# Patient Record
Sex: MALE | Race: WHITE | NOT HISPANIC OR LATINO | Employment: FULL TIME | URBAN - METROPOLITAN AREA
[De-identification: names, ages, dates, MRNs, and addresses within clinical notes are randomized per-mention and may not be internally consistent; named-entity substitution may affect disease eponyms.]

---

## 2017-01-03 ENCOUNTER — APPOINTMENT (OUTPATIENT)
Dept: VASCULAR LAB | Facility: MEDICAL CENTER | Age: 72
End: 2017-01-03
Payer: MEDICARE

## 2017-01-03 ENCOUNTER — TELEMEDICINE ORIGINATING SITE VISIT (OUTPATIENT)
Dept: MEDICAL GROUP | Facility: PHYSICIAN GROUP | Age: 72
End: 2017-01-03
Payer: MEDICARE

## 2017-01-03 ENCOUNTER — NON-PROVIDER VISIT (OUTPATIENT)
Dept: MEDICAL GROUP | Facility: PHYSICIAN GROUP | Age: 72
End: 2017-01-03
Payer: MEDICARE

## 2017-01-03 ENCOUNTER — ANTICOAGULATION MONITORING (OUTPATIENT)
Dept: VASCULAR LAB | Facility: MEDICAL CENTER | Age: 72
End: 2017-01-03

## 2017-01-03 VITALS — DIASTOLIC BLOOD PRESSURE: 88 MMHG | HEART RATE: 96 BPM | SYSTOLIC BLOOD PRESSURE: 146 MMHG

## 2017-01-03 DIAGNOSIS — I48.91 ATRIAL FIBRILLATION, UNSPECIFIED TYPE (HCC): ICD-10-CM

## 2017-01-03 DIAGNOSIS — Z79.01 LONG TERM CURRENT USE OF ANTICOAGULANT THERAPY: ICD-10-CM

## 2017-01-03 DIAGNOSIS — R79.1 INR (INTERNATIONAL NORMAL RATIO) ABNORMAL: ICD-10-CM

## 2017-01-03 DIAGNOSIS — Z79.01 LONG TERM CURRENT USE OF ANTICOAGULANT THERAPY: Chronic | ICD-10-CM

## 2017-01-03 LAB
INR BLD: 2.1 (ref 0.9–1.2)
INR PPP: 2.1 (ref 2–3.5)
POC PROTIME: ABNORMAL

## 2017-01-03 PROCEDURE — 85610 PROTHROMBIN TIME: CPT | Performed by: PHYSICIAN ASSISTANT

## 2017-01-03 NOTE — PROGRESS NOTES
Anticoagulation Summary as of 1/3/2017     INR goal 2.0-3.0   Selected INR 2.1 (1/3/2017)   Maintenance plan 5 mg (5 mg x 1) every day   Weekly total 35 mg   No change documented Hardeep Hansen, IDA   Plan last modified MARIAA Stinson (9/20/2016)   Next INR check 2/14/2017   Target end date Indefinite    Indications   Atrial fibrillation (HCC) [I48.91]  Long term current use of anticoagulant therapy [Z79.01]  Atrial fibrillation (HCC) [I48.91]         Anticoagulation Episode Summary     INR check location     Preferred lab     Send INR reminders to     Comments       Anticoagulation Care Providers     Provider Role Specialty Phone number    Sanjay Sorensen M.D. Referring Cardiology [phone]        Anticoagulation Patient Findings    No current signs of bleeding. No interval medication changes. Continue current dose of warfarin. Follow up INR in 6 weeks.      THIS VISIT CONDUCTED WITH PRESENTER [employer]IA [employer] UTILIZING SECURE AND ENCRYPTED VIDEOCONFERENCING EQUIPMENT    Hardeep Hansen, DANICAD

## 2017-02-14 ENCOUNTER — APPOINTMENT (OUTPATIENT)
Dept: VASCULAR LAB | Facility: MEDICAL CENTER | Age: 72
End: 2017-02-14
Payer: MEDICARE

## 2017-02-14 ENCOUNTER — NON-PROVIDER VISIT (OUTPATIENT)
Dept: MEDICAL GROUP | Facility: PHYSICIAN GROUP | Age: 72
End: 2017-02-14
Payer: MEDICARE

## 2017-02-14 ENCOUNTER — ANTICOAGULATION MONITORING (OUTPATIENT)
Dept: VASCULAR LAB | Facility: MEDICAL CENTER | Age: 72
End: 2017-02-14

## 2017-02-14 VITALS — DIASTOLIC BLOOD PRESSURE: 78 MMHG | SYSTOLIC BLOOD PRESSURE: 142 MMHG | HEART RATE: 92 BPM

## 2017-02-14 DIAGNOSIS — Z79.01 LONG TERM CURRENT USE OF ANTICOAGULANT THERAPY: ICD-10-CM

## 2017-02-14 DIAGNOSIS — Z79.01 CHRONIC ANTICOAGULATION: ICD-10-CM

## 2017-02-14 DIAGNOSIS — I48.91 ATRIAL FIBRILLATION, UNSPECIFIED TYPE (HCC): ICD-10-CM

## 2017-02-14 LAB
INR BLD: 2.5 (ref 0.9–1.2)
INR PPP: 2.5 (ref 2–3.5)
POC PROTIME: ABNORMAL

## 2017-02-14 PROCEDURE — 85610 PROTHROMBIN TIME: CPT | Performed by: PHYSICIAN ASSISTANT

## 2017-02-14 NOTE — PROGRESS NOTES
Anticoagulation Summary as of 2/14/2017     INR goal 2.0-3.0   Selected INR 2.5 (2/14/2017)   Maintenance plan 5 mg (5 mg x 1) every day   Weekly total 35 mg   No change documented Hardeep Hansen, PHARMD   Plan last modified AMRIAA Stinson (9/20/2016)   Next INR check 3/28/2017   Target end date Indefinite    Indications   Atrial fibrillation (CMS-[facility]) [I48.91]  Long term current use of anticoagulant therapy [Z79.01]  Atrial fibrillation (CMS-[facility]) [I48.91]         Anticoagulation Episode Summary     INR check location     Preferred lab     Send INR reminders to     Comments       Anticoagulation Care Providers     Provider Role Specialty Phone number    Sanjay Sorensen M.D. Referring Cardiology [phone]        Anticoagulation Patient Findings    No current signs of bleeding. No interval medication changes. Continue current dose of warfarin. Follow up INR in 6 weeks.    Hardeep Hansen, PHARMD

## 2017-02-21 ENCOUNTER — HOSPITAL ENCOUNTER (OUTPATIENT)
Dept: LAB | Facility: MEDICAL CENTER | Age: 72
End: 2017-02-21
Attending: UROLOGY
Payer: MEDICARE

## 2017-02-21 LAB — PSA SERPL DL<=0.01 NG/ML-MCNC: 2.05 NG/ML (ref 0–4)

## 2017-02-21 PROCEDURE — 84153 ASSAY OF PSA TOTAL: CPT

## 2017-02-21 PROCEDURE — 36415 COLL VENOUS BLD VENIPUNCTURE: CPT

## 2017-03-28 ENCOUNTER — ANTICOAGULATION MONITORING (OUTPATIENT)
Dept: VASCULAR LAB | Facility: MEDICAL CENTER | Age: 72
End: 2017-03-28

## 2017-03-28 ENCOUNTER — APPOINTMENT (OUTPATIENT)
Dept: VASCULAR LAB | Facility: MEDICAL CENTER | Age: 72
End: 2017-03-28
Attending: NURSE PRACTITIONER
Payer: MEDICARE

## 2017-03-28 VITALS — HEART RATE: 118 BPM | SYSTOLIC BLOOD PRESSURE: 150 MMHG | DIASTOLIC BLOOD PRESSURE: 80 MMHG

## 2017-03-28 DIAGNOSIS — Z79.01 LONG TERM CURRENT USE OF ANTICOAGULANT THERAPY: ICD-10-CM

## 2017-03-28 DIAGNOSIS — I48.91 ATRIAL FIBRILLATION, UNSPECIFIED TYPE (HCC): ICD-10-CM

## 2017-03-28 LAB — INR PPP: 1.9 (ref 2–3.5)

## 2017-03-28 NOTE — PROGRESS NOTES
Anticoagulation Summary as of 3/28/2017     INR goal 2.0-3.0   Selected INR 1.9! (3/28/2017)   Maintenance plan 5 mg (5 mg x 1) every day   Weekly total 35 mg   Plan last modified MARIAA Stinson (9/20/2016)   Next INR check 4/25/2017   Target end date Indefinite    Indications   Atrial fibrillation (CMS-HCC) [I48.91]  Long term current use of anticoagulant therapy [Z79.01]  Atrial fibrillation (CMS-HCC) [I48.91]         Anticoagulation Episode Summary     INR check location     Preferred lab     Send INR reminders to     Comments       Anticoagulation Care Providers     Provider Role Specialty Phone number    Sanjay Sorensen M.D. Referring Cardiology [phone]        Anticoagulation Patient Findings    Patient's INR was slightly subtherapeutic today in clinic.    Pt denies any unusual s/s of bleeding, bruising, clotting or any changes to diet or medications.  Confirmed dosing regimen.  Pt is to take an extra 1/2 tab of warfarin this evening.      THIS VISIT CONDUCTED WITH [employer] UTIL[employer]ING SECURE AND ENCRYPTED VIDEOCONFERENCING EQUIPMENT      ROS:    Pulm: Denies SOB, chest pain.    Card: Denies syncope, edema, palpitations.    Extremities: Denies redness, pain.     PE:    Pulm: No SOB, even and unlabored.      Medication: Warfarin (Coumadin)     Sunday Monday Tuesday Wednesday Thursday Friday Saturday        5 mg      5 mg      5 mg      5 mg      5 mg      5 mg      5 mg        1 tab(s)      1 tab(s)      1 tab(s)      1 tab(s)      1 tab(s)      1 tab(s)    1 tab(s)               Next Visit 4/25/17 @ 1:30 pm      Follow up in 4 weeks.    Hardeep Hansen, PHARMD

## 2017-04-05 ENCOUNTER — HOSPITAL ENCOUNTER (OUTPATIENT)
Dept: LAB | Facility: MEDICAL CENTER | Age: 72
End: 2017-04-05
Attending: FAMILY MEDICINE
Payer: MEDICARE

## 2017-04-05 ENCOUNTER — HOSPITAL ENCOUNTER (OUTPATIENT)
Dept: LAB | Facility: MEDICAL CENTER | Age: 72
End: 2017-04-05
Attending: INTERNAL MEDICINE
Payer: MEDICARE

## 2017-04-05 LAB
ALBUMIN SERPL BCP-MCNC: 3.9 G/DL (ref 3.2–4.9)
BUN SERPL-MCNC: 24 MG/DL (ref 8–22)
CALCIUM SERPL-MCNC: 9.2 MG/DL (ref 8.5–10.5)
CHLORIDE SERPL-SCNC: 103 MMOL/L (ref 96–112)
CHOLEST SERPL-MCNC: 182 MG/DL (ref 100–199)
CO2 SERPL-SCNC: 30 MMOL/L (ref 20–33)
CREAT SERPL-MCNC: 1.6 MG/DL (ref 0.5–1.4)
EST. AVERAGE GLUCOSE BLD GHB EST-MCNC: 209 MG/DL
GFR SERPL CREATININE-BSD FRML MDRD: 43 ML/MIN/1.73 M 2
GLUCOSE SERPL-MCNC: 198 MG/DL (ref 65–99)
HBA1C MFR BLD: 8.9 % (ref 0–5.6)
HDLC SERPL-MCNC: 46 MG/DL
LDLC SERPL CALC-MCNC: 123 MG/DL
PHOSPHATE SERPL-MCNC: 2.4 MG/DL (ref 2.5–4.5)
POTASSIUM SERPL-SCNC: 4.5 MMOL/L (ref 3.6–5.5)
SODIUM SERPL-SCNC: 138 MMOL/L (ref 135–145)
TRIGL SERPL-MCNC: 63 MG/DL (ref 0–149)

## 2017-04-05 PROCEDURE — 80061 LIPID PANEL: CPT

## 2017-04-05 PROCEDURE — 83036 HEMOGLOBIN GLYCOSYLATED A1C: CPT | Mod: GA

## 2017-04-25 ENCOUNTER — APPOINTMENT (OUTPATIENT)
Dept: VASCULAR LAB | Facility: MEDICAL CENTER | Age: 72
End: 2017-04-25
Payer: MEDICARE

## 2017-04-25 ENCOUNTER — NON-PROVIDER VISIT (OUTPATIENT)
Dept: MEDICAL GROUP | Facility: PHYSICIAN GROUP | Age: 72
End: 2017-04-25
Payer: MEDICARE

## 2017-04-25 ENCOUNTER — ANTICOAGULATION MONITORING (OUTPATIENT)
Dept: VASCULAR LAB | Facility: MEDICAL CENTER | Age: 72
End: 2017-04-25
Payer: MEDICARE

## 2017-04-25 DIAGNOSIS — Z79.01 LONG TERM CURRENT USE OF ANTICOAGULANT THERAPY: ICD-10-CM

## 2017-04-25 DIAGNOSIS — Z79.01 CHRONIC ANTICOAGULATION: ICD-10-CM

## 2017-04-25 LAB
INR BLD: 3.8 (ref 0.9–1.2)
INR PPP: 3.9 (ref 2–3.5)
POC PROTIME: ABNORMAL

## 2017-04-25 PROCEDURE — 85610 PROTHROMBIN TIME: CPT | Performed by: PHYSICIAN ASSISTANT

## 2017-04-25 NOTE — PROGRESS NOTES
Anticoagulation Summary as of 4/25/2017     INR goal 2.0-3.0   Selected INR 3.9! (4/25/2017)   Maintenance plan 5 mg (5 mg x 1) every day   Weekly total 35 mg   Plan last modified MARIAA Stinson (9/20/2016)   Next INR check 5/23/2017   Target end date Indefinite    Indications   Atrial fibrillation (CMS-HCC) [I48.91]  Long term current use of anticoagulant therapy [Z79.01]  Atrial fibrillation (CMS-HCC) [I48.91]         Anticoagulation Episode Summary     INR check location     Preferred lab     Send INR reminders to     Comments       Anticoagulation Care Providers     Provider Role Specialty Phone number    Sanjay Sorensen M.D. Referring Cardiology [phone]        Anticoagulation Patient Findings      OP Anticoagulation Service Note  /72   ROS:    Pulm: Denies SOB, chest pain.    Card: Denies syncope, edema, palpitations.    E  xtremities: Denies redness, pain.     PE:    Pulm: No SOB, even and unlabored.     Extremities: No redness, or edema.     Pt denies any unusual s/s of bleeding, bruising, clotting or any changes to diet or medications.  States he took an extra half tablet two days ago, likely the cause of elevated INR.  Confirmed dosing regimen.     Today only : take 2.5 mg (1/2 tablet)  Then:  Medication: Warfarin (Coumadin)     Sunday Monday Tuesday Wednesday Thursday Friday Saturday         5 mg       5 mg       5 mg       5 mg       5 mg       5 mg       5 mg         1 tab(s)       1 tab(s)       1 tab(s)       1 tab(s)       1 tab(s)       1 tab(s)     1 tab(s)                              Next Appointment: Monday, 5/23/17 at 1:00     Review all of your home medications and newly ordered medications with your doctor and / or pharmacist. Follow medication instructions as directed by your doctor and / or pharmacist. Please keep your medication list with you and share with your physician. Update the information when medications are discontinued,  doses are changed, or new medications (including over-the-counter products) are added; and carry medication information at all times in the event of emergency situations.      For questions, please contact Outpatient Anticoagulation Service [phone].     DANICA AlonzoD

## 2017-05-01 ENCOUNTER — TELEPHONE (OUTPATIENT)
Dept: CARDIOLOGY | Facility: MEDICAL CENTER | Age: 72
End: 2017-05-01

## 2017-05-01 NOTE — TELEPHONE ENCOUNTER
PIPO/Layla     Patient is calling to confirm you received fax was received for dental clearance to stop his coumadin. He can be reached at [phone], or on his cell at [phone]            Pt states that Dentist office faxed over clearance on Friday. Informed pt that I have not received fax yet at this time, pt states he is wanting to get extractions done ASAP and would like a response. Educated pt that he can contact coumadin clinic for immediate response for clearance on coumadin. Pt to call clinic. Will fax over clearance to coumadin clinic when it is received.

## 2017-05-03 DIAGNOSIS — I48.91 ATRIAL FIBRILLATION, UNSPECIFIED TYPE (HCC): Chronic | ICD-10-CM

## 2017-05-19 ENCOUNTER — HOSPITAL ENCOUNTER (OUTPATIENT)
Dept: LAB | Facility: MEDICAL CENTER | Age: 72
End: 2017-05-19
Attending: INTERNAL MEDICINE
Payer: MEDICARE

## 2017-05-19 LAB
ANION GAP SERPL CALC-SCNC: 6 MMOL/L (ref 0–11.9)
BUN SERPL-MCNC: 24 MG/DL (ref 8–22)
CALCIUM SERPL-MCNC: 9.2 MG/DL (ref 8.5–10.5)
CHLORIDE SERPL-SCNC: 103 MMOL/L (ref 96–112)
CO2 SERPL-SCNC: 29 MMOL/L (ref 20–33)
CREAT SERPL-MCNC: 1.28 MG/DL (ref 0.5–1.4)
GFR SERPL CREATININE-BSD FRML MDRD: 55 ML/MIN/1.73 M 2
GLUCOSE SERPL-MCNC: 157 MG/DL (ref 65–99)
POTASSIUM SERPL-SCNC: 4.6 MMOL/L (ref 3.6–5.5)
SODIUM SERPL-SCNC: 138 MMOL/L (ref 135–145)

## 2017-05-19 PROCEDURE — 80048 BASIC METABOLIC PNL TOTAL CA: CPT

## 2017-05-19 PROCEDURE — 36415 COLL VENOUS BLD VENIPUNCTURE: CPT

## 2017-05-23 ENCOUNTER — NON-PROVIDER VISIT (OUTPATIENT)
Dept: MEDICAL GROUP | Facility: PHYSICIAN GROUP | Age: 72
End: 2017-05-23
Payer: MEDICARE

## 2017-05-23 ENCOUNTER — ANTICOAGULATION MONITORING (OUTPATIENT)
Dept: VASCULAR LAB | Facility: MEDICAL CENTER | Age: 72
End: 2017-05-23

## 2017-05-23 ENCOUNTER — APPOINTMENT (OUTPATIENT)
Dept: VASCULAR LAB | Facility: MEDICAL CENTER | Age: 72
End: 2017-05-23
Payer: MEDICARE

## 2017-05-23 DIAGNOSIS — Z79.01 LONG TERM CURRENT USE OF ANTICOAGULANT THERAPY: ICD-10-CM

## 2017-05-23 DIAGNOSIS — I48.91 ATRIAL FIBRILLATION, UNSPECIFIED TYPE (HCC): ICD-10-CM

## 2017-05-23 DIAGNOSIS — Z79.01 CHRONIC ANTICOAGULATION: ICD-10-CM

## 2017-05-23 LAB
INR BLD: 2.4 (ref 0.9–1.2)
INR PPP: 2.4 (ref 2–3.5)
POC PROTIME: ABNORMAL

## 2017-05-23 PROCEDURE — 85610 PROTHROMBIN TIME: CPT | Performed by: PHYSICIAN ASSISTANT

## 2017-05-23 NOTE — PROGRESS NOTES
OP Anticoagulation Service Note    Date: 5/23/2017       Anticoagulation Summary as of 5/23/2017     INR goal 2.0-3.0   Selected INR 2.4 (5/23/2017)   Maintenance plan 5 mg (5 mg x 1) every day   Weekly total 35 mg   Plan last modified MARIAA Stinson (9/20/2016)   Next INR check 7/4/2017   Target end date Indefinite    Indications   Atrial fibrillation (CMS-HCC) [I48.91]  Long term current use of anticoagulant therapy [Z79.01]  Atrial fibrillation (CMS-HCC) [I48.91]         Anticoagulation Episode Summary     INR check location     Preferred lab     Send INR reminders to     Comments       Anticoagulation Care Providers     Provider Role Specialty Phone number    Sanjay Sorensen M.D. Referring Cardiology [phone]        Anticoagulation Patient Findings   Negatives Missed Doses, Extra Doses, Medication Changes, Antibiotic Use, Diet Changes, Dental/Other Procedures, Hospitalization, Bleeding Gums, Nose Bleeds, Blood in Urine, Blood in Stool, Any Bruising, Other Complaints    Comments Bp 142/88  Hr 92          THIS VISIT CONDUCTED WITH [employer] UTILIZING SECURE AND ENCRYPTED VIDEOCONFERENCING EQUIPMENT  ROS:    Pulm: Denies SOB, chest pain.    Card: Denies syncope, edema, palpitations.    Extremities: Denies redness, pain.   PE:    Pulm: No SOB, even and unlabored.    Card: Normal rate and rhythm.    Extremities: No redness, or edema.     INR  therapeutic.    Denies signs/symptoms of bleeding and/or thrombosis.    Denies changes to diet or medications.   Follow up appt in 6 weeks     Plan:  Continue weekly warfarin dose as noted      Medication: Warfarin (Coumadin)     Sunday Monday Tuesday Wednesday Thursday Friday Saturday      5 mg    5 mg    5 mg    5 mg    5 mg    5 mg    5 mg      1 tab(s)    1 tab(s)    1 tab(s)    1 tab(s)    1 tab(s)    1 tab(s)  1 tab(s)     Next Appointment: Tuesday, 7/11/2017 at 1:30pm      Review all of your home medications and newly ordered  medications with your doctor and / or pharmacist. Follow medication instructions as directed by your doctor and / or pharmacist. Please keep your medication list with you and share with your physician. Update the information when medications are discontinued, doses are changed, or new medications (including over-the-counter products) are added; and carry medication information at all times in the event of emergency situations.      For questions, please contact Outpatient Anticoagulation Service [phone].     Ilya Rachel, DANICAD

## 2017-05-31 ENCOUNTER — HOSPITAL ENCOUNTER (OUTPATIENT)
Dept: LAB | Facility: MEDICAL CENTER | Age: 72
End: 2017-05-31
Attending: NURSE PRACTITIONER
Payer: MEDICARE

## 2017-05-31 LAB
INR PPP: 2.15 (ref 0.87–1.13)
PROTHROMBIN TIME: 24.7 SEC (ref 12–14.6)

## 2017-05-31 PROCEDURE — 36415 COLL VENOUS BLD VENIPUNCTURE: CPT

## 2017-05-31 PROCEDURE — 85610 PROTHROMBIN TIME: CPT

## 2017-06-01 ENCOUNTER — ANTICOAGULATION MONITORING (OUTPATIENT)
Dept: VASCULAR LAB | Facility: MEDICAL CENTER | Age: 72
End: 2017-06-01

## 2017-06-01 DIAGNOSIS — I48.91 ATRIAL FIBRILLATION, UNSPECIFIED TYPE (HCC): ICD-10-CM

## 2017-06-01 DIAGNOSIS — Z79.01 LONG TERM CURRENT USE OF ANTICOAGULANT THERAPY: ICD-10-CM

## 2017-06-01 NOTE — PROGRESS NOTES
Anticoagulation Summary as of 6/1/2017     INR goal 2.0-3.0   Selected INR 2.15 (5/31/2017)   Maintenance plan 5 mg (5 mg x 1) every day   Weekly total 35 mg   Plan last modified MARIAA Stinson (9/20/2016)   Next INR check 6/15/2017   Target end date Indefinite    Indications   Atrial fibrillation (CMS-HCC) [I48.91]  Long term current use of anticoagulant therapy [Z79.01]  Atrial fibrillation (CMS-HCC) [I48.91]         Anticoagulation Episode Summary     INR check location     Preferred lab     Send INR reminders to     Comments       Anticoagulation Care Providers     Provider Role Specialty Phone number    Sanjay Sorensen M.D. Referring Cardiology [phone]        Anticoagulation Patient Findings      Current Outpatient Prescriptions on File Prior to Visit   Medication Sig Dispense Refill   • warfarin (COUMADIN) 5 MG Tab Take one to one and one-half (1-1.5) tablets daily as directed by coumadin clinic 135 Tab 1   • warfarin (COUMADIN) 5 MG Tab TAKE 1 TABLET BY MOUTH EVERY DAY AS DIRECTED BY COUMADIN CLINIC 90 Tab 3   • montelukast (SINGULAIR) 10 MG Tab Take 1 Tab by mouth every day. 30 Tab 11   • Insulin Lispro, Human, (HUMALOG PEN SC) Inject  as instructed.     • fluticasone (FLONASE) 50 MCG/ACT nasal spray Spray 1 Spray in nose every day.     • atorvastatin (LIPITOR) 20 MG TABS   0   • Blood Glucose Monitoring Suppl (FREESTYLE FREEDOM LITE) W/DEVICE KIT   0   • carvedilol (COREG) 6.25 MG TABS   1   • FREESTYLE LITE strip   11   • HUMALOG KWIKPEN 100 UNIT/ML SOPN injection   11   • LEVEMIR FLEXTOUCH 100 UNIT/ML SOPN injection   11   • NOVOFINE 32G X 6 MM MISC   5   • FreeStyle Lancets MISC   0   • NON SPECIFIED Blood glucose monitor with testing supplies.  Strips # 100 to test qid. Lancets # 100 to obtain blood sample. 1 Not Specified 0   • CARTIA  MG CP24 TAKE ONE CAPSULE BY MOUTH DAILY 90 Cap 2   • lisinopril (PRINIVIL) 10 MG TABS Take 1 Tab by mouth every day. 30 Tab 1   • metformin  (GLUCOPHAGE) 500 MG TABS Take 1 Tab by mouth 2 times a day, with meals. 60 Tab 1   • albuterol (VENTOLIN OR PROVENTIL) 108 (90 BASE) MCG/ACT AERS Inhale 2 Puffs by mouth every 6 hours as needed.     • Mometasone Furo-Formoterol Fum (DULERA) 200-5 MCG/ACT AERO Inhale 2 Puffs by mouth every day.       • Nasal Wash (ALKALOL NA) Spray  in nose every day.       No current facility-administered medications on file prior to visit.       Lab Results   Component Value Date/Time    SODIUM 138 05/19/2017 11:35 AM    POTASSIUM 4.6 05/19/2017 11:35 AM    CHLORIDE 103 05/19/2017 11:35 AM    CO2 29 05/19/2017 11:35 AM    GLUCOSE 157* 05/19/2017 11:35 AM    BUN 24* 05/19/2017 11:35 AM    CREATININE 1.28 05/19/2017 11:35 AM        Spoke to patient on the phone.   INR  therapeutic.   Denies signs/symptoms of bleeding and/or thrombosis.   Denies changes to diet or medications.   Follow up appointment in 2 week(s).    Continue weekly warfarin dose as noted    Ilya Rachel, PHARMD

## 2017-06-13 ENCOUNTER — APPOINTMENT (OUTPATIENT)
Dept: MEDICAL GROUP | Facility: PHYSICIAN GROUP | Age: 72
End: 2017-06-13
Payer: MEDICARE

## 2017-06-13 ENCOUNTER — ANTICOAGULATION MONITORING (OUTPATIENT)
Dept: VASCULAR LAB | Facility: MEDICAL CENTER | Age: 72
End: 2017-06-13

## 2017-06-13 ENCOUNTER — APPOINTMENT (OUTPATIENT)
Dept: VASCULAR LAB | Facility: MEDICAL CENTER | Age: 72
End: 2017-06-13
Payer: MEDICARE

## 2017-06-13 ENCOUNTER — NON-PROVIDER VISIT (OUTPATIENT)
Dept: MEDICAL GROUP | Facility: PHYSICIAN GROUP | Age: 72
End: 2017-06-13
Payer: MEDICARE

## 2017-06-13 DIAGNOSIS — Z79.01 CHRONIC ANTICOAGULATION: ICD-10-CM

## 2017-06-13 DIAGNOSIS — I48.91 ATRIAL FIBRILLATION, UNSPECIFIED TYPE (HCC): ICD-10-CM

## 2017-06-13 DIAGNOSIS — Z79.01 LONG TERM CURRENT USE OF ANTICOAGULANT THERAPY: ICD-10-CM

## 2017-06-13 LAB
INR BLD: 2.2 (ref 0.9–1.2)
INR PPP: 2.2 (ref 2–3.5)
POC PROTIME: ABNORMAL

## 2017-06-13 PROCEDURE — 85610 PROTHROMBIN TIME: CPT | Performed by: PHYSICIAN ASSISTANT

## 2017-06-13 NOTE — PROGRESS NOTES
OP Anticoagulation Service Note    Date: 6/13/2017       Anticoagulation Summary as of 6/13/2017     INR goal 2.0-3.0   Selected INR    Maintenance plan 5 mg (5 mg x 1) every day   Weekly total 35 mg   Plan last modified MARIAA Stinson (9/20/2016)   Next INR check    Target end date Indefinite    Indications   Atrial fibrillation (CMS-HCC) [I48.91]  Long term current use of anticoagulant therapy [Z79.01]  Atrial fibrillation (CMS-HCC) [I48.91]         Anticoagulation Episode Summary     INR check location     Preferred lab     Send INR reminders to     Comments       Anticoagulation Care Providers     Provider Role Specialty Phone number    Sanjay Sorensen M.D. Referring Cardiology [phone]        Anticoagulation Patient Findings   Comments bp 148/76  HR 98          THIS VISIT CONDUCTED WITH [employer] UTILIZING SECURE AND ENCRYPTED VIDEOCONFERENCING EQUIPMENT  ROS:    Pulm: Denies SOB, chest pain.    Card: Denies syncope, edema, palpitations.    Extremities: Denies redness, pain.   PE:    Pulm: No SOB, even and unlabored.    Card: Normal rate and rhythm.    Extremities: No redness, or edema.     INR  therapeutic.  He will hold for a dental procedure on 6/23, he needs to be 1.5-2   Denies signs/symptoms of bleeding and/or thrombosis.    Denies changes to diet or medications.   Follow up appt in 1 weeks     Plan:  Hold the 20th the 21st and only 2.5mg on the 22nd      Next Appointment: 6/23/2017 at 9am     Review all of your home medications and newly ordered medications with your doctor and / or pharmacist. Follow medication instructions as directed by your doctor and / or pharmacist. Please keep your medication list with you and share with your physician. Update the information when medications are discontinued, doses are changed, or new medications (including over-the-counter products) are added; and carry medication information at all times in the event of emergency situations.       For questions, please contact Outpatient Anticoagulation Service [phone].     Luz Graf

## 2017-06-23 ENCOUNTER — ANTICOAGULATION VISIT (OUTPATIENT)
Dept: MEDICAL GROUP | Facility: PHYSICIAN GROUP | Age: 72
End: 2017-06-23
Payer: MEDICARE

## 2017-06-23 VITALS — HEART RATE: 89 BPM | DIASTOLIC BLOOD PRESSURE: 106 MMHG | SYSTOLIC BLOOD PRESSURE: 160 MMHG

## 2017-06-23 DIAGNOSIS — I48.91 ATRIAL FIBRILLATION, UNSPECIFIED TYPE (HCC): ICD-10-CM

## 2017-06-23 DIAGNOSIS — Z79.01 LONG TERM CURRENT USE OF ANTICOAGULANT THERAPY: ICD-10-CM

## 2017-06-23 LAB — INR PPP: 1.3 (ref 2–3.5)

## 2017-06-23 PROCEDURE — 85610 PROTHROMBIN TIME: CPT | Performed by: EMERGENCY MEDICINE

## 2017-06-23 NOTE — MR AVS SNAPSHOT
Yuri De León   2017 9:00 AM   Anticoagulation Visit   MRN: [id number]    Department:  [facility]   Dept Phone:  [phone]    Description:  Male : 1945   Provider:  [facility]           Allergies as of 2017     Allergen Noted Reactions    Atenolol 2009       DYSPNEA.    Tetanus Toxoid 2010   Swelling      You were diagnosed with     Atrial fibrillation, unspecified type (CMS-HCC)   [2269720]       Long term current use of anticoagulant therapy   [6631722]         Vital Signs     Blood Pressure Pulse                160/106 mmHg 89          Basic Information     Date Of Birth Sex Race Ethnicity Preferred Language    1945 Male White Non- English      Your appointments     2017  1:30 PM   Telemedicine Clinic Established Pt with [facility] EXAM 4, TELEMED [facility], TELEMED [facility] VASCULAR MED   [facility]  (--)    [address] [address]   [phone]              Problem List              ICD-10-CM Priority Class Noted - Resolved    Abnormal electrocardiogram (Chronic) R94.31   2010 - Present    Atrial fibrillation (CMS-HCC) (Chronic) I48.91   10/25/2011 - Present    Bronchospasm (Chronic) J98.01   2009 - Present    Long term current use of anticoagulant therapy (Chronic) Z79.01   10/25/2011 - Present    HTN (hypertension) (Chronic) I10   10/25/2011 - Present    Nasal polyp (Chronic) J33.9   2009 - Present    Sleep apnea (Chronic) G47.30   2009 - Present    Other nonspecific abnormal finding (Chronic) R68.89   2009 - Present    Preop cardiovascular exam Z01.810   9/15/2014 - Present    Hypokalemia E87.6   2015 - Present    Hyperlipidemia E78.5   2015 - Present    Hyperglycemia R73.9   2015 - Present    Hypertensive urgency I16.0   2015 - Present    Diabetes mellitus, type II (CMS-[facility]) E11.9   2015 - Present    Abnormal nuclear cardiac imaging test R93.1   2/26/2015 - Present    Nocturnal polyuria R35.1   5/6/2015 - Present    CKD (chronic kidney disease) stage 4, GFR 15-29 ml/min (CMS-HCC) N18.4   5/6/2015 - Present    Leg edema, left R60.0   5/6/2015 - Present    Urinary obstruction N13.9   9/19/2015 - Present    Atrial fibrillation (CMS-HCC) I48.91   10/7/2015 - Present    'Light-for-dates' infant with signs of fetal malnutrition P05.00   5/1/2017 - Present      Health Maintenance        Date Due Completion Dates    RETINAL SCREENING 4/15/1963 ---    IMM DTaP/Tdap/Td Vaccine (1 - Tdap) 4/15/1964 ---    COLONOSCOPY 4/15/1995 ---    IMM ZOSTER VACCINE 4/15/2005 ---    IMM PNEUMOCOCCAL 65+ (ADULT) HIGH/HIGHEST RISK SERIES (1 of 2 - PCV13) 4/15/2010 ---    DIABETES MONOFILAMENT / LE EXAM 5/6/2016 5/6/2015    URINE ACR / MICROALBUMIN 8/12/2017 8/12/2016, 4/5/2016, 11/30/2015, 9/11/2015, 6/11/2015, 4/20/2015, 2/23/2015    A1C SCREENING 10/5/2017 4/5/2017, 5/13/2016, 10/21/2015, 4/20/2015, 2/23/2015, 2/6/2015    FASTING LIPID PROFILE 4/5/2018 4/5/2017    SERUM CREATININE 5/19/2018 5/19/2017, 4/5/2017, 12/14/2016, 8/12/2016, 4/5/2016, 1/11/2016, 11/30/2015, 9/8/2015, 6/11/2015, 4/9/2015, 4/3/2015, 2/23/2015, 2/7/2015, 2/6/2015, 9/2/2010, 9/1/2010, 8/23/2010            Results     POCT Protime      Component    INR    1.3    Comment:     ic valid [id number] exp 03/2018                        Current Immunizations     No immunizations on file.      Below and/or attached are the medications your provider expects you to take. Review all of your home medications and newly ordered medications with your provider and/or pharmacist. Follow medication instructions as directed by your provider and/or pharmacist. Please keep your medication list with you and share with your provider. Update the information when medications are discontinued, doses are changed, or new medications (including over-the-counter products) are added; and carry  medication information at all times in the event of emergency situations     Allergies:  ATENOLOL - (reactions not documented)     TETANUS TOXOID - Swelling               Medications  Valid as of: June 23, 2017 -  9:04 AM    Generic Name Brand Name Tablet Size Instructions for use    Albuterol Sulfate (Aero Soln) albuterol 108 (90 BASE) MCG/ACT Inhale 2 Puffs by mouth every 6 hours as needed.        Atorvastatin Calcium (Tab) LIPITOR 20 MG         Blood Glucose Monitoring Suppl (Kit) FREESTYLE FREEDOM LITE W/DEVICE         Carvedilol (Tab) COREG 6.25 MG         DilTIAZem HCl Coated Beads (CAPSULE SR 24 HR) CARTIA  MG TAKE ONE CAPSULE BY MOUTH DAILY        Fluticasone Propionate (Suspension) FLONASE 50 MCG/ACT Spray 1 Spray in nose every day.        Glucose Blood (Strip) FREESTYLE LITE          Insulin Detemir (Solution Pen-injector) LEVEMIR FLEXTOUCH 100 UNIT/ML         Insulin Lispro   Inject  as instructed.        Insulin Lispro (Solution Pen-injector) HUMALOG KWIKPEN 100 UNIT/ML         Insulin Pen Needle (Misc) NOVOFINE 32G X 6 MM         Lancets (Misc) FREESTYLE LANCETS          Lisinopril (Tab) PRINIVIL 10 MG Take 1 Tab by mouth every day.        MetFORMIN HCl (Tab) GLUCOPHAGE 500 MG Take 1 Tab by mouth 2 times a day, with meals.        Mometasone Furo-Formoterol Fum (Aerosol) Mometasone Furo-Formoterol Fum 200-5 MCG/ACT Inhale 2 Puffs by mouth every day.          Montelukast Sodium (Tab) SINGULAIR 10 MG Take 1 Tab by mouth every day.        Nasal Wash   Spray  in nose every day.        NON SPECIFIED   Blood glucose monitor with testing supplies.  Strips # 100 to test qid. Lancets # 100 to obtain blood sample.        Warfarin Sodium (Tab) COUMADIN 5 MG TAKE 1 TABLET BY MOUTH EVERY DAY AS DIRECTED BY COUMADIN CLINIC        Warfarin Sodium (Tab) COUMADIN 5 MG Take one to one and one-half (1-1.5) tablets daily as directed by coumadin clinic        .                 Medicines prescribed today were sent to:       [id number] - [place], [place] - [address] [facility] [address]    [address] [place] [address]    Phone: [phone] Fax: [phone]    Open 24 Hours?: No      Medication refill instructions:       If your prescription bottle indicates you have medication refills left, it is not necessary to call your provider’s office. Please contact your pharmacy and they will refill your medication.    If your prescription bottle indicates you do not have any refills left, you may request refills at any time through one of the following ways: The online [employer] system (except Urgent Care), by calling your provider’s office, or by asking your pharmacy to contact your provider’s office with a refill request. Medication refills are processed only during regular business hours and may not be available until the next business day. Your provider may request additional information or to have a follow-up visit with you prior to refilling your medication.   *Please Note: Medication refills are assigned a new Rx number when refilled electronically. Your pharmacy may indicate that no refills were authorized even though a new prescription for the same medication is available at the pharmacy. Please request the medicine by name with the pharmacy before contacting your provider for a refill.        Warfarin Dosing Calendar   June 2017 Details    Sun Mon Tue Wed Thu Fri Sat         1               2               3                 4               5               6               7               8               9               10                 11               12               13               14               15               16               17                 18               19               20               21               22               23   1.3   7.5 mg   See details      24      7.5 mg           25      5 mg         26      5 mg         27      5 mg         28      5 mg         29      5 mg         30       5 mg           Date Details   06/23 This INR check   INR: 1.3   ic valid [id number] 160 exp 03/2018               How to take your warfarin dose     To take:  5 mg Take 1 of the 5 mg tablets.    To take:  7.5 mg Take 1.5 of the 5 mg tablets.           Warfarin Dosing Calendar   July 2017 Details    Sun Mon Tue Wed Thu Fri Sat           1      5 mg           2      5 mg         3      5 mg         4      5 mg         5      5 mg         6      5 mg         7      5 mg         8      5 mg           9      5 mg         10      5 mg         11      5 mg         12               13               14               15                 16               17               18               19               20               21               22                 23               24               25               26               27               28               29                 30               31                     Date Details   No additional details    Date of next INR:  7/11/2017         How to take your warfarin dose     To take:  5 mg Take 1 of the 5 mg tablets.              [employer] Access Code: Activation code not generated  Current [employer] Status: Active

## 2017-06-23 NOTE — PROGRESS NOTES
Anticoagulation Summary as of 6/23/2017     INR goal 2.0-3.0   Selected INR 1.3! (6/23/2017)   Maintenance plan 5 mg (5 mg x 1) every day   Weekly total 35 mg   Plan last modified MARIAA Stinson (9/20/2016)   Next INR check 7/11/2017   Target end date Indefinite    Indications   Atrial fibrillation (CMS-HCC) [I48.91]  Long term current use of anticoagulant therapy [Z79.01]  Atrial fibrillation (CMS-HCC) [I48.91]         Anticoagulation Episode Summary     INR check location     Preferred lab     Send INR reminders to     Comments       Anticoagulation Care Providers     Provider Role Specialty Phone number    Sanjay Sorensen M.D. Referring Cardiology [phone]        Anticoagulation Patient Findings      Current Outpatient Prescriptions on File Prior to Visit   Medication Sig Dispense Refill   • warfarin (COUMADIN) 5 MG Tab Take one to one and one-half (1-1.5) tablets daily as directed by coumadin clinic 135 Tab 1   • warfarin (COUMADIN) 5 MG Tab TAKE 1 TABLET BY MOUTH EVERY DAY AS DIRECTED BY COUMADIN CLINIC 90 Tab 3   • montelukast (SINGULAIR) 10 MG Tab Take 1 Tab by mouth every day. 30 Tab 11   • Insulin Lispro, Human, (HUMALOG PEN SC) Inject  as instructed.     • fluticasone (FLONASE) 50 MCG/ACT nasal spray Spray 1 Spray in nose every day.     • atorvastatin (LIPITOR) 20 MG TABS   0   • Blood Glucose Monitoring Suppl (FREESTYLE FREEDOM LITE) W/DEVICE KIT   0   • carvedilol (COREG) 6.25 MG TABS   1   • FREESTYLE LITE strip   11   • HUMALOG KWIKPEN 100 UNIT/ML SOPN injection   11   • LEVEMIR FLEXTOUCH 100 UNIT/ML SOPN injection   11   • NOVOFINE 32G X 6 MM MISC   5   • FreeStyle Lancets MISC   0   • NON SPECIFIED Blood glucose monitor with testing supplies.  Strips # 100 to test qid. Lancets # 100 to obtain blood sample. 1 Not Specified 0   • CARTIA  MG CP24 TAKE ONE CAPSULE BY MOUTH DAILY 90 Cap 2   • lisinopril (PRINIVIL) 10 MG TABS Take 1 Tab by mouth every day. 30 Tab 1   • metformin  (GLUCOPHAGE) 500 MG TABS Take 1 Tab by mouth 2 times a day, with meals. 60 Tab 1   • albuterol (VENTOLIN OR PROVENTIL) 108 (90 BASE) MCG/ACT AERS Inhale 2 Puffs by mouth every 6 hours as needed.     • Mometasone Furo-Formoterol Fum (DULERA) 200-5 MCG/ACT AERO Inhale 2 Puffs by mouth every day.       • Nasal Wash (ALKALOL NA) Spray  in nose every day.       No current facility-administered medications on file prior to visit.       Lab Results   Component Value Date/Time    SODIUM 138 05/19/2017 11:35 AM    POTASSIUM 4.6 05/19/2017 11:35 AM    CHLORIDE 103 05/19/2017 11:35 AM    CO2 29 05/19/2017 11:35 AM    GLUCOSE 157* 05/19/2017 11:35 AM    BUN 24* 05/19/2017 11:35 AM    CREATININE 1.28 05/19/2017 11:35 AM          Yuri De León seen in clinic today  INR  sub-therapeutic for dental procedure.   Denies signs/symptoms of bleeding and/or thrombosis.    Denies changes to diet or medications.   Follow up appointment in 3 week(s).      7.5mg x two then continue weekly warfarin dose as noted     Ilya Rachel, PHARMD

## 2017-07-11 ENCOUNTER — APPOINTMENT (OUTPATIENT)
Dept: VASCULAR LAB | Facility: MEDICAL CENTER | Age: 72
End: 2017-07-11
Payer: MEDICARE

## 2017-07-11 ENCOUNTER — ANTICOAGULATION MONITORING (OUTPATIENT)
Dept: VASCULAR LAB | Facility: MEDICAL CENTER | Age: 72
End: 2017-07-11

## 2017-07-11 VITALS — SYSTOLIC BLOOD PRESSURE: 158 MMHG | HEART RATE: 99 BPM | DIASTOLIC BLOOD PRESSURE: 80 MMHG | OXYGEN SATURATION: 93 %

## 2017-07-11 DIAGNOSIS — I48.91 ATRIAL FIBRILLATION, UNSPECIFIED TYPE (HCC): ICD-10-CM

## 2017-07-11 DIAGNOSIS — Z79.01 LONG TERM CURRENT USE OF ANTICOAGULANT THERAPY: ICD-10-CM

## 2017-07-11 LAB — INR PPP: 2 (ref 2–3.5)

## 2017-07-11 NOTE — PROGRESS NOTES
OP Anticoagulation Service Note    Date: 7/11/2017  Blood Pressure : 158/80 mmHg  Pulse: 99    Anticoagulation Summary as of 7/11/2017     INR goal 2.0-3.0   Selected INR 2.0 (7/11/2017)   Maintenance plan 5 mg (5 mg x 1) every day   Weekly total 35 mg   Plan last modified MARIAA Stinson (9/20/2016)   Next INR check 7/25/2017   Target end date Indefinite    Indications   Atrial fibrillation (CMS-HCC) [I48.91]  Long term current use of anticoagulant therapy [Z79.01]  Atrial fibrillation (CMS-[facility]) [I48.91]         Anticoagulation Episode Summary     INR check location     Preferred lab     Send INR reminders to     Comments       Anticoagulation Care Providers     Provider Role Specialty Phone number    Sanjay Sorensen M.D. Referring Cardiology [phone]        Anticoagulation Patient Findings      THIS VISIT CONDUCTED WITH [employer] UTILIZING SECURE AND ENCRYPTED VIDEOCONFERENCING EQUIPMENT  ROS:      INR  therapeutic.    Denies signs/symptoms of bleeding and/or thrombosis.    Denies changes to diet or medications.   Follow up appt in 2 weeks     Plan:  Continue current dose    Medication: Warfarin (Coumadin)     Sunday Monday Tuesday Wednesday Thursday Friday Saturday      5 mg    5 mg    5 mg    5 mg    5 mg    5 mg    5 mg      1 tab(s)    1 tab(s)    1 tab(s)    1 tab(s)    1 tab(s)    1 tab(s)  1 tab(s)     Next Appointment: Tuesday, July 25th at 1:15 PM     Review all of your home medications and newly ordered medications with your doctor and / or pharmacist. Follow medication instructions as directed by your doctor and / or pharmacist. Please keep your medication list with you and share with your physician. Update the information when medications are discontinued, doses are changed, or new medications (including over-the-counter products) are added; and carry medication information at all times in the event of emergency situations.      For questions, please contact  Outpatient Anticoagulation Service [phone].     Oralia Wallace, PHARMD

## 2017-07-25 ENCOUNTER — APPOINTMENT (OUTPATIENT)
Dept: VASCULAR LAB | Facility: MEDICAL CENTER | Age: 72
End: 2017-07-25
Payer: MEDICARE

## 2017-07-25 ENCOUNTER — ANTICOAGULATION MONITORING (OUTPATIENT)
Dept: VASCULAR LAB | Facility: MEDICAL CENTER | Age: 72
End: 2017-07-25

## 2017-07-25 VITALS — HEART RATE: 89 BPM | SYSTOLIC BLOOD PRESSURE: 150 MMHG | OXYGEN SATURATION: 95 % | DIASTOLIC BLOOD PRESSURE: 100 MMHG

## 2017-07-25 DIAGNOSIS — Z79.01 LONG TERM CURRENT USE OF ANTICOAGULANT THERAPY: ICD-10-CM

## 2017-07-25 DIAGNOSIS — I48.91 ATRIAL FIBRILLATION, UNSPECIFIED TYPE (HCC): ICD-10-CM

## 2017-07-25 LAB — INR PPP: 2.4 (ref 2–3.5)

## 2017-07-25 NOTE — PROGRESS NOTES
OP Anticoagulation Service Note    Date: 7/25/2017  Blood Pressure : 150/100 mmHg  Pulse: 89    Anticoagulation Summary as of 7/25/2017     INR goal 2.0-3.0   Selected INR 2.4 (7/25/2017)   Maintenance plan 5 mg (5 mg x 1) every day   Weekly total 35 mg   Plan last modified MARIAA Stinson (9/20/2016)   Next INR check 8/22/2017   Target end date Indefinite    Indications   Atrial fibrillation (CMS-[facility]) [I48.91]  Long term current use of anticoagulant therapy [Z79.01]  Atrial fibrillation (CMS-HCC) (Resolved) [I48.91]         Anticoagulation Episode Summary     INR check location     Preferred lab     Send INR reminders to     Comments       Anticoagulation Care Providers     Provider Role Specialty Phone number    Sanjay Sorensen M.D. Referring Cardiology [phone]        Anticoagulation Patient Findings      THIS VISIT CONDUCTED WITH [employer] UTILIZING SECURE AND ENCRYPTED VIDEOCONFERENCING EQUIPMENT  ROS:    Pulm: Denies SOB, chest pain.    Card: Denies syncope, edema, palpitations.    Extremities: Denies redness, pain.   PE:    Pulm: No SOB, even and unlabored.    Card: Normal rate and rhythm.    Extremities: No redness, or edema.     INR  therapeutic.    Denies signs/symptoms of bleeding and/or thrombosis.    Denies changes to diet or medications.   Follow up appt in 4 weeks     Plan:  Continue current medication regimen.       Medication: Warfarin (Coumadin)     Sunday Monday Tuesday Wednesday Thursday Friday Saturday      5 mg    5 mg    5 mg    5 mg    5 mg    5 mg    5 mg      1 tab(s)    1 tab(s)    1 tab(s)    1 tab(s)    1 tab(s)    1 tab(s)  1 tab(s)     Next Appointment: Tuesday, 8/22 at 1:00     Review all of your home medications and newly ordered medications with your doctor and / or pharmacist. Follow medication instructions as directed by your doctor and / or pharmacist. Please keep your medication list with you and share with your physician. Update the  information when medications are discontinued, doses are changed, or new medications (including over-the-counter products) are added; and carry medication information at all times in the event of emergency situations.      For questions, please contact Outpatient Anticoagulation Service [phone].     APRIL Garcia.

## 2017-07-27 ENCOUNTER — TELEPHONE (OUTPATIENT)
Dept: MEDICAL GROUP | Facility: PHYSICIAN GROUP | Age: 72
End: 2017-07-27

## 2017-08-22 ENCOUNTER — TELEMEDICINE2 (OUTPATIENT)
Dept: VASCULAR LAB | Facility: MEDICAL CENTER | Age: 72
End: 2017-08-22
Payer: MEDICARE

## 2017-08-22 ENCOUNTER — TELEPHONE (OUTPATIENT)
Dept: VASCULAR LAB | Facility: MEDICAL CENTER | Age: 72
End: 2017-08-22

## 2017-08-22 VITALS — DIASTOLIC BLOOD PRESSURE: 80 MMHG | OXYGEN SATURATION: 95 % | HEART RATE: 86 BPM | SYSTOLIC BLOOD PRESSURE: 120 MMHG

## 2017-08-22 DIAGNOSIS — I48.91 ATRIAL FIBRILLATION, UNSPECIFIED TYPE (HCC): ICD-10-CM

## 2017-08-22 DIAGNOSIS — Z79.01 LONG TERM CURRENT USE OF ANTICOAGULANT THERAPY: ICD-10-CM

## 2017-08-22 LAB — INR PPP: 1.9 (ref 2–3.5)

## 2017-08-22 NOTE — PROGRESS NOTES
OP Anticoagulation Service Note    Date: 8/22/2017  Blood Pressure : 120/80 mmHg  Pulse: 86    Anticoagulation Summary as of 8/22/2017     INR goal 2.0-3.0   Selected INR 1.9! (8/22/2017)   Maintenance plan 5 mg (5 mg x 1) every day   Weekly total 35 mg   Plan last modified MARIAA Stinson (9/20/2016)   Next INR check 9/19/2017   Target end date Indefinite    Indications   Atrial fibrillation (CMS-[facility]) [I48.91]  Long term current use of anticoagulant therapy [Z79.01]  Atrial fibrillation (CMS-[facility]) (Resolved) [I48.91]         Anticoagulation Episode Summary     INR check location     Preferred lab     Send INR reminders to     Comments       Anticoagulation Care Providers     Provider Role Specialty Phone number    Sanjay Sorensen M.D. Referring Cardiology [phone]        Anticoagulation Patient Findings      THIS VISIT CONDUCTED WITH [employer] UTILIZING SECURE AND ENCRYPTED VIDEOCONFERENCING EQUIPMENT      Plan:  Take 7.5 mg tonight then go back to usual dose of 5 mg daily.    Medication: Warfarin (Coumadin)     Sunday Monday Tuesday Wednesday Thursday Friday Saturday      5 mg    5 mg    5 mg    5 mg    5 mg    5 mg    5 mg      1 tab(s)    1 tab(s)    1 tab(s)    1 tab(s)    1 tab(s)    1 tab(s)  1 tab(s)     Next Appointment: Tuesday, 9/19/ at 1:15 pm     Review all of your home medications and newly ordered medications with your doctor and / or pharmacist. Follow medication instructions as directed by your doctor and / or pharmacist. Please keep your medication list with you and share with your physician. Update the information when medications are discontinued, doses are changed, or new medications (including over-the-counter products) are added; and carry medication information at all times in the event of emergency situations.      For questions, please contact Outpatient Anticoagulation Service [phone].     APRIL Garcia.

## 2017-09-06 ENCOUNTER — HOSPITAL ENCOUNTER (OUTPATIENT)
Dept: LAB | Facility: MEDICAL CENTER | Age: 72
End: 2017-09-06
Attending: INTERNAL MEDICINE
Payer: MEDICARE

## 2017-09-06 LAB
ALBUMIN SERPL BCP-MCNC: 3.7 G/DL (ref 3.2–4.9)
BASOPHILS # BLD AUTO: 1.4 % (ref 0–1.8)
BASOPHILS # BLD: 0.13 K/UL (ref 0–0.12)
BUN SERPL-MCNC: 30 MG/DL (ref 8–22)
CALCIUM SERPL-MCNC: 9.4 MG/DL (ref 8.5–10.5)
CHLORIDE SERPL-SCNC: 102 MMOL/L (ref 96–112)
CO2 SERPL-SCNC: 30 MMOL/L (ref 20–33)
CREAT SERPL-MCNC: 1.32 MG/DL (ref 0.5–1.4)
CREAT UR-MCNC: 94.3 MG/DL
EOSINOPHIL # BLD AUTO: 0.71 K/UL (ref 0–0.51)
EOSINOPHIL NFR BLD: 7.7 % (ref 0–6.9)
ERYTHROCYTE [DISTWIDTH] IN BLOOD BY AUTOMATED COUNT: 46.6 FL (ref 35.9–50)
GFR SERPL CREATININE-BSD FRML MDRD: 53 ML/MIN/1.73 M 2
GLUCOSE SERPL-MCNC: 227 MG/DL (ref 65–99)
HCT VFR BLD AUTO: 51.8 % (ref 42–52)
HGB BLD-MCNC: 16.5 G/DL (ref 14–18)
IMM GRANULOCYTES # BLD AUTO: 0.02 K/UL (ref 0–0.11)
IMM GRANULOCYTES NFR BLD AUTO: 0.2 % (ref 0–0.9)
LYMPHOCYTES # BLD AUTO: 1.37 K/UL (ref 1–4.8)
LYMPHOCYTES NFR BLD: 14.9 % (ref 22–41)
MAGNESIUM SERPL-MCNC: 1.9 MG/DL (ref 1.5–2.5)
MCH RBC QN AUTO: 28.5 PG (ref 27–33)
MCHC RBC AUTO-ENTMCNC: 31.9 G/DL (ref 33.7–35.3)
MCV RBC AUTO: 89.5 FL (ref 81.4–97.8)
MONOCYTES # BLD AUTO: 1.03 K/UL (ref 0–0.85)
MONOCYTES NFR BLD AUTO: 11.2 % (ref 0–13.4)
NEUTROPHILS # BLD AUTO: 5.93 K/UL (ref 1.82–7.42)
NEUTROPHILS NFR BLD: 64.6 % (ref 44–72)
NRBC # BLD AUTO: 0 K/UL
NRBC BLD AUTO-RTO: 0 /100 WBC
PHOSPHATE SERPL-MCNC: 2.8 MG/DL (ref 2.5–4.5)
PLATELET # BLD AUTO: 255 K/UL (ref 164–446)
PMV BLD AUTO: 10.3 FL (ref 9–12.9)
POTASSIUM SERPL-SCNC: 5.3 MMOL/L (ref 3.6–5.5)
PROT UR-MCNC: 43.5 MG/DL (ref 0–15)
PROT/CREAT UR: 461 MG/G (ref 15–68)
RBC # BLD AUTO: 5.79 M/UL (ref 4.7–6.1)
SODIUM SERPL-SCNC: 137 MMOL/L (ref 135–145)
WBC # BLD AUTO: 9.2 K/UL (ref 4.8–10.8)

## 2017-09-06 PROCEDURE — 36415 COLL VENOUS BLD VENIPUNCTURE: CPT

## 2017-09-06 PROCEDURE — 85025 COMPLETE CBC W/AUTO DIFF WBC: CPT

## 2017-09-06 PROCEDURE — 80069 RENAL FUNCTION PANEL: CPT

## 2017-09-06 PROCEDURE — 84156 ASSAY OF PROTEIN URINE: CPT

## 2017-09-06 PROCEDURE — 83735 ASSAY OF MAGNESIUM: CPT

## 2017-09-06 PROCEDURE — 82570 ASSAY OF URINE CREATININE: CPT

## 2017-09-26 ENCOUNTER — HOSPITAL ENCOUNTER (OUTPATIENT)
Dept: LAB | Facility: MEDICAL CENTER | Age: 72
End: 2017-09-26
Attending: INTERNAL MEDICINE
Payer: MEDICARE

## 2017-09-26 ENCOUNTER — ANTICOAGULATION MONITORING (OUTPATIENT)
Dept: VASCULAR LAB | Facility: MEDICAL CENTER | Age: 72
End: 2017-09-26

## 2017-09-26 VITALS — DIASTOLIC BLOOD PRESSURE: 80 MMHG | SYSTOLIC BLOOD PRESSURE: 140 MMHG | OXYGEN SATURATION: 93 % | HEART RATE: 85 BPM

## 2017-09-26 DIAGNOSIS — I48.91 ATRIAL FIBRILLATION, UNSPECIFIED TYPE (HCC): ICD-10-CM

## 2017-09-26 DIAGNOSIS — Z79.01 LONG TERM CURRENT USE OF ANTICOAGULANT THERAPY: ICD-10-CM

## 2017-09-26 LAB
INR PPP: 2.1 (ref 2–3.5)
POTASSIUM SERPL-SCNC: 4.4 MMOL/L (ref 3.6–5.5)

## 2017-09-26 PROCEDURE — 36415 COLL VENOUS BLD VENIPUNCTURE: CPT

## 2017-09-26 PROCEDURE — 84132 ASSAY OF SERUM POTASSIUM: CPT

## 2017-09-26 NOTE — PROGRESS NOTES
OP Anticoagulation Service Note    Date: 9/26/2017  Blood Pressure : 140/80  Pulse: 85    Anticoagulation Summary  As of 9/26/2017    INR goal:   2.0-3.0   TTR:   60.2 % (2.2 y)   Today's INR:   2.1   Maintenance plan:   5 mg (5 mg x 1) every day   Weekly total:   35 mg   Plan last modified:   MARIAA Stinson (9/20/2016)   Next INR check:   11/7/2017   Target end date:   Indefinite    Indications    Atrial fibrillation (CMS-HCC) [I48.91]  Long term current use of anticoagulant therapy [Z79.01]  Atrial fibrillation (CMS-HCC) (Resolved) [I48.91]             Anticoagulation Episode Summary     INR check location:       Preferred lab:       Send INR reminders to:       Comments:         Anticoagulation Care Providers     Provider Role Specialty Phone number    Sanjay Sorensen M.D. Referring Cardiology [phone]        Anticoagulation Patient Findings      THIS VISIT CONDUCTED WITH [employer] UTILIZING SECURE AND ENCRYPTED VIDEOCONFERENCING EQUIPMENT  ROS:    Pulm: Denies SOB, chest pain.    Card: Denies syncope, edema, palpitations.    Extremities: Denies redness, pain.    PE:    Pulm: No SOB, even and unlabored.    Card: Normal rate and rhythm.   Extremities: No redness, or edema.     INR  is-therapeutic.    Denies signs/symptoms of bleeding and/or thrombosis.    Denies changes to diet or medications.   Follow up appt in 6 weeks     Plan:  Continue current medication regimen.       Medication: Warfarin (Coumadin)     Sunday Monday Tuesday Wednesday Thursday Friday Saturday      5 mg    5 mg    5 mg    5 mg    5 mg    5 mg    5 mg      1 tab(s)    1 tab(s)    1 tab(s)    1 tab(s)    1 tab(s)    1 tab(s)  1 tab(s)     Next Appointment: Tuesday, Nov 7 @ 1:15    Review all of your home medications and newly ordered medications with your doctor and / or pharmacist. Follow medication instructions as directed by your doctor and / or pharmacist. Please keep your medication list with  you and share with your physician. Update the information when medications are discontinued, doses are changed, or new medications (including over-the-counter products) are added; and carry medication information at all times in the event of emergency situations.      For questions, please contact Outpatient Anticoagulation Service [phone].     APRIL Garcia.

## 2017-10-26 ENCOUNTER — ANTICOAGULATION MONITORING (OUTPATIENT)
Dept: VASCULAR LAB | Facility: MEDICAL CENTER | Age: 72
End: 2017-10-26

## 2017-10-26 DIAGNOSIS — Z79.01 LONG TERM CURRENT USE OF ANTICOAGULANT THERAPY: ICD-10-CM

## 2017-10-26 NOTE — PROGRESS NOTES
Pt called, is going to be having sinus surgery to remove polyps on Monday.   Pt has no hx stroke/TIA or clotting. CHADSvasc = 3 (HTN, DM2, age).    Advised pt to start holding his warfarin today, and to increase his greens intake, as he will be off for 4 days.     Advised to resume warfarin the evening of surgery unless otherwise instructed by MD with 7.5mg x2 days and then to resume his normal 5mg daily dosing.     Keep current [facility] appt for 11/7/17.    Diane Jalloh, LeslieD

## 2017-11-07 ENCOUNTER — ANTICOAGULATION MONITORING (OUTPATIENT)
Dept: VASCULAR LAB | Facility: MEDICAL CENTER | Age: 72
End: 2017-11-07
Payer: MEDICARE

## 2017-11-07 ENCOUNTER — APPOINTMENT (OUTPATIENT)
Dept: VASCULAR LAB | Facility: MEDICAL CENTER | Age: 72
End: 2017-11-07
Payer: MEDICARE

## 2017-11-07 VITALS — HEART RATE: 89 BPM | OXYGEN SATURATION: 95 % | DIASTOLIC BLOOD PRESSURE: 66 MMHG | SYSTOLIC BLOOD PRESSURE: 138 MMHG

## 2017-11-07 DIAGNOSIS — Z79.01 LONG TERM CURRENT USE OF ANTICOAGULANT THERAPY: ICD-10-CM

## 2017-11-07 LAB — INR PPP: 1.7 (ref 2–3.5)

## 2017-11-07 PROCEDURE — 99212 OFFICE O/P EST SF 10 MIN: CPT | Mod: GT | Performed by: NURSE PRACTITIONER

## 2017-11-07 NOTE — PROGRESS NOTES
OP Anticoagulation Service Note    Date: 11/7/2017  Blood Pressure : 138/66  Pulse: 89    Anticoagulation Summary  As of 11/7/2017    INR goal:   2.0-3.0   TTR:   58.5 % (2.4 y)   Today's INR:   1.7!   Maintenance plan:   5 mg (5 mg x 1) every day   Weekly total:   35 mg   Plan last modified:   MARIAA Stinson (9/20/2016)   Next INR check:   11/21/2017   Target end date:   Indefinite    Indications    Atrial fibrillation (CMS-HCC) [I48.91]  Long term current use of anticoagulant therapy [Z79.01]  Atrial fibrillation (CMS-HCC) (Resolved) [I48.91]             Anticoagulation Episode Summary     INR check location:       Preferred lab:       Send INR reminders to:       Comments:         Anticoagulation Care Providers     Provider Role Specialty Phone number    Sanjay Sorensen M.D. Referring Cardiology [phone]        Anticoagulation Patient Findings      THIS VISIT CONDUCTED WITH [employer] TELE[employer] UTILIZING SECURE AND ENCRYPTED VIDEOCONFERENCING EQUIPMENT  ROS:    Pulm: Denies SOB, chest pain.    Card: Denies syncope, edema, palpitations.    Extremities: Denies redness, pain.    PE:    Pulm: No SOB, even and unlabored.    Card: Normal rate and rhythm.   Extremities: No redness, or edema.     INR  Sub--therapeutic.    Denies signs/symptoms of bleeding and/or thrombosis.    Denies changes to diet or medications.   Follow up appt in 3 weeks     Plan:  Take 7.5 mg tonight then back to usual dose.     Medication: Warfarin (Coumadin)     Sunday Monday Tuesday Wednesday Thursday Friday Saturday      5 mg    5 mg    5 mg    5 mg    5 mg    5 mg    5 mg      1 tab(s)    1 tab(s)    1 tab(s)    1 tab(s)    1 tab(s)    1 tab(s)  1 tab(s)     Next Appointment: Tuesday, Nov 28 @ 1:15.      Review all of your home medications and newly ordered medications with your doctor and / or pharmacist. Follow medication instructions as directed by your doctor and / or pharmacist. Please keep your  medication list with you and share with your physician. Update the information when medications are discontinued, doses are changed, or new medications (including over-the-counter products) are added; and carry medication information at all times in the event of emergency situations.      For questions, please contact Outpatient Anticoagulation Service [phone].     APRIL Garcia.

## 2017-11-14 ENCOUNTER — TELEPHONE (OUTPATIENT)
Dept: MEDICAL GROUP | Facility: PHYSICIAN GROUP | Age: 72
End: 2017-11-14

## 2017-11-28 ENCOUNTER — ANTICOAGULATION MONITORING (OUTPATIENT)
Dept: VASCULAR LAB | Facility: MEDICAL CENTER | Age: 72
End: 2017-11-28
Payer: MEDICARE

## 2017-11-28 ENCOUNTER — NON-PROVIDER VISIT (OUTPATIENT)
Dept: MEDICAL GROUP | Facility: PHYSICIAN GROUP | Age: 72
End: 2017-11-28
Payer: MEDICARE

## 2017-11-28 ENCOUNTER — APPOINTMENT (OUTPATIENT)
Dept: VASCULAR LAB | Facility: MEDICAL CENTER | Age: 72
End: 2017-11-28
Payer: MEDICARE

## 2017-11-28 VITALS
DIASTOLIC BLOOD PRESSURE: 70 MMHG | SYSTOLIC BLOOD PRESSURE: 130 MMHG | HEART RATE: 92 BPM | RESPIRATION RATE: 14 BRPM | OXYGEN SATURATION: 94 %

## 2017-11-28 DIAGNOSIS — I48.91 ATRIAL FIBRILLATION, UNSPECIFIED TYPE (HCC): ICD-10-CM

## 2017-11-28 DIAGNOSIS — Z79.01 LONG TERM CURRENT USE OF ANTICOAGULANT THERAPY: ICD-10-CM

## 2017-11-28 DIAGNOSIS — D68.9 COAGULATION DISORDER (HCC): ICD-10-CM

## 2017-11-28 LAB
INR BLD: 2.8 (ref 0.9–1.2)
INR PPP: 2.8 (ref 2–3.5)
POC PROTIME: ABNORMAL

## 2017-11-28 PROCEDURE — 85610 PROTHROMBIN TIME: CPT | Performed by: PHYSICIAN ASSISTANT

## 2017-11-28 PROCEDURE — 99211 OFF/OP EST MAY X REQ PHY/QHP: CPT | Mod: GT | Performed by: NURSE PRACTITIONER

## 2017-11-28 NOTE — PROGRESS NOTES
OP Anticoagulation Service Note    Date: 11/28/2017  Blood Pressure : 130/70  Pulse: 92  Respiration: 14    Anticoagulation Summary  As of 11/28/2017    INR goal:   2.0-3.0   TTR:   58.8 % (2.4 y)   Today's INR:   2.8   Maintenance plan:   5 mg (5 mg x 1) every day   Weekly total:   35 mg   Plan last modified:   MARIAA Stinson (9/20/2016)   Next INR check:   1/2/2018   Target end date:   Indefinite    Indications    Atrial fibrillation (CMS-HCC) [I48.91]  Long term current use of anticoagulant therapy [Z79.01]  Atrial fibrillation (CMS-HCC) (Resolved) [I48.91]             Anticoagulation Episode Summary     INR check location:       Preferred lab:       Send INR reminders to:       Comments:         Anticoagulation Care Providers     Provider Role Specialty Phone number    Sanjay Sorensen M.D. Referring Cardiology [phone]        Anticoagulation Patient Findings      THIS VISIT CONDUCTED WITH [employer] TELE[employer] UTILIZING SECURE AND ENCRYPTED VIDEOCONFERENCING EQUIPMENT  ROS:    Pulm: Denies SOB, chest pain.    Card: Denies syncope, edema, palpitations.    Extremities: Denies redness, pain.    PE:    Pulm: No SOB, even and unlabored.    Card: Normal rate and rhythm.   Extremities: No redness, or edema.     INR  is-therapeutic.    Denies signs/symptoms of bleeding and/or thrombosis.    Denies changes to diet or medications.   Follow up appt in 5 weeks     Plan:  Continue current medication regimen.     Medication: Warfarin (Coumadin)     Sunday Monday Tuesday Wednesday Thursday Friday Saturday      5 mg    5 mg    5 mg    5 mg    5 mg    5 mg    5 mg      1 tab(s)    1 tab(s)    1 tab(s)    1 tab(s)    1 tab(s)    1 tab(s)  1 tab(s)     Next Appointment: Tuesday, Jan 2 @ 1:30     Review all of your home medications and newly ordered medications with your doctor and / or pharmacist. Follow medication instructions as directed by your doctor and / or pharmacist. Please keep your  medication list with you and share with your physician. Update the information when medications are discontinued, doses are changed, or new medications (including over-the-counter products) are added; and carry medication information at all times in the event of emergency situations.      For questions, please contact Outpatient Anticoagulation Service [phone].        Patient is on a high risk medication and therefore requires close monitoring and follow up.       APRIL Garcia.

## 2017-12-01 ENCOUNTER — TELEPHONE (OUTPATIENT)
Dept: MEDICAL GROUP | Facility: PHYSICIAN GROUP | Age: 72
End: 2017-12-01

## 2017-12-20 DIAGNOSIS — Z79.01 CHRONIC ANTICOAGULATION: ICD-10-CM

## 2017-12-20 RX ORDER — WARFARIN SODIUM 5 MG/1
TABLET ORAL
Qty: 90 TAB | Refills: 1 | Status: SHIPPED | OUTPATIENT
Start: 2017-12-20 | End: 2017-12-27 | Stop reason: SDUPTHER

## 2017-12-26 DIAGNOSIS — Z79.01 CHRONIC ANTICOAGULATION: ICD-10-CM

## 2017-12-26 RX ORDER — WARFARIN SODIUM 5 MG/1
TABLET ORAL
Qty: 90 TAB | Refills: 1 | Status: CANCELLED | OUTPATIENT
Start: 2017-12-26

## 2017-12-27 DIAGNOSIS — Z79.01 CHRONIC ANTICOAGULATION: ICD-10-CM

## 2017-12-27 RX ORDER — WARFARIN SODIUM 5 MG/1
TABLET ORAL
Qty: 90 TAB | Refills: 1 | Status: SHIPPED | OUTPATIENT
Start: 2017-12-27 | End: 2018-03-12 | Stop reason: SDUPTHER

## 2018-01-02 ENCOUNTER — APPOINTMENT (OUTPATIENT)
Dept: VASCULAR LAB | Facility: MEDICAL CENTER | Age: 73
End: 2018-01-02
Attending: NURSE PRACTITIONER
Payer: MEDICARE

## 2018-01-02 ENCOUNTER — NON-PROVIDER VISIT (OUTPATIENT)
Dept: MEDICAL GROUP | Facility: PHYSICIAN GROUP | Age: 73
End: 2018-01-02
Payer: MEDICARE

## 2018-01-02 ENCOUNTER — ANTICOAGULATION MONITORING (OUTPATIENT)
Dept: VASCULAR LAB | Facility: MEDICAL CENTER | Age: 73
End: 2018-01-02
Payer: MEDICARE

## 2018-01-02 VITALS
SYSTOLIC BLOOD PRESSURE: 132 MMHG | OXYGEN SATURATION: 93 % | HEART RATE: 99 BPM | RESPIRATION RATE: 15 BRPM | DIASTOLIC BLOOD PRESSURE: 80 MMHG

## 2018-01-02 DIAGNOSIS — Z79.01 LONG TERM CURRENT USE OF ANTICOAGULANT THERAPY: ICD-10-CM

## 2018-01-02 DIAGNOSIS — D68.9 COAGULATION DISORDER (HCC): ICD-10-CM

## 2018-01-02 DIAGNOSIS — I48.91 ATRIAL FIBRILLATION, UNSPECIFIED TYPE (HCC): ICD-10-CM

## 2018-01-02 LAB
INR BLD: 2.3 (ref 0.9–1.2)
INR PPP: 2.3 (ref 2–3.5)
POC PROTIME: ABNORMAL

## 2018-01-02 PROCEDURE — 85610 PROTHROMBIN TIME: CPT | Performed by: NURSE PRACTITIONER

## 2018-01-02 PROCEDURE — 99211 OFF/OP EST MAY X REQ PHY/QHP: CPT | Performed by: NURSE PRACTITIONER

## 2018-01-02 NOTE — PROGRESS NOTES
OP Anticoagulation Service Note    Date: 1/2/2018  Blood Pressure : 132/80  Pulse: 99  Respiration: 15    Anticoagulation Summary  As of 1/2/2018    INR goal:   2.0-3.0   TTR:   60.4 % (2.5 y)   Today's INR:   2.3   Maintenance plan:   5 mg (5 mg x 1) every day   Weekly total:   35 mg   Plan last modified:   MARIAA Stinson (9/20/2016)   Next INR check:   2/13/2018   Target end date:   Indefinite    Indications    Atrial fibrillation (CMS-HCC) [I48.91]  Long term current use of anticoagulant therapy [Z79.01]  Atrial fibrillation (CMS-HCC) (Resolved) [I48.91]             Anticoagulation Episode Summary     INR check location:       Preferred lab:       Send INR reminders to:       Comments:         Anticoagulation Care Providers     Provider Role Specialty Phone number    Sanjay Sorensen M.D. Referring Cardiology [phone]        Anticoagulation Patient Findings      THIS VISIT CONDUCTED WITH PRES[employer] TELE[employer] UTILIZING SECURE AND ENCRYPTED VIDEOCONFERENCING EQUIPMENT  ROS:    Pulm: Denies SOB, chest pain.    Card: Denies syncope, edema, palpitations.    Extremities: Denies redness, pain.    PE:    Pulm: No SOB, even and unlabored.    Card: Normal rate and rhythm.   Extremities: No redness, or edema.     INR  is-therapeutic.    Denies signs/symptoms of bleeding and/or thrombosis.    Denies changes to diet or medications.   Follow up appt in 6 weeks     Plan: Continue current medication regimen.       Medication: Warfarin (Coumadin)     Sunday Monday Tuesday Wednesday Thursday Friday Saturday      5 mg    5 mg    5 mg    5 mg    5 mg    5 mg    5 mg      1 tab(s)    1 tab(s)    1 tab(s)    1 tab(s)    1 tab(s)    1 tab(s)  1 tab(s)     Next Appointment: Tuesday, Feb 13 @1:00    Review all of your home medications and newly ordered medications with your doctor and / or pharmacist. Follow medication instructions as directed by your doctor and / or pharmacist. Please keep your  medication list with you and share with your physician. Update the information when medications are discontinued, doses are changed, or new medications (including over-the-counter products) are added; and carry medication information at all times in the event of emergency situations.      For questions, please contact Outpatient Anticoagulation Service [phone].     APRIL Garcia.

## 2018-01-03 ENCOUNTER — TELEPHONE (OUTPATIENT)
Dept: MEDICAL GROUP | Facility: PHYSICIAN GROUP | Age: 73
End: 2018-01-03

## 2018-01-30 ENCOUNTER — HOSPITAL ENCOUNTER (OUTPATIENT)
Dept: LAB | Facility: MEDICAL CENTER | Age: 73
End: 2018-01-30
Attending: INTERNAL MEDICINE
Payer: MEDICARE

## 2018-01-30 LAB
25(OH)D3 SERPL-MCNC: 25 NG/ML (ref 30–100)
ALBUMIN SERPL BCP-MCNC: 4.3 G/DL (ref 3.2–4.9)
BUN SERPL-MCNC: 23 MG/DL (ref 8–22)
CALCIUM SERPL-MCNC: 9.1 MG/DL (ref 8.5–10.5)
CHLORIDE SERPL-SCNC: 102 MMOL/L (ref 96–112)
CO2 SERPL-SCNC: 29 MMOL/L (ref 20–33)
CREAT SERPL-MCNC: 1.19 MG/DL (ref 0.5–1.4)
CREAT UR-MCNC: 81 MG/DL
GLUCOSE SERPL-MCNC: 155 MG/DL (ref 65–99)
PHOSPHATE SERPL-MCNC: 2.9 MG/DL (ref 2.5–4.5)
POTASSIUM SERPL-SCNC: 4.8 MMOL/L (ref 3.6–5.5)
PROT UR-MCNC: 112.8 MG/DL (ref 0–15)
PROT/CREAT UR: 1393 MG/G (ref 15–68)
PTH-INTACT SERPL-MCNC: 92 PG/ML (ref 14–72)
SODIUM SERPL-SCNC: 136 MMOL/L (ref 135–145)
URATE SERPL-MCNC: 5.1 MG/DL (ref 2.5–8.3)

## 2018-01-30 PROCEDURE — 80069 RENAL FUNCTION PANEL: CPT

## 2018-01-30 PROCEDURE — 84550 ASSAY OF BLOOD/URIC ACID: CPT

## 2018-01-30 PROCEDURE — 84156 ASSAY OF PROTEIN URINE: CPT

## 2018-01-30 PROCEDURE — 82570 ASSAY OF URINE CREATININE: CPT

## 2018-01-30 PROCEDURE — 83970 ASSAY OF PARATHORMONE: CPT

## 2018-01-30 PROCEDURE — 36415 COLL VENOUS BLD VENIPUNCTURE: CPT

## 2018-01-30 PROCEDURE — 82306 VITAMIN D 25 HYDROXY: CPT

## 2018-02-13 ENCOUNTER — NON-PROVIDER VISIT (OUTPATIENT)
Dept: MEDICAL GROUP | Facility: PHYSICIAN GROUP | Age: 73
End: 2018-02-13
Payer: MEDICARE

## 2018-02-13 ENCOUNTER — APPOINTMENT (OUTPATIENT)
Dept: VASCULAR LAB | Facility: MEDICAL CENTER | Age: 73
End: 2018-02-13
Payer: MEDICARE

## 2018-02-13 ENCOUNTER — ANTICOAGULATION MONITORING (OUTPATIENT)
Dept: VASCULAR LAB | Facility: MEDICAL CENTER | Age: 73
End: 2018-02-13
Payer: MEDICARE

## 2018-02-13 VITALS
OXYGEN SATURATION: 96 % | SYSTOLIC BLOOD PRESSURE: 140 MMHG | RESPIRATION RATE: 18 BRPM | HEART RATE: 85 BPM | DIASTOLIC BLOOD PRESSURE: 98 MMHG

## 2018-02-13 DIAGNOSIS — I48.91 ATRIAL FIBRILLATION, UNSPECIFIED TYPE (HCC): ICD-10-CM

## 2018-02-13 DIAGNOSIS — Z79.01 LONG TERM CURRENT USE OF ANTICOAGULANT THERAPY: ICD-10-CM

## 2018-02-13 DIAGNOSIS — D68.9 COAGULATION DISORDER (HCC): ICD-10-CM

## 2018-02-13 LAB
INR BLD: 2.6 (ref 0.9–1.2)
INR PPP: 2.6 (ref 2–3.5)
POC PROTIME: ABNORMAL

## 2018-02-13 PROCEDURE — 99211 OFF/OP EST MAY X REQ PHY/QHP: CPT | Performed by: NURSE PRACTITIONER

## 2018-02-13 PROCEDURE — 85610 PROTHROMBIN TIME: CPT | Performed by: PHYSICIAN ASSISTANT

## 2018-02-13 NOTE — PROGRESS NOTES
OP Anticoagulation Service Note    Date: 2/13/2018  Blood Pressure : 140/98  Pulse: 85  Respiration: 18    Anticoagulation Summary  As of 2/13/2018    INR goal:   2.0-3.0   TTR:   62.1 % (2.6 y)   Today's INR:   2.6   Maintenance plan:   5 mg (5 mg x 1) every day   Weekly total:   35 mg   Plan last modified:   MARIAA Stinson (9/20/2016)   Next INR check:   3/20/2018   Target end date:   Indefinite    Indications    Atrial fibrillation (CMS-HCC) [I48.91]  Long term current use of anticoagulant therapy [Z79.01]  Atrial fibrillation (CMS-HCC) (Resolved) [I48.91]             Anticoagulation Episode Summary     INR check location:       Preferred lab:       Send INR reminders to:       Comments:         Anticoagulation Care Providers     Provider Role Specialty Phone number    Sanjay Sorensen M.D. Referring Cardiology [phone]        Anticoagulation Patient Findings      THIS VISIT CONDUCTED WITH [employer] TELE[employer] UTILIZING SECURE AND ENCRYPTED VIDEOCONFERENCING EQUIPMENT  ROS:    Pulm: Denies SOB, chest pain.    Card: Denies syncope, edema, palpitations.    Extremities: Denies redness, pain.    PE:    Pulm: No SOB, even and unlabored.    Card: Normal rate and rhythm.   Extremities: No redness, or edema.     INR  is-therapeutic.    Denies signs/symptoms of bleeding and/or thrombosis.    Denies changes to diet or medications.   Follow up appt in 6 weeks     Plan:  Continue current medication regimen.       Medication: Warfarin (Coumadin)     Sunday Monday Tuesday Wednesday Thursday Friday Saturday      5 mg    5 mg    5 mg    5 mg    5 mg    5 mg    5 mg      1 tab(s)    1 tab(s)    1 tab(s)    1 tab(s)    1 tab(s)    1 tab(s)  1 tab(s)     Next Appointment: Tuesday, March 20 @ 1:15     Review all of your home medications and newly ordered medications with your doctor and / or pharmacist. Follow medication instructions as directed by your doctor and / or pharmacist. Please keep your  medication list with you and share with your physician. Update the information when medications are discontinued, doses are changed, or new medications (including over-the-counter products) are added; and carry medication information at all times in the event of emergency situations.      For questions, please contact Outpatient Anticoagulation Service [phone].      Patient is on a high risk medication and therefore requires close monitoring and follow up.   The patient will go to the ER for falls with a head injury,  blood in urine or stool or any bleeding that last longer than 20 min.         APRIL Garcia.

## 2018-02-15 ENCOUNTER — TELEPHONE (OUTPATIENT)
Dept: MEDICAL GROUP | Facility: CLINIC | Age: 73
End: 2018-02-15

## 2018-02-28 ENCOUNTER — HOSPITAL ENCOUNTER (OUTPATIENT)
Dept: LAB | Facility: MEDICAL CENTER | Age: 73
End: 2018-02-28
Attending: INTERNAL MEDICINE
Payer: MEDICARE

## 2018-02-28 LAB
ALBUMIN SERPL BCP-MCNC: 4 G/DL (ref 3.2–4.9)
APPEARANCE UR: CLEAR
BACTERIA #/AREA URNS HPF: NEGATIVE /HPF
BASOPHILS # BLD AUTO: 1.2 % (ref 0–1.8)
BASOPHILS # BLD: 0.1 K/UL (ref 0–0.12)
BILIRUB UR QL STRIP.AUTO: NEGATIVE
BUN SERPL-MCNC: 29 MG/DL (ref 8–22)
CALCIUM SERPL-MCNC: 9.4 MG/DL (ref 8.5–10.5)
CHLORIDE SERPL-SCNC: 99 MMOL/L (ref 96–112)
CO2 SERPL-SCNC: 29 MMOL/L (ref 20–33)
COLOR UR: YELLOW
CREAT SERPL-MCNC: 1.41 MG/DL (ref 0.5–1.4)
CREAT UR-MCNC: 69.1 MG/DL
CULTURE IF INDICATED INDCX: NO UA CULTURE
EOSINOPHIL # BLD AUTO: 0.47 K/UL (ref 0–0.51)
EOSINOPHIL NFR BLD: 5.7 % (ref 0–6.9)
EPI CELLS #/AREA URNS HPF: NEGATIVE /HPF
ERYTHROCYTE [DISTWIDTH] IN BLOOD BY AUTOMATED COUNT: 46.5 FL (ref 35.9–50)
GLUCOSE SERPL-MCNC: 185 MG/DL (ref 65–99)
GLUCOSE UR STRIP.AUTO-MCNC: NEGATIVE MG/DL
HCT VFR BLD AUTO: 50.4 % (ref 42–52)
HGB BLD-MCNC: 16.3 G/DL (ref 14–18)
HYALINE CASTS #/AREA URNS LPF: ABNORMAL /LPF
IMM GRANULOCYTES # BLD AUTO: 0.02 K/UL (ref 0–0.11)
IMM GRANULOCYTES NFR BLD AUTO: 0.2 % (ref 0–0.9)
KETONES UR STRIP.AUTO-MCNC: NEGATIVE MG/DL
LEUKOCYTE ESTERASE UR QL STRIP.AUTO: NEGATIVE
LYMPHOCYTES # BLD AUTO: 1.28 K/UL (ref 1–4.8)
LYMPHOCYTES NFR BLD: 15.6 % (ref 22–41)
MCH RBC QN AUTO: 28.8 PG (ref 27–33)
MCHC RBC AUTO-ENTMCNC: 32.3 G/DL (ref 33.7–35.3)
MCV RBC AUTO: 89.2 FL (ref 81.4–97.8)
MICRO URNS: ABNORMAL
MONOCYTES # BLD AUTO: 0.91 K/UL (ref 0–0.85)
MONOCYTES NFR BLD AUTO: 11.1 % (ref 0–13.4)
NEUTROPHILS # BLD AUTO: 5.45 K/UL (ref 1.82–7.42)
NEUTROPHILS NFR BLD: 66.2 % (ref 44–72)
NITRITE UR QL STRIP.AUTO: NEGATIVE
NRBC # BLD AUTO: 0 K/UL
NRBC BLD-RTO: 0 /100 WBC
PH UR STRIP.AUTO: 5.5 [PH]
PHOSPHATE SERPL-MCNC: 3.5 MG/DL (ref 2.5–4.5)
PLATELET # BLD AUTO: 270 K/UL (ref 164–446)
PMV BLD AUTO: 10.5 FL (ref 9–12.9)
POTASSIUM SERPL-SCNC: 4.8 MMOL/L (ref 3.6–5.5)
PROT UR QL STRIP: 100 MG/DL
PROT UR-MCNC: 69.5 MG/DL (ref 0–15)
PROT/CREAT UR: 1006 MG/G (ref 15–68)
RBC # BLD AUTO: 5.65 M/UL (ref 4.7–6.1)
RBC # URNS HPF: ABNORMAL /HPF
RBC UR QL AUTO: ABNORMAL
SODIUM SERPL-SCNC: 135 MMOL/L (ref 135–145)
SP GR UR STRIP.AUTO: 1.02
UROBILINOGEN UR STRIP.AUTO-MCNC: 0.2 MG/DL
WBC # BLD AUTO: 8.2 K/UL (ref 4.8–10.8)
WBC #/AREA URNS HPF: ABNORMAL /HPF

## 2018-02-28 PROCEDURE — 85025 COMPLETE CBC W/AUTO DIFF WBC: CPT

## 2018-02-28 PROCEDURE — 84156 ASSAY OF PROTEIN URINE: CPT

## 2018-02-28 PROCEDURE — 81001 URINALYSIS AUTO W/SCOPE: CPT

## 2018-02-28 PROCEDURE — 80069 RENAL FUNCTION PANEL: CPT

## 2018-02-28 PROCEDURE — 36415 COLL VENOUS BLD VENIPUNCTURE: CPT

## 2018-02-28 PROCEDURE — 82570 ASSAY OF URINE CREATININE: CPT

## 2018-03-12 DIAGNOSIS — Z79.01 CHRONIC ANTICOAGULATION: ICD-10-CM

## 2018-03-12 RX ORDER — WARFARIN SODIUM 5 MG/1
TABLET ORAL
Qty: 90 TAB | Refills: 1 | Status: SHIPPED | OUTPATIENT
Start: 2018-03-12 | End: 2018-10-23 | Stop reason: SDUPTHER

## 2018-03-20 ENCOUNTER — APPOINTMENT (OUTPATIENT)
Dept: VASCULAR LAB | Facility: MEDICAL CENTER | Age: 73
End: 2018-03-20
Attending: NURSE PRACTITIONER
Payer: MEDICARE

## 2018-03-20 ENCOUNTER — ANTICOAGULATION MONITORING (OUTPATIENT)
Dept: VASCULAR LAB | Facility: MEDICAL CENTER | Age: 73
End: 2018-03-20
Payer: MEDICARE

## 2018-03-20 ENCOUNTER — NON-PROVIDER VISIT (OUTPATIENT)
Dept: MEDICAL GROUP | Facility: PHYSICIAN GROUP | Age: 73
End: 2018-03-20
Payer: MEDICARE

## 2018-03-20 VITALS
RESPIRATION RATE: 20 BRPM | HEART RATE: 86 BPM | DIASTOLIC BLOOD PRESSURE: 92 MMHG | OXYGEN SATURATION: 92 % | SYSTOLIC BLOOD PRESSURE: 146 MMHG

## 2018-03-20 DIAGNOSIS — Z79.01 LONG TERM CURRENT USE OF ANTICOAGULANT THERAPY: ICD-10-CM

## 2018-03-20 DIAGNOSIS — D68.9 COAGULATION DISORDER (HCC): ICD-10-CM

## 2018-03-20 DIAGNOSIS — I48.91 ATRIAL FIBRILLATION, UNSPECIFIED TYPE (HCC): ICD-10-CM

## 2018-03-20 LAB
INR BLD: 1.6 (ref 0.9–1.2)
INR PPP: 1.6 (ref 2–3.5)
POC PROTIME: ABNORMAL

## 2018-03-20 PROCEDURE — 99212 OFFICE O/P EST SF 10 MIN: CPT | Performed by: NURSE PRACTITIONER

## 2018-03-20 PROCEDURE — 85610 PROTHROMBIN TIME: CPT | Performed by: NURSE PRACTITIONER

## 2018-03-21 ENCOUNTER — TELEPHONE (OUTPATIENT)
Dept: MEDICAL GROUP | Facility: PHYSICIAN GROUP | Age: 73
End: 2018-03-21

## 2018-03-22 ENCOUNTER — RESOLUTE PROFESSIONAL BILLING HOSPITAL PROF FEE (OUTPATIENT)
Dept: HOSPITALIST | Facility: MEDICAL CENTER | Age: 73
End: 2018-03-22
Payer: MEDICARE

## 2018-03-22 ENCOUNTER — APPOINTMENT (OUTPATIENT)
Dept: RADIOLOGY | Facility: MEDICAL CENTER | Age: 73
DRG: 871 | End: 2018-03-22
Attending: EMERGENCY MEDICINE
Payer: MEDICARE

## 2018-03-22 ENCOUNTER — HOSPITAL ENCOUNTER (INPATIENT)
Facility: MEDICAL CENTER | Age: 73
LOS: 4 days | DRG: 871 | End: 2018-03-26
Attending: EMERGENCY MEDICINE | Admitting: INTERNAL MEDICINE
Payer: MEDICARE

## 2018-03-22 DIAGNOSIS — J44.1 COPD WITH ACUTE EXACERBATION (HCC): ICD-10-CM

## 2018-03-22 DIAGNOSIS — I48.91 ATRIAL FIBRILLATION WITH RVR (HCC): ICD-10-CM

## 2018-03-22 DIAGNOSIS — R09.02 HYPOXIA: ICD-10-CM

## 2018-03-22 PROBLEM — J96.01 ACUTE RESPIRATORY FAILURE WITH HYPOXIA (HCC): Status: ACTIVE | Noted: 2018-03-22

## 2018-03-22 PROBLEM — D72.829 LEUCOCYTOSIS: Status: ACTIVE | Noted: 2018-03-22

## 2018-03-22 PROBLEM — A41.9 SEPSIS (HCC): Status: ACTIVE | Noted: 2018-03-22

## 2018-03-22 LAB
ALBUMIN SERPL BCP-MCNC: 4.6 G/DL (ref 3.2–4.9)
ALBUMIN/GLOB SERPL: 1.2 G/DL
ALP SERPL-CCNC: 95 U/L (ref 30–99)
ALT SERPL-CCNC: 19 U/L (ref 2–50)
ANION GAP SERPL CALC-SCNC: 9 MMOL/L (ref 0–11.9)
AST SERPL-CCNC: 20 U/L (ref 12–45)
BASOPHILS # BLD AUTO: 0.7 % (ref 0–1.8)
BASOPHILS # BLD: 0.1 K/UL (ref 0–0.12)
BILIRUB SERPL-MCNC: 0.7 MG/DL (ref 0.1–1.5)
BLOOD CULTURE HOLD CXBCH: NORMAL
BNP SERPL-MCNC: 88 PG/ML (ref 0–100)
BUN SERPL-MCNC: 36 MG/DL (ref 8–22)
CALCIUM SERPL-MCNC: 9.5 MG/DL (ref 8.4–10.2)
CHLORIDE SERPL-SCNC: 102 MMOL/L (ref 96–112)
CO2 SERPL-SCNC: 28 MMOL/L (ref 20–33)
CREAT SERPL-MCNC: 1.42 MG/DL (ref 0.5–1.4)
EKG IMPRESSION: NORMAL
EOSINOPHIL # BLD AUTO: 0.49 K/UL (ref 0–0.51)
EOSINOPHIL NFR BLD: 3.5 % (ref 0–6.9)
ERYTHROCYTE [DISTWIDTH] IN BLOOD BY AUTOMATED COUNT: 46.4 FL (ref 35.9–50)
GLOBULIN SER CALC-MCNC: 3.7 G/DL (ref 1.9–3.5)
GLUCOSE BLD-MCNC: 177 MG/DL (ref 65–99)
GLUCOSE SERPL-MCNC: 166 MG/DL (ref 65–99)
HCT VFR BLD AUTO: 53.1 % (ref 42–52)
HGB BLD-MCNC: 17.2 G/DL (ref 14–18)
IMM GRANULOCYTES # BLD AUTO: 0.04 K/UL (ref 0–0.11)
IMM GRANULOCYTES NFR BLD AUTO: 0.3 % (ref 0–0.9)
INR PPP: 1.87 (ref 0.87–1.13)
LACTATE BLD-SCNC: 1.45 MMOL/L (ref 0.5–2)
LACTATE BLD-SCNC: 2.12 MMOL/L (ref 0.5–2)
LIPASE SERPL-CCNC: 22 U/L (ref 7–58)
LYMPHOCYTES # BLD AUTO: 1.25 K/UL (ref 1–4.8)
LYMPHOCYTES NFR BLD: 8.9 % (ref 22–41)
MCH RBC QN AUTO: 28.7 PG (ref 27–33)
MCHC RBC AUTO-ENTMCNC: 32.4 G/DL (ref 33.7–35.3)
MCV RBC AUTO: 88.6 FL (ref 81.4–97.8)
MONOCYTES # BLD AUTO: 1.18 K/UL (ref 0–0.85)
MONOCYTES NFR BLD AUTO: 8.4 % (ref 0–13.4)
NEUTROPHILS # BLD AUTO: 10.92 K/UL (ref 1.82–7.42)
NEUTROPHILS NFR BLD: 78.2 % (ref 44–72)
NRBC # BLD AUTO: 0 K/UL
NRBC BLD-RTO: 0 /100 WBC
PLATELET # BLD AUTO: 316 K/UL (ref 164–446)
PMV BLD AUTO: 9.6 FL (ref 9–12.9)
POTASSIUM SERPL-SCNC: 4 MMOL/L (ref 3.6–5.5)
PROCALCITONIN SERPL-MCNC: <0.05 NG/ML
PROT SERPL-MCNC: 8.3 G/DL (ref 6–8.2)
PROTHROMBIN TIME: 21.3 SEC (ref 12–14.6)
RBC # BLD AUTO: 5.99 M/UL (ref 4.7–6.1)
SODIUM SERPL-SCNC: 139 MMOL/L (ref 135–145)
T4 FREE SERPL-MCNC: 0.74 NG/DL (ref 0.58–1.64)
TROPONIN I SERPL-MCNC: 0.03 NG/ML (ref 0–0.04)
TSH SERPL DL<=0.005 MIU/L-ACNC: 3.56 UIU/ML (ref 0.38–5.33)
WBC # BLD AUTO: 14 K/UL (ref 4.8–10.8)

## 2018-03-22 PROCEDURE — 84443 ASSAY THYROID STIM HORMONE: CPT

## 2018-03-22 PROCEDURE — 71045 X-RAY EXAM CHEST 1 VIEW: CPT

## 2018-03-22 PROCEDURE — 94640 AIRWAY INHALATION TREATMENT: CPT

## 2018-03-22 PROCEDURE — 700101 HCHG RX REV CODE 250: Performed by: EMERGENCY MEDICINE

## 2018-03-22 PROCEDURE — 84145 PROCALCITONIN (PCT): CPT

## 2018-03-22 PROCEDURE — 85610 PROTHROMBIN TIME: CPT

## 2018-03-22 PROCEDURE — 96365 THER/PROPH/DIAG IV INF INIT: CPT

## 2018-03-22 PROCEDURE — 93005 ELECTROCARDIOGRAM TRACING: CPT

## 2018-03-22 PROCEDURE — 700111 HCHG RX REV CODE 636 W/ 250 OVERRIDE (IP)

## 2018-03-22 PROCEDURE — 84484 ASSAY OF TROPONIN QUANT: CPT

## 2018-03-22 PROCEDURE — 87040 BLOOD CULTURE FOR BACTERIA: CPT

## 2018-03-22 PROCEDURE — 94760 N-INVAS EAR/PLS OXIMETRY 1: CPT

## 2018-03-22 PROCEDURE — 96375 TX/PRO/DX INJ NEW DRUG ADDON: CPT

## 2018-03-22 PROCEDURE — 99285 EMERGENCY DEPT VISIT HI MDM: CPT

## 2018-03-22 PROCEDURE — 93010 ELECTROCARDIOGRAM REPORT: CPT | Performed by: INTERNAL MEDICINE

## 2018-03-22 PROCEDURE — 93005 ELECTROCARDIOGRAM TRACING: CPT | Performed by: INTERNAL MEDICINE

## 2018-03-22 PROCEDURE — 96366 THER/PROPH/DIAG IV INF ADDON: CPT

## 2018-03-22 PROCEDURE — 770020 HCHG ROOM/CARE - TELE (206)

## 2018-03-22 PROCEDURE — 80053 COMPREHEN METABOLIC PANEL: CPT

## 2018-03-22 PROCEDURE — 83880 ASSAY OF NATRIURETIC PEPTIDE: CPT

## 2018-03-22 PROCEDURE — 83690 ASSAY OF LIPASE: CPT

## 2018-03-22 PROCEDURE — 84439 ASSAY OF FREE THYROXINE: CPT

## 2018-03-22 PROCEDURE — 85025 COMPLETE CBC W/AUTO DIFF WBC: CPT

## 2018-03-22 PROCEDURE — 700105 HCHG RX REV CODE 258

## 2018-03-22 PROCEDURE — 83605 ASSAY OF LACTIC ACID: CPT | Mod: 91

## 2018-03-22 PROCEDURE — 700111 HCHG RX REV CODE 636 W/ 250 OVERRIDE (IP): Performed by: EMERGENCY MEDICINE

## 2018-03-22 PROCEDURE — 93005 ELECTROCARDIOGRAM TRACING: CPT | Performed by: EMERGENCY MEDICINE

## 2018-03-22 PROCEDURE — 700102 HCHG RX REV CODE 250 W/ 637 OVERRIDE(OP): Performed by: INTERNAL MEDICINE

## 2018-03-22 PROCEDURE — 700101 HCHG RX REV CODE 250

## 2018-03-22 PROCEDURE — 36415 COLL VENOUS BLD VENIPUNCTURE: CPT

## 2018-03-22 PROCEDURE — A9270 NON-COVERED ITEM OR SERVICE: HCPCS | Performed by: INTERNAL MEDICINE

## 2018-03-22 PROCEDURE — 700105 HCHG RX REV CODE 258: Performed by: INTERNAL MEDICINE

## 2018-03-22 PROCEDURE — 99223 1ST HOSP IP/OBS HIGH 75: CPT | Mod: AI | Performed by: INTERNAL MEDICINE

## 2018-03-22 PROCEDURE — 82962 GLUCOSE BLOOD TEST: CPT

## 2018-03-22 RX ORDER — AZITHROMYCIN 250 MG/1
500 TABLET, FILM COATED ORAL ONCE
Status: DISCONTINUED | OUTPATIENT
Start: 2018-03-22 | End: 2018-03-22

## 2018-03-22 RX ORDER — INSULIN GLARGINE 100 [IU]/ML
12 INJECTION, SOLUTION SUBCUTANEOUS EVERY EVENING
Status: DISCONTINUED | OUTPATIENT
Start: 2018-03-22 | End: 2018-03-24

## 2018-03-22 RX ORDER — SODIUM CHLORIDE 9 MG/ML
30 INJECTION, SOLUTION INTRAVENOUS
Status: DISCONTINUED | OUTPATIENT
Start: 2018-03-22 | End: 2018-03-26 | Stop reason: HOSPADM

## 2018-03-22 RX ORDER — AZITHROMYCIN 250 MG/1
250 TABLET, FILM COATED ORAL DAILY
Status: DISCONTINUED | OUTPATIENT
Start: 2018-03-22 | End: 2018-03-22

## 2018-03-22 RX ORDER — ALBUTEROL SULFATE 2.5 MG/3ML
2.5 SOLUTION RESPIRATORY (INHALATION) EVERY 4 HOURS PRN
Status: ON HOLD | COMMUNITY
End: 2019-06-17

## 2018-03-22 RX ORDER — AZITHROMYCIN 250 MG/1
250 TABLET, FILM COATED ORAL DAILY
Status: DISCONTINUED | OUTPATIENT
Start: 2018-03-23 | End: 2018-03-22

## 2018-03-22 RX ORDER — FLUTICASONE PROPIONATE 50 MCG
2 SPRAY, SUSPENSION (ML) NASAL DAILY
Status: DISCONTINUED | OUTPATIENT
Start: 2018-03-23 | End: 2018-03-26 | Stop reason: HOSPADM

## 2018-03-22 RX ORDER — WARFARIN SODIUM 5 MG/1
5 TABLET ORAL
Status: DISCONTINUED | OUTPATIENT
Start: 2018-03-22 | End: 2018-03-22

## 2018-03-22 RX ORDER — MONTELUKAST SODIUM 10 MG/1
10 TABLET ORAL
Status: DISCONTINUED | OUTPATIENT
Start: 2018-03-22 | End: 2018-03-26 | Stop reason: HOSPADM

## 2018-03-22 RX ORDER — ONDANSETRON 2 MG/ML
4 INJECTION INTRAMUSCULAR; INTRAVENOUS EVERY 4 HOURS PRN
Status: DISCONTINUED | OUTPATIENT
Start: 2018-03-22 | End: 2018-03-26 | Stop reason: HOSPADM

## 2018-03-22 RX ORDER — TIOTROPIUM BROMIDE 18 UG/1
1 CAPSULE ORAL; RESPIRATORY (INHALATION)
Status: DISCONTINUED | OUTPATIENT
Start: 2018-03-22 | End: 2018-03-26 | Stop reason: HOSPADM

## 2018-03-22 RX ORDER — AZITHROMYCIN 500 MG/1
500 INJECTION, POWDER, LYOPHILIZED, FOR SOLUTION INTRAVENOUS ONCE
Status: COMPLETED | OUTPATIENT
Start: 2018-03-22 | End: 2018-03-22

## 2018-03-22 RX ORDER — WARFARIN SODIUM 5 MG/1
5 TABLET ORAL
Status: COMPLETED | OUTPATIENT
Start: 2018-03-22 | End: 2018-03-22

## 2018-03-22 RX ORDER — ALBUTEROL SULFATE 90 UG/1
2 AEROSOL, METERED RESPIRATORY (INHALATION) EVERY 4 HOURS PRN
Status: DISCONTINUED | OUTPATIENT
Start: 2018-03-22 | End: 2018-03-26 | Stop reason: HOSPADM

## 2018-03-22 RX ORDER — PREDNISONE 20 MG/1
60 TABLET ORAL DAILY
Status: DISCONTINUED | OUTPATIENT
Start: 2018-03-23 | End: 2018-03-23

## 2018-03-22 RX ORDER — SODIUM CHLORIDE 9 MG/ML
INJECTION, SOLUTION INTRAVENOUS ONCE
Status: COMPLETED | OUTPATIENT
Start: 2018-03-22 | End: 2018-03-23

## 2018-03-22 RX ORDER — LOSARTAN POTASSIUM 25 MG/1
25 TABLET ORAL EVERY EVENING
Status: DISCONTINUED | OUTPATIENT
Start: 2018-03-22 | End: 2018-03-26 | Stop reason: HOSPADM

## 2018-03-22 RX ORDER — ALBUTEROL SULFATE 2.5 MG/3ML
2.5 SOLUTION RESPIRATORY (INHALATION)
Status: DISCONTINUED | OUTPATIENT
Start: 2018-03-22 | End: 2018-03-22

## 2018-03-22 RX ORDER — AZITHROMYCIN 250 MG/1
250 TABLET, FILM COATED ORAL DAILY
Status: COMPLETED | OUTPATIENT
Start: 2018-03-23 | End: 2018-03-26

## 2018-03-22 RX ORDER — ACETAMINOPHEN 325 MG/1
650 TABLET ORAL EVERY 6 HOURS PRN
Status: DISCONTINUED | OUTPATIENT
Start: 2018-03-22 | End: 2018-03-26 | Stop reason: HOSPADM

## 2018-03-22 RX ORDER — FUROSEMIDE 10 MG/ML
40 INJECTION INTRAMUSCULAR; INTRAVENOUS ONCE
Status: COMPLETED | OUTPATIENT
Start: 2018-03-22 | End: 2018-03-22

## 2018-03-22 RX ORDER — CEFTRIAXONE 2 G/1
2 INJECTION, POWDER, FOR SOLUTION INTRAMUSCULAR; INTRAVENOUS ONCE
Status: COMPLETED | OUTPATIENT
Start: 2018-03-22 | End: 2018-03-22

## 2018-03-22 RX ORDER — ONDANSETRON 4 MG/1
4 TABLET, ORALLY DISINTEGRATING ORAL EVERY 4 HOURS PRN
Status: DISCONTINUED | OUTPATIENT
Start: 2018-03-22 | End: 2018-03-26 | Stop reason: HOSPADM

## 2018-03-22 RX ORDER — AMOXICILLIN 250 MG
2 CAPSULE ORAL 2 TIMES DAILY
Status: DISCONTINUED | OUTPATIENT
Start: 2018-03-22 | End: 2018-03-26 | Stop reason: HOSPADM

## 2018-03-22 RX ORDER — LOSARTAN POTASSIUM 25 MG/1
25 TABLET ORAL EVERY EVENING
Status: ON HOLD | COMMUNITY
End: 2019-06-21

## 2018-03-22 RX ORDER — POLYETHYLENE GLYCOL 3350 17 G/17G
1 POWDER, FOR SOLUTION ORAL
Status: DISCONTINUED | OUTPATIENT
Start: 2018-03-22 | End: 2018-03-26 | Stop reason: HOSPADM

## 2018-03-22 RX ORDER — AMOXICILLIN AND CLAVULANATE POTASSIUM 875; 125 MG/1; MG/1
1 TABLET, FILM COATED ORAL 2 TIMES DAILY
Status: ON HOLD | COMMUNITY
End: 2018-03-26

## 2018-03-22 RX ORDER — BISACODYL 10 MG
10 SUPPOSITORY, RECTAL RECTAL
Status: DISCONTINUED | OUTPATIENT
Start: 2018-03-22 | End: 2018-03-26 | Stop reason: HOSPADM

## 2018-03-22 RX ORDER — DEXTROSE MONOHYDRATE 25 G/50ML
25 INJECTION, SOLUTION INTRAVENOUS
Status: DISCONTINUED | OUTPATIENT
Start: 2018-03-22 | End: 2018-03-26 | Stop reason: HOSPADM

## 2018-03-22 RX ORDER — DIGOXIN 0.25 MG/ML
500 INJECTION INTRAMUSCULAR; INTRAVENOUS ONCE
Status: COMPLETED | OUTPATIENT
Start: 2018-03-22 | End: 2018-03-22

## 2018-03-22 RX ORDER — DILTIAZEM HYDROCHLORIDE 5 MG/ML
INJECTION INTRAVENOUS
Status: ACTIVE
Start: 2018-03-22 | End: 2018-03-23

## 2018-03-22 RX ORDER — DILTIAZEM HYDROCHLORIDE 5 MG/ML
10 INJECTION INTRAVENOUS ONCE
Status: DISPENSED | OUTPATIENT
Start: 2018-03-22 | End: 2018-03-23

## 2018-03-22 RX ORDER — SODIUM CHLORIDE 9 MG/ML
INJECTION, SOLUTION INTRAVENOUS CONTINUOUS
Status: DISCONTINUED | OUTPATIENT
Start: 2018-03-22 | End: 2018-03-23

## 2018-03-22 RX ORDER — SODIUM CHLORIDE 9 MG/ML
500 INJECTION, SOLUTION INTRAVENOUS
Status: DISCONTINUED | OUTPATIENT
Start: 2018-03-22 | End: 2018-03-26 | Stop reason: HOSPADM

## 2018-03-22 RX ADMIN — INSULIN HUMAN 1 UNITS: 100 INJECTION, SOLUTION PARENTERAL at 20:35

## 2018-03-22 RX ADMIN — SODIUM CHLORIDE: 9 INJECTION, SOLUTION INTRAVENOUS at 22:57

## 2018-03-22 RX ADMIN — ALBUTEROL SULFATE 2.5 MG: 2.5 SOLUTION RESPIRATORY (INHALATION) at 17:15

## 2018-03-22 RX ADMIN — AZITHROMYCIN MONOHYDRATE 500 MG: 500 INJECTION, POWDER, LYOPHILIZED, FOR SOLUTION INTRAVENOUS at 16:46

## 2018-03-22 RX ADMIN — AZITHROMYCIN 250 MG: 250 TABLET, FILM COATED ORAL at 20:30

## 2018-03-22 RX ADMIN — DIGOXIN 500 MCG: 0.25 INJECTION INTRAMUSCULAR; INTRAVENOUS at 15:40

## 2018-03-22 RX ADMIN — ALBUTEROL SULFATE 2.5 MG: 2.5 SOLUTION RESPIRATORY (INHALATION) at 17:40

## 2018-03-22 RX ADMIN — INSULIN GLARGINE 12 UNITS: 100 INJECTION, SOLUTION SUBCUTANEOUS at 20:34

## 2018-03-22 RX ADMIN — ALBUTEROL SULFATE 2.5 MG: 2.5 SOLUTION RESPIRATORY (INHALATION) at 22:41

## 2018-03-22 RX ADMIN — MONTELUKAST SODIUM 10 MG: 10 TABLET, FILM COATED ORAL at 20:32

## 2018-03-22 RX ADMIN — LOSARTAN POTASSIUM 25 MG: 25 TABLET, FILM COATED ORAL at 20:31

## 2018-03-22 RX ADMIN — TIOTROPIUM BROMIDE 1 CAPSULE: 18 CAPSULE ORAL; RESPIRATORY (INHALATION) at 19:28

## 2018-03-22 RX ADMIN — WARFARIN SODIUM 5 MG: 5 TABLET ORAL at 20:33

## 2018-03-22 RX ADMIN — CEFTRIAXONE SODIUM 2 G: 2 INJECTION, POWDER, FOR SOLUTION INTRAMUSCULAR; INTRAVENOUS at 16:44

## 2018-03-22 RX ADMIN — FUROSEMIDE 40 MG: 10 INJECTION, SOLUTION INTRAMUSCULAR; INTRAVENOUS at 15:40

## 2018-03-22 RX ADMIN — DILTIAZEM HYDROCHLORIDE 60 MG: 30 TABLET, FILM COATED ORAL at 23:04

## 2018-03-22 RX ADMIN — ALBUTEROL SULFATE 2.5 MG: 2.5 SOLUTION RESPIRATORY (INHALATION) at 19:28

## 2018-03-22 ASSESSMENT — LIFESTYLE VARIABLES
ALCOHOL_USE: NO
EVER_SMOKED: YES
EVER_SMOKED: YES

## 2018-03-22 ASSESSMENT — ENCOUNTER SYMPTOMS
CHILLS: 0
COUGH: 1
INSOMNIA: 0
VOMITING: 0
EYE PAIN: 0
ABDOMINAL PAIN: 0
STRIDOR: 0
DIZZINESS: 0
DIARRHEA: 0
NAUSEA: 0
MYALGIAS: 0
FEVER: 0
WHEEZING: 1
NERVOUS/ANXIOUS: 0
SHORTNESS OF BREATH: 1
DEPRESSION: 0
HEARTBURN: 0
NECK PAIN: 0
WEIGHT LOSS: 0
ORTHOPNEA: 0
EYE REDNESS: 0
FOCAL WEAKNESS: 0
BACK PAIN: 0
PALPITATIONS: 0
EYE DISCHARGE: 0
SPUTUM PRODUCTION: 1
HEADACHES: 0
BLURRED VISION: 0
SEIZURES: 0

## 2018-03-22 ASSESSMENT — PATIENT HEALTH QUESTIONNAIRE - PHQ9
2. FEELING DOWN, DEPRESSED, IRRITABLE, OR HOPELESS: NOT AT ALL
SUM OF ALL RESPONSES TO PHQ9 QUESTIONS 1 AND 2: 0
1. LITTLE INTEREST OR PLEASURE IN DOING THINGS: NOT AT ALL

## 2018-03-22 ASSESSMENT — PAIN SCALES - GENERAL: PAINLEVEL_OUTOF10: 4

## 2018-03-22 NOTE — ED NOTES
ERP at bedside. Pt agrees with plan of care discussed by ERP. AIDET acknowledged with patient. Florinda in low position, side rail up for pt safety. Medicinal interventions carried out per ERP orders. Call light within reach. Will continue to monitor.

## 2018-03-22 NOTE — ASSESSMENT & PLAN NOTE
Not very well controlled due to storid use at this time for his COPD, will increase Lantus to 15 units monitor

## 2018-03-22 NOTE — ED NOTES
The Medication Reconciliation process has been completed by interviewing the patient    Allergies have been reviewed  Antibiotic use in 30 days - started augmentin 875 - 10 day course 3/21/18    Home Pharm[facility]:  [facility]

## 2018-03-22 NOTE — ASSESSMENT & PLAN NOTE
This is severe sepsis with the following associated acute organ dysfunction(s): acute respiratory failure.   from CAP  Sepsis protocol  Cont IV ceftriaxone and azithromycin  Improving

## 2018-03-22 NOTE — ED NOTES
Pt bib family with c/o increased SOB and difficulty breathing for the past week. Pt placed on 3LPM in triage and saturating in the high 90s.

## 2018-03-22 NOTE — PROGRESS NOTES
I examined the patient 3/22/2018 4:57 PM  Vital Signs:BP (!) 181/111   Pulse (!) 115   Temp 36.4 °C (97.6 °F)   Resp (!) 24   Ht 1.829 m (6')   Wt 110 kg (242 lb 8.1 oz)   SpO2 98%   BMI 32.89 kg/m²   Cardiac examination significant for Tachycardia  Pulmonary examination significant for Clear lung fileds  Capillary refill is brisk  Peripheral Pulse is 2+   Skin is normal

## 2018-03-22 NOTE — H&P
Hospital Medicine History and Physical      Date of Service  3/22/2018    Chief Complaint  Chief Complaint   Patient presents with   • Shortness of Breath   • Difficulty Breathing       History of Presenting Illness  Sarthak is a 72 y.o. male PMH of atrial fibrillation, COPD/asthma, who presents with shortness of breath for the past 3 weeks. Over the past few day it has been getting worse associated with tightness of chest, productive cough with yellowish sputum production. He tried to use nebulizer and albuterol inhaler at home without much improvement. He went to see his doctor and he was prescribed with antibiotics without much improvement. He decided to come here today and he was found to have acute COPD exacerbation and atrial fibrillation with rapid ventricular response. He will be admitted to the [facility].    Primary Care Physician  Deng Corbin D.O.      Code Status  Full code    Review of Systems  Review of Systems   Constitutional: Negative for chills, fever and weight loss.   HENT: Negative for congestion and nosebleeds.    Eyes: Negative for blurred vision, pain, discharge and redness.   Respiratory: Positive for cough, sputum production, shortness of breath and wheezing. Negative for stridor.    Cardiovascular: Positive for leg swelling. Negative for chest pain, palpitations and orthopnea.   Gastrointestinal: Negative for abdominal pain, diarrhea, heartburn, nausea and vomiting.   Genitourinary: Negative for dysuria, frequency and urgency.   Musculoskeletal: Negative for back pain, myalgias and neck pain.   Skin: Negative for itching and rash.   Neurological: Negative for dizziness, focal weakness, seizures and headaches.   Psychiatric/Behavioral: Negative for depression. The patient is not nervous/anxious and does not have insomnia.      Please see HPI, all other systems were reviewed and are negative (AMA/CMS criteria)     Past Medical History  Past Medical History:   Diagnosis Date   •  Atrial fibrillation (CMS-HCC) 10/25/2011   • Long term (current) use of anticoagulants 10/25/2011   • Hypercholesteremia 10/25/2011   • HTN (hypertension) 10/25/2011   • Abnormal electrocardiogram 2010   • Bronchospasm 2009   • NASAL POLYPS 2009   • Sleep apnea 2009   • Other nonspecific abnormal finding 2009   • Arrhythmia     Atrial Fibrillation; Cardiologist, Dr. Sorensen   • Arthritis     knees, left hip   • ASTHMA    • Pain     left hip       Surgical History  Past Surgical History:   Procedure Laterality Date   • HIP ARTHROPLASTY TOTAL  2010    Performed by OSGOOD, PATRICK J at SURGERY [facility]   • LUMBAR DECOMPRESSION     • SINUSOTOMIES  ,,2007       Medications  No current facility-administered medications on file prior to encounter.      Current Outpatient Prescriptions on File Prior to Encounter   Medication Sig Dispense Refill   • warfarin (COUMADIN) 5 MG Tab TAKE ONE TABLET BY MOUTH ONE TIME DAILY OR AS DIRECTED BY COUMADIN CLINIC 90 Tab 1   • montelukast (SINGULAIR) 10 MG Tab Take 1 Tab by mouth every day. 30 Tab 11   • fluticasone (FLONASE) 50 MCG/ACT nasal spray Spray 2 Sprays in nose every day.     • albuterol (VENTOLIN OR PROVENTIL) 108 (90 BASE) MCG/ACT AERS Inhale 2 Puffs by mouth every 6 hours as needed.       Family History  Family History   Problem Relation Age of Onset   • Heart Disease Mother          Social History  Social History   Substance Use Topics   • Smoking status: Former Smoker     Packs/day: 0.50     Years: 10.00     Types: Cigarettes     Quit date: 12/10/1968   • Smokeless tobacco: Never Used   • Alcohol use No       Allergies  Allergies   Allergen Reactions   • Atenolol      DYSPNEA.   • Tetanus Toxoid Swelling        Physical Exam  Laboratory   Hemodynamics  Temp (24hrs), Av.4 °C (97.6 °F), Min:36.4 °C (97.6 °F), Max:36.4 °C (97.6 °F)   Temperature: 36.4 °C (97.6 °F)  Pulse  Av  Min: 115  Max: 115    Blood Pressure :  (!) 181/111      Respiratory      Respiration: (!) 24, Pulse Oximetry: 98 %             Physical Exam   Constitutional: He is oriented to person, place, and time. No distress.   HENT:   Head: Normocephalic and atraumatic.   Mouth/Throat: Oropharynx is clear and moist.   Eyes: Conjunctivae and EOM are normal. Pupils are equal, round, and reactive to light.   Neck: Normal range of motion. Neck supple. No tracheal deviation present. No thyromegaly present.   Cardiovascular:   No murmur heard.  afib   Pulmonary/Chest: Effort normal. No respiratory distress. He has wheezes.   Abdominal: Soft. Bowel sounds are normal. He exhibits no distension. There is no tenderness.   Musculoskeletal: He exhibits edema. He exhibits no tenderness.   Neurological: He is alert and oriented to person, place, and time. No cranial nerve deficit.   Skin: Skin is warm and dry. He is not diaphoretic. No erythema.   Psychiatric: He has a normal mood and affect. His behavior is normal. Thought content normal.       Recent Labs      03/22/18   1530   WBC  14.0*   RBC  5.99   HEMOGLOBIN  17.2   HEMATOCRIT  53.1*   MCV  88.6   MCH  28.7   MCHC  32.4*   RDW  46.4   PLATELETCT  316   MPV  9.6     Recent Labs      03/22/18   1530   SODIUM  139   POTASSIUM  4.0   CHLORIDE  102   CO2  28   GLUCOSE  166*   BUN  36*   CREATININE  1.42*   CALCIUM  9.5     Recent Labs      03/22/18   1530   ALTSGPT  19   ASTSGOT  20   ALKPHOSPHAT  95   TBILIRUBIN  0.7   LIPASE  22   GLUCOSE  166*     Recent Labs     03/20/18   INR  1.6     Recent Labs      03/22/18   1530   BNPBTYPENAT  88         Lab Results   Component Value Date    TROPONINI 0.03 03/22/2018       Imaging  DX-CHEST-PORTABLE (1 VIEW)   Final Result      Cardiomegaly.      Echocardiogram Comp W/O Cont    (Results Pending)     EKG  per my independant read:  QTc: 504, HR: 147,afib   Assessment/Plan     I anticipate this patient will require at least two midnights for appropriate medical management,  necessitating inpatient admission.    Atrial fibrillation with rapid ventricular response (CMS-HCC)- (present on admission)   Assessment & Plan    Currently rate controlled  Will start him on diltiazem  With prn iv meds with parameters  Check TFT  Echo  On warfarin  Daily INR        COPD with acute exacerbation (CMS-HCC)   Assessment & Plan    Remote history of smoking  COPD vs asthma exacerbation  Need outpatient PFT test  On aggressive breathing treatment with duoneb, albuterol, tiotro, prednisone, abx  RT protocol  o2        Sepsis ([facility] (present on admission)   Assessment & Plan    This is severe sepsis with the following associated acute organ dysfunction(s): acute respiratory failure.   from CAP  Sepsis protocol  On IV ceftriaxone and azithromycin  Follow culture  Check procalcitonin        Leucocytosis- (present on admission)   Assessment & Plan    Follow cbc in am        Acute respiratory failure with hypoxia (CMS-HCC)- (present on admission)   Assessment & Plan    Plan as above        CKD (chronic kidney disease) stage 4, GFR 15-29 ml/min (CMS-HCC)- (present on admission)   Assessment & Plan    Stable at baseline        Diabetes mellitus, type II (CMS-HCC)- (present on admission)   Assessment & Plan    On insulin managment            Prophylaxis:  warfarin

## 2018-03-23 ENCOUNTER — APPOINTMENT (OUTPATIENT)
Dept: RADIOLOGY | Facility: MEDICAL CENTER | Age: 73
DRG: 871 | End: 2018-03-23
Attending: INTERNAL MEDICINE
Payer: MEDICARE

## 2018-03-23 ENCOUNTER — PATIENT OUTREACH (OUTPATIENT)
Dept: HEALTH INFORMATION MANAGEMENT | Facility: OTHER | Age: 73
End: 2018-03-23

## 2018-03-23 LAB
ANION GAP SERPL CALC-SCNC: 7 MMOL/L (ref 0–11.9)
BUN SERPL-MCNC: 33 MG/DL (ref 8–22)
CALCIUM SERPL-MCNC: 9.2 MG/DL (ref 8.4–10.2)
CHLORIDE SERPL-SCNC: 102 MMOL/L (ref 96–112)
CO2 SERPL-SCNC: 29 MMOL/L (ref 20–33)
CREAT SERPL-MCNC: 1.54 MG/DL (ref 0.5–1.4)
EKG IMPRESSION: NORMAL
EKG IMPRESSION: NORMAL
ERYTHROCYTE [DISTWIDTH] IN BLOOD BY AUTOMATED COUNT: 46.7 FL (ref 35.9–50)
GLUCOSE BLD-MCNC: 261 MG/DL (ref 65–99)
GLUCOSE BLD-MCNC: 264 MG/DL (ref 65–99)
GLUCOSE BLD-MCNC: 353 MG/DL (ref 65–99)
GLUCOSE BLD-MCNC: 450 MG/DL (ref 65–99)
GLUCOSE SERPL-MCNC: 219 MG/DL (ref 65–99)
GRAM STN SPEC: NORMAL
HCT VFR BLD AUTO: 51 % (ref 42–52)
HGB BLD-MCNC: 16.3 G/DL (ref 14–18)
INR PPP: 2.01 (ref 0.87–1.13)
LACTATE BLD-SCNC: 2.3 MMOL/L (ref 0.5–2)
LV EJECT FRACT  99904: 60
LV EJECT FRACT MOD 2C 99903: 51.3
LV EJECT FRACT MOD 4C 99902: 65.23
LV EJECT FRACT MOD BP 99901: 58.63
MCH RBC QN AUTO: 28.5 PG (ref 27–33)
MCHC RBC AUTO-ENTMCNC: 32 G/DL (ref 33.7–35.3)
MCV RBC AUTO: 89.2 FL (ref 81.4–97.8)
PLATELET # BLD AUTO: 287 K/UL (ref 164–446)
PMV BLD AUTO: 9.8 FL (ref 9–12.9)
POTASSIUM SERPL-SCNC: 4.3 MMOL/L (ref 3.6–5.5)
PROTHROMBIN TIME: 22.5 SEC (ref 12–14.6)
RBC # BLD AUTO: 5.72 M/UL (ref 4.7–6.1)
SIGNIFICANT IND 70042: NORMAL
SITE SITE: NORMAL
SODIUM SERPL-SCNC: 138 MMOL/L (ref 135–145)
SOURCE SOURCE: NORMAL
WBC # BLD AUTO: 23.5 K/UL (ref 4.8–10.8)

## 2018-03-23 PROCEDURE — 99232 SBSQ HOSP IP/OBS MODERATE 35: CPT | Performed by: INTERNAL MEDICINE

## 2018-03-23 PROCEDURE — A9270 NON-COVERED ITEM OR SERVICE: HCPCS | Performed by: INTERNAL MEDICINE

## 2018-03-23 PROCEDURE — 93010 ELECTROCARDIOGRAM REPORT: CPT | Performed by: INTERNAL MEDICINE

## 2018-03-23 PROCEDURE — 83605 ASSAY OF LACTIC ACID: CPT

## 2018-03-23 PROCEDURE — 700105 HCHG RX REV CODE 258: Performed by: INTERNAL MEDICINE

## 2018-03-23 PROCEDURE — 700101 HCHG RX REV CODE 250: Performed by: EMERGENCY MEDICINE

## 2018-03-23 PROCEDURE — 700111 HCHG RX REV CODE 636 W/ 250 OVERRIDE (IP): Performed by: INTERNAL MEDICINE

## 2018-03-23 PROCEDURE — 700102 HCHG RX REV CODE 250 W/ 637 OVERRIDE(OP): Performed by: INTERNAL MEDICINE

## 2018-03-23 PROCEDURE — 87205 SMEAR GRAM STAIN: CPT

## 2018-03-23 PROCEDURE — 94002 VENT MGMT INPAT INIT DAY: CPT

## 2018-03-23 PROCEDURE — 770022 HCHG ROOM/CARE - ICU (200)

## 2018-03-23 PROCEDURE — 93005 ELECTROCARDIOGRAM TRACING: CPT | Performed by: INTERNAL MEDICINE

## 2018-03-23 PROCEDURE — 82962 GLUCOSE BLOOD TEST: CPT | Mod: 91

## 2018-03-23 PROCEDURE — 94640 AIRWAY INHALATION TREATMENT: CPT

## 2018-03-23 PROCEDURE — 85610 PROTHROMBIN TIME: CPT

## 2018-03-23 PROCEDURE — 85027 COMPLETE CBC AUTOMATED: CPT

## 2018-03-23 PROCEDURE — 87070 CULTURE OTHR SPECIMN AEROBIC: CPT

## 2018-03-23 PROCEDURE — 80048 BASIC METABOLIC PNL TOTAL CA: CPT

## 2018-03-23 PROCEDURE — 93306 TTE W/DOPPLER COMPLETE: CPT | Mod: 26 | Performed by: INTERNAL MEDICINE

## 2018-03-23 PROCEDURE — 700101 HCHG RX REV CODE 250: Performed by: INTERNAL MEDICINE

## 2018-03-23 PROCEDURE — 93306 TTE W/DOPPLER COMPLETE: CPT

## 2018-03-23 PROCEDURE — 71045 X-RAY EXAM CHEST 1 VIEW: CPT

## 2018-03-23 PROCEDURE — 94760 N-INVAS EAR/PLS OXIMETRY 1: CPT

## 2018-03-23 RX ORDER — WARFARIN SODIUM 5 MG/1
5 TABLET ORAL
Status: DISCONTINUED | OUTPATIENT
Start: 2018-03-23 | End: 2018-03-24

## 2018-03-23 RX ORDER — METHYLPREDNISOLONE SODIUM SUCCINATE 40 MG/ML
40 INJECTION, POWDER, LYOPHILIZED, FOR SOLUTION INTRAMUSCULAR; INTRAVENOUS EVERY 6 HOURS
Status: DISCONTINUED | OUTPATIENT
Start: 2018-03-23 | End: 2018-03-24

## 2018-03-23 RX ADMIN — METHYLPREDNISOLONE SODIUM SUCCINATE 40 MG: 40 INJECTION, POWDER, FOR SOLUTION INTRAMUSCULAR; INTRAVENOUS at 06:33

## 2018-03-23 RX ADMIN — LOSARTAN POTASSIUM 25 MG: 25 TABLET, FILM COATED ORAL at 20:27

## 2018-03-23 RX ADMIN — INSULIN HUMAN 3 UNITS: 100 INJECTION, SOLUTION PARENTERAL at 06:31

## 2018-03-23 RX ADMIN — METHYLPREDNISOLONE SODIUM SUCCINATE 40 MG: 40 INJECTION, POWDER, FOR SOLUTION INTRAMUSCULAR; INTRAVENOUS at 18:16

## 2018-03-23 RX ADMIN — SODIUM CHLORIDE: 9 INJECTION, SOLUTION INTRAVENOUS at 00:54

## 2018-03-23 RX ADMIN — METHYLPREDNISOLONE SODIUM SUCCINATE 40 MG: 40 INJECTION, POWDER, FOR SOLUTION INTRAMUSCULAR; INTRAVENOUS at 12:52

## 2018-03-23 RX ADMIN — INSULIN HUMAN 3 UNITS: 100 INJECTION, SOLUTION PARENTERAL at 11:42

## 2018-03-23 RX ADMIN — ALBUTEROL SULFATE 2.5 MG: 2.5 SOLUTION RESPIRATORY (INHALATION) at 06:53

## 2018-03-23 RX ADMIN — DILTIAZEM HYDROCHLORIDE 60 MG: 30 TABLET, FILM COATED ORAL at 08:48

## 2018-03-23 RX ADMIN — INSULIN GLARGINE 12 UNITS: 100 INJECTION, SOLUTION SUBCUTANEOUS at 20:28

## 2018-03-23 RX ADMIN — INSULIN HUMAN 6 UNITS: 100 INJECTION, SOLUTION PARENTERAL at 20:29

## 2018-03-23 RX ADMIN — INSULIN HUMAN 5 UNITS: 100 INJECTION, SOLUTION PARENTERAL at 18:16

## 2018-03-23 RX ADMIN — CEFTRIAXONE SODIUM 2 G: 10 INJECTION, POWDER, FOR SOLUTION INTRAVENOUS at 08:31

## 2018-03-23 RX ADMIN — ALBUTEROL SULFATE 2.5 MG: 2.5 SOLUTION RESPIRATORY (INHALATION) at 10:38

## 2018-03-23 RX ADMIN — WARFARIN SODIUM 5 MG: 5 TABLET ORAL at 18:16

## 2018-03-23 RX ADMIN — ALBUTEROL SULFATE 2.5 MG: 2.5 SOLUTION RESPIRATORY (INHALATION) at 03:07

## 2018-03-23 RX ADMIN — ALBUTEROL SULFATE 2.5 MG: 2.5 SOLUTION RESPIRATORY (INHALATION) at 19:59

## 2018-03-23 RX ADMIN — AZITHROMYCIN 250 MG: 250 TABLET, FILM COATED ORAL at 08:31

## 2018-03-23 RX ADMIN — DILTIAZEM HYDROCHLORIDE 60 MG: 30 TABLET, FILM COATED ORAL at 12:52

## 2018-03-23 RX ADMIN — MONTELUKAST SODIUM 10 MG: 10 TABLET, FILM COATED ORAL at 20:27

## 2018-03-23 RX ADMIN — DILTIAZEM HYDROCHLORIDE 60 MG: 30 TABLET, FILM COATED ORAL at 18:16

## 2018-03-23 RX ADMIN — TIOTROPIUM BROMIDE 1 CAPSULE: 18 CAPSULE ORAL; RESPIRATORY (INHALATION) at 08:31

## 2018-03-23 RX ADMIN — ALBUTEROL SULFATE 2.5 MG: 2.5 SOLUTION RESPIRATORY (INHALATION) at 22:42

## 2018-03-23 ASSESSMENT — PULMONARY FUNCTION TESTS
EPAP_CMH2O: 6
EPAP_CMH2O: 6

## 2018-03-23 ASSESSMENT — PAIN SCALES - GENERAL
PAINLEVEL_OUTOF10: 0

## 2018-03-23 ASSESSMENT — ENCOUNTER SYMPTOMS
CHILLS: 0
SHORTNESS OF BREATH: 1
VOMITING: 0
BLURRED VISION: 0
NAUSEA: 0
COUGH: 1
MYALGIAS: 0
ABDOMINAL PAIN: 0
FEVER: 0

## 2018-03-23 ASSESSMENT — CHA2DS2 SCORE
AGE 75 OR GREATER: NO
CHF OR LEFT VENTRICULAR DYSFUNCTION: NO
DIABETES: NO
PRIOR STROKE OR TIA OR THROMBOEMBOLISM: NO
AGE 65 TO 74: YES
SEX: MALE
HYPERTENSION: YES
CHA2DS2 VASC SCORE: 2
VASCULAR DISEASE: NO

## 2018-03-23 NOTE — PROGRESS NOTES
Admit profile,med rec and assessment completed.Alert and oriented  to person,place and time.No edema,on 4L of O2 sat 94%.Denied pain at this time.Due medication given per MAR.Patients belongings within reach,Bed locked in low position.oriented to room, bed alarm on, nonskid socks applied, advised to call for assistance. States understanding of POC.

## 2018-03-23 NOTE — ED NOTES
Total urine output 1200 cc after lasix 40 mg in the ER. Pt taken to floor with cardiac monitor and RN. Pt in stable condition.

## 2018-03-23 NOTE — FLOWSHEET NOTE
03/23/18 1038   Interdisciplinary Plan of Care-Outcomes    Bronchodilator Outcome Diminished Wheezing and Volume of Air Movement Increased   Education   Education Yes - Pt. / Family has been Instructed in use of Respiratory Equipment;Yes - Pt. / Family has been Instructed in use of Respiratory Medications and Adverse Reactions   RT Assessment of Delivered Medications   Evaluation of Medication Delivery Daily Yes-- Pt /Family has been Instructed in use of Respiratory Medications and Adverse Reactions   SVN Group   #SVN Performed Yes   Given By: Mouthpiece   Respiratory WDL   Respiratory (WDL) X   Chest Exam   Work Of Breathing / Effort Mild;Moderate   Respiration (!) 32   Pulse 80   Heart Rate (Monitored) 75   Breath Sounds   Pre/Post Intervention Pre Intervention Assessment   RUL Breath Sounds Expiratory Wheezes   RML Breath Sounds Expiratory Wheezes   RLL Breath Sounds Diminished   FÁTIMA Breath Sounds Expiratory Wheezes   LLL Breath Sounds Diminished   Secretions   Cough Congested   How Sputum Obtained Spontaneous   Oximetry   #Pulse Oximetry (Single Determination) Yes   Continuous Oximetry Yes   Oxygen   Pulse Oximetry 93 %   O2 (LPM) 3   O2 Daily Delivery Respiratory  Silicone Nasal Cannula

## 2018-03-23 NOTE — FLOWSHEET NOTE
Patient received an albuterol nebulizer at 1715 and 1741 with some improvement with each.     03/22/18 1715   Interdisciplinary Plan of Care-Goals (Indications)   Obstructive Ventilatory Defect or Pulmonary Disease without Obvious Obstruction Physical Exam / Hyperinflation / Wheezing (bronchospasm)   RT Assessment of Delivered Medications   Evaluation of Medication Delivery Daily Yes-- Pt /Family has been Instructed in use of Respiratory Medications and Adverse Reactions   SVN Group   #SVN Performed Yes   Given By: Mouthpiece   Chest Exam   Work Of Breathing / Effort Increased Work of Breathing;Shallow;Speech Limiting/Unable to complete full sentence   Respiration (!) 26   Pulse 89   Heart Rate (Monitored) (!) 102   Breath Sounds   RUL Breath Sounds Inspiratory Wheezes;Expiratory Wheezes   RML Breath Sounds Inspiratory Wheezes;Expiratory Wheezes   RLL Breath Sounds Inspiratory Wheezes;Expiratory Wheezes;Diminished   FÁTIMA Breath Sounds Inspiratory Wheezes;Expiratory Wheezes   LLL Breath Sounds Inspiratory Wheezes;Expiratory Wheezes;Diminished   Secretions   Cough Congested;Moist;Non Productive   Oximetry   Continuous Oximetry Yes   Oxygen   Home O2 Use Prior To Admission? Yes   Home O2 LPM Flow 2 LPM  (just started yesterday)   Home O2 Delivery Method Nasal Cannula   Home O2 Frequency of Use Continuous   Pulse Oximetry 94 %   O2 (LPM) 3.5   O2 Daily Delivery Respiratory  Silicone Nasal Cannula

## 2018-03-23 NOTE — CARE PLAN
Problem: Safety  Goal: Will remain free from injury  Outcome: PROGRESSING AS EXPECTED  Hourly rounding.  Non-skid socks. Bed locked & in low position. Personal belongings and call light  within reach. .      Problem: Knowledge Deficit  Goal: Knowledge of disease process/condition, treatment plan, diagnostic tests, and medications will improve  Outcome: PROGRESSING AS EXPECTED  Information regarding disease process/condition explained,question answered.  Updated with plan of care, verbalized understanding.

## 2018-03-23 NOTE — FLOWSHEET NOTE
03/22/18 1928   Events/Summary/Plan   Events/Summary/Plan SVN DPI   Interdisciplinary Plan of Care-Goals (Indications)   Obstructive Ventilatory Defect or Pulmonary Disease without Obvious Obstruction Physical Exam / Hyperinflation / Wheezing (bronchospasm)   Interdisciplinary Plan of Care-Outcomes    Bronchodilator Outcome Diminished Wheezing and Volume of Air Movement Increased   Education   Education Yes - Pt. / Family has been Instructed in use of Respiratory Equipment;Yes - Pt. / Family has been Instructed in use of Respiratory Medications and Adverse Reactions   RT Assessment of Delivered Medications   Evaluation of Medication Delivery Daily Yes-- Pt /Family has been Instructed in use of Respiratory Medications and Adverse Reactions   SVN Group   #SVN Performed Yes   Given By: Mouthpiece   Date SVN Last Changed 03/22/18   Date SVN Next Change Due (Q 7 Days) 03/29/18   MDI/DPI Group   #MDI/DPI Given MDI/DPI x 1   Incentive Spirometry Group   Incentive Spirometry Instruction Yes   Breathing Exercises Yes   Incentive Spirometer Date Last Changed 03/22/18   Incentive Spirometer Next Change Date (Q 30 Days) 04/21/18   Respiratory WDL   Respiratory (WDL) X   Chest Exam   Respiration (!) 22   Pulse 94   Breath Sounds   Pre/Post Intervention Post Intervention Assessment   RUL Breath Sounds Rhonchi;Crackles   RML Breath Sounds Diminished;Rhonchi   RLL Breath Sounds Expiratory Wheezes;Diminished   FÁTIMA Breath Sounds Expiratory Wheezes;Rhonchi   LLL Breath Sounds Expiratory Wheezes;Diminished   Secretions   Cough Congested;Moist;Productive;Strong   How Sputum Obtained Spontaneous   Sputum Amount Moderate   Sputum Color Grey;Tan   Sputum Consistency Thick   Oximetry   #Pulse Oximetry (Single Determination) Yes   Oxygen   Pulse Oximetry 95 %   O2 (LPM) 3   O2 Daily Delivery Respiratory  Silicone Nasal Cannula

## 2018-03-23 NOTE — ASSESSMENT & PLAN NOTE
Remote history of smoking  COPD vs asthma exacerbation  Need outpatient PFT test  Cont  aggressive breathing treatment with duoneb, albuterol, tiotro,  Abx, switching steroids to oral from IV   RT protocol  o2

## 2018-03-23 NOTE — ED PROVIDER NOTES
ED Provider Note    CHIEF COMPLAINT  Chief Complaint   Patient presents with   • Shortness of Breath   • Difficulty Breathing       HPI  Yuri De León is a 72 y.o. male who presents to admission with complaints of difficulty breathing, cough and shortness of breath. States his symptoms are presently 2-3 weeks getting worse. Had been on recently antibiotics by his primary care physician. Has history of atrial fibrillation presents with atrial fibrillation with RVR at 150 beats per minute. Hypoxic at 82% on room air placed on oxygen increasing doses appear to be in severe distress secondary respiratory distress and his elevated heart rate. No chest pain no fever or chills    REVIEW OF SYSTEMS  See HPI for further details. All other systems are negative.     PAST MEDICAL HISTORY  Past Medical History:   Diagnosis Date   • Atrial fibrillation (CMS-HCC) 10/25/2011   • Long term (current) use of anticoagulants 10/25/2011   • Hypercholesteremia 10/25/2011   • HTN (hypertension) 10/25/2011   • Abnormal electrocardiogram 8/13/2010   • Bronchospasm 8/6/2009   • NASAL POLYPS 8/6/2009   • Sleep apnea 8/6/2009   • Other nonspecific abnormal finding 8/6/2009   • Arrhythmia     Atrial Fibrillation; Cardiologist, Dr. Sorensen   • Arthritis     knees, left hip   • ASTHMA    • Pain     left hip       FAMILY HISTORY  [unfilled]    SOCIAL HISTORY  Social History     Social History   • Marital status:      Spouse name: N/A   • Number of children: N/A   • Years of education: N/A     Social History Main Topics   • Smoking status: Former Smoker     Packs/day: 0.50     Years: 10.00     Types: Cigarettes     Quit date: 12/10/1968   • Smokeless tobacco: Never Used   • Alcohol use No   • Drug use: No   • Sexual activity: Not on file     Other Topics Concern   • Not on file     Social History Narrative   • No narrative on file       SURGICAL HISTORY  Past Surgical History:   Procedure Laterality Date   • HIP ARTHROPLASTY TOTAL   8/31/2010    Performed by OSGOOD, PATRICK J at SURGERY [facility] ORS   • LUMBAR DECOMPRESSION  1984   • SINUSOTOMIES  2003,2005,2/2007       CURRENT MEDICATIONS  Home Medications     Reviewed by Annette Mancera (Pharmacy Tech) on 03/22/18 at 1619  Med List Status: Complete   Medication Last Dose Status   albuterol (PROVENTIL) 2.5mg/3ml Nebu Soln solution for nebulization 3/22/2018 Active   albuterol (VENTOLIN OR PROVENTIL) 108 (90 BASE) MCG/ACT AERS 3/22/2018 Active   amoxicillin-clavulanate (AUGMENTIN) 875-125 MG Tab 3/21/2018 Active   fluticasone (FLONASE) 50 MCG/ACT nasal spray 3/21/2018 Active   insulin detemir (LEVEMIR) 100 UNIT/ML Solution 3/21/2018 Active   losartan (COZAAR) 25 MG Tab 3/21/2018 Active   montelukast (SINGULAIR) 10 MG Tab 3/21/2018 Active   warfarin (COUMADIN) 5 MG Tab 3/21/2018 Active                ALLERGIES  Allergies   Allergen Reactions   • Atenolol      DYSPNEA.   • Tetanus Toxoid Swelling       PHYSICAL EXAM  VITAL SIGNS: BP (!) 181/111   Pulse (!) 115   Temp 36.4 °C (97.6 °F)   Resp (!) 24   Ht 1.829 m (6')   Wt 110 kg (242 lb 8.1 oz)   SpO2 98%   BMI 32.89 kg/m²       Constitutional: Well developed, Well nourished, severe acute distress, Non-toxic appearance.   HENT: Normocephalic, Atraumatic, Bilateral external ears normal, Oropharynx moist, No oral exudates, Nose normal.   Eyes: PERRLA, EOMI, Conjunctiva normal, No discharge.   Neck: Normal range of motion, No tenderness, Supple, No stridor.   Lymphatic: No lymphadenopathy noted.   Cardiovascular: Elevated heart rate, irregular rhythm, No murmurs, No rubs, No gallops.   Thorax & Lungs: Diminished breath sounds, patient was on respiratory distress,  wheezing, No chest tenderness.   Abdomen: Bowel sounds normal, Soft, No tenderness, No masses, No pulsatile masses.   Skin: Warm, Dry, No erythema, No rash.   Back: No tenderness, No CVA tenderness.      Extremities: Intact distal pulses, No edema, No tenderness, No  cyanosis, No clubbing.   Musculoskeletal: Good range of motion in all major joints. No tenderness to palpation or major deformities noted.   Neurologic: Alert & oriented x 3, Normal motor function, Normal sensory function, No focal deficits noted.   Psychiatric: Affect normal, Judgment normal, Mood normal.     EKG  Atrial fibrillation with rapid ventricular response rate of 140 beats per minute, no ST elevation or depression, no widening of the QRS complex, poor R-wave progression, IL intervals are absent, no diagnostic Q waves. This a 12-lead EKG there is no previous EKG available at this time for comparison.    RADIOLOGY/PROCEDURES  DX-CHEST-PORTABLE (1 VIEW)   Final Result      Cardiomegaly.      Echocardiogram Comp W/O Cont    (Results Pending)         COURSE & MEDICAL DECISION MAKING  Pertinent Labs & Imaging studies reviewed. (See chart for details)  Patient has atrial fibrillation with rapid ventricular response. He has allergies to beta blockers and there is a nationwide shortage of Cardizem. He was given digoxin 0.5 mg which lowers heart rate to 98 beats per minute. To be hypoxic was given breathing treatment with albuterol and placed on increasing doses of oxygen and cardiac monitor.. Be in severe distress secondary to respiratory distress/hypoxia. Initially thought he had a failure and was given Lasix. Elevated white blood cell count lactic acid was normal is given Anaprox Rocephin/Zithromax. Will be admitted to the ICU.    FINAL IMPRESSION  1. Acute respiratory distress/hypoxia  2. Atrial fibrillation with rapid ventricular response rate  3. Critical care time of 45 minutes; as includes multiple conversations patient's family/wife, discussions with hospitalist, reviewing records and discussion treatment plan, interventions as discussed above. This does not include any procedures.         Electronically signed by: Eze Castillo, 3/22/2018 5:14 PM

## 2018-03-23 NOTE — PROGRESS NOTES
Alert and oriented  to person,place and time.on 4L of O2 sat 94%. Denied pain at this time.Due medication given per MAR. States understanding of POC.bed locked in low position , call light and belongings within reach.

## 2018-03-23 NOTE — FLOWSHEET NOTE
PAtient is working harder to breathe. RN notified.   0332 Dr gama and NAM notified of patients worsening condition. RR30 SpO2 94%  0334 Dr Walton  returned page and states to transfer patient to unit and get CXR.

## 2018-03-23 NOTE — PROGRESS NOTES
0445- Patient transferred to 322 from floor. Patient brought over via recliner chair. Patient remains in recliner. ICU monitoring placed on patient. Patient SOB, tachypneic, has increased WOB, very tense body posture, and struggling to complete 2-4 word sentences. RT placed patient on Bipap.     0500- Patient appears to be tolerating Bipap well. Decreased respiratory rate and more relaxed.     0615- During 0600 med pass, patient expressed concern for taking ordered cardizem. Education provided regarding medication. Patient would like to discuss medication with MD prior to administration.

## 2018-03-23 NOTE — FLOWSHEET NOTE
03/22/18 2219   Events/Summary/Plan   Events/Summary/Plan SVN    Interdisciplinary Plan of Care-Goals (Indications)   Obstructive Ventilatory Defect or Pulmonary Disease without Obvious Obstruction Physical Exam / Hyperinflation / Wheezing (bronchospasm)   Interdisciplinary Plan of Care-Outcomes    Bronchodilator Outcome Diminished Wheezing and Volume of Air Movement Increased   Education   Education Yes - Pt. / Family has been Instructed in use of Respiratory Equipment;Yes - Pt. / Family has been Instructed in use of Respiratory Medications and Adverse Reactions   RT Assessment of Delivered Medications   Evaluation of Medication Delivery Daily Yes-- Pt /Family has been Instructed in use of Respiratory Medications and Adverse Reactions   SVN Group   #SVN Performed Yes   Given By: Mouthpiece   Incentive Spirometry Group   Incentive Spirometry Instruction Yes   Breathing Exercises Yes   Incentive Spirometer Volume 2000 mL   Respiratory WDL   Respiratory (WDL) X   Chest Exam   Respiration 18   Pulse (!) 52   Breath Sounds   Pre/Post Intervention Post Intervention Assessment   RUL Breath Sounds Expiratory Wheezes;Diminished;Rhonchi   RML Breath Sounds Diminished   RLL Breath Sounds Diminished   FÁTIMA Breath Sounds Expiratory Wheezes;Diminished;Rhonchi   LLL Breath Sounds Expiratory Wheezes   Oximetry   #Pulse Oximetry (Single Determination) Yes   Oxygen   Pulse Oximetry 95 %   O2 (LPM) 4   O2 Daily Delivery Respiratory  Silicone Nasal Cannula

## 2018-03-23 NOTE — PROGRESS NOTES
[facility] Hospitalist Progress Note    Date of Service: 3/23/2018    Chief Complaint  72 y.o. male admitted 3/22/2018 with SOB, found to have acute res[iratory failure and sepsis due to possible CAp and also COPD exacerbation.    Interval Problem Update  Pt seen and examined, required BiPAP overnight, now off, on 6L of NC, afebrile.  Pulmonology also following appreciate rec.     Consultants/Specialty  PMA    Disposition  TBD         Review of Systems   Constitutional: Negative for chills and fever.   Eyes: Negative for blurred vision.   Respiratory: Positive for cough and shortness of breath.    Cardiovascular: Negative for chest pain.   Gastrointestinal: Negative for abdominal pain, nausea and vomiting.   Genitourinary: Negative for dysuria and urgency.   Musculoskeletal: Negative for myalgias.      Physical Exam  Laboratory/Imaging   Hemodynamics  Temp (24hrs), Av.7 °C (98 °F), Min:36.2 °C (97.2 °F), Max:37 °C (98.6 °F)   Temperature: 37 °C (98.6 °F)  Pulse  Av.4  Min: 52  Max: 152 Heart Rate (Monitored): 68  Blood Pressure : (!) 176/83, NIBP: 160/78      Respiratory      Respiration: (!) 22, Pulse Oximetry: 96 %, O2 Daily Delivery Respiratory : Silicone Nasal Cannula     Given By:: Mouthpiece, #MDI/DPI Given: MDI/DPI x 1, Work Of Breathing / Effort: Mild  RUL Breath Sounds: Expiratory Wheezes;Inspiratory Wheezes, RML Breath Sounds: Expiratory Wheezes;Inspiratory Wheezes, RLL Breath Sounds: Diminished, FÁTIMA Breath Sounds: Expiratory Wheezes;Inspiratory Wheezes, LLL Breath Sounds: Diminished    Fluids    Intake/Output Summary (Last 24 hours) at 18 1622  Last data filed at 18 1400   Gross per 24 hour   Intake              940 ml   Output              550 ml   Net              390 ml       Nutrition  Orders Placed This Encounter   Procedures   • Diet Order     Standing Status:   Standing     Number of Occurrences:   1     Order Specific Question:   Diet:     Answer:   Diabetic [3]     Physical Exam    Constitutional: He is oriented to person, place, and time.   HENT:   Head: Normocephalic and atraumatic.   Eyes: Conjunctivae are normal. No scleral icterus.   Neck: Neck supple. No JVD present.   Cardiovascular: An irregularly irregular rhythm present.   No murmur heard.  Pulmonary/Chest: He has wheezes.   Decreased breath sounds    Abdominal: Soft. Bowel sounds are normal. He exhibits no distension. There is no tenderness.   Musculoskeletal: He exhibits no edema.   Neurological: He is alert and oriented to person, place, and time.   Skin: Skin is warm and dry.   Nursing note and vitals reviewed.      Recent Labs      03/22/18   1530  03/23/18   0051   WBC  14.0*  23.5*   RBC  5.99  5.72   HEMOGLOBIN  17.2  16.3   HEMATOCRIT  53.1*  51.0   MCV  88.6  89.2   MCH  28.7  28.5   MCHC  32.4*  32.0*   RDW  46.4  46.7   PLATELETCT  316  287   MPV  9.6  9.8     Recent Labs      03/22/18   1530  03/23/18   0051   SODIUM  139  138   POTASSIUM  4.0  4.3   CHLORIDE  102  102   CO2  28  29   GLUCOSE  166*  219*   BUN  36*  33*   CREATININE  1.42*  1.54*   CALCIUM  9.5  9.2     Recent Labs      03/22/18   1530  03/23/18   0051   INR  1.87*  2.01*     Recent Labs      03/22/18   1530   BNPBTYPENAT  88              Assessment/Plan     * Sepsis (CMS-HCC)- (present on admission)   Assessment & Plan    This is severe sepsis with the following associated acute organ dysfunction(s): acute respiratory failure.   from CAP  Sepsis protocol  Cont IV ceftriaxone and azithromycin  Follow culture          COPD with acute exacerbation (CMS-HCC)   Assessment & Plan    Remote history of smoking  COPD vs asthma exacerbation  Need outpatient PFT test  Cont  aggressive breathing treatment with duoneb, albuterol, tiotro, prednisone, abx  RT protocol  o2        Acute respiratory failure with hypoxia (CMS-HCC)- (present on admission)   Assessment & Plan    Plan as above        Atrial fibrillation with rapid ventricular response (CMS-HCC)- (present  on admission)   Assessment & Plan    Rate controlled, cont on diltiazem,   Echo showing EF of 60%.  Cont warfarin           Leucocytosis- (present on admission)   Assessment & Plan    Possible PNA vs related to steroids.   Cont IV abx, cultures pending         CKD (chronic kidney disease) stage 4, GFR 15-29 ml/min (CMS-HCC)- (present on admission)   Assessment & Plan    Stable at baseline        Diabetes mellitus, type II (CMS-HCC)- (present on admission)   Assessment & Plan    On insulin managment          Quality-Core Measures   Reviewed items::  Labs reviewed, Radiology images reviewed and Medications reviewed  Gonzalez catheter::  No Gonzalez  DVT prophylaxis pharmacological::  Heparin  Antibiotics:  Treating active infection/contamination beyond 24 hours perioperative coverage

## 2018-03-23 NOTE — PROGRESS NOTES
0700-Report from CASSI Giraldo. POC reviewed. Pt sitting up in cardiac chair with Bipap on. Tolerating well.     0850-Echo complete. Pt assisted back to cardiac chair for breakfast. Transitioned to 6L NC for breakfast. O2 sats mid 90s. Pt able to tolerate all AM meds. Dr. Carranza at bedside. Discussed Cardizem. Pt agrees to take. No further needs.

## 2018-03-23 NOTE — CARE PLAN
Problem: Safety  Goal: Will remain free from injury  Outcome: PROGRESSING AS EXPECTED  Pt will remain free from injury. Pt has been educated on the call light and demonstrates its use appropriately. Fall precautions in place.

## 2018-03-23 NOTE — PROGRESS NOTES
Inpatient Anticoagulation Service Note    Date: 3/23/2018  Reason for Anticoagulation: Atrial Fibrillation   Hemoglobin Value: 16.3  Hematocrit Value: 51  Lab Platelet Value: 287  Target INR: 2.0 to 3.0    INR from last 7 days     Date/Time INR Value    03/23/18 0051 (!)  2.01    03/22/18 1530 (!)  1.87        Dose from last 7 days     Date/Time Dose (mg)    03/23/18 0700  5    03/22/18 1800  5        Significant Interactions: Antibiotics, Corticosteroids  Bridge Therapy: Not required    Comments:  (Home dose 5 mg daily, with sub-therapeutic INR on admit.)    Plan:  Coumadin 5 mg PO tonight for INR 2.01  Education Material Provided?: No (Coumadin clinic patient)  Pharmacist suggested discharge dosing: continue 5 mg daily with clinic follow up     Marianne NelsonD

## 2018-03-23 NOTE — CARE PLAN
Problem: Communication  Goal: The ability to communicate needs accurately and effectively will improve  Outcome: PROGRESSING AS EXPECTED  Communication will remain effective with patient and family. Both updated as changes made/initiated. Both have been updated on intentions and policies for ICU.

## 2018-03-23 NOTE — PROGRESS NOTES
Inpatient Anticoagulation Service Note    Date: 3/22/2018  Reason for Anticoagulation: Atrial Fibrillation        Hemoglobin Value: 17.2  Hematocrit Value: (!) 53.1  Lab Platelet Value: 316  Target INR: 2.0 to 3.0    INR from last 7 days     Date/Time INR Value    03/22/18 1530 (!)  1.87        Dose from last 7 days     Date/Time Dose (mg)    03/22/18 1800  5        Significant Interactions: Antibiotics  Bridge Therapy: No (If less than 5 days and overlap therapy discontinued -- document reason (i.e. Bleed Risk))    (If still on overlap therapy, if No -- document reason (i.e. Bleed Risk))    Comments:  (Home dose 5 mg daily, with sub-therapeutic INR on admit.)    Plan:  Will give warfarin 5 mg po tonight for INR of 1.87  Education Material Provided?: No (Coumadin clinic patient)  Pharmacist suggested discharge dosing: To be determined.     Clinton Lares [facility]

## 2018-03-23 NOTE — PROGRESS NOTES
1818 Report received from FERNY Hernandes RN.    1837 Received patient from ER via gurcolin accompanied by Er RN & spouse. SHAWNEE MIKE is placing patient on tele monitor & getting the vitals.    1845 Bedside report given to Oncoming NOC RN (Tochukwu).

## 2018-03-24 LAB
ANION GAP SERPL CALC-SCNC: 10 MMOL/L (ref 0–11.9)
BASOPHILS # BLD AUTO: 0.1 % (ref 0–1.8)
BASOPHILS # BLD: 0.02 K/UL (ref 0–0.12)
BUN SERPL-MCNC: 48 MG/DL (ref 8–22)
CALCIUM SERPL-MCNC: 8.9 MG/DL (ref 8.4–10.2)
CHLORIDE SERPL-SCNC: 98 MMOL/L (ref 96–112)
CO2 SERPL-SCNC: 25 MMOL/L (ref 20–33)
CREAT SERPL-MCNC: 1.72 MG/DL (ref 0.5–1.4)
EOSINOPHIL # BLD AUTO: 0 K/UL (ref 0–0.51)
EOSINOPHIL NFR BLD: 0 % (ref 0–6.9)
ERYTHROCYTE [DISTWIDTH] IN BLOOD BY AUTOMATED COUNT: 45.5 FL (ref 35.9–50)
GLUCOSE BLD-MCNC: 261 MG/DL (ref 65–99)
GLUCOSE BLD-MCNC: 418 MG/DL (ref 65–99)
GLUCOSE BLD-MCNC: 507 MG/DL (ref 65–99)
GLUCOSE BLD-MCNC: 511 MG/DL (ref 65–99)
GLUCOSE BLD-MCNC: 552 MG/DL (ref 65–99)
GLUCOSE SERPL-MCNC: 427 MG/DL (ref 65–99)
HCT VFR BLD AUTO: 46.9 % (ref 42–52)
HGB BLD-MCNC: 15.4 G/DL (ref 14–18)
IMM GRANULOCYTES # BLD AUTO: 0.07 K/UL (ref 0–0.11)
IMM GRANULOCYTES NFR BLD AUTO: 0.5 % (ref 0–0.9)
INR PPP: 2.6 (ref 0.87–1.13)
LYMPHOCYTES # BLD AUTO: 0.51 K/UL (ref 1–4.8)
LYMPHOCYTES NFR BLD: 3.5 % (ref 22–41)
MCH RBC QN AUTO: 28.8 PG (ref 27–33)
MCHC RBC AUTO-ENTMCNC: 32.8 G/DL (ref 33.7–35.3)
MCV RBC AUTO: 87.7 FL (ref 81.4–97.8)
MONOCYTES # BLD AUTO: 0.37 K/UL (ref 0–0.85)
MONOCYTES NFR BLD AUTO: 2.6 % (ref 0–13.4)
NEUTROPHILS # BLD AUTO: 13.47 K/UL (ref 1.82–7.42)
NEUTROPHILS NFR BLD: 93.3 % (ref 44–72)
NRBC # BLD AUTO: 0 K/UL
NRBC BLD-RTO: 0 /100 WBC
PLATELET # BLD AUTO: 263 K/UL (ref 164–446)
PMV BLD AUTO: 10.2 FL (ref 9–12.9)
POTASSIUM SERPL-SCNC: 4.4 MMOL/L (ref 3.6–5.5)
PROTHROMBIN TIME: 27.5 SEC (ref 12–14.6)
RBC # BLD AUTO: 5.35 M/UL (ref 4.7–6.1)
SODIUM SERPL-SCNC: 133 MMOL/L (ref 135–145)
WBC # BLD AUTO: 14.4 K/UL (ref 4.8–10.8)

## 2018-03-24 PROCEDURE — 85025 COMPLETE CBC W/AUTO DIFF WBC: CPT

## 2018-03-24 PROCEDURE — 770020 HCHG ROOM/CARE - TELE (206)

## 2018-03-24 PROCEDURE — 94760 N-INVAS EAR/PLS OXIMETRY 1: CPT

## 2018-03-24 PROCEDURE — 700111 HCHG RX REV CODE 636 W/ 250 OVERRIDE (IP): Performed by: INTERNAL MEDICINE

## 2018-03-24 PROCEDURE — 85610 PROTHROMBIN TIME: CPT

## 2018-03-24 PROCEDURE — A9270 NON-COVERED ITEM OR SERVICE: HCPCS | Performed by: INTERNAL MEDICINE

## 2018-03-24 PROCEDURE — 700102 HCHG RX REV CODE 250 W/ 637 OVERRIDE(OP): Performed by: INTERNAL MEDICINE

## 2018-03-24 PROCEDURE — 82962 GLUCOSE BLOOD TEST: CPT

## 2018-03-24 PROCEDURE — 94640 AIRWAY INHALATION TREATMENT: CPT

## 2018-03-24 PROCEDURE — 80048 BASIC METABOLIC PNL TOTAL CA: CPT

## 2018-03-24 PROCEDURE — 700101 HCHG RX REV CODE 250: Performed by: EMERGENCY MEDICINE

## 2018-03-24 PROCEDURE — 700101 HCHG RX REV CODE 250: Performed by: INTERNAL MEDICINE

## 2018-03-24 PROCEDURE — 99232 SBSQ HOSP IP/OBS MODERATE 35: CPT | Performed by: INTERNAL MEDICINE

## 2018-03-24 RX ORDER — PREDNISONE 50 MG/1
50 TABLET ORAL DAILY
Status: DISCONTINUED | OUTPATIENT
Start: 2018-03-24 | End: 2018-03-26 | Stop reason: HOSPADM

## 2018-03-24 RX ORDER — WARFARIN SODIUM 3 MG/1
3 TABLET ORAL
Status: COMPLETED | OUTPATIENT
Start: 2018-03-24 | End: 2018-03-24

## 2018-03-24 RX ORDER — INSULIN GLARGINE 100 [IU]/ML
15 INJECTION, SOLUTION SUBCUTANEOUS EVERY EVENING
Status: DISCONTINUED | OUTPATIENT
Start: 2018-03-24 | End: 2018-03-26 | Stop reason: HOSPADM

## 2018-03-24 RX ADMIN — METHYLPREDNISOLONE SODIUM SUCCINATE 40 MG: 40 INJECTION, POWDER, FOR SOLUTION INTRAMUSCULAR; INTRAVENOUS at 06:11

## 2018-03-24 RX ADMIN — WARFARIN SODIUM 3 MG: 3 TABLET ORAL at 17:02

## 2018-03-24 RX ADMIN — METHYLPREDNISOLONE SODIUM SUCCINATE 40 MG: 40 INJECTION, POWDER, FOR SOLUTION INTRAMUSCULAR; INTRAVENOUS at 12:00

## 2018-03-24 RX ADMIN — CEFTRIAXONE SODIUM 2 G: 10 INJECTION, POWDER, FOR SOLUTION INTRAVENOUS at 08:44

## 2018-03-24 RX ADMIN — DILTIAZEM HYDROCHLORIDE 60 MG: 30 TABLET, FILM COATED ORAL at 17:02

## 2018-03-24 RX ADMIN — AZITHROMYCIN 250 MG: 250 TABLET, FILM COATED ORAL at 08:44

## 2018-03-24 RX ADMIN — INSULIN HUMAN 2 UNITS: 100 INJECTION, SOLUTION PARENTERAL at 20:04

## 2018-03-24 RX ADMIN — LOSARTAN POTASSIUM 25 MG: 25 TABLET, FILM COATED ORAL at 20:02

## 2018-03-24 RX ADMIN — INSULIN HUMAN 12 UNITS: 100 INJECTION, SOLUTION PARENTERAL at 15:30

## 2018-03-24 RX ADMIN — ALBUTEROL SULFATE 2.5 MG: 2.5 SOLUTION RESPIRATORY (INHALATION) at 11:31

## 2018-03-24 RX ADMIN — MONTELUKAST SODIUM 10 MG: 10 TABLET, FILM COATED ORAL at 20:02

## 2018-03-24 RX ADMIN — DILTIAZEM HYDROCHLORIDE 60 MG: 30 TABLET, FILM COATED ORAL at 01:11

## 2018-03-24 RX ADMIN — DILTIAZEM HYDROCHLORIDE 60 MG: 30 TABLET, FILM COATED ORAL at 23:55

## 2018-03-24 RX ADMIN — DILTIAZEM HYDROCHLORIDE 60 MG: 30 TABLET, FILM COATED ORAL at 06:11

## 2018-03-24 RX ADMIN — INSULIN HUMAN 4 UNITS: 100 INJECTION, SOLUTION PARENTERAL at 12:30

## 2018-03-24 RX ADMIN — INSULIN HUMAN 12 UNITS: 100 INJECTION, SOLUTION PARENTERAL at 17:16

## 2018-03-24 RX ADMIN — INSULIN HUMAN 6 UNITS: 100 INJECTION, SOLUTION PARENTERAL at 12:01

## 2018-03-24 RX ADMIN — ALBUTEROL SULFATE 2.5 MG: 2.5 SOLUTION RESPIRATORY (INHALATION) at 23:21

## 2018-03-24 RX ADMIN — TIOTROPIUM BROMIDE 1 CAPSULE: 18 CAPSULE ORAL; RESPIRATORY (INHALATION) at 08:03

## 2018-03-24 RX ADMIN — METHYLPREDNISOLONE SODIUM SUCCINATE 40 MG: 40 INJECTION, POWDER, FOR SOLUTION INTRAMUSCULAR; INTRAVENOUS at 01:11

## 2018-03-24 RX ADMIN — ALBUTEROL SULFATE 2.5 MG: 2.5 SOLUTION RESPIRATORY (INHALATION) at 07:55

## 2018-03-24 RX ADMIN — ALBUTEROL SULFATE 2.5 MG: 2.5 SOLUTION RESPIRATORY (INHALATION) at 02:34

## 2018-03-24 RX ADMIN — ALBUTEROL SULFATE 2.5 MG: 2.5 SOLUTION RESPIRATORY (INHALATION) at 19:06

## 2018-03-24 RX ADMIN — PREDNISONE 50 MG: 50 TABLET ORAL at 15:19

## 2018-03-24 RX ADMIN — INSULIN HUMAN 6 UNITS: 100 INJECTION, SOLUTION PARENTERAL at 06:14

## 2018-03-24 RX ADMIN — INSULIN GLARGINE 15 UNITS: 100 INJECTION, SOLUTION SUBCUTANEOUS at 20:04

## 2018-03-24 RX ADMIN — ALBUTEROL SULFATE 2.5 MG: 2.5 SOLUTION RESPIRATORY (INHALATION) at 15:22

## 2018-03-24 RX ADMIN — DILTIAZEM HYDROCHLORIDE 60 MG: 30 TABLET, FILM COATED ORAL at 12:00

## 2018-03-24 ASSESSMENT — ENCOUNTER SYMPTOMS
NAUSEA: 0
SHORTNESS OF BREATH: 1
BLURRED VISION: 0
MYALGIAS: 0
COUGH: 1
FEVER: 0
ABDOMINAL PAIN: 0
CHILLS: 0
VOMITING: 0

## 2018-03-24 ASSESSMENT — PAIN SCALES - GENERAL
PAINLEVEL_OUTOF10: 0

## 2018-03-24 NOTE — PROGRESS NOTES
Initial Tele strip at 1917 shows Aflutter w/ HR of 81.   Measurements: - / 0.10 / -    Telemetry monitoring strip placed in pt chart.

## 2018-03-24 NOTE — CARE PLAN
Problem: Oxygenation:  Goal: Maintain adequate oxygenation dependent on patient condition  Outcome: PROGRESSING AS EXPECTED  Monitor oxygenation for SpO2 >90%  Intervention: Manage oxygen therapy by monitoring pulse oximetry and/or ABG values  Oxygen is currently required at 4 lpm nasal cannula for SpO2 >90%. Bipap no longer required  Intervention: Levels of oxygenation will improve to baseline  Patient did require oxygen at 2 lpm at home prior to this admission.       Problem: Bronchoconstriction:  Goal: Improve in air movement and diminished wheezing  Outcome: PROGRESSING AS EXPECTED  Patient does claim a benefit from bronchodilator therapy. There was a increase in aeration and a decrease in wheezes heard  Intervention: Implement inhaled treatments  Patient is receiving Duoneb Q4 and Spiriva daily.  Intervention: Evaluate and manage medication effects  Patient will be evaluated before and after medication delivery to assess effectiveness of therapy.

## 2018-03-24 NOTE — PROGRESS NOTES
0700-Report from CASSI Du. POC reviewed. Pt sitting up in cardiac chair. No needs at this time.     0900-Pt aware of plan to stay in ICU one more day. Aware of need for continuous monitoring.

## 2018-03-24 NOTE — PROGRESS NOTES
Pt's FSBS 507. Discussed with Dr. Carranza. Additional orders received for insulin and change in steroid dose. Pt updated.

## 2018-03-24 NOTE — PROGRESS NOTES
[facility] Hospitalist Progress Note    Date of Service: 3/24/2018    Chief Complaint  72 y.o. male admitted 3/22/2018 with SOB, found to have acute res[iratory failure and sepsis due to possible CAp and also COPD exacerbation.    Interval Problem Update  No overnight events, not on BiPAP anymore, O2 requirement slowly decreasing, will also d/c IV steroids and switch to oral, Blood sugar not very well controlled due to steroid use, will increase lantus.      Consultants/Specialty  PMA    Disposition  TBD         Review of Systems   Constitutional: Negative for chills and fever.   Eyes: Negative for blurred vision.   Respiratory: Positive for cough and shortness of breath.    Cardiovascular: Negative for chest pain.   Gastrointestinal: Negative for abdominal pain, nausea and vomiting.   Genitourinary: Negative for dysuria and urgency.   Musculoskeletal: Negative for myalgias.      Physical Exam  Laboratory/Imaging   Hemodynamics  Temp (24hrs), Av.6 °C (97.9 °F), Min:36.2 °C (97.1 °F), Max:36.9 °C (98.4 °F)   Temperature: 36.6 °C (97.9 °F)  Pulse  Av.6  Min: 52  Max: 152 Heart Rate (Monitored): 64  NIBP: 112/54      Respiratory      Respiration: (!) 23, Pulse Oximetry: 94 %, O2 Daily Delivery Respiratory : Silicone Nasal Cannula     Given By:: Mouthpiece, #MDI/DPI Given: MDI/DPI x 1, Work Of Breathing / Effort: Mild;Moderate;Increased Work of Breathing  RUL Breath Sounds: Expiratory Wheezes, RML Breath Sounds: Expiratory Wheezes, RLL Breath Sounds: Diminished, FÁTIMA Breath Sounds: Expiratory Wheezes, LLL Breath Sounds: Diminished    Fluids    Intake/Output Summary (Last 24 hours) at 18 1434  Last data filed at 18 1200   Gross per 24 hour   Intake             2280 ml   Output             1025 ml   Net             1255 ml       Nutrition  Orders Placed This Encounter   Procedures   • Diet Order     Standing Status:   Standing     Number of Occurrences:   1     Order Specific Question:   Diet:     Answer:    Diabetic [3]     Physical Exam   Constitutional: He is oriented to person, place, and time.   HENT:   Head: Normocephalic and atraumatic.   Eyes: Conjunctivae are normal. No scleral icterus.   Neck: Neck supple. No JVD present.   Cardiovascular: An irregularly irregular rhythm present.   No murmur heard.  Pulmonary/Chest: He has wheezes.   Decreased breath sounds    Abdominal: Soft. Bowel sounds are normal. He exhibits no distension. There is no tenderness.   Musculoskeletal: He exhibits no edema.   Neurological: He is alert and oriented to person, place, and time.   Skin: Skin is warm and dry.   Nursing note and vitals reviewed.      Recent Labs      03/22/18   1530  03/23/18   0051  03/24/18   0240   WBC  14.0*  23.5*  14.4*   RBC  5.99  5.72  5.35   HEMOGLOBIN  17.2  16.3  15.4   HEMATOCRIT  53.1*  51.0  46.9   MCV  88.6  89.2  87.7   MCH  28.7  28.5  28.8   MCHC  32.4*  32.0*  32.8*   RDW  46.4  46.7  45.5   PLATELETCT  316  287  263   MPV  9.6  9.8  10.2     Recent Labs      03/22/18   1530  03/23/18   0051  03/24/18   0240   SODIUM  139  138  133*   POTASSIUM  4.0  4.3  4.4   CHLORIDE  102  102  98   CO2  28  29  25   GLUCOSE  166*  219*  427*   BUN  36*  33*  48*   CREATININE  1.42*  1.54*  1.72*   CALCIUM  9.5  9.2  8.9     Recent Labs      03/22/18   1530  03/23/18   0051  03/24/18   0240   INR  1.87*  2.01*  2.60*     Recent Labs      03/22/18   1530   BNPBTYPENAT  88              Assessment/Plan     * Sepsis (CMS-HCC)- (present on admission)   Assessment & Plan    This is severe sepsis with the following associated acute organ dysfunction(s): acute respiratory failure.   from CAP  Sepsis protocol  Cont IV ceftriaxone and azithromycin  Improving           COPD with acute exacerbation (CMS-HCC)   Assessment & Plan    Remote history of smoking  COPD vs asthma exacerbation  Need outpatient PFT test  Cont  aggressive breathing treatment with duoneb, albuterol, tiotro,  Abx, switching steroids to oral from IV    RT protocol  o2        Acute respiratory failure with hypoxia (CMS-HCC)- (present on admission)   Assessment & Plan    Plan as above        Atrial fibrillation with rapid ventricular response (CMS-HCC)- (present on admission)   Assessment & Plan    Rate controlled, cont on diltiazem,   Echo showing EF of 60%.  Cont warfarin           Leucocytosis- (present on admission)   Assessment & Plan    Possible PNA vs related to steroids.   Cont IV abx, cultures pending         CKD (chronic kidney disease) stage 4, GFR 15-29 ml/min (CMS-HCC)- (present on admission)   Assessment & Plan    Stable at baseline        Diabetes mellitus, type II (CMS-HCC)- (present on admission)   Assessment & Plan    Not very well controlled due to storid use at this time for his COPD, will increase Lantus to 15 units monitor           Quality-Core Measures   Reviewed items::  Labs reviewed, Radiology images reviewed and Medications reviewed  Gonzalez catheter::  No Goznalez  DVT prophylaxis pharmacological::  Heparin  Antibiotics:  Treating active infection/contamination beyond 24 hours perioperative coverage

## 2018-03-24 NOTE — CARE PLAN
Problem: Safety  Goal: Will remain free from falls  Outcome: PROGRESSING AS EXPECTED  Pt will remain free from injury. Pt has been educated on the call light and demonstrates its use appropriately. Fall precautions in place.     Problem: Respiratory:  Goal: Respiratory status will improve  Outcome: PROGRESSING AS EXPECTED  Pt's respiratory status will improve. Remains on 4L NC. Sating > 90%

## 2018-03-24 NOTE — PROGRESS NOTES
FSBS 418, coverage given per sliding scale. Due AM meds given (see MAR). Update given to wife Avelino. Attempted multiple times to titrate pt's O2 level down, but he remains on 5L via NC.

## 2018-03-24 NOTE — PROGRESS NOTES
Assumed care of patient. Bedside report from CASSI Luna. POC discussed. Multiple family members at bedside. Pt up in cardiac chair. No needs at this time.

## 2018-03-24 NOTE — PROGRESS NOTES
Inpatient Anticoagulation Service Note    Date: 3/24/2018  Reason for Anticoagulation: Atrial Fibrillation   [id number] VASc Score: 2    Hemoglobin Value: 15.4  Hematocrit Value: 46.9  Lab Platelet Value: 263  Target INR: 2.0 to 3.0    INR from last 7 days     Date/Time INR Value    03/24/18 0240 (!)  2.6    03/23/18 0051 (!)  2.01    03/22/18 1530 (!)  1.87        Dose from last 7 days     Date/Time Dose (mg)    03/24/18 0800  3    03/23/18 0700  5    03/22/18 1800  5        Significant Interactions: Antibiotics, Corticosteroids  Bridge Therapy: No (If less than 5 days and overlap therapy discontinued -- document reason (i.e. Bleed Risk))    (If still on overlap therapy, if No -- document reason (i.e. Bleed Risk))    Comments:  (Home dose 5 mg daily, with sub-therapeutic INR on admit.)    Plan:  Will give warfarin 3 mg po tonight for an INR of 2.6 (reduced dose for rapid rise in INR)  Education Material Provided?: No (Coumadin clinic patient)  Pharmacist suggested discharge dosing: To be determined.     Clinton Lares [facility]

## 2018-03-24 NOTE — FLOWSHEET NOTE
03/24/18 1130   Events/Summary/Plan   Non-Invasive Resp Device Site Inspection Completed Intact   Interdisciplinary Plan of Care-Goals (Indications)   Obstructive Ventilatory Defect or Pulmonary Disease without Obvious Obstruction History / Diagnosis;Physical Exam / Hyperinflation / Wheezing (bronchospasm)   Interdisciplinary Plan of Care-Outcomes    Bronchodilator Outcome Improved Patient Appearance with Decreased use of Accessory Muscles   Education   Education Yes - Pt. / Family has been Instructed in use of Respiratory Medications and Adverse Reactions   RT Assessment of Delivered Medications   Evaluation of Medication Delivery Daily Yes-- Pt /Family has been Instructed in use of Respiratory Medications and Adverse Reactions   SVN Group   #SVN Performed Yes   Given By: Mouthpiece   Date SVN Next Change Due (Q 7 Days) 03/29/18   Incentive Spirometry Group   Breathing Exercises Yes   Incentive Spirometer Volume 3250 mL   Incentive Spirometer Next Change Date (Q 30 Days) 04/21/18   Chest Exam   Work Of Breathing / Effort Mild   Respiration (!) 23   Pulse 74   Heart Rate (Monitored) 69   Breath Sounds   RUL Breath Sounds Clear;Diminished   RML Breath Sounds Crackles;Diminished   RLL Breath Sounds Crackles;Diminished   FÁTIMA Breath Sounds Clear;Diminished   LLL Breath Sounds Crackles;Diminished   Secretions   Cough Congested   How Sputum Obtained Cough on Request   Oximetry   Continuous Oximetry Yes   Oxygen   Pulse Oximetry 92 %   O2 (LPM) 4   O2 Daily Delivery Respiratory  Silicone Nasal Cannula

## 2018-03-24 NOTE — FLOWSHEET NOTE
03/24/18 0750   Events/Summary/Plan   Non-Invasive Resp Device Site Inspection Completed Intact   Interdisciplinary Plan of Care-Goals (Indications)   Obstructive Ventilatory Defect or Pulmonary Disease without Obvious Obstruction Physical Exam / Hyperinflation / Wheezing (bronchospasm)   Interdisciplinary Plan of Care-Outcomes    Bronchodilator Outcome Improved Patient Appearance with Decreased use of Accessory Muscles   Education   Education Yes - Pt. / Family has been Instructed in use of Respiratory Medications and Adverse Reactions   RT Assessment of Delivered Medications   Evaluation of Medication Delivery Daily Yes-- Pt /Family has been Instructed in use of Respiratory Medications and Adverse Reactions   SVN Group   #SVN Performed Yes   Given By: Mouthpiece   Date SVN Next Change Due (Q 7 Days) 03/29/18   MDI/DPI Group   #MDI/DPI Given MDI/DPI x 1   Incentive Spirometry Group   Breathing Exercises Yes   Incentive Spirometer Volume 2750 mL   Incentive Spirometer Next Change Date (Q 30 Days) 04/21/18   Chest Exam   Work Of Breathing / Effort Mild;Moderate   Respiration (!) 26   Pulse 67   Heart Rate (Monitored) 63   Breath Sounds   RUL Breath Sounds Expiratory Wheezes   RML Breath Sounds Expiratory Wheezes   RLL Breath Sounds Diminished   FÁTIMA Breath Sounds Expiratory Wheezes   LLL Breath Sounds Diminished   Secretions   Cough Congested   How Sputum Obtained Cough on Request   Oximetry   Continuous Oximetry Yes   Oxygen   Pulse Oximetry 94 %   O2 (LPM) 5   O2 Daily Delivery Respiratory  Silicone Nasal Cannula

## 2018-03-24 NOTE — PROGRESS NOTES
Pt continues to sit up in cardiac chair. Visiting with family. Continues to deny pain or discomfort throughout day.

## 2018-03-24 NOTE — FLOWSHEET NOTE
03/24/18 1526   Events/Summary/Plan   Non-Invasive Resp Device Site Inspection Completed Intact   Interdisciplinary Plan of Care-Goals (Indications)   Obstructive Ventilatory Defect or Pulmonary Disease without Obvious Obstruction History / Diagnosis;Physical Exam / Hyperinflation / Wheezing (bronchospasm)   Interdisciplinary Plan of Care-Outcomes    Bronchodilator Outcome Improved Patient Appearance with Decreased use of Accessory Muscles   Education   Education Yes - Pt. / Family has been Instructed in use of Respiratory Medications and Adverse Reactions   RT Assessment of Delivered Medications   Evaluation of Medication Delivery Daily Yes-- Pt /Family has been Instructed in use of Respiratory Medications and Adverse Reactions   SVN Group   #SVN Performed Yes   Given By: Mouthpiece   Date SVN Next Change Due (Q 7 Days) 03/29/18   Incentive Spirometry Group   Breathing Exercises Yes   Incentive Spirometer Volume 3250 mL   Incentive Spirometer Next Change Date (Q 30 Days) 04/21/18   Chest Exam   Work Of Breathing / Effort Mild   Respiration (!) 28   Pulse 63   Heart Rate (Monitored) 65   Breath Sounds   RUL Breath Sounds Expiratory Wheezes   RML Breath Sounds Expiratory Wheezes   RLL Breath Sounds Diminished   FÁTIMA Breath Sounds Expiratory Wheezes   LLL Breath Sounds Diminished   Secretions   Cough Congested   How Sputum Obtained Cough on Request   Oximetry   Continuous Oximetry Yes   Oxygen   Pulse Oximetry 93 %   O2 (LPM) 4   O2 Daily Delivery Respiratory  Silicone Nasal Cannula

## 2018-03-24 NOTE — FLOWSHEET NOTE
03/23/18 1959   Interdisciplinary Plan of Care-Goals (Indications)   Obstructive Ventilatory Defect or Pulmonary Disease without Obvious Obstruction Physical Exam / Hyperinflation / Wheezing (bronchospasm)   Interdisciplinary Plan of Care-Outcomes    Bronchodilator Outcome Diminished Wheezing and Volume of Air Movement Increased   Education   Education Yes - Pt. / Family has been Instructed in use of Respiratory Equipment   RT Assessment of Delivered Medications   Evaluation of Medication Delivery Daily Yes-- Pt /Family has been Instructed in use of Respiratory Medications and Adverse Reactions   SVN Group   #SVN Performed Yes   Given By: Mouthpiece   Incentive Spirometry Group   Incentive Spirometry Instruction Yes   Breathing Exercises Yes   Incentive Spirometer Volume 1750 mL   Incentive Spirometer Date Last Changed 03/22/18   Incentive Spirometer Next Change Date (Q 30 Days) 04/21/18   Respiratory WDL   Respiratory (WDL) X   Chest Exam   Respiration 20   Pulse 77   Heart Rate (Monitored) 78   Breath Sounds   Pre/Post Intervention Pre Intervention Assessment   RUL Breath Sounds Expiratory Wheezes   RML Breath Sounds Expiratory Wheezes   RLL Breath Sounds Expiratory Wheezes;Diminished   FÁTIMA Breath Sounds Clear   LLL Breath Sounds Expiratory Wheezes   Oximetry   #Pulse Oximetry (Single Determination) Yes   Oxygen   Pulse Oximetry 95 %   O2 (LPM) 7  (titrated down to 5L)   O2 Daily Delivery Respiratory  Silicone Nasal Cannula   Patient has increased wheezing throughout lung fields, patient feels better post treatment

## 2018-03-24 NOTE — CARE PLAN
Problem: Infection  Goal: Will remain free from infection  Outcome: PROGRESSING AS EXPECTED  Admit w/ sepsis r/t acute respiratory failure and PNA. IV rocephin and oral zithromax daily.     Problem: Venous Thromboembolism (VTW)/Deep Vein Thrombosis (DVT) Prevention:  Goal: Patient will participate in Venous Thrombosis (VTE)/Deep Vein Thrombosis (DVT)Prevention Measures  Outcome: PROGRESSING AS EXPECTED   03/23/18 2040   OTHER   Risk Assessment Score 3   VTE RISK High   Pharmacologic Prophylaxis Used Warfarin (Coumadin)      Admit Afib w/ RVR. Ambulatory with assist. No s/s DVT/VTE at this time.      Problem: Respiratory:  Goal: Respiratory status will improve  Outcome: PROGRESSING SLOWER THAN EXPECTED  Titrate 5-6 L o2 via NC to maintain O2sat >93%. SOB w/ exertion. (+) productive cough. RT following. IV solumedrol q6h.

## 2018-03-24 NOTE — PROGRESS NOTES
AOx4, very pleasant. Afebrile. Denied pain. Sitting up in cardiac chair. Assessment per doc flowsheet. Occasional productive cough noted. Per Jensen RT, pt was on 7L O2 via NC at the time of his assessment. He was titrated down to 5L O2 with O2 sat ranging 90-92%. Per pt, he is a mouth breather. Just had an episode of desaturation to 84%, titrated up to 6L O2 and is now 94-95%. PIV intact & patent, saline locked. Due NOC meds given (see MAR). FSBS 450, coverage given per sliding scale with scheduled lantus. Will recheck after an hour. States understanding of POC. No concerns at this time. CLIP. Personal belongings within reach. Non-skid socks.

## 2018-03-25 LAB
ANION GAP SERPL CALC-SCNC: 11 MMOL/L (ref 0–11.9)
BACTERIA SPEC RESP CULT: NORMAL
BASOPHILS # BLD AUTO: 0.1 % (ref 0–1.8)
BASOPHILS # BLD: 0.03 K/UL (ref 0–0.12)
BUN SERPL-MCNC: 69 MG/DL (ref 8–22)
CALCIUM SERPL-MCNC: 8.9 MG/DL (ref 8.4–10.2)
CHLORIDE SERPL-SCNC: 98 MMOL/L (ref 96–112)
CO2 SERPL-SCNC: 23 MMOL/L (ref 20–33)
CREAT SERPL-MCNC: 2.13 MG/DL (ref 0.5–1.4)
EOSINOPHIL # BLD AUTO: 0 K/UL (ref 0–0.51)
EOSINOPHIL NFR BLD: 0 % (ref 0–6.9)
ERYTHROCYTE [DISTWIDTH] IN BLOOD BY AUTOMATED COUNT: 45.4 FL (ref 35.9–50)
GLUCOSE BLD-MCNC: 235 MG/DL (ref 65–99)
GLUCOSE BLD-MCNC: 310 MG/DL (ref 65–99)
GLUCOSE BLD-MCNC: 360 MG/DL (ref 65–99)
GLUCOSE BLD-MCNC: 433 MG/DL (ref 65–99)
GLUCOSE SERPL-MCNC: 253 MG/DL (ref 65–99)
GRAM STN SPEC: NORMAL
HCT VFR BLD AUTO: 48.7 % (ref 42–52)
HGB BLD-MCNC: 15.9 G/DL (ref 14–18)
IMM GRANULOCYTES # BLD AUTO: 0.08 K/UL (ref 0–0.11)
IMM GRANULOCYTES NFR BLD AUTO: 0.4 % (ref 0–0.9)
INR PPP: 3.47 (ref 0.87–1.13)
LYMPHOCYTES # BLD AUTO: 0.56 K/UL (ref 1–4.8)
LYMPHOCYTES NFR BLD: 2.8 % (ref 22–41)
MCH RBC QN AUTO: 28.4 PG (ref 27–33)
MCHC RBC AUTO-ENTMCNC: 32.6 G/DL (ref 33.7–35.3)
MCV RBC AUTO: 87.1 FL (ref 81.4–97.8)
MONOCYTES # BLD AUTO: 0.75 K/UL (ref 0–0.85)
MONOCYTES NFR BLD AUTO: 3.7 % (ref 0–13.4)
NEUTROPHILS # BLD AUTO: 18.75 K/UL (ref 1.82–7.42)
NEUTROPHILS NFR BLD: 93 % (ref 44–72)
NRBC # BLD AUTO: 0 K/UL
NRBC BLD-RTO: 0 /100 WBC
PLATELET # BLD AUTO: 298 K/UL (ref 164–446)
PMV BLD AUTO: 10.3 FL (ref 9–12.9)
POTASSIUM SERPL-SCNC: 4 MMOL/L (ref 3.6–5.5)
PROTHROMBIN TIME: 34.4 SEC (ref 12–14.6)
RBC # BLD AUTO: 5.59 M/UL (ref 4.7–6.1)
SIGNIFICANT IND 70042: NORMAL
SITE SITE: NORMAL
SODIUM SERPL-SCNC: 132 MMOL/L (ref 135–145)
SOURCE SOURCE: NORMAL
WBC # BLD AUTO: 20.2 K/UL (ref 4.8–10.8)

## 2018-03-25 PROCEDURE — 80048 BASIC METABOLIC PNL TOTAL CA: CPT

## 2018-03-25 PROCEDURE — 700111 HCHG RX REV CODE 636 W/ 250 OVERRIDE (IP): Performed by: INTERNAL MEDICINE

## 2018-03-25 PROCEDURE — 82962 GLUCOSE BLOOD TEST: CPT | Mod: 91

## 2018-03-25 PROCEDURE — 700102 HCHG RX REV CODE 250 W/ 637 OVERRIDE(OP): Performed by: INTERNAL MEDICINE

## 2018-03-25 PROCEDURE — 85025 COMPLETE CBC W/AUTO DIFF WBC: CPT

## 2018-03-25 PROCEDURE — 770020 HCHG ROOM/CARE - TELE (206)

## 2018-03-25 PROCEDURE — 99232 SBSQ HOSP IP/OBS MODERATE 35: CPT | Performed by: INTERNAL MEDICINE

## 2018-03-25 PROCEDURE — 85610 PROTHROMBIN TIME: CPT

## 2018-03-25 PROCEDURE — 700101 HCHG RX REV CODE 250: Performed by: INTERNAL MEDICINE

## 2018-03-25 PROCEDURE — A9270 NON-COVERED ITEM OR SERVICE: HCPCS | Performed by: INTERNAL MEDICINE

## 2018-03-25 PROCEDURE — 700101 HCHG RX REV CODE 250: Performed by: EMERGENCY MEDICINE

## 2018-03-25 PROCEDURE — 94760 N-INVAS EAR/PLS OXIMETRY 1: CPT

## 2018-03-25 PROCEDURE — 94640 AIRWAY INHALATION TREATMENT: CPT

## 2018-03-25 RX ORDER — LEVALBUTEROL INHALATION SOLUTION 1.25 MG/3ML
1.25 SOLUTION RESPIRATORY (INHALATION)
Status: DISCONTINUED | OUTPATIENT
Start: 2018-03-25 | End: 2018-03-26 | Stop reason: HOSPADM

## 2018-03-25 RX ADMIN — INSULIN HUMAN 5 UNITS: 100 INJECTION, SOLUTION PARENTERAL at 20:54

## 2018-03-25 RX ADMIN — ALBUTEROL SULFATE 2.5 MG: 2.5 SOLUTION RESPIRATORY (INHALATION) at 07:26

## 2018-03-25 RX ADMIN — DILTIAZEM HYDROCHLORIDE 60 MG: 30 TABLET, FILM COATED ORAL at 12:26

## 2018-03-25 RX ADMIN — MONTELUKAST SODIUM 10 MG: 10 TABLET, FILM COATED ORAL at 20:48

## 2018-03-25 RX ADMIN — DILTIAZEM HYDROCHLORIDE 60 MG: 30 TABLET, FILM COATED ORAL at 17:07

## 2018-03-25 RX ADMIN — ALBUTEROL SULFATE 2.5 MG: 2.5 SOLUTION RESPIRATORY (INHALATION) at 14:14

## 2018-03-25 RX ADMIN — AZITHROMYCIN 250 MG: 250 TABLET, FILM COATED ORAL at 08:10

## 2018-03-25 RX ADMIN — LOSARTAN POTASSIUM 25 MG: 25 TABLET, FILM COATED ORAL at 20:48

## 2018-03-25 RX ADMIN — LEVALBUTEROL HYDROCHLORIDE 1.25 MG: 1.25 SOLUTION RESPIRATORY (INHALATION) at 22:41

## 2018-03-25 RX ADMIN — PREDNISONE 50 MG: 50 TABLET ORAL at 08:10

## 2018-03-25 RX ADMIN — DILTIAZEM HYDROCHLORIDE 60 MG: 30 TABLET, FILM COATED ORAL at 22:50

## 2018-03-25 RX ADMIN — CEFTRIAXONE SODIUM 2 G: 10 INJECTION, POWDER, FOR SOLUTION INTRAVENOUS at 08:11

## 2018-03-25 RX ADMIN — ALBUTEROL SULFATE 2.5 MG: 2.5 SOLUTION RESPIRATORY (INHALATION) at 03:35

## 2018-03-25 RX ADMIN — INSULIN GLARGINE 15 UNITS: 100 INJECTION, SOLUTION SUBCUTANEOUS at 20:55

## 2018-03-25 RX ADMIN — INSULIN HUMAN 6 UNITS: 100 INJECTION, SOLUTION PARENTERAL at 16:43

## 2018-03-25 RX ADMIN — ALBUTEROL SULFATE 2.5 MG: 2.5 SOLUTION RESPIRATORY (INHALATION) at 19:25

## 2018-03-25 RX ADMIN — DILTIAZEM HYDROCHLORIDE 60 MG: 30 TABLET, FILM COATED ORAL at 06:18

## 2018-03-25 RX ADMIN — INSULIN HUMAN 4 UNITS: 100 INJECTION, SOLUTION PARENTERAL at 11:39

## 2018-03-25 RX ADMIN — TIOTROPIUM BROMIDE 1 CAPSULE: 18 CAPSULE ORAL; RESPIRATORY (INHALATION) at 07:26

## 2018-03-25 RX ADMIN — INSULIN HUMAN 2 UNITS: 100 INJECTION, SOLUTION PARENTERAL at 06:19

## 2018-03-25 ASSESSMENT — ENCOUNTER SYMPTOMS
SHORTNESS OF BREATH: 1
CHILLS: 0
MYALGIAS: 0
COUGH: 1
VOMITING: 0
FEVER: 0
NAUSEA: 0
ABDOMINAL PAIN: 0
BLURRED VISION: 0

## 2018-03-25 ASSESSMENT — PAIN SCALES - GENERAL
PAINLEVEL_OUTOF10: 0

## 2018-03-25 NOTE — FLOWSHEET NOTE
03/24/18 1906   Events/Summary/Plan   Events/Summary/Plan SVN   Interdisciplinary Plan of Care-Goals (Indications)   Obstructive Ventilatory Defect or Pulmonary Disease without Obvious Obstruction History / Diagnosis;Physical Exam / Hyperinflation / Wheezing (bronchospasm)   Interdisciplinary Plan of Care-Outcomes    Bronchodilator Outcome Improved Patient Appearance with Decreased use of Accessory Muscles   Education   Education Yes - Pt. / Family has been Instructed in use of Respiratory Equipment;Yes - Pt. / Family has been Instructed in use of Respiratory Medications and Adverse Reactions   RT Assessment of Delivered Medications   Evaluation of Medication Delivery Daily Yes-- Pt /Family has been Instructed in use of Respiratory Medications and Adverse Reactions   SVN Group   #SVN Performed Yes   Given By: Mouthpiece   Incentive Spirometry Group   Incentive Spirometry Instruction Yes   Breathing Exercises Yes   Incentive Spirometer Volume 3000 mL   Respiratory WDL   Respiratory (WDL) X   Breath Sounds   Pre/Post Intervention Post Intervention Assessment   RUL Breath Sounds Expiratory Wheezes   RML Breath Sounds Diminished   RLL Breath Sounds Diminished   FÁTIMA Breath Sounds Expiratory Wheezes   LLL Breath Sounds Diminished   Oximetry   #Pulse Oximetry (Single Determination) Yes   Oxygen   Pulse Oximetry 93 %   O2 (LPM) 3   O2 Daily Delivery Respiratory  Silicone Nasal Cannula

## 2018-03-25 NOTE — PROGRESS NOTES
1620 Received patient from ICU via w/c accompanied by one female ICU, one female CT & spouse.    1708 Placed a call to Dr Carranza, awaiting for response.    1713 Received a call from Dr Carranza, notified MD that patient;s FSBS-511, new orders received & acknowledged to give one time order of 12 units of Humulin R. Verified with another RN (Layla) & to re- check FSBS @1930.

## 2018-03-25 NOTE — CARE PLAN
Problem: Safety  Goal: Will remain free from falls    Intervention: Implement fall precautions  Assist with walking in hallways.      Problem: Knowledge Deficit  Goal: Knowledge of the prescribed therapeutic regimen will improve  Outcome: PROGRESSING AS EXPECTED  Discussed plan of care.

## 2018-03-25 NOTE — PROGRESS NOTES
[facility] Hospitalist Progress Note    Date of Service: 3/25/2018    Chief Complaint  72 y.o. male admitted 3/22/2018 with SOB, found to have acute res[iratory failure and sepsis due to possible CAp and also COPD exacerbation.    Interval Problem Update  Pt seen and examined , O2 requirement slowly decreasing,now switched to oral and Blood sugars are better today.     Consultants/Specialty  PMA    Disposition  TBD         Review of Systems   Constitutional: Negative for chills and fever.   Eyes: Negative for blurred vision.   Respiratory: Positive for cough and shortness of breath.    Cardiovascular: Negative for chest pain.   Gastrointestinal: Negative for abdominal pain, nausea and vomiting.   Genitourinary: Negative for dysuria and urgency.   Musculoskeletal: Negative for myalgias.      Physical Exam  Laboratory/Imaging   Hemodynamics  Temp (24hrs), Av.3 °C (97.3 °F), Min:36.2 °C (97.2 °F), Max:36.4 °C (97.6 °F)   Temperature: 36.2 °C (97.2 °F)  Pulse  Av.4  Min: 52  Max: 152 Heart Rate (Monitored): 79  Blood Pressure : 126/58, NIBP: (!) 162/69      Respiratory      Respiration: 18, Pulse Oximetry: 90 %, O2 Daily Delivery Respiratory : Silicone Nasal Cannula     Given By:: Mouthpiece, #MDI/DPI Given: MDI/DPI x 1, Work Of Breathing / Effort: Mild  RUL Breath Sounds: Diminished, RML Breath Sounds: Diminished, RLL Breath Sounds: Diminished, FÁTIMA Breath Sounds: Diminished, LLL Breath Sounds: Diminished    Fluids    Intake/Output Summary (Last 24 hours) at 18 1235  Last data filed at 18 0800   Gross per 24 hour   Intake              340 ml   Output                0 ml   Net              340 ml       Nutrition  Orders Placed This Encounter   Procedures   • Diet Order     Standing Status:   Standing     Number of Occurrences:   1     Order Specific Question:   Diet:     Answer:   Diabetic [3]     Physical Exam   Constitutional: He is oriented to person, place, and time.   HENT:   Head: Normocephalic and  atraumatic.   Eyes: Conjunctivae are normal. No scleral icterus.   Neck: Neck supple. No JVD present.   Cardiovascular: An irregularly irregular rhythm present.   No murmur heard.  Pulmonary/Chest: He has wheezes.   Decreased breath sounds    Abdominal: Soft. Bowel sounds are normal. He exhibits no distension. There is no tenderness.   Musculoskeletal: He exhibits no edema.   Neurological: He is alert and oriented to person, place, and time.   Skin: Skin is warm and dry.   Nursing note and vitals reviewed.      Recent Labs      03/23/18   0051  03/24/18   0240  03/25/18   0421   WBC  23.5*  14.4*  20.2*   RBC  5.72  5.35  5.59   HEMOGLOBIN  16.3  15.4  15.9   HEMATOCRIT  51.0  46.9  48.7   MCV  89.2  87.7  87.1   MCH  28.5  28.8  28.4   MCHC  32.0*  32.8*  32.6*   RDW  46.7  45.5  45.4   PLATELETCT  287  263  298   MPV  9.8  10.2  10.3     Recent Labs      03/23/18   0051  03/24/18   0240  03/25/18   0421   SODIUM  138  133*  132*   POTASSIUM  4.3  4.4  4.0   CHLORIDE  102  98  98   CO2  29  25  23   GLUCOSE  219*  427*  253*   BUN  33*  48*  69*   CREATININE  1.54*  1.72*  2.13*   CALCIUM  9.2  8.9  8.9     Recent Labs      03/23/18   0051  03/24/18   0240  03/25/18   0429   INR  2.01*  2.60*  3.47*     Recent Labs      03/22/18   1530   BNPBTYPENAT  88              Assessment/Plan     * Sepsis (CMS-HCC)- (present on admission)   Assessment & Plan    This is severe sepsis with the following associated acute organ dysfunction(s): acute respiratory failure.   from CAP  Sepsis protocol  Cont IV ceftriaxone and azithromycin  Improving           COPD with acute exacerbation (CMS-HCC)   Assessment & Plan    Remote history of smoking  COPD vs asthma exacerbation  Need outpatient PFT test  Cont  aggressive breathing treatment with duoneb, albuterol, tiotro,  Abx, switching steroids to oral from IV   RT protocol  o2        Acute respiratory failure with hypoxia (CMS-HCC)- (present on admission)   Assessment & Plan    Plan  as above        Atrial fibrillation with rapid ventricular response (CMS-HCC)- (present on admission)   Assessment & Plan    Rate controlled, cont on diltiazem,   Echo showing EF of 60%.  Cont warfarin           Leucocytosis- (present on admission)   Assessment & Plan    Possible PNA vs related to steroids.   Cont IV abx, cultures pending         CKD (chronic kidney disease) stage 4, GFR 15-29 ml/min (CMS-HCC)- (present on admission)   Assessment & Plan    Stable at baseline        Diabetes mellitus, type II (CMS-HCC)- (present on admission)   Assessment & Plan    Not very well controlled due to storid use at this time for his COPD, will increase Lantus to 15 units monitor           Quality-Core Measures   Reviewed items::  Labs reviewed, Radiology images reviewed and Medications reviewed  Gonzalez catheter::  No Gonzalez  DVT prophylaxis pharmacological::  Heparin  Antibiotics:  Treating active infection/contamination beyond 24 hours perioperative coverage

## 2018-03-25 NOTE — PROGRESS NOTES
Gave bedside report to CASSI March. Plan of care discussed. Safety precautions in place. Personal belongings and call light are with in reach. Patient has no additional needs at this time.

## 2018-03-25 NOTE — PROGRESS NOTES
Tele Summary    Rhythm : Atrial Fibrillation  Ectopy : frequent PVC  HR : 65  LA : none  QRS : 0.14  QT : 0.34

## 2018-03-25 NOTE — FLOWSHEET NOTE
03/24/18 2321   Events/Summary/Plan   Events/Summary/Plan SVN   Interdisciplinary Plan of Care-Goals (Indications)   Obstructive Ventilatory Defect or Pulmonary Disease without Obvious Obstruction History / Diagnosis;Physical Exam / Hyperinflation / Wheezing (bronchospasm)   Interdisciplinary Plan of Care-Outcomes    Bronchodilator Outcome Improved Patient Appearance with Decreased use of Accessory Muscles   Education   Education Yes - Pt. / Family has been Instructed in use of Respiratory Equipment;Yes - Pt. / Family has been Instructed in use of Respiratory Medications and Adverse Reactions   RT Assessment of Delivered Medications   Evaluation of Medication Delivery Daily Yes-- Pt /Family has been Instructed in use of Respiratory Medications and Adverse Reactions   SVN Group   #SVN Performed Yes   Given By: Mouthpiece   Respiratory WDL   Respiratory (WDL) X   Chest Exam   Respiration 16   Pulse 82   Breath Sounds   Pre/Post Intervention Post Intervention Assessment   RUL Breath Sounds Clear   RML Breath Sounds Diminished   RLL Breath Sounds Diminished   FÁTIMA Breath Sounds Clear   LLL Breath Sounds Diminished   Oximetry   #Pulse Oximetry (Single Determination) Yes   Oxygen   Pulse Oximetry 92 %   O2 (LPM) 3   O2 Daily Delivery Respiratory  Silicone Nasal Cannula

## 2018-03-25 NOTE — FLOWSHEET NOTE
03/25/18 1419   Events/Summary/Plan   Events/Summary/Plan svn done   Interdisciplinary Plan of Care-Goals (Indications)   Obstructive Ventilatory Defect or Pulmonary Disease without Obvious Obstruction History / Diagnosis   Interdisciplinary Plan of Care-Outcomes    Bronchodilator Outcome Patient at Stable Baseline   SVN Group   #SVN Performed Yes   Given By: Mouthpiece   Incentive Spirometry Group   Incentive Spirometer Volume 2500 mL   Chest Exam   Work Of Breathing / Effort Mild   Respiration 16  (post 16)   Pulse 83  (post 71)   Heart Rate (Monitored) 83   Breath Sounds   Pre/Post Intervention Pre Intervention Assessment  (increased aeration following IS and svn)   RUL Breath Sounds Diminished   RML Breath Sounds Diminished   RLL Breath Sounds Diminished   FÁTIMA Breath Sounds Diminished   LLL Breath Sounds Diminished   Secretions   Cough Congested   Oximetry   #Pulse Oximetry (Single Determination) Yes   Oxygen   Pulse Oximetry 92 %   O2 (LPM) 3   O2 Daily Delivery Respiratory  Silicone Nasal Cannula

## 2018-03-25 NOTE — PROGRESS NOTES
Received bedside report from CASSI Burk. Plan of care discussed. Safety precautions in place. Call light and personal belongings within reach. Patient has no needs at this time. Will check blood sugar at 7:30 pm.

## 2018-03-25 NOTE — PROGRESS NOTES
Wife, daughter and granddaughter at bedside. FSBS 310, 4 units regular insulin administered, Patient eating lunch.

## 2018-03-25 NOTE — PROGRESS NOTES
1740 Patient's sitting up in a chair, having Dinner. Spouse visiting. No c/o's at this time. No distress noted.

## 2018-03-25 NOTE — FLOWSHEET NOTE
03/25/18 0731   Events/Summary/Plan   Events/Summary/Plan svn and dpi given   Interdisciplinary Plan of Care-Goals (Indications)   Obstructive Ventilatory Defect or Pulmonary Disease without Obvious Obstruction History / Diagnosis   Interdisciplinary Plan of Care-Outcomes    Bronchodilator Outcome Patient at Stable Baseline   SVN Group   #SVN Performed Yes   Given By: Mouthpiece   MDI/DPI Group   #MDI/DPI Given MDI/DPI x 1   Chest Exam   Work Of Breathing / Effort Mild   Respiration 16  (post 16)   Pulse 79  (post 82)   Heart Rate (Monitored) 79   Breath Sounds   Pre/Post Intervention Pre Intervention Assessment  (increased aeration following svn)   RUL Breath Sounds Diminished   RML Breath Sounds Diminished   RLL Breath Sounds Diminished   FÁTIMA Breath Sounds Diminished   LLL Breath Sounds Diminished   Oximetry   #Pulse Oximetry (Single Determination) Yes   Oxygen   Pulse Oximetry 90 %   O2 (LPM) 3   O2 Daily Delivery Respiratory  Silicone Nasal Cannula

## 2018-03-25 NOTE — PROGRESS NOTES
Inpatient Anticoagulation Service Note    Date: 3/25/2018  Reason for Anticoagulation: Atrial Fibrillation   [id number] VASc Score: 2    Hemoglobin Value: 15.4  Hematocrit Value: 46.9  Lab Platelet Value: 263  Target INR: 2.0 to 3.0    INR from last 7 days     Date/Time INR Value    03/25/18 0429 (!)  3.47    03/24/18 0240 (!)  2.6    03/23/18 0051 (!)  2.01    03/22/18 1530 (!)  1.87        Dose from last 7 days     Date/Time Dose (mg)    03/25/18 0600  0    03/24/18 0800  3    03/23/18 0700  5    03/22/18 1800  5        Significant Interactions: Antibiotics, Corticosteroids  Bridge Therapy: No (If less than 5 days and overlap therapy discontinued -- document reason (i.e. Bleed Risk))    (If still on overlap therapy, if No -- document reason (i.e. Bleed Risk))    Comments:  (Home dose 5 mg daily, with sub-therapeutic INR on admit.)    Plan:  Will hold warfarin today for INR of 3.47  Education Material Provided?: No (Coumadin clinic patient)  Pharmacist suggested discharge dosing: To be determined.     Clinton Lares [facility]

## 2018-03-25 NOTE — FLOWSHEET NOTE
03/25/18 0337   Events/Summary/Plan   Events/Summary/Plan SVN   Interdisciplinary Plan of Care-Goals (Indications)   Obstructive Ventilatory Defect or Pulmonary Disease without Obvious Obstruction History / Diagnosis   Interdisciplinary Plan of Care-Outcomes    Bronchodilator Outcome Patient at Stable Baseline   Education   Education Yes - Pt. / Family has been Instructed in use of Respiratory Equipment;Yes - Pt. / Family has been Instructed in use of Respiratory Medications and Adverse Reactions   RT Assessment of Delivered Medications   Evaluation of Medication Delivery Daily Yes-- Pt /Family has been Instructed in use of Respiratory Medications and Adverse Reactions   SVN Group   #SVN Performed Yes   Given By: Mouthpiece   Respiratory WDL   Respiratory (WDL) X   Chest Exam   Respiration 18   Pulse 82   Breath Sounds   Pre/Post Intervention Post Intervention Assessment   RUL Breath Sounds Transmitted Upper Airway Sound;Expiratory Wheezes   RML Breath Sounds Diminished   RLL Breath Sounds Diminished   FÁTIMA Breath Sounds Transmitted Upper Airway Sound;Expiratory Wheezes   LLL Breath Sounds Diminished;Crackles   Oximetry   #Pulse Oximetry (Single Determination) Yes   Oxygen   Pulse Oximetry 93 %   O2 (LPM) 3   O2 Daily Delivery Respiratory  Silicone Nasal Cannula

## 2018-03-25 NOTE — CARE PLAN
Problem: Safety  Goal: Will remain free from injury  Outcome: PROGRESSING AS EXPECTED  Safety Precaution in place.  Non skid socks in use. Call light within reach. Reminded patient to call for assist. Hourly rounds in effect. Verbalized understanding.    Problem: Infection  Goal: Will remain free from infection  Outcome: PROGRESSING AS EXPECTED  Implement Standard Precaution. Hand washing every encounter & before & after patient care. Verbalized understanding.    Problem: Knowledge Deficit  Goal: Knowledge of disease process/condition, treatment plan, diagnostic tests, and medications will improve  Outcome: PROGRESSING AS EXPECTED  Discussed Plan of care. Questions answered. verbalized understanding.

## 2018-03-25 NOTE — PROGRESS NOTES
Completed assessment and administered scheduled medications. Bed in low position, locked, and appropriate alarms set. Personal belongings and call light are within reach. Patient  Provided with snacks; no additional needs at this time.

## 2018-03-25 NOTE — CARE PLAN
Problem: Communication  Goal: The ability to communicate needs accurately and effectively will improve  Outcome: PROGRESSING AS EXPECTED  Encourage patient to verbalize concerns/needs; provide active listening and assistance.     Problem: Safety  Goal: Will remain free from injury  Outcome: PROGRESSING AS EXPECTED  Remind patient to use call light and provide assistance. Bed in low position, bed locked, and appropriate alarms set. Patient wearing non-slip socks. Call light and personal belongings are within reach.     Problem: Infection  Goal: Will remain free from infection  Outcome: PROGRESSING AS EXPECTED  Assess and monitor for signs and symptoms of infection. Perform hand hygiene before and after patient contact and entering/exiting the room.

## 2018-03-25 NOTE — PROGRESS NOTES
Received report from night shift RN (Ramírez). Discussed plan of care, assumed care of patient. Safety measures in place.

## 2018-03-26 VITALS
RESPIRATION RATE: 18 BRPM | WEIGHT: 239.64 LBS | TEMPERATURE: 97.3 F | HEART RATE: 78 BPM | SYSTOLIC BLOOD PRESSURE: 140 MMHG | BODY MASS INDEX: 32.46 KG/M2 | OXYGEN SATURATION: 93 % | DIASTOLIC BLOOD PRESSURE: 50 MMHG | HEIGHT: 72 IN

## 2018-03-26 LAB
ANION GAP SERPL CALC-SCNC: 7 MMOL/L (ref 0–11.9)
BASOPHILS # BLD AUTO: 0.1 % (ref 0–1.8)
BASOPHILS # BLD: 0.02 K/UL (ref 0–0.12)
BUN SERPL-MCNC: 72 MG/DL (ref 8–22)
CALCIUM SERPL-MCNC: 8.9 MG/DL (ref 8.4–10.2)
CHLORIDE SERPL-SCNC: 98 MMOL/L (ref 96–112)
CO2 SERPL-SCNC: 27 MMOL/L (ref 20–33)
CREAT SERPL-MCNC: 1.85 MG/DL (ref 0.5–1.4)
EOSINOPHIL # BLD AUTO: 0 K/UL (ref 0–0.51)
EOSINOPHIL NFR BLD: 0 % (ref 0–6.9)
ERYTHROCYTE [DISTWIDTH] IN BLOOD BY AUTOMATED COUNT: 44.9 FL (ref 35.9–50)
GLUCOSE BLD-MCNC: 312 MG/DL (ref 65–99)
GLUCOSE BLD-MCNC: 320 MG/DL (ref 65–99)
GLUCOSE BLD-MCNC: 380 MG/DL (ref 65–99)
GLUCOSE SERPL-MCNC: 354 MG/DL (ref 65–99)
HCT VFR BLD AUTO: 46.8 % (ref 42–52)
HGB BLD-MCNC: 15.4 G/DL (ref 14–18)
IMM GRANULOCYTES # BLD AUTO: 0.06 K/UL (ref 0–0.11)
IMM GRANULOCYTES NFR BLD AUTO: 0.4 % (ref 0–0.9)
INR PPP: 3.5 (ref 0.87–1.13)
LYMPHOCYTES # BLD AUTO: 0.47 K/UL (ref 1–4.8)
LYMPHOCYTES NFR BLD: 2.9 % (ref 22–41)
MCH RBC QN AUTO: 28.6 PG (ref 27–33)
MCHC RBC AUTO-ENTMCNC: 32.9 G/DL (ref 33.7–35.3)
MCV RBC AUTO: 87 FL (ref 81.4–97.8)
MONOCYTES # BLD AUTO: 0.89 K/UL (ref 0–0.85)
MONOCYTES NFR BLD AUTO: 5.5 % (ref 0–13.4)
NEUTROPHILS # BLD AUTO: 14.75 K/UL (ref 1.82–7.42)
NEUTROPHILS NFR BLD: 91.1 % (ref 44–72)
NRBC # BLD AUTO: 0 K/UL
NRBC BLD-RTO: 0 /100 WBC
PLATELET # BLD AUTO: 268 K/UL (ref 164–446)
PMV BLD AUTO: 10.3 FL (ref 9–12.9)
POTASSIUM SERPL-SCNC: 4.7 MMOL/L (ref 3.6–5.5)
PROTHROMBIN TIME: 34.6 SEC (ref 12–14.6)
RBC # BLD AUTO: 5.38 M/UL (ref 4.7–6.1)
SODIUM SERPL-SCNC: 132 MMOL/L (ref 135–145)
WBC # BLD AUTO: 16.2 K/UL (ref 4.8–10.8)

## 2018-03-26 PROCEDURE — A9270 NON-COVERED ITEM OR SERVICE: HCPCS | Performed by: INTERNAL MEDICINE

## 2018-03-26 PROCEDURE — 80048 BASIC METABOLIC PNL TOTAL CA: CPT

## 2018-03-26 PROCEDURE — 700111 HCHG RX REV CODE 636 W/ 250 OVERRIDE (IP): Performed by: INTERNAL MEDICINE

## 2018-03-26 PROCEDURE — 94760 N-INVAS EAR/PLS OXIMETRY 1: CPT

## 2018-03-26 PROCEDURE — 94640 AIRWAY INHALATION TREATMENT: CPT

## 2018-03-26 PROCEDURE — 700101 HCHG RX REV CODE 250: Performed by: INTERNAL MEDICINE

## 2018-03-26 PROCEDURE — 700102 HCHG RX REV CODE 250 W/ 637 OVERRIDE(OP): Performed by: INTERNAL MEDICINE

## 2018-03-26 PROCEDURE — 85025 COMPLETE CBC W/AUTO DIFF WBC: CPT

## 2018-03-26 PROCEDURE — 99239 HOSP IP/OBS DSCHRG MGMT >30: CPT | Performed by: INTERNAL MEDICINE

## 2018-03-26 PROCEDURE — 82962 GLUCOSE BLOOD TEST: CPT | Mod: 91

## 2018-03-26 PROCEDURE — 85610 PROTHROMBIN TIME: CPT

## 2018-03-26 RX ORDER — DILTIAZEM HYDROCHLORIDE 60 MG/1
60 TABLET, FILM COATED ORAL 3 TIMES DAILY
Qty: 90 TAB | Refills: 0 | Status: SHIPPED | OUTPATIENT
Start: 2018-03-26 | End: 2021-07-14

## 2018-03-26 RX ORDER — TIOTROPIUM BROMIDE 18 UG/1
18 CAPSULE ORAL; RESPIRATORY (INHALATION) DAILY
Qty: 30 CAP | Refills: 3 | Status: SHIPPED | OUTPATIENT
Start: 2018-03-27 | End: 2018-06-29

## 2018-03-26 RX ORDER — DOXYCYCLINE 100 MG/1
100 CAPSULE ORAL 2 TIMES DAILY
Qty: 6 CAP | Refills: 0 | Status: SHIPPED | OUTPATIENT
Start: 2018-03-26 | End: 2018-06-29

## 2018-03-26 RX ORDER — PREDNISONE 10 MG/1
10 TABLET ORAL DAILY
Qty: 30 TAB | Refills: 0 | Status: SHIPPED | OUTPATIENT
Start: 2018-03-26 | End: 2018-04-07

## 2018-03-26 RX ADMIN — PREDNISONE 50 MG: 50 TABLET ORAL at 08:02

## 2018-03-26 RX ADMIN — DILTIAZEM HYDROCHLORIDE 60 MG: 30 TABLET, FILM COATED ORAL at 12:24

## 2018-03-26 RX ADMIN — LEVALBUTEROL HYDROCHLORIDE 1.25 MG: 1.25 SOLUTION RESPIRATORY (INHALATION) at 10:37

## 2018-03-26 RX ADMIN — LEVALBUTEROL HYDROCHLORIDE 1.25 MG: 1.25 SOLUTION RESPIRATORY (INHALATION) at 07:22

## 2018-03-26 RX ADMIN — INSULIN HUMAN 4 UNITS: 100 INJECTION, SOLUTION PARENTERAL at 07:59

## 2018-03-26 RX ADMIN — LEVALBUTEROL HYDROCHLORIDE 1.25 MG: 1.25 SOLUTION RESPIRATORY (INHALATION) at 02:24

## 2018-03-26 RX ADMIN — AZITHROMYCIN 250 MG: 250 TABLET, FILM COATED ORAL at 08:02

## 2018-03-26 RX ADMIN — DILTIAZEM HYDROCHLORIDE 60 MG: 30 TABLET, FILM COATED ORAL at 06:13

## 2018-03-26 RX ADMIN — TIOTROPIUM BROMIDE 1 CAPSULE: 18 CAPSULE ORAL; RESPIRATORY (INHALATION) at 07:22

## 2018-03-26 RX ADMIN — CEFTRIAXONE SODIUM 2 G: 10 INJECTION, POWDER, FOR SOLUTION INTRAVENOUS at 08:03

## 2018-03-26 RX ADMIN — INSULIN HUMAN 4 UNITS: 100 INJECTION, SOLUTION PARENTERAL at 12:20

## 2018-03-26 ASSESSMENT — PAIN SCALES - GENERAL
PAINLEVEL_OUTOF10: 0

## 2018-03-26 NOTE — FLOWSHEET NOTE
03/26/18 [facility]   Interdisciplinary Plan of Care-Goals (Indications)   Obstructive Ventilatory Defect or Pulmonary Disease without Obvious Obstruction History / Diagnosis   Interdisciplinary Plan of Care-Outcomes    Bronchodilator Outcome Patient at Stable Baseline   RT Assessment of Delivered Medications   Evaluation of Medication Delivery Daily Yes-- Pt /Family has been Instructed in use of Respiratory Medications and Adverse Reactions   SVN Group   #SVN Performed Yes   Given By: Mouthpiece   Incentive Spirometry Group   Breathing Exercises Yes   Incentive Spirometer Volume 2500 mL   Respiratory WDL   Respiratory (WDL) X   Chest Exam   Respiration 20   Heart Rate (Monitored) 82   Breath Sounds   Pre/Post Intervention Pre Intervention Assessment   RUL Breath Sounds Diminished   RML Breath Sounds Diminished   RLL Breath Sounds Diminished   FÁTIMA Breath Sounds Diminished;Expiratory Wheezes   LLL Breath Sounds Diminished   Oxygen   Pulse Oximetry 94 %   O2 (LPM) 3   O2 Daily Delivery Respiratory  Silicone Nasal Cannula

## 2018-03-26 NOTE — DISCHARGE SUMMARY
CHIEF COMPLAINT ON ADMISSION  Chief Complaint   Patient presents with   • Shortness of Breath   • Difficulty Breathing       CODE STATUS  Full Code    HPI & HOSPITAL COURSE  This is a 72 y.o. male who was admitted on 3/22/18 after presenting to ER complaining of SOB. Pt was found to have COPD exacerbation, acute on chronic respiratory  Failure, and also pneumonia.  Pt was started on IV steroids, also on abx, respiratory protocol, and inhalers, his symptoms continued to improve, his O2 requirement dropped from 6L to his baseline.   Pt will be discharged home today on steroid taper.     The patient met 2-midnight criteria for an inpatient stay at the time of discharge.      DISCHARGE PROBLEM LIST    Sepsis (CMS-[facility]) POA: Yes    Atrial fibrillation with rapid ventricular response (CMS-[facility]) (Chronic) POA: Yes    Acute respiratory failure with hypoxia (CMS-HCC) POA: Yes    COPD with acute exacerbation (CMS-HCC) POA: Unknown    Diabetes mellitus, type II (CMS-[facility]) POA: Yes    CKD (chronic kidney disease) stage 4, GFR 15-29 ml/min (CMS-[facility]) POA: Yes    Leucocytosis POA: Yes        FOLLOW UP  Future Appointments  Date Time Provider Department Center   4/3/2018 1:30 PM TELEMED ANTICOAG VASCULAR MED VMED None   9/10/2018 9:00 AM Brook Dubon M.D. PULM None     Deng Corbin D.O.  [address] [place] [id number]  [phone]    On 4/2/2018  Please check in at 145pm for your appointment thank you       MEDICATIONS ON DISCHARGE   Yuri De León   Home Medication Instructions SHANTI:[id number]    Printed on:03/26/18 1424   Medication Information                      albuterol (PROVENTIL) 2.5mg/3ml Nebu Soln solution for nebulization  2.5 mg by Nebulization route every four hours as needed for Shortness of Breath.             albuterol (VENTOLIN OR PROVENTIL) 108 (90 BASE) MCG/ACT AERS  Inhale 2 Puffs by mouth every 6 hours as needed.             DILTIAZem (CARDIZEM) 60 MG Tab  Take 1 Tab by mouth 3 times a day.              doxycycline (MONODOX) 100 MG capsule  Take 1 Cap by mouth 2 times a day.             fluticasone (FLONASE) 50 MCG/ACT nasal spray  Spray 2 Sprays in nose every day.             insulin detemir (LEVEMIR) 100 UNIT/ML Solution  Inject 12 Units as instructed every evening.             losartan (COZAAR) 25 MG Tab  Take 25 mg by mouth every evening.             montelukast (SINGULAIR) 10 MG Tab  Take 1 Tab by mouth every day.             predniSONE (DELTASONE) 10 MG Tab  Take 1 Tab by mouth every day for 12 days. Take 40 mg for 3 days, then 30 mg for 3 days then 20 mg for 3 days then 10 mg for 3 days then stop             tiotropium (SPIRIVA) 18 MCG Cap  Inhale 1 Cap by mouth every day.             warfarin (COUMADIN) 5 MG Tab  TAKE ONE TABLET BY MOUTH ONE TIME DAILY OR AS DIRECTED BY COUMADIN CLINIC                 DIET  Orders Placed This Encounter   Procedures   • Diet Order     Standing Status:   Standing     Number of Occurrences:   1     Order Specific Question:   Diet:     Answer:   Diabetic [3]       ACTIVITY  As tolerated.      CONSULTATIONS  none    PROCEDURES  none     LABORATORY  Lab Results   Component Value Date/Time    SODIUM 132 (L) 03/26/2018 04:27 AM    POTASSIUM 4.7 03/26/2018 04:27 AM    CHLORIDE 98 03/26/2018 04:27 AM    CO2 27 03/26/2018 04:27 AM    GLUCOSE 354 (H) 03/26/2018 04:27 AM    BUN 72 (HH) 03/26/2018 04:27 AM    CREATININE 1.85 (H) 03/26/2018 04:27 AM        Lab Results   Component Value Date/Time    WBC 16.2 (H) 03/26/2018 04:27 AM    HEMOGLOBIN 15.4 03/26/2018 04:27 AM    HEMATOCRIT 46.8 03/26/2018 04:27 AM    PLATELETCT 268 03/26/2018 04:27 AM        Total time of the discharge process exceeds 40 minutes

## 2018-03-26 NOTE — FLOWSHEET NOTE
03/25/18 2243   Events/Summary/Plan   Events/Summary/Plan med changed to xopenex due to diagnosis fof Afib   Non-Invasive Resp Device Site Inspection Completed Intact   Interdisciplinary Plan of Care-Goals (Indications)   Obstructive Ventilatory Defect or Pulmonary Disease without Obvious Obstruction Physical Exam / Hyperinflation / Wheezing (bronchospasm)   Interdisciplinary Plan of Care-Outcomes    Bronchodilator Outcome Diminished Wheezing and Volume of Air Movement Increased   Education   Education Yes - Pt. / Family has been Instructed in use of Respiratory Equipment;Yes - Pt. / Family has been Instructed in use of Respiratory Medications and Adverse Reactions   RT Assessment of Delivered Medications   Evaluation of Medication Delivery Daily Yes-- Pt /Family has been Instructed in use of Respiratory Medications and Adverse Reactions   SVN Group   #SVN Performed Yes   Given By: Mouthpiece   Respiratory WDL   Respiratory (WDL) X   Chest Exam   Work Of Breathing / Effort Mild   Respiration 18   Pulse 71   Breath Sounds   Pre/Post Intervention Pre Intervention Assessment   RUL Breath Sounds Expiratory Wheezes   RML Breath Sounds Expiratory Wheezes   RLL Breath Sounds Diminished   FÁTIMA Breath Sounds Expiratory Wheezes   LLL Breath Sounds Diminished   Secretions   Cough Non Productive   Oximetry   #Pulse Oximetry (Single Determination) Yes   Oxygen   Pulse Oximetry 95 %   O2 (LPM) 3   O2 Daily Delivery Respiratory  Silicone Nasal Cannula

## 2018-03-26 NOTE — PROGRESS NOTES
Resting with eyes closed. Respirations even and unlabored. No c/o. Repositions self. NC in place. Call light in reach.     Tele Report:   Afib  R BBB, Occ PVC  HR 68-75  -/ 0.14/ -

## 2018-03-26 NOTE — FLOWSHEET NOTE
03/26/18 0224   Events/Summary/Plan   Non-Invasive Resp Device Site Inspection Completed Intact   Interdisciplinary Plan of Care-Goals (Indications)   Obstructive Ventilatory Defect or Pulmonary Disease without Obvious Obstruction Physical Exam / Hyperinflation / Wheezing (bronchospasm)   Interdisciplinary Plan of Care-Outcomes    Bronchodilator Outcome Diminished Wheezing and Volume of Air Movement Increased   Education   Education Yes - Pt. / Family has been Instructed in use of Respiratory Equipment;Yes - Pt. / Family has been Instructed in use of Respiratory Medications and Adverse Reactions   RT Assessment of Delivered Medications   Evaluation of Medication Delivery Daily Yes-- Pt /Family has been Instructed in use of Respiratory Medications and Adverse Reactions   SVN Group   #SVN Performed Yes   Given By: Mouthpiece   Respiratory WDL   Respiratory (WDL) X   Chest Exam   Work Of Breathing / Effort Mild   Respiration 20   Pulse 63   Breath Sounds   Pre/Post Intervention Post Intervention Assessment   RUL Breath Sounds Clear   RML Breath Sounds Diminished   RLL Breath Sounds Diminished   FÁTIMA Breath Sounds Clear   LLL Breath Sounds Diminished   Secretions   Cough Non Productive   Oximetry   #Pulse Oximetry (Single Determination) Yes   Oxygen   Pulse Oximetry 94 %   O2 (LPM) 3   O2 Daily Delivery Respiratory  Silicone Nasal Cannula

## 2018-03-26 NOTE — FLOWSHEET NOTE
03/25/18 1926   Events/Summary/Plan   Non-Invasive Resp Device Site Inspection Completed Intact   Interdisciplinary Plan of Care-Goals (Indications)   Obstructive Ventilatory Defect or Pulmonary Disease without Obvious Obstruction Strong Subjective / Objective Improvement   Interdisciplinary Plan of Care-Outcomes    Bronchodilator Outcome Patient at Stable Baseline   Education   Education Yes - Pt. / Family has been Instructed in use of Respiratory Equipment;Yes - Pt. / Family has been Instructed in use of Respiratory Medications and Adverse Reactions   RT Assessment of Delivered Medications   Evaluation of Medication Delivery Daily Yes-- Pt /Family has been Instructed in use of Respiratory Medications and Adverse Reactions   SVN Group   #SVN Performed Yes   Given By: Mouthpiece   Incentive Spirometry Group   Breathing Exercises Yes   Incentive Spirometer Volume 2500 mL   Respiratory WDL   Respiratory (WDL) X   Chest Exam   Work Of Breathing / Effort Mild   Respiration 18   Pulse 67   Breath Sounds   Pre/Post Intervention Pre Intervention Assessment   RUL Breath Sounds Clear   RML Breath Sounds Clear   RLL Breath Sounds Diminished;Fine Crackles   FÁTIMA Breath Sounds Clear   LLL Breath Sounds Diminished   Secretions   Cough Non Productive   Oximetry   #Pulse Oximetry (Single Determination) Yes   Oxygen   Pulse Oximetry 94 %   O2 (LPM) 3   O2 Daily Delivery Respiratory  Silicone Nasal Cannula

## 2018-03-26 NOTE — PROGRESS NOTES
Discharging patient home per physician order.  Discharged with spouse.  Demonstrated understanding of discharge instructions, follow up appointments, home medications, prescriptions.  Ambulating without assistance, voiding without difficulty, pain well controlled, tolerating oral medications, oxygen saturation greater than 90% on 3L nc , tolerating diet.   Educational handouts for new medications given and discussed.  Verbalized understanding of discharge instructions and educational handouts.  All questions answered.  Belongings with patient at time of discharge.  IV and tele box removed prior to DC.  Discharge with oxygen tank from patients home.

## 2018-03-26 NOTE — PROGRESS NOTES
Due medication given without issue. Tolerated RT Tx well. No c/o. Ambulating in room as needed, gait steady. Call light in reach.

## 2018-03-26 NOTE — PROGRESS NOTES
Report received. Care assumed. Up to bathroom and ambulating in room. Gait steady. NC in place. No c/o. Call light in reach.

## 2018-03-26 NOTE — DISCHARGE PLANNING
Care Transition Team Assessment    SW met with this patient bedside and completed assessment.  Patient stated that he has [facility] o2 and that his wife is bringing the O2 tank with her to pick him up.  SS to follow up. As needed.     Information Source  Orientation : Oriented x 4  Information Given By: Patient  Informant's Name: Yuri  Who is responsible for making decisions for patient? : Patient    Elopement Risk  Legal Hold: No  Ambulatory or Self Mobile in Wheelchair: Yes  Disoriented: No  Psychiatric Symptoms: None  History of Wandering: No  Elopement this Admit: No  Vocalizing Wanting to Leave: No  Displays Behaviors, Body Language Wanting to Leave: No-Not at Risk for Elopement  Elopement Risk: Not at Risk for Elopement    Interdisciplinary Discharge Planning  Does Admitting Nurse Feel This Could be a Complex Discharge?: No  Primary Care Physician:  Dr Corbin  Lives with - Patient's Self Care Capacity: Spouse  Patient or legal guardian wants to designate a caregiver (see row info): No  Support Systems: Spouse / Significant Other  Housing / Facility: [address]  Do You Take your Prescribed Medications Regularly: Yes  Able to Return to Previous ADL's: Yes  Mobility Issues: No  Prior Services: None  Patient Expects to be Discharged to:: Home  Assistance Needed: Unknown at this Time  Durable Medical Equipment: Not Applicable    Discharge Preparedness  What is your plan after discharge?: Home with help  What are your discharge supports?: Spouse  Prior Functional Level: Independent with Activities of Daily Living    Functional Assesment  Prior Functional Level: Independent with Activities of Daily Living    Finances  Financial Barriers to Discharge: No  Prescription Coverage: Yes    Vision / Hearing Impairment  Vision Impairment : Yes  Right Eye Vision: Impaired, Wears Glasses  Left Eye Vision: Impaired, Wears Glasses  Hearing Impairment : Yes  Hearing Impairment: Both Ears, Hearing Device Not  Available  Does Pt Need Special Equipment for the Hearing Impaired?: No    Values / Beliefs / Concerns  Values / Beliefs Concerns : No    Advance Directive  Advance Directive?: None    Domestic Abuse  Have you ever been the victim of abuse or violence?: No    Psychological Assessment  History of Substance Abuse: None    Discharge Risks or Barriers  Discharge risks or barriers?: No    Anticipated Discharge Information  Anticipated discharge disposition: Home  Discharge Address: [address] [place] [address] [place]

## 2018-03-26 NOTE — CARE PLAN
Problem: Oxygenation:  Goal: Maintain adequate oxygenation dependent on patient condition  Outcome: PROGRESSING AS EXPECTED  Patient O2 will be titrated to maintain saturation by pulse oximetry >90%. Current fiO2 3 lpm nasal cannula.     Intervention: Levels of oxygenation will improve to baseline  Patient utilizes home O2 at 2 lpm, home settings will be maintain unless there is a change in O2 demand at which time patent O2 will be titrated for adequate saturation for Spo2 > 90%      Problem: Bronchoconstriction:  Goal: Improve in air movement and diminished wheezing  Outcome: PROGRESSING AS EXPECTED  No wheezing auscultated at this time    Diminished wheezing and volume of air movement increased     Improvement in airflow    Improved vital signs and measures of gas exchange      Intervention: Implement inhaled treatments  Albuterol Q4  Intervention: Evaluate and manage medication effects  Improved patient appearance with subjective improvement of symptom alleviation after treatment.

## 2018-03-26 NOTE — PROGRESS NOTES
Due medications given without issue. FSBS 360, covered per sliding scale. No c/o. Snack and fluids provided per request. Call light in reach.

## 2018-03-26 NOTE — CARE PLAN
Problem: Respiratory:  Goal: Respiratory status will improve    Intervention: Administer and titrate oxygen therapy  3L NC in use

## 2018-03-26 NOTE — PROGRESS NOTES
Due medications given without issue. C/o productive cough, declined interventions, states will use steam from faucet. Respirations even and unlabored. NC in place. Call light in reach.

## 2018-03-26 NOTE — CARE PLAN
Problem: Knowledge Deficit  Goal: Knowledge of disease process/condition, treatment plan, diagnostic tests, and medications will improve  Outcome: PROGRESSING AS EXPECTED  Plan of care discussed with patient. Opportunity given for questions. States understanding.     Problem: Pain Management  Goal: Pain level will decrease to patient's comfort goal  Outcome: PROGRESSING AS EXPECTED  Uses 0-10 scale appropriately. Pharmacologic and nonpharmacologic interventions in place.

## 2018-03-26 NOTE — DISCHARGE INSTRUCTIONS
Discharge Instructions    Discharged to home by car with relative. Discharged via wheelchair, hospital escort: Yes.  Special equipment needed: Oxygen    Be sure to schedule a follow-up appointment with your primary care doctor or any specialists as instructed.     Discharge Plan:   Diet Plan: Discussed  Activity Level: Discussed  Confirmed Follow up Appointment: Patient to Call and Schedule Appointment  Confirmed Symptoms Management: Discussed  Medication Reconciliation Updated: No (Comments)  Influenza Vaccine Indication: Patient Refuses    I understand that a diet low in cholesterol, fat, and sodium is recommended for good health. Unless I have been given specific instructions below for another diet, I accept this instruction as my diet prescription.   Other diet: diabetic    Special Instructions: None    · Is patient discharged on Warfarin / Coumadin?   No     Depression / Suicide Risk    As you are discharged from this [facility] facility, it is important to learn how to keep safe from harming yourself.    Recognize the warning signs:  · Abrupt changes in personality, positive or negative- including increase in energy   · Giving away possessions  · Change in eating patterns- significant weight changes-  positive or negative  · Change in sleeping patterns- unable to sleep or sleeping all the time   · Unwillingness or inability to communicate  · Depression  · Unusual sadness, discouragement and loneliness  · Talk of wanting to die  · Neglect of personal appearance   · Rebelliousness- reckless behavior  · Withdrawal from people/activities they love  · Confusion- inability to concentrate     If you or a loved one observes any of these behaviors or has concerns about self-harm, here's what you can do:  · Talk about it- your feelings and reasons for harming yourself  · Remove any means that you might use to hurt yourself (examples: pills, rope, extension cords, firearm)  · Get professional help from the community  (Mental Health, Substance Abuse, psychological counseling)  · Do not be alone:Call your Safe Contact- someone whom you trust who will be there for you.  · Call your local CRISIS HOTLINE 927-2068 or 665-195-2608  · Call your local Children's Mobile Crisis Response Team [place] (368) 016-5952 or www.[employer]  · Call the toll free National Suicide Prevention Hotlines   · National Suicide Prevention Lifeline 544-466-LUKS (8950)  · [employer] Hope Line Network 800-SUICIDE (426-6910)    Tiotropium inhalation powder  What is this medicine?  TIOTROPIUM (julia oh TRO pee um) is a bronchodilator. It helps open up the airways in your lungs to make it easier to breathe. This medicine is used to treat chronic obstructive pulmonary disease (COPD), including emphysema and chronic bronchitis. Do not use this medicine for an acute attack.  This medicine may be used for other purposes; ask your health care provider or pharmacist if you have questions.  COMMON BRAND NAME(S): Spiriva HandiHaler  What should I tell my health care provider before I take this medicine?  They need to know if you have any of these conditions:  -bladder problems or difficulty passing urine  -glaucoma  -kidney disease  -prostate trouble  -an unusual or allergic reaction to tiotropium, ipratropium, atropine, other medicines, lactose, foods, dyes, or preservatives  -pregnant or trying to get pregnant  -breast-feeding  How should I use this medicine?  This medicine is used in a special inhaler. Do NOT swallow the capsules. Do NOT use a spacer device. Follow the directions on the prescription label. Take your medicine at regular intervals. Do not take it more often than directed. Do not stop taking except on your doctor's advice. Make sure that you are using your inhaler correctly. Ask your doctor or health care provider if you have any questions. Small pieces of the capsule may get in your mouth or throat when you breathe in your medicine. This is  normal and should not hurt you.  Talk to your pediatrician regarding the use of this medicine in children. Special care may be needed.  Overdosage: If you think you have taken too much of this medicine contact a poison control center or emergency room at once.  NOTE: This medicine is only for you. Do not share this medicine with others.  What if I miss a dose?  If you miss a dose, use it as soon as you can. If it is almost time for your next dose, use only that dose and continue with your regular schedule. Do not use double or extra doses.  What may interact with this medicine?  This medicine may also interact with the following medications:  -atropine  -antihistamines for allergy, cough and cold  -certain medicines for bladder problems like oxybutynin, tolterodine  -certain medicines for stomach problems like dicyclomine, hyoscyamine  -certain medicines for travel sickness like scopolamine  -certain medicines for Parkinson's disease like benztropine, trihexyphenidyl  -ipratropium  This list may not describe all possible interactions. Give your health care provider a list of all the medicines, herbs, non-prescription drugs, or dietary supplements you use. Also tell them if you smoke, drink alcohol, or use illegal drugs. Some items may interact with your medicine.  What should I watch for while using this medicine?  Visit your doctor or health care professional for regular checks on your progress. Tell your doctor if your symptoms do not improve. Do not use more medicine than directed. If your symptoms get worse while you are using this medicine, call your doctor right away.  Do not get the this medicine in your eyes. It can cause irritation, pain, or blurred vision.  You may get dizzy. Do not drive, use machinery, or do anything that needs mental alertness until you know how this medicine affects you. Do not stand or sit up quickly, especially if you are an older patient. This reduces the risk of dizzy or fainting  edith.  Clean the inhaler as directed in the patient information sheet that comes with this medicine.  What side effects may I notice from receiving this medicine?  Side effects that you should report to your doctor or health care professional as soon as possible:  -allergic reactions like skin rash or hives, swelling of the face, lips, or tongue  -breathing problems  -changes in vision  -chest pain  -fast heartbeat  -infection or flu-like symptoms  -trouble passing urine or change in the amount of urine  Side effects that usually do not require medical attention (report to your doctor or health care professional if they continue or are bothersome):  -constipation  -cough  -dizziness  -dry mouth  -headache  -muscle pain  -sore throat  -stomach upset  This list may not describe all possible side effects. Call your doctor for medical advice about side effects. You may report side effects to FDA at 1-325-FDA-6259.  Where should I keep my medicine?  Keep out of the reach of children.  Store at room temperature between 15 and 30 degrees C (59 and 86 degrees F). Protect from humidity. Keep capsules in the foil pack until you are ready to use. Throw away any unused medicine after the expiration date.  NOTE: This sheet is a summary. It may not cover all possible information. If you have questions about this medicine, talk to your doctor, pharmacist, or health care provider.  © 2018 Elsevier/Gold Standard (2015-09-30 13:19:07)    Doxycycline tablets or capsules  What is this medicine?  DOXYCYCLINE (dox will DIMASARSENIO escobar) is a tetracycline antibiotic. It kills certain bacteria or stops their growth. It is used to treat many kinds of infections, like dental, skin, respiratory, and urinary tract infections. It also treats acne, Lyme disease, malaria, and certain sexually transmitted infections.  This medicine may be used for other purposes; ask your health care provider or pharmacist if you have questions.  COMMON BRAND NAME(S):  Acticlate, Adoxa, Adoxa CK, Adoxa Ajit, Adoxa TT, Alodox, Avidoxy, Doxal, Mondoxyne NL, Monodox, Morgidox 1x, Morgidox 1x Kit, Morgidox 2x, Morgidox 2x Kit, NutriDox, Ocudox, TARGADOX, Vibra-Tabs, Vibramycin  What should I tell my health care provider before I take this medicine?  They need to know if you have any of these conditions:  -liver disease  -long exposure to sunlight like working outdoors  -stomach problems like colitis  -an unusual or allergic reaction to doxycycline, tetracycline antibiotics, other medicines, foods, dyes, or preservatives  -pregnant or trying to get pregnant  -breast-feeding  How should I use this medicine?  Take this medicine by mouth with a full glass of water. Follow the directions on the prescription label. It is best to take this medicine without food, but if it upsets your stomach take it with food. Take your medicine at regular intervals. Do not take your medicine more often than directed. Take all of your medicine as directed even if you think you are better. Do not skip doses or stop your medicine early.  Talk to your pediatrician regarding the use of this medicine in children. While this drug may be prescribed for selected conditions, precautions do apply.  Overdosage: If you think you have taken too much of this medicine contact a poison control center or emergency room at once.  NOTE: This medicine is only for you. Do not share this medicine with others.  What if I miss a dose?  If you miss a dose, take it as soon as you can. If it is almost time for your next dose, take only that dose. Do not take double or extra doses.  What may interact with this medicine?  -antacids  -barbiturates  -birth control pills  -bismuth subsalicylate  -carbamazepine  -methoxyflurane  -other antibiotics  -phenytoin  -vitamins that contain iron  -warfarin  This list may not describe all possible interactions. Give your health care provider a list of all the medicines, herbs, non-prescription drugs,  or dietary supplements you use. Also tell them if you smoke, drink alcohol, or use illegal drugs. Some items may interact with your medicine.  What should I watch for while using this medicine?  Tell your doctor or health care professional if your symptoms do not improve.  Do not treat diarrhea with over the counter products. Contact your doctor if you have diarrhea that lasts more than 2 days or if it is severe and watery.  Do not take this medicine just before going to bed. It may not dissolve properly when you lay down and can cause pain in your throat. Drink plenty of fluids while taking this medicine to also help reduce irritation in your throat.  This medicine can make you more sensitive to the sun. Keep out of the sun. If you cannot avoid being in the sun, wear protective clothing and use sunscreen. Do not use sun lamps or tanning beds/booths.  Birth control pills may not work properly while you are taking this medicine. Talk to your doctor about using an extra method of birth control.  If you are being treated for a sexually transmitted infection, avoid sexual contact until you have finished your treatment. Your sexual partner may also need treatment.  Avoid antacids, aluminum, calcium, magnesium, and iron products for 4 hours before and 2 hours after taking a dose of this medicine.  If you are using this medicine to prevent malaria, you should still protect yourself from contact with mosquitos. Stay in screened-in areas, use mosquito nets, keep your body covered, and use an insect repellent.  What side effects may I notice from receiving this medicine?  Side effects that you should report to your doctor or health care professional as soon as possible:  -allergic reactions like skin rash, itching or hives, swelling of the face, lips, or tongue  -difficulty breathing  -fever  -itching in the rectal or genital area  -pain on swallowing  -redness, blistering, peeling or loosening of the skin, including inside  the mouth  -severe stomach pain or cramps  -unusual bleeding or bruising  -unusually weak or tired  -yellowing of the eyes or skin  Side effects that usually do not require medical attention (report to your doctor or health care professional if they continue or are bothersome):  -diarrhea  -loss of appetite  -nausea, vomiting  This list may not describe all possible side effects. Call your doctor for medical advice about side effects. You may report side effects to FDA at 5-739-DTR-0508.  Where should I keep my medicine?  Keep out of the reach of children.  Store at room temperature, below 30 degrees C (86 degrees F). Protect from light. Keep container tightly closed. Throw away any unused medicine after the expiration date. Taking this medicine after the expiration date can make you seriously ill.  NOTE: This sheet is a summary. It may not cover all possible information. If you have questions about this medicine, talk to your doctor, pharmacist, or health care provider.  © 2018 Elsevier/Gold Standard (2017-01-18 17:11:22)    Diltiazem tablets  What is this medicine?  DILTIAZEM (dil SOFIA a zem) is a calcium-channel blocker. It affects the amount of calcium found in your heart and muscle cells. This relaxes your blood vessels, which can reduce the amount of work the heart has to do. This medicine is used to treat chest pain caused by angina.  This medicine may be used for other purposes; ask your health care provider or pharmacist if you have questions.  COMMON BRAND NAME(S): Cardizem  What should I tell my health care provider before I take this medicine?  They need to know if you have any of these conditions:  -heart problems, low blood pressure, irregular heartbeat  -liver disease  -previous heart attack  -an unusual or allergic reaction to diltiazem, other medicines, foods, dyes, or preservatives  -pregnant or trying to get pregnant  -breast-feeding  How should I use this medicine?  Take this medicine by mouth  with a glass of water. Follow the directions on the prescription label. Do not cut, crush or chew this medicine. This medicine is usually taken before meals and at bedtime. Take your doses at regular intervals. Do not take your medicine more often then directed. Do not stop taking except on the advice of your doctor or health care professional.  Talk to your pediatrician regarding the use of this medicine in children. Special care may be needed.  Overdosage: If you think you have taken too much of this medicine contact a poison control center or emergency room at once.  NOTE: This medicine is only for you. Do not share this medicine with others.  What if I miss a dose?  If you miss a dose, take it as soon as you can. If it is almost time for your next dose, take only that dose. Do not take double or extra doses.  What may interact with this medicine?  Do not take this medicine with any of the following:  -cisapride  -hawthorn  -pimozide  -ranolazine  -red yeast rice  This medicine may also interact with the following medications:  -buspirone  -carbamazepine  -cimetidine  -cyclosporine  -digoxin  -local anesthetics or general anesthetics  -lovastatin  -medicines for anxiety or difficulty sleeping like midazolam and triazolam  -medicines for high blood pressure or heart problems  -quinidine  -rifampin, rifabutin, or rifapentine  This list may not describe all possible interactions. Give your health care provider a list of all the medicines, herbs, non-prescription drugs, or dietary supplements you use. Also tell them if you smoke, drink alcohol, or use illegal drugs. Some items may interact with your medicine.  What should I watch for while using this medicine?  Check your blood pressure and pulse rate regularly. Ask your doctor or health care professional what your blood pressure and pulse rate should be and when you should contact him or her.  You may feel dizzy or lightheaded. Do not drive, use machinery, or do  anything that needs mental alertness until you know how this medicine affects you. To reduce the risk of dizzy or fainting spells, do not sit or stand up quickly, especially if you are an older patient. Alcohol can make you more dizzy or increase flushing and rapid heartbeats. Avoid alcoholic drinks.  What side effects may I notice from receiving this medicine?  Side effects that you should report to your doctor or health care professional as soon as possible:  -allergic reactions like skin rash, itching or hives, swelling of the face, lips, or tongue  -confusion, mental depression  -feeling faint or lightheaded, falls  -pinpoint red spots on the skin  -redness, blistering, peeling or loosening of the skin, including inside the mouth  -slow, irregular heartbeat  -swelling of the ankles, feet  -unusual bleeding or bruising  Side effects that usually do not require medical attention (report to your doctor or health care professional if they continue or are bothersome):  -change in sex drive or performance  -constipation or diarrhea  -flushing of the face  -headache  -nausea, vomiting  -tired or weak  -trouble sleeping  This list may not describe all possible side effects. Call your doctor for medical advice about side effects. You may report side effects to FDA at 5-987-FDA-2418.  Where should I keep my medicine?  Keep out of the reach of children.  Store at room temperature between 20 and 25 degrees C (68 and 77 degrees F). Protect from light. Keep container tightly closed. Throw away any unused medicine after the expiration date.  NOTE: This sheet is a summary. It may not cover all possible information. If you have questions about this medicine, talk to your doctor, pharmacist, or health care provider.  © 2018 [employer]vier/Gold Standard (2014-12-01 10:54:31)    Prednisone tablets  What is this medicine?  PREDNISONE (PRED ni sone) is a corticosteroid. It is commonly used to treat inflammation of the skin, joints, lungs,  and other organs. Common conditions treated include asthma, allergies, and arthritis. It is also used for other conditions, such as blood disorders and diseases of the adrenal glands.  This medicine may be used for other purposes; ask your health care provider or pharmacist if you have questions.  COMMON BRAND NAME(S): Deltasone, Predone, Sterapred, Sterapred DS  What should I tell my health care provider before I take this medicine?  They need to know if you have any of these conditions:  -Cushing's syndrome  -diabetes  -glaucoma  -heart disease  -high blood pressure  -infection (especially a virus infection such as chickenpox, cold sores, or herpes)  -kidney disease  -liver disease  -mental illness  -myasthenia gravis  -osteoporosis  -seizures  -stomach or intestine problems  -thyroid disease  -an unusual or allergic reaction to lactose, prednisone, other medicines, foods, dyes, or preservatives  -pregnant or trying to get pregnant  -breast-feeding  How should I use this medicine?  Take this medicine by mouth with a glass of water. Follow the directions on the prescription label. Take this medicine with food. If you are taking this medicine once a day, take it in the morning. Do not take more medicine than you are told to take. Do not suddenly stop taking your medicine because you may develop a severe reaction. Your doctor will tell you how much medicine to take. If your doctor wants you to stop the medicine, the dose may be slowly lowered over time to avoid any side effects.  Talk to your pediatrician regarding the use of this medicine in children. Special care may be needed.  Overdosage: If you think you have taken too much of this medicine contact a poison control center or emergency room at once.  NOTE: This medicine is only for you. Do not share this medicine with others.  What if I miss a dose?  If you miss a dose, take it as soon as you can. If it is almost time for your next dose, talk to your doctor or  health care professional. You may need to miss a dose or take an extra dose. Do not take double or extra doses without advice.  What may interact with this medicine?  Do not take this medicine with any of the following medications:  -metyrapone  -mifepristone  This medicine may also interact with the following medications:  -aminoglutethimide  -amphotericin B  -aspirin and aspirin-like medicines  -barbiturates  -certain medicines for diabetes, like glipizide or glyburide  -cholestyramine  -cholinesterase inhibitors  -cyclosporine  -digoxin  -diuretics  -ephedrine  -female hormones, like estrogens and birth control pills  -isoniazid  -ketoconazole  -NSAIDS, medicines for pain and inflammation, like ibuprofen or naproxen  -phenytoin  -rifampin  -toxoids  -vaccines  -warfarin  This list may not describe all possible interactions. Give your health care provider a list of all the medicines, herbs, non-prescription drugs, or dietary supplements you use. Also tell them if you smoke, drink alcohol, or use illegal drugs. Some items may interact with your medicine.  What should I watch for while using this medicine?  Visit your doctor or health care professional for regular checks on your progress. If you are taking this medicine over a prolonged period, carry an identification card with your name and address, the type and dose of your medicine, and your doctor's name and address.  This medicine may increase your risk of getting an infection. Tell your doctor or health care professional if you are around anyone with measles or chickenpox, or if you develop sores or blisters that do not heal properly.  If you are going to have surgery, tell your doctor or health care professional that you have taken this medicine within the last twelve months.  Ask your doctor or health care professional about your diet. You may need to lower the amount of salt you eat.  This medicine may affect blood sugar levels. If you have diabetes, check  with your doctor or health care professional before you change your diet or the dose of your diabetic medicine.  What side effects may I notice from receiving this medicine?  Side effects that you should report to your doctor or health care professional as soon as possible:  -allergic reactions like skin rash, itching or hives, swelling of the face, lips, or tongue  -changes in emotions or moods  -changes in vision  -depressed mood  -eye pain  -fever or chills, cough, sore throat, pain or difficulty passing urine  -increased thirst  -swelling of ankles, feet  Side effects that usually do not require medical attention (report to your doctor or health care professional if they continue or are bothersome):  -confusion, excitement, restlessness  -headache  -nausea, vomiting  -skin problems, acne, thin and shiny skin  -trouble sleeping  -weight gain  This list may not describe all possible side effects. Call your doctor for medical advice about side effects. You may report side effects to FDA at 6-633-FDA-2963.  Where should I keep my medicine?  Keep out of the reach of children.  Store at room temperature between 15 and 30 degrees C (59 and 86 degrees F). Protect from light. Keep container tightly closed. Throw away any unused medicine after the expiration date.  NOTE: This sheet is a summary. It may not cover all possible information. If you have questions about this medicine, talk to your doctor, pharmacist, or health care provider.  © 2018 [employer]/Gold Standard (2012-08-02 10:57:14)    Oxygen Use at Home  Oxygen can be prescribed for home use. The prescription will show the flow rate. This is how much oxygen is to be used per minute. This will be listed in liters per minute (LPM or L/M). A liter is a metric measurement of volume.  You will use oxygen therapy as directed. It can be used while exercising, sleeping, or at rest. You may need oxygen continuously. Your health care provider may order a blood oxygen test  (arterial blood gas or pulse oximetry test) that will show what your oxygen level is. Your health care provider will use these measurements to learn about your needs and follow your progress.  Home oxygen therapy is commonly used on patients with various lung (pulmonary) related conditions. Some of these conditions include:  · Asthma.  · Lung cancer.  · Pneumonia.  · Emphysema.  · Chronic bronchitis.  · Cystic fibrosis.  · Other lung diseases.  · Pulmonary fibrosis.  · Occupational lung disease.  · Heart failure.  · Chronic obstructive pulmonary disease (COPD).  3 COMMON WAYS OF PROVIDING OXYGEN THERAPY  · Gas: The gas form of oxygen is put into variously sized cylinders or tanks. The cylinders or oxygen tanks contain compressed oxygen. The cylinder is equipped with a regulator that controls the flow rate. Because the flow of oxygen out of the cylinder is constant, an oxygen conserving device may be attached to the system to avoid waste. This device releases the gas only when you inhale and cuts it off when you exhale. Oxygen can be provided in a small cylinder that can be carried with you. Large tanks are heavy and are only for stationary use. After use, empty tanks must be exchanged for full tanks.  · Liquid: The liquid form of oxygen is put into a container similar to a thermos. When released, the liquid converts to a gas and you breathe it in just like the compressed gas. This storage method takes up less space than the compressed gas cylinder, and you can transfer the liquid to a small, portable vessel at home. Liquid oxygen is more expensive than the compressed gas, and the vessel vents when not in use. An oxygen conserving device may be built into the vessel to conserve the oxygen. Liquid oxygen is very cold, around 297° below zero.  · Oxygen concentrator: This medical device filters oxygen from room air and gives almost 100% oxygen to the patient. Oxygen concentrators are powered by electricity. Benefits of  "this system are:  ¨ It does not need to be resupplied.  ¨ It is not as costly as liquid oxygen.  ¨ Extra tubing permits the user to move around easier.  There are several types of small, portable oxygen systems available which can help you remain active and mobile. You must have a cylinder of oxygen as a backup in the event of a power failure. Advise your electric power company that you are on oxygen therapy in order to get priority service when there is a power failure.  OXYGEN DELIVERY DEVICES  There are 3 common ways to deliver oxygen to your body.  · Nasal cannula. This is a 2-pronged device inserted in the nostrils that is connected to tubing carrying the oxygen. The tubing can rest on the ears or be attached to the frame of eyeglasses.  · Mask. People who need a high flow of oxygen generally use a mask.  · Transtracheal catheter. Transtracheal oxygen therapy requires the insertion of a small, flexible tube (catheter) in the windpipe (trachea). This catheter is held in place by a necklace. Since transtracheal oxygen bypasses the mouth, nose, and throat, a humidifier is absolutely required at flow rates of 1 LPM or greater.  OXYGEN USE SAFETY TIPS  · Never smoke while using oxygen. Oxygen does not burn or explode, but flammable materials will burn faster in the presence of oxygen.  · Keep a fire extinguisher close by. Let your fire department know that you have oxygen in your home.  · Warn visitors not to smoke near you when you are using oxygen. Put up \"no smoking\" signs in your home where you most often use the oxygen.  · When you go to a restaurant with your portable oxygen source, ask to be seated in the nonsmoking section.  · Stay at least 5 feet away from gas stoves, candles, lighted fireplaces, or other heat sources.  · Do not use materials that burn easily (flammable) while using your oxygen.  · If you use an oxygen cylinder, make sure it is secured to some fixed object or in a stand. If you use liquid " oxygen, make sure the vessel is kept upright to keep the oxygen from pouring out. Liquid oxygen is so cold it can hurt your skin.  · If you use an oxygen concentrator, call your electric company so you will be given priority service if your power goes out. Avoid using extension cords, if possible.  · Regularly test your smoke detectors at home to make sure they work. If you receive care in your home from a nurse or other health care provider, he or she may also check to make sure your smoke detectors work.  GUIDELINES FOR CLEANING YOUR EQUIPMENT  · Wash the nasal prongs with a liquid soap. Thoroughly rinse them once or twice a week.  · Replace the prongs every 2 to 4 weeks. If you have an infection (cold, pneumonia) change them when you are well.  · Your health care provider will give you instructions on how to clean your transtracheal catheter.  · The humidifier bottle should be washed with soap and warm water and rinsed thoroughly between each refill. Air-dry the bottle before filling it with sterile or distilled water. The bottle and its top should be disinfected after they are cleaned.  · If you use an oxygen concentrator, unplug the unit. Then wipe down the cabinet with a damp cloth and dry it daily. The air filter should be cleaned at least twice a week.  · Follow your home medical equipment and service company's directions for cleaning the compressor filter.  HOME CARE INSTRUCTIONS   · Do not change the flow of oxygen unless directed by your health care provider.  · Do not use alcohol or other sedating drugs unless instructed. They slow your breathing rate.  · Do not use materials that burn easily (flammable) while using your oxygen.  · Always keep a spare tank of oxygen. Plan ahead for holidays when you may not be able to get a prescription filled.  · Use water-based lubricants on your lips or nostrils. Do not use an oil-based product like petroleum jelly.  · To prevent your cheeks or the skin behind your  ears from becoming irritated, tuck some gauze under the tubing.  · If you have persistent redness under your nose, call your health care provider.  · When you no longer need oxygen, your doctor will have the oxygen discontinued. Oxygen is not addicting or habit forming.  · Use the oxygen as instructed. Too much oxygen can be harmful and too little will not give you the benefit you need.  · Shortness of breath is not always from a lack of oxygen. If your oxygen level is not the cause of your shortness of breath, taking oxygen will not help.  SEEK MEDICAL CARE IF:   · You have frequent headaches.  · You have shortness of breath or a lasting cough.  · You have anxiety.  · You are confused.  · You are drowsy or sleepy all the time.  · You develop an illness which aggravates your breathing.  · You cannot exercise.  · You are restless.  · You have blue lips or fingernails.  · You have difficult or irregular breathing and it is getting worse.  · You have a fever.     This information is not intended to replace advice given to you by your health care provider. Make sure you discuss any questions you have with your health care provider.     Document Released: 03/09/2005 Document Revised: 01/08/2016 Document Reviewed: 07/30/2014  [employer]text Interactive Patient Education ©2016 [employer]    Oximetry  Oximetry calculates the percent of hemoglobin in your red blood cells that carry oxygen. This measurement is obtained through the use of a sensor. The sensor is connected by a cable to either a device known as an oximeter or a monitor with multiple displays. The measurement helps to:  · Assess a person's oxygen level and breathing.  · Assess the need or effectiveness of oxygen therapy or other treatments for lung disease.  · Assess a person's ability to tolerate increased activity.  If a more accurate measurement is required, a blood sample from an artery should be collected.  HOW IS AN OXIMETRY READING OBTAINED?  For children  and adults, a reusable or single use tape sensor is usually placed on such areas as a finger, earlobe, or toe. For infants, a single use tape sensor is usually placed around such areas as the sole of a foot or the palm of a hand. The sensor contains a light source and a light detector. The light source beams both a red and infrared LED light through the skin and blood. More infrared light is absorbed by hemoglobin that contains oxygen (oxyhemoglobin). Conversely, more red light passes through or is transmitted through oxyhemoglobin. Hemoglobin that lacks oxygen absorbs more red light. Conversely, more infrared light is transmitted through hemoglobin that lacks oxygen. The levels of transmitted red and infrared LED light received by the detector is measured. The difference of the transmitted red and infrared light during a heartbeat and between heart beats is calculated. The calculated value is then converted to a percent of hemoglobin that is carrying oxygen.  Oximetry may be used continuously or may be used at specified intervals. Dark-colored nail polish may need to be removed to calculate an accurate oxyhemoglobin percentage.  ARE THERE ANY RISKS ASSOCIATED WITH OXIMETRY?  The risks associated with oximetry are rare. The LED lights do not burn or hurt your skin. However, there is a risk of skin breakdown if the sensor is left in the same location for extended periods of time.  Certain factors or conditions can affect the accuracy of the measurements. Accurate measurements can be affected by factors or conditions such as:  · Extreme warmth or coolness of the area where the sensor is located.  · Excessive sweating of the area where the sensor is located.  · Poor blood flow to the area where the sensor is located.  · Low hemoglobin or red blood cell levels (anemia).  · A bone marrow disease that causes high levels of red blood cells, white blood cells, and platelets (polycythemia vera).  · Recent inhalation of smoke  or carbon monoxide.  · Bright artificial lighting.  WHAT IS THE MEANING OF THE OXIMETRY READING?  The normal value depends upon your medical history and your elevation above sea level. For a healthy person at sea level, a saturation level equal to or greater than 92% is normal. However, if the person has a lung condition such as severe chronic obstructive pulmonary disease (COPD), a normal oxygen saturation may be a range between 88-92%. Consistent oxygen saturation levels below 88% is a concern and should be evaluated by your health care provider.  This information is not intended to replace advice given to you by your health care provider. Make sure you discuss any questions you have with your health care provider.  Document Released: 03/08/2006 Document Revised: 04/10/2017 Document Reviewed: 06/11/2014  [employer] Interactive Patient Education © 2017 [employer]

## 2018-03-26 NOTE — PROGRESS NOTES
A&Ox4, denies CP/SOB, 3L nc in use.  States he uses 2L at home.  Has bedside concentrator and 1 tank.  Tolerating diet, IS encouraged, voiding, ambulating standby assist, steady gait.

## 2018-03-26 NOTE — DISCHARGE PLANNING
SW informed patient's home Oxygen order has changed.  Completed choice form faxed to [facility] for referral up.

## 2018-03-26 NOTE — PROGRESS NOTES
Inpatient Anticoagulation Service Note    Date: 3/26/2018  Reason for Anticoagulation: Atrial Fibrillation   [id number] VASc Score: 2    Hemoglobin Value: 15.4  Hematocrit Value: 46.8  Lab Platelet Value: 268  Target INR: 2.0 to 3.0    INR from last 7 days     Date/Time INR Value    03/26/18 0428 (!)  3.5    03/25/18 0429 (!)  3.47    03/24/18 0240 (!)  2.6    03/23/18 0051 (!)  2.01    03/22/18 1530 (!)  1.87        Dose from last 7 days     Date/Time Dose (mg)    03/26/18 0900  0    03/25/18 0600  0    03/24/18 0800  3    03/23/18 0700  5    03/22/18 1800  5        Significant Interactions: Antibiotics, Corticosteroids  Bridge Therapy: Not required    Comments:  (Home dose 5 mg daily, with sub-therapeutic INR on admit.)    Plan:  HOLD Coumadin today for INR 3.5.  Will continue to monitor daily  Education Material Provided?: No (Coumadin clinic patient)  Pharmacist suggested discharge dosing: resume outpatient regimen once INR < 3 with clinic follow-up.     Marianne NelsonD

## 2018-03-26 NOTE — FLOWSHEET NOTE
03/26/18 0723   Events/Summary/Plan   Non-Invasive Resp Device Site Inspection Completed Intact   Interdisciplinary Plan of Care-Goals (Indications)   Obstructive Ventilatory Defect or Pulmonary Disease without Obvious Obstruction Physical Exam / Hyperinflation / Wheezing (bronchospasm)   Interdisciplinary Plan of Care-Outcomes    Bronchodilator Outcome Patient at Stable Baseline   Education   Education Yes - Pt. / Family has been Instructed in use of Respiratory Equipment;Yes - Pt. / Family has been Instructed in use of Respiratory Medications and Adverse Reactions   RT Assessment of Delivered Medications   Evaluation of Medication Delivery Daily Yes-- Pt /Family has been Instructed in use of Respiratory Medications and Adverse Reactions   SVN Group   #SVN Performed Yes   Given By: Mouthpiece   MDI/DPI Group   #MDI/DPI Given MDI/DPI x 1   Incentive Spirometry Group   Breathing Exercises Yes   Incentive Spirometer Volume 2500 mL   Respiratory WDL   Respiratory (WDL) X   Chest Exam   Work Of Breathing / Effort Mild   Respiration 18   Heart Rate (Monitored) 70   Breath Sounds   Pre/Post Intervention Pre Intervention Assessment   RUL Breath Sounds Diminished   RML Breath Sounds Diminished   RLL Breath Sounds Diminished   FÁTIMA Breath Sounds Diminished   LLL Breath Sounds Diminished   Oximetry   #Pulse Oximetry (Single Determination) Yes   Oxygen   Home O2 Use Prior To Admission? Yes   Home O2 LPM Flow 2 LPM   Home O2 Delivery Method Nasal Cannula   Pulse Oximetry 93 %   O2 (LPM) 3   O2 Daily Delivery Respiratory  Silicone Nasal Cannula     Breath sounds after TX no change noted.

## 2018-03-26 NOTE — FACE TO FACE
Face to Face Note  -  Durable Medical Equipment    Rafa Carranza M.D. - NPI: [id number]  I certify that this patient is under my care and that they had a durable medical equipment(DME)face to face encounter by myself that meets the physician DME face-to-face encounter requirements with this patient on:    Date of encounter:   Patient:                    MRN:                       YOB: 2018  Yuri De León  [id number]  1945     The encounter with the patient was in whole, or in part, for the following medical condition, which is the primary reason for durable medical equipment:  COPD    I certify that, based on my findings, the following durable medical equipment is medically necessary:  Oxygen.    HOME O2 Saturation Measurements:(Values must be present for Home Oxygen orders)  Room air sat at rest: 92  Room air sat with amb: 88  With liters of O2: 3, O2 sat at rest with O2: 90  With Liters of O2: 3, O2 sat with amb with O2 : 90  Is the patient mobile?: Yes    My Clinical findings support the need for the above equipment due to:  Hypoxia

## 2018-03-27 ENCOUNTER — PATIENT OUTREACH (OUTPATIENT)
Dept: HEALTH INFORMATION MANAGEMENT | Facility: OTHER | Age: 73
End: 2018-03-27

## 2018-03-27 LAB
BACTERIA BLD CULT: NORMAL
BACTERIA BLD CULT: NORMAL
SIGNIFICANT IND 70042: NORMAL
SIGNIFICANT IND 70042: NORMAL
SITE SITE: NORMAL
SITE SITE: NORMAL
SOURCE SOURCE: NORMAL
SOURCE SOURCE: NORMAL

## 2018-04-03 ENCOUNTER — NON-PROVIDER VISIT (OUTPATIENT)
Dept: MEDICAL GROUP | Facility: PHYSICIAN GROUP | Age: 73
End: 2018-04-03
Payer: MEDICARE

## 2018-04-03 ENCOUNTER — ANTICOAGULATION MONITORING (OUTPATIENT)
Dept: VASCULAR LAB | Facility: MEDICAL CENTER | Age: 73
End: 2018-04-03
Payer: MEDICARE

## 2018-04-03 ENCOUNTER — APPOINTMENT (OUTPATIENT)
Dept: VASCULAR LAB | Facility: MEDICAL CENTER | Age: 73
End: 2018-04-03
Attending: NURSE PRACTITIONER
Payer: MEDICARE

## 2018-04-03 VITALS
HEART RATE: 87 BPM | RESPIRATION RATE: 17 BRPM | OXYGEN SATURATION: 95 % | SYSTOLIC BLOOD PRESSURE: 132 MMHG | DIASTOLIC BLOOD PRESSURE: 70 MMHG

## 2018-04-03 DIAGNOSIS — Z79.01 LONG TERM CURRENT USE OF ANTICOAGULANT THERAPY: ICD-10-CM

## 2018-04-03 DIAGNOSIS — I48.91 ATRIAL FIBRILLATION WITH RAPID VENTRICULAR RESPONSE (HCC): ICD-10-CM

## 2018-04-03 DIAGNOSIS — D68.9 COAGULATION DISORDER (HCC): ICD-10-CM

## 2018-04-03 LAB
INR BLD: 2.3 (ref 0.9–1.2)
INR PPP: 2.3 (ref 2–3.5)
POC PROTIME: ABNORMAL

## 2018-04-03 PROCEDURE — 99211 OFF/OP EST MAY X REQ PHY/QHP: CPT | Performed by: NURSE PRACTITIONER

## 2018-04-03 PROCEDURE — 85610 PROTHROMBIN TIME: CPT | Performed by: NURSE PRACTITIONER

## 2018-04-03 NOTE — PROGRESS NOTES
OP Anticoagulation Service Note    Date: 4/3/2018  Blood Pressure : 132/70  Pulse: 87  Respiration: 17    Anticoagulation Summary  As of 4/3/2018    INR goal:   2.0-3.0   TTR:   61.9 % (2.7 y)   Today's INR:   2.3   Maintenance plan:   5 mg (5 mg x 1) every day   Weekly total:   35 mg   Plan last modified:   MARIAA Stinson (9/20/2016)   Next INR check:   4/10/2018   Target end date:   Indefinite    Indications    Atrial fibrillation with rapid ventricular response (CMS-HCC) [I48.91]  Long term current use of anticoagulant therapy [Z79.01]  Atrial fibrillation (CMS-HCC) (Resolved) [I48.91]             Anticoagulation Episode Summary     INR check location:       Preferred lab:       Send INR reminders to:       Comments:         Anticoagulation Care Providers     Provider Role Specialty Phone number    Sanjay Sorensen M.D. Referring Cardiology [phone]        Anticoagulation Patient Findings      THIS VISIT CONDUCTED WITH PRESENTER VIA TELEMEDICINE UTILIZING SECURE AND ENCRYPTED VIDEOCONFERENCING EQUIPMENT  ROS:    Pulm: Denies SOB, chest pain.    Card: Denies syncope, edema, palpitations.    Extremities: Denies redness, pain.    PE:    Pulm: No SOB, even and unlabored.    Card: Normal rate and rhythm.   Extremities: No redness, or edema.     INR  is-therapeutic.    Denies signs/symptoms of bleeding and/or thrombosis.    Denies changes to diet or medications. Taking prednisone 10 mg for next 3 days, 20 mg today  Follow up appt in 1 weeks     Plan:  5 mg daily    Medication: Warfarin (Coumadin)     Sunday Monday Tuesday Wednesday Thursday Friday Saturday      5 mg    5 mg    5 mg    5 mg    5 mg    5 mg    5 mg      1 tab(s)    1 tab(s)    1 tab(s)    1 tab(s)    1 tab(s)    1 tab(s)  1 tab(s)     Next Appointment: Tuesday, April 10 @ 3:00    Review all of your home medications and newly ordered medications with your doctor and / or pharmacist. Follow medication instructions as directed by  your doctor and / or pharmacist. Please keep your medication list with you and share with your physician. Update the information when medications are discontinued, doses are changed, or new medications (including over-the-counter products) are added; and carry medication information at all times in the event of emergency situations.      For questions, please contact Outpatient Anticoagulation Service [phone].        Patient is on a high risk medication and therefore requires close monitoring and follow up.   The patient will go to the ER for falls with a head injury,  blood in urine or stool or any bleeding that last longer than 20 min.       APRIL Garcia.

## 2018-04-04 ENCOUNTER — TELEPHONE (OUTPATIENT)
Dept: MEDICAL GROUP | Facility: PHYSICIAN GROUP | Age: 73
End: 2018-04-04

## 2018-04-10 ENCOUNTER — APPOINTMENT (OUTPATIENT)
Dept: VASCULAR LAB | Facility: MEDICAL CENTER | Age: 73
End: 2018-04-10
Payer: MEDICARE

## 2018-04-10 ENCOUNTER — NON-PROVIDER VISIT (OUTPATIENT)
Dept: MEDICAL GROUP | Facility: PHYSICIAN GROUP | Age: 73
End: 2018-04-10
Payer: MEDICARE

## 2018-04-10 ENCOUNTER — ANTICOAGULATION MONITORING (OUTPATIENT)
Dept: VASCULAR LAB | Facility: MEDICAL CENTER | Age: 73
End: 2018-04-10
Payer: MEDICARE

## 2018-04-10 VITALS
DIASTOLIC BLOOD PRESSURE: 66 MMHG | OXYGEN SATURATION: 94 % | HEART RATE: 98 BPM | RESPIRATION RATE: 16 BRPM | SYSTOLIC BLOOD PRESSURE: 126 MMHG

## 2018-04-10 DIAGNOSIS — D69.9 ANTICOAGULANT DISORDER (HCC): ICD-10-CM

## 2018-04-10 DIAGNOSIS — I48.91 ATRIAL FIBRILLATION WITH RAPID VENTRICULAR RESPONSE (HCC): ICD-10-CM

## 2018-04-10 DIAGNOSIS — Z79.01 LONG TERM CURRENT USE OF ANTICOAGULANT THERAPY: ICD-10-CM

## 2018-04-10 LAB
INR BLD: 2.3 (ref 0.9–1.2)
INR PPP: 2.3 (ref 2–3.5)
POC PROTIME: ABNORMAL

## 2018-04-10 PROCEDURE — 99211 OFF/OP EST MAY X REQ PHY/QHP: CPT | Performed by: NURSE PRACTITIONER

## 2018-04-10 PROCEDURE — 85610 PROTHROMBIN TIME: CPT | Performed by: FAMILY MEDICINE

## 2018-04-10 NOTE — PROGRESS NOTES
OP Anticoagulation Service Note    Date: 4/10/2018  Blood Pressure : 126/66  Pulse: 98  Respiration: 16    Anticoagulation Summary  As of 4/10/2018    INR goal:   2.0-3.0   TTR:   62.2 % (2.7 y)   Today's INR:   2.3   Maintenance plan:   5 mg (5 mg x 1) every day   Weekly total:   35 mg   Plan last modified:   MARIAA Stinson (9/20/2016)   Next INR check:   4/24/2018   Target end date:   Indefinite    Indications    Atrial fibrillation with rapid ventricular response (CMS-HCC) [I48.91]  Long term current use of anticoagulant therapy [Z79.01]  Atrial fibrillation (CMS-HCC) (Resolved) [I48.91]             Anticoagulation Episode Summary     INR check location:       Preferred lab:       Send INR reminders to:       Comments:         Anticoagulation Care Providers     Provider Role Specialty Phone number    Sanjay Sorensen M.D. Referring Cardiology [phone]        Anticoagulation Patient Findings      THIS VISIT CONDUCTED WITH PRESENTER VIA TELEMEDICINE UTILIZING SECURE AND ENCRYPTED VIDEOCONFERENCING EQUIPMENT  ROS:    Pulm: Denies SOB, chest pain.    Card: Denies syncope, edema, palpitations.    Extremities: Denies redness, pain.    PE:    Pulm: No SOB, even and unlabored.    Card: Normal rate and rhythm.   Extremities: No redness, or edema.     INR  is-therapeutic.    Denies signs/symptoms of bleeding and/or thrombosis.    Denies changes to diet or medications.   Follow up appt in 2 weeks     Plan:  5 mg daily    Medication: Warfarin (Coumadin)     Sunday Monday Tuesday Wednesday Thursday Friday Saturday      5 mg    5 mg    5 mg    5 mg    5 mg    5 mg    5 mg      1 tab(s)    1 tab(s)    1 tab(s)    1 tab(s)    1 tab(s)    1 tab(s)  1 tab(s)     Next Appointment: Tuesday, April 24 @ 1:30    Review all of your home medications and newly ordered medications with your doctor and / or pharmacist. Follow medication instructions as directed by your doctor and / or pharmacist. Please keep your  medication list with you and share with your physician. Update the information when medications are discontinued, doses are changed, or new medications (including over-the-counter products) are added; and carry medication information at all times in the event of emergency situations.      For questions, please contact Outpatient Anticoagulation Service [phone].        Patient is on a high risk medication and therefore requires close monitoring and follow up.   The patient will go to the ER for falls with a head injury,  blood in urine or stool or any bleeding that last longer than 20 min.       APRIL Garcia.

## 2018-04-11 ENCOUNTER — TELEPHONE (OUTPATIENT)
Dept: MEDICAL GROUP | Facility: PHYSICIAN GROUP | Age: 73
End: 2018-04-11

## 2018-05-08 ENCOUNTER — OFFICE VISIT (OUTPATIENT)
Dept: PULMONOLOGY | Facility: HOSPICE | Age: 73
End: 2018-05-08
Payer: MEDICARE

## 2018-05-08 VITALS
HEART RATE: 76 BPM | SYSTOLIC BLOOD PRESSURE: 116 MMHG | WEIGHT: 241 LBS | OXYGEN SATURATION: 98 % | TEMPERATURE: 97.7 F | BODY MASS INDEX: 32.64 KG/M2 | DIASTOLIC BLOOD PRESSURE: 76 MMHG | HEIGHT: 72 IN | RESPIRATION RATE: 18 BRPM

## 2018-05-08 DIAGNOSIS — R60.0 LEG EDEMA, LEFT: ICD-10-CM

## 2018-05-08 DIAGNOSIS — J98.01 BRONCHOSPASM: Chronic | ICD-10-CM

## 2018-05-08 DIAGNOSIS — I48.91 ATRIAL FIBRILLATION WITH RAPID VENTRICULAR RESPONSE (HCC): Chronic | ICD-10-CM

## 2018-05-08 DIAGNOSIS — J44.1 COPD WITH ACUTE EXACERBATION (HCC): ICD-10-CM

## 2018-05-08 PROCEDURE — 99204 OFFICE O/P NEW MOD 45 MIN: CPT | Performed by: INTERNAL MEDICINE

## 2018-05-08 RX ORDER — AMOXICILLIN 500 MG/1
TABLET, FILM COATED ORAL
COMMUNITY
End: 2018-08-14

## 2018-05-08 RX ORDER — ALBUTEROL SULFATE 2.5 MG/3ML
3 SOLUTION RESPIRATORY (INHALATION)
COMMUNITY
End: 2018-06-29

## 2018-05-08 RX ORDER — OSELTAMIVIR PHOSPHATE 75 MG/1
CAPSULE ORAL
COMMUNITY
End: 2018-06-29

## 2018-05-08 RX ORDER — AMOXICILLIN AND CLAVULANATE POTASSIUM 875; 125 MG/1; MG/1
TABLET, FILM COATED ORAL
COMMUNITY
End: 2018-06-29

## 2018-05-08 RX ORDER — CEPHALEXIN 500 MG/1
CAPSULE ORAL
COMMUNITY
End: 2018-06-29

## 2018-05-08 RX ORDER — FLUTICASONE PROPIONATE 110 UG/1
AEROSOL, METERED RESPIRATORY (INHALATION)
COMMUNITY
End: 2019-02-15

## 2018-05-08 RX ORDER — BUDESONIDE AND FORMOTEROL FUMARATE DIHYDRATE 160; 4.5 UG/1; UG/1
2 AEROSOL RESPIRATORY (INHALATION) 2 TIMES DAILY
Qty: 1 INHALER | Refills: 5 | Status: SHIPPED | OUTPATIENT
Start: 2018-05-08 | End: 2018-07-31 | Stop reason: SDUPTHER

## 2018-05-08 RX ORDER — IPRATROPIUM BROMIDE AND ALBUTEROL 20; 100 UG/1; UG/1
SPRAY, METERED RESPIRATORY (INHALATION)
COMMUNITY
Start: 2018-03-14 | End: 2018-06-29

## 2018-05-08 RX ORDER — PEN NEEDLE, DIABETIC 31 G X1/4"
NEEDLE, DISPOSABLE MISCELLANEOUS
COMMUNITY
Start: 2018-03-14 | End: 2019-06-07 | Stop reason: CLARIF

## 2018-05-08 RX ORDER — FUROSEMIDE 40 MG/1
TABLET ORAL
Status: ON HOLD | COMMUNITY
End: 2019-06-17

## 2018-05-08 RX ORDER — ALBUTEROL SULFATE 90 UG/1
AEROSOL, METERED RESPIRATORY (INHALATION)
COMMUNITY
End: 2018-06-29

## 2018-05-08 RX ORDER — MONTELUKAST SODIUM 10 MG/1
TABLET ORAL
COMMUNITY
End: 2018-08-14

## 2018-05-08 RX ORDER — POTASSIUM CHLORIDE
CRYSTALS MISCELLANEOUS
COMMUNITY
End: 2019-06-07 | Stop reason: CLARIF

## 2018-05-08 RX ORDER — PREDNISONE 10 MG/1
TABLET ORAL
COMMUNITY
End: 2018-06-29

## 2018-05-08 RX ORDER — LEVOFLOXACIN 750 MG/1
TABLET, FILM COATED ORAL
COMMUNITY
End: 2018-06-29

## 2018-05-08 RX ORDER — DOXYCYCLINE 100 MG/1
CAPSULE ORAL
COMMUNITY
End: 2018-06-29

## 2018-05-08 RX ORDER — FLUTICASONE PROPIONATE 50 MCG
SPRAY, SUSPENSION (ML) NASAL
COMMUNITY
End: 2018-06-29

## 2018-05-08 RX ORDER — DILTIAZEM HYDROCHLORIDE 60 MG/1
TABLET, FILM COATED ORAL
COMMUNITY
End: 2018-06-29

## 2018-05-08 RX ORDER — WARFARIN SODIUM 5 MG/1
TABLET ORAL
COMMUNITY
End: 2018-08-14

## 2018-05-08 RX ORDER — LOSARTAN POTASSIUM 25 MG/1
TABLET ORAL
COMMUNITY
End: 2018-08-14

## 2018-05-08 NOTE — PATIENT INSTRUCTIONS
This gentleman has a significant predicament with ongoing bronchospasm, intolerance to oral prednisone which although beneficial causes significant ankle edema and swelling. After long discussion, I did advise him to consider utilizing prednisone 10 mg every other day to try to get control of his marked daily bronchospasm. He is utilized dulera in the past but did not find it to be of benefit. Spiriva was overly expensive. He is on Singulair from his ear nose and throat doctor.    He currently has Ventolin rescue inhaler, nebulized albuterol, and Combivent. I explained to him that all 3 of these contain albuterol, and he should only utilize one of them every 4 hours at maximum.    Prevnar and pneumonia are up-to-date. He is not smoking. Body mass index mildly elevated at 32.6, gentle exercise and portion control advised, health maintenance issues addressed.    We need to get control of his symptoms, he was hospitalized in March with COPD exacerbation, and then again in the emergency room one week ago for nebulizer treatment. He does use oxygen at night.    After long discussion and careful consideration regarding cost and side effects, we will try Symbicort 1/6/02 puffs morning and 2 puffs night, followed by rinse and gargle. I've asked him to return in 6 weeks, if during that time he has flares consider using the prednisone at least low-dose alternate day to try to get control. We will have him see a nurse practitioner on return, to review his medications and consider other alternatives that are within his price range given the very expensive co-pays.

## 2018-05-08 NOTE — PROGRESS NOTES
Yuri De León is a 73 y.o. male here for refractory bronchospasm with COPD exacerbation and multiple ER visits. Patient was referred by his primary care doctor.    History of Present Illness:        This gentleman has a significant predicament with ongoing bronchospasm, intolerance to oral prednisone which although beneficial causes significant ankle edema and swelling. After long discussion, I did advise him to consider utilizing prednisone 10 mg every other day to try to get control of his marked daily bronchospasm. He is utilized dulera in the past but did not find it to be of benefit. Spiriva was overly expensive. He is on Singulair from his ear nose and throat doctor.    He currently has Ventolin rescue inhaler, nebulized albuterol, and Combivent. I explained to him that all 3 of these contain albuterol, and he should only utilize one of them every 4 hours at maximum.    Prevnar and pneumonia are up-to-date. He is not smoking. Body mass index mildly elevated at 32.6, gentle exercise and portion control advised, health maintenance issues addressed.    We need to get control of his symptoms, he was hospitalized in March with COPD exacerbation, and then again in the emergency room one week ago for nebulizer treatment. He does use oxygen at night.    After long discussion and careful consideration regarding cost and side effects, we will try Symbicort 1/6/02 puffs morning and 2 puffs night, followed by rinse and gargle. I've asked him to return in 6 weeks, if during that time he has flares consider using the prednisone at least low-dose alternate day to try to get control. We will have him see a nurse practitioner on return, to review his medications and consider other alternatives that are within his price range given the very expensive co-pays.  Constitutional ROS: No unexpected change in weight, No unexplained fevers  Eyes: No change in vision or blurring or double vision  Mouth/Throat ROS: No sore throat,  "No recent change in voice or hoarseness  Pulmonary ROS: See present history for pertinent positives  Cardiovascular ROS: No chest pain to suggest acute coronary syndrome  Gastrointestinal ROS: No abdominal pain to suggest peptic disease  Musculoskeletal/Extremities ROS: no acute artritis or unusual swelling  Hematologic/Lymphatic ROS: No easy bleeding or unusual lymph node swelling  Neurologic ROS: No new or unusual weakness  Psychiatric ROS: No hallucinations  Allergic/Immunologic: No  urticaria or allergic rash      Current Outpatient Prescriptions   Medication Sig Dispense Refill   • ipratropium-albuterol (COMBIVENT RESPIMAT)  MCG/ACT Aero Soln Combivent Respimat 20 mcg-100 mcg/actuation solution for inhalation     • furosemide (LASIX) 40 MG Tab furosemide 40 mg tablet   Take 1 tablet every day by oral route as needed.     • insulin detemir (LEVEMIR FLEXTOUCH) 100 UNIT/ML Solution Pen-injector injection Levemir FlexTouch U-100 Insulin 100 unit/mL (3 mL) subcutaneous pen     • Potassium Chloride Crystals potassium chloride ER 10 mEq tablet,extended release   Take 1 tablet every day by oral route.     • Insulin Pen Needle (TRUEPLUS PEN NEEDLES) 29G X 12MM Misc TRUEplus Pen Needle 31 gauge x 1/4\"     • albuterol (VENTOLIN HFA) 108 (90 Base) MCG/ACT Aero Soln inhalation aerosol Ventolin HFA 90 mcg/actuation aerosol inhaler     • warfarin (COUMADIN) 5 MG Tab warfarin 5 mg tablet     • DEXAMETHASONE PO Take  by mouth.     • budesonide-formoterol (SYMBICORT) 160-4.5 MCG/ACT Aerosol Inhale 2 Puffs by mouth 2 Times a Day. Use spacer. Rinse mouth after each use. 1 Inhaler 5   • DILTIAZem (CARDIZEM) 60 MG Tab Take 1 Tab by mouth 3 times a day. 90 Tab 0   • albuterol (PROVENTIL) 2.5mg/3ml Nebu Soln solution for nebulization 2.5 mg by Nebulization route every four hours as needed for Shortness of Breath.     • losartan (COZAAR) 25 MG Tab Take 25 mg by mouth every evening.     • warfarin (COUMADIN) 5 MG Tab TAKE ONE " "TABLET BY MOUTH ONE TIME DAILY OR AS DIRECTED BY COUMADIN CLINIC 90 Tab 1   • montelukast (SINGULAIR) 10 MG Tab Take 1 Tab by mouth every day. 30 Tab 11   • fluticasone (FLONASE) 50 MCG/ACT nasal spray Spray 2 Sprays in nose every day.     • Acetaminophen 325 MG Cap acetaminophen 300 mg-codeine 30 mg tablet     • Amoxicillin 500 MG Tab amoxicillin 500 mg capsule     • amoxicillin-clavulanate (AUGMENTIN) 875-125 MG Tab amoxicillin 875 mg-potassium clavulanate 125 mg tablet     • amoxicillin-clavulanate (AUGMENTIN) 875-125 MG Tab amoxicillin 875 mg-potassium clavulanate 125 mg tablet   Take 1 tablet every 12 hours by oral route for 10 days.     • cephALEXin (KEFLEX) 500 MG Cap cephalexin 500 mg capsule     • ipratropium-albuterol (COMBIVENT RESPIMAT)  MCG/ACT Aero Soln Combivent Respimat 20 mcg-100 mcg/actuation solution for inhalation   Inhale 1 puff 4 times a day by inhalation route.     • fluticasone (FLOVENT HFA) 110 MCG/ACT Aerosol Flovent  mcg/actuation aerosol inhaler     • TRUEPLUS PEN NEEDLES 31G X 6 MM Misc      • COMBIVENT RESPIMAT  MCG/ACT Aero Soln      • levoFLOXacin (LEVAQUIN) 750 MG tablet levofloxacin 750 mg tablet     • Insulin Pen Needle (NOVOFINE PLUS) 32G X 4 MM Misc Novofine 32 32 gauge x 1/4\" needle   use 1 needle daily     • oseltamivir (TAMIFLU) 75 MG Cap oseltamivir 75 mg capsule     • oseltamivir (TAMIFLU) 75 MG Cap oseltamivir 75 mg capsule   Take 1 capsule twice a day by oral route for 5 days.     • predniSONE (DELTASONE) 10 MG Tab prednisone 10 mg tablet     • albuterol (PROVENTIL) 2.5mg/3ml Nebu Soln solution for nebulization 3 mL.     • DILTIAZem (CARDIZEM) 60 MG Tab diltiazem 60 mg tablet     • doxycycline (MONODOX) 100 MG capsule doxycycline monohydrate 100 mg capsule     • fluticasone (FLONASE) 50 MCG/ACT nasal spray fluticasone 50 mcg/actuation nasal spray,suspension     • losartan (COZAAR) 25 MG Tab losartan 25 mg tablet     • montelukast (SINGULAIR) 10 MG Tab " montelukast 10 mg tablet     • tiotropium (SPIRIVA) 18 MCG Cap Inhale 1 Cap by mouth every day. 30 Cap 3   • doxycycline (MONODOX) 100 MG capsule Take 1 Cap by mouth 2 times a day. 6 Cap 0   • insulin detemir (LEVEMIR) 100 UNIT/ML Solution Inject 12 Units as instructed every evening.     • albuterol (VENTOLIN OR PROVENTIL) 108 (90 BASE) MCG/ACT AERS Inhale 2 Puffs by mouth every 6 hours as needed.       No current facility-administered medications for this visit.        Social History   Substance Use Topics   • Smoking status: Former Smoker     Packs/day: 0.50     Years: 10.00     Types: Cigarettes     Quit date: 12/10/1968   • Smokeless tobacco: Never Used   • Alcohol use No        Past Medical History:   Diagnosis Date   • Abnormal electrocardiogram 8/13/2010   • Arrhythmia     Atrial Fibrillation; Cardiologist, Dr. Sorensen   • Arthritis     knees, left hip   • ASTHMA    • Atrial fibrillation (HCC) 10/25/2011   • Bronchospasm 8/6/2009   • HTN (hypertension) 10/25/2011   • Hypercholesteremia 10/25/2011   • Long term (current) use of anticoagulants 10/25/2011   • NASAL POLYPS 8/6/2009   • Other nonspecific abnormal finding 8/6/2009   • Pain     left hip   • Shortness of breath    • Sleep apnea 8/6/2009   • Wears glasses        Past Surgical History:   Procedure Laterality Date   • HIP ARTHROPLASTY TOTAL  8/31/2010    Performed by OSGOOD, PATRICK J at [facility] ORS   • LUMBAR DECOMPRESSION  1984   • HIP REPLACEMENT, TOTAL     • LAMINOTOMY     • SINUSCOPE     • SINUSOTOMIES  2003,2005,2/2007       Allergies: Atenolol and Tetanus toxoid    Family History   Problem Relation Age of Onset   • Heart Disease Mother        Physical Examination    Vitals:    05/08/18 1437   Height: 1.829 m (6')   Weight: 109.3 kg (241 lb)   Weight % change since last entry.: 0 %   BP: 116/76   Pulse: 76   BMI (Calculated): 32.69   Resp: 18   Temp: 36.5 °C (97.7 °F)       General Appearance: alert, no distress  Skin: Skin color,  texture, turgor normal. No rashes or lesions.  Eyes: negative  Oropharynx: Lips, mucosa, and tongue normal. Teeth and gums normal. Oropharynx moist and without lesion  Lungs: positive findings: Marked wheezing inspiratory and expiratory without prolonged phases  Heart: negative. RRR without murmur, gallop, or rubs.  No ectopy.  Abdomen: Abdomen soft, non-tender. . No masses,  No organomegaly  Extremities:  No deformities, edema, or skin discoloration  Joints: No acute arthritis  Peripheral Pulses:perfused  Neurologic: intact grossly  No clubbing or cyanosis    II (soft palate, uvula, fauces visible)    Imaging: Recent chest x-ray was clear    PFTS: None presently      Assessment and Plan  1. COPD with acute exacerbation (HCC)    2. Leg edema, left    3. Bronchospasm    4. Atrial fibrillation with rapid ventricular response (HCC)    5. BMI 32.0-32.9,adult    This gentleman has a significant predicament with ongoing bronchospasm, intolerance to oral prednisone which although beneficial causes significant ankle edema and swelling. After long discussion, I did advise him to consider utilizing prednisone 10 mg every other day to try to get control of his marked daily bronchospasm. He is utilized dulera in the past but did not find it to be of benefit. Spiriva was overly expensive. He is on Singulair from his ear nose and throat doctor.    He currently has Ventolin rescue inhaler, nebulized albuterol, and Combivent. I explained to him that all 3 of these contain albuterol, and he should only utilize one of them every 4 hours at maximum.    Prevnar and pneumonia are up-to-date. He is not smoking. Body mass index mildly elevated at 32.6, gentle exercise and portion control advised, health maintenance issues addressed.    We need to get control of his symptoms, he was hospitalized in March with COPD exacerbation, and then again in the emergency room one week ago for nebulizer treatment. He does use oxygen at night.    After  long discussion and careful consideration regarding cost and side effects, we will try Symbicort 1/6/02 puffs morning and 2 puffs night, followed by rinse and gargle. I've asked him to return in 6 weeks, if during that time he has flares consider using the prednisone at least low-dose alternate day to try to get control. We will have him see a nurse practitioner on return, to review his medications and consider other alternatives that are within his price range given the very expensive co-pays.  Followup Return in about 6 weeks (around 6/19/2018) for With MD or APRN.

## 2018-05-15 ENCOUNTER — NON-PROVIDER VISIT (OUTPATIENT)
Dept: MEDICAL GROUP | Facility: PHYSICIAN GROUP | Age: 73
End: 2018-05-15
Payer: MEDICARE

## 2018-05-15 ENCOUNTER — APPOINTMENT (OUTPATIENT)
Dept: VASCULAR LAB | Facility: MEDICAL CENTER | Age: 73
End: 2018-05-15
Attending: INTERNAL MEDICINE
Payer: MEDICARE

## 2018-05-15 ENCOUNTER — HOSPITAL ENCOUNTER (OUTPATIENT)
Dept: LAB | Facility: MEDICAL CENTER | Age: 73
End: 2018-05-15
Attending: NURSE PRACTITIONER
Payer: MEDICARE

## 2018-05-15 ENCOUNTER — HOSPITAL ENCOUNTER (OUTPATIENT)
Dept: LAB | Facility: MEDICAL CENTER | Age: 73
End: 2018-05-15
Attending: INTERNAL MEDICINE
Payer: MEDICARE

## 2018-05-15 ENCOUNTER — ANTICOAGULATION MONITORING (OUTPATIENT)
Dept: VASCULAR LAB | Facility: MEDICAL CENTER | Age: 73
End: 2018-05-15
Payer: MEDICARE

## 2018-05-15 ENCOUNTER — HOSPITAL ENCOUNTER (OUTPATIENT)
Dept: LAB | Facility: MEDICAL CENTER | Age: 73
End: 2018-05-15
Attending: UROLOGY
Payer: MEDICARE

## 2018-05-15 VITALS
SYSTOLIC BLOOD PRESSURE: 120 MMHG | DIASTOLIC BLOOD PRESSURE: 70 MMHG | OXYGEN SATURATION: 94 % | HEART RATE: 100 BPM | RESPIRATION RATE: 20 BRPM

## 2018-05-15 DIAGNOSIS — I48.91 ATRIAL FIBRILLATION WITH RAPID VENTRICULAR RESPONSE (HCC): ICD-10-CM

## 2018-05-15 DIAGNOSIS — Z79.01 LONG TERM CURRENT USE OF ANTICOAGULANT THERAPY: ICD-10-CM

## 2018-05-15 DIAGNOSIS — D68.9 COAGULATION DISORDER (HCC): ICD-10-CM

## 2018-05-15 LAB
ALBUMIN SERPL BCP-MCNC: 3.9 G/DL (ref 3.2–4.9)
ANION GAP SERPL CALC-SCNC: 5 MMOL/L (ref 0–11.9)
BUN SERPL-MCNC: 30 MG/DL (ref 8–22)
BUN SERPL-MCNC: 31 MG/DL (ref 8–22)
CALCIUM SERPL-MCNC: 9.1 MG/DL (ref 8.5–10.5)
CALCIUM SERPL-MCNC: 9.3 MG/DL (ref 8.5–10.5)
CHLORIDE SERPL-SCNC: 103 MMOL/L (ref 96–112)
CHLORIDE SERPL-SCNC: 104 MMOL/L (ref 96–112)
CO2 SERPL-SCNC: 31 MMOL/L (ref 20–33)
CO2 SERPL-SCNC: 31 MMOL/L (ref 20–33)
CREAT SERPL-MCNC: 1.47 MG/DL (ref 0.5–1.4)
CREAT SERPL-MCNC: 1.47 MG/DL (ref 0.5–1.4)
CREAT UR-MCNC: 107.7 MG/DL
GLUCOSE SERPL-MCNC: 168 MG/DL (ref 65–99)
GLUCOSE SERPL-MCNC: 170 MG/DL (ref 65–99)
INR BLD: 2.8 (ref 0.9–1.2)
INR PPP: 2.29 (ref 0.87–1.13)
INR PPP: 2.8 (ref 2–3.5)
MAGNESIUM SERPL-MCNC: 1.8 MG/DL (ref 1.5–2.5)
PHOSPHATE SERPL-MCNC: 2.7 MG/DL (ref 2.5–4.5)
POC PROTIME: ABNORMAL
POTASSIUM SERPL-SCNC: 4.5 MMOL/L (ref 3.6–5.5)
POTASSIUM SERPL-SCNC: 4.5 MMOL/L (ref 3.6–5.5)
PROT UR-MCNC: 43.9 MG/DL (ref 0–15)
PROT/CREAT UR: 408 MG/G (ref 15–68)
PROTHROMBIN TIME: 24.9 SEC (ref 12–14.6)
PSA SERPL-MCNC: 3.01 NG/ML (ref 0–4)
SODIUM SERPL-SCNC: 139 MMOL/L (ref 135–145)
SODIUM SERPL-SCNC: 140 MMOL/L (ref 135–145)

## 2018-05-15 PROCEDURE — 85610 PROTHROMBIN TIME: CPT

## 2018-05-15 PROCEDURE — 84153 ASSAY OF PSA TOTAL: CPT

## 2018-05-15 PROCEDURE — 85610 PROTHROMBIN TIME: CPT | Performed by: NURSE PRACTITIONER

## 2018-05-15 PROCEDURE — 84156 ASSAY OF PROTEIN URINE: CPT

## 2018-05-15 PROCEDURE — 82570 ASSAY OF URINE CREATININE: CPT

## 2018-05-15 PROCEDURE — 80048 BASIC METABOLIC PNL TOTAL CA: CPT

## 2018-05-15 PROCEDURE — 80069 RENAL FUNCTION PANEL: CPT

## 2018-05-15 PROCEDURE — 83735 ASSAY OF MAGNESIUM: CPT

## 2018-05-15 PROCEDURE — 36415 COLL VENOUS BLD VENIPUNCTURE: CPT

## 2018-05-15 PROCEDURE — 99212 OFFICE O/P EST SF 10 MIN: CPT | Performed by: NURSE PRACTITIONER

## 2018-05-15 NOTE — PROGRESS NOTES
OP Anticoagulation Service Note    Date: 5/15/2018  Blood Pressure : 120/70  Pulse: 100  Respiration: 20    Anticoagulation Summary  As of 5/15/2018    INR goal:   2.0-3.0   TTR:   63.4 % (2.8 y)   Today's INR:   2.8   Warfarin maintenance plan:   5 mg (5 mg x 1) every day   Weekly warfarin total:   35 mg   Plan last modified:   MARIAA Stinson (9/20/2016)   Next INR check:   5/22/2018   Target end date:   Indefinite    Indications    Atrial fibrillation with rapid ventricular response (HCC) [I48.91]  Long term current use of anticoagulant therapy [Z79.01]  Atrial fibrillation (HCC) (Resolved) [I48.91]             Anticoagulation Episode Summary     INR check location:       Preferred lab:       Send INR reminders to:       Comments:         Anticoagulation Care Providers     Provider Role Specialty Phone number    Sanjay Sorensen M.D. Referring Cardiology [phone]        Anticoagulation Patient Findings      THIS VISIT CONDUCTED WITH [employer] UTILIZING SECURE AND ENCRYPTED VIDEOCONFERENCING EQUIPMENT  ROS:    Pulm: Denies SOB, chest pain.    Card: Denies syncope, edema, palpitations.    Extremities: Denies redness, pain.    PE:    Pulm: No SOB, even and unlabored.    Card: Normal rate and rhythm.   Extremities: No redness, or edema.     INR  is-therapeutic.    Denies signs/symptoms of bleeding and/or thrombosis.    Denies changes to diet or medications.   Follow up appt in 1 weeks     Plan:  Take 2.5 mg tonight then follow the dose below.    Medication: Warfarin (Coumadin)     Sunday Monday Tuesday Wednesday Thursday Friday Saturday      5 mg    5 mg    5 mg    5 mg    5 mg    5 mg    5 mg      1 tab(s)    1 tab(s)    1 tab(s)    1 tab(s)    1 tab(s)    1 tab(s)  1 tab(s)     Next Appointment: Tuesday, May 22      Review all of your home medications and newly ordered medications with your doctor and / or pharmacist. Follow medication instructions as directed by your  doctor and / or pharmacist. Please keep your medication list with you and share with your physician. Update the information when medications are discontinued, doses are changed, or new medications (including over-the-counter products) are added; and carry medication information at all times in the event of emergency situations.      For questions, please contact Outpatient Anticoagulation Service [phone].      Patient is on a high risk medication and therefore requires close monitoring and follow up.     The patient will go to the ER for falls with a head injury,  blood in urine or stool or any bleeding that last longer than 20 min.     APRIL Garcia.

## 2018-05-22 ENCOUNTER — APPOINTMENT (OUTPATIENT)
Dept: VASCULAR LAB | Facility: MEDICAL CENTER | Age: 73
End: 2018-05-22
Payer: MEDICARE

## 2018-05-30 ENCOUNTER — TELEPHONE (OUTPATIENT)
Dept: VASCULAR LAB | Facility: MEDICAL CENTER | Age: 73
End: 2018-05-30

## 2018-05-30 NOTE — TELEPHONE ENCOUNTER
Spoke with the pt to remind that the INR is overdue. Made appt for next Tuesday in [facility]. APRIL Garcia.

## 2018-06-05 ENCOUNTER — APPOINTMENT (OUTPATIENT)
Dept: VASCULAR LAB | Facility: MEDICAL CENTER | Age: 73
End: 2018-06-05
Payer: MEDICARE

## 2018-06-05 ENCOUNTER — NON-PROVIDER VISIT (OUTPATIENT)
Dept: MEDICAL GROUP | Facility: PHYSICIAN GROUP | Age: 73
End: 2018-06-05
Payer: MEDICARE

## 2018-06-05 ENCOUNTER — ANTICOAGULATION MONITORING (OUTPATIENT)
Dept: VASCULAR LAB | Facility: MEDICAL CENTER | Age: 73
End: 2018-06-05
Payer: MEDICARE

## 2018-06-05 VITALS
OXYGEN SATURATION: 93 % | RESPIRATION RATE: 16 BRPM | SYSTOLIC BLOOD PRESSURE: 134 MMHG | HEART RATE: 89 BPM | DIASTOLIC BLOOD PRESSURE: 80 MMHG

## 2018-06-05 DIAGNOSIS — I48.91 ATRIAL FIBRILLATION WITH RAPID VENTRICULAR RESPONSE (HCC): ICD-10-CM

## 2018-06-05 DIAGNOSIS — Z79.01 LONG TERM CURRENT USE OF ANTICOAGULANT THERAPY: ICD-10-CM

## 2018-06-05 DIAGNOSIS — D68.9 COAGULATION DISORDER (HCC): ICD-10-CM

## 2018-06-05 LAB
INR BLD: 3 (ref 0.9–1.2)
INR PPP: 3 (ref 2–3.5)
POC PROTIME: ABNORMAL

## 2018-06-05 PROCEDURE — 99212 OFFICE O/P EST SF 10 MIN: CPT | Performed by: NURSE PRACTITIONER

## 2018-06-05 PROCEDURE — 85610 PROTHROMBIN TIME: CPT | Performed by: NURSE PRACTITIONER

## 2018-06-05 NOTE — PROGRESS NOTES
OP Anticoagulation Service Note    Date: 6/5/2018  Blood Pressure : 134/80  Pulse: 89  Respiration: 16    Anticoagulation Summary  As of 6/5/2018    INR goal:   2.0-3.0   TTR:   64.2 % (2.9 y)   Today's INR:   3.0   Warfarin maintenance plan:   5 mg (5 mg x 1) every day   Weekly warfarin total:   35 mg   Plan last modified:   MARIAA Stinson (9/20/2016)   Next INR check:   6/26/2018   Target end date:   Indefinite    Indications    Atrial fibrillation with rapid ventricular response (HCC) [I48.91]  Long term current use of anticoagulant therapy [Z79.01]  Atrial fibrillation (HCC) (Resolved) [I48.91]             Anticoagulation Episode Summary     INR check location:       Preferred lab:       Send INR reminders to:       Comments:         Anticoagulation Care Providers     Provider Role Specialty Phone number    Sanjay Sorensen M.D. Referring Cardiology [phone]        Anticoagulation Patient Findings      THIS VISIT CONDUCTED WITH [employer] UTILIZING SECURE AND ENCRYPTED VIDEOCONFERENCING EQUIPMENT  ROS:    Pulm: Denies SOB, chest pain.    Card: Denies syncope, edema, palpitations.    Extremities: Denies redness, pain.    PE:    Pulm: No SOB, even and unlabored.    Card: Normal rate and rhythm.   Extremities: No redness, or edema.     INR  is-therapeutic. May have taken an extra 2.5 this week by accident.     Denies signs/symptoms of bleeding and/or thrombosis.    Denies changes to diet or medications.   Follow up appt in 3 weeks     Plan:  Continue current medication regimen.       Medication: Warfarin (Coumadin)     Sunday Monday Tuesday Wednesday Thursday Friday Saturday      5 mg    5 mg    5 mg    5 mg    5 mg    5 mg    5 mg      1 tab(s)    1 tab(s)    1 tab(s)    1 tab(s)    1 tab(s)    1 tab(s)  1 tab(s)     Next Appointment: Tuesday, June 26 @ 1:30      Review all of your home medications and newly ordered medications with your doctor and / or pharmacist. Follow  medication instructions as directed by your doctor and / or pharmacist. Please keep your medication list with you and share with your physician. Update the information when medications are discontinued, doses are changed, or new medications (including over-the-counter products) are added; and carry medication information at all times in the event of emergency situations.      For questions, please contact Outpatient Anticoagulation Service [phone].        Patient is on a high risk medication and therefore requires close monitoring and follow up.   The patient will go to the ER for falls with a head injury,  blood in urine or stool or any bleeding that last longer than 20 min.     APRIL Garcia.

## 2018-06-07 ENCOUNTER — TELEPHONE (OUTPATIENT)
Dept: MEDICAL GROUP | Facility: PHYSICIAN GROUP | Age: 73
End: 2018-06-07

## 2018-06-18 ENCOUNTER — TELEPHONE (OUTPATIENT)
Dept: PULMONOLOGY | Facility: HOSPICE | Age: 73
End: 2018-06-18

## 2018-06-18 DIAGNOSIS — J44.9 CHRONIC OBSTRUCTIVE PULMONARY DISEASE, UNSPECIFIED COPD TYPE (HCC): ICD-10-CM

## 2018-06-18 RX ORDER — PREDNISONE 10 MG/1
10 TABLET ORAL
Qty: 30 TAB | Refills: 1 | Status: SHIPPED | OUTPATIENT
Start: 2018-06-18 | End: 2018-06-29

## 2018-06-18 NOTE — TELEPHONE ENCOUNTER
Patient was prescribed symbacort and prednisone. He said he felt better but after he ran out of the prednisone his breathing became worse. He would like to know if he could start taking prednisone again

## 2018-06-18 NOTE — TELEPHONE ENCOUNTER
Dr. Jacques discussed prednisone use every other day. Will sign RX to start Prednisone 10mg every other day only. Will review this at follow up OV.  Rx signed.

## 2018-06-26 ENCOUNTER — APPOINTMENT (OUTPATIENT)
Dept: VASCULAR LAB | Facility: MEDICAL CENTER | Age: 73
End: 2018-06-26
Payer: MEDICARE

## 2018-06-26 ENCOUNTER — ANTICOAGULATION MONITORING (OUTPATIENT)
Dept: VASCULAR LAB | Facility: MEDICAL CENTER | Age: 73
End: 2018-06-26
Payer: MEDICARE

## 2018-06-26 ENCOUNTER — NON-PROVIDER VISIT (OUTPATIENT)
Dept: MEDICAL GROUP | Facility: PHYSICIAN GROUP | Age: 73
End: 2018-06-26
Payer: MEDICARE

## 2018-06-26 VITALS
RESPIRATION RATE: 20 BRPM | DIASTOLIC BLOOD PRESSURE: 70 MMHG | OXYGEN SATURATION: 95 % | SYSTOLIC BLOOD PRESSURE: 114 MMHG | HEART RATE: 86 BPM

## 2018-06-26 DIAGNOSIS — Z79.01 LONG TERM CURRENT USE OF ANTICOAGULANT THERAPY: ICD-10-CM

## 2018-06-26 DIAGNOSIS — D68.9 COAGULATION DISORDER (HCC): ICD-10-CM

## 2018-06-26 DIAGNOSIS — I48.91 ATRIAL FIBRILLATION WITH RAPID VENTRICULAR RESPONSE (HCC): ICD-10-CM

## 2018-06-26 LAB
INR BLD: 3.1 (ref 0.9–1.2)
INR PPP: 3.1 (ref 2–3.5)
POC PROTIME: ABNORMAL

## 2018-06-26 PROCEDURE — 85610 PROTHROMBIN TIME: CPT | Performed by: PHYSICIAN ASSISTANT

## 2018-06-26 PROCEDURE — 99212 OFFICE O/P EST SF 10 MIN: CPT | Performed by: NURSE PRACTITIONER

## 2018-06-26 NOTE — PROGRESS NOTES
OP Anticoagulation Service Note    Date: 6/26/2018  Blood Pressure : 114/70  Pulse: 86  Respiration: 20    Anticoagulation Summary  As of 6/26/2018    INR goal:   2.0-3.0   TTR:   62.9 % (3 y)   Today's INR:   3.1!   Warfarin maintenance plan:   2.5 mg (5 mg x 0.5) on Tue; 5 mg (5 mg x 1) all other days   Weekly warfarin total:   32.5 mg   Plan last modified:   MARIAA Garcia (6/26/2018)   Next INR check:   7/3/2018   Target end date:   Indefinite    Indications    Atrial fibrillation with rapid ventricular response (HCC) [I48.91]  Long term current use of anticoagulant therapy [Z79.01]  Atrial fibrillation (HCC) (Resolved) [I48.91]             Anticoagulation Episode Summary     INR check location:       Preferred lab:       Send INR reminders to:       Comments:         Anticoagulation Care Providers     Provider Role Specialty Phone number    Sanjay Sorensen M.D. Referring Cardiology [phone]        Anticoagulation Patient Findings      THIS VISIT CONDUCTED WITH [employer] UTILIZING SECURE AND ENCRYPTED VIDEOCONFERENCING EQUIPMENT  ROS:    Pulm: Denies SOB, chest pain.    Card: Denies syncope, edema, palpitations.    Extremities: Denies redness, pain.    PE:    Pulm: No SOB, even and unlabored.    Card: Normal rate and rhythm.   Extremities: No redness, or edema.     INR  supra-therapeutic.    Denies signs/symptoms of bleeding and/or thrombosis.    Denies changes to diet or medications.   Follow up appt in 1 weeks     Plan:  Started on Prednisone-Take 2.5 mg tonight and every Tuesday/ 5 mg rest of the week test in 1 week.     Medication: Warfarin (Coumadin)     Sunday Monday Tuesday Wednesday Thursday Friday Saturday      5 mg    5 mg    2.5 mg    5 mg    5 mg    5 mg    5 mg      1 tab(s)    1 tab(s)    1/2 tab(s)    1 tab(s)    1 tab(s)    1 tab(s)  1 tab(s)     Next Appointment: Tuesday, July 3 @ 2:15     Review all of your home medications and newly ordered  medications with your doctor and / or pharmacist. Follow medication instructions as directed by your doctor and / or pharmacist. Please keep your medication list with you and share with your physician. Update the information when medications are discontinued, doses are changed, or new medications (including over-the-counter products) are added; and carry medication information at all times in the event of emergency situations.      For questions, please contact Outpatient Anticoagulation Service [phone].        The patient is on a high risk medication and is supra-therapeudic. This increases the patients risk of bleeding and therefore requires frequent monitoring and follow up.      The patient will go to the ER for falls with a head injury,  blood in urine or stool or any bleeding that last longer than 20 min.         APRIL Garcia.

## 2018-06-29 ENCOUNTER — TELEPHONE (OUTPATIENT)
Dept: PULMONOLOGY | Facility: HOSPICE | Age: 73
End: 2018-06-29

## 2018-06-29 ENCOUNTER — TELEPHONE (OUTPATIENT)
Dept: MEDICAL GROUP | Facility: PHYSICIAN GROUP | Age: 73
End: 2018-06-29

## 2018-06-29 ENCOUNTER — OFFICE VISIT (OUTPATIENT)
Dept: PULMONOLOGY | Facility: HOSPICE | Age: 73
End: 2018-06-29
Payer: MEDICARE

## 2018-06-29 ENCOUNTER — APPOINTMENT (OUTPATIENT)
Dept: RADIOLOGY | Facility: IMAGING CENTER | Age: 73
End: 2018-06-29
Attending: NURSE PRACTITIONER
Payer: MEDICARE

## 2018-06-29 VITALS
TEMPERATURE: 98.1 F | SYSTOLIC BLOOD PRESSURE: 142 MMHG | BODY MASS INDEX: 34.81 KG/M2 | RESPIRATION RATE: 16 BRPM | DIASTOLIC BLOOD PRESSURE: 89 MMHG | HEIGHT: 72 IN | OXYGEN SATURATION: 94 % | WEIGHT: 257 LBS | HEART RATE: 86 BPM

## 2018-06-29 DIAGNOSIS — I10 HYPERTENSION, UNSPECIFIED TYPE: Chronic | ICD-10-CM

## 2018-06-29 DIAGNOSIS — R06.02 SHORTNESS OF BREATH: ICD-10-CM

## 2018-06-29 DIAGNOSIS — R60.0 PEDAL EDEMA: ICD-10-CM

## 2018-06-29 DIAGNOSIS — J44.9 CHRONIC OBSTRUCTIVE PULMONARY DISEASE, UNSPECIFIED COPD TYPE (HCC): ICD-10-CM

## 2018-06-29 DIAGNOSIS — Z87.891 FORMER SMOKER: ICD-10-CM

## 2018-06-29 DIAGNOSIS — I48.91 ATRIAL FIBRILLATION WITH RAPID VENTRICULAR RESPONSE (HCC): Chronic | ICD-10-CM

## 2018-06-29 DIAGNOSIS — Z79.01 LONG TERM CURRENT USE OF ANTICOAGULANT THERAPY: Chronic | ICD-10-CM

## 2018-06-29 PROBLEM — J96.01 ACUTE RESPIRATORY FAILURE WITH HYPOXIA (HCC): Status: RESOLVED | Noted: 2018-03-22 | Resolved: 2018-06-29

## 2018-06-29 PROBLEM — J44.1 COPD WITH ACUTE EXACERBATION (HCC): Status: RESOLVED | Noted: 2018-03-22 | Resolved: 2018-06-29

## 2018-06-29 PROCEDURE — 99214 OFFICE O/P EST MOD 30 MIN: CPT | Performed by: NURSE PRACTITIONER

## 2018-06-29 PROCEDURE — 71046 X-RAY EXAM CHEST 2 VIEWS: CPT | Mod: 26 | Performed by: NURSE PRACTITIONER

## 2018-06-29 NOTE — PROGRESS NOTES
Chief Complaint   Patient presents with   • Follow-Up       HPI:  Yuri De León is a 73 y.o. year old male here today for follow-up on shortness of breath. Former smoker who quit in 1968. She is referred by primary care for refractory bronchospasm and multiple ER visits. Patient was last seen by Dr. Jacques 5/8/2018. He was on multiple inhalers at that time. They refined his regimen to Symbicort 160/4.5 µg 2 puffs twice a day with spacer and singulair qhs. He has remained on that since then. He also has nebulizer, Combivent and Ventolin HFA inhaler at home. He has not used these. He was also started on prednisone 10 mg every other day to control his bronchospasm. Patient's weight at last office visit was 241 pounds and today he is 257 pounds. He feels prednisone is contributing to this but is unsure if he is also fluid overloaded. He has a history of atrial fibrillation on Coumadin. He has not been seen by a cardiologist in the last 2 years. He is pending follow-up in October Dr. Gong. He does have pedal edema, L>R. He remains on Lasix 40 mg daily. He continues to be followed at the Coumadin clinic.    Most recent chest x-ray 3/23/2018 indicated no acute process.     Prior echocardiogram March 2018 indicated:  Mild concentric left ventricular hypertrophy.  Normal left ventricular systolic function.  Left ventricular ejection fraction is visually estimated to be 60%.  Diastolic function is difficult to assess with atrial fibrillation.  Severely dilated left atrium.  Estimated right ventricular systolic pressure is 37 mmHg + JVP.    Today he notes dyspnea with exertion. He notes minimal dyspnea with ADLs. He notes chronic a.m. cough with white phlegm. He notes chest tightness at times. He did have chest pain when he is out of his diltiazem for 1-2 days but once he restarted a medication at result. He denies GERD. He denies sinus congestion.    Chest x-ray today indicated no acute process and no pulmonary  edema noted.    ROS: As per HPI and otherwise negative if not stated.    Past Medical History:   Diagnosis Date   • Abnormal electrocardiogram 8/13/2010   • Arrhythmia     Atrial Fibrillation; Cardiologist, Dr. Sorensen   • Arthritis     knees, left hip   • ASTHMA    • Atrial fibrillation (HCC) 10/25/2011   • Bronchospasm 8/6/2009   • HTN (hypertension) 10/25/2011   • Hypercholesteremia 10/25/2011   • Long term (current) use of anticoagulants 10/25/2011   • NASAL POLYPS 8/6/2009   • Other nonspecific abnormal finding 8/6/2009   • Pain     left hip   • Shortness of breath    • Sleep apnea 8/6/2009   • Wears glasses        Past Surgical History:   Procedure Laterality Date   • HIP ARTHROPLASTY TOTAL  8/31/2010    Performed by OSGOOD, PATRICK J at [facility]   • LUMBAR DECOMPRESSION  1984   • HIP REPLACEMENT, TOTAL     • LAMINOTOMY     • SINUSCOPE     • SINUSOTOMIES  2003,2005,2/2007       Family History   Problem Relation Age of Onset   • Heart Disease Mother        Social History     Social History   • Marital status:      Spouse name: N/A   • Number of children: N/A   • Years of education: N/A     Occupational History   • Not on file.     Social History Main Topics   • Smoking status: Former Smoker     Packs/day: 0.50     Years: 10.00     Types: Cigarettes     Quit date: 12/10/1968   • Smokeless tobacco: Never Used   • Alcohol use No   • Drug use: No   • Sexual activity: Not on file     Other Topics Concern   • Not on file     Social History Narrative   • No narrative on file       Allergies as of 06/29/2018 - Reviewed 06/29/2018   Allergen Reaction Noted   • Atenolol Shortness of Breath 08/06/2009   • Tetanus toxoid Swelling 08/23/2010        @Vital signs for this encounter:  Vitals:    06/29/18 1311   Height: 1.829 m (6')   Weight: 116.6 kg (257 lb)   Weight % change since last entry.: 0 %   BP: 142/89   Pulse: 86   BMI (Calculated): 34.86   Resp: 16   Temp: 36.7 °C (98.1 °F)   O2 sat %  "room air: 94 %       Current medications as of today   Current Outpatient Prescriptions   Medication Sig Dispense Refill   • furosemide (LASIX) 40 MG Tab furosemide 40 mg tablet   Take 1 tablet every day by oral route as needed.     • insulin detemir (LEVEMIR FLEXTOUCH) 100 UNIT/ML Solution Pen-injector injection Levemir FlexTouch U-100 Insulin 100 unit/mL (3 mL) subcutaneous pen     • Potassium Chloride Crystals potassium chloride ER 10 mEq tablet,extended release   Take 1 tablet every day by oral route.     • Insulin Pen Needle (TRUEPLUS PEN NEEDLES) 29G X 12MM Misc TRUEplus Pen Needle 31 gauge x 1/4\"     • montelukast (SINGULAIR) 10 MG Tab montelukast 10 mg tablet     • warfarin (COUMADIN) 5 MG Tab warfarin 5 mg tablet     • DILTIAZem (CARDIZEM) 60 MG Tab Take 1 Tab by mouth 3 times a day. 90 Tab 0   • albuterol (PROVENTIL) 2.5mg/3ml Nebu Soln solution for nebulization 2.5 mg by Nebulization route every four hours as needed for Shortness of Breath.     • losartan (COZAAR) 25 MG Tab Take 25 mg by mouth every evening.     • warfarin (COUMADIN) 5 MG Tab TAKE ONE TABLET BY MOUTH ONE TIME DAILY OR AS DIRECTED BY COUMADIN CLINIC 90 Tab 1   • montelukast (SINGULAIR) 10 MG Tab Take 1 Tab by mouth every day. 30 Tab 11   • fluticasone (FLONASE) 50 MCG/ACT nasal spray Spray 2 Sprays in nose every day.     • Acetaminophen 325 MG Cap acetaminophen 300 mg-codeine 30 mg tablet     • Amoxicillin 500 MG Tab amoxicillin 500 mg capsule     • fluticasone (FLOVENT HFA) 110 MCG/ACT Aerosol Flovent  mcg/actuation aerosol inhaler     • TRUEPLUS PEN NEEDLES 31G X 6 MM Misc      • Insulin Pen Needle (NOVOFINE PLUS) 32G X 4 MM Misc Novofine 32 32 gauge x 1/4\" needle   use 1 needle daily     • losartan (COZAAR) 25 MG Tab losartan 25 mg tablet     • budesonide-formoterol (SYMBICORT) 160-4.5 MCG/ACT Aerosol Inhale 2 Puffs by mouth 2 Times a Day. Use spacer. Rinse mouth after each use. 1 Inhaler 5   • insulin detemir (LEVEMIR) 100 " UNIT/ML Solution Inject 12 Units as instructed every evening.     • albuterol (VENTOLIN OR PROVENTIL) 108 (90 BASE) MCG/ACT AERS Inhale 2 Puffs by mouth every 6 hours as needed.       No current facility-administered medications for this visit.          Physical Exam:   Gen:           Alert and oriented, No apparent distress. Mood and affect appropriate, normal interaction with examiner.  Eyes:          PERRL, EOM intact, sclere white, conjunctive moist.  Ears:          Not examined.   Hearing:     Grossly intact.  Nose:          Normal, no lesions or deformities.  Dentition:    Good dentition.  Oropharynx:   Tongue normal, posterior pharynx without erythema or exudate.  Mallampati Classification: 3  Neck:        Supple, trachea midline, no masses.  Respiratory Effort: No intercostal retractions or use of accessory muscles.   Lung Auscultation:      Clear to auscultation bilaterally but diminished in bases; no rales, rhonchi or wheezing.  CV:            Regular rate and rhythm. No murmurs, rubs or gallops.  Abd:           Not examined.   Lymphadenopathy: Not examined.  Gait and Station: Normal.  Digits and Nails: No clubbing, cyanosis, petechiae, or nodes.   Cranial Nerves: II-XII grossly intact.  Skin:        No rashes, lesions or ulcers noted.               Ext:           No cyanosis but bilateral pedal edema, L>R.      Assessment:  1. Chronic obstructive pulmonary disease, unspecified COPD type ([facility])  AMB PULMONARY FUNCTION TEST/LAB    DX-CHEST-2 VIEWS   2. Former smoker     3. Atrial fibrillation with rapid ventricular response (HCC)     4. Long term current use of anticoagulant therapy     5. Hypertension, unspecified type     6. BMI 34.0-34.9,adult  HEIGHT AND WEIGHT   7. Shortness of breath  DX-CHEST-2 VIEWS   8. Pedal edema  DX-CHEST-2 VIEWS       Immunizations:    Flu:not given  Pneumovax 23:2014  Prevnar 13:due    Plan:  1. Recommend Prevnar 13 at next OV.  2. Stop prednisone due to weight gain/improved  dyspnea.  3. Continue Symbicort 160/4.5mcg 2 puffs BID with spacer, rinse mouth after use. May use nebulizer or AURELIANO q4-6hrs prn dyspnea. Singular qhs.  4. Encouraged daily weights and reporting >3lb weight gain over night.  Start Lasix 60mg x 3 days, then reduce to 40mg daily. Will attempt to diurese patient due to pedal edema/fluid overload.  5. F/u with cardio as scheduled to re-establish care.  6. Continue 2L oxygen at night. Patient has not been tested for LAUREL, but may benefit from this in the future. Will discuss at next OV.  7. Follow up in 3 weeks with PFT, sooner if needed. If unable to follow up in this time frame, keep f/u appt with MD 8/14/18 with PFT as previously schedule. Call office if dyspnea worsens.

## 2018-06-29 NOTE — TELEPHONE ENCOUNTER
Please advise patient to increase water pill (lasix) to 60mg daily (1.5pills) for 3 days then go to regular 40mg daily (1 pill daily) dosing.   CXR is clear. Continue with updating PFT at next OV.  Stop prednisone.  Weigh self daily.  If breathing worsens, contact us ASAP. If symptoms remain stable and sooner OV with me does not open, continue to be seen 8/14/18 with MD with PFT.

## 2018-07-03 ENCOUNTER — APPOINTMENT (OUTPATIENT)
Dept: VASCULAR LAB | Facility: MEDICAL CENTER | Age: 73
End: 2018-07-03
Payer: MEDICARE

## 2018-07-03 ENCOUNTER — NON-PROVIDER VISIT (OUTPATIENT)
Dept: MEDICAL GROUP | Facility: PHYSICIAN GROUP | Age: 73
End: 2018-07-03
Payer: MEDICARE

## 2018-07-03 ENCOUNTER — ANTICOAGULATION MONITORING (OUTPATIENT)
Dept: VASCULAR LAB | Facility: MEDICAL CENTER | Age: 73
End: 2018-07-03
Payer: MEDICARE

## 2018-07-03 VITALS
SYSTOLIC BLOOD PRESSURE: 140 MMHG | DIASTOLIC BLOOD PRESSURE: 82 MMHG | RESPIRATION RATE: 20 BRPM | OXYGEN SATURATION: 92 % | HEART RATE: 100 BPM

## 2018-07-03 DIAGNOSIS — Z79.01 LONG TERM CURRENT USE OF ANTICOAGULANT THERAPY: ICD-10-CM

## 2018-07-03 DIAGNOSIS — I48.91 ATRIAL FIBRILLATION WITH RAPID VENTRICULAR RESPONSE (HCC): ICD-10-CM

## 2018-07-03 DIAGNOSIS — D68.9 COAGULATION DISORDER (HCC): ICD-10-CM

## 2018-07-03 LAB
INR BLD: 3.1 (ref 0.9–1.2)
INR PPP: 3.1 (ref 2–3.5)
POC PROTIME: ABNORMAL

## 2018-07-03 PROCEDURE — 99212 OFFICE O/P EST SF 10 MIN: CPT | Performed by: NURSE PRACTITIONER

## 2018-07-03 PROCEDURE — 85610 PROTHROMBIN TIME: CPT | Performed by: NURSE PRACTITIONER

## 2018-07-03 NOTE — PROGRESS NOTES
OP Anticoagulation Service Note    Date: 7/3/2018  Blood Pressure : 140/82  Pulse: 100  Respiration: 20    Anticoagulation Summary  As of 7/3/2018    INR goal:   2.0-3.0   TTR:   62.5 % (3 y)   Today's INR:   3.1!   Warfarin maintenance plan:   2.5 mg (5 mg x 0.5) on Tue; 5 mg (5 mg x 1) all other days   Weekly warfarin total:   32.5 mg   Plan last modified:   MARIAA Garcia (6/26/2018)   Next INR check:   7/17/2018   Target end date:   Indefinite    Indications    Atrial fibrillation with rapid ventricular response (HCC) [I48.91]  Long term current use of anticoagulant therapy [Z79.01]  Atrial fibrillation (HCC) (Resolved) [I48.91]             Anticoagulation Episode Summary     INR check location:       Preferred lab:       Send INR reminders to:       Comments:         Anticoagulation Care Providers     Provider Role Specialty Phone number    Sanjay Sorensen M.D. Referring Cardiology [phone]        Anticoagulation Patient Findings      THIS VISIT CONDUCTED WITH PRESENTER VIA TELEMEDICINE UTILIZING SECURE AND ENCRYPTED VIDEOCONFERENCING EQUIPMENT  ROS:    Pulm: Denies SOB, chest pain.    Card: Denies syncope, edema, palpitations.    Extremities: Denies redness, pain.    PE:    Pulm: No SOB, even and unlabored.    Card: Normal rate and rhythm.   Extremities: No redness, or edema.     INR  supra-therapeutic.    Denies signs/symptoms of bleeding and/or thrombosis.    Denies changes to diet or medications.   Follow up appt in 2 weeks. Off prednisone now     Plan:  Continue current medication regimen.       Medication: Warfarin (Coumadin)     Sunday Monday Tuesday Wednesday Thursday Friday Saturday      5 mg    5 mg    2.5 mg    5 mg    5 mg    5 mg    5 mg      1 tab(s)    1 tab(s)    1/2 tab(s)    1 tab(s)    1 tab(s)    1 tab(s)  1 tab(s)     Next Appointment: Tuesday, July 17 @ 1:30      Review all of your home medications and newly ordered medications with your doctor and / or  pharmacist. Follow medication instructions as directed by your doctor and / or pharmacist. Please keep your medication list with you and share with your physician. Update the information when medications are discontinued, doses are changed, or new medications (including over-the-counter products) are added; and carry medication information at all times in the event of emergency situations.      For questions, please contact Outpatient Anticoagulation Service [phone].        The patient is on a high risk medication and is supra-therapeudic. This increases the patients risk of bleeding and therefore requires frequent monitoring and follow up.     The patient will go to the ER for falls with a head injury,  blood in urine or stool or any bleeding that last longer than 20 min.        APRIL Garcia.

## 2018-07-05 ENCOUNTER — TELEPHONE (OUTPATIENT)
Dept: CARDIOLOGY | Facility: MEDICAL CENTER | Age: 73
End: 2018-07-05

## 2018-07-05 NOTE — TELEPHONE ENCOUNTER
RE: Fluid overload   Received: 2 days ago   Message Contents   RADHA Reeder R.N.             I spoke with the patient   He is having chest pain and leg edema.   Needs to be seen soon; currently scheduled for October    Previous Messages      ----- Message -----   From: AMALIA Guzmán   Sent: 6/29/2018   1:46 PM   To: Sanjay Sorensen M.D.   Subject: Fluid overload                                   Patient is pending appt with you, but he is fluid overloaded.2+ pitting edema in legs. On lasix 40mg daily. Hx A. Fib.     Dry weight should be 240, he is 257lb today.   Do you have recommendations for lasix dosing? Should he double dose for a period of time?     Thank you for your help,   Nicky Nogueira APRN         Contacted patient, discussed above note.  Patient states he's doing ok today.  FV made with APN next week on 7/12/18.  Advised patient to call back if symptoms worsen, Discussed s/s of chest pain, chest pressure, shortness of breath, difficulty breathing and when to initiate EMS.  Patient verbalizes understanding.

## 2018-07-12 ENCOUNTER — OFFICE VISIT (OUTPATIENT)
Dept: CARDIOLOGY | Facility: MEDICAL CENTER | Age: 73
End: 2018-07-12
Payer: MEDICARE

## 2018-07-12 VITALS
OXYGEN SATURATION: 95 % | BODY MASS INDEX: 33.59 KG/M2 | WEIGHT: 248 LBS | DIASTOLIC BLOOD PRESSURE: 74 MMHG | SYSTOLIC BLOOD PRESSURE: 130 MMHG | HEIGHT: 72 IN | HEART RATE: 80 BPM

## 2018-07-12 DIAGNOSIS — E78.5 HYPERLIPIDEMIA, UNSPECIFIED HYPERLIPIDEMIA TYPE: ICD-10-CM

## 2018-07-12 DIAGNOSIS — Z79.01 LONG TERM CURRENT USE OF ANTICOAGULANT THERAPY: Chronic | ICD-10-CM

## 2018-07-12 DIAGNOSIS — I48.91 ATRIAL FIBRILLATION WITH RAPID VENTRICULAR RESPONSE (HCC): Chronic | ICD-10-CM

## 2018-07-12 DIAGNOSIS — I10 ESSENTIAL HYPERTENSION: Chronic | ICD-10-CM

## 2018-07-12 DIAGNOSIS — R07.9 CHEST PAIN, UNSPECIFIED TYPE: ICD-10-CM

## 2018-07-12 PROCEDURE — 93000 ELECTROCARDIOGRAM COMPLETE: CPT | Performed by: INTERNAL MEDICINE

## 2018-07-12 PROCEDURE — 99214 OFFICE O/P EST MOD 30 MIN: CPT | Performed by: NURSE PRACTITIONER

## 2018-07-12 ASSESSMENT — ENCOUNTER SYMPTOMS
ORTHOPNEA: 1
PALPITATIONS: 0
SHORTNESS OF BREATH: 1
COUGH: 0
WHEEZING: 0
PND: 0
SPUTUM PRODUCTION: 0
NAUSEA: 0
DIZZINESS: 0
VOMITING: 0
HEMOPTYSIS: 0
CLAUDICATION: 0
TINGLING: 0
CHILLS: 0
FEVER: 0

## 2018-07-12 NOTE — PROGRESS NOTES
"Chief Complaint   Patient presents with   • Chest Pain       Subjective:   Yuri De León is a 73 y.o. male who presents today for follow-up of his atrial fibrillation.  Patient was last seen 2/26/2015 by Dr. Sorensen.  He has been doing quite well over the last 3 years, until the last 1-2 weeks.  He has noticed a significant increase in his lower extremity edema.  He was seen in pulmonary and told to double his furosemide.  There has been improvement to his lower extremity edema, however it has not resolved completely.    The patient is on diltiazem for his atrial fibrillation.  He ran out of his medication and had been off of it for 3 days.  On the third day he started experiencing substernal chest pain and felt that his throat \"felt weird.\"  EKG today shows atrial fibrillation with right bundle branch block.  It has been many years since he has had a stress test.  Recent echocardiogram was performed indicating normal LV function, EF 60%, severely dilated left atrium, and RVSP 37 mmHg with JVP.    Past Medical History:   Diagnosis Date   • Abnormal electrocardiogram 8/13/2010   • Arrhythmia     Atrial Fibrillation; Cardiologist, Dr. Sorensen   • Arthritis     knees, left hip   • ASTHMA    • Atrial fibrillation (HCC) 10/25/2011   • Bronchospasm 8/6/2009   • HTN (hypertension) 10/25/2011   • Hypercholesteremia 10/25/2011   • Long term (current) use of anticoagulants 10/25/2011   • NASAL POLYPS 8/6/2009   • Other nonspecific abnormal finding 8/6/2009   • Pain     left hip   • Shortness of breath    • Sleep apnea 8/6/2009   • Wears glasses      Past Surgical History:   Procedure Laterality Date   • HIP ARTHROPLASTY TOTAL  8/31/2010    Performed by OSGOOD, PATRICK J at [facility]   • LUMBAR DECOMPRESSION  1984   • HIP REPLACEMENT, TOTAL     • LAMINOTOMY     • SINUSCOPE     • SINUSOTOMIES  2003,2005,2/2007     Family History   Problem Relation Age of Onset   • Heart Disease Mother      Social " "History     Social History   • Marital status:      Spouse name: N/A   • Number of children: N/A   • Years of education: N/A     Occupational History   • Not on file.     Social History Main Topics   • Smoking status: Former Smoker     Packs/day: 0.50     Years: 10.00     Types: Cigarettes     Quit date: 12/10/1968   • Smokeless tobacco: Never Used   • Alcohol use No   • Drug use: No   • Sexual activity: Not on file     Other Topics Concern   • Not on file     Social History Narrative   • No narrative on file     Allergies   Allergen Reactions   • Atenolol Shortness of Breath     DYSPNEA.   • Tetanus Toxoid Swelling     Outpatient Encounter Prescriptions as of 7/12/2018   Medication Sig Dispense Refill   • ipratropium-albuterol (COMBIVENT RESPIMAT)  MCG/ACT Aero Soln Inhale 1 Puff by mouth 4 times a day.     • Non Formulary Request Humalog-PRN     • furosemide (LASIX) 40 MG Tab furosemide 40 mg tablet   Take 1 tablet every day by oral route as needed.     • insulin detemir (LEVEMIR FLEXTOUCH) 100 UNIT/ML Solution Pen-injector injection Levemir FlexTouch U-100 Insulin 100 unit/mL (3 mL) subcutaneous pen     • Insulin Pen Needle (NOVOFINE PLUS) 32G X 4 MM Misc Novofine 32 32 gauge x 1/4\" needle   use 1 needle daily     • Potassium Chloride Crystals potassium chloride ER 10 mEq tablet,extended release   Take 1 tablet every day by oral route.     • montelukast (SINGULAIR) 10 MG Tab montelukast 10 mg tablet     • warfarin (COUMADIN) 5 MG Tab warfarin 5 mg tablet     • budesonide-formoterol (SYMBICORT) 160-4.5 MCG/ACT Aerosol Inhale 2 Puffs by mouth 2 Times a Day. Use spacer. Rinse mouth after each use. 1 Inhaler 5   • DILTIAZem (CARDIZEM) 60 MG Tab Take 1 Tab by mouth 3 times a day. 90 Tab 0   • losartan (COZAAR) 25 MG Tab Take 25 mg by mouth every evening.     • insulin detemir (LEVEMIR) 100 UNIT/ML Solution Inject 12 Units as instructed every evening.     • montelukast (SINGULAIR) 10 MG Tab Take 1 Tab by " "mouth every day. 30 Tab 11   • fluticasone (FLONASE) 50 MCG/ACT nasal spray Spray 2 Sprays in nose every day.     • Acetaminophen 325 MG Cap acetaminophen 300 mg-codeine 30 mg tablet     • Amoxicillin 500 MG Tab amoxicillin 500 mg capsule     • fluticasone (FLOVENT HFA) 110 MCG/ACT Aerosol Flovent  mcg/actuation aerosol inhaler     • TRUEPLUS PEN NEEDLES 31G X 6 MM Misc      • Insulin Pen Needle (TRUEPLUS PEN NEEDLES) 29G X 12MM Misc TRUEplus Pen Needle 31 gauge x 1/4\"     • losartan (COZAAR) 25 MG Tab losartan 25 mg tablet     • albuterol (PROVENTIL) 2.5mg/3ml Nebu Soln solution for nebulization 2.5 mg by Nebulization route every four hours as needed for Shortness of Breath.     • warfarin (COUMADIN) 5 MG Tab TAKE ONE TABLET BY MOUTH ONE TIME DAILY OR AS DIRECTED BY COUMADIN CLINIC 90 Tab 1   • albuterol (VENTOLIN OR PROVENTIL) 108 (90 BASE) MCG/ACT AERS Inhale 2 Puffs by mouth every 6 hours as needed.       No facility-administered encounter medications on file as of 7/12/2018.      Review of Systems   Constitutional: Negative for chills and fever.   Respiratory: Positive for shortness of breath. Negative for cough, hemoptysis, sputum production and wheezing.    Cardiovascular: Positive for chest pain, orthopnea and leg swelling. Negative for palpitations, claudication and PND.   Gastrointestinal: Negative for nausea and vomiting.   Neurological: Negative for dizziness and tingling.      Objective:   /74   Pulse 80   Ht 1.829 m (6')   Wt 112.5 kg (248 lb)   SpO2 95%   BMI 33.63 kg/m²     Physical Exam   Constitutional: He is oriented to person, place, and time. He appears well-developed and well-nourished.   HENT:   Head: Normocephalic and atraumatic.   Eyes: EOM are normal.   Neck: Neck supple.   Cardiovascular: An irregularly irregular rhythm present.   Pulses:       Radial pulses are 2+ on the right side, and 2+ on the left side.   Pulmonary/Chest: Effort normal and breath sounds normal. "   Abdominal: Soft. Bowel sounds are normal.   Musculoskeletal: He exhibits edema.   1+ BLE   Neurological: He is alert and oriented to person, place, and time.   Skin: Skin is warm and dry.   Psychiatric: He has a normal mood and affect. His behavior is normal. Judgment and thought content normal.     Assessment:     1. Atrial fibrillation with rapid ventricular response (HCC)  BASIC METABOLIC PANEL    BASIC METABOLIC PANEL   2. Long term current use of anticoagulant therapy  BASIC METABOLIC PANEL    BASIC METABOLIC PANEL   3. Essential hypertension  BASIC METABOLIC PANEL    BASIC METABOLIC PANEL   4. Hyperlipidemia, unspecified hyperlipidemia type  BASIC METABOLIC PANEL    BASIC METABOLIC PANEL     Medical Decision Making:  Today's Assessment / Status / Plan:   1. Atrial fibrillation  -Continue warfarin, diltiazem    2.  Chest pain  -Related to discontinuation of diltiazem  -Obtain MPI to rule out CAD    3.  HTN  -Continue losartan 25 mg, furosemide 40 mg daily and KCl 10 mEq daily  -Check blood pressure and daily weights    -Discussed importance of taking medicine consistently without interruption.    Collaborating MD is Dr. CADY Walton.

## 2018-07-13 ENCOUNTER — HOSPITAL ENCOUNTER (OUTPATIENT)
Dept: LAB | Facility: MEDICAL CENTER | Age: 73
End: 2018-07-13
Attending: NURSE PRACTITIONER
Payer: MEDICARE

## 2018-07-13 DIAGNOSIS — I48.91 ATRIAL FIBRILLATION WITH RAPID VENTRICULAR RESPONSE (HCC): Chronic | ICD-10-CM

## 2018-07-13 DIAGNOSIS — E78.5 HYPERLIPIDEMIA, UNSPECIFIED HYPERLIPIDEMIA TYPE: ICD-10-CM

## 2018-07-13 DIAGNOSIS — Z79.01 LONG TERM CURRENT USE OF ANTICOAGULANT THERAPY: Chronic | ICD-10-CM

## 2018-07-13 DIAGNOSIS — I10 ESSENTIAL HYPERTENSION: Chronic | ICD-10-CM

## 2018-07-13 LAB — EKG IMPRESSION: NORMAL

## 2018-07-13 PROCEDURE — 36415 COLL VENOUS BLD VENIPUNCTURE: CPT

## 2018-07-13 PROCEDURE — 80048 BASIC METABOLIC PNL TOTAL CA: CPT

## 2018-07-14 LAB
ANION GAP SERPL CALC-SCNC: 6 MMOL/L (ref 0–11.9)
BUN SERPL-MCNC: 33 MG/DL (ref 8–22)
CALCIUM SERPL-MCNC: 9.1 MG/DL (ref 8.5–10.5)
CHLORIDE SERPL-SCNC: 104 MMOL/L (ref 96–112)
CO2 SERPL-SCNC: 30 MMOL/L (ref 20–33)
CREAT SERPL-MCNC: 1.54 MG/DL (ref 0.5–1.4)
GLUCOSE SERPL-MCNC: 159 MG/DL (ref 65–99)
POTASSIUM SERPL-SCNC: 4.4 MMOL/L (ref 3.6–5.5)
SODIUM SERPL-SCNC: 140 MMOL/L (ref 135–145)

## 2018-07-17 ENCOUNTER — NON-PROVIDER VISIT (OUTPATIENT)
Dept: MEDICAL GROUP | Facility: PHYSICIAN GROUP | Age: 73
End: 2018-07-17
Payer: MEDICARE

## 2018-07-17 ENCOUNTER — ANTICOAGULATION MONITORING (OUTPATIENT)
Dept: VASCULAR LAB | Facility: MEDICAL CENTER | Age: 73
End: 2018-07-17
Payer: MEDICARE

## 2018-07-17 ENCOUNTER — APPOINTMENT (OUTPATIENT)
Dept: VASCULAR LAB | Facility: MEDICAL CENTER | Age: 73
End: 2018-07-17
Payer: MEDICARE

## 2018-07-17 VITALS
SYSTOLIC BLOOD PRESSURE: 122 MMHG | DIASTOLIC BLOOD PRESSURE: 50 MMHG | RESPIRATION RATE: 14 BRPM | HEART RATE: 88 BPM | OXYGEN SATURATION: 93 %

## 2018-07-17 DIAGNOSIS — Z79.01 LONG TERM CURRENT USE OF ANTICOAGULANT THERAPY: ICD-10-CM

## 2018-07-17 DIAGNOSIS — Z79.01 CHRONIC ANTICOAGULATION: ICD-10-CM

## 2018-07-17 DIAGNOSIS — I48.91 ATRIAL FIBRILLATION WITH RAPID VENTRICULAR RESPONSE (HCC): ICD-10-CM

## 2018-07-17 LAB
INR BLD: 3.1 (ref 0.9–1.2)
INR PPP: 3.1 (ref 2–3.5)
POC PROTIME: ABNORMAL

## 2018-07-17 PROCEDURE — 99212 OFFICE O/P EST SF 10 MIN: CPT | Performed by: NURSE PRACTITIONER

## 2018-07-17 PROCEDURE — 85610 PROTHROMBIN TIME: CPT | Performed by: FAMILY MEDICINE

## 2018-07-17 NOTE — PROGRESS NOTES
OP Anticoagulation Service Note    Date: 7/17/2018  Blood Pressure : 122/50  Pulse: 88  Respiration: 14    Anticoagulation Summary  As of 7/17/2018    INR goal:   2.0-3.0   TTR:   61.7 % (3 y)   Today's INR:   3.1!   Warfarin maintenance plan:   2.5 mg (5 mg x 0.5) on Tue, Fri; 5 mg (5 mg x 1) all other days   Weekly warfarin total:   30 mg   Plan last modified:   MARIAA Garcia (7/17/2018)   Next INR check:   7/31/2018   Target end date:   Indefinite    Indications    Atrial fibrillation with rapid ventricular response (HCC) [I48.91]  Long term current use of anticoagulant therapy [Z79.01]  Atrial fibrillation (HCC) (Resolved) [I48.91]             Anticoagulation Episode Summary     INR check location:       Preferred lab:       Send INR reminders to:       Comments:         Anticoagulation Care Providers     Provider Role Specialty Phone number    Sanjay Sorensen M.D. Referring Cardiology [phone]        Anticoagulation Patient Findings      THIS VISIT CONDUCTED WITH PRESENTER VIA TELEMEDICINE UTILIZING SECURE AND ENCRYPTED VIDEOCONFERENCING EQUIPMENT  ROS:    Pulm: Denies SOB, chest pain.    Card: Denies syncope, edema, palpitations.    Extremities: Denies redness, pain.    PE:    Pulm: No SOB, even and unlabored.    Card: Normal rate and rhythm.   Extremities: No redness, or edema.     INR  supra-therapeutic.    Denies signs/symptoms of bleeding and/or thrombosis.    Denies changes to diet or medications.   Follow up appt in 2 weeks     Plan:  Decrease dose see below    Medication: Warfarin (Coumadin)     Sunday Monday Tuesday Wednesday Thursday Friday Saturday      5 mg    5 mg    2.5 mg    5 mg    5 mg    2.5 mg    5 mg      1 tab(s)    1 tab(s)    1/2 tab(s)    1 tab(s)    1 tab(s)    1/2 tab(s)  1 tab(s)     Next Appointment: Tuesday, July 31 @ 1:30      The patient is on a high risk medication and is supra-therapeudic. This increases the patients risk of bleeding and therefore  requires frequent monitoring and follow up.    The patient will go to the ER for falls with a head injury,  blood in urine or stool or any bleeding that last longer than 20 min.         Review all of your home medications and newly ordered medications with your doctor and / or pharmacist. Follow medication instructions as directed by your doctor and / or pharmacist. Please keep your medication list with you and share with your physician. Update the information when medications are discontinued, doses are changed, or new medications (including over-the-counter products) are added; and carry medication information at all times in the event of emergency situations.      For questions, please contact Outpatient Anticoagulation Service [phone].     APRIL Garcia.

## 2018-07-19 ENCOUNTER — HOSPITAL ENCOUNTER (OUTPATIENT)
Dept: RADIOLOGY | Facility: MEDICAL CENTER | Age: 73
End: 2018-07-19
Attending: NURSE PRACTITIONER
Payer: MEDICARE

## 2018-07-19 DIAGNOSIS — I10 ESSENTIAL HYPERTENSION: Chronic | ICD-10-CM

## 2018-07-19 DIAGNOSIS — E78.5 HYPERLIPIDEMIA, UNSPECIFIED HYPERLIPIDEMIA TYPE: ICD-10-CM

## 2018-07-19 DIAGNOSIS — I48.91 ATRIAL FIBRILLATION WITH RAPID VENTRICULAR RESPONSE (HCC): Chronic | ICD-10-CM

## 2018-07-19 PROCEDURE — A9502 TC99M TETROFOSMIN: HCPCS

## 2018-07-19 PROCEDURE — 700111 HCHG RX REV CODE 636 W/ 250 OVERRIDE (IP)

## 2018-07-19 RX ORDER — REGADENOSON 0.08 MG/ML
INJECTION, SOLUTION INTRAVENOUS
Status: COMPLETED
Start: 2018-07-19 | End: 2018-07-19

## 2018-07-19 RX ADMIN — REGADENOSON 0.4 MG: 0.08 INJECTION, SOLUTION INTRAVENOUS at 11:37

## 2018-07-23 ENCOUNTER — TELEPHONE (OUTPATIENT)
Dept: MEDICAL GROUP | Facility: PHYSICIAN GROUP | Age: 73
End: 2018-07-23

## 2018-07-25 ENCOUNTER — DOCUMENTATION (OUTPATIENT)
Dept: CARDIOLOGY | Facility: MEDICAL CENTER | Age: 73
End: 2018-07-25

## 2018-07-25 NOTE — PROGRESS NOTES
Reviewed MPI with Dr Dobson and recommends angiogram for pt. He will start ASA 81 mg and proceed with testing.

## 2018-07-31 DIAGNOSIS — J44.9 CHRONIC OBSTRUCTIVE PULMONARY DISEASE, UNSPECIFIED COPD TYPE (HCC): ICD-10-CM

## 2018-07-31 RX ORDER — BUDESONIDE AND FORMOTEROL FUMARATE DIHYDRATE 160; 4.5 UG/1; UG/1
2 AEROSOL RESPIRATORY (INHALATION) 2 TIMES DAILY
Qty: 3 INHALER | Refills: 3 | Status: SHIPPED | OUTPATIENT
Start: 2018-07-31 | End: 2021-02-16

## 2018-07-31 NOTE — TELEPHONE ENCOUNTER
Have we ever prescribed this med? Yes.  If yes, what date? 5/8/18    Last OV: 6/29/18- Nicky Bach    Next OV: 8/14/18    DX: COPD    Medications: Symbicort

## 2018-08-03 ENCOUNTER — TELEPHONE (OUTPATIENT)
Dept: CARDIOLOGY | Facility: MEDICAL CENTER | Age: 73
End: 2018-08-03

## 2018-08-03 NOTE — TELEPHONE ENCOUNTER
NM-CARDIAC STRESS TEST   Order: [id number]   Status:  Final result   Visible to patient:  Yes (Sherryt) Dx:  Atrial fibrillation with rapid ventri...   Notes recorded by LYNN DudleyPLONNIE on 7/25/2018 at 3:32 PM PDT  Can you please call pt and explain his test is a little abnormal, so would recommend angiogram to see if he has any blocked arteries due to him having CP. Per Dr Dobson. He needs to start ASA 81 mg. Please set up for angiogram.  APRIL Dudley.     Attempted to contact patient at home #, no answer. LVM to call back.     Contacted patient on cell #.  Discussed stress test results and the need to investigate more with an angiogram.  Patient agreeable.  Discussed taking ASA.  Patient verbalizes understanding.     Tristin Peguero to scheduled cath

## 2018-08-08 ENCOUNTER — TELEPHONE (OUTPATIENT)
Dept: CARDIOLOGY | Facility: MEDICAL CENTER | Age: 73
End: 2018-08-08

## 2018-08-08 NOTE — TELEPHONE ENCOUNTER
Patient is scheduled on 8-15-18 for a [facility] w/poss with Dr. Sorensen. Patient was told to hold coumadin for 5 days prior and to notify coumadin clinic. Patient was also told to hold lasix am day of procedure and to cut insulin in half night before and hold am day of procedure. Patient requested to check in at 9:00 am for an 11:00 procedure. Updated H&P to be done on admit. Pre admit to call patient to do a tele pre admit due to patient living out of area.

## 2018-08-14 ENCOUNTER — OFFICE VISIT (OUTPATIENT)
Dept: PULMONOLOGY | Facility: HOSPICE | Age: 73
End: 2018-08-14
Payer: MEDICARE

## 2018-08-14 ENCOUNTER — HOSPITAL ENCOUNTER (OUTPATIENT)
Dept: RADIOLOGY | Facility: MEDICAL CENTER | Age: 73
End: 2018-08-14
Attending: INTERNAL MEDICINE | Admitting: INTERNAL MEDICINE
Payer: MEDICARE

## 2018-08-14 VITALS
BODY MASS INDEX: 33.86 KG/M2 | HEART RATE: 96 BPM | OXYGEN SATURATION: 92 % | RESPIRATION RATE: 16 BRPM | WEIGHT: 250 LBS | HEIGHT: 72 IN | SYSTOLIC BLOOD PRESSURE: 130 MMHG | TEMPERATURE: 98.2 F | DIASTOLIC BLOOD PRESSURE: 80 MMHG

## 2018-08-14 DIAGNOSIS — J45.30 MILD PERSISTENT ASTHMA WITHOUT COMPLICATION: ICD-10-CM

## 2018-08-14 DIAGNOSIS — Z01.811 PRE-OPERATIVE RESPIRATORY EXAMINATION: ICD-10-CM

## 2018-08-14 DIAGNOSIS — Z79.01 LONG TERM CURRENT USE OF ANTICOAGULANT THERAPY: Chronic | ICD-10-CM

## 2018-08-14 DIAGNOSIS — Z01.812 PRE-OPERATIVE LABORATORY EXAMINATION: ICD-10-CM

## 2018-08-14 DIAGNOSIS — I48.91 ATRIAL FIBRILLATION WITH RAPID VENTRICULAR RESPONSE (HCC): Chronic | ICD-10-CM

## 2018-08-14 DIAGNOSIS — Z01.810 PRE-OPERATIVE CARDIOVASCULAR EXAMINATION: ICD-10-CM

## 2018-08-14 LAB
ALBUMIN SERPL BCP-MCNC: 4.3 G/DL (ref 3.2–4.9)
ALBUMIN/GLOB SERPL: 1.4 G/DL
ALP SERPL-CCNC: 98 U/L (ref 30–99)
ALT SERPL-CCNC: 20 U/L (ref 2–50)
ANION GAP SERPL CALC-SCNC: 8 MMOL/L (ref 0–11.9)
APTT PPP: 26.9 SEC (ref 24.7–36)
AST SERPL-CCNC: 20 U/L (ref 12–45)
BILIRUB SERPL-MCNC: 1.1 MG/DL (ref 0.1–1.5)
BUN SERPL-MCNC: 30 MG/DL (ref 8–22)
CALCIUM SERPL-MCNC: 9.8 MG/DL (ref 8.5–10.5)
CHLORIDE SERPL-SCNC: 103 MMOL/L (ref 96–112)
CO2 SERPL-SCNC: 30 MMOL/L (ref 20–33)
CREAT SERPL-MCNC: 1.34 MG/DL (ref 0.5–1.4)
EKG IMPRESSION: NORMAL
ERYTHROCYTE [DISTWIDTH] IN BLOOD BY AUTOMATED COUNT: 44.3 FL (ref 35.9–50)
GLOBULIN SER CALC-MCNC: 3.1 G/DL (ref 1.9–3.5)
GLUCOSE SERPL-MCNC: 121 MG/DL (ref 65–99)
HCT VFR BLD AUTO: 49.6 % (ref 42–52)
HGB BLD-MCNC: 16.1 G/DL (ref 14–18)
INR PPP: 1.21 (ref 0.87–1.13)
MCH RBC QN AUTO: 29 PG (ref 27–33)
MCHC RBC AUTO-ENTMCNC: 32.5 G/DL (ref 33.7–35.3)
MCV RBC AUTO: 89.2 FL (ref 81.4–97.8)
PLATELET # BLD AUTO: 253 K/UL (ref 164–446)
PMV BLD AUTO: 10.3 FL (ref 9–12.9)
POTASSIUM SERPL-SCNC: 4.1 MMOL/L (ref 3.6–5.5)
PROT SERPL-MCNC: 7.4 G/DL (ref 6–8.2)
PROTHROMBIN TIME: 15 SEC (ref 12–14.6)
RBC # BLD AUTO: 5.56 M/UL (ref 4.7–6.1)
SODIUM SERPL-SCNC: 141 MMOL/L (ref 135–145)
WBC # BLD AUTO: 10.1 K/UL (ref 4.8–10.8)

## 2018-08-14 PROCEDURE — 93010 ELECTROCARDIOGRAM REPORT: CPT | Performed by: INTERNAL MEDICINE

## 2018-08-14 PROCEDURE — 85610 PROTHROMBIN TIME: CPT

## 2018-08-14 PROCEDURE — 99214 OFFICE O/P EST MOD 30 MIN: CPT | Performed by: INTERNAL MEDICINE

## 2018-08-14 PROCEDURE — 85730 THROMBOPLASTIN TIME PARTIAL: CPT

## 2018-08-14 PROCEDURE — 85027 COMPLETE CBC AUTOMATED: CPT

## 2018-08-14 PROCEDURE — 36415 COLL VENOUS BLD VENIPUNCTURE: CPT

## 2018-08-14 PROCEDURE — 93005 ELECTROCARDIOGRAM TRACING: CPT

## 2018-08-14 PROCEDURE — 80053 COMPREHEN METABOLIC PANEL: CPT

## 2018-08-14 PROCEDURE — 71046 X-RAY EXAM CHEST 2 VIEWS: CPT

## 2018-08-14 NOTE — PROGRESS NOTES
Yuri De León is a 73 y.o. male here for asthma on maintenance therapy. Patient was referred by his primary care.    History of Present Illness:      This gentleman comes in from [place], has stable asthma, is off prednisone.  He uses Symbicort twice daily, Ventolin occasionally, not using Combivent.    When he lies down at night he does have shortness of breath, I explained that with his asthma and abdominal enlargement, being supine reduces his lung function and he does utilize oxygen.    An x-ray done in March was clear.  He continues to work in [place], his wife is present, he sells farm equipment, is actually fairly vigorous, pumping up tires and charging batteries.    He does need Prevnar, but tomorrow is having an angiogram for an abnormal stress test, and we will defer administration of Prevnar either to his next visit, or Dr. Corbin could administer that with his flu shot in October.    He is not smoking, vaccinations are otherwise reviewed, body mass index at 34 is addressed with exercise and portion control.  Recheck in 6 months, sooner for problems  Constitutional ROS: No unexpected change in weight, No unexplained fevers  Eyes: No change in vision or blurring or double vision  Mouth/Throat ROS: No sore throat, No recent change in voice or hoarseness  Pulmonary ROS: See present history for pertinent positives  Cardiovascular ROS: No chest pain to suggest acute coronary syndrome  Gastrointestinal ROS: No abdominal pain to suggest peptic disease  Musculoskeletal/Extremities ROS: no acute artritis or unusual swelling  Hematologic/Lymphatic ROS: No easy bleeding or unusual lymph node swelling  Neurologic ROS: No new or unusual weakness  Psychiatric ROS: No hallucinations  Allergic/Immunologic: No  urticaria or allergic rash      Current Outpatient Prescriptions   Medication Sig Dispense Refill   • budesonide-formoterol (SYMBICORT) 160-4.5 MCG/ACT Aerosol Inhale 2 Puffs by mouth 2 Times a Day. Use spacer.  "Rinse mouth after each use. 3 Inhaler 3   • furosemide (LASIX) 40 MG Tab furosemide 40 mg tablet   Take 1 tablet every day by oral route as needed.     • insulin detemir (LEVEMIR FLEXTOUCH) 100 UNIT/ML Solution Pen-injector injection Levemir FlexTouch U-100 Insulin 100 unit/mL (3 mL) subcutaneous pen     • Potassium Chloride Crystals potassium chloride ER 10 mEq tablet,extended release   Take 1 tablet every day by oral route.     • montelukast (SINGULAIR) 10 MG Tab montelukast 10 mg tablet     • warfarin (COUMADIN) 5 MG Tab warfarin 5 mg tablet     • DILTIAZem (CARDIZEM) 60 MG Tab Take 1 Tab by mouth 3 times a day. 90 Tab 0   • losartan (COZAAR) 25 MG Tab Take 25 mg by mouth every evening.     • fluticasone (FLONASE) 50 MCG/ACT nasal spray Spray 2 Sprays in nose every day.     • ipratropium-albuterol (COMBIVENT RESPIMAT)  MCG/ACT Aero Soln Inhale 1 Puff by mouth 4 times a day.     • Non Formulary Request Humalog-PRN     • Acetaminophen 325 MG Cap acetaminophen 300 mg-codeine 30 mg tablet     • Amoxicillin 500 MG Tab amoxicillin 500 mg capsule     • fluticasone (FLOVENT HFA) 110 MCG/ACT Aerosol Flovent  mcg/actuation aerosol inhaler     • TRUEPLUS PEN NEEDLES 31G X 6 MM Misc      • Insulin Pen Needle (NOVOFINE PLUS) 32G X 4 MM Misc Novofine 32 32 gauge x 1/4\" needle   use 1 needle daily     • Insulin Pen Needle (TRUEPLUS PEN NEEDLES) 29G X 12MM Misc TRUEplus Pen Needle 31 gauge x 1/4\"     • losartan (COZAAR) 25 MG Tab losartan 25 mg tablet     • albuterol (PROVENTIL) 2.5mg/3ml Nebu Soln solution for nebulization 2.5 mg by Nebulization route every four hours as needed for Shortness of Breath.     • insulin detemir (LEVEMIR) 100 UNIT/ML Solution Inject 12 Units as instructed every evening.     • warfarin (COUMADIN) 5 MG Tab TAKE ONE TABLET BY MOUTH ONE TIME DAILY OR AS DIRECTED BY COUMADIN CLINIC 90 Tab 1   • montelukast (SINGULAIR) 10 MG Tab Take 1 Tab by mouth every day. 30 Tab 11   • albuterol (VENTOLIN " OR PROVENTIL) 108 (90 BASE) MCG/ACT AERS Inhale 2 Puffs by mouth every 6 hours as needed.       No current facility-administered medications for this visit.        Social History   Substance Use Topics   • Smoking status: Former Smoker     Packs/day: 0.50     Years: 10.00     Types: Cigarettes     Quit date: 12/10/1968   • Smokeless tobacco: Never Used   • Alcohol use No        Past Medical History:   Diagnosis Date   • Abnormal electrocardiogram 8/13/2010   • Arrhythmia     Atrial Fibrillation; Cardiologist, Dr. Sorensen   • Arthritis     knees, left hip   • ASTHMA    • Atrial fibrillation (HCC) 10/25/2011   • Bronchospasm 8/6/2009   • HTN (hypertension) 10/25/2011   • Hypercholesteremia 10/25/2011   • Long term (current) use of anticoagulants 10/25/2011   • NASAL POLYPS 8/6/2009   • Other nonspecific abnormal finding 8/6/2009   • Pain     left hip   • Shortness of breath    • Sleep apnea 8/6/2009   • Wears glasses        Past Surgical History:   Procedure Laterality Date   • HIP ARTHROPLASTY TOTAL  8/31/2010    Performed by OSGOOD, PATRICK J at SURGERY [facility] ORS   • LUMBAR DECOMPRESSION  1984   • HIP REPLACEMENT, TOTAL     • LAMINOTOMY     • SINUSCOPE     • SINUSOTOMIES  2003,2005,2/2007       Allergies: Atenolol and Tetanus toxoid    Family History   Problem Relation Age of Onset   • Heart Disease Mother        Physical Examination    Vitals:    08/14/18 1340   Height: 1.829 m (6')   Weight: 113.4 kg (250 lb)   Weight % change since last entry.: 0 %   BP: 130/80   Pulse: 96   BMI (Calculated): 33.91   Resp: 16   Temp: 36.8 °C (98.2 °F)       General Appearance: alert, no distress  Skin: Skin color, texture, turgor normal. No rashes or lesions.  Eyes: negative  Oropharynx: Lips, mucosa, and tongue normal. Teeth and gums normal. Oropharynx moist and without lesion  Lungs: positive findings: Quiet and clear  Heart: negative. RRR without murmur, gallop, or rubs.  No ectopy.  Abdomen: Abdomen soft,  non-tender. . No masses,  No organomegaly  Extremities:  No deformities, edema, or skin discoloration  Joints: No acute arthritis  Peripheral Pulses:perfused  Neurologic: intact grossly  No clubbing or cyanosis    II (soft palate, uvula, fauces visible)    Imaging: None presently    PFTS: None presently      Assessment and Plan  1. Mild persistent asthma without complication    2. Atrial fibrillation with rapid ventricular response (HCC)    3. Long term current use of anticoagulant therapy    4. BMI 33.0-33.9,adult    - OBESITY COUNSELING (No Charge): Patient identified as having weight management issue.  Appropriate orders and counseling given.      Followup Return in about 6 months (around 2/14/2019) for follow up visit with Dr. Carlene Jacques.

## 2018-08-14 NOTE — PATIENT INSTRUCTIONS
This gentleman comes in from [place], has stable asthma, is off prednisone.  He uses Symbicort twice daily, Ventolin occasionally, not using Combivent.    When he lies down at night he does have shortness of breath, I explained that with his asthma and abdominal enlargement, being supine reduces his lung function and he does utilize oxygen.    An x-ray done in March was clear.  He continues to work in [place], his wife is present, he sells farm equipment, is actually fairly vigorous, pumping up tires and charging batteries.    He does need Prevnar, but tomorrow is having an angiogram for an abnormal stress test, and we will defer administration of Prevnar either to his next visit, or Dr. Corbin could administer that with his flu shot in October.    He is not smoking, vaccinations are otherwise reviewed, body mass index at 34 is addressed with exercise and portion control.  Recheck in 6 months, sooner for problems

## 2018-08-15 ENCOUNTER — HOSPITAL ENCOUNTER (OUTPATIENT)
Facility: MEDICAL CENTER | Age: 73
End: 2018-08-16
Attending: INTERNAL MEDICINE | Admitting: INTERNAL MEDICINE
Payer: MEDICARE

## 2018-08-15 LAB
EKG IMPRESSION: NORMAL
GLUCOSE BLD-MCNC: 174 MG/DL (ref 65–99)
GLUCOSE BLD-MCNC: 203 MG/DL (ref 65–99)
GLUCOSE BLD-MCNC: 222 MG/DL (ref 65–99)

## 2018-08-15 PROCEDURE — 99152 MOD SED SAME PHYS/QHP 5/>YRS: CPT

## 2018-08-15 PROCEDURE — C1769 GUIDE WIRE: HCPCS

## 2018-08-15 PROCEDURE — C9600 PERC DRUG-EL COR STENT SING: HCPCS | Mod: RC

## 2018-08-15 PROCEDURE — 700117 HCHG RX CONTRAST REV CODE 255: Performed by: INTERNAL MEDICINE

## 2018-08-15 PROCEDURE — C1887 CATHETER, GUIDING: HCPCS

## 2018-08-15 PROCEDURE — 700102 HCHG RX REV CODE 250 W/ 637 OVERRIDE(OP)

## 2018-08-15 PROCEDURE — 700111 HCHG RX REV CODE 636 W/ 250 OVERRIDE (IP): Performed by: INTERNAL MEDICINE

## 2018-08-15 PROCEDURE — A9270 NON-COVERED ITEM OR SERVICE: HCPCS

## 2018-08-15 PROCEDURE — 700101 HCHG RX REV CODE 250

## 2018-08-15 PROCEDURE — C1876 STENT, NON-COA/NON-COV W/DEL: HCPCS

## 2018-08-15 PROCEDURE — C1894 INTRO/SHEATH, NON-LASER: HCPCS

## 2018-08-15 PROCEDURE — 700102 HCHG RX REV CODE 250 W/ 637 OVERRIDE(OP): Performed by: INTERNAL MEDICINE

## 2018-08-15 PROCEDURE — 93010 ELECTROCARDIOGRAM REPORT: CPT | Performed by: INTERNAL MEDICINE

## 2018-08-15 PROCEDURE — 160002 HCHG RECOVERY MINUTES (STAT)

## 2018-08-15 PROCEDURE — 304952 HCHG R 2 PADS

## 2018-08-15 PROCEDURE — 82962 GLUCOSE BLOOD TEST: CPT

## 2018-08-15 PROCEDURE — 360979 HCHG DIAGNOSTIC CATH

## 2018-08-15 PROCEDURE — C1874 STENT, COATED/COV W/DEL SYS: HCPCS

## 2018-08-15 PROCEDURE — 307093 HCHG TR BAND RADIAL

## 2018-08-15 PROCEDURE — C1725 CATH, TRANSLUMIN NON-LASER: HCPCS

## 2018-08-15 PROCEDURE — 700102 HCHG RX REV CODE 250 W/ 637 OVERRIDE(OP): Performed by: NURSE PRACTITIONER

## 2018-08-15 PROCEDURE — 700111 HCHG RX REV CODE 636 W/ 250 OVERRIDE (IP)

## 2018-08-15 PROCEDURE — 96372 THER/PROPH/DIAG INJ SC/IM: CPT

## 2018-08-15 PROCEDURE — 99153 MOD SED SAME PHYS/QHP EA: CPT

## 2018-08-15 PROCEDURE — G0378 HOSPITAL OBSERVATION PER HR: HCPCS

## 2018-08-15 PROCEDURE — A9270 NON-COVERED ITEM OR SERVICE: HCPCS | Performed by: INTERNAL MEDICINE

## 2018-08-15 PROCEDURE — A9270 NON-COVERED ITEM OR SERVICE: HCPCS | Performed by: NURSE PRACTITIONER

## 2018-08-15 PROCEDURE — 700105 HCHG RX REV CODE 258: Performed by: INTERNAL MEDICINE

## 2018-08-15 PROCEDURE — 93458 L HRT ARTERY/VENTRICLE ANGIO: CPT | Mod: XU

## 2018-08-15 PROCEDURE — 93005 ELECTROCARDIOGRAM TRACING: CPT | Performed by: INTERNAL MEDICINE

## 2018-08-15 RX ORDER — VERAPAMIL HYDROCHLORIDE 2.5 MG/ML
INJECTION, SOLUTION INTRAVENOUS
Status: DISPENSED
Start: 2018-08-15 | End: 2018-08-16

## 2018-08-15 RX ORDER — POLYETHYLENE GLYCOL 3350 17 G/17G
1 POWDER, FOR SOLUTION ORAL
Status: DISCONTINUED | OUTPATIENT
Start: 2018-08-15 | End: 2018-08-16 | Stop reason: HOSPADM

## 2018-08-15 RX ORDER — METOPROLOL SUCCINATE 25 MG/1
25 TABLET, EXTENDED RELEASE ORAL
Status: DISCONTINUED | OUTPATIENT
Start: 2018-08-15 | End: 2018-08-16 | Stop reason: HOSPADM

## 2018-08-15 RX ORDER — LIDOCAINE HYDROCHLORIDE 20 MG/ML
INJECTION, SOLUTION INFILTRATION; PERINEURAL
Status: DISPENSED
Start: 2018-08-15 | End: 2018-08-16

## 2018-08-15 RX ORDER — BIVALIRUDIN 250 MG/5ML
INJECTION, POWDER, LYOPHILIZED, FOR SOLUTION INTRAVENOUS
Status: COMPLETED
Start: 2018-08-15 | End: 2018-08-15

## 2018-08-15 RX ORDER — DEXTROSE MONOHYDRATE 25 G/50ML
25 INJECTION, SOLUTION INTRAVENOUS
Status: DISCONTINUED | OUTPATIENT
Start: 2018-08-15 | End: 2018-08-16 | Stop reason: HOSPADM

## 2018-08-15 RX ORDER — AMOXICILLIN 250 MG
2 CAPSULE ORAL 2 TIMES DAILY
Status: DISCONTINUED | OUTPATIENT
Start: 2018-08-15 | End: 2018-08-16 | Stop reason: HOSPADM

## 2018-08-15 RX ORDER — HEPARIN SODIUM,PORCINE 1000/ML
VIAL (ML) INJECTION
Status: COMPLETED
Start: 2018-08-15 | End: 2018-08-15

## 2018-08-15 RX ORDER — ALBUTEROL SULFATE 90 UG/1
2 AEROSOL, METERED RESPIRATORY (INHALATION) EVERY 6 HOURS PRN
Status: DISCONTINUED | OUTPATIENT
Start: 2018-08-15 | End: 2018-08-16 | Stop reason: HOSPADM

## 2018-08-15 RX ORDER — PRASUGREL 10 MG/1
TABLET, FILM COATED ORAL
Status: COMPLETED
Start: 2018-08-15 | End: 2018-08-15

## 2018-08-15 RX ORDER — LABETALOL HYDROCHLORIDE 5 MG/ML
INJECTION, SOLUTION INTRAVENOUS
Status: COMPLETED
Start: 2018-08-15 | End: 2018-08-15

## 2018-08-15 RX ORDER — VERAPAMIL HYDROCHLORIDE 2.5 MG/ML
INJECTION, SOLUTION INTRAVENOUS
Status: COMPLETED
Start: 2018-08-15 | End: 2018-08-15

## 2018-08-15 RX ORDER — SODIUM CHLORIDE 9 MG/ML
INJECTION, SOLUTION INTRAVENOUS CONTINUOUS
Status: DISCONTINUED | OUTPATIENT
Start: 2018-08-15 | End: 2018-08-16 | Stop reason: HOSPADM

## 2018-08-15 RX ORDER — ONDANSETRON 2 MG/ML
4 INJECTION INTRAMUSCULAR; INTRAVENOUS EVERY 4 HOURS PRN
Status: DISCONTINUED | OUTPATIENT
Start: 2018-08-15 | End: 2018-08-16 | Stop reason: HOSPADM

## 2018-08-15 RX ORDER — FLUTICASONE PROPIONATE 50 MCG
2 SPRAY, SUSPENSION (ML) NASAL DAILY
Status: DISCONTINUED | OUTPATIENT
Start: 2018-08-15 | End: 2018-08-16 | Stop reason: HOSPADM

## 2018-08-15 RX ORDER — BUDESONIDE AND FORMOTEROL FUMARATE DIHYDRATE 160; 4.5 UG/1; UG/1
2 AEROSOL RESPIRATORY (INHALATION) 2 TIMES DAILY
Status: DISCONTINUED | OUTPATIENT
Start: 2018-08-15 | End: 2018-08-16 | Stop reason: HOSPADM

## 2018-08-15 RX ORDER — HEPARIN SODIUM,PORCINE 1000/ML
VIAL (ML) INJECTION
Status: DISPENSED
Start: 2018-08-15 | End: 2018-08-16

## 2018-08-15 RX ORDER — LOSARTAN POTASSIUM 25 MG/1
25 TABLET ORAL EVERY EVENING
Status: DISCONTINUED | OUTPATIENT
Start: 2018-08-15 | End: 2018-08-15

## 2018-08-15 RX ORDER — ASPIRIN 325 MG
325 TABLET ORAL DAILY
Status: DISCONTINUED | OUTPATIENT
Start: 2018-08-16 | End: 2018-08-16 | Stop reason: HOSPADM

## 2018-08-15 RX ORDER — SODIUM CHLORIDE 9 MG/ML
INJECTION, SOLUTION INTRAVENOUS
Status: DISCONTINUED | OUTPATIENT
Start: 2018-08-15 | End: 2018-08-15

## 2018-08-15 RX ORDER — ADENOSINE 3 MG/ML
INJECTION, SOLUTION INTRAVENOUS
Status: COMPLETED
Start: 2018-08-15 | End: 2018-08-15

## 2018-08-15 RX ORDER — ASPIRIN 81 MG/1
TABLET, CHEWABLE ORAL
Status: COMPLETED
Start: 2018-08-15 | End: 2018-08-15

## 2018-08-15 RX ORDER — MIDAZOLAM HYDROCHLORIDE 1 MG/ML
INJECTION INTRAMUSCULAR; INTRAVENOUS
Status: COMPLETED
Start: 2018-08-15 | End: 2018-08-15

## 2018-08-15 RX ORDER — EPTIFIBATIDE 0.75 MG/ML
INJECTION, SOLUTION INTRAVENOUS
Status: COMPLETED
Start: 2018-08-15 | End: 2018-08-15

## 2018-08-15 RX ORDER — DILTIAZEM HYDROCHLORIDE 60 MG/1
60 TABLET, FILM COATED ORAL 3 TIMES DAILY
Status: DISCONTINUED | OUTPATIENT
Start: 2018-08-15 | End: 2018-08-16 | Stop reason: HOSPADM

## 2018-08-15 RX ORDER — NITROGLYCERIN 0.4 MG/1
0.4 TABLET SUBLINGUAL
Status: DISCONTINUED | OUTPATIENT
Start: 2018-08-15 | End: 2018-08-16 | Stop reason: HOSPADM

## 2018-08-15 RX ORDER — LABETALOL HYDROCHLORIDE 5 MG/ML
INJECTION, SOLUTION INTRAVENOUS
Status: DISPENSED
Start: 2018-08-15 | End: 2018-08-16

## 2018-08-15 RX ORDER — ATORVASTATIN CALCIUM 80 MG/1
80 TABLET, FILM COATED ORAL DAILY
Status: DISCONTINUED | OUTPATIENT
Start: 2018-08-15 | End: 2018-08-16 | Stop reason: HOSPADM

## 2018-08-15 RX ORDER — LISINOPRIL 5 MG/1
5 TABLET ORAL
Status: DISCONTINUED | OUTPATIENT
Start: 2018-08-15 | End: 2018-08-16 | Stop reason: HOSPADM

## 2018-08-15 RX ORDER — BISACODYL 10 MG
10 SUPPOSITORY, RECTAL RECTAL
Status: DISCONTINUED | OUTPATIENT
Start: 2018-08-15 | End: 2018-08-16 | Stop reason: HOSPADM

## 2018-08-15 RX ORDER — PRASUGREL 10 MG/1
60 TABLET, FILM COATED ORAL ONCE
Status: DISCONTINUED | OUTPATIENT
Start: 2018-08-15 | End: 2018-08-15

## 2018-08-15 RX ORDER — LABETALOL HYDROCHLORIDE 5 MG/ML
10 INJECTION, SOLUTION INTRAVENOUS
Status: DISCONTINUED | OUTPATIENT
Start: 2018-08-15 | End: 2018-08-16 | Stop reason: HOSPADM

## 2018-08-15 RX ORDER — LIDOCAINE HYDROCHLORIDE 20 MG/ML
INJECTION, SOLUTION INFILTRATION; PERINEURAL
Status: COMPLETED
Start: 2018-08-15 | End: 2018-08-15

## 2018-08-15 RX ORDER — INSULIN GLARGINE 100 [IU]/ML
12 INJECTION, SOLUTION SUBCUTANEOUS NIGHTLY
Status: DISCONTINUED | OUTPATIENT
Start: 2018-08-15 | End: 2018-08-16 | Stop reason: HOSPADM

## 2018-08-15 RX ORDER — PRASUGREL 10 MG/1
10 TABLET, FILM COATED ORAL DAILY
Status: DISCONTINUED | OUTPATIENT
Start: 2018-08-16 | End: 2018-08-16 | Stop reason: HOSPADM

## 2018-08-15 RX ORDER — FUROSEMIDE 40 MG/1
40 TABLET ORAL
Status: DISCONTINUED | OUTPATIENT
Start: 2018-08-15 | End: 2018-08-16 | Stop reason: HOSPADM

## 2018-08-15 RX ORDER — NITROGLYCERIN 80 MG/1
1 PATCH TRANSDERMAL DAILY
Status: DISCONTINUED | OUTPATIENT
Start: 2018-08-15 | End: 2018-08-16 | Stop reason: HOSPADM

## 2018-08-15 RX ORDER — POTASSIUM CHLORIDE 20 MEQ/1
10 TABLET, EXTENDED RELEASE ORAL DAILY
Status: DISCONTINUED | OUTPATIENT
Start: 2018-08-15 | End: 2018-08-16 | Stop reason: HOSPADM

## 2018-08-15 RX ADMIN — EPTIFIBATIDE 20000 MCG: 2 INJECTION, SOLUTION INTRAVENOUS at 14:36

## 2018-08-15 RX ADMIN — ADENOSINE 6 MG: 3 INJECTION, SOLUTION INTRAVENOUS at 14:35

## 2018-08-15 RX ADMIN — HEPARIN SODIUM: 1000 INJECTION, SOLUTION INTRAVENOUS; SUBCUTANEOUS at 14:35

## 2018-08-15 RX ADMIN — IOHEXOL 176 ML: 350 INJECTION, SOLUTION INTRAVENOUS at 15:18

## 2018-08-15 RX ADMIN — LABETALOL HYDROCHLORIDE 10 MG: 5 INJECTION, SOLUTION INTRAVENOUS at 15:10

## 2018-08-15 RX ADMIN — NITROGLYCERIN 10 ML: 20 INJECTION INTRAVENOUS at 14:35

## 2018-08-15 RX ADMIN — FENTANYL CITRATE 75 MCG: 50 INJECTION, SOLUTION INTRAMUSCULAR; INTRAVENOUS at 15:18

## 2018-08-15 RX ADMIN — METOPROLOL SUCCINATE 25 MG: 25 TABLET, EXTENDED RELEASE ORAL at 18:21

## 2018-08-15 RX ADMIN — FUROSEMIDE 40 MG: 40 TABLET ORAL at 18:22

## 2018-08-15 RX ADMIN — DILTIAZEM HYDROCHLORIDE 60 MG: 60 TABLET, FILM COATED ORAL at 18:40

## 2018-08-15 RX ADMIN — LABETALOL HYDROCHLORIDE 10 MG: 5 INJECTION, SOLUTION INTRAVENOUS at 15:55

## 2018-08-15 RX ADMIN — MIDAZOLAM HYDROCHLORIDE 2 MG: 1 INJECTION, SOLUTION INTRAMUSCULAR; INTRAVENOUS at 14:37

## 2018-08-15 RX ADMIN — BIVALIRUDIN 0.2 MG/KG/HR: 250 INJECTION, POWDER, LYOPHILIZED, FOR SOLUTION INTRAVENOUS at 16:59

## 2018-08-15 RX ADMIN — LISINOPRIL 5 MG: 5 TABLET ORAL at 18:21

## 2018-08-15 RX ADMIN — PRASUGREL 60 MG: 10 TABLET, FILM COATED ORAL at 14:53

## 2018-08-15 RX ADMIN — BIVALIRUDIN 250 MG: 250 INJECTION, POWDER, LYOPHILIZED, FOR SOLUTION INTRAVENOUS at 14:16

## 2018-08-15 RX ADMIN — INSULIN HUMAN 3 UNITS: 100 INJECTION, SOLUTION PARENTERAL at 18:37

## 2018-08-15 RX ADMIN — BUDESONIDE AND FORMOTEROL FUMARATE DIHYDRATE 2 PUFF: 160; 4.5 AEROSOL RESPIRATORY (INHALATION) at 18:37

## 2018-08-15 RX ADMIN — ATORVASTATIN CALCIUM 80 MG: 80 TABLET, FILM COATED ORAL at 18:20

## 2018-08-15 RX ADMIN — POTASSIUM CHLORIDE 10 MEQ: 1500 TABLET, EXTENDED RELEASE ORAL at 18:21

## 2018-08-15 RX ADMIN — LABETALOL HYDROCHLORIDE 10 MG: 5 INJECTION, SOLUTION INTRAVENOUS at 14:35

## 2018-08-15 RX ADMIN — EPTIFIBATIDE 74996.25 MCG: 0.75 INJECTION, SOLUTION INTRAVENOUS at 14:22

## 2018-08-15 RX ADMIN — ASPIRIN 324 MG: 81 TABLET, CHEWABLE ORAL at 14:53

## 2018-08-15 RX ADMIN — SODIUM CHLORIDE: 9 INJECTION, SOLUTION INTRAVENOUS at 09:28

## 2018-08-15 RX ADMIN — NITROGLYCERIN TRANSDERMAL SYSTEM 1 PATCH: 0.4 PATCH, EXTENDED RELEASE TRANSDERMAL at 18:16

## 2018-08-15 RX ADMIN — VERAPAMIL HYDROCHLORIDE 2.5 MG: 2.5 INJECTION, SOLUTION INTRAVENOUS at 14:35

## 2018-08-15 RX ADMIN — LIDOCAINE HYDROCHLORIDE: 20 INJECTION, SOLUTION INFILTRATION; PERINEURAL at 14:34

## 2018-08-15 RX ADMIN — INSULIN GLARGINE 12 UNITS: 100 INJECTION, SOLUTION SUBCUTANEOUS at 21:15

## 2018-08-15 RX ADMIN — FLUTICASONE PROPIONATE 100 MCG: 50 SPRAY, METERED NASAL at 18:37

## 2018-08-15 ASSESSMENT — ENCOUNTER SYMPTOMS
SHORTNESS OF BREATH: 0
HEMOPTYSIS: 0
SPUTUM PRODUCTION: 0
DIZZINESS: 0
PALPITATIONS: 0
COUGH: 0
CLAUDICATION: 0
WHEEZING: 0
NAUSEA: 0
VOMITING: 0
ORTHOPNEA: 0
PND: 0
WEAKNESS: 0

## 2018-08-15 ASSESSMENT — PAIN SCALES - GENERAL
PAINLEVEL_OUTOF10: 0

## 2018-08-15 ASSESSMENT — COPD QUESTIONNAIRES
COPD SCREENING SCORE: 2
IN THE PAST 12 MONTHS DO YOU DO LESS THAN YOU USED TO BECAUSE OF YOUR BREATHING PROBLEMS: DISAGREE/UNSURE
DO YOU EVER COUGH UP ANY MUCUS OR PHLEGM?: NO/ONLY WITH OCCASIONAL COLDS OR INFECTIONS
DURING THE PAST 4 WEEKS HOW MUCH DID YOU FEEL SHORT OF BREATH: NONE/LITTLE OF THE TIME
HAVE YOU SMOKED AT LEAST 100 CIGARETTES IN YOUR ENTIRE LIFE: NO/DON'T KNOW

## 2018-08-15 ASSESSMENT — COGNITIVE AND FUNCTIONAL STATUS - GENERAL
DAILY ACTIVITIY SCORE: 24
SUGGESTED CMS G CODE MODIFIER MOBILITY: CH
SUGGESTED CMS G CODE MODIFIER DAILY ACTIVITY: CH
MOBILITY SCORE: 24

## 2018-08-15 ASSESSMENT — PATIENT HEALTH QUESTIONNAIRE - PHQ9
1. LITTLE INTEREST OR PLEASURE IN DOING THINGS: NOT AT ALL
SUM OF ALL RESPONSES TO PHQ9 QUESTIONS 1 AND 2: 0
2. FEELING DOWN, DEPRESSED, IRRITABLE, OR HOPELESS: NOT AT ALL

## 2018-08-15 ASSESSMENT — LIFESTYLE VARIABLES
ALCOHOL_USE: NO
EVER_SMOKED: YES

## 2018-08-15 NOTE — H&P
History:  Primary Diagnosis: chest pain on exertion      HPI:  73 years old male patient of Dr. Sorensen with significant history of atrial fibrillation and hypertension. Recent cardiac imaging showed MPI showed medium sized, moderate severity, mostly non-reversible defect in the inferior wall, and Small sized, moderate severity, mostly non-reversible defect in distal anterior and mid anterolateral wall, likely in a diagonal territory.  Patient     Currently patient denies chest pain, shortness of breath or palpitations. Patient does complain of symptoms of chest pain on exertion, he ran out of his diltiazem. He was seen by FARHANA Grant about a month ago having symptoms. MPI was positive and scheduled for Cardiac catheterization.     Medical history:   Past Medical History:   Diagnosis Date   • Abnormal electrocardiogram 8/13/2010   • Arrhythmia     Atrial Fibrillation; Cardiologist, Dr. Sorensen   • Arthritis     knees, left hip   • ASTHMA     inhalers   • Atrial fibrillation (HCC) 10/25/2011   • Bronchospasm 8/6/2009   • Diabetes ([facility])     insulin   • HTN (hypertension) 10/25/2011   • Hypercholesteremia 10/25/2011   • Long term (current) use of anticoagulants 10/25/2011   • NASAL POLYPS 8/6/2009   • Other nonspecific abnormal finding 8/6/2009   • Pain     left hip   • Shortness of breath    • Sleep apnea 8/6/2009    o2 at night   • Wears glasses        Surgical history:   Past Surgical History:   Procedure Laterality Date   • KNEE ARTHROPLASTY TOTAL Left 10/2014   • HIP ARTHROPLASTY TOTAL  8/31/2010    Performed by OSGOOD, PATRICK J at SURGERY [facility]   • LUMBAR DECOMPRESSION  1984   • HIP REPLACEMENT, TOTAL     • LAMINOTOMY     • SINUSCOPE     • SINUSOTOMIES  2003,2005,2/2007       Social history:   History   Smoking Status   • Former Smoker   • Packs/day: 0.50   • Years: 10.00   • Types: Cigarettes   • Quit date: 12/10/1968   Smokeless Tobacco   • Never Used      History   Alcohol Use No     "  History   Drug Use No        Family history:   Family History   Problem Relation Age of Onset   • Heart Disease Mother        Allergies:   Allergies   Allergen Reactions   • Atenolol Shortness of Breath     DYSPNEA.   • Tetanus Toxoid Swelling       Home medications: No current facility-administered medications for this encounter.   No current facility-administered medications on file prior to encounter.      Current Outpatient Prescriptions on File Prior to Encounter   Medication Sig Dispense Refill   • budesonide-formoterol (SYMBICORT) 160-4.5 MCG/ACT Aerosol Inhale 2 Puffs by mouth 2 Times a Day. Use spacer. Rinse mouth after each use. 3 Inhaler 3   • Non Formulary Request Humalog-PRN     • fluticasone (FLOVENT HFA) 110 MCG/ACT Aerosol Flovent  mcg/actuation aerosol inhaler     • furosemide (LASIX) 40 MG Tab furosemide 40 mg tablet   Take 1 tablet every day by oral route as needed.     • TRUEPLUS PEN NEEDLES 31G X 6 MM Misc      • insulin detemir (LEVEMIR FLEXTOUCH) 100 UNIT/ML Solution Pen-injector injection Levemir FlexTouch U-100 Insulin 100 unit/mL (3 mL) subcutaneous pen     • Insulin Pen Needle (NOVOFINE PLUS) 32G X 4 MM Misc Novofine 32 32 gauge x 1/4\" needle   use 1 needle daily     • Potassium Chloride Crystals potassium chloride ER 10 mEq tablet,extended release   Take 1 tablet every day by oral route.     • Insulin Pen Needle (TRUEPLUS PEN NEEDLES) 29G X 12MM Misc TRUEplus Pen Needle 31 gauge x 1/4\"     • DILTIAZem (CARDIZEM) 60 MG Tab Take 1 Tab by mouth 3 times a day. 90 Tab 0   • albuterol (PROVENTIL) 2.5mg/3ml Nebu Soln solution for nebulization 2.5 mg by Nebulization route every four hours as needed for Shortness of Breath.     • losartan (COZAAR) 25 MG Tab Take 25 mg by mouth every evening.     • insulin detemir (LEVEMIR) 100 UNIT/ML Solution Inject 12 Units as instructed every evening.     • warfarin (COUMADIN) 5 MG Tab TAKE ONE TABLET BY MOUTH ONE TIME DAILY OR AS DIRECTED BY COUMADIN " CLINIC 90 Tab 1   • montelukast (SINGULAIR) 10 MG Tab Take 1 Tab by mouth every day. 30 Tab 11   • fluticasone (FLONASE) 50 MCG/ACT nasal spray Spray 2 Sprays in nose every day.     • albuterol (VENTOLIN OR PROVENTIL) 108 (90 BASE) MCG/ACT AERS Inhale 2 Puffs by mouth every 6 hours as needed.         Review of Systems:  Review of Systems   Constitutional: Negative for malaise/fatigue.   Respiratory: Negative for cough, hemoptysis, sputum production, shortness of breath and wheezing.    Cardiovascular: Negative for chest pain, palpitations, orthopnea, claudication, leg swelling and PND.   Gastrointestinal: Negative for nausea and vomiting.   Neurological: Negative for dizziness and weakness.       Physical Examination:  Physical Exam   Constitutional: He is oriented to person, place, and time. He appears well-developed.   Cardiovascular: An irregularly irregular rhythm present.   No murmur heard.  Pulmonary/Chest: Effort normal and breath sounds normal.   Abdominal: Soft. He exhibits no distension.   Musculoskeletal: He exhibits edema (1+ pitting edema ).   Neurological: He is alert and oriented to person, place, and time.   Skin: Skin is warm and dry. He is not diaphoretic.         Plan:  Left cardiac cath with possible intervention. Patient wishes to proceed with this procedure.      The risks, benefits, and alternatives to coronary angiography with IV sedation were discussed in great detail. Specific risks mentioned include bleeding, infection, kidney damage, allergic reaction, cardiac perforation with possible tamponade requiring pericardiocentesis or possibly open heart surgery. In addition, we discussed that 10% of patients will experience small to moderate bruising at the site of the arterial puncture. Lastly, the risks of heart attack, stroke and death were discussed; the risk of major complications such as heart attack or stroke caused by the angiogram is approximately 1%; the risk of death is approximately  1 in 1000. The patient verbalized understanding of the potential complications and wishes to proceed with this procedure.    The risks/benefits of the procedure will be further discussed with the consenting physician performing the procedure.

## 2018-08-15 NOTE — OR NURSING
1512Pt report given from cath lab RN, pt transferred over on a gurney, pt placed on monitor, B/P every 15 minutes per MD order. Right radial TR band in place, 16ml of air in band per RN report. Site clean, dry and soft. Family at bedside, pt was given water and a snack. Pt instructed to limit right wrist movement.     1615 3 ml of air was removed from TR band no S/S of bleeding    1630 Pt report given to accepting RN on tele floor, pt and family updated on transfer to room. Pt taken up to tele floor with RN on monitor. Right radial site reviewed with accepting RN.

## 2018-08-16 ENCOUNTER — PATIENT OUTREACH (OUTPATIENT)
Dept: HEALTH INFORMATION MANAGEMENT | Facility: OTHER | Age: 73
End: 2018-08-16

## 2018-08-16 ENCOUNTER — TELEPHONE (OUTPATIENT)
Dept: VASCULAR LAB | Facility: MEDICAL CENTER | Age: 73
End: 2018-08-16

## 2018-08-16 VITALS
RESPIRATION RATE: 17 BRPM | HEART RATE: 78 BPM | TEMPERATURE: 97.2 F | SYSTOLIC BLOOD PRESSURE: 139 MMHG | WEIGHT: 261.25 LBS | BODY MASS INDEX: 35.38 KG/M2 | DIASTOLIC BLOOD PRESSURE: 73 MMHG | OXYGEN SATURATION: 92 % | HEIGHT: 72 IN

## 2018-08-16 LAB
ANION GAP SERPL CALC-SCNC: 8 MMOL/L (ref 0–11.9)
BUN SERPL-MCNC: 32 MG/DL (ref 8–22)
CALCIUM SERPL-MCNC: 8.9 MG/DL (ref 8.5–10.5)
CHLORIDE SERPL-SCNC: 106 MMOL/L (ref 96–112)
CO2 SERPL-SCNC: 26 MMOL/L (ref 20–33)
CREAT SERPL-MCNC: 1.25 MG/DL (ref 0.5–1.4)
EKG IMPRESSION: NORMAL
ERYTHROCYTE [DISTWIDTH] IN BLOOD BY AUTOMATED COUNT: 45.1 FL (ref 35.9–50)
GLUCOSE BLD-MCNC: 170 MG/DL (ref 65–99)
GLUCOSE SERPL-MCNC: 169 MG/DL (ref 65–99)
HCT VFR BLD AUTO: 43.5 % (ref 42–52)
HGB BLD-MCNC: 14.4 G/DL (ref 14–18)
MCH RBC QN AUTO: 29.3 PG (ref 27–33)
MCHC RBC AUTO-ENTMCNC: 33.1 G/DL (ref 33.7–35.3)
MCV RBC AUTO: 88.6 FL (ref 81.4–97.8)
PLATELET # BLD AUTO: 219 K/UL (ref 164–446)
PMV BLD AUTO: 10.1 FL (ref 9–12.9)
POTASSIUM SERPL-SCNC: 4 MMOL/L (ref 3.6–5.5)
RBC # BLD AUTO: 4.91 M/UL (ref 4.7–6.1)
SODIUM SERPL-SCNC: 140 MMOL/L (ref 135–145)
WBC # BLD AUTO: 10.2 K/UL (ref 4.8–10.8)

## 2018-08-16 PROCEDURE — G8979 MOBILITY GOAL STATUS: HCPCS | Mod: CI

## 2018-08-16 PROCEDURE — 80048 BASIC METABOLIC PNL TOTAL CA: CPT

## 2018-08-16 PROCEDURE — G0378 HOSPITAL OBSERVATION PER HR: HCPCS

## 2018-08-16 PROCEDURE — 82962 GLUCOSE BLOOD TEST: CPT

## 2018-08-16 PROCEDURE — 97161 PT EVAL LOW COMPLEX 20 MIN: CPT

## 2018-08-16 PROCEDURE — 93005 ELECTROCARDIOGRAM TRACING: CPT | Performed by: INTERNAL MEDICINE

## 2018-08-16 PROCEDURE — A9270 NON-COVERED ITEM OR SERVICE: HCPCS | Performed by: NURSE PRACTITIONER

## 2018-08-16 PROCEDURE — 85027 COMPLETE CBC AUTOMATED: CPT

## 2018-08-16 PROCEDURE — G8978 MOBILITY CURRENT STATUS: HCPCS | Mod: CI

## 2018-08-16 PROCEDURE — 700105 HCHG RX REV CODE 258: Performed by: INTERNAL MEDICINE

## 2018-08-16 PROCEDURE — 96372 THER/PROPH/DIAG INJ SC/IM: CPT

## 2018-08-16 PROCEDURE — 93010 ELECTROCARDIOGRAM REPORT: CPT | Performed by: INTERNAL MEDICINE

## 2018-08-16 PROCEDURE — A9270 NON-COVERED ITEM OR SERVICE: HCPCS | Performed by: INTERNAL MEDICINE

## 2018-08-16 PROCEDURE — 700102 HCHG RX REV CODE 250 W/ 637 OVERRIDE(OP): Performed by: NURSE PRACTITIONER

## 2018-08-16 PROCEDURE — 700102 HCHG RX REV CODE 250 W/ 637 OVERRIDE(OP): Performed by: INTERNAL MEDICINE

## 2018-08-16 PROCEDURE — 36415 COLL VENOUS BLD VENIPUNCTURE: CPT

## 2018-08-16 PROCEDURE — G8980 MOBILITY D/C STATUS: HCPCS | Mod: CI

## 2018-08-16 RX ORDER — PRASUGREL 10 MG/1
10 TABLET, FILM COATED ORAL DAILY
Qty: 30 TAB | Refills: 3 | Status: SHIPPED | OUTPATIENT
Start: 2018-08-17 | End: 2018-11-13 | Stop reason: SDUPTHER

## 2018-08-16 RX ORDER — ATORVASTATIN CALCIUM 80 MG/1
80 TABLET, FILM COATED ORAL DAILY
Qty: 30 TAB | Refills: 2 | Status: SHIPPED | OUTPATIENT
Start: 2018-08-16 | End: 2018-10-15 | Stop reason: SDUPTHER

## 2018-08-16 RX ORDER — METOPROLOL SUCCINATE 25 MG/1
25 TABLET, EXTENDED RELEASE ORAL DAILY
Qty: 30 TAB | Refills: 2 | Status: SHIPPED | OUTPATIENT
Start: 2018-08-17 | End: 2018-10-15 | Stop reason: SDUPTHER

## 2018-08-16 RX ADMIN — NITROGLYCERIN TRANSDERMAL SYSTEM 1 PATCH: 0.4 PATCH, EXTENDED RELEASE TRANSDERMAL at 06:05

## 2018-08-16 RX ADMIN — FLUTICASONE PROPIONATE 100 MCG: 50 SPRAY, METERED NASAL at 05:59

## 2018-08-16 RX ADMIN — METOPROLOL SUCCINATE 25 MG: 25 TABLET, EXTENDED RELEASE ORAL at 05:58

## 2018-08-16 RX ADMIN — LISINOPRIL 5 MG: 5 TABLET ORAL at 05:58

## 2018-08-16 RX ADMIN — FUROSEMIDE 40 MG: 40 TABLET ORAL at 05:58

## 2018-08-16 RX ADMIN — SODIUM CHLORIDE: 9 INJECTION, SOLUTION INTRAVENOUS at 06:05

## 2018-08-16 RX ADMIN — INSULIN HUMAN 2 UNITS: 100 INJECTION, SOLUTION PARENTERAL at 08:33

## 2018-08-16 RX ADMIN — BUDESONIDE AND FORMOTEROL FUMARATE DIHYDRATE 2 PUFF: 160; 4.5 AEROSOL RESPIRATORY (INHALATION) at 05:59

## 2018-08-16 RX ADMIN — PRASUGREL 10 MG: 10 TABLET, FILM COATED ORAL at 06:00

## 2018-08-16 RX ADMIN — ASPIRIN 325 MG: 325 TABLET, COATED ORAL at 05:58

## 2018-08-16 RX ADMIN — POTASSIUM CHLORIDE 10 MEQ: 1500 TABLET, EXTENDED RELEASE ORAL at 05:59

## 2018-08-16 ASSESSMENT — ENCOUNTER SYMPTOMS
DOUBLE VISION: 0
ORTHOPNEA: 0
WHEEZING: 0
BACK PAIN: 1
WEAKNESS: 0
DIZZINESS: 0
BLURRED VISION: 0
HEADACHES: 0
FOCAL WEAKNESS: 0
SHORTNESS OF BREATH: 0
FALLS: 0
COUGH: 0
LOSS OF CONSCIOUSNESS: 0
PALPITATIONS: 0

## 2018-08-16 ASSESSMENT — COGNITIVE AND FUNCTIONAL STATUS - GENERAL
SUGGESTED CMS G CODE MODIFIER MOBILITY: CH
MOBILITY SCORE: 24

## 2018-08-16 ASSESSMENT — GAIT ASSESSMENTS
DISTANCE (FEET): 160
GAIT LEVEL OF ASSIST: SUPERVISED

## 2018-08-16 ASSESSMENT — PAIN SCALES - GENERAL
PAINLEVEL_OUTOF10: 0
PAINLEVEL_OUTOF10: 0

## 2018-08-16 NOTE — PROCEDURES
DATE OF SERVICE:  08/15/2018    PROCEDURE:  Cardiac catheterization and percutaneous coronary intervention.  A.  Left heart catheterization.  B.  Left ventriculography.  C.  Selective coronary angiography.  D.  Coronary stent implantation of the mid right coronary artery with   overlapping stents: 4.5x18 mm Ultra non-drug eluting stent and 4.0x38 mm Xience   Gianna drug-eluting stent   E.  Right radial artery approach.    PREOPERATIVE DIAGNOSES:  1.  Unstable angina pectoris.  2.  Abnormal myocardial perfusion scan with inferior perfusion abnormality.  3.  Chronic atrial fibrillation.  4.  Chronic anticoagulation with warfarin.  5.  Diabetes mellitus.    POSTOPERATIVE DIAGNOSES:  1.  Coronary artery disease, 3-vessel, involving the dominant mid right   coronary artery, distal circumflex artery and diagonal branch ostium.  2.  Left ventricular ejection fraction 60%.    PHYSICIAN:  Sanjay Sorensen MD    REFERRING PHYSICIAN:  Eze Dobson MD    COMPLICATIONS:  None.    MEDICATIONS:  1.  Versed 2 mg IV.  2.  Fentanyl 75 mcg IV.  3.  Lidocaine 2% subcutaneous.  4.  Heparin 3000 units a day.  5.  Nitroglycerin 100 mcg a day.  6.  Verapamil 2.5 mg a day.  7.  Angiomax IV bolus infusion.  8.  Integrilin IV bolus infusion.  9.  Labetalol 10 mg IV for hypertension.  10.  Adenosine 78 mcg intracoronary in divided doses.  11.  Nitroglycerin 750 mcg in divided doses.  12.  Prasugrel 60 mg p.o.    INDICATIONS:  The patient is a 73-year-old male with a history of chronic   atrial fibrillation, chronic anticoagulation on warfarin, hypertension,   hyperlipidemia, and diabetes mellitus who recently developed typical unstable   angina pectoris symptoms and underwent a myocardial perfusion stress test on   07/19/2018 which showed a medium sized moderately severe, mostly nonreversible   defect in the inferior wall, likely right coronary artery territory, a small   size moderate severity mostly nonreversible defect in the distal  anterior wall   and mid anterolateral wall, likely diagonal territory with inferior wall   hypokinesis and left ventricular ejection fraction of 58%.    The patient was referred for diagnostic cardiac catheterization.    DESCRIPTION OF PROCEDURE:  After informed consent was obtained, the patient   was brought to the cardiac catheterization laboratory, where he was prepped,   draped, and anesthetized in the usual manner.    After having established adequate collateral circulation, the right hand with   a pressure and oximetry-guided Michael's test, the volar surface of the right   wrist was prepped, draped, and anesthetized in the usual manner.      Using a modified standard technique, 6-[place] citizen x 10 cm introducer sheath was inserted   in the right radial artery.  Heparin, verapamil, and nitroglycerin were given   via the side port.    Next, a 4-[place] citizen JL 4.5 left coronary catheter was required to cannulate the   left coronary artery and left coronary angiograms were obtained in various   injections.    Next, a 4-[place] citizen JR 4.0 right coronary catheter was inserted in the ostium of   the right coronary artery and right coronary angiograms were obtained in   various projections.    Next, a 4-[place] citizen pigtail catheter was inserted in the left ventricle under   fluoroscopic guidance.  Single plane PATEL left ventricular angiogram was   performed.  Pre and post angiogram LVEDP, LV and aortic pressures were   obtained.     After reviewing the initial angiograms, elected to proceed with coronary   intervention of the mid dominant right coronary artery.    Next, Angiomax and Integrilin was given.    Next, a 6-[place] citizen right Ikari 2.0 guiding catheter was advanced to the ostium   of the right coronary artery.    Next, 0.014 Whisper wire was advanced to the distal right coronary artery.    Next, a 3.0x20-mm balloon catheter was inserted and 3 inflations of 14   atmospheres were performed throughout the diseased segment.  The balloon was    removed.    Next, a 4.0x38 mm Xience Gianna stent was inserted within the residual mid RCA   lesion and deployed at 15 atmospheres.  The balloon was removed.    IC nitroglycerin and adenosine were given in divided doses.    Next, a 4.5x18 mm non-drug eluting Ultra stent was placed just proximal to the   initial stent with slight overlap and deployed at 12 atmospheres.  The   balloon was removed.    Next, using a benjamin wire technique, a 4.5x15 mm noncompliant balloon catheter   was inserted and 4 inflations at 15 atmospheres were performed throughout the   4.0 mm stent.  The balloon and wire removed.  IC nitroglycerin was given and   final angiograms were performed.    At the end of the procedure, catheter removed.  Hemostasis was achieved with a   wrist band device.  The patient tolerated the procedure well.    FINDINGS:  HEMODYNAMICS:  LEFT HEART PRESSURES:  1.  LVEDP 27 mmHg.  2.  Left ventricular systolic pressure 156 mmHg.  3.  Central aortic pressure systolic 153, diastolic 89, mean of 114 mmHg.    LEFT VENTRICULOGRAPHY:  Left ventricular chamber size is normal with mild   hypokinesis of the inferior wall.  Calculated ejection fraction 60%.    CORONARY ARTERIOGRAPHY:  1.  Left main artery:  Left main is a large caliber vessel, angiographically   widely patent and trifurcates into a left anterior descending artery and ramus   intermedius vessel, and circumflex artery.  2.  Left anterior descending artery:  The left anterior descending artery   gives rise to a bifurcating first diagonal branch, a normal complement of septal   branches and extends around the apex.  The left anterior descending artery is   patent with mild 20% proximal narrowing.  First diagonal branch ostium has an   80% stenosis.  3.  Ramus intermedius:  The ramus is a long vessel that extends along the   lateral wall.  The ostium and proximal vessel has a 60% stenosis.  4.  Circumflex artery:  The circumflex gives rise to 4 major marginal    branches.  The distal circumflex prior to the small caliber third and fourth marginal   branches there is diffuse disease of up to 70% stenosis.  5.  Right coronary artery:  The right coronary artery is a huge dominant   vessel, gives rise to a long right ventricular branch, a large posterior   descending artery and a large posterolateral system.  The mid right coronary   artery has severe diffuse subtotal disease of 99% stenoses.    POST-STENT IMPLANTATION of the mid right coronary artery with overlapping   Stents:4.5x18 mm Ultra non-drug eluting stent and 4.0x38 mm drug-eluting Xience   Gianna stent, postdilated to 4.5 mm demonstrates good stent expansion with 0%   residual stenosis.  No evidence of dissection or thrombus and SAURAV 3 antegrade flow.    PLAN:  Maximize optimal medical therapy for the patient's coronary artery   disease post coronary intervention.       ____________________________________     MD SUSANNA TRAN / DELMAR    DD:  08/15/2018 16:30:31  DT:  08/15/2018 17:01:26    D#:  [id number]  Job#:  [id number]

## 2018-08-16 NOTE — PROGRESS NOTES
Patient stable for discharge, orders are placed,  IV taken out, tele monitor removed.  All belongings gathered by patient.  dishchare instructions and education provided. All questions answered.  Patient going home with self care.

## 2018-08-16 NOTE — PROGRESS NOTES
Report received by PACU RN. Assumed care of pt. Assessment complete. Wife Avelino at bedside. Pt A&O x 4. Pt has TR band right wrist-no hematomoa-good CMS distal band, good waveform, has 13 mL air in band, has angiomax running, and is in afib/flutter rate approximately 69 no c/o chest pain or palpitations. Post procedure VS in process. Pt in no apparent signs of distress. Plan of care discussed. Call light in reach,  bed in lowest position, and pt has no further questions at this time.

## 2018-08-16 NOTE — TELEPHONE ENCOUNTER
Patient called to report that he has been prescribed prasugrel for his stent placement.    Vani Santiago, PharmD

## 2018-08-16 NOTE — DISCHARGE PLANNING
Anticipated Discharge Disposition: Home    Action: Pt lives in [address].  Per pharmacy effient is $37.43.  Pt agreeable to price.  No other needs.    Barriers to Discharge: none    Plan: Home

## 2018-08-16 NOTE — PROGRESS NOTES
Cardiology Progress Note               Author: Jadyn Roblero Date & Time created: 8/16/2018  8:10 AM     Interval History:  73 years old male patient of Dr. Sorensen with significant history of atrial fibrillation and hypertension. Recent cardiac imaging showed MPI showed medium sized, moderate severity, mostly non-reversible defect in the inferior wall, and Small sized, moderate severity, mostly non-reversible defect in distal anterior and mid anterolateral wall, likely in a diagonal territory.      8/16: patient ambulated in the hallway without any chest pain or SOB. No rhythm issue overnight. Vital sign is stable. Tolerating his medications.     PROCEDURE:  Cardiac catheterization and percutaneous coronary intervention.  A.  Left heart catheterization.  B.  Left ventriculography.  C.  Selective coronary angiography.  D.  Coronary stent implantation of the mid right coronary artery with   overlapping stents 4.0x38 mm Xience Gianna drug-eluting stent and 4.5x18 mm   Ultra non-drug eluting stent.  E.  Right radial artery approach.    Review of Systems   Constitutional: Negative for malaise/fatigue.   Eyes: Negative for blurred vision and double vision.   Respiratory: Negative for cough, shortness of breath and wheezing.    Cardiovascular: Negative for chest pain, palpitations, orthopnea and leg swelling.   Musculoskeletal: Positive for back pain. Negative for falls.   Neurological: Negative for dizziness, focal weakness, loss of consciousness, weakness and headaches.   All other systems reviewed and are negative.      Physical Exam   Constitutional: He is oriented to person, place, and time. He appears well-developed and well-nourished. No distress.   HENT:   Head: Normocephalic.   Neck: Normal range of motion. No JVD present.   Cardiovascular: Normal rate, normal heart sounds and intact distal pulses.  An irregularly irregular rhythm present.   No murmur heard.  Pulmonary/Chest: Effort normal and breath sounds  normal. No respiratory distress. He has no wheezes.   Abdominal: He exhibits no distension. There is no tenderness.   Musculoskeletal: He exhibits no edema or tenderness.   Neurological: He is alert and oriented to person, place, and time.   Skin: Skin is warm and dry. No rash noted. He is not diaphoretic.   Psychiatric: His behavior is normal. Judgment normal.   Nursing note and vitals reviewed.      Hemodynamics:  Temp (24hrs), Av.7 °C (98 °F), Min:36.4 °C (97.6 °F), Max:36.9 °C (98.5 °F)  Temperature: 36.6 °C (97.9 °F)  Pulse  Av.5  Min: 66  Max: 78Heart Rate (Monitored): 69  Blood Pressure : 151/80, NIBP: 128/84     Respiratory:    Respiration: 18, Pulse Oximetry: 91 %        RUL Breath Sounds: Clear, RML Breath Sounds: Clear, RLL Breath Sounds: Diminished;Clear, FÁTIMA Breath Sounds: Clear, LLL Breath Sounds: Diminished;Clear  Fluids:     Weight: 118.5 kg (261 lb 3.9 oz)  GI/Nutrition:  Orders Placed This Encounter   Procedures   • Diet Order Diabetic     Standing Status:   Standing     Number of Occurrences:   1     Order Specific Question:   Diet:     Answer:   Diabetic [3]     Lab Results:  Recent Labs      18   1539  18   0351   WBC  10.1  10.2   RBC  5.56  4.91   HEMOGLOBIN  16.1  14.4   HEMATOCRIT  49.6  43.5   MCV  89.2  88.6   MCH  29.0  29.3   MCHC  32.5*  33.1*   RDW  44.3  45.1   PLATELETCT  253  219   MPV  10.3  10.1     Recent Labs      18   1539  18   0352   SODIUM  141  140   POTASSIUM  4.1  4.0   CHLORIDE  103  106   CO2  30  26   GLUCOSE  121*  169*   BUN  30*  32*   CREATININE  1.34  1.25   CALCIUM  9.8  8.9     Recent Labs      18   1539   APTT  26.9   INR  1.21*                     Medical Decision Making, by Problem:  There are no active hospital problems to display for this patient.      Plan:  1. CAD:  - no chest pain or SOB  - no rhythm issue overnight   - continue triple therapy (asa 81mg qd, effient 10mg qd, and warfarin ) after 1 month, stop the  "asa. Informed patient  - continue statin, tolerated toprol XL 25mg qd, and losartan 25mg qd    Future Appointments  Date Time Provider Department Center   8/17/2018 1:20 PM APRIL [facility] None   8/21/2018 2:30 PM [facility] EXAM 4 [facility] None   10/8/2018 4:20 PM Sanjay Sorensen M.D. [facility] None   2/12/2019 1:30 PM [facility] None      Yuri De León   Home Medication Instructions SHANTI:[id number]    Printed on:08/16/18 0810   Medication Information                      albuterol (PROVENTIL) 2.5mg/3ml Nebu Soln solution for nebulization  2.5 mg by Nebulization route every four hours as needed for Shortness of Breath.             albuterol (VENTOLIN OR PROVENTIL) 108 (90 BASE) MCG/ACT AERS  Inhale 2 Puffs by mouth every 6 hours as needed.             aspirin 81 MG tablet  Take 1 Tab by mouth Once for 1 dose.             atorvastatin (LIPITOR) 80 MG tablet  Take 1 Tab by mouth every day.             budesonide-formoterol (SYMBICORT) 160-4.5 MCG/ACT Aerosol  Inhale 2 Puffs by mouth 2 Times a Day. Use spacer. Rinse mouth after each use.             DILTIAZem (CARDIZEM) 60 MG Tab  Take 1 Tab by mouth 3 times a day.             fluticasone (FLONASE) 50 MCG/ACT nasal spray  Spray 2 Sprays in nose every day.             fluticasone (FLOVENT HFA) 110 MCG/ACT Aerosol  Flovent  mcg/actuation aerosol inhaler             furosemide (LASIX) 40 MG Tab  furosemide 40 mg tablet   Take 1 tablet every day by oral route as needed.             insulin detemir (LEVEMIR FLEXTOUCH) 100 UNIT/ML Solution Pen-injector injection  Levemir FlexTouch U-100 Insulin 100 unit/mL (3 mL) subcutaneous pen             insulin detemir (LEVEMIR) 100 UNIT/ML Solution  Inject 12 Units as instructed every evening.             Insulin Pen Needle (NOVOFINE PLUS) 32G X 4 MM Misc  Novofine 32 32 gauge x 1/4\" needle   use 1 needle daily             Insulin Pen Needle (TRUEPLUS PEN NEEDLES) 29G X 12MM Misc  TRUEplus Pen Needle 31 gauge x " "1/4\"             losartan (COZAAR) 25 MG Tab  Take 25 mg by mouth every evening.             metoprolol SR (TOPROL XL) 25 MG TABLET SR 24 HR  Take 1 Tab by mouth every day.             montelukast (SINGULAIR) 10 MG Tab  Take 1 Tab by mouth every day.             Non Formulary Request  Humalog-PRN             Potassium Chloride Crystals  potassium chloride ER 10 mEq tablet,extended release   Take 1 tablet every day by oral route.             prasugrel (EFFIENT) 10 MG Tab  Take 1 Tab by mouth every day.             TRUEPLUS PEN NEEDLES 31G X 6 MM Misc               warfarin (COUMADIN) 5 MG Tab  TAKE ONE TABLET BY MOUTH ONE TIME DAILY OR AS DIRECTED BY COUMADIN CLINIC                   Quality-Core Measures   Reviewed items::  Labs reviewed, EKG reviewed and Medications reviewed    "

## 2018-08-16 NOTE — DISCHARGE INSTRUCTIONS
Discharge Instructions    Discharged to home by car with relative. Discharged via wheelchair, hospital escort: Yes.  Special equipment needed: Not Applicable    Be sure to schedule a follow-up appointment with your primary care doctor or any specialists as instructed.     Discharge Plan:   Diet Plan: Discussed  Activity Level: Discussed  Confirmed Follow up Appointment: Appointment Scheduled  Confirmed Symptoms Management: Discussed  Medication Reconciliation Updated: Yes  Pneumococcal Vaccine Administered/Refused: Not given - Patient refused pneumococcal vaccine  Influenza Vaccine Indication: Patient Refuses    I understand that a diet low in cholesterol, fat, and sodium is recommended for good health. Unless I have been given specific instructions below for another diet, I accept this instruction as my diet prescription.   Other diet: Heart Healthy    Special Instructions: None    · Is patient discharged on Warfarin / Coumadin?   No     Depression / Suicide Risk    As you are discharged from this [facility] facility, it is important to learn how to keep safe from harming yourself.    Recognize the warning signs:  · Abrupt changes in personality, positive or negative- including increase in energy   · Giving away possessions  · Change in eating patterns- significant weight changes-  positive or negative  · Change in sleeping patterns- unable to sleep or sleeping all the time   · Unwillingness or inability to communicate  · Depression  · Unusual sadness, discouragement and loneliness  · Talk of wanting to die  · Neglect of personal appearance   · Rebelliousness- reckless behavior  · Withdrawal from people/activities they love  · Confusion- inability to concentrate     If you or a loved one observes any of these behaviors or has concerns about self-harm, here's what you can do:  · Talk about it- your feelings and reasons for harming yourself  · Remove any means that you might use to hurt yourself (examples: pills,  rope, extension cords, firearm)  · Get professional help from the community (Mental Health, Substance Abuse, psychological counseling)  · Do not be alone:Call your Safe Contact- someone whom you trust who will be there for you.  · Call your local CRISIS HOTLINE 703-1570 or 362-288-6757  · Call your local Children's Mobile Crisis Response Team [place] (420) 836-2327 or www.[employer]  · Call the toll free National Suicide Prevention Hotlines   · National Suicide Prevention Lifeline 461-894-ANXR (4132)  · [employer] Line Network 800-SUICIDE (351-4874)        Coronary Angiogram With Stent  Coronary angiogram with stent placement is a procedure to widen or open a narrow blood vessel of the heart (coronary artery). Arteries may become blocked by cholesterol buildup (plaques) in the lining or wall. When a coronary artery becomes partially blocked, blood flow to that area decreases. This may lead to chest pain or a heart attack (myocardial infarction).  A stent is a small piece of metal that looks like mesh or a spring. Stent placement may be done as treatment for a heart attack or right after a coronary angiogram in which a blocked artery is found.  Let your health care provider know about:  · Any allergies you have.  · All medicines you are taking, including vitamins, herbs, eye drops, creams, and over-the-counter medicines.  · Any problems you or family members have had with anesthetic medicines.  · Any blood disorders you have.  · Any surgeries you have had.  · Any medical conditions you have.  · Whether you are pregnant or may be pregnant.  What are the risks?  Generally, this is a safe procedure. However, problems may occur, including:  · Damage to the heart or its blood vessels.  · A return of blockage.  · Bleeding, infection, or bruising at the insertion site.  · A collection of blood under the skin (hematoma) at the insertion site.  · A blood clot in another part of the body.  · Kidney  injury.  · Allergic reaction to the dye or contrast that is used.  · Bleeding into the abdomen (retroperitoneal bleeding).  What happens before the procedure?  Staying hydrated   Follow instructions from your health care provider about hydration, which may include:  · Up to 2 hours before the procedure - you may continue to drink clear liquids, such as water, clear fruit juice, black coffee, and plain tea.  Eating and drinking restrictions   Follow instructions from your health care provider about eating and drinking, which may include:  · 8 hours before the procedure - stop eating heavy meals or foods such as meat, fried foods, or fatty foods.  · 6 hours before the procedure - stop eating light meals or foods, such as toast or cereal.  · 2 hours before the procedure - stop drinking clear liquids.  Ask your health care provider about:  · Changing or stopping your regular medicines. This is especially important if you are taking diabetes medicines or blood thinners.  · Taking medicines such as ibuprofen. These medicines can thin your blood. Do not take these medicines before your procedure if your health care provider instructs you not to. Generally, aspirin is recommended before a procedure of passing a small, thin tube (catheter) through a blood vessel and into the heart (cardiac catheterization).  What happens during the procedure?  · An IV tube will be inserted into one of your veins.  · You will be given one or more of the following:  ¨ A medicine to help you relax (sedative).  ¨ A medicine to numb the area where the catheter will be inserted into an artery (local anesthetic).  · To reduce your risk of infection:  ¨ Your health care team will wash or sanitize their hands.  ¨ Your skin will be washed with soap.  ¨ Hair may be removed from the area where the catheter will be inserted.  · Using a guide wire, the catheter will be inserted into an artery. The location may be in your groin, in your wrist, or in the  fold of your arm (near your elbow).  · A type of X-ray (fluoroscopy) will be used to help guide the catheter to the opening of the arteries in the heart.  · A dye will be injected into the catheter, and X-rays will be taken. The dye will help to show where any narrowing or blockages are located in the arteries.  · A tiny wire will be guided to the blocked spot, and a balloon will be inflated to make the artery wider.  · The stent will be expanded and will crush the plaques into the wall of the vessel. The stent will hold the area open and improve the blood flow. Most stents have a drug coating to reduce the risk of the stent narrowing over time.  · The artery may be made wider using a drill, laser, or other tools to remove plaques.  · When the blood flow is better, the catheter will be removed. The lining of the artery will grow over the stent, which stays where it was placed.  This procedure may vary among health care providers and hospitals.  What happens after the procedure?  · If the procedure is done through the leg, you will be kept in bed lying flat for about 6 hours. You will be instructed to not bend and not cross your legs.  · The insertion site will be checked frequently.  · The pulse in your foot or wrist will be checked frequently.  · You may have additional blood tests, X-rays, and a test that records the electrical activity of your heart (electrocardiogram, or ECG).  This information is not intended to replace advice given to you by your health care provider. Make sure you discuss any questions you have with your health care provider.  Document Released: 06/23/2004 Document Revised: 08/17/2017 Document Reviewed: 07/23/2017  Elsevier Interactive Patient Education © 2017 [employer]

## 2018-08-16 NOTE — THERAPY
"Physical Therapy Cardiac Evaluation completed.   Bed Mobility:     Transfers: Sit to Stand: Supervised  Gait: Level Of Assist: Supervised with No Equipment Needed       Plan of Care: Patient with no further skilled PT needs in the acute care setting at this time  Discharge Recommendations: Equipment: No Equipment Needed.    See \"Rehab Therapy-Acute\" Patient Summary Report for complete documentation.     Pt has recenlty had a L hear catheterization and stent placement for unstable angina and stenosis. Pt was seen for cardiac rehab phase 1 education. Pt was educated on current physical activity limitations, HR goal, RPE scale, caridac failure s/s, exercise progression/monitoring, and pacing. Pt was able to demonstrate SPV for all functional mobility and demonstrated with a peak HR of 94 and reported an RPE scale of 13. Pt edcuated on maintinaing this intensity for any time of activity and avoiding further exertion and he was receptive of this education. Pt educated on phase 2 outpatient cardiac rehab and recommended for further counseling/education, exercise training and monitoring and nurtritional guidlines.   "

## 2018-08-16 NOTE — PROGRESS NOTES
Received report from previous shift RN, patient AOX4 with no discomfort noted. Will continue to monitor

## 2018-08-16 NOTE — PROGRESS NOTES
2 RN skin check, blanchable redness around sacrum area. No other bruises, sores or open wounds noted. Will continue to monitor

## 2018-08-16 NOTE — CARE PLAN
Problem: Knowledge Deficit  Goal: Knowledge of disease process/condition, treatment plan, diagnostic tests, and medications will improve  Outcome: PROGRESSING AS EXPECTED  Discussed plan of care with the patient

## 2018-08-17 ENCOUNTER — OFFICE VISIT (OUTPATIENT)
Dept: CARDIOLOGY | Facility: MEDICAL CENTER | Age: 73
End: 2018-08-17
Payer: MEDICARE

## 2018-08-17 ENCOUNTER — TELEPHONE (OUTPATIENT)
Dept: CARDIOLOGY | Facility: MEDICAL CENTER | Age: 73
End: 2018-08-17

## 2018-08-17 ENCOUNTER — PATIENT OUTREACH (OUTPATIENT)
Dept: HEALTH INFORMATION MANAGEMENT | Facility: OTHER | Age: 73
End: 2018-08-17

## 2018-08-17 VITALS
HEART RATE: 64 BPM | DIASTOLIC BLOOD PRESSURE: 66 MMHG | OXYGEN SATURATION: 96 % | HEIGHT: 72 IN | BODY MASS INDEX: 33.82 KG/M2 | SYSTOLIC BLOOD PRESSURE: 120 MMHG | WEIGHT: 249.7 LBS

## 2018-08-17 DIAGNOSIS — E78.5 HYPERLIPIDEMIA, UNSPECIFIED HYPERLIPIDEMIA TYPE: ICD-10-CM

## 2018-08-17 DIAGNOSIS — I10 ESSENTIAL HYPERTENSION: Chronic | ICD-10-CM

## 2018-08-17 DIAGNOSIS — Z79.01 LONG TERM CURRENT USE OF ANTICOAGULANT THERAPY: Chronic | ICD-10-CM

## 2018-08-17 DIAGNOSIS — I48.91 ATRIAL FIBRILLATION WITH RAPID VENTRICULAR RESPONSE (HCC): Chronic | ICD-10-CM

## 2018-08-17 PROCEDURE — 99214 OFFICE O/P EST MOD 30 MIN: CPT | Performed by: NURSE PRACTITIONER

## 2018-08-17 RX ORDER — ASPIRIN 81 MG/1
81 TABLET, CHEWABLE ORAL DAILY
COMMUNITY
End: 2019-02-15

## 2018-08-17 ASSESSMENT — ENCOUNTER SYMPTOMS
FEVER: 0
NAUSEA: 0
CHILLS: 0
PALPITATIONS: 0
SPUTUM PRODUCTION: 0
DIZZINESS: 0
PND: 0
ORTHOPNEA: 0
WHEEZING: 0
SHORTNESS OF BREATH: 0
HEADACHES: 0
VOMITING: 0
COUGH: 0
HEMOPTYSIS: 0
CLAUDICATION: 0

## 2018-08-17 NOTE — PROGRESS NOTES
"Chief Complaint   Patient presents with   • Results     NM exam     Subjective:   Yuri De León is a 73 y.o. male who presents today after being hospitalized 8/15-16/2018 for angiogram and stent placement.  I last saw the patient on 7/12/2018 and the patient had been experiencing substernal chest pain and felt his throat \"felt weird.\"  Patient underwent MPI and found a medium-sized, moderately severe mostly nonreversible defect in the inferior wall, small sized, moderate severity, mostly nonreversible defect in the distal anterior and mild anterolateral wall with recommendations for cardiac catheterization. On 8/15/2018 the patient underwent cardiac catheterization in which overlapping stents 4.0x38 mm drug-eluting Xience Gianna stent and a 4.5x18 mm Ultra   non-drug eluting stent postdilated to 4.5 mm demonstrates good stent expansion with 0% residual stenosis.  No evidence of dissection or thrombus and SAURAV 3 antegrade flow.    Ultimately the patient feels well.  He denies chest pain, palpitations, or shortness of breath.  He is having some difficulty obtaining his Prasugrel but will be picking it up at the pharmacy on his way home today.  He knows the absolute importance of taking this medication daily without disruption.    Past Medical History:   Diagnosis Date   • Abnormal electrocardiogram 8/13/2010   • Arrhythmia     Atrial Fibrillation; Cardiologist, Dr. Sorensen   • Arthritis     knees, left hip   • ASTHMA     inhalers   • Atrial fibrillation (HCC) 10/25/2011   • Bronchospasm 8/6/2009   • Diabetes (HCC)     insulin   • HTN (hypertension) 10/25/2011   • Hypercholesteremia 10/25/2011   • Long term (current) use of anticoagulants 10/25/2011   • NASAL POLYPS 8/6/2009   • Other nonspecific abnormal finding 8/6/2009   • Pain     left hip   • Shortness of breath    • Sleep apnea 8/6/2009    o2 at night   • Wears glasses      Past Surgical History:   Procedure Laterality Date   • KNEE ARTHROPLASTY TOTAL " "Left 10/2014   • HIP ARTHROPLASTY TOTAL  8/31/2010    Performed by OSGOOD, PATRICK J at SURGERY [facility]S   • LUMBAR DECOMPRESSION  1984   • HIP REPLACEMENT, TOTAL     • LAMINOTOMY     • SINUSCOPE     • SINUSOTOMIES  2003,2005,2/2007     Family History   Problem Relation Age of Onset   • Heart Disease Mother      Social History     Social History   • Marital status:      Spouse name: N/A   • Number of children: N/A   • Years of education: N/A     Occupational History   • Not on file.     Social History Main Topics   • Smoking status: Former Smoker     Packs/day: 0.50     Years: 10.00     Types: Cigarettes     Quit date: 12/10/1968   • Smokeless tobacco: Never Used   • Alcohol use No   • Drug use: No   • Sexual activity: Not on file     Other Topics Concern   • Not on file     Social History Narrative   • No narrative on file     Allergies   Allergen Reactions   • Atenolol Shortness of Breath     DYSPNEA.   • Tetanus Toxoid Swelling     Outpatient Encounter Prescriptions as of 8/17/2018   Medication Sig Dispense Refill   • aspirin (ASA) 81 MG Chew Tab chewable tablet Take 81 mg by mouth every day.     • metoprolol SR (TOPROL XL) 25 MG TABLET SR 24 HR Take 1 Tab by mouth every day. 30 Tab 2   • budesonide-formoterol (SYMBICORT) 160-4.5 MCG/ACT Aerosol Inhale 2 Puffs by mouth 2 Times a Day. Use spacer. Rinse mouth after each use. 3 Inhaler 3   • insulin detemir (LEVEMIR FLEXTOUCH) 100 UNIT/ML Solution Pen-injector injection Levemir FlexTouch U-100 Insulin 100 unit/mL (3 mL) subcutaneous pen     • Insulin Pen Needle (NOVOFINE PLUS) 32G X 4 MM Misc Novofine 32 32 gauge x 1/4\" needle   use 1 needle daily     • Insulin Pen Needle (TRUEPLUS PEN NEEDLES) 29G X 12MM Misc TRUEplus Pen Needle 31 gauge x 1/4\"     • DILTIAZem (CARDIZEM) 60 MG Tab Take 1 Tab by mouth 3 times a day. 90 Tab 0   • losartan (COZAAR) 25 MG Tab Take 25 mg by mouth every evening.     • insulin detemir (LEVEMIR) 100 UNIT/ML Solution Inject " 12 Units as instructed every evening.     • warfarin (COUMADIN) 5 MG Tab TAKE ONE TABLET BY MOUTH ONE TIME DAILY OR AS DIRECTED BY COUMADIN CLINIC 90 Tab 1   • montelukast (SINGULAIR) 10 MG Tab Take 1 Tab by mouth every day. 30 Tab 11   • fluticasone (FLONASE) 50 MCG/ACT nasal spray Spray 2 Sprays in nose every day.     • atorvastatin (LIPITOR) 80 MG tablet Take 1 Tab by mouth every day. 30 Tab 2   • prasugrel (EFFIENT) 10 MG Tab Take 1 Tab by mouth every day. 30 Tab 3   • Non Formulary Request Humalog-PRN     • fluticasone (FLOVENT HFA) 110 MCG/ACT Aerosol Flovent  mcg/actuation aerosol inhaler     • furosemide (LASIX) 40 MG Tab furosemide 40 mg tablet   Take 1 tablet every day by oral route as needed.     • TRUEPLUS PEN NEEDLES 31G X 6 MM Misc      • Potassium Chloride Crystals potassium chloride ER 10 mEq tablet,extended release   Take 1 tablet every day by oral route.     • albuterol (PROVENTIL) 2.5mg/3ml Nebu Soln solution for nebulization 2.5 mg by Nebulization route every four hours as needed for Shortness of Breath.     • albuterol (VENTOLIN OR PROVENTIL) 108 (90 BASE) MCG/ACT AERS Inhale 2 Puffs by mouth every 6 hours as needed.       No facility-administered encounter medications on file as of 8/17/2018.      Review of Systems   Constitutional: Negative for chills and fever.   Respiratory: Negative for cough, hemoptysis, sputum production, shortness of breath and wheezing.    Cardiovascular: Negative for chest pain, palpitations, orthopnea, claudication, leg swelling and PND.   Gastrointestinal: Negative for nausea and vomiting.   Neurological: Negative for dizziness and headaches.      Objective:   /66   Pulse 64   Ht 1.829 m (6')   Wt 113.3 kg (249 lb 11.2 oz)   SpO2 96%   BMI 33.87 kg/m²     Physical Exam   Constitutional: He is oriented to person, place, and time. He appears well-developed and well-nourished.   HENT:   Head: Normocephalic and atraumatic.   Eyes: EOM are normal.    Neck: Neck supple.   Cardiovascular: Normal rate and regular rhythm.    No murmur heard.  Pulmonary/Chest: Effort normal and breath sounds normal.   Abdominal: Soft. Bowel sounds are normal.   Musculoskeletal: He exhibits no edema.   Neurological: He is alert and oriented to person, place, and time.   Skin: Skin is warm and dry.   Right wrist with Band-Aid at cath site.  No hematoma no ecchymosis.   Psychiatric: He has a normal mood and affect. His behavior is normal. Judgment and thought content normal.   Nursing note and vitals reviewed.    Assessment:     1. Hyperlipidemia, unspecified hyperlipidemia type     2. Essential hypertension     3. Atrial fibrillation with rapid ventricular response (HCC)     4. Long term current use of anticoagulant therapy       Medical Decision Making:  Today's Assessment / Status / Plan:   1. CAD, status post stents overlapping to the mid RCA  -Continue ASA 81, atorvastatin 80 mg, metoprolol SR 25 mg, losartan 25 mg, and Prasugrel.    2.  HTN  -Continue diltiazem, losartan, and metoprolol    Collaborating MD is Dr. HANS Walton.

## 2018-08-17 NOTE — PATIENT INSTRUCTIONS
1. Continue ASA 81 mg, Effient 10 mg daily, and warfarin as prescribed.  2.  Discontinue aspirin December 14, 2018.  3.  Continue atorvastatin, diltiazem, losartan, metoprolol SR, prasugrel, and warfarin

## 2018-08-21 ENCOUNTER — APPOINTMENT (OUTPATIENT)
Dept: VASCULAR LAB | Facility: MEDICAL CENTER | Age: 73
End: 2018-08-21
Payer: MEDICARE

## 2018-08-21 ENCOUNTER — NON-PROVIDER VISIT (OUTPATIENT)
Dept: MEDICAL GROUP | Facility: PHYSICIAN GROUP | Age: 73
End: 2018-08-21
Payer: MEDICARE

## 2018-08-21 ENCOUNTER — ANTICOAGULATION MONITORING (OUTPATIENT)
Dept: VASCULAR LAB | Facility: MEDICAL CENTER | Age: 73
End: 2018-08-21
Payer: MEDICARE

## 2018-08-21 VITALS
OXYGEN SATURATION: 95 % | HEART RATE: 69 BPM | DIASTOLIC BLOOD PRESSURE: 70 MMHG | SYSTOLIC BLOOD PRESSURE: 134 MMHG | RESPIRATION RATE: 18 BRPM

## 2018-08-21 DIAGNOSIS — D68.9 COAGULATION DISORDER (HCC): ICD-10-CM

## 2018-08-21 DIAGNOSIS — Z79.01 LONG TERM CURRENT USE OF ANTICOAGULANT THERAPY: ICD-10-CM

## 2018-08-21 DIAGNOSIS — I48.91 ATRIAL FIBRILLATION WITH RAPID VENTRICULAR RESPONSE (HCC): ICD-10-CM

## 2018-08-21 LAB
INR BLD: 1.3 (ref 0.9–1.2)
INR PPP: 1.3 (ref 2–3.5)
POC PROTIME: ABNORMAL

## 2018-08-21 PROCEDURE — 85610 PROTHROMBIN TIME: CPT | Performed by: NURSE PRACTITIONER

## 2018-08-21 PROCEDURE — 99212 OFFICE O/P EST SF 10 MIN: CPT | Performed by: NURSE PRACTITIONER

## 2018-08-21 NOTE — PROGRESS NOTES
OP Anticoagulation Service Note    Date: 8/21/2018  Blood Pressure : 134/70  Pulse: 69  Respiration: 18    Anticoagulation Summary  As of 8/21/2018    INR goal:   2.0-3.0   TTR:   61.1 % (3.1 y)   Today's INR:   1.3!   Warfarin maintenance plan:   5 mg (5 mg x 1) every day   Weekly warfarin total:   35 mg   Plan last modified:   MARIAA Garcia (8/21/2018)   Next INR check:   8/28/2018   Target end date:   Indefinite    Indications    Atrial fibrillation with rapid ventricular response (HCC) [I48.91]  Long term current use of anticoagulant therapy [Z79.01]  Atrial fibrillation (HCC) (Resolved) [I48.91]             Anticoagulation Episode Summary     INR check location:       Preferred lab:       Send INR reminders to:       Comments:         Anticoagulation Care Providers     Provider Role Specialty Phone number    Sanjay Sorensen M.D. Referring Cardiology [phone]        Anticoagulation Patient Findings      THIS VISIT CONDUCTED WITH PRESENTER [employer]IA TELE[employer] UTILIZING SECURE AND ENCRYPTED VIDEOCONFERENCING EQUIPMENT  ROS:    Pulm: Denies SOB, chest pain.    Card: Denies syncope, edema, palpitations.    Extremities: Denies redness, pain.    PE:    Pulm: No SOB, even and unlabored.    Card: Normal rate and rhythm.   Extremities: No redness, or edema.     INR  sub-therapeutic.  Was off warfarin for surgery then started on triple therapy for 1 month. Will stop ASA after the 30 day garrick.   Denies signs/symptoms of bleeding and/or thrombosis.    Denies changes to diet or medications.   Follow up appt in 1 weeks     Plan:  Take 7.5 mg For 2 days then back to 5 mg daily.     Medication: Warfarin (Coumadin)     Sunday Monday Tuesday Wednesday Thursday Friday Saturday      5 mg    5 mg    5 mg    5 mg    5 mg    5 mg    5 mg      1 tab(s)    1 tab(s)    1 tab(s)    1 tab(s)    1 tab(s)    1 tab(s)  1 tab(s)     Next Appointment: Tuesday, Aug 282:00     Review all of your home medications and  newly ordered medications with your doctor and / or pharmacist. Follow medication instructions as directed by your doctor and / or pharmacist. Please keep your medication list with you and share with your physician. Update the information when medications are discontinued, doses are changed, or new medications (including over-the-counter products) are added; and carry medication information at all times in the event of emergency situations.      For questions, please contact Outpatient Anticoagulation Service [phone].   The patient is on a high risk medication and is sub- therapeutic. This could lead to clot formation or risk of stroke. Therefore this patient requires close monitoring and follow up.          CHEST guidelines recommend frequent INR monitoring at regular intervals (a few days up to a max of 12 weeks) to ensure they are on the proper dose of warfarin and not having any complications from therapy.  INRs can dramatically change over a short time period due to diet, medications, and medical conditions.   The patient instructed to go to the ER for falls with a head injury,  blood in urine or stool or any bleeding that last longer than 20 min.     APRIL Garcia.

## 2018-08-28 ENCOUNTER — APPOINTMENT (OUTPATIENT)
Dept: VASCULAR LAB | Facility: MEDICAL CENTER | Age: 73
End: 2018-08-28
Attending: INTERNAL MEDICINE
Payer: MEDICARE

## 2018-08-28 ENCOUNTER — ANTICOAGULATION MONITORING (OUTPATIENT)
Dept: VASCULAR LAB | Facility: MEDICAL CENTER | Age: 73
End: 2018-08-28

## 2018-08-28 ENCOUNTER — NON-PROVIDER VISIT (OUTPATIENT)
Dept: MEDICAL GROUP | Facility: PHYSICIAN GROUP | Age: 73
End: 2018-08-28
Payer: MEDICARE

## 2018-08-28 VITALS
SYSTOLIC BLOOD PRESSURE: 140 MMHG | RESPIRATION RATE: 16 BRPM | DIASTOLIC BLOOD PRESSURE: 70 MMHG | HEART RATE: 72 BPM | OXYGEN SATURATION: 92 %

## 2018-08-28 DIAGNOSIS — I48.91 ATRIAL FIBRILLATION WITH RAPID VENTRICULAR RESPONSE (HCC): ICD-10-CM

## 2018-08-28 DIAGNOSIS — D68.9 COAGULATION DISORDER (HCC): ICD-10-CM

## 2018-08-28 DIAGNOSIS — Z79.01 LONG TERM CURRENT USE OF ANTICOAGULANT THERAPY: ICD-10-CM

## 2018-08-28 LAB
INR BLD: 2.1 (ref 0.9–1.2)
INR PPP: 2.1 (ref 2–3.5)
POC PROTIME: ABNORMAL

## 2018-08-28 PROCEDURE — 85610 PROTHROMBIN TIME: CPT | Performed by: NURSE PRACTITIONER

## 2018-08-28 NOTE — PROGRESS NOTES
OP Anticoagulation Service Note    Date: 8/28/2018  Blood Pressure : 140/70  Pulse: 72  Respiration: 16    Anticoagulation Summary  As of 8/28/2018    INR goal:   2.0-3.0   TTR:   60.8 % (3.1 y)   Today's INR:   2.1   Warfarin maintenance plan:   5 mg (5 mg x 1) every day   Weekly warfarin total:   35 mg   Plan last modified:   MARIAA Garcia (8/21/2018)   Next INR check:   9/4/2018   Target end date:   Indefinite    Indications    Atrial fibrillation with rapid ventricular response (HCC) [I48.91]  Long term current use of anticoagulant therapy [Z79.01]  Atrial fibrillation (HCC) (Resolved) [I48.91]             Anticoagulation Episode Summary     INR check location:       Preferred lab:       Send INR reminders to:       Comments:         Anticoagulation Care Providers     Provider Role Specialty Phone number    Sanjay Sorensen M.D. Referring Cardiology [phone]        Anticoagulation Patient Findings      THIS VISIT CONDUCTED WITH PRES[employer] TE[employer] UTILIZING SECURE AND ENCRYPTED VIDEOCONFERENCING EQUIPMENT  ROS:    Pulm: Denies SOB, chest pain.    Card: Denies syncope, edema, palpitations.    Extremities: Denies redness, pain.    PE:    Pulm: No SOB, even and unlabored.    Card: Normal rate and rhythm.   Extremities: No redness, or edema.     INR  is-therapeutic.    Denies signs/symptoms of bleeding and/or thrombosis.    Denies changes to diet or medications.   Follow up appt in 1 weeks     Plan:  5 mg daily missed a dose last night.     Medication: Warfarin (Coumadin)     Sunday Monday Tuesday Wednesday Thursday Friday Saturday      5 mg    5 mg    5 mg    5 mg    5 mg    5 mg    5 mg      1 tab(s)    1 tab(s)    1 tab(s)    1 tab(s)    1 tab(s)    1 tab(s)  1 tab(s)     Next Appointment: Tuesday, Sept 4 @     Review all of your home medications and newly ordered medications with your doctor and / or pharmacist. Follow medication instructions as directed by your doctor and /  or pharmacist. Please keep your medication list with you and share with your physician. Update the information when medications are discontinued, doses are changed, or new medications (including over-the-counter products) are added; and carry medication information at all times in the event of emergency situations.      For questions, please contact Outpatient Anticoagulation Service [phone].      Patient is on a high risk medication and therefore requires close monitoring and follow up.     CHEST guidelines recommend frequent INR monitoring at regular intervals (a few days up to a max of 12 weeks) to ensure they are on the proper dose of warfarin and not having any complications from therapy.  INRs can dramatically change over a short time period due to diet, medications, and medical conditions.   The patient instructed to go to the ER for falls with a head injury,  blood in urine or stool or any bleeding that last longer than 20 min.     APRIL Garcia.

## 2018-09-04 ENCOUNTER — ANTICOAGULATION MONITORING (OUTPATIENT)
Dept: VASCULAR LAB | Facility: MEDICAL CENTER | Age: 73
End: 2018-09-04
Payer: MEDICARE

## 2018-09-04 ENCOUNTER — NON-PROVIDER VISIT (OUTPATIENT)
Dept: MEDICAL GROUP | Facility: PHYSICIAN GROUP | Age: 73
End: 2018-09-04
Payer: MEDICARE

## 2018-09-04 ENCOUNTER — APPOINTMENT (OUTPATIENT)
Dept: VASCULAR LAB | Facility: MEDICAL CENTER | Age: 73
End: 2018-09-04
Payer: MEDICARE

## 2018-09-04 VITALS
OXYGEN SATURATION: 94 % | DIASTOLIC BLOOD PRESSURE: 66 MMHG | RESPIRATION RATE: 16 BRPM | HEART RATE: 77 BPM | SYSTOLIC BLOOD PRESSURE: 120 MMHG

## 2018-09-04 DIAGNOSIS — Z79.01 LONG TERM CURRENT USE OF ANTICOAGULANT THERAPY: ICD-10-CM

## 2018-09-04 DIAGNOSIS — I48.91 ATRIAL FIBRILLATION WITH RAPID VENTRICULAR RESPONSE (HCC): ICD-10-CM

## 2018-09-04 DIAGNOSIS — D68.9 COAGULATION DISORDER (HCC): ICD-10-CM

## 2018-09-04 LAB
INR BLD: 2.4 (ref 0.9–1.2)
INR PPP: 2.4 (ref 2–3.5)
POC PROTIME: ABNORMAL

## 2018-09-04 PROCEDURE — 99212 OFFICE O/P EST SF 10 MIN: CPT | Performed by: NURSE PRACTITIONER

## 2018-09-04 PROCEDURE — 85610 PROTHROMBIN TIME: CPT | Performed by: NURSE PRACTITIONER

## 2018-09-04 NOTE — PROGRESS NOTES
OP Anticoagulation Service Note    Date: 9/4/2018  Blood Pressure : 120/66  Pulse: 77  Respiration: 16    Anticoagulation Summary  As of 9/4/2018    INR goal:   2.0-3.0   TTR:   61.1 % (3.1 y)   Today's INR:   2.4   Warfarin maintenance plan:   2.5 mg (5 mg x 0.5) on Mon; 5 mg (5 mg x 1) all other days   Weekly warfarin total:   32.5 mg   Plan last modified:   MARIAA Garcia (9/4/2018)   Next INR check:      Target end date:   Indefinite    Indications    Atrial fibrillation with rapid ventricular response (HCC) [I48.91]  Long term current use of anticoagulant therapy [Z79.01]  Atrial fibrillation (HCC) (Resolved) [I48.91]             Anticoagulation Episode Summary     INR check location:       Preferred lab:       Send INR reminders to:       Comments:         Anticoagulation Care Providers     Provider Role Specialty Phone number    Sanjay Sorensen M.D. Referring Cardiology [phone]        Anticoagulation Patient Findings      THIS VISIT CONDUCTED WITH PRES[employer] TELE[employer] UTILIZING SECURE AND ENCRYPTED VIDEOCONFERENCING EQUIPMENT  ROS:    Pulm: Denies SOB, chest pain.    Card: Denies syncope, edema, palpitations.    Extremities: Denies redness, pain.    PE:    Pulm: No SOB, even and unlabored.    Card: Normal rate and rhythm.   Extremities: No redness, or edema.     INR  is-therapeutic.    Denies signs/symptoms of bleeding and/or thrombosis.    Denies changes to diet or medications.   Follow up appt in 2 weeks     Plan:  Decrease dose to 2.5 mg on Monday.     Medication: Warfarin (Coumadin)     Sunday Monday Tuesday Wednesday Thursday Friday Saturday      5 mg    2.5 mg    5 mg    5 mg    5 mg    5 mg    5 mg      1 tab(s)    1/2 tab(s)    1 tab(s)    1 tab(s)    1 tab(s)    1 tab(s)  1 tab(s)     Next Appointment: Tuesday, Sept 18 @  2:45    Review all of your home medications and newly ordered medications with your doctor and / or pharmacist. Follow medication instructions  as directed by your doctor and / or pharmacist. Please keep your medication list with you and share with your physician. Update the information when medications are discontinued, doses are changed, or new medications (including over-the-counter products) are added; and carry medication information at all times in the event of emergency situations.      For questions, please contact Outpatient Anticoagulation Service [phone].        Patient is on a high risk medication and therefore requires close monitoring and follow up.     CHEST guidelines recommend frequent INR monitoring at regular intervals (a few days up to a max of 12 weeks) to ensure they are on the proper dose of warfarin and not having any complications from therapy.  INRs can dramatically change over a short time period due to diet, medications, and medical conditions.   The patient instructed to go to the ER for falls with a head injury,  blood in urine or stool or any bleeding that last longer than 20 min.     APRIL Garcia.

## 2018-09-05 ENCOUNTER — TELEPHONE (OUTPATIENT)
Dept: CARDIOLOGY | Facility: MEDICAL CENTER | Age: 73
End: 2018-09-05

## 2018-09-05 NOTE — TELEPHONE ENCOUNTER
"Problem with bruising on arms   Received: Yesterday   Message Contents   MADELINE Hillman/Dena     Patient is on Aspirin, Prasugrel and Warfarin and is having problems with bruising on his arms. He wants to find out if he can get off the Aspirin and can be reached at [phone].      Discussed with DB.  ASA to be discontinued after 1 month post procedure. Continue Prasugrel and Coumadin.     Patient states he bumped his arm, and has a 3\"x3\" bruise.  Developed another bruise and he's unsure how that developed.      Does state he had some blood from his nose when he wiped his nose.  Denies any heavy epistaxis. Denies any blood in stool/urine or dark/black stool. Denies light headedness/dizziness.  Denies CP.     Advised patient to stop ASA on 9/15/18 and continue the other two blood thinners.  Continue to follow the coumadin clinic.  Monitor bruising, advised to seek evaluation if bruising worsens and develops hardness/warmth. If patient does develop epistaxis and bleeding is not controlled after 15 minutes, to seek UC or ER.  Call clinic if patient develops any new or worsening symptoms. Discussed s/s of severe bleeding, chest pain, chest pressure, shortness of breath, difficulty breathing and when to initiate EMS.  Patient verbalizes understanding.     "

## 2018-09-18 ENCOUNTER — ANTICOAGULATION MONITORING (OUTPATIENT)
Dept: VASCULAR LAB | Facility: MEDICAL CENTER | Age: 73
End: 2018-09-18
Payer: MEDICARE

## 2018-09-18 ENCOUNTER — NON-PROVIDER VISIT (OUTPATIENT)
Dept: MEDICAL GROUP | Facility: PHYSICIAN GROUP | Age: 73
End: 2018-09-18
Payer: MEDICARE

## 2018-09-18 ENCOUNTER — APPOINTMENT (OUTPATIENT)
Dept: VASCULAR LAB | Facility: MEDICAL CENTER | Age: 73
End: 2018-09-18
Payer: MEDICARE

## 2018-09-18 VITALS
RESPIRATION RATE: 19 BRPM | DIASTOLIC BLOOD PRESSURE: 70 MMHG | SYSTOLIC BLOOD PRESSURE: 130 MMHG | OXYGEN SATURATION: 92 % | HEART RATE: 77 BPM

## 2018-09-18 DIAGNOSIS — D68.9 COAGULATION DISORDER (HCC): ICD-10-CM

## 2018-09-18 DIAGNOSIS — Z79.01 LONG TERM CURRENT USE OF ANTICOAGULANT THERAPY: ICD-10-CM

## 2018-09-18 DIAGNOSIS — I48.91 ATRIAL FIBRILLATION WITH RAPID VENTRICULAR RESPONSE (HCC): ICD-10-CM

## 2018-09-18 LAB
INR BLD: 2.5 (ref 0.9–1.2)
INR PPP: 2.5 (ref 2–3.5)
POC PROTIME: ABNORMAL

## 2018-09-18 PROCEDURE — 99212 OFFICE O/P EST SF 10 MIN: CPT | Performed by: NURSE PRACTITIONER

## 2018-09-18 PROCEDURE — 85610 PROTHROMBIN TIME: CPT | Performed by: PHYSICIAN ASSISTANT

## 2018-09-18 NOTE — PROGRESS NOTES
OP Anticoagulation Service Note    Date: 9/18/2018  Blood Pressure : 130/70  Pulse: 77  Respiration: 19    Anticoagulation Summary  As of 9/18/2018    INR goal:   2.0-3.0   TTR:   61.5 % (3.2 y)   Today's INR:   2.5   Warfarin maintenance plan:   5 mg (5 mg x 1) every day   Weekly warfarin total:   35 mg   Plan last modified:   MARIAA Garcia (9/18/2018)   Next INR check:   10/2/2018   Target end date:   Indefinite    Indications    Atrial fibrillation with rapid ventricular response (HCC) [I48.91]  Long term current use of anticoagulant therapy [Z79.01]  Atrial fibrillation (HCC) (Resolved) [I48.91]             Anticoagulation Episode Summary     INR check location:       Preferred lab:       Send INR reminders to:       Comments:         Anticoagulation Care Providers     Provider Role Specialty Phone number    Sanjay Sorensen M.D. Referring Cardiology [phone]        Anticoagulation Patient Findings      THIS VISIT CONDUCTED WITH PRES[employer] TELE[employer] UTILIZING SECURE AND ENCRYPTED VIDEOCONFERENCING EQUIPMENT  ROS:    Pulm: Denies SOB, chest pain.    Card: Denies syncope, edema, palpitations.    Extremities: Denies redness, pain.    PE:    Pulm: No SOB, even and unlabored.    Card: Normal rate and rhythm.   Extremities: No redness, or edema.     INR  is-therapeutic.    Denies signs/symptoms of bleeding and/or thrombosis.    Denies changes to diet or medications.   Follow up appt in 2 weeks     Plan:  5 mg daily off ASA on Beet powder.     Medication: Warfarin (Coumadin)     Sunday Monday Tuesday Wednesday Thursday Friday Saturday      5 mg    5 mg    5 mg    5 mg    5 mg    5 mg    5 mg      1 tab(s)    1 tab(s)    1 tab(s)    1 tab(s)    1 tab(s)    1 tab(s)  1 tab(s)     Next Appointment: Tuesday, Oct 2 @ 2:30    Review all of your home medications and newly ordered medications with your doctor and / or pharmacist. Follow medication instructions as directed by your doctor and  / or pharmacist. Please keep your medication list with you and share with your physician. Update the information when medications are discontinued, doses are changed, or new medications (including over-the-counter products) are added; and carry medication information at all times in the event of emergency situations.      For questions, please contact Outpatient Anticoagulation Service [phone].      Patient is on a high risk medication and therefore requires close monitoring and follow up.     CHEST guidelines recommend frequent INR monitoring at regular intervals (a few days up to a max of 12 weeks) to ensure they are on the proper dose of warfarin and not having any complications from therapy.  INRs can dramatically change over a short time period due to diet, medications, and medical conditions.   The patient instructed to go to the ER for falls with a head injury,  blood in urine or stool or any bleeding that last longer than 20 min.     APRIL Garcia.

## 2018-10-02 ENCOUNTER — APPOINTMENT (OUTPATIENT)
Dept: VASCULAR LAB | Facility: MEDICAL CENTER | Age: 73
End: 2018-10-02
Payer: MEDICARE

## 2018-10-08 ENCOUNTER — OFFICE VISIT (OUTPATIENT)
Dept: CARDIOLOGY | Facility: MEDICAL CENTER | Age: 73
End: 2018-10-08
Payer: MEDICARE

## 2018-10-08 VITALS
HEART RATE: 82 BPM | HEIGHT: 72 IN | DIASTOLIC BLOOD PRESSURE: 88 MMHG | SYSTOLIC BLOOD PRESSURE: 142 MMHG | BODY MASS INDEX: 34.54 KG/M2 | WEIGHT: 255 LBS | OXYGEN SATURATION: 98 %

## 2018-10-08 DIAGNOSIS — E78.5 DYSLIPIDEMIA: ICD-10-CM

## 2018-10-08 DIAGNOSIS — Z79.01 LONG TERM CURRENT USE OF ANTICOAGULANT THERAPY: Chronic | ICD-10-CM

## 2018-10-08 DIAGNOSIS — Z95.5 S/P CORONARY ARTERY STENT PLACEMENT: ICD-10-CM

## 2018-10-08 DIAGNOSIS — I48.20 ATRIAL FIBRILLATION, CHRONIC (HCC): ICD-10-CM

## 2018-10-08 DIAGNOSIS — I25.10 CORONARY ARTERY DISEASE, NON-OCCLUSIVE: ICD-10-CM

## 2018-10-08 DIAGNOSIS — I10 ESSENTIAL HYPERTENSION, BENIGN: ICD-10-CM

## 2018-10-08 PROCEDURE — 99214 OFFICE O/P EST MOD 30 MIN: CPT | Performed by: INTERNAL MEDICINE

## 2018-10-08 ASSESSMENT — ENCOUNTER SYMPTOMS
CHILLS: 0
MYALGIAS: 0
INSOMNIA: 0
ORTHOPNEA: 0
FEVER: 0
PALPITATIONS: 0
SHORTNESS OF BREATH: 0
LOSS OF CONSCIOUSNESS: 0
BLURRED VISION: 0
ABDOMINAL PAIN: 0
BRUISES/BLEEDS EASILY: 1
PND: 0
DIZZINESS: 0

## 2018-10-08 NOTE — PROGRESS NOTES
Chief Complaint   Patient presents with   •  CAD       Subjective:   Yuri De León is a 73 y.o. male who presents today for follow-up evaluation of CAD, NSTEMI, RCA stent, chronic fibrillation, chronic anticoagulation, hypertension and hyperlipidemia.    Last seen at the time of his coronary intervention on 8/15/2018.    Since his coronary intervention he has had no cardiac symptoms.  He is completed 1 month of triple drug therapy and currently on warfarin and Prasugrel.    Past Medical History:   Diagnosis Date   • Abnormal electrocardiogram 8/13/2010   • Arrhythmia     Atrial Fibrillation; Cardiologist, Dr. Sorensen   • Arthritis     knees, left hip   • ASTHMA     inhalers   • Atrial fibrillation (HCC) 10/25/2011   • Bronchospasm 8/6/2009   • Diabetes (HCC)     insulin   • HTN (hypertension) 10/25/2011   • Hypercholesteremia 10/25/2011   • Long term (current) use of anticoagulants 10/25/2011   • NASAL POLYPS 8/6/2009   • Other nonspecific abnormal finding 8/6/2009   • Pain     left hip   • Shortness of breath    • Sleep apnea 8/6/2009    o2 at night   • Wears glasses      Past Surgical History:   Procedure Laterality Date   • KNEE ARTHROPLASTY TOTAL Left 10/2014   • HIP ARTHROPLASTY TOTAL  8/31/2010    Performed by OSGOOD, PATRICK J at SURGERY [facility] ORS   • LUMBAR DECOMPRESSION  1984   • HIP REPLACEMENT, TOTAL     • LAMINOTOMY     • SINUSCOPE     • SINUSOTOMIES  2003,2005,2/2007     Family History   Problem Relation Age of Onset   • Heart Disease Mother      Social History     Social History   • Marital status:      Spouse name: N/A   • Number of children: N/A   • Years of education: N/A     Occupational History   • Not on file.     Social History Main Topics   • Smoking status: Former Smoker     Packs/day: 0.50     Years: 10.00     Types: Cigarettes     Quit date: 12/10/1968   • Smokeless tobacco: Never Used   • Alcohol use No   • Drug use: No   • Sexual activity: Not on file     Other  "Topics Concern   • Not on file     Social History Narrative   • No narrative on file     Allergies   Allergen Reactions   • Atenolol Shortness of Breath     DYSPNEA.   • Tetanus Toxoid Swelling     Outpatient Encounter Prescriptions as of 10/8/2018   Medication Sig Dispense Refill   • insulin lispro prot & lispro (HUMALOG MIX) (75-25) unit/mL Suspension inj Inject  as instructed 3 times a day before meals.     • atorvastatin (LIPITOR) 80 MG tablet Take 1 Tab by mouth every day. 30 Tab 2   • metoprolol SR (TOPROL XL) 25 MG TABLET SR 24 HR Take 1 Tab by mouth every day. 30 Tab 2   • prasugrel (EFFIENT) 10 MG Tab Take 1 Tab by mouth every day. 30 Tab 3   • budesonide-formoterol (SYMBICORT) 160-4.5 MCG/ACT Aerosol Inhale 2 Puffs by mouth 2 Times a Day. Use spacer. Rinse mouth after each use. 3 Inhaler 3   • Non Formulary Request Humalog-PRN     • TRUEPLUS PEN NEEDLES 31G X 6 MM Misc      • Insulin Pen Needle (NOVOFINE PLUS) 32G X 4 MM Misc Novofine 32 32 gauge x 1/4\" needle   use 1 needle daily     • Insulin Pen Needle (TRUEPLUS PEN NEEDLES) 29G X 12MM Misc TRUEplus Pen Needle 31 gauge x 1/4\"     • DILTIAZem (CARDIZEM) 60 MG Tab Take 1 Tab by mouth 3 times a day. 90 Tab 0   • albuterol (PROVENTIL) 2.5mg/3ml Nebu Soln solution for nebulization 2.5 mg by Nebulization route every four hours as needed for Shortness of Breath.     • losartan (COZAAR) 25 MG Tab Take 25 mg by mouth every evening.     • insulin detemir (LEVEMIR) 100 UNIT/ML Solution Inject 12 Units as instructed every evening.     • warfarin (COUMADIN) 5 MG Tab TAKE ONE TABLET BY MOUTH ONE TIME DAILY OR AS DIRECTED BY COUMADIN CLINIC 90 Tab 1   • montelukast (SINGULAIR) 10 MG Tab Take 1 Tab by mouth every day. 30 Tab 11   • fluticasone (FLONASE) 50 MCG/ACT nasal spray Spray 2 Sprays in nose every day.     • albuterol (VENTOLIN OR PROVENTIL) 108 (90 BASE) MCG/ACT AERS Inhale 2 Puffs by mouth every 6 hours as needed.     • aspirin (ASA) 81 MG Chew Tab chewable " tablet Take 81 mg by mouth every day.     • fluticasone (FLOVENT HFA) 110 MCG/ACT Aerosol Flovent  mcg/actuation aerosol inhaler     • furosemide (LASIX) 40 MG Tab furosemide 40 mg tablet   Take 1 tablet every day by oral route as needed.     • insulin detemir (LEVEMIR FLEXTOUCH) 100 UNIT/ML Solution Pen-injector injection Levemir FlexTouch U-100 Insulin 100 unit/mL (3 mL) subcutaneous pen     • Potassium Chloride Crystals potassium chloride ER 10 mEq tablet,extended release   Take 1 tablet every day by oral route.       No facility-administered encounter medications on file as of 10/8/2018.      Review of Systems   Constitutional: Negative for chills and fever.   HENT: Negative for congestion.    Eyes: Negative for blurred vision.   Respiratory: Negative for shortness of breath.    Cardiovascular: Negative for chest pain, palpitations, orthopnea, leg swelling and PND.   Gastrointestinal: Negative for abdominal pain.   Genitourinary: Negative for dysuria.   Musculoskeletal: Negative for joint pain and myalgias.   Skin: Negative for rash.   Neurological: Negative for dizziness and loss of consciousness.   Endo/Heme/Allergies: Bruises/bleeds easily.   Psychiatric/Behavioral: The patient does not have insomnia.         Objective:   /88   Pulse 82   Ht 1.829 m (6')   Wt 115.7 kg (255 lb)   SpO2 98%   BMI 34.58 kg/m²     Physical Exam   Constitutional: He is oriented to person, place, and time. He appears well-developed and well-nourished.   Neck: No JVD present.   Cardiovascular: Normal rate, normal heart sounds and intact distal pulses.  An irregularly irregular rhythm present.   No murmur heard.  Pulmonary/Chest: Effort normal and breath sounds normal. No respiratory distress. He has no wheezes. He has no rales.   Musculoskeletal: He exhibits no edema.   Neurological: He is alert and oriented to person, place, and time.   Skin: Skin is warm and dry.   Psychiatric: He has a normal mood and affect. His  behavior is normal.       Assessment:     1. Coronary artery disease, non-occlusive     2. S/P coronary artery stent placement     3. Essential hypertension, benign     4. Dyslipidemia     5. Atrial fibrillation, chronic (HCC)     6. Long term current use of anticoagulant therapy         Medical Decision Making:  Today's Assessment / Status / Plan:     1.  The patient's current cardiovascular status is stable.  2.  Continue warfarin, Prasugrel, metoprolol and atorvastatin.  3.  Follow-up in anticoagulation clinic.  4.  Follow-up 4 months.

## 2018-10-09 DIAGNOSIS — J44.9 CHRONIC OBSTRUCTIVE PULMONARY DISEASE, UNSPECIFIED COPD TYPE (HCC): ICD-10-CM

## 2018-10-09 RX ORDER — BUDESONIDE AND FORMOTEROL FUMARATE DIHYDRATE 160; 4.5 UG/1; UG/1
2 AEROSOL RESPIRATORY (INHALATION) 2 TIMES DAILY
Qty: 1 INHALER | Refills: 11 | Status: ON HOLD | OUTPATIENT
Start: 2018-10-09 | End: 2019-06-17

## 2018-10-09 NOTE — TELEPHONE ENCOUNTER
Have we ever prescribed this med? Yes.  If yes, what date? 07/31/2018 -    Last OV: 08/14/2018 - Dr. Jacques    Next OV: 02/12/2019 - A rotation     DX: COPD    Medications: Symbicort

## 2018-10-15 DIAGNOSIS — I10 ESSENTIAL HYPERTENSION: ICD-10-CM

## 2018-10-15 DIAGNOSIS — I48.20 CHRONIC ATRIAL FIBRILLATION (HCC): ICD-10-CM

## 2018-10-15 DIAGNOSIS — E78.5 DYSLIPIDEMIA: ICD-10-CM

## 2018-10-15 RX ORDER — METOPROLOL SUCCINATE 25 MG/1
25 TABLET, EXTENDED RELEASE ORAL DAILY
Qty: 30 TAB | Refills: 6 | Status: SHIPPED | OUTPATIENT
Start: 2018-10-15 | End: 2019-05-14 | Stop reason: SDUPTHER

## 2018-10-15 RX ORDER — ATORVASTATIN CALCIUM 80 MG/1
80 TABLET, FILM COATED ORAL DAILY
Qty: 30 TAB | Refills: 6 | Status: SHIPPED | OUTPATIENT
Start: 2018-10-15 | End: 2019-02-11 | Stop reason: SDUPTHER

## 2018-10-16 ENCOUNTER — APPOINTMENT (OUTPATIENT)
Dept: VASCULAR LAB | Facility: MEDICAL CENTER | Age: 73
End: 2018-10-16
Payer: MEDICARE

## 2018-10-23 ENCOUNTER — ANTICOAGULATION MONITORING (OUTPATIENT)
Dept: VASCULAR LAB | Facility: MEDICAL CENTER | Age: 73
End: 2018-10-23
Payer: MEDICARE

## 2018-10-23 ENCOUNTER — NON-PROVIDER VISIT (OUTPATIENT)
Dept: MEDICAL GROUP | Facility: PHYSICIAN GROUP | Age: 73
End: 2018-10-23
Payer: MEDICARE

## 2018-10-23 ENCOUNTER — APPOINTMENT (OUTPATIENT)
Dept: VASCULAR LAB | Facility: MEDICAL CENTER | Age: 73
End: 2018-10-23
Payer: MEDICARE

## 2018-10-23 VITALS
HEART RATE: 69 BPM | OXYGEN SATURATION: 96 % | SYSTOLIC BLOOD PRESSURE: 130 MMHG | DIASTOLIC BLOOD PRESSURE: 80 MMHG | RESPIRATION RATE: 18 BRPM

## 2018-10-23 DIAGNOSIS — D68.9 COAGULATION DISORDER (HCC): ICD-10-CM

## 2018-10-23 DIAGNOSIS — I48.91 ATRIAL FIBRILLATION WITH RAPID VENTRICULAR RESPONSE (HCC): ICD-10-CM

## 2018-10-23 DIAGNOSIS — Z79.01 LONG TERM CURRENT USE OF ANTICOAGULANT THERAPY: ICD-10-CM

## 2018-10-23 LAB
INR BLD: 1.6 (ref 0.9–1.2)
INR PPP: 1.6 (ref 2–3.5)
POC PROTIME: ABNORMAL

## 2018-10-23 PROCEDURE — 85610 PROTHROMBIN TIME: CPT | Performed by: NURSE PRACTITIONER

## 2018-10-23 PROCEDURE — 99212 OFFICE O/P EST SF 10 MIN: CPT | Performed by: NURSE PRACTITIONER

## 2018-10-23 NOTE — PROGRESS NOTES
OP Anticoagulation Service Note    Date: 10/23/2018  Blood Pressure : 130/80  Pulse: 69  Respiration: 18    Anticoagulation Summary  As of 10/23/2018    INR goal:   2.0-3.0   TTR:   61.4 % (3.3 y)   Today's INR:   1.6!   Warfarin maintenance plan:   5 mg (5 mg x 1) every day   Weekly warfarin total:   35 mg   Plan last modified:   MARIAA Garcia (9/18/2018)   Next INR check:   10/30/2018   Target end date:   Indefinite    Indications    Atrial fibrillation with rapid ventricular response (HCC) [I48.91]  Long term current use of anticoagulant therapy [Z79.01]  Atrial fibrillation (HCC) (Resolved) [I48.91]             Anticoagulation Episode Summary     INR check location:       Preferred lab:       Send INR reminders to:       Comments:         Anticoagulation Care Providers     Provider Role Specialty Phone number    Sanjay Sorensen M.D. Referring Cardiology [phone]        Anticoagulation Patient Findings      THIS VISIT CONDUCTED WITH PRESENTER VIA TELEMEDICINE UTILIZING SECURE AND ENCRYPTED VIDEOCONFERENCING EQUIPMENT  ROS:    Pulm: Denies SOB, chest pain.    Card: Denies syncope, edema, palpitations.    Extremities: Denies redness, pain.    PE:    Pulm: No SOB, even and unlabored.    Card: Normal rate and rhythm.   Extremities: No redness, or edema.     INR  sub-therapeutic.    Denies signs/symptoms of bleeding and/or thrombosis.    Denies changes to diet or medications.   Follow up appt in 1 weeks Missed a dose last sat.     Plan:  Take 7.5 mg on tues and Thursday this week, 5 mg rest of the week and test in 2 week.     Medication: Warfarin (Coumadin)     Sunday Monday Tuesday Wednesday Thursday Friday Saturday      5 mg    5 mg    5 mg    5 mg    5 mg    5 mg    5 mg      1 tab(s)    1 tab(s)    1 tab(s)    1 tab(s)    1 tab(s)    1 tab(s)  1 tab(s)     Next Appointment: Tuesday, Nov 6 @ 1:15      Review all of your home medications and newly ordered medications with your doctor  and / or pharmacist. Follow medication instructions as directed by your doctor and / or pharmacist. Please keep your medication list with you and share with your physician. Update the information when medications are discontinued, doses are changed, or new medications (including over-the-counter products) are added; and carry medication information at all times in the event of emergency situations.      For questions, please contact Outpatient Anticoagulation Service [phone].   The patient is on a high risk medication and is sub- therapeutic. This could lead to clot formation or risk of stroke. Therefore this patient requires close monitoring and follow up.      CHEST guidelines recommend frequent INR monitoring at regular intervals (a few days up to a max of 12 weeks) to ensure they are on the proper dose of warfarin and not having any complications from therapy.  INRs can dramatically change over a short time period due to diet, medications, and medical conditions.   The patient instructed to go to the ER for falls with a head injury,  blood in urine or stool or any bleeding that last longer than 20 min.     APRIL Garcia.

## 2018-10-30 ENCOUNTER — TELEPHONE (OUTPATIENT)
Dept: PULMONOLOGY | Facility: HOSPICE | Age: 73
End: 2018-10-30

## 2018-10-30 DIAGNOSIS — R05.9 COUGH: ICD-10-CM

## 2018-10-30 RX ORDER — AZITHROMYCIN 250 MG/1
TABLET, FILM COATED ORAL
Qty: 6 TAB | Refills: 0 | Status: SHIPPED | OUTPATIENT
Start: 2018-10-30 | End: 2019-02-15

## 2018-10-30 RX ORDER — METHYLPREDNISOLONE 4 MG/1
TABLET ORAL
Qty: 21 TAB | Refills: 0 | Status: SHIPPED | OUTPATIENT
Start: 2018-10-30 | End: 2019-02-15

## 2018-10-30 NOTE — TELEPHONE ENCOUNTER
RX signed for zpak and medrol dosepak. May also use mucinex OTC and drink plenty of fluids. IF symptoms get worse, report to UC/ER or PCP.

## 2018-10-30 NOTE — TELEPHONE ENCOUNTER
Caller Name:  Yuri De León  Call Back Number: [phone] (home)      Patient approves a detailed voicemail message: yes    Patient's symptoms    Consititutional: productive cough    Fever: absent    Cough: productive of white sputum    Is this a new symptom: No    Onset of symptom: several days ago    Frequency of symptom: intermittent    Has the patient tried anything to resolve symptoms: Yes    Has the patient taken their nebulizer/rescue inhaler: yes    Additional details: The patient was very hard to understand as it sounded like he was driving...very echoey.  He is coughing up off-white sputum and is asking for antibiotics.    Action taken per Active Symptom guide: Patient is asking for antibiotics    Patient agrees to recommended action per Active Symptom Escalation Protocol.

## 2018-11-06 ENCOUNTER — NON-PROVIDER VISIT (OUTPATIENT)
Dept: MEDICAL GROUP | Facility: PHYSICIAN GROUP | Age: 73
End: 2018-11-06
Payer: MEDICARE

## 2018-11-06 ENCOUNTER — ANTICOAGULATION MONITORING (OUTPATIENT)
Dept: VASCULAR LAB | Facility: MEDICAL CENTER | Age: 73
End: 2018-11-06
Payer: MEDICARE

## 2018-11-06 ENCOUNTER — APPOINTMENT (OUTPATIENT)
Dept: VASCULAR LAB | Facility: MEDICAL CENTER | Age: 73
End: 2018-11-06
Payer: MEDICARE

## 2018-11-06 VITALS
OXYGEN SATURATION: 93 % | DIASTOLIC BLOOD PRESSURE: 70 MMHG | SYSTOLIC BLOOD PRESSURE: 140 MMHG | HEART RATE: 96 BPM | RESPIRATION RATE: 19 BRPM

## 2018-11-06 DIAGNOSIS — I48.91 ATRIAL FIBRILLATION WITH RAPID VENTRICULAR RESPONSE (HCC): ICD-10-CM

## 2018-11-06 DIAGNOSIS — D68.9 COAGULATION DISORDER (HCC): ICD-10-CM

## 2018-11-06 DIAGNOSIS — Z79.01 LONG TERM CURRENT USE OF ANTICOAGULANT THERAPY: ICD-10-CM

## 2018-11-06 LAB
INR BLD: 3.3 (ref 0.9–1.2)
INR PPP: 3.3 (ref 2–3.5)
POC PROTIME: ABNORMAL

## 2018-11-06 PROCEDURE — 99212 OFFICE O/P EST SF 10 MIN: CPT | Performed by: NURSE PRACTITIONER

## 2018-11-06 PROCEDURE — 85610 PROTHROMBIN TIME: CPT | Performed by: NURSE PRACTITIONER

## 2018-11-06 NOTE — PROGRESS NOTES
OP Anticoagulation Service Note    Date: 11/6/2018  Blood Pressure : 140/70  Pulse: 96  Respiration: 19    Anticoagulation Summary  As of 11/6/2018    INR goal:   2.0-3.0   TTR:   61.3 % (3.3 y)   Today's INR:   3.3!   Warfarin maintenance plan:   5 mg (5 mg x 1) every day   Weekly warfarin total:   35 mg   Plan last modified:   MARIAA Garcia (9/18/2018)   Next INR check:   11/20/2018   Target end date:   Indefinite    Indications    Atrial fibrillation with rapid ventricular response (HCC) [I48.91]  Long term current use of anticoagulant therapy [Z79.01]  Atrial fibrillation (HCC) (Resolved) [I48.91]             Anticoagulation Episode Summary     INR check location:       Preferred lab:       Send INR reminders to:       Comments:         Anticoagulation Care Providers     Provider Role Specialty Phone number    Sanjay Sorensen M.D. Referring Cardiology [phone]        Anticoagulation Patient Findings      THIS VISIT CONDUCTED WITH PRESENTER [employer]IA TELE[employer]ICINE UTILIZING SECURE AND ENCRYPTED VIDEOCONFERENCING EQUIPMENT  ROS:    Pulm: Denies SOB, chest pain.    Card: Denies syncope, edema, palpitations.    Extremities: Denies redness, pain.    PE:    Pulm: No SOB, even and unlabored.    Card: Normal rate and rhythm.   Extremities: No redness, or edema.     INR  supr-therapeutic.  On Medrol dose greta and missed one dose  Denies signs/symptoms of bleeding and/or thrombosis.    Denies changes to diet or medications.   Follow up appt in 2 weeks     Plan:  Take 2.5 mg tonight then follow below schedule    Medication: Warfarin (Coumadin)     Sunday Monday Tuesday Wednesday Thursday Friday Saturday      5 mg    5 mg    5 mg    5 mg    5 mg    5 mg    5 mg      1 tab(s)    1 tab(s)    1 tab(s)    1 tab(s)    1 tab(s)    1 tab(s)  1 tab(s)     Next Appointment: Tuesday, Nov 20 @ 1:30      Review all of your home medications and newly ordered medications with your doctor and / or pharmacist.  Follow medication instructions as directed by your doctor and / or pharmacist. Please keep your medication list with you and share with your physician. Update the information when medications are discontinued, doses are changed, or new medications (including over-the-counter products) are added; and carry medication information at all times in the event of emergency situations.      For questions, please contact Outpatient Anticoagulation Service [phone].     The patient is on a high risk medication and is supra-therapeudic. This increases the patients risk of bleeding and therefore requires frequent monitoring and follow up.       CHEST guidelines recommend frequent INR monitoring at regular intervals (a few days up to a max of 12 weeks) to ensure they are on the proper dose of warfarin and not having any complications from therapy.  INRs can dramatically change over a short time period due to diet, medications, and medical conditions.    The patient instructed to go to the ER for falls with a head injury,  blood in urine or stool or any bleeding that last longer than 20 min.     APRIL Garcia.

## 2018-11-13 DIAGNOSIS — I25.119 CORONARY ARTERY DISEASE WITH ANGINA PECTORIS, UNSPECIFIED VESSEL OR LESION TYPE, UNSPECIFIED WHETHER NATIVE OR TRANSPLANTED HEART (HCC): Primary | ICD-10-CM

## 2018-11-13 RX ORDER — PRASUGREL 10 MG/1
10 TABLET, FILM COATED ORAL DAILY
Qty: 30 TAB | Refills: 6 | Status: SHIPPED | OUTPATIENT
Start: 2018-11-13 | End: 2019-05-31 | Stop reason: SDUPTHER

## 2018-11-20 ENCOUNTER — NON-PROVIDER VISIT (OUTPATIENT)
Dept: MEDICAL GROUP | Facility: PHYSICIAN GROUP | Age: 73
End: 2018-11-20
Payer: MEDICARE

## 2018-11-20 ENCOUNTER — ANTICOAGULATION MONITORING (OUTPATIENT)
Dept: VASCULAR LAB | Facility: MEDICAL CENTER | Age: 73
End: 2018-11-20
Payer: MEDICARE

## 2018-11-20 ENCOUNTER — APPOINTMENT (OUTPATIENT)
Dept: VASCULAR LAB | Facility: MEDICAL CENTER | Age: 73
End: 2018-11-20
Payer: MEDICARE

## 2018-11-20 VITALS
RESPIRATION RATE: 16 BRPM | HEART RATE: 84 BPM | DIASTOLIC BLOOD PRESSURE: 78 MMHG | OXYGEN SATURATION: 94 % | SYSTOLIC BLOOD PRESSURE: 142 MMHG

## 2018-11-20 DIAGNOSIS — D68.9 COAGULATION DISORDER (HCC): ICD-10-CM

## 2018-11-20 DIAGNOSIS — I48.91 ATRIAL FIBRILLATION WITH RAPID VENTRICULAR RESPONSE (HCC): ICD-10-CM

## 2018-11-20 DIAGNOSIS — Z79.01 LONG TERM CURRENT USE OF ANTICOAGULANT THERAPY: ICD-10-CM

## 2018-11-20 LAB
INR BLD: 2.6 (ref 0.9–1.2)
INR PPP: 2.6 (ref 2–3.5)
POC PROTIME: ABNORMAL

## 2018-11-20 PROCEDURE — 85610 PROTHROMBIN TIME: CPT | Performed by: NURSE PRACTITIONER

## 2018-11-20 PROCEDURE — 99212 OFFICE O/P EST SF 10 MIN: CPT | Performed by: NURSE PRACTITIONER

## 2018-11-20 NOTE — PROGRESS NOTES
OP Anticoagulation Service Note    Date: 11/20/2018  Blood Pressure : 142/78  Pulse: 84  Respiration: 16    Anticoagulation Summary  As of 11/20/2018    INR goal:   2.0-3.0   TTR:   61.3 % (3.4 y)   Today's INR:   2.6   Warfarin maintenance plan:   2.5 mg (5 mg x 0.5) on Sun; 5 mg (5 mg x 1) all other days   Weekly warfarin total:   32.5 mg   Plan last modified:   MARIAA Garcia (11/20/2018)   Next INR check:      Target end date:   Indefinite    Indications    Atrial fibrillation with rapid ventricular response (HCC) [I48.91]  Long term current use of anticoagulant therapy [Z79.01]  Atrial fibrillation (HCC) (Resolved) [I48.91]             Anticoagulation Episode Summary     INR check location:       Preferred lab:       Send INR reminders to:       Comments:         Anticoagulation Care Providers     Provider Role Specialty Phone number    Sanjay Sorensen M.D. Referring Cardiology [phone]        Anticoagulation Patient Findings      THIS VISIT CONDUCTED WITH PRES[employer] TELE[employer] UTILIZING SECURE AND ENCRYPTED VIDEOCONFERENCING EQUIPMENT  ROS:    Pulm: Denies SOB, chest pain.    Card: Denies syncope, edema, palpitations.    Extremities: Denies redness, pain.    PE:    Pulm: No SOB, even and unlabored.    Card: Normal rate and rhythm.   Extremities: No redness, or edema.     INR  is-therapeutic.    Denies signs/symptoms of bleeding and/or thrombosis.    Denies changes to diet or medications.   Follow up appt in 2 weeks     Plan:  Decrease dose pt held one dose and cut another in half due to epistaxis    Medication: Warfarin (Coumadin)     Sunday Monday Tuesday Wednesday Thursday Friday Saturday      2.5 mg    5 mg    5 mg    5 mg    5 mg    5 mg    5 mg      1/2 tab(s)    1 tab(s)    1 tab(s)    1 tab(s)    1 tab(s)    1 tab(s)  1 tab(s)     Next Appointment: Tuesday, Dec 4@ 2:45     Review all of your home medications and newly ordered medications with your doctor and / or  pharmacist. Follow medication instructions as directed by your doctor and / or pharmacist. Please keep your medication list with you and share with your physician. Update the information when medications are discontinued, doses are changed, or new medications (including over-the-counter products) are added; and carry medication information at all times in the event of emergency situations.      For questions, please contact Outpatient Anticoagulation Service [phone].        Patient is on a high risk medication and therefore requires close monitoring and follow up.     CHEST guidelines recommend frequent INR monitoring at regular intervals (a few days up to a max of 12 weeks) to ensure they are on the proper dose of warfarin and not having any complications from therapy.  INRs can dramatically change over a short time period due to diet, medications, and medical conditions.   The patient instructed to go to the ER for falls with a head injury,  blood in urine or stool or any bleeding that last longer than 20 min.     APRIL Garcia.

## 2018-11-24 ENCOUNTER — HOSPITAL ENCOUNTER (EMERGENCY)
Facility: MEDICAL CENTER | Age: 73
End: 2018-11-25
Attending: EMERGENCY MEDICINE
Payer: MEDICARE

## 2018-11-24 DIAGNOSIS — S01.512A LACERATION OF ORAL CAVITY, INITIAL ENCOUNTER: ICD-10-CM

## 2018-11-24 PROCEDURE — 99284 EMERGENCY DEPT VISIT MOD MDM: CPT

## 2018-11-25 ENCOUNTER — APPOINTMENT (OUTPATIENT)
Dept: RADIOLOGY | Facility: MEDICAL CENTER | Age: 73
End: 2018-11-25
Attending: EMERGENCY MEDICINE
Payer: MEDICARE

## 2018-11-25 VITALS
RESPIRATION RATE: 16 BRPM | WEIGHT: 246.91 LBS | HEART RATE: 88 BPM | TEMPERATURE: 98.1 F | DIASTOLIC BLOOD PRESSURE: 91 MMHG | BODY MASS INDEX: 33.49 KG/M2 | OXYGEN SATURATION: 91 % | SYSTOLIC BLOOD PRESSURE: 143 MMHG

## 2018-11-25 LAB
ANION GAP SERPL CALC-SCNC: 6 MMOL/L (ref 0–11.9)
APTT PPP: 31.9 SEC (ref 24.7–36)
BASOPHILS # BLD AUTO: 0.6 % (ref 0–1.8)
BASOPHILS # BLD: 0.06 K/UL (ref 0–0.12)
BUN SERPL-MCNC: 30 MG/DL (ref 8–22)
CALCIUM SERPL-MCNC: 9 MG/DL (ref 8.5–10.5)
CHLORIDE SERPL-SCNC: 103 MMOL/L (ref 96–112)
CO2 SERPL-SCNC: 24 MMOL/L (ref 20–33)
CREAT SERPL-MCNC: 1.36 MG/DL (ref 0.5–1.4)
EOSINOPHIL # BLD AUTO: 0.2 K/UL (ref 0–0.51)
EOSINOPHIL NFR BLD: 2.1 % (ref 0–6.9)
ERYTHROCYTE [DISTWIDTH] IN BLOOD BY AUTOMATED COUNT: 48.6 FL (ref 35.9–50)
GLUCOSE SERPL-MCNC: 287 MG/DL (ref 65–99)
HCT VFR BLD AUTO: 40.9 % (ref 42–52)
HGB BLD-MCNC: 13.4 G/DL (ref 14–18)
IMM GRANULOCYTES # BLD AUTO: 0.02 K/UL (ref 0–0.11)
IMM GRANULOCYTES NFR BLD AUTO: 0.2 % (ref 0–0.9)
INR PPP: 2.17 (ref 0.87–1.13)
LYMPHOCYTES # BLD AUTO: 1.14 K/UL (ref 1–4.8)
LYMPHOCYTES NFR BLD: 12 % (ref 22–41)
MCH RBC QN AUTO: 29.6 PG (ref 27–33)
MCHC RBC AUTO-ENTMCNC: 32.8 G/DL (ref 33.7–35.3)
MCV RBC AUTO: 90.3 FL (ref 81.4–97.8)
MONOCYTES # BLD AUTO: 0.91 K/UL (ref 0–0.85)
MONOCYTES NFR BLD AUTO: 9.6 % (ref 0–13.4)
NEUTROPHILS # BLD AUTO: 7.15 K/UL (ref 1.82–7.42)
NEUTROPHILS NFR BLD: 75.5 % (ref 44–72)
NRBC # BLD AUTO: 0 K/UL
NRBC BLD-RTO: 0 /100 WBC
PLATELET # BLD AUTO: 248 K/UL (ref 164–446)
PMV BLD AUTO: 9.9 FL (ref 9–12.9)
POTASSIUM SERPL-SCNC: 3.8 MMOL/L (ref 3.6–5.5)
PROTHROMBIN TIME: 24.3 SEC (ref 12–14.6)
RBC # BLD AUTO: 4.53 M/UL (ref 4.7–6.1)
SODIUM SERPL-SCNC: 133 MMOL/L (ref 135–145)
WBC # BLD AUTO: 9.5 K/UL (ref 4.8–10.8)

## 2018-11-25 PROCEDURE — 85730 THROMBOPLASTIN TIME PARTIAL: CPT

## 2018-11-25 PROCEDURE — 80048 BASIC METABOLIC PNL TOTAL CA: CPT

## 2018-11-25 PROCEDURE — 85025 COMPLETE CBC W/AUTO DIFF WBC: CPT

## 2018-11-25 PROCEDURE — 85610 PROTHROMBIN TIME: CPT

## 2018-11-25 RX ORDER — TRANEXAMIC ACID 100 MG/ML
5 INJECTION, SOLUTION INTRAVENOUS ONCE
Status: DISCONTINUED | OUTPATIENT
Start: 2018-11-25 | End: 2018-11-25 | Stop reason: HOSPADM

## 2018-11-25 NOTE — ED PROVIDER NOTES
ED Provider Note    ED Provider Note      Primary care provider: Deng Corbin D.O.    CHIEF COMPLAINT  Chief Complaint   Patient presents with   • Other     pt states recent coughing fits this last month. Pt state bleeding from mouth no source seen with visual assessment. Irrigated sinus after bleeding occured.    • Cough     pt is coughing in triage, mask applied. Blood around lips and gums.        HPI  Yuri De León is a 73 y.o. male who presents to the Emergency Department with chief complaint of oral bleeding.  Patient was at dinner this evening when he felt an abnormal popping sensation in his mouth and since that time he has had moderate amount of bleeding.  Patient is cleared his mouth cleared his nose several times and cannot determine where the blood is coming from.  States these had no lightheadedness no palpitations.  Patient is on warfarin therapy for his past medical history of atrial fibrillation.  Patient had no other bleeding issues no chest pain no shortness of breath has had a chronic cough which he states is improving.      REVIEW OF SYSTEMS  10 systems reviewed and otherwise negative, pertinent positives and negatives listed in the history of present illness.    PAST MEDICAL HISTORY   has a past medical history of Abnormal electrocardiogram (8/13/2010); Arrhythmia; Arthritis; ASTHMA; Atrial fibrillation (HCC) (10/25/2011); Bronchospasm (8/6/2009); Diabetes (HCC); HTN (hypertension) (10/25/2011); Hypercholesteremia (10/25/2011); Long term (current) use of anticoagulants (10/25/2011); NASAL POLYPS (8/6/2009); Other nonspecific abnormal finding (8/6/2009); Pain; Shortness of breath; Sleep apnea (8/6/2009); and Wears glasses.    SURGICAL HISTORY   has a past surgical history that includes sinusotomies (2003,2005,2/2007); lumbar decompression (1984); hip arthroplasty total (8/31/2010); laminotomy; sinuscope; hip replacement, total; and knee arthroplasty total (Left, 10/2014).    SOCIAL  HISTORY  Social History   Substance Use Topics   • Smoking status: Former Smoker     Packs/day: 0.50     Years: 10.00     Types: Cigarettes     Quit date: 12/10/1968   • Smokeless tobacco: Never Used   • Alcohol use No      History   Drug Use No       FAMILY HISTORY  Non-Contributory    CURRENT MEDICATIONS  Home Medications     Reviewed by Doroteo Mcdermott R.N. (Registered Nurse) on 11/24/18 at 2323  Med List Status: Not Addressed   Medication Last Dose Status   albuterol (PROVENTIL) 2.5mg/3ml Nebu Soln solution for nebulization Taking Active   albuterol (VENTOLIN OR PROVENTIL) 108 (90 BASE) MCG/ACT AERS Taking Active   aspirin (ASA) 81 MG Chew Tab chewable tablet Not Taking Active   atorvastatin (LIPITOR) 80 MG tablet  Active   azithromycin (ZITHROMAX Z-LIBBY) 250 MG Tab  Active   budesonide-formoterol (SYMBICORT) 160-4.5 MCG/ACT Aerosol Taking Active   DILTIAZem (CARDIZEM) 60 MG Tab Taking Active   fluticasone (FLONASE) 50 MCG/ACT nasal spray Taking Active   fluticasone (FLOVENT HFA) 110 MCG/ACT Aerosol 8/14/2018 Active   furosemide (LASIX) 40 MG Tab Not Taking Active   insulin detemir (LEVEMIR FLEXTOUCH) 100 UNIT/ML Solution Pen-injector injection 8/17/2018 Active   insulin detemir (LEVEMIR) 100 UNIT/ML Solution Taking Active   insulin lispro prot & lispro (HUMALOG MIX) (75-25) unit/mL Suspension inj Taking Active   Insulin Pen Needle (NOVOFINE PLUS) 32G X 4 MM Misc Taking Active   Insulin Pen Needle (TRUEPLUS PEN NEEDLES) 29G X 12MM Misc Taking Active   losartan (COZAAR) 25 MG Tab Taking Active   MethylPREDNISolone (MEDROL DOSEPAK) 4 MG Tablet Therapy Pack  Active   metoprolol SR (TOPROL XL) 25 MG TABLET SR 24 HR  Active   montelukast (SINGULAIR) 10 MG Tab Taking Active   Non Formulary Request Taking Active   Potassium Chloride Crystals Not Taking Active   prasugrel (EFFIENT) 10 MG Tab  Active   SYMBICORT 160-4.5 MCG/ACT Aerosol  Active   TRUEPLUS PEN NEEDLES 31G X 6 MM Misc Taking Active   warfarin (COUMADIN) 5  MG Tab  Active                ALLERGIES  Allergies   Allergen Reactions   • Atenolol Shortness of Breath     DYSPNEA.   • Tetanus Toxoid Swelling        PHYSICAL EXAM  VITAL SIGNS: /91   Pulse (!) 107   Temp 36.7 °C (98.1 °F) (Temporal)   Resp 16   Wt 112 kg (246 lb 14.6 oz)   SpO2 90%   BMI 33.49 kg/m²   Pulse ox interpretation: I interpret this pulse ox as normal.  Constitutional: Alert and oriented x 3, minimal distress  HEENT: Atraumatic normocephalic, pupils are equal round reactive to light extraocular movements are intact. The nares is clear, external ears are normal, mouth shows moist mucous membranes, patient has subcentimeter laceration posterior with moderate active oozing.    Neck: Supple, no JVD no tracheal deviation    Cardiovascular: Regular rate and rhythm no murmur rub or gallop 2+ pulses peripherally x4    Thorax & Lungs: No respiratory distress, no wheezes rales or rhonchi, No chest tenderness.     GI: Soft nontender nondistended positive bowel sounds, no peritoneal signs    Skin: Warm dry no acute rash or lesion    Musculoskeletal: Moving all extremities with full range and 5 of 5 strength, no acute  deformity    Neurologic: Cranial nerves III through XII are grossly intact, no sensory deficit, no cerebellar dysfunction     Psychiatric: Appropriate affect for situation at this time      DIAGNOSTIC STUDIES / PROCEDURES  LABS      Results for orders placed or performed during the hospital encounter of 11/24/18   CBC WITH DIFFERENTIAL   Result Value Ref Range    WBC 9.5 4.8 - 10.8 K/uL    RBC 4.53 (L) 4.70 - 6.10 M/uL    Hemoglobin 13.4 (L) 14.0 - 18.0 g/dL    Hematocrit 40.9 (L) 42.0 - 52.0 %    MCV 90.3 81.4 - 97.8 fL    MCH 29.6 27.0 - 33.0 pg    MCHC 32.8 (L) 33.7 - 35.3 g/dL    RDW 48.6 35.9 - 50.0 fL    Platelet Count 248 164 - 446 K/uL    MPV 9.9 9.0 - 12.9 fL    Neutrophils-Polys 75.50 (H) 44.00 - 72.00 %    Lymphocytes 12.00 (L) 22.00 - 41.00 %    Monocytes 9.60 0.00 - 13.40 %     Eosinophils 2.10 0.00 - 6.90 %    Basophils 0.60 0.00 - 1.80 %    Immature Granulocytes 0.20 0.00 - 0.90 %    Nucleated RBC 0.00 /100 WBC    Neutrophils (Absolute) 7.15 1.82 - 7.42 K/uL    Lymphs (Absolute) 1.14 1.00 - 4.80 K/uL    Monos (Absolute) 0.91 (H) 0.00 - 0.85 K/uL    Eos (Absolute) 0.20 0.00 - 0.51 K/uL    Baso (Absolute) 0.06 0.00 - 0.12 K/uL    Immature Granulocytes (abs) 0.02 0.00 - 0.11 K/uL    NRBC (Absolute) 0.00 K/uL   BASIC METABOLIC PANEL   Result Value Ref Range    Sodium 133 (L) 135 - 145 mmol/L    Potassium 3.8 3.6 - 5.5 mmol/L    Chloride 103 96 - 112 mmol/L    Co2 24 20 - 33 mmol/L    Glucose 287 (H) 65 - 99 mg/dL    Bun 30 (H) 8 - 22 mg/dL    Creatinine 1.36 0.50 - 1.40 mg/dL    Calcium 9.0 8.5 - 10.5 mg/dL    Anion Gap 6.0 0.0 - 11.9   PROTHROMBIN TIME   Result Value Ref Range    PT 24.3 (H) 12.0 - 14.6 sec    INR 2.17 (H) 0.87 - 1.13   APTT   Result Value Ref Range    APTT 31.9 24.7 - 36.0 sec   ESTIMATED GFR   Result Value Ref Range    GFR If African American >60 >60 mL/min/1.73 m 2    GFR If Non  51 (A) >60 mL/min/1.73 m 2       All labs reviewed by me.      RADIOLOGY  No orders to display     The radiologist's interpretation of all radiological studies have been reviewed by me.    COURSE & MEDICAL DECISION MAKING  Pertinent Labs & Imaging studies reviewed. (See chart for details)    12:37 AM - Patient seen and examined at bedside.   Source of bleeding identified patient is a very small approximately 3 mm laceration posterior to tooth #9 in the hard palate.  Moderate active bleeding that re-accumulates quickly after sponging away.      Patient had the area of bleeding dressed with gauze pad soaked with TXA this had minimal improvement.  Patient then had approximately 0.2 mL of TXA injected subcutaneously surrounding the wound and had near immediate hemostasis.    Patient noted to have slightly elevated blood pressure likely circumstantial secondary to presenting  complaint. Referred to primary care physician for further evaluation.    Medical Decision Making: Chest x-ray was ordered however patient prefers to leave the hospital prior to completion as he states that his cough has been steadily improving.  His INR is borderline therapeutic at this time otherwise labs as above are reassuring vital signs improved and discharged home in stable and improved condition.    /91   Pulse 88   Temp 36.7 °C (98.1 °F) (Temporal)   Resp 16   Wt 112 kg (246 lb 14.6 oz)   SpO2 91%   BMI 33.49 kg/m²     Deng Corbin D.O.  [address] [facility]  [place] [address]  [phone]    In 2 days      [facility], Emergency Dept  [facility] [facility] [address] [phone]    If symptoms worsen      Discharge Medication List as of 11/25/2018  3:01 AM          FINAL IMPRESSION  1. Laceration of oral cavity, initial encounter Active           This dictation has been created using voice recognition software and/or scribes. The accuracy of the dictation is limited by the abilities of the software and the expertise of the scribes. I expect there may be some errors of grammar and possibly content. I made every attempt to manually correct the errors within my dictation. However, errors related to voice recognition software and/or scribes may still exist and should be interpreted within the appropriate context.

## 2018-11-25 NOTE — ED NOTES
Discharge instructions given to patient, a verbal understanding of all instructions was stated.  Pt preferred to walk out accompanied by family VSS, all belongings accounted for.  Pt instructed to return to ED if bleeding reoccurs.    Statement Selected

## 2018-11-25 NOTE — ED NOTES
Pt ambulate to room, slow gait but steady. Pt states he was sitting on the couch and he started bleeding from the roof of his mouth, bleeding won't stop. Pt takes Warfarin. Moist cough noted.

## 2018-11-25 NOTE — ED TRIAGE NOTES
"Yuri Holcomb Sarthak  73 y.o. male  Chief Complaint   Patient presents with   • Other     pt states recent coughing fits this last month. Pt state bleeding from mouth no source seen with visual assessment. Irrigated sinus after bleeding occured.    • Cough     pt is coughing in triage, mask applied. Blood around lips and gums.      /91   Pulse (!) 107   Temp 36.7 °C (98.1 °F) (Temporal)   Resp 16   Wt 112 kg (246 lb 14.6 oz)   SpO2 90%   BMI 33.49 kg/m²     Pt amb to triage with steady gait for above complaint. +Coumadin therapy,  Pt denies blood in urine and stool, pt states that he has a drip in the back of the throat, no recent trauma or injuries. Speaks full sentences, ,manages airway. No episode of \"blood flooding\" from mouth.   Pt is alert and oriented, speaking in full sentences, follows commands and responds appropriately to questions. NAD. Resp are even and unlabored.  Pt placed in lobby. Pt educated on triage process. Pt encouraged to alert staff for any changes.    "

## 2018-12-04 ENCOUNTER — ANTICOAGULATION MONITORING (OUTPATIENT)
Dept: VASCULAR LAB | Facility: MEDICAL CENTER | Age: 73
End: 2018-12-04
Payer: MEDICARE

## 2018-12-04 ENCOUNTER — APPOINTMENT (OUTPATIENT)
Dept: VASCULAR LAB | Facility: MEDICAL CENTER | Age: 73
End: 2018-12-04
Payer: MEDICARE

## 2018-12-04 ENCOUNTER — NON-PROVIDER VISIT (OUTPATIENT)
Dept: MEDICAL GROUP | Facility: PHYSICIAN GROUP | Age: 73
End: 2018-12-04
Payer: MEDICARE

## 2018-12-04 VITALS
RESPIRATION RATE: 20 BRPM | DIASTOLIC BLOOD PRESSURE: 88 MMHG | SYSTOLIC BLOOD PRESSURE: 130 MMHG | OXYGEN SATURATION: 95 % | HEART RATE: 66 BPM

## 2018-12-04 DIAGNOSIS — I48.91 ATRIAL FIBRILLATION WITH RAPID VENTRICULAR RESPONSE (HCC): ICD-10-CM

## 2018-12-04 DIAGNOSIS — Z79.01 LONG TERM CURRENT USE OF ANTICOAGULANT THERAPY: ICD-10-CM

## 2018-12-04 DIAGNOSIS — D68.9 COAGULATION DISORDER (HCC): ICD-10-CM

## 2018-12-04 LAB
INR BLD: 1.6 (ref 0.9–1.2)
INR PPP: 1.6 (ref 2–3.5)
POC PROTIME: ABNORMAL

## 2018-12-04 PROCEDURE — 99212 OFFICE O/P EST SF 10 MIN: CPT | Performed by: NURSE PRACTITIONER

## 2018-12-04 PROCEDURE — 85610 PROTHROMBIN TIME: CPT | Performed by: NURSE PRACTITIONER

## 2018-12-04 NOTE — PROGRESS NOTES
OP Anticoagulation Service Note    Date: 12/4/2018  Blood Pressure : 130/88  Pulse: 66  Respiration: 20    Anticoagulation Summary  As of 12/4/2018    INR goal:   2.0-3.0   TTR:   61.2 % (3.4 y)   Today's INR:   1.6!   Warfarin maintenance plan:   2.5 mg (5 mg x 0.5) on Sun; 5 mg (5 mg x 1) all other days   Weekly warfarin total:   32.5 mg   Plan last modified:   MARIAA Garcia (11/20/2018)   Next INR check:   12/11/2018   Target end date:   Indefinite    Indications    Atrial fibrillation with rapid ventricular response (HCC) [I48.91]  Long term current use of anticoagulant therapy [Z79.01]  Atrial fibrillation (HCC) (Resolved) [I48.91]             Anticoagulation Episode Summary     INR check location:       Preferred lab:       Send INR reminders to:       Comments:         Anticoagulation Care Providers     Provider Role Specialty Phone number    Sanjay Sorensen M.D. Referring Cardiology [phone]        Anticoagulation Patient Findings      THIS VISIT CONDUCTED WITH PRESENTER [employer] TELE[employer] UTILIZING SECURE AND ENCRYPTED VIDEOCONFERENCING EQUIPMENT  ROS:    Pulm: Denies SOB, chest pain.    Card: Denies syncope, edema, palpitations.    Extremities: Denies redness, pain.    PE:    Pulm: No SOB, even and unlabored.    Card: Normal rate and rhythm.   Extremities: No redness, or edema.     INR  sub-therapeutic.  Had a bloody nose and some blood in his mouth. He held one dose and cut one in half. He is on Prasugrel which can increase the chances of bleeding   Denies signs/symptoms of bleeding and/or thrombosis.    Denies changes to diet or medications.   Follow up appt in 1weeks     Plan: Continue current medication regimen. Does not want to increase the med due to nose bleeding.     Medication: Warfarin (Coumadin)     Sunday Monday Tuesday Wednesday Thursday Friday Saturday      2.5 mg    5 mg    5 mg    5 mg    5 mg    5 mg    5 mg      1/2 tab(s)    1 tab(s)    1tab(s)    1 tab(s)     1 tab(s)    1 tab(s)  1 tab(s)     Next Appointment: Tuesday, Dec 11 @ 3:30    Review all of your home medications and newly ordered medications with your doctor and / or pharmacist. Follow medication instructions as directed by your doctor and / or pharmacist. Please keep your medication list with you and share with your physician. Update the information when medications are discontinued, doses are changed, or new medications (including over-the-counter products) are added; and carry medication information at all times in the event of emergency situations.      For questions, please contact Outpatient Anticoagulation Service [phone].   The patient is on a high risk medication and is sub- therapeutic. This could lead to clot formation or risk of stroke. Therefore this patient requires close monitoring and follow up.          CHEST guidelines recommend frequent INR monitoring at regular intervals (a few days up to a max of 12 weeks) to ensure they are on the proper dose of warfarin and not having any complications from therapy.  INRs can dramatically change over a short time period due to diet, medications, and medical conditions.   The patient instructed to go to the ER for falls with a head injury,  blood in urine or stool or any bleeding that last longer than 20 min.     APRIL Garcia.

## 2018-12-11 ENCOUNTER — NON-PROVIDER VISIT (OUTPATIENT)
Dept: MEDICAL GROUP | Facility: PHYSICIAN GROUP | Age: 73
End: 2018-12-11
Payer: MEDICARE

## 2018-12-11 ENCOUNTER — ANTICOAGULATION MONITORING (OUTPATIENT)
Dept: VASCULAR LAB | Facility: MEDICAL CENTER | Age: 73
End: 2018-12-11
Payer: MEDICARE

## 2018-12-11 ENCOUNTER — APPOINTMENT (OUTPATIENT)
Dept: VASCULAR LAB | Facility: MEDICAL CENTER | Age: 73
End: 2018-12-11
Payer: MEDICARE

## 2018-12-11 VITALS
DIASTOLIC BLOOD PRESSURE: 60 MMHG | SYSTOLIC BLOOD PRESSURE: 130 MMHG | OXYGEN SATURATION: 93 % | RESPIRATION RATE: 20 BRPM | HEART RATE: 82 BPM

## 2018-12-11 DIAGNOSIS — I48.91 ATRIAL FIBRILLATION WITH RAPID VENTRICULAR RESPONSE (HCC): ICD-10-CM

## 2018-12-11 DIAGNOSIS — Z79.01 LONG TERM CURRENT USE OF ANTICOAGULANT THERAPY: ICD-10-CM

## 2018-12-11 DIAGNOSIS — D68.9 COAGULATION DISORDER (HCC): ICD-10-CM

## 2018-12-11 LAB
INR BLD: 2 (ref 0.9–1.2)
INR PPP: 2 (ref 2–3.5)
POC PROTIME: ABNORMAL

## 2018-12-11 PROCEDURE — 99212 OFFICE O/P EST SF 10 MIN: CPT | Performed by: NURSE PRACTITIONER

## 2018-12-11 PROCEDURE — 85610 PROTHROMBIN TIME: CPT | Performed by: NURSE PRACTITIONER

## 2018-12-11 NOTE — PROGRESS NOTES
OP Anticoagulation Service Note    Date: 12/11/2018  Blood Pressure : 130/60  Pulse: 82  Respiration: 20    Anticoagulation Summary  As of 12/11/2018    INR goal:   2.0-3.0   TTR:   60.9 % (3.4 y)   Today's INR:   2.0   Warfarin maintenance plan:   2.5 mg (5 mg x 0.5) on Sun; 5 mg (5 mg x 1) all other days   Weekly warfarin total:   32.5 mg   Plan last modified:   MARIAA Garcia (11/20/2018)   Next INR check:   1/8/2019   Target end date:   Indefinite    Indications    Atrial fibrillation with rapid ventricular response (HCC) [I48.91]  Long term current use of anticoagulant therapy [Z79.01]  Atrial fibrillation (HCC) (Resolved) [I48.91]             Anticoagulation Episode Summary     INR check location:       Preferred lab:       Send INR reminders to:       Comments:         Anticoagulation Care Providers     Provider Role Specialty Phone number    Sanjay Sorensen M.D. Referring Cardiology [phone]        Anticoagulation Patient Findings      THIS VISIT CONDUCTED WITH PRESENTER [employer]IA TELE[employer]ICINE UTILIZING SECURE AND ENCRYPTED VIDEOCONFERENCING EQUIPMENT  ROS:    Pulm: Denies SOB, chest pain.    Card: Denies syncope, edema, palpitations.    Extremities: Denies redness, pain.    PE:    Pulm: No SOB, even and unlabored.    Card: Normal rate and rhythm.   Extremities: No redness, or edema.     INR  is-therapeutic.    Denies signs/symptoms of bleeding and/or thrombosis.    Denies changes to diet or medications.   Follow up appt in 4 weeks     Plan:  Continue current medication regimen.     Medication: Warfarin (Coumadin)     Sunday Monday Tuesday Wednesday Thursday Friday Saturday      2.5 mg    5 mg    5 mg    5 mg    5 mg    5 mg    5 mg      1/2 tab(s)    1 tab(s)    1 tab(s)    1 tab(s)    1 tab(s)    1 tab(s)  1 tab(s)     Next Appointment: Tuesday, Jan 8 @ 3:45     Review all of your home medications and newly ordered medications with your doctor and / or pharmacist. Follow medication  instructions as directed by your doctor and / or pharmacist. Please keep your medication list with you and share with your physician. Update the information when medications are discontinued, doses are changed, or new medications (including over-the-counter products) are added; and carry medication information at all times in the event of emergency situations.      For questions, please contact Outpatient Anticoagulation Service [phone].   .      Patient is on a high risk medication and therefore requires close monitoring and follow up.     CHEST guidelines recommend frequent INR monitoring at regular intervals (a few days up to a max of 12 weeks) to ensure they are on the proper dose of warfarin and not having any complications from therapy.  INRs can dramatically change over a short time period due to diet, medications, and medical conditions.   The patient instructed to go to the ER for falls with a head injury,  blood in urine or stool or any bleeding that last longer than 20 min.     APRIL Garcia.

## 2018-12-12 ENCOUNTER — HOSPITAL ENCOUNTER (OUTPATIENT)
Dept: LAB | Facility: MEDICAL CENTER | Age: 73
End: 2018-12-12
Attending: INTERNAL MEDICINE
Payer: MEDICARE

## 2018-12-12 LAB
ALBUMIN SERPL BCP-MCNC: 3.7 G/DL (ref 3.2–4.9)
BUN SERPL-MCNC: 32 MG/DL (ref 8–22)
CALCIUM SERPL-MCNC: 9.6 MG/DL (ref 8.5–10.5)
CHLORIDE SERPL-SCNC: 103 MMOL/L (ref 96–112)
CO2 SERPL-SCNC: 29 MMOL/L (ref 20–33)
CREAT SERPL-MCNC: 1.38 MG/DL (ref 0.5–1.4)
CREAT UR-MCNC: 115.8 MG/DL
GLUCOSE SERPL-MCNC: 232 MG/DL (ref 65–99)
MAGNESIUM SERPL-MCNC: 1.8 MG/DL (ref 1.5–2.5)
PHOSPHATE SERPL-MCNC: 3.1 MG/DL (ref 2.5–4.5)
POTASSIUM SERPL-SCNC: 4.3 MMOL/L (ref 3.6–5.5)
PROT UR-MCNC: 76.7 MG/DL (ref 0–15)
PROT/CREAT UR: 662 MG/G (ref 15–68)
SODIUM SERPL-SCNC: 137 MMOL/L (ref 135–145)

## 2018-12-12 PROCEDURE — 83735 ASSAY OF MAGNESIUM: CPT

## 2018-12-12 PROCEDURE — 36415 COLL VENOUS BLD VENIPUNCTURE: CPT

## 2018-12-12 PROCEDURE — 84156 ASSAY OF PROTEIN URINE: CPT

## 2018-12-12 PROCEDURE — 80069 RENAL FUNCTION PANEL: CPT

## 2018-12-12 PROCEDURE — 82570 ASSAY OF URINE CREATININE: CPT

## 2019-01-08 ENCOUNTER — APPOINTMENT (OUTPATIENT)
Dept: VASCULAR LAB | Facility: MEDICAL CENTER | Age: 74
End: 2019-01-08
Payer: MEDICARE

## 2019-01-08 ENCOUNTER — NON-PROVIDER VISIT (OUTPATIENT)
Dept: MEDICAL GROUP | Facility: PHYSICIAN GROUP | Age: 74
End: 2019-01-08
Payer: MEDICARE

## 2019-01-08 ENCOUNTER — ANTICOAGULATION MONITORING (OUTPATIENT)
Dept: VASCULAR LAB | Facility: MEDICAL CENTER | Age: 74
End: 2019-01-08
Payer: MEDICARE

## 2019-01-08 VITALS
OXYGEN SATURATION: 94 % | DIASTOLIC BLOOD PRESSURE: 72 MMHG | HEART RATE: 72 BPM | RESPIRATION RATE: 18 BRPM | SYSTOLIC BLOOD PRESSURE: 142 MMHG

## 2019-01-08 DIAGNOSIS — I48.91 ATRIAL FIBRILLATION WITH RAPID VENTRICULAR RESPONSE (HCC): ICD-10-CM

## 2019-01-08 DIAGNOSIS — Z79.01 LONG TERM CURRENT USE OF ANTICOAGULANT THERAPY: ICD-10-CM

## 2019-01-08 DIAGNOSIS — D68.9 COAGULATION DISORDER (HCC): ICD-10-CM

## 2019-01-08 LAB
INR BLD: 2.2 (ref 0.9–1.2)
INR PPP: 2.2 (ref 2–3.5)
POC PROTIME: ABNORMAL

## 2019-01-08 PROCEDURE — 99212 OFFICE O/P EST SF 10 MIN: CPT | Performed by: NURSE PRACTITIONER

## 2019-01-08 PROCEDURE — 85610 PROTHROMBIN TIME: CPT | Performed by: NURSE PRACTITIONER

## 2019-01-08 NOTE — PROGRESS NOTES
OP Anticoagulation Service Note    Date: 1/8/2019  Blood Pressure : 142/72  Pulse: 72  Respiration: 18    Anticoagulation Summary  As of 1/8/2019    INR goal:   2.0-3.0   TTR:   61.7 % (3.5 y)   Today's INR:   2.2   Warfarin maintenance plan:   2.5 mg (5 mg x 0.5) on Sun; 5 mg (5 mg x 1) all other days   Weekly warfarin total:   32.5 mg   Plan last modified:   MARIAA Garcia (11/20/2018)   Next INR check:   2/12/2019   Target end date:   Indefinite    Indications    Atrial fibrillation with rapid ventricular response (HCC) [I48.91]  Long term current use of anticoagulant therapy [Z79.01]  Atrial fibrillation (HCC) (Resolved) [I48.91]             Anticoagulation Episode Summary     INR check location:       Preferred lab:       Send INR reminders to:       Comments:         Anticoagulation Care Providers     Provider Role Specialty Phone number    Sanjay Sorensen M.D. Referring Cardiology [phone]        Anticoagulation Patient Findings      THIS VISIT CONDUCTED WITH PRESENTER [employer] UTILIZING SECURE AND ENCRYPTED VIDEOCONFERENCING EQUIPMENT  ROS:    Pulm: Denies SOB, chest pain.    Card: Denies syncope, edema, palpitations.    Extremities: Denies redness, pain.    PE:    Pulm: No SOB, even and unlabored.    Card: Normal rate and rhythm.   Extremities: No redness, or edema.     INR  is-therapeutic.    Denies signs/symptoms of bleeding and/or thrombosis.    Denies changes to diet or medications.   Follow up appt in 5 weeks     Plan:  Continue current medication regimen.       Medication: Warfarin (Coumadin)     Sunday Monday Tuesday Wednesday Thursday Friday Saturday      2.5 mg    5 mg    5 mg    5 mg    5 mg    5 mg    5 mg      1/2 tab(s)    1 tab(s)    1 tab(s)    1 tab(s)    1 tab(s)    1 tab(s)  1 tab(s)     Next Appointment: Tuesday, Feb. 12  @1:15    Review all of your home medications and newly ordered medications with your doctor and / or pharmacist. Follow medication  instructions as directed by your doctor and / or pharmacist. Please keep your medication list with you and share with your physician. Update the information when medications are discontinued, doses are changed, or new medications (including over-the-counter products) are added; and carry medication information at all times in the event of emergency situations.      For questions, please contact Outpatient Anticoagulation Service [phone].      Patient is on a high risk medication and therefore requires close monitoring and follow up.     CHEST guidelines recommend frequent INR monitoring at regular intervals (a few days up to a max of 12 weeks) to ensure they are on the proper dose of warfarin and not having any complications from therapy.  INRs can dramatically change over a short time period due to diet, medications, and medical conditions.   The patient instructed to go to the ER for falls with a head injury,  blood in urine or stool or any bleeding that last longer than 20 min.     APRIL Garcia.

## 2019-02-11 DIAGNOSIS — E78.5 DYSLIPIDEMIA: ICD-10-CM

## 2019-02-12 ENCOUNTER — ANTICOAGULATION MONITORING (OUTPATIENT)
Dept: VASCULAR LAB | Facility: MEDICAL CENTER | Age: 74
End: 2019-02-12
Payer: MEDICARE

## 2019-02-12 ENCOUNTER — APPOINTMENT (OUTPATIENT)
Dept: VASCULAR LAB | Facility: MEDICAL CENTER | Age: 74
End: 2019-02-12
Payer: MEDICARE

## 2019-02-12 ENCOUNTER — NON-PROVIDER VISIT (OUTPATIENT)
Dept: MEDICAL GROUP | Facility: PHYSICIAN GROUP | Age: 74
End: 2019-02-12
Payer: MEDICARE

## 2019-02-12 VITALS
DIASTOLIC BLOOD PRESSURE: 60 MMHG | RESPIRATION RATE: 14 BRPM | SYSTOLIC BLOOD PRESSURE: 130 MMHG | OXYGEN SATURATION: 97 % | HEART RATE: 72 BPM

## 2019-02-12 DIAGNOSIS — Z79.01 CHRONIC ANTICOAGULATION: ICD-10-CM

## 2019-02-12 DIAGNOSIS — Z79.01 LONG TERM CURRENT USE OF ANTICOAGULANT THERAPY: ICD-10-CM

## 2019-02-12 DIAGNOSIS — I48.91 ATRIAL FIBRILLATION WITH RAPID VENTRICULAR RESPONSE (HCC): ICD-10-CM

## 2019-02-12 LAB
HBA1C MFR BLD: 1.7 % (ref ?–5.8)
INR BLD: 1.7 (ref 0.9–1.2)
INR PPP: 1.7 (ref 2–3.5)
INT CON NEG: NORMAL
INT CON POS: NORMAL
POC PROTIME: ABNORMAL

## 2019-02-12 PROCEDURE — 85610 PROTHROMBIN TIME: CPT | Performed by: NURSE PRACTITIONER

## 2019-02-12 PROCEDURE — 99212 OFFICE O/P EST SF 10 MIN: CPT | Performed by: NURSE PRACTITIONER

## 2019-02-12 NOTE — PROGRESS NOTES
OP Anticoagulation Service Note    Date: 2019  Blood Pressure : 130/60  Pulse: 72  Respiration: 14    Anticoagulation Summary  As of 2019    INR goal:   2.0-3.0   TTR:   61.2 % (3.6 y)   INR used for dosin.7! (2019)   Warfarin maintenance plan:   2.5 mg (5 mg x 0.5) every Sun; 5 mg (5 mg x 1) all other days   Weekly warfarin total:   32.5 mg   Plan last modified:   MARIAA Garcia (2018)   Next INR check:   2019   Target end date:   Indefinite    Indications    Atrial fibrillation with rapid ventricular response (HCC) [I48.91]  Long term current use of anticoagulant therapy [Z79.01]  Atrial fibrillation (HCC) (Resolved) [I48.91]             Anticoagulation Episode Summary     INR check location:       Preferred lab:       Send INR reminders to:       Comments:         Anticoagulation Care Providers     Provider Role Specialty Phone number    Sanjay Sorensen M.D. Referring Cardiology [phone]        Anticoagulation Patient Findings      THIS VISIT CONDUCTED WITH PRESENTER [employer] TE[employer] UTILIZING SECURE AND ENCRYPTED VIDEOCONFERENCING EQUIPMENT  ROS:    Pulm: Denies SOB, chest pain.    Card: Denies syncope, edema, palpitations.    Extremities: Denies redness, pain.    PE:    Pulm: No SOB, even and unlabored.    Card: Normal rate and rhythm.   Extremities: No redness, or edema.     INR  sub-therapeutic.    Denies signs/symptoms of bleeding and/or thrombosis.    Denies changes to diet or medications.   Follow up appt in 2 weeks     Plan:  Take 7.5 mg tonight then follow below     Medication: Warfarin (Coumadin)           2.5 mg    5 mg    5 mg    5 mg    5 mg    5 mg    5 mg      1/2 tab(s)    1 tab(s)    1 tab(s)    1 tab(s)    1 tab(s)    1 tab(s)  1 tab(s)     Next Appointment:  @  2:00    Review all of your home medications and newly ordered medications with your doctor and / or  pharmacist. Follow medication instructions as directed by your doctor and / or pharmacist. Please keep your medication list with you and share with your physician. Update the information when medications are discontinued, doses are changed, or new medications (including over-the-counter products) are added; and carry medication information at all times in the event of emergency situations.      For questions, please contact Outpatient Anticoagulation Service [phone].   The patient is on a high risk medication and is sub- therapeutic. This could lead to clot formation or risk of stroke. Therefore this patient requires close monitoring and follow up.      CHEST guidelines recommend frequent INR monitoring at regular intervals (a few days up to a max of 12 weeks) to ensure they are on the proper dose of warfarin and not having any complications from therapy.  INRs can dramatically change over a short time period due to diet, medications, and medical conditions.   The patient instructed to go to the ER for falls with a head injury,  blood in urine or stool or any bleeding that last longer than 20 min.     APRIL Garcia.

## 2019-02-14 RX ORDER — ATORVASTATIN CALCIUM 80 MG/1
TABLET, FILM COATED ORAL
Qty: 90 TAB | Refills: 3 | Status: SHIPPED | OUTPATIENT
Start: 2019-02-14 | End: 2020-12-01

## 2019-02-15 ENCOUNTER — OFFICE VISIT (OUTPATIENT)
Dept: CARDIOLOGY | Facility: MEDICAL CENTER | Age: 74
End: 2019-02-15
Payer: MEDICARE

## 2019-02-15 VITALS
OXYGEN SATURATION: 97 % | HEIGHT: 72 IN | HEART RATE: 70 BPM | DIASTOLIC BLOOD PRESSURE: 80 MMHG | SYSTOLIC BLOOD PRESSURE: 146 MMHG | WEIGHT: 244 LBS | BODY MASS INDEX: 33.05 KG/M2

## 2019-02-15 DIAGNOSIS — Z79.01 LONG TERM CURRENT USE OF ANTICOAGULANT THERAPY: Chronic | ICD-10-CM

## 2019-02-15 DIAGNOSIS — I10 ESSENTIAL HYPERTENSION, BENIGN: ICD-10-CM

## 2019-02-15 DIAGNOSIS — Z95.5 S/P CORONARY ARTERY STENT PLACEMENT: ICD-10-CM

## 2019-02-15 DIAGNOSIS — E78.5 DYSLIPIDEMIA: ICD-10-CM

## 2019-02-15 DIAGNOSIS — I48.20 ATRIAL FIBRILLATION, CHRONIC (HCC): ICD-10-CM

## 2019-02-15 DIAGNOSIS — I25.10 CORONARY ARTERY DISEASE, OCCLUSIVE: ICD-10-CM

## 2019-02-15 PROCEDURE — 99214 OFFICE O/P EST MOD 30 MIN: CPT | Performed by: INTERNAL MEDICINE

## 2019-02-15 ASSESSMENT — ENCOUNTER SYMPTOMS
BLURRED VISION: 0
PALPITATIONS: 0
ABDOMINAL PAIN: 0
INSOMNIA: 0
BRUISES/BLEEDS EASILY: 1
CHILLS: 0
PND: 0
MYALGIAS: 0
ORTHOPNEA: 0
DIZZINESS: 0
FEVER: 0
SHORTNESS OF BREATH: 0
LOSS OF CONSCIOUSNESS: 0

## 2019-02-15 NOTE — LETTER
[facility]   [address] [place] [address]  CARMINA Hernandez [address]  Phone: [phone]  Fax: [phone]              Yuri De León  1945    Encounter Date: 2/15/2019    Sanjay Sorensen M.D.          PROGRESS NOTE:  Chief Complaint   Patient presents with   •  CAD       Subjective:   Yuri De León is a 73 y.o. male who presents today for follow-up evaluation of CAD, NSTEMI, RCA stent, chronic fibrillation, chronic anticoagulation, hypertension and hyperlipidemia.    Last seen at the time of his coronary intervention on 8/15/2018.    Since his coronary intervention he has had no cardiac symptoms.  He is completed 1 month of triple drug therapy and currently on warfarin and Prasugrel.    Past Medical History:   Diagnosis Date   • Abnormal electrocardiogram 8/13/2010   • Arrhythmia     Atrial Fibrillation; Cardiologist, Dr. Sorensen   • Arthritis     knees, left hip   • ASTHMA     inhalers   • Atrial fibrillation (HCC) 10/25/2011   • Bronchospasm 8/6/2009   • Diabetes ([facility])     insulin   • HTN (hypertension) 10/25/2011   • Hypercholesteremia 10/25/2011   • Long term (current) use of anticoagulants 10/25/2011   • NASAL POLYPS 8/6/2009   • Other nonspecific abnormal finding 8/6/2009   • Pain     left hip   • Shortness of breath    • Sleep apnea 8/6/2009    o2 at night   • Wears glasses      Past Surgical History:   Procedure Laterality Date   • KNEE ARTHROPLASTY TOTAL Left 10/2014   • HIP ARTHROPLASTY TOTAL  8/31/2010    Performed by OSGOOD, PATRICK J at SURGERY [facility] ORS   • LUMBAR DECOMPRESSION  1984   • HIP REPLACEMENT, TOTAL     • LAMINOTOMY     • SINUSCOPE     • SINUSOTOMIES  2003,2005,2/2007     Family History   Problem Relation Age of Onset   • Heart Disease Mother      Social History     Social History   • Marital status:      Spouse name: N/A   • Number of children: N/A   • Years of education: N/A     Occupational History   • Not on  "file.     Social History Main Topics   • Smoking status: Former Smoker     Packs/day: 0.50     Years: 10.00     Types: Cigarettes     Quit date: 12/10/1968   • Smokeless tobacco: Never Used   • Alcohol use No   • Drug use: No   • Sexual activity: Not on file     Other Topics Concern   • Not on file     Social History Narrative   • No narrative on file     Allergies   Allergen Reactions   • Atenolol Shortness of Breath     DYSPNEA.   • Tetanus Toxoid Swelling     Outpatient Encounter Prescriptions as of 2/15/2019   Medication Sig Dispense Refill   • atorvastatin (LIPITOR) 80 MG tablet TAKE ONE TABLET BY MOUTH EVERY DAY 90 Tab 3   • prasugrel (EFFIENT) 10 MG Tab Take 1 Tab by mouth every day. 30 Tab 6   • warfarin (COUMADIN) 5 MG Tab TAKE ONE TABLET BY MOUTH ONE TIME DAILY OR AS DIRECTED BY COUMADIN CLINIC 90 Tab 1   • metoprolol SR (TOPROL XL) 25 MG TABLET SR 24 HR Take 1 Tab by mouth every day. 30 Tab 6   • SYMBICORT 160-4.5 MCG/ACT Aerosol INHALE 2 PUFFS BY MOUTH 2 TIMES A DAY. USE SPACER. RINSE MOUTH AFTER EACH USE. 1 Inhaler 11   • insulin lispro prot & lispro (HUMALOG MIX) (75-25) unit/mL Suspension inj Inject  as instructed 3 times a day before meals.     • insulin detemir (LEVEMIR FLEXTOUCH) 100 UNIT/ML Solution Pen-injector injection Levemir FlexTouch U-100 Insulin 100 unit/mL (3 mL) subcutaneous pen     • Insulin Pen Needle (NOVOFINE PLUS) 32G X 4 MM Misc Novofine 32 32 gauge x 1/4\" needle   use 1 needle daily     • Insulin Pen Needle (TRUEPLUS PEN NEEDLES) 29G X 12MM Misc TRUEplus Pen Needle 31 gauge x 1/4\"     • DILTIAZem (CARDIZEM) 60 MG Tab Take 1 Tab by mouth 3 times a day. 90 Tab 0   • losartan (COZAAR) 25 MG Tab Take 25 mg by mouth every evening.     • montelukast (SINGULAIR) 10 MG Tab Take 1 Tab by mouth every day. 30 Tab 11   • fluticasone (FLONASE) 50 MCG/ACT nasal spray Spray 2 Sprays in nose every day.     • [DISCONTINUED] azithromycin (ZITHROMAX Z-LIBBY) 250 MG Tab Take 2 tablets on day 1. Then " take 1 tablet a day for 4 days. 6 Tab 0   • [DISCONTINUED] MethylPREDNISolone (MEDROL DOSEPAK) 4 MG Tablet Therapy Pack Take as directed. 21 Tab 0   • [DISCONTINUED] aspirin (ASA) 81 MG Chew Tab chewable tablet Take 81 mg by mouth every day.     • budesonide-formoterol (SYMBICORT) 160-4.5 MCG/ACT Aerosol Inhale 2 Puffs by mouth 2 Times a Day. Use spacer. Rinse mouth after each use. 3 Inhaler 3   • Non Formulary Request Humalog-PRN     • furosemide (LASIX) 40 MG Tab furosemide 40 mg tablet   Take 1 tablet every day by oral route as needed.     • TRUEPLUS PEN NEEDLES 31G X 6 MM Misc      • Potassium Chloride Crystals potassium chloride ER 10 mEq tablet,extended release   Take 1 tablet every day by oral route.     • [DISCONTINUED] fluticasone (FLOVENT HFA) 110 MCG/ACT Aerosol Flovent  mcg/actuation aerosol inhaler     • albuterol (PROVENTIL) 2.5mg/3ml Nebu Soln solution for nebulization 2.5 mg by Nebulization route every four hours as needed for Shortness of Breath.     • [DISCONTINUED] insulin detemir (LEVEMIR) 100 UNIT/ML Solution Inject 12 Units as instructed every evening.     • albuterol (VENTOLIN OR PROVENTIL) 108 (90 BASE) MCG/ACT AERS Inhale 2 Puffs by mouth every 6 hours as needed.       No facility-administered encounter medications on file as of 2/15/2019.      Review of Systems   Constitutional: Negative for chills and fever.   HENT: Negative for congestion.    Eyes: Negative for blurred vision.   Respiratory: Negative for shortness of breath.    Cardiovascular: Negative for chest pain, palpitations, orthopnea, leg swelling and PND.   Gastrointestinal: Negative for abdominal pain.   Genitourinary: Negative for dysuria.   Musculoskeletal: Negative for joint pain and myalgias.   Skin: Negative for rash.   Neurological: Negative for dizziness and loss of consciousness.   Endo/Heme/Allergies: Bruises/bleeds easily.   Psychiatric/Behavioral: The patient does not have insomnia.         Objective:   BP  146/80 (BP Location: Left arm, Patient Position: Sitting, BP Cuff Size: Adult)   Pulse 70   Ht 1.829 m (6')   Wt 110.7 kg (244 lb)   SpO2 97%   BMI 33.09 kg/m²      Physical Exam   Constitutional: He is oriented to person, place, and time. He appears well-developed and well-nourished.   Neck: No JVD present.   Cardiovascular: Normal rate, normal heart sounds and intact distal pulses.  An irregularly irregular rhythm present.   No murmur heard.  Pulmonary/Chest: Effort normal and breath sounds normal. No respiratory distress. He has no wheezes. He has no rales.   Musculoskeletal: He exhibits no edema.   Neurological: He is alert and oriented to person, place, and time.   Skin: Skin is warm and dry.   Psychiatric: He has a normal mood and affect. His behavior is normal.     DATE OF SERVICE:  08/15/2018     PROCEDURE:  Cardiac catheterization and percutaneous coronary intervention.  A.  Left heart catheterization.  B.  Left ventriculography.  C.  Selective coronary angiography.  D.  Coronary stent implantation of the mid right coronary artery with   overlapping stents: 4.5x18 mm Ultra non-drug eluting stent and 4.0x38 mm Xience   Gianna drug-eluting stent   E.  Right radial artery approach.     PREOPERATIVE DIAGNOSES:  1.  Unstable angina pectoris.  2.  Abnormal myocardial perfusion scan with inferior perfusion abnormality.  3.  Chronic atrial fibrillation.  4.  Chronic anticoagulation with warfarin.  5.  Diabetes mellitus.     POSTOPERATIVE DIAGNOSES:  1.  Coronary artery disease, 3-vessel, involving the dominant mid right   coronary artery, distal circumflex artery and diagonal branch ostium.  2.  Left ventricular ejection fraction 60%.  No prior study is available for comparison.   Mild concentric left ventricular hypertrophy.  Normal left ventricular systolic function.  Left ventricular ejection fraction is visually estimated to be 60%.  Diastolic function is difficult to assess with atrial  fibrillation.  Severely dilated left atrium.  Estimated right ventricular systolic pressure is 37 mmHg + JVP.    Assessment:     1. Coronary artery disease, occlusive     2. S/P coronary artery stent placement     3. Atrial fibrillation, chronic (HCC)     4. Long term current use of anticoagulant therapy     5. Essential hypertension, benign     6. Dyslipidemia         Medical Decision Making:  Today's Assessment / Status / Plan:     Assessment  1.  CAD.  Clinically stable.  2.  RCA stent 8/15/2018  3.  Atrial fibrillation.  Chronic.  Asymptomatic, rate controlled.  4.  Anticoagulation.  On warfarin.  5.  Hypertension.  Controlled  6.  Dyslipidemia.  On atorvastatin    Recommendation Discussion  1.  Can continue current cardiac therapy.  2.  Instructed the monitor and log blood pressure readings and notify me if it is not less than 130 systolic.  3.  Continue warfarin surveillance.  4.  Follow-up 6 months.        Deng Corbin D.O.  [address] [place] [id number]  VIA Facsimile: [phone]

## 2019-02-15 NOTE — PROGRESS NOTES
Chief Complaint   Patient presents with   •  CAD       Subjective:   Yuri De León is a 73 y.o. male who presents today for follow-up evaluation of CAD, NSTEMI, RCA stent, chronic fibrillation, chronic anticoagulation, hypertension and hyperlipidemia.    Last seen at the time of his coronary intervention on 8/15/2018.    Since his coronary intervention he has had no cardiac symptoms.  He is completed 1 month of triple drug therapy and currently on warfarin and Prasugrel.    Past Medical History:   Diagnosis Date   • Abnormal electrocardiogram 8/13/2010   • Arrhythmia     Atrial Fibrillation; Cardiologist, Dr. Sorensen   • Arthritis     knees, left hip   • ASTHMA     inhalers   • Atrial fibrillation (HCC) 10/25/2011   • Bronchospasm 8/6/2009   • Diabetes (HCC)     insulin   • HTN (hypertension) 10/25/2011   • Hypercholesteremia 10/25/2011   • Long term (current) use of anticoagulants 10/25/2011   • NASAL POLYPS 8/6/2009   • Other nonspecific abnormal finding 8/6/2009   • Pain     left hip   • Shortness of breath    • Sleep apnea 8/6/2009    o2 at night   • Wears glasses      Past Surgical History:   Procedure Laterality Date   • KNEE ARTHROPLASTY TOTAL Left 10/2014   • HIP ARTHROPLASTY TOTAL  8/31/2010    Performed by OSGOOD, PATRICK J at SURGERY [facility] ORS   • LUMBAR DECOMPRESSION  1984   • HIP REPLACEMENT, TOTAL     • LAMINOTOMY     • SINUSCOPE     • SINUSOTOMIES  2003,2005,2/2007     Family History   Problem Relation Age of Onset   • Heart Disease Mother      Social History     Social History   • Marital status:      Spouse name: N/A   • Number of children: N/A   • Years of education: N/A     Occupational History   • Not on file.     Social History Main Topics   • Smoking status: Former Smoker     Packs/day: 0.50     Years: 10.00     Types: Cigarettes     Quit date: 12/10/1968   • Smokeless tobacco: Never Used   • Alcohol use No   • Drug use: No   • Sexual activity: Not on file     Other  "Topics Concern   • Not on file     Social History Narrative   • No narrative on file     Allergies   Allergen Reactions   • Atenolol Shortness of Breath     DYSPNEA.   • Tetanus Toxoid Swelling     Outpatient Encounter Prescriptions as of 2/15/2019   Medication Sig Dispense Refill   • atorvastatin (LIPITOR) 80 MG tablet TAKE ONE TABLET BY MOUTH EVERY DAY 90 Tab 3   • prasugrel (EFFIENT) 10 MG Tab Take 1 Tab by mouth every day. 30 Tab 6   • warfarin (COUMADIN) 5 MG Tab TAKE ONE TABLET BY MOUTH ONE TIME DAILY OR AS DIRECTED BY COUMADIN CLINIC 90 Tab 1   • metoprolol SR (TOPROL XL) 25 MG TABLET SR 24 HR Take 1 Tab by mouth every day. 30 Tab 6   • SYMBICORT 160-4.5 MCG/ACT Aerosol INHALE 2 PUFFS BY MOUTH 2 TIMES A DAY. USE SPACER. RINSE MOUTH AFTER EACH USE. 1 Inhaler 11   • insulin lispro prot & lispro (HUMALOG MIX) (75-25) unit/mL Suspension inj Inject  as instructed 3 times a day before meals.     • insulin detemir (LEVEMIR FLEXTOUCH) 100 UNIT/ML Solution Pen-injector injection Levemir FlexTouch U-100 Insulin 100 unit/mL (3 mL) subcutaneous pen     • Insulin Pen Needle (NOVOFINE PLUS) 32G X 4 MM Misc Novofine 32 32 gauge x 1/4\" needle   use 1 needle daily     • Insulin Pen Needle (TRUEPLUS PEN NEEDLES) 29G X 12MM Misc TRUEplus Pen Needle 31 gauge x 1/4\"     • DILTIAZem (CARDIZEM) 60 MG Tab Take 1 Tab by mouth 3 times a day. 90 Tab 0   • losartan (COZAAR) 25 MG Tab Take 25 mg by mouth every evening.     • montelukast (SINGULAIR) 10 MG Tab Take 1 Tab by mouth every day. 30 Tab 11   • fluticasone (FLONASE) 50 MCG/ACT nasal spray Spray 2 Sprays in nose every day.     • [DISCONTINUED] azithromycin (ZITHROMAX Z-LIBBY) 250 MG Tab Take 2 tablets on day 1. Then take 1 tablet a day for 4 days. 6 Tab 0   • [DISCONTINUED] MethylPREDNISolone (MEDROL DOSEPAK) 4 MG Tablet Therapy Pack Take as directed. 21 Tab 0   • [DISCONTINUED] aspirin (ASA) 81 MG Chew Tab chewable tablet Take 81 mg by mouth every day.     • budesonide-formoterol " (SYMBICORT) 160-4.5 MCG/ACT Aerosol Inhale 2 Puffs by mouth 2 Times a Day. Use spacer. Rinse mouth after each use. 3 Inhaler 3   • Non Formulary Request Humalog-PRN     • furosemide (LASIX) 40 MG Tab furosemide 40 mg tablet   Take 1 tablet every day by oral route as needed.     • TRUEPLUS PEN NEEDLES 31G X 6 MM Misc      • Potassium Chloride Crystals potassium chloride ER 10 mEq tablet,extended release   Take 1 tablet every day by oral route.     • [DISCONTINUED] fluticasone (FLOVENT HFA) 110 MCG/ACT Aerosol Flovent  mcg/actuation aerosol inhaler     • albuterol (PROVENTIL) 2.5mg/3ml Nebu Soln solution for nebulization 2.5 mg by Nebulization route every four hours as needed for Shortness of Breath.     • [DISCONTINUED] insulin detemir (LEVEMIR) 100 UNIT/ML Solution Inject 12 Units as instructed every evening.     • albuterol (VENTOLIN OR PROVENTIL) 108 (90 BASE) MCG/ACT AERS Inhale 2 Puffs by mouth every 6 hours as needed.       No facility-administered encounter medications on file as of 2/15/2019.      Review of Systems   Constitutional: Negative for chills and fever.   HENT: Negative for congestion.    Eyes: Negative for blurred vision.   Respiratory: Negative for shortness of breath.    Cardiovascular: Negative for chest pain, palpitations, orthopnea, leg swelling and PND.   Gastrointestinal: Negative for abdominal pain.   Genitourinary: Negative for dysuria.   Musculoskeletal: Negative for joint pain and myalgias.   Skin: Negative for rash.   Neurological: Negative for dizziness and loss of consciousness.   Endo/Heme/Allergies: Bruises/bleeds easily.   Psychiatric/Behavioral: The patient does not have insomnia.         Objective:   /80 (BP Location: Left arm, Patient Position: Sitting, BP Cuff Size: Adult)   Pulse 70   Ht 1.829 m (6')   Wt 110.7 kg (244 lb)   SpO2 97%   BMI 33.09 kg/m²     Physical Exam   Constitutional: He is oriented to person, place, and time. He appears well-developed and  well-nourished.   Neck: No JVD present.   Cardiovascular: Normal rate, normal heart sounds and intact distal pulses.  An irregularly irregular rhythm present.   No murmur heard.  Pulmonary/Chest: Effort normal and breath sounds normal. No respiratory distress. He has no wheezes. He has no rales.   Musculoskeletal: He exhibits no edema.   Neurological: He is alert and oriented to person, place, and time.   Skin: Skin is warm and dry.   Psychiatric: He has a normal mood and affect. His behavior is normal.     DATE OF SERVICE:  08/15/2018     PROCEDURE:  Cardiac catheterization and percutaneous coronary intervention.  A.  Left heart catheterization.  B.  Left ventriculography.  C.  Selective coronary angiography.  D.  Coronary stent implantation of the mid right coronary artery with   overlapping stents: 4.5x18 mm Ultra non-drug eluting stent and 4.0x38 mm Xience   Gianna drug-eluting stent   E.  Right radial artery approach.     PREOPERATIVE DIAGNOSES:  1.  Unstable angina pectoris.  2.  Abnormal myocardial perfusion scan with inferior perfusion abnormality.  3.  Chronic atrial fibrillation.  4.  Chronic anticoagulation with warfarin.  5.  Diabetes mellitus.     POSTOPERATIVE DIAGNOSES:  1.  Coronary artery disease, 3-vessel, involving the dominant mid right   coronary artery, distal circumflex artery and diagonal branch ostium.  2.  Left ventricular ejection fraction 60%.  No prior study is available for comparison.   Mild concentric left ventricular hypertrophy.  Normal left ventricular systolic function.  Left ventricular ejection fraction is visually estimated to be 60%.  Diastolic function is difficult to assess with atrial fibrillation.  Severely dilated left atrium.  Estimated right ventricular systolic pressure is 37 mmHg + JVP.    Assessment:     1. Coronary artery disease, occlusive     2. S/P coronary artery stent placement     3. Atrial fibrillation, chronic (HCC)     4. Long term current use of  anticoagulant therapy     5. Essential hypertension, benign     6. Dyslipidemia         Medical Decision Making:  Today's Assessment / Status / Plan:     Assessment  1.  CAD.  Clinically stable.  2.  RCA stent 8/15/2018  3.  Atrial fibrillation.  Chronic.  Asymptomatic, rate controlled.  4.  Anticoagulation.  On warfarin.  5.  Hypertension.  Controlled  6.  Dyslipidemia.  On atorvastatin    Recommendation Discussion  1.  Can continue current cardiac therapy.  2.  Instructed the monitor and log blood pressure readings and notify me if it is not less than 130 systolic.  3.  Continue warfarin surveillance.  4.  Follow-up 6 months.

## 2019-02-21 ENCOUNTER — OFFICE VISIT (OUTPATIENT)
Dept: PULMONOLOGY | Facility: HOSPICE | Age: 74
End: 2019-02-21
Payer: MEDICARE

## 2019-02-21 VITALS
WEIGHT: 240 LBS | RESPIRATION RATE: 16 BRPM | TEMPERATURE: 98.2 F | OXYGEN SATURATION: 97 % | HEART RATE: 68 BPM | DIASTOLIC BLOOD PRESSURE: 78 MMHG | SYSTOLIC BLOOD PRESSURE: 124 MMHG | HEIGHT: 72 IN | BODY MASS INDEX: 32.51 KG/M2

## 2019-02-21 DIAGNOSIS — I48.20 ATRIAL FIBRILLATION, CHRONIC (HCC): ICD-10-CM

## 2019-02-21 DIAGNOSIS — Z79.01 LONG TERM CURRENT USE OF ANTICOAGULANT THERAPY: Chronic | ICD-10-CM

## 2019-02-21 DIAGNOSIS — Z95.5 S/P CORONARY ARTERY STENT PLACEMENT: ICD-10-CM

## 2019-02-21 DIAGNOSIS — J45.30 MILD PERSISTENT ASTHMA WITHOUT COMPLICATION: ICD-10-CM

## 2019-02-21 PROCEDURE — 99214 OFFICE O/P EST MOD 30 MIN: CPT | Performed by: INTERNAL MEDICINE

## 2019-02-21 RX ORDER — FUROSEMIDE 40 MG/1
TABLET ORAL
COMMUNITY
End: 2019-06-07

## 2019-02-21 RX ORDER — ALBUTEROL SULFATE 90 UG/1
AEROSOL, METERED RESPIRATORY (INHALATION)
COMMUNITY
End: 2019-06-07

## 2019-02-21 RX ORDER — DEXAMETHASONE 4 MG/1
TABLET ORAL
Status: ON HOLD | COMMUNITY
End: 2019-06-17

## 2019-02-21 RX ORDER — DILTIAZEM HYDROCHLORIDE 60 MG/1
TABLET, FILM COATED ORAL
COMMUNITY
End: 2019-06-07

## 2019-02-21 RX ORDER — BUDESONIDE AND FORMOTEROL FUMARATE DIHYDRATE 160; 4.5 UG/1; UG/1
AEROSOL RESPIRATORY (INHALATION)
COMMUNITY
End: 2019-06-07

## 2019-02-21 RX ORDER — DEXAMETHASONE 1 MG
TABLET ORAL
COMMUNITY
End: 2019-06-07

## 2019-02-21 RX ORDER — POTASSIUM CHLORIDE 750 MG/1
CAPSULE, EXTENDED RELEASE ORAL
Status: ON HOLD | COMMUNITY
End: 2019-06-17 | Stop reason: CLARIF

## 2019-02-21 ASSESSMENT — PAIN SCALES - GENERAL: PAINLEVEL: NO PAIN

## 2019-02-21 NOTE — PATIENT INSTRUCTIONS
This gentleman underwent a cardiac catheterization the day after our last visit, required 2 stents, and since then his supine sleeping and breathing has been better.  Certainly some cardiac contribution.  Atrial fibrillation rate is controlled and Coumadin as well as his anticoagulation for the stent for at least one year.    Reactive airways is controlled, he uses Symbicort generally once a day, he would boost that to twice a day if he developed wheezing coughing or more respiratory symptoms.  He no longer requires oxygen and that has been discontinued.    His vaccinations are current, he was able to get the Prevnar after the angiogram and flu vaccine Pneumovax and Prevnar are all current.  Other health maintenance issues include his mild elevation of body mass index, portion control and gentle exercise, he is active every day out and about and his wife has designs on very healthy cooking.

## 2019-02-21 NOTE — PROGRESS NOTES
Yuri De León is a 73 y.o. male here for asthma with prior hypoxemia and recent coronary stent. Patient was referred by his primary.    History of Present Illness:        This gentleman underwent a cardiac catheterization the day after our last visit, required 2 stents, and since then his supine sleeping and breathing has been better.  Certainly some cardiac contribution.  Atrial fibrillation rate is controlled and Coumadin as well as his anticoagulation for the stent for at least one year.    Reactive airways is controlled, he uses Symbicort generally once a day, he would boost that to twice a day if he developed wheezing coughing or more respiratory symptoms.  He no longer requires oxygen and that has been discontinued.    His vaccinations are current, he was able to get the Prevnar after the angiogram and flu vaccine Pneumovax and Prevnar are all current.  Other health maintenance issues include his mild elevation of body mass index, portion control and gentle exercise, he is active every day out and about and his wife has designs on very healthy cooking.  He continues to work at [employer], fairly active, in [place]  Constitutional ROS: No unexpected change in weight, No unexplained fevers  Eyes: No change in vision or blurring or double vision  Mouth/Throat ROS: No sore throat, No recent change in voice or hoarseness  Pulmonary ROS: See present history for pertinent positives  Cardiovascular ROS: No chest pain to suggest acute coronary syndrome  Gastrointestinal ROS: No abdominal pain to suggest peptic disease  Musculoskeletal/Extremities ROS: no acute artritis or unusual swelling  Hematologic/Lymphatic ROS: No easy bleeding or unusual lymph node swelling  Neurologic ROS: No new or unusual weakness  Psychiatric ROS: No hallucinations  Allergic/Immunologic: No  urticaria or allergic rash      Current Outpatient Prescriptions   Medication Sig Dispense Refill   • atorvastatin (LIPITOR) 80 MG tablet  "TAKE ONE TABLET BY MOUTH EVERY DAY 90 Tab 3   • prasugrel (EFFIENT) 10 MG Tab Take 1 Tab by mouth every day. 30 Tab 6   • warfarin (COUMADIN) 5 MG Tab TAKE ONE TABLET BY MOUTH ONE TIME DAILY OR AS DIRECTED BY COUMADIN CLINIC 90 Tab 1   • metoprolol SR (TOPROL XL) 25 MG TABLET SR 24 HR Take 1 Tab by mouth every day. 30 Tab 6   • SYMBICORT 160-4.5 MCG/ACT Aerosol INHALE 2 PUFFS BY MOUTH 2 TIMES A DAY. USE SPACER. RINSE MOUTH AFTER EACH USE. 1 Inhaler 11   • insulin lispro prot & lispro (HUMALOG MIX) (75-25) unit/mL Suspension inj Inject  as instructed 3 times a day before meals.     • budesonide-formoterol (SYMBICORT) 160-4.5 MCG/ACT Aerosol Inhale 2 Puffs by mouth 2 Times a Day. Use spacer. Rinse mouth after each use. 3 Inhaler 3   • Non Formulary Request Humalog-PRN     • furosemide (LASIX) 40 MG Tab furosemide 40 mg tablet   Take 1 tablet every day by oral route as needed.     • TRUEPLUS PEN NEEDLES 31G X 6 MM Misc      • insulin detemir (LEVEMIR FLEXTOUCH) 100 UNIT/ML Solution Pen-injector injection Levemir FlexTouch U-100 Insulin 100 unit/mL (3 mL) subcutaneous pen     • Insulin Pen Needle (NOVOFINE PLUS) 32G X 4 MM Misc Novofine 32 32 gauge x 1/4\" needle   use 1 needle daily     • Potassium Chloride Crystals potassium chloride ER 10 mEq tablet,extended release   Take 1 tablet every day by oral route.     • Insulin Pen Needle (TRUEPLUS PEN NEEDLES) 29G X 12MM Misc TRUEplus Pen Needle 31 gauge x 1/4\"     • DILTIAZem (CARDIZEM) 60 MG Tab Take 1 Tab by mouth 3 times a day. 90 Tab 0   • albuterol (PROVENTIL) 2.5mg/3ml Nebu Soln solution for nebulization 2.5 mg by Nebulization route every four hours as needed for Shortness of Breath.     • losartan (COZAAR) 25 MG Tab Take 25 mg by mouth every evening.     • montelukast (SINGULAIR) 10 MG Tab Take 1 Tab by mouth every day. 30 Tab 11   • fluticasone (FLONASE) 50 MCG/ACT nasal spray Spray 2 Sprays in nose every day.     • albuterol (VENTOLIN OR PROVENTIL) 108 (90 BASE) " MCG/ACT AERS Inhale 2 Puffs by mouth every 6 hours as needed.       No current facility-administered medications for this visit.        Social History   Substance Use Topics   • Smoking status: Former Smoker     Packs/day: 0.50     Years: 10.00     Types: Cigarettes     Quit date: 12/10/1968   • Smokeless tobacco: Never Used   • Alcohol use No        Past Medical History:   Diagnosis Date   • Abnormal electrocardiogram 8/13/2010   • Arrhythmia     Atrial Fibrillation; Cardiologist, Dr. Sorensen   • Arthritis     knees, left hip   • ASTHMA     inhalers   • Atrial fibrillation (HCC) 10/25/2011   • Bronchospasm 8/6/2009   • Diabetes (HCC)     insulin   • HTN (hypertension) 10/25/2011   • Hypercholesteremia 10/25/2011   • Long term (current) use of anticoagulants 10/25/2011   • NASAL POLYPS 8/6/2009   • Other nonspecific abnormal finding 8/6/2009   • Pain     left hip   • Shortness of breath    • Sleep apnea 8/6/2009    o2 at night   • Wears glasses        Past Surgical History:   Procedure Laterality Date   • KNEE ARTHROPLASTY TOTAL Left 10/2014   • HIP ARTHROPLASTY TOTAL  8/31/2010    Performed by OSGOOD, PATRICK J at SURGERY [facility] ORS   • LUMBAR DECOMPRESSION  1984   • HIP REPLACEMENT, TOTAL     • LAMINOTOMY     • SINUSCOPE     • SINUSOTOMIES  2003,2005,2/2007       Allergies: Atenolol and Tetanus toxoid    Family History   Problem Relation Age of Onset   • Heart Disease Mother        Physical Examination    Vitals:    02/21/19 1533   Height: 1.829 m (6')   Weight: 108.9 kg (240 lb)   Weight % change since last entry.: 0 %   BP: 124/78   Pulse: 68   BMI (Calculated): 32.55   Resp: 16   Temp: 36.8 °C (98.2 °F)       General Appearance: alert, no distress  Skin: Skin color, texture, turgor normal. No rashes or lesions.  Eyes: negative  Oropharynx: Lips, mucosa, and tongue normal. Teeth and gums normal. Oropharynx moist and without lesion  Lungs: positive findings: Expiratory wheeze  Heart: negative. RRR  without murmur, gallop, or rubs.  No ectopy.  Abdomen: Abdomen soft, non-tender. . No masses,  No organomegaly  Extremities:  No deformities, edema, or skin discoloration  Joints: No acute arthritis  Peripheral Pulses:perfused  Neurologic: intact grossly  No cough    II (soft palate, uvula, fauces visible)    Imaging: None    PFTS: None      Assessment and Plan  1. Mild persistent asthma without complication      2. Atrial fibrillation, chronic (HCC)    3. Long term current use of anticoagulant therapy    4. S/P coronary artery stent placement    5. BMI 31.0-31.9,adult  Patient's body mass index is 32.55 kg/m². Exercise and nutrition counseling were performed at this visit.  - OBESITY COUNSELING (No Charge): Patient identified as having weight management issue.  Appropriate orders and counseling given.    This gentleman underwent a cardiac catheterization the day after our last visit, required 2 stents, and since then his supine sleeping and breathing has been better.  Certainly some cardiac contribution.  Atrial fibrillation rate is controlled and Coumadin as well as his anticoagulation for the stent for at least one year.    Reactive airways is controlled, he uses Symbicort generally once a day, he would boost that to twice a day if he developed wheezing coughing or more respiratory symptoms.  He no longer requires oxygen and that has been discontinued.    His vaccinations are current, he was able to get the Prevnar after the angiogram and flu vaccine Pneumovax and Prevnar are all current.  Other health maintenance issues include his mild elevation of body mass index, portion control and gentle exercise, he is active every day out and about and his wife has designs on very healthy cooking.  Followup Return in about 6 months (around 8/21/2019) for follow up visit with Dr. Carlene Jacques.

## 2019-02-26 ENCOUNTER — ANTICOAGULATION MONITORING (OUTPATIENT)
Dept: VASCULAR LAB | Facility: MEDICAL CENTER | Age: 74
End: 2019-02-26
Payer: MEDICARE

## 2019-02-26 ENCOUNTER — APPOINTMENT (OUTPATIENT)
Dept: VASCULAR LAB | Facility: MEDICAL CENTER | Age: 74
End: 2019-02-26
Payer: MEDICARE

## 2019-02-26 ENCOUNTER — NON-PROVIDER VISIT (OUTPATIENT)
Dept: MEDICAL GROUP | Facility: PHYSICIAN GROUP | Age: 74
End: 2019-02-26
Payer: MEDICARE

## 2019-02-26 VITALS
SYSTOLIC BLOOD PRESSURE: 120 MMHG | DIASTOLIC BLOOD PRESSURE: 56 MMHG | OXYGEN SATURATION: 95 % | HEART RATE: 73 BPM | RESPIRATION RATE: 14 BRPM

## 2019-02-26 DIAGNOSIS — Z79.01 LONG TERM CURRENT USE OF ANTICOAGULANT THERAPY: ICD-10-CM

## 2019-02-26 DIAGNOSIS — I48.91 ATRIAL FIBRILLATION WITH RAPID VENTRICULAR RESPONSE (HCC): ICD-10-CM

## 2019-02-26 DIAGNOSIS — Z79.01 CHRONIC ANTICOAGULATION: ICD-10-CM

## 2019-02-26 LAB
INR BLD: 2.6 (ref 0.9–1.2)
INR PPP: 2.6 (ref 2–3.5)
POC PROTIME: ABNORMAL

## 2019-02-26 PROCEDURE — 99212 OFFICE O/P EST SF 10 MIN: CPT | Performed by: NURSE PRACTITIONER

## 2019-02-26 PROCEDURE — 85610 PROTHROMBIN TIME: CPT | Performed by: NURSE PRACTITIONER

## 2019-02-26 NOTE — PROGRESS NOTES
OP Anticoagulation Service Note    Date: 2019  Blood Pressure : 120/56  Pulse: 73  Respiration: 14    Anticoagulation Summary  As of 2019    INR goal:   2.0-3.0   TTR:   61.2 % (3.6 y)   INR used for dosin.6 (2019)   Warfarin maintenance plan:   2.5 mg (5 mg x 0.5) every Sun; 5 mg (5 mg x 1) all other days   Weekly warfarin total:   32.5 mg   Plan last modified:   MARIAA Garcia (2018)   Next INR check:   3/26/2019   Target end date:   Indefinite    Indications    Atrial fibrillation with rapid ventricular response (HCC) [I48.91]  Long term current use of anticoagulant therapy [Z79.01]  Atrial fibrillation (HCC) (Resolved) [I48.91]             Anticoagulation Episode Summary     INR check location:       Preferred lab:       Send INR reminders to:       Comments:         Anticoagulation Care Providers     Provider Role Specialty Phone number    Sanjay Sorensen M.D. Referring Cardiology [phone]        Anticoagulation Patient Findings      THIS VISIT CONDUCTED WITH [employer] UTIL[employer]ING SECURE AND ENCRYPTED VIDEOCONFERENCING EQUIPMENT  ROS:    Pulm: Denies SOB, chest pain.    Card: Denies syncope, edema, palpitations.    Extremities: Denies redness, pain.    PE:    Pulm: No SOB, even and unlabored.    Card: Normal rate and rhythm.   Extremities: No redness, or edema.     INR  is-therapeutic.    Denies signs/symptoms of bleeding and/or thrombosis.    Denies changes to diet or medications.   Follow up appt in 4 weeks     Plan:  Continue current medication regimen.     Medication: Warfarin (Coumadin)           2.5 mg    5 mg    5 mg    5 mg    5 mg    5 mg    5 mg      1/2 tab(s)    1 tab(s)    1 tab(s)    1 tab(s)    1 tab(s)    1 tab(s)  1 tab(s)     Next Appointment:  @ 2:00    Review all of your home medications and newly ordered medications with your doctor and / or  pharmacist. Follow medication instructions as directed by your doctor and / or pharmacist. Please keep your medication list with you and share with your physician. Update the information when medications are discontinued, doses are changed, or new medications (including over-the-counter products) are added; and carry medication information at all times in the event of emergency situations.      For questions, please contact Outpatient Anticoagulation Service [phone].        Patient is on a high risk medication and therefore requires close monitoring and follow up.     CHEST guidelines recommend frequent INR monitoring at regular intervals (a few days up to a max of 12 weeks) to ensure they are on the proper dose of warfarin and not having any complications from therapy.  INRs can dramatically change over a short time period due to diet, medications, and medical conditions.   The patient instructed to go to the ER for falls with a head injury,  blood in urine or stool or any bleeding that last longer than 20 min.     APRIL Garcia.

## 2019-03-12 ENCOUNTER — HOSPITAL ENCOUNTER (OUTPATIENT)
Dept: LAB | Facility: MEDICAL CENTER | Age: 74
End: 2019-03-12
Attending: INTERNAL MEDICINE
Payer: MEDICARE

## 2019-03-12 LAB
25(OH)D3 SERPL-MCNC: 34 NG/ML (ref 30–100)
ALBUMIN SERPL BCP-MCNC: 3.9 G/DL (ref 3.2–4.9)
BASOPHILS # BLD AUTO: 0.5 % (ref 0–1.8)
BASOPHILS # BLD: 0.05 K/UL (ref 0–0.12)
BUN SERPL-MCNC: 31 MG/DL (ref 8–22)
CALCIUM SERPL-MCNC: 8.9 MG/DL (ref 8.5–10.5)
CHLORIDE SERPL-SCNC: 106 MMOL/L (ref 96–112)
CO2 SERPL-SCNC: 29 MMOL/L (ref 20–33)
CREAT SERPL-MCNC: 1.31 MG/DL (ref 0.5–1.4)
EOSINOPHIL # BLD AUTO: 0.11 K/UL (ref 0–0.51)
EOSINOPHIL NFR BLD: 1.2 % (ref 0–6.9)
ERYTHROCYTE [DISTWIDTH] IN BLOOD BY AUTOMATED COUNT: 47 FL (ref 35.9–50)
GLUCOSE SERPL-MCNC: 234 MG/DL (ref 65–99)
HCT VFR BLD AUTO: 46.5 % (ref 42–52)
HGB BLD-MCNC: 15 G/DL (ref 14–18)
IMM GRANULOCYTES # BLD AUTO: 0.02 K/UL (ref 0–0.11)
IMM GRANULOCYTES NFR BLD AUTO: 0.2 % (ref 0–0.9)
LYMPHOCYTES # BLD AUTO: 1.02 K/UL (ref 1–4.8)
LYMPHOCYTES NFR BLD: 10.8 % (ref 22–41)
MCH RBC QN AUTO: 29.5 PG (ref 27–33)
MCHC RBC AUTO-ENTMCNC: 32.3 G/DL (ref 33.7–35.3)
MCV RBC AUTO: 91.4 FL (ref 81.4–97.8)
MONOCYTES # BLD AUTO: 0.83 K/UL (ref 0–0.85)
MONOCYTES NFR BLD AUTO: 8.8 % (ref 0–13.4)
NEUTROPHILS # BLD AUTO: 7.38 K/UL (ref 1.82–7.42)
NEUTROPHILS NFR BLD: 78.5 % (ref 44–72)
NRBC # BLD AUTO: 0 K/UL
NRBC BLD-RTO: 0 /100 WBC
PHOSPHATE SERPL-MCNC: 2.7 MG/DL (ref 2.5–4.5)
PLATELET # BLD AUTO: 235 K/UL (ref 164–446)
PMV BLD AUTO: 10.2 FL (ref 9–12.9)
POTASSIUM SERPL-SCNC: 4.6 MMOL/L (ref 3.6–5.5)
PTH-INTACT SERPL-MCNC: 88.7 PG/ML (ref 14–72)
RBC # BLD AUTO: 5.09 M/UL (ref 4.7–6.1)
SODIUM SERPL-SCNC: 140 MMOL/L (ref 135–145)
URATE SERPL-MCNC: 5.2 MG/DL (ref 2.5–8.3)
WBC # BLD AUTO: 9.4 K/UL (ref 4.8–10.8)

## 2019-03-12 PROCEDURE — 83970 ASSAY OF PARATHORMONE: CPT

## 2019-03-12 PROCEDURE — 82306 VITAMIN D 25 HYDROXY: CPT

## 2019-03-12 PROCEDURE — 85025 COMPLETE CBC W/AUTO DIFF WBC: CPT

## 2019-03-12 PROCEDURE — 36415 COLL VENOUS BLD VENIPUNCTURE: CPT

## 2019-03-12 PROCEDURE — 84550 ASSAY OF BLOOD/URIC ACID: CPT

## 2019-03-12 PROCEDURE — 80069 RENAL FUNCTION PANEL: CPT

## 2019-03-26 ENCOUNTER — ANTICOAGULATION MONITORING (OUTPATIENT)
Dept: VASCULAR LAB | Facility: MEDICAL CENTER | Age: 74
End: 2019-03-26

## 2019-03-26 ENCOUNTER — APPOINTMENT (OUTPATIENT)
Dept: VASCULAR LAB | Facility: MEDICAL CENTER | Age: 74
End: 2019-03-26
Attending: NURSE PRACTITIONER
Payer: MEDICARE

## 2019-03-26 ENCOUNTER — NON-PROVIDER VISIT (OUTPATIENT)
Dept: MEDICAL GROUP | Facility: PHYSICIAN GROUP | Age: 74
End: 2019-03-26
Payer: MEDICARE

## 2019-03-26 VITALS
OXYGEN SATURATION: 94 % | RESPIRATION RATE: 18 BRPM | DIASTOLIC BLOOD PRESSURE: 84 MMHG | HEART RATE: 68 BPM | SYSTOLIC BLOOD PRESSURE: 140 MMHG

## 2019-03-26 DIAGNOSIS — I48.91 ATRIAL FIBRILLATION WITH RAPID VENTRICULAR RESPONSE (HCC): ICD-10-CM

## 2019-03-26 DIAGNOSIS — D68.9 COAGULATION DISORDER (HCC): ICD-10-CM

## 2019-03-26 DIAGNOSIS — Z79.01 LONG TERM CURRENT USE OF ANTICOAGULANT THERAPY: ICD-10-CM

## 2019-03-26 LAB
INR BLD: 2.6 (ref 0.9–1.2)
INR PPP: 2.6 (ref 2–3.5)
POC PROTIME: ABNORMAL

## 2019-03-26 PROCEDURE — 85610 PROTHROMBIN TIME: CPT | Performed by: NURSE PRACTITIONER

## 2019-03-26 NOTE — PROGRESS NOTES
Anticoagulation Summary  As of 3/26/2019    INR goal:   2.0-3.0   TTR:   62.0 % (3.7 y)   INR used for dosin.6 (3/26/2019)   Warfarin maintenance plan:   2.5 mg (5 mg x 0.5) every Sun; 5 mg (5 mg x 1) all other days   Weekly warfarin total:   32.5 mg   Plan last modified:   MARIAA Garcia (2018)   Next INR check:   2019   Target end date:   Indefinite    Indications    Atrial fibrillation with rapid ventricular response (HCC) [I48.91]  Long term current use of anticoagulant therapy [Z79.01]  Atrial fibrillation (HCC) (Resolved) [I48.91]             Anticoagulation Episode Summary     INR check location:       Preferred lab:       Send INR reminders to:       Comments:         Anticoagulation Care Providers     Provider Role Specialty Phone number    Sanjay Sorensen M.D. Referring Cardiology [phone]        Anticoagulation Patient Findings  Patient Findings     Negatives:   Signs/symptoms of thrombosis, Signs/symptoms of bleeding, Laboratory test error suspected, Change in health, Change in alcohol use, Change in activity, Upcoming invasive procedure, Emergency department visit, Upcoming dental procedure, Missed doses, Extra doses, Change in medications, Change in diet/appetite, Hospital admission, Bruising, Other complaints        Vitals:    19 1405   BP: 140/84   Pulse: 68   Resp: 18   SpO2: 94%     Plan:  INR is therapeutic today at 2.6. Confirmed the current warfarin dosing regimen and patient compliance. Patient denies any interval changes to diet and/or medications. Patient denies any signs/symptoms of bleeding or clotting.  Patient instructed to continue with the current warfarin dosing regimen, and asked to follow up again in 4 weeks.        Medication: Warfarin (Coumadin)          2.5 mg     5mg  5mg 5mg 5mg 5mg 5mg      0.5 tab(s)    1 tab(s)    1 tab(s)    1 tab(s)    1 tab(s)    1 tab(s)  1 tab(s)       Next Appointment: Tuesday, April 23, 2019  1:45pm    Kennedi NelsonD

## 2019-04-23 ENCOUNTER — APPOINTMENT (OUTPATIENT)
Dept: VASCULAR LAB | Facility: MEDICAL CENTER | Age: 74
End: 2019-04-23
Payer: MEDICARE

## 2019-04-23 ENCOUNTER — NON-PROVIDER VISIT (OUTPATIENT)
Dept: MEDICAL GROUP | Facility: PHYSICIAN GROUP | Age: 74
End: 2019-04-23
Payer: MEDICARE

## 2019-04-23 ENCOUNTER — ANTICOAGULATION MONITORING (OUTPATIENT)
Dept: VASCULAR LAB | Facility: MEDICAL CENTER | Age: 74
End: 2019-04-23
Payer: MEDICARE

## 2019-04-23 VITALS
SYSTOLIC BLOOD PRESSURE: 100 MMHG | HEART RATE: 71 BPM | OXYGEN SATURATION: 95 % | RESPIRATION RATE: 20 BRPM | DIASTOLIC BLOOD PRESSURE: 60 MMHG

## 2019-04-23 DIAGNOSIS — I48.91 ATRIAL FIBRILLATION WITH RAPID VENTRICULAR RESPONSE (HCC): ICD-10-CM

## 2019-04-23 DIAGNOSIS — D68.9 COAGULATION DISORDER (HCC): ICD-10-CM

## 2019-04-23 DIAGNOSIS — Z79.01 LONG TERM CURRENT USE OF ANTICOAGULANT THERAPY: ICD-10-CM

## 2019-04-23 LAB
INR BLD: 2.9 (ref 0.9–1.2)
INR PPP: 2.9 (ref 2–3.5)
POC PROTIME: ABNORMAL

## 2019-04-23 PROCEDURE — 85610 PROTHROMBIN TIME: CPT | Performed by: NURSE PRACTITIONER

## 2019-04-23 PROCEDURE — 99212 OFFICE O/P EST SF 10 MIN: CPT | Performed by: NURSE PRACTITIONER

## 2019-04-23 NOTE — PROGRESS NOTES
OP Anticoagulation Service Note    Date: 2019  Blood Pressure : 100/60  Pulse: 71  Respiration: 20    Anticoagulation Summary  As of 2019    INR goal:   2.0-3.0   TTR:   62.8 % (3.8 y)   INR used for dosin.9 (2019)   Warfarin maintenance plan:   2.5 mg (5 mg x 0.5) every Sun; 5 mg (5 mg x 1) all other days   Weekly warfarin total:   32.5 mg   Plan last modified:   MARIAA Garcia (2018)   Next INR check:   2019   Target end date:   Indefinite    Indications    Atrial fibrillation with rapid ventricular response (HCC) [I48.91]  Long term current use of anticoagulant therapy [Z79.01]  Atrial fibrillation (HCC) (Resolved) [I48.91]             Anticoagulation Episode Summary     INR check location:       Preferred lab:       Send INR reminders to:       Comments:         Anticoagulation Care Providers     Provider Role Specialty Phone number    Sanjay Sorensen M.D. Referring Cardiology [phone]        Anticoagulation Patient Findings  Patient Findings     Negatives:   Signs/symptoms of thrombosis, Signs/symptoms of bleeding, Change in health, Change in alcohol use, Change in activity, Missed doses, Extra doses, Change in medications, Change in diet/appetite, Bruising          THIS VISIT CONDUCTED WITH [employer] SECURE AND ENCRYPTED VIDEOCONFERENCING EQUIPMENT  ROS:    Pulm: Denies SOB, chest pain.    Card: Denies syncope, edema, palpitations.    Extremities: Denies redness, pain.    PE:    Pulm: No SOB, even and unlabored.    Card: Normal rate and rhythm.   Extremities: No redness, or edema.     INR  is-therapeutic.    Denies signs/symptoms of bleeding and/or thrombosis.    Denies changes to diet or medications.   Follow up appt in 5 weeks     Plan:  Continue current medication regimen.     Medication: Warfarin (Coumadin)           2.5 mg    5 mg    5 mg    5 mg    5 mg    5 mg     5 mg      1/2 tab(s)    1 tab(s)    1 tab(s)    1 tab(s)    1 tab(s)    1 tab(s)  1 tab(s)     Next Appointment: Tuesday, May 28 @     Review all of your home medications and newly ordered medications with your doctor and / or pharmacist. Follow medication instructions as directed by your doctor and / or pharmacist. Please keep your medication list with you and share with your physician. Update the information when medications are discontinued, doses are changed, or new medications (including over-the-counter products) are added; and carry medication information at all times in the event of emergency situations.      For questions, please contact Outpatient Anticoagulation Service [phone].         Patient is on a high risk medication and therefore requires close monitoring and follow up.     CHEST guidelines recommend frequent INR monitoring at regular intervals (a few days up to a max of 12 weeks) to ensure they are on the proper dose of warfarin and not having any complications from therapy.  INRs can dramatically change over a short time period due to diet, medications, and medical conditions.   The patient instructed to go to the ER for falls with a head injury,  blood in urine or stool or any bleeding that last longer than 20 min.     APRIL Garcia.

## 2019-05-14 DIAGNOSIS — I10 ESSENTIAL HYPERTENSION: ICD-10-CM

## 2019-05-21 RX ORDER — METOPROLOL SUCCINATE 25 MG/1
25 TABLET, EXTENDED RELEASE ORAL DAILY
Qty: 90 TAB | Refills: 2 | Status: SHIPPED | OUTPATIENT
Start: 2019-05-21 | End: 2020-12-01

## 2019-05-24 DIAGNOSIS — Z79.01 CHRONIC ANTICOAGULATION: ICD-10-CM

## 2019-05-24 RX ORDER — WARFARIN SODIUM 5 MG/1
TABLET ORAL
Qty: 90 TAB | Refills: 1 | Status: ON HOLD | OUTPATIENT
Start: 2019-05-24 | End: 2019-06-17

## 2019-05-28 ENCOUNTER — NON-PROVIDER VISIT (OUTPATIENT)
Dept: MEDICAL GROUP | Facility: PHYSICIAN GROUP | Age: 74
End: 2019-05-28
Payer: MEDICARE

## 2019-05-28 ENCOUNTER — ANTICOAGULATION MONITORING (OUTPATIENT)
Dept: VASCULAR LAB | Facility: MEDICAL CENTER | Age: 74
End: 2019-05-28

## 2019-05-28 ENCOUNTER — APPOINTMENT (OUTPATIENT)
Dept: MEDICAL GROUP | Facility: PHYSICIAN GROUP | Age: 74
End: 2019-05-28
Payer: MEDICARE

## 2019-05-28 VITALS — HEART RATE: 71 BPM | DIASTOLIC BLOOD PRESSURE: 70 MMHG | RESPIRATION RATE: 16 BRPM | SYSTOLIC BLOOD PRESSURE: 128 MMHG

## 2019-05-28 DIAGNOSIS — D68.9 COAGULATION DISORDER (HCC): ICD-10-CM

## 2019-05-28 DIAGNOSIS — I48.91 ATRIAL FIBRILLATION WITH RAPID VENTRICULAR RESPONSE (HCC): ICD-10-CM

## 2019-05-28 DIAGNOSIS — Z79.01 LONG TERM CURRENT USE OF ANTICOAGULANT THERAPY: ICD-10-CM

## 2019-05-28 LAB
INR BLD: 2.6 (ref 0.9–1.2)
INR PPP: 2.6 (ref 2–3.5)
POC PROTIME: ABNORMAL

## 2019-05-28 PROCEDURE — 85610 PROTHROMBIN TIME: CPT | Performed by: NURSE PRACTITIONER

## 2019-05-28 NOTE — PROGRESS NOTES
OP Anticoagulation Service Note    Date: 5/28/2019  Blood Pressure : 128/70  Pulse: 71  Respiration: 16    Anticoagulation Summary  As of 5/28/2019    INR goal:   2.0-3.0   TTR:   62.8 % (3.8 y)   INR used for dosing:      Warfarin maintenance plan:   2.5 mg (5 mg x 0.5) every Sun; 5 mg (5 mg x 1) all other days   Weekly warfarin total:   32.5 mg   Plan last modified:   MARIAA Garcia (11/20/2018)   Next INR check:      Target end date:   Indefinite    Indications    Atrial fibrillation with rapid ventricular response (HCC) [I48.91]  Long term current use of anticoagulant therapy [Z79.01]  Atrial fibrillation (HCC) (Resolved) [I48.91]             Anticoagulation Episode Summary     INR check location:       Preferred lab:       Send INR reminders to:       Comments:         Anticoagulation Care Providers     Provider Role Specialty Phone number    Sanjay Sorensen M.D. Referring Cardiology [phone]        Anticoagulation Patient Findings      INR  therapeutic.    Denies signs/symptoms of bleeding and/or thrombosis.    Denies changes to diet or medications.   Follow up appt in 7 weeks     Plan:  Continue weekly warfarin dose as noted      Medication: Warfarin (Coumadin)     Sunday Monday Tuesday Wednesday Thursday Friday Saturday      2.5 mg    5 mg    5 mg    5 mg    5 mg    5 mg    5 mg      0.5 tab(s)    1 tab(s)    1 tab(s)    1 tab(s)    1 tab(s)    1 tab(s)  1 tab(s)     Next Appointment: Tuesday, 7/16/2019 at 1pm      Review all of your home medications and newly ordered medications with your doctor and / or pharmacist. Follow medication instructions as directed by your doctor and / or pharmacist. Please keep your medication list with you and share with your physician. Update the information when medications are discontinued, doses are changed, or new medications (including over-the-counter products) are added; and carry medication information at all times in the event of  emergency situations.      For questions, please contact Outpatient Anticoagulation Service [phone].     Leslie GraceD

## 2019-05-31 DIAGNOSIS — I25.119 CORONARY ARTERY DISEASE WITH ANGINA PECTORIS, UNSPECIFIED VESSEL OR LESION TYPE, UNSPECIFIED WHETHER NATIVE OR TRANSPLANTED HEART (HCC): ICD-10-CM

## 2019-06-06 RX ORDER — PRASUGREL 10 MG/1
10 TABLET, FILM COATED ORAL DAILY
Qty: 30 TAB | Refills: 6 | Status: SHIPPED | OUTPATIENT
Start: 2019-06-06 | End: 2019-08-22

## 2019-06-07 ENCOUNTER — APPOINTMENT (OUTPATIENT)
Dept: RADIOLOGY | Facility: MEDICAL CENTER | Age: 74
End: 2019-06-07
Attending: EMERGENCY MEDICINE
Payer: MEDICARE

## 2019-06-07 ENCOUNTER — HOSPITAL ENCOUNTER (EMERGENCY)
Facility: MEDICAL CENTER | Age: 74
End: 2019-06-07
Attending: EMERGENCY MEDICINE
Payer: MEDICARE

## 2019-06-07 VITALS
TEMPERATURE: 97 F | DIASTOLIC BLOOD PRESSURE: 90 MMHG | RESPIRATION RATE: 16 BRPM | OXYGEN SATURATION: 96 % | SYSTOLIC BLOOD PRESSURE: 148 MMHG | BODY MASS INDEX: 32.85 KG/M2 | HEIGHT: 72 IN | WEIGHT: 242.51 LBS | HEART RATE: 72 BPM

## 2019-06-07 DIAGNOSIS — T14.8XXA BLISTER: ICD-10-CM

## 2019-06-07 DIAGNOSIS — S80.01XA TRAUMATIC HEMATOMA OF KNEE, RIGHT, INITIAL ENCOUNTER: ICD-10-CM

## 2019-06-07 DIAGNOSIS — Z79.01 CHRONIC ANTICOAGULATION: ICD-10-CM

## 2019-06-07 PROCEDURE — 99284 EMERGENCY DEPT VISIT MOD MDM: CPT

## 2019-06-07 PROCEDURE — 302875 HCHG BANDAGE ACE 4 OR 6""

## 2019-06-07 PROCEDURE — 73564 X-RAY EXAM KNEE 4 OR MORE: CPT | Mod: RT

## 2019-06-07 NOTE — ED NOTES
Released with all follow-up to POV with wife. Encouraged to return with any concerns, pt verbalized he will see PCp. Ambulated with a limp out of the ED, denies any need for crutches.

## 2019-06-07 NOTE — ED PROVIDER NOTES
ED Provider Note    CHIEF COMPLAINT  Chief Complaint   Patient presents with   • Knee Injury   • T-5000 FALL       HPI  Yuri De León is a 74 y.o. male who presents the day after he tripped and fell onto the carpet, taking the brunt of the fall on his right knee.  He is status post left TKA and JUAN.  He hit the back of his head on the door frame on the way down, but he did not lose consciousness.  He has had no vomiting since then.  He denies headache.  He is on Coumadin and his last INR was 2.4.  He is usually very controlled with that medication.  He is concerned because a blister popped up on his right knee.  He is able to walk, but it is painful to bend the knee.  He notes that he has very bad arthritis in the right knee and he is scheduled to see Dr. Zaldivar, orthopedist, on June 14 to address that knee and its chronic pain.    REVIEW OF SYSTEMS  See HPI for further details.  No loss of consciousness, no vomiting    PAST MEDICAL HISTORY  Past Medical History:   Diagnosis Date   • Atrial fibrillation (HCC) 10/25/2011   • Long term (current) use of anticoagulants 10/25/2011   • Hypercholesteremia 10/25/2011   • HTN (hypertension) 10/25/2011   • Abnormal electrocardiogram 8/13/2010   • Bronchospasm 8/6/2009   • NASAL POLYPS 8/6/2009   • Other nonspecific abnormal finding 8/6/2009   • Sleep apnea 8/6/2009    o2 at night   • Arrhythmia     Atrial Fibrillation; Cardiologist, Dr. Sorensen   • Arthritis     knees, left hip   • ASTHMA     inhalers   • Diabetes (HCC)     insulin   • Pain     left hip   • Shortness of breath    • Wears glasses        FAMILY HISTORY  Family History   Problem Relation Age of Onset   • Heart Disease Mother        SOCIAL HISTORY  Social History     Social History   • Marital status:      Spouse name: N/A   • Number of children: N/A   • Years of education: N/A     Social History Main Topics   • Smoking status: Former Smoker     Packs/day: 0.50     Years: 10.00     Types: Cigarettes      Quit date: 12/10/1968   • Smokeless tobacco: Never Used   • Alcohol use No   • Drug use: No   • Sexual activity: Not on file     Other Topics Concern   • Not on file     Social History Narrative   • No narrative on file       SURGICAL HISTORY  Past Surgical History:   Procedure Laterality Date   • KNEE ARTHROPLASTY TOTAL Left 10/2014   • HIP ARTHROPLASTY TOTAL  8/31/2010    Performed by OSGOOD, PATRICK J at SURGERY [facility]   • LUMBAR DECOMPRESSION  1984   • HIP REPLACEMENT, TOTAL     • LAMINOTOMY     • SINUSCOPE     • SINUSOTOMIES  2003,2005,2/2007       CURRENT MEDICATIONS  No current facility-administered medications for this encounter.     Current Outpatient Prescriptions:   •  prasugrel (EFFIENT) 10 MG Tab, Take 1 Tab by mouth every day., Disp: 30 Tab, Rfl: 6  •  warfarin (COUMADIN) 5 MG Tab, TAKE ONE TABLET BY MOUTH ONE TIME DAILY OR AS DIRECTED BY COUMADIN CLINIC, Disp: 90 Tab, Rfl: 1  •  metoprolol SR (TOPROL XL) 25 MG TABLET SR 24 HR, Take 1 Tab by mouth every day., Disp: 90 Tab, Rfl: 2  •  dexamethasone (DECADRON) 4 MG Tab, dexamethasone 4 mg tablet, Disp: , Rfl:   •  potassium chloride (MICRO-K) 10 MEQ capsule, potassium chloride ER 10 mEq tablet,extended release, Disp: , Rfl:   •  atorvastatin (LIPITOR) 80 MG tablet, TAKE ONE TABLET BY MOUTH EVERY DAY, Disp: 90 Tab, Rfl: 3  •  SYMBICORT 160-4.5 MCG/ACT Aerosol, INHALE 2 PUFFS BY MOUTH 2 TIMES A DAY. USE SPACER. RINSE MOUTH AFTER EACH USE., Disp: 1 Inhaler, Rfl: 11  •  insulin lispro prot & lispro (HUMALOG MIX) (75-25) unit/mL Suspension inj, Inject  as instructed 3 times a day before meals., Disp: , Rfl:   •  budesonide-formoterol (SYMBICORT) 160-4.5 MCG/ACT Aerosol, Inhale 2 Puffs by mouth 2 Times a Day. Use spacer. Rinse mouth after each use., Disp: 3 Inhaler, Rfl: 3  •  furosemide (LASIX) 40 MG Tab, furosemide 40 mg tablet  Take 1 tablet every day by oral route as needed., Disp: , Rfl:   •  insulin detemir (LEVEMIR FLEXTOUCH) 100 UNIT/ML  Solution Pen-injector injection, Levemir FlexTouch U-100 Insulin 100 unit/mL (3 mL) subcutaneous pen, Disp: , Rfl:   •  DILTIAZem (CARDIZEM) 60 MG Tab, Take 1 Tab by mouth 3 times a day., Disp: 90 Tab, Rfl: 0  •  albuterol (PROVENTIL) 2.5mg/3ml Nebu Soln solution for nebulization, 2.5 mg by Nebulization route every four hours as needed for Shortness of Breath., Disp: , Rfl:   •  losartan (COZAAR) 25 MG Tab, Take 25 mg by mouth every evening., Disp: , Rfl:   •  montelukast (SINGULAIR) 10 MG Tab, Take 1 Tab by mouth every day., Disp: 30 Tab, Rfl: 11  •  fluticasone (FLONASE) 50 MCG/ACT nasal spray, Spray 2 Sprays in nose every day., Disp: , Rfl:   •  albuterol (VENTOLIN OR PROVENTIL) 108 (90 BASE) MCG/ACT AERS, Inhale 2 Puffs by mouth every 6 hours as needed., Disp: , Rfl:       ALLERGIES  Allergies   Allergen Reactions   • Atenolol Shortness of Breath     DYSPNEA.   • Tetanus Toxoid Swelling       PHYSICAL EXAM  VITAL SIGNS: /91   Pulse 76   Temp 36.1 °C (97 °F) (Temporal)   Resp 18   Ht 1.829 m (6')   Wt 110 kg (242 lb 8.1 oz)   SpO2 96%   BMI 32.89 kg/m²  @[facility][[id number]::@   Constitutional: Well developed, Well nourished, No acute respiratory distress, Non-toxic appearance.   HENT: Normocephalic, Atraumatic, Bilateral external ears normal, Oropharynx clear, mucous membranes are moist.  Eyes: Conjunctiva normal, No discharge. No icterus.  Neck: Normal range of motion. Supple.  Skin: Warm, Dry, No erythema, No rash.  1 cm partial-thickness laceration with glue already on it.  Large amount of ecchymosis with one bulla to the anterior right knee.  Musculoskeletal: Slightly limited right knee flexion and extension due to large effusion  Neurologic: Alert & oriented x 3. No focal deficits noted.  Sensation intact to the right foot    DX-KNEE COMPLETE 4+ RIGHT   Final Result      1.  No evidence of acute fracture or dislocation.      2.  Prominent prepatellar soft tissue swelling.      3.  Joint effusion.       4.  Tricompartment degenerative change which involves the lateral femorotibial articulation to the greatest degree.            COURSE & MEDICAL DECISION MAKING  Pertinent Labs & Imaging studies reviewed. (See chart for details)  11:50 AM x-ray reviewed and orthopedics informed.  Patient likely has an intra-articular and prepatellar hematoma.  Dr. Torres returned the text and states the patient needs to ice, elevate, and be in a compression wrap to follow-up next week.  Patient is comfortable with this plan.  After cleaning the skin with alcohol, I lanced at the side of the blister with a #12 blade and removed the inflammatory fluid.  A bandage was placed and the patient was gently wrapped with 6 inch elastic bandages.    The patient will return for new or worsening symptoms and is stable at the time of discharge.    The patient is referred to a primary physician for blood pressure management, diabetic screening, and for all other preventative health concerns.    DISPOSITION:  Patient will be discharged home in stable condition.    FOLLOW UP:  Jah Zaldivar M.D.  [address] [facility] [address]  [phone]      June 14th as previously scheduled      OUTPATIENT MEDICATIONS:  Discharge Medication List as of 6/7/2019 12:41 PM           FINAL IMPRESSION  1. Traumatic hematoma of knee, right, initial encounter    2. Chronic anticoagulation    3. Blister               Electronically signed by: Elma Arizmendi, 6/7/2019 11:50 AM

## 2019-06-07 NOTE — ED NOTES
C/O right knee pain, and swelling after suffering a GLF yesterday.   Chief Complaint   Patient presents with   • Knee Injury   • T-5000 FALL     /91   Pulse 76   Temp 36.1 °C (97 °F) (Temporal)   Resp 18   Ht 1.829 m (6')   Wt 110 kg (242 lb 8.1 oz)   SpO2 96%   BMI 32.89 kg/m²

## 2019-06-16 ENCOUNTER — HOSPITAL ENCOUNTER (INPATIENT)
Facility: MEDICAL CENTER | Age: 74
LOS: 5 days | DRG: 603 | End: 2019-06-21
Attending: EMERGENCY MEDICINE | Admitting: INTERNAL MEDICINE
Payer: MEDICARE

## 2019-06-16 DIAGNOSIS — M79.89 LEG SWELLING: ICD-10-CM

## 2019-06-16 DIAGNOSIS — L03.115 CELLULITIS OF RIGHT LEG: ICD-10-CM

## 2019-06-16 DIAGNOSIS — N17.9 AKI (ACUTE KIDNEY INJURY) (HCC): ICD-10-CM

## 2019-06-16 LAB
ALBUMIN SERPL BCP-MCNC: 3 G/DL (ref 3.2–4.9)
ALBUMIN/GLOB SERPL: 1 G/DL
ALP SERPL-CCNC: 128 U/L (ref 30–99)
ALT SERPL-CCNC: 24 U/L (ref 2–50)
ANION GAP SERPL CALC-SCNC: 6 MMOL/L (ref 0–11.9)
APTT PPP: 39.7 SEC (ref 24.7–36)
AST SERPL-CCNC: 22 U/L (ref 12–45)
BASOPHILS # BLD AUTO: 0.7 % (ref 0–1.8)
BASOPHILS # BLD: 0.07 K/UL (ref 0–0.12)
BILIRUB SERPL-MCNC: 1.6 MG/DL (ref 0.1–1.5)
BUN SERPL-MCNC: 39 MG/DL (ref 8–22)
CALCIUM SERPL-MCNC: 8.4 MG/DL (ref 8.4–10.2)
CHLORIDE SERPL-SCNC: 105 MMOL/L (ref 96–112)
CO2 SERPL-SCNC: 26 MMOL/L (ref 20–33)
CREAT SERPL-MCNC: 1.59 MG/DL (ref 0.5–1.4)
CRP SERPL HS-MCNC: 5.71 MG/DL (ref 0–0.75)
EOSINOPHIL # BLD AUTO: 0.14 K/UL (ref 0–0.51)
EOSINOPHIL NFR BLD: 1.3 % (ref 0–6.9)
ERYTHROCYTE [DISTWIDTH] IN BLOOD BY AUTOMATED COUNT: 49.2 FL (ref 35.9–50)
GLOBULIN SER CALC-MCNC: 3 G/DL (ref 1.9–3.5)
GLUCOSE SERPL-MCNC: 296 MG/DL (ref 65–99)
HCT VFR BLD AUTO: 38.8 % (ref 42–52)
HGB BLD-MCNC: 12.6 G/DL (ref 14–18)
IMM GRANULOCYTES # BLD AUTO: 0.04 K/UL (ref 0–0.11)
IMM GRANULOCYTES NFR BLD AUTO: 0.4 % (ref 0–0.9)
INR PPP: 2.83 (ref 0.87–1.13)
LACTATE BLD-SCNC: 1.4 MMOL/L (ref 0.5–2)
LYMPHOCYTES # BLD AUTO: 1.03 K/UL (ref 1–4.8)
LYMPHOCYTES NFR BLD: 9.7 % (ref 22–41)
MCH RBC QN AUTO: 29.4 PG (ref 27–33)
MCHC RBC AUTO-ENTMCNC: 32.5 G/DL (ref 33.7–35.3)
MCV RBC AUTO: 90.4 FL (ref 81.4–97.8)
MONOCYTES # BLD AUTO: 1.25 K/UL (ref 0–0.85)
MONOCYTES NFR BLD AUTO: 11.8 % (ref 0–13.4)
NEUTROPHILS # BLD AUTO: 8.04 K/UL (ref 1.82–7.42)
NEUTROPHILS NFR BLD: 76.1 % (ref 44–72)
NRBC # BLD AUTO: 0 K/UL
NRBC BLD-RTO: 0 /100 WBC
PLATELET # BLD AUTO: 249 K/UL (ref 164–446)
PMV BLD AUTO: 9.8 FL (ref 9–12.9)
POTASSIUM SERPL-SCNC: 4.3 MMOL/L (ref 3.6–5.5)
PROT SERPL-MCNC: 6 G/DL (ref 6–8.2)
PROTHROMBIN TIME: 30.6 SEC (ref 12–14.6)
RBC # BLD AUTO: 4.29 M/UL (ref 4.7–6.1)
SODIUM SERPL-SCNC: 137 MMOL/L (ref 135–145)
WBC # BLD AUTO: 10.6 K/UL (ref 4.8–10.8)

## 2019-06-16 PROCEDURE — 700101 HCHG RX REV CODE 250: Performed by: EMERGENCY MEDICINE

## 2019-06-16 PROCEDURE — 96365 THER/PROPH/DIAG IV INF INIT: CPT

## 2019-06-16 PROCEDURE — 700111 HCHG RX REV CODE 636 W/ 250 OVERRIDE (IP): Performed by: EMERGENCY MEDICINE

## 2019-06-16 PROCEDURE — 96367 TX/PROPH/DG ADDL SEQ IV INF: CPT

## 2019-06-16 PROCEDURE — 99223 1ST HOSP IP/OBS HIGH 75: CPT | Mod: AI | Performed by: INTERNAL MEDICINE

## 2019-06-16 PROCEDURE — 700105 HCHG RX REV CODE 258

## 2019-06-16 PROCEDURE — 87040 BLOOD CULTURE FOR BACTERIA: CPT | Mod: 91

## 2019-06-16 PROCEDURE — 83605 ASSAY OF LACTIC ACID: CPT

## 2019-06-16 PROCEDURE — 85610 PROTHROMBIN TIME: CPT

## 2019-06-16 PROCEDURE — 85025 COMPLETE CBC W/AUTO DIFF WBC: CPT

## 2019-06-16 PROCEDURE — 86140 C-REACTIVE PROTEIN: CPT

## 2019-06-16 PROCEDURE — 36415 COLL VENOUS BLD VENIPUNCTURE: CPT

## 2019-06-16 PROCEDURE — 80053 COMPREHEN METABOLIC PANEL: CPT

## 2019-06-16 PROCEDURE — 85652 RBC SED RATE AUTOMATED: CPT

## 2019-06-16 PROCEDURE — 99285 EMERGENCY DEPT VISIT HI MDM: CPT

## 2019-06-16 PROCEDURE — 96375 TX/PRO/DX INJ NEW DRUG ADDON: CPT

## 2019-06-16 PROCEDURE — 85730 THROMBOPLASTIN TIME PARTIAL: CPT

## 2019-06-16 PROCEDURE — 770006 HCHG ROOM/CARE - MED/SURG/GYN SEMI*

## 2019-06-16 PROCEDURE — 700105 HCHG RX REV CODE 258: Performed by: EMERGENCY MEDICINE

## 2019-06-16 PROCEDURE — 700111 HCHG RX REV CODE 636 W/ 250 OVERRIDE (IP)

## 2019-06-16 RX ORDER — SODIUM CHLORIDE 9 MG/ML
1000 INJECTION, SOLUTION INTRAVENOUS ONCE
Status: COMPLETED | OUTPATIENT
Start: 2019-06-16 | End: 2019-06-16

## 2019-06-16 RX ORDER — CLINDAMYCIN PHOSPHATE 600 MG/50ML
600 INJECTION, SOLUTION INTRAVENOUS ONCE
Status: COMPLETED | OUTPATIENT
Start: 2019-06-16 | End: 2019-06-16

## 2019-06-16 RX ADMIN — CLINDAMYCIN IN 5 PERCENT DEXTROSE 600 MG: 12 INJECTION, SOLUTION INTRAVENOUS at 23:02

## 2019-06-16 RX ADMIN — AMPICILLIN AND SULBACTAM 3 G: 1; 2 INJECTION, POWDER, FOR SOLUTION INTRAMUSCULAR; INTRAVENOUS at 22:59

## 2019-06-16 RX ADMIN — VANCOMYCIN: 1 INJECTION, SOLUTION INTRAVENOUS at 23:36

## 2019-06-16 RX ADMIN — SODIUM CHLORIDE 1000 ML: 9 INJECTION, SOLUTION INTRAVENOUS at 23:36

## 2019-06-17 ENCOUNTER — APPOINTMENT (OUTPATIENT)
Dept: RADIOLOGY | Facility: MEDICAL CENTER | Age: 74
DRG: 603 | End: 2019-06-17
Attending: HOSPITALIST
Payer: MEDICARE

## 2019-06-17 PROBLEM — N17.9 ACUTE ON CHRONIC RENAL FAILURE (HCC): Status: ACTIVE | Noted: 2019-06-17

## 2019-06-17 PROBLEM — N17.9 ARF (ACUTE RENAL FAILURE) (HCC): Status: ACTIVE | Noted: 2019-06-17

## 2019-06-17 PROBLEM — R74.8 ELEVATED LIVER ENZYMES: Status: ACTIVE | Noted: 2019-06-17

## 2019-06-17 PROBLEM — L03.90 CELLULITIS: Status: ACTIVE | Noted: 2019-06-17

## 2019-06-17 PROBLEM — N18.9 ACUTE ON CHRONIC RENAL FAILURE (HCC): Status: ACTIVE | Noted: 2019-06-17

## 2019-06-17 LAB
APPEARANCE UR: CLEAR
BACTERIA #/AREA URNS HPF: NEGATIVE /HPF
BILIRUB UR QL STRIP.AUTO: NEGATIVE
COLOR UR: YELLOW
EPI CELLS #/AREA URNS HPF: ABNORMAL /HPF
ERYTHROCYTE [SEDIMENTATION RATE] IN BLOOD BY WESTERGREN METHOD: 32 MM/HOUR (ref 0–20)
GLUCOSE BLD-MCNC: 152 MG/DL (ref 65–99)
GLUCOSE BLD-MCNC: 174 MG/DL (ref 65–99)
GLUCOSE BLD-MCNC: 216 MG/DL (ref 65–99)
GLUCOSE BLD-MCNC: 287 MG/DL (ref 65–99)
GLUCOSE UR STRIP.AUTO-MCNC: 100 MG/DL
KETONES UR STRIP.AUTO-MCNC: NEGATIVE MG/DL
LEUKOCYTE ESTERASE UR QL STRIP.AUTO: NEGATIVE
MICRO URNS: ABNORMAL
MUCOUS THREADS #/AREA URNS HPF: ABNORMAL /HPF
NITRITE UR QL STRIP.AUTO: NEGATIVE
PH UR STRIP.AUTO: 5 [PH]
PROT UR QL STRIP: NEGATIVE MG/DL
RBC # URNS HPF: ABNORMAL /HPF
RBC UR QL AUTO: ABNORMAL
SP GR UR STRIP.AUTO: 1.02
VANCOMYCIN TIMED 2584: 14.7 UG/ML
WBC #/AREA URNS HPF: ABNORMAL /HPF

## 2019-06-17 PROCEDURE — 80202 ASSAY OF VANCOMYCIN: CPT

## 2019-06-17 PROCEDURE — 87040 BLOOD CULTURE FOR BACTERIA: CPT | Mod: 91

## 2019-06-17 PROCEDURE — 99233 SBSQ HOSP IP/OBS HIGH 50: CPT | Performed by: HOSPITALIST

## 2019-06-17 PROCEDURE — 81001 URINALYSIS AUTO W/SCOPE: CPT

## 2019-06-17 PROCEDURE — 36415 COLL VENOUS BLD VENIPUNCTURE: CPT

## 2019-06-17 PROCEDURE — A9270 NON-COVERED ITEM OR SERVICE: HCPCS | Performed by: INTERNAL MEDICINE

## 2019-06-17 PROCEDURE — 97165 OT EVAL LOW COMPLEX 30 MIN: CPT

## 2019-06-17 PROCEDURE — 700111 HCHG RX REV CODE 636 W/ 250 OVERRIDE (IP): Performed by: HOSPITALIST

## 2019-06-17 PROCEDURE — 700102 HCHG RX REV CODE 250 W/ 637 OVERRIDE(OP): Performed by: HOSPITALIST

## 2019-06-17 PROCEDURE — A9270 NON-COVERED ITEM OR SERVICE: HCPCS | Performed by: HOSPITALIST

## 2019-06-17 PROCEDURE — 700102 HCHG RX REV CODE 250 W/ 637 OVERRIDE(OP): Performed by: INTERNAL MEDICINE

## 2019-06-17 PROCEDURE — 97161 PT EVAL LOW COMPLEX 20 MIN: CPT

## 2019-06-17 PROCEDURE — 82962 GLUCOSE BLOOD TEST: CPT | Mod: 91

## 2019-06-17 PROCEDURE — 700111 HCHG RX REV CODE 636 W/ 250 OVERRIDE (IP): Performed by: INTERNAL MEDICINE

## 2019-06-17 PROCEDURE — 770006 HCHG ROOM/CARE - MED/SURG/GYN SEMI*

## 2019-06-17 PROCEDURE — 700105 HCHG RX REV CODE 258: Performed by: HOSPITALIST

## 2019-06-17 PROCEDURE — 700105 HCHG RX REV CODE 258: Performed by: INTERNAL MEDICINE

## 2019-06-17 RX ORDER — WARFARIN SODIUM 5 MG/1
2.5-5 TABLET ORAL DAILY
COMMUNITY
End: 2020-01-08

## 2019-06-17 RX ORDER — PRASUGREL 10 MG/1
10 TABLET, FILM COATED ORAL DAILY
Status: DISCONTINUED | OUTPATIENT
Start: 2019-06-17 | End: 2019-06-21 | Stop reason: HOSPADM

## 2019-06-17 RX ORDER — CELECOXIB 200 MG/1
200 CAPSULE ORAL EVERY EVENING
Status: ON HOLD | COMMUNITY
End: 2019-06-21

## 2019-06-17 RX ORDER — BISACODYL 10 MG
10 SUPPOSITORY, RECTAL RECTAL
Status: DISCONTINUED | OUTPATIENT
Start: 2019-06-17 | End: 2019-06-21 | Stop reason: HOSPADM

## 2019-06-17 RX ORDER — WARFARIN SODIUM 5 MG/1
5 TABLET ORAL
Status: COMPLETED | OUTPATIENT
Start: 2019-06-17 | End: 2019-06-17

## 2019-06-17 RX ORDER — CEPHALEXIN 500 MG/1
500 CAPSULE ORAL 3 TIMES DAILY
Status: ON HOLD | COMMUNITY
Start: 2019-06-14 | End: 2019-06-21

## 2019-06-17 RX ORDER — METOPROLOL SUCCINATE 25 MG/1
25 TABLET, EXTENDED RELEASE ORAL DAILY
Status: DISCONTINUED | OUTPATIENT
Start: 2019-06-17 | End: 2019-06-21 | Stop reason: HOSPADM

## 2019-06-17 RX ORDER — ACETAMINOPHEN 325 MG/1
650 TABLET ORAL EVERY 6 HOURS PRN
Status: DISCONTINUED | OUTPATIENT
Start: 2019-06-17 | End: 2019-06-21 | Stop reason: HOSPADM

## 2019-06-17 RX ORDER — DEXTROSE MONOHYDRATE 25 G/50ML
50 INJECTION, SOLUTION INTRAVENOUS
Status: DISCONTINUED | OUTPATIENT
Start: 2019-06-17 | End: 2019-06-21 | Stop reason: HOSPADM

## 2019-06-17 RX ORDER — ALBUTEROL SULFATE 90 UG/1
2 AEROSOL, METERED RESPIRATORY (INHALATION) EVERY 4 HOURS PRN
Status: DISCONTINUED | OUTPATIENT
Start: 2019-06-17 | End: 2019-06-21 | Stop reason: HOSPADM

## 2019-06-17 RX ORDER — CELECOXIB 200 MG/1
200 CAPSULE ORAL EVERY EVENING
Status: DISCONTINUED | OUTPATIENT
Start: 2019-06-17 | End: 2019-06-21 | Stop reason: HOSPADM

## 2019-06-17 RX ORDER — HYDROMORPHONE HYDROCHLORIDE 1 MG/ML
0.5 INJECTION, SOLUTION INTRAMUSCULAR; INTRAVENOUS; SUBCUTANEOUS
Status: DISCONTINUED | OUTPATIENT
Start: 2019-06-17 | End: 2019-06-21 | Stop reason: HOSPADM

## 2019-06-17 RX ORDER — LOSARTAN POTASSIUM 25 MG/1
25 TABLET ORAL EVERY EVENING
Status: DISCONTINUED | OUTPATIENT
Start: 2019-06-17 | End: 2019-06-18

## 2019-06-17 RX ORDER — ONDANSETRON 2 MG/ML
4 INJECTION INTRAMUSCULAR; INTRAVENOUS EVERY 4 HOURS PRN
Status: DISCONTINUED | OUTPATIENT
Start: 2019-06-17 | End: 2019-06-21 | Stop reason: HOSPADM

## 2019-06-17 RX ORDER — ENALAPRILAT 1.25 MG/ML
1.25 INJECTION INTRAVENOUS EVERY 6 HOURS PRN
Status: DISCONTINUED | OUTPATIENT
Start: 2019-06-17 | End: 2019-06-21 | Stop reason: HOSPADM

## 2019-06-17 RX ORDER — OXYCODONE HYDROCHLORIDE 5 MG/1
5 TABLET ORAL
Status: DISCONTINUED | OUTPATIENT
Start: 2019-06-17 | End: 2019-06-21 | Stop reason: HOSPADM

## 2019-06-17 RX ORDER — BUDESONIDE AND FORMOTEROL FUMARATE DIHYDRATE 160; 4.5 UG/1; UG/1
2 AEROSOL RESPIRATORY (INHALATION) 2 TIMES DAILY
Status: DISCONTINUED | OUTPATIENT
Start: 2019-06-17 | End: 2019-06-21 | Stop reason: HOSPADM

## 2019-06-17 RX ORDER — ATORVASTATIN CALCIUM 40 MG/1
80 TABLET, FILM COATED ORAL DAILY
Status: DISCONTINUED | OUTPATIENT
Start: 2019-06-17 | End: 2019-06-21 | Stop reason: HOSPADM

## 2019-06-17 RX ORDER — POLYETHYLENE GLYCOL 3350 17 G/17G
1 POWDER, FOR SOLUTION ORAL
Status: DISCONTINUED | OUTPATIENT
Start: 2019-06-17 | End: 2019-06-21 | Stop reason: HOSPADM

## 2019-06-17 RX ORDER — ACETAMINOPHEN 500 MG
1000 TABLET ORAL EVERY 6 HOURS PRN
COMMUNITY
End: 2020-12-01

## 2019-06-17 RX ORDER — AMOXICILLIN 250 MG
2 CAPSULE ORAL 2 TIMES DAILY
Status: DISCONTINUED | OUTPATIENT
Start: 2019-06-17 | End: 2019-06-21 | Stop reason: HOSPADM

## 2019-06-17 RX ORDER — DILTIAZEM HYDROCHLORIDE 5 MG/ML
10 INJECTION INTRAVENOUS EVERY 4 HOURS PRN
Status: DISCONTINUED | OUTPATIENT
Start: 2019-06-17 | End: 2019-06-21 | Stop reason: HOSPADM

## 2019-06-17 RX ORDER — MONTELUKAST SODIUM 10 MG/1
10 TABLET ORAL EVERY EVENING
COMMUNITY
End: 2021-02-16

## 2019-06-17 RX ORDER — SODIUM CHLORIDE 9 MG/ML
INJECTION, SOLUTION INTRAVENOUS CONTINUOUS
Status: DISCONTINUED | OUTPATIENT
Start: 2019-06-17 | End: 2019-06-20

## 2019-06-17 RX ORDER — OXYCODONE HYDROCHLORIDE 5 MG/1
10 TABLET ORAL
Status: DISCONTINUED | OUTPATIENT
Start: 2019-06-17 | End: 2019-06-21 | Stop reason: HOSPADM

## 2019-06-17 RX ORDER — ONDANSETRON 4 MG/1
4 TABLET, ORALLY DISINTEGRATING ORAL EVERY 4 HOURS PRN
Status: DISCONTINUED | OUTPATIENT
Start: 2019-06-17 | End: 2019-06-21 | Stop reason: HOSPADM

## 2019-06-17 RX ADMIN — AMPICILLIN SODIUM AND SULBACTAM SODIUM 3 G: 2; 1 INJECTION, POWDER, FOR SOLUTION INTRAMUSCULAR; INTRAVENOUS at 11:41

## 2019-06-17 RX ADMIN — VANCOMYCIN HYDROCHLORIDE 1600 MG: 10 INJECTION, POWDER, LYOPHILIZED, FOR SOLUTION INTRAVENOUS at 18:34

## 2019-06-17 RX ADMIN — INSULIN HUMAN 2 UNITS: 100 INJECTION, SOLUTION PARENTERAL at 17:06

## 2019-06-17 RX ADMIN — METOPROLOL SUCCINATE 25 MG: 25 TABLET, EXTENDED RELEASE ORAL at 09:53

## 2019-06-17 RX ADMIN — AMPICILLIN SODIUM AND SULBACTAM SODIUM 3 G: 2; 1 INJECTION, POWDER, FOR SOLUTION INTRAMUSCULAR; INTRAVENOUS at 23:37

## 2019-06-17 RX ADMIN — DILTIAZEM HYDROCHLORIDE 60 MG: 30 TABLET, FILM COATED ORAL at 09:52

## 2019-06-17 RX ADMIN — LOSARTAN POTASSIUM 25 MG: 25 TABLET ORAL at 17:03

## 2019-06-17 RX ADMIN — BUDESONIDE AND FORMOTEROL FUMARATE DIHYDRATE 2 PUFF: 160; 4.5 AEROSOL RESPIRATORY (INHALATION) at 17:04

## 2019-06-17 RX ADMIN — INSULIN HUMAN 2 UNITS: 100 INJECTION, SOLUTION PARENTERAL at 21:00

## 2019-06-17 RX ADMIN — AMPICILLIN SODIUM AND SULBACTAM SODIUM 3 G: 2; 1 INJECTION, POWDER, FOR SOLUTION INTRAMUSCULAR; INTRAVENOUS at 17:03

## 2019-06-17 RX ADMIN — AMPICILLIN SODIUM AND SULBACTAM SODIUM 3 G: 2; 1 INJECTION, POWDER, FOR SOLUTION INTRAMUSCULAR; INTRAVENOUS at 06:18

## 2019-06-17 RX ADMIN — SODIUM CHLORIDE: 9 INJECTION, SOLUTION INTRAVENOUS at 15:42

## 2019-06-17 RX ADMIN — WARFARIN SODIUM 5 MG: 5 TABLET ORAL at 17:03

## 2019-06-17 RX ADMIN — SODIUM CHLORIDE: 9 INJECTION, SOLUTION INTRAVENOUS at 00:30

## 2019-06-17 RX ADMIN — ATORVASTATIN CALCIUM 80 MG: 40 TABLET, FILM COATED ORAL at 09:53

## 2019-06-17 RX ADMIN — CELECOXIB 200 MG: 200 CAPSULE ORAL at 17:03

## 2019-06-17 RX ADMIN — DILTIAZEM HYDROCHLORIDE 60 MG: 30 TABLET, FILM COATED ORAL at 14:35

## 2019-06-17 RX ADMIN — PRASUGREL 10 MG: 10 TABLET, FILM COATED ORAL at 14:35

## 2019-06-17 RX ADMIN — BUDESONIDE AND FORMOTEROL FUMARATE DIHYDRATE 2 PUFF: 160; 4.5 AEROSOL RESPIRATORY (INHALATION) at 06:00

## 2019-06-17 RX ADMIN — INSULIN HUMAN 3 UNITS: 100 INJECTION, SOLUTION PARENTERAL at 06:26

## 2019-06-17 RX ADMIN — DILTIAZEM HYDROCHLORIDE 60 MG: 30 TABLET, FILM COATED ORAL at 20:59

## 2019-06-17 ASSESSMENT — COGNITIVE AND FUNCTIONAL STATUS - GENERAL
MOVING FROM LYING ON BACK TO SITTING ON SIDE OF FLAT BED: A LITTLE
DAILY ACTIVITIY SCORE: 24
STANDING UP FROM CHAIR USING ARMS: A LITTLE
CLIMB 3 TO 5 STEPS WITH RAILING: A LITTLE
STANDING UP FROM CHAIR USING ARMS: A LITTLE
WALKING IN HOSPITAL ROOM: A LITTLE
SUGGESTED CMS G CODE MODIFIER MOBILITY: CJ
MOBILITY SCORE: 20
SUGGESTED CMS G CODE MODIFIER MOBILITY: CJ
SUGGESTED CMS G CODE MODIFIER DAILY ACTIVITY: CH
MOBILITY SCORE: 21
CLIMB 3 TO 5 STEPS WITH RAILING: A LITTLE
WALKING IN HOSPITAL ROOM: A LITTLE

## 2019-06-17 ASSESSMENT — PATIENT HEALTH QUESTIONNAIRE - PHQ9
2. FEELING DOWN, DEPRESSED, IRRITABLE, OR HOPELESS: NOT AT ALL
1. LITTLE INTEREST OR PLEASURE IN DOING THINGS: NOT AT ALL
SUM OF ALL RESPONSES TO PHQ9 QUESTIONS 1 AND 2: 0

## 2019-06-17 ASSESSMENT — GAIT ASSESSMENTS
DISTANCE (FEET): 100
ASSISTIVE DEVICE: FRONT WHEEL WALKER
GAIT LEVEL OF ASSIST: MINIMAL ASSIST
DEVIATION: ANTALGIC;STEP TO

## 2019-06-17 ASSESSMENT — COPD QUESTIONNAIRES
COPD SCREENING SCORE: 4
HAVE YOU SMOKED AT LEAST 100 CIGARETTES IN YOUR ENTIRE LIFE: YES
DURING THE PAST 4 WEEKS HOW MUCH DID YOU FEEL SHORT OF BREATH: NONE/LITTLE OF THE TIME
DO YOU EVER COUGH UP ANY MUCUS OR PHLEGM?: NO/ONLY WITH OCCASIONAL COLDS OR INFECTIONS

## 2019-06-17 ASSESSMENT — ENCOUNTER SYMPTOMS
NAUSEA: 0
STRIDOR: 0
MYALGIAS: 0
INSOMNIA: 0
SHORTNESS OF BREATH: 0
HEADACHES: 0
BLURRED VISION: 0
SPUTUM PRODUCTION: 0
SORE THROAT: 0
TINGLING: 0
DEPRESSION: 0
FEVER: 0
VOMITING: 0
BACK PAIN: 0
EYE PAIN: 0
NECK PAIN: 0
WEAKNESS: 0
COUGH: 0
SENSORY CHANGE: 0
DIZZINESS: 0
ABDOMINAL PAIN: 0
CONSTIPATION: 0
LOSS OF CONSCIOUSNESS: 0
CHILLS: 0
FALLS: 1
DIARRHEA: 0
PALPITATIONS: 0

## 2019-06-17 ASSESSMENT — CHA2DS2 SCORE
DIABETES: YES
PRIOR STROKE OR TIA OR THROMBOEMBOLISM: NO
HYPERTENSION: YES
AGE 75 OR GREATER: NO
CHF OR LEFT VENTRICULAR DYSFUNCTION: NO
SEX: MALE
AGE 65 TO 74: YES
VASCULAR DISEASE: YES
CHA2DS2 VASC SCORE: 4

## 2019-06-17 ASSESSMENT — LIFESTYLE VARIABLES
EVER_SMOKED: NEVER
ALCOHOL_USE: NO
EVER_SMOKED: YES

## 2019-06-17 ASSESSMENT — ACTIVITIES OF DAILY LIVING (ADL): TOILETING: INDEPENDENT

## 2019-06-17 NOTE — PROGRESS NOTES
Pt ambulated in conrad with PT, 1x assist with FWW. RLE red and swollen with a scab to the knee. Pt reports pain at 3/10, declines intervention at this time. IVF infusing into PIV. Call light within reach, pt calls appropriately

## 2019-06-17 NOTE — ASSESSMENT & PLAN NOTE
BP improved but not at goal, pressures have been in the 150-160s systolic.  Hold Lisinopril due to rising creatinine  Started amlodipine 10 mg daily  -Continue home metoprolol and diltiazem  -Place PRN enalapril

## 2019-06-17 NOTE — ED TRIAGE NOTES
Chief Complaint   Patient presents with   • Leg Swelling     Pt tripped and fell 1 week ago thurs, was seen in the ER and told no fracture. Last friday was placed on ABX by Ortho Dr for cellulitis, has since has increased redness, swelling and pain.     /73   Pulse 99   Temp 37.4 °C (99.3 °F) (Temporal)   Resp 18   Ht 1.829 m (6')   Wt 116.3 kg (256 lb 6.3 oz)   SpO2 93%   BMI 34.77 kg/m²

## 2019-06-17 NOTE — H&P
Hospital Medicine History & Physical Note    Date of Service  6/16/2019    Primary Care Physician  Deng Corbin D.O.    Consultants  Orthopedic surgery    Code Status  Full    Chief Complaint  Right leg pain, swelling and erythema    History of Presenting Illness  74 y.o. male who presented 6/16/2019 with right leg cellulitis.  Patient states the event that started this happened approximately 10 days ago.  He states that he tripped over something landed on his right knee and had an abrasion.  He states a couple of days later he went to the emergency department, was noted to have a blister which was lanced.  He was not placed on antibiotics at that time.  1 week later he went to see Dr. Zaldivar, they did note cellulitis and antibiotics were started.  Patient states it has significantly worsened since then, becoming more red, more painful and more swollen.  He states the pain is 10/10 whenever he tries to bear weight.  He denied any fever or chills.  He continues to have sensation and movement in the leg.  I did discuss the case including labs with the ER physician.  ERP did discuss the case with orthopedic surgery and they are requesting the patient be kept n.p.o. and stop blood thinners.    Review of Systems  Review of Systems   Constitutional: Negative for chills, fever and malaise/fatigue.   HENT: Negative for congestion.    Respiratory: Negative for cough, sputum production, shortness of breath and stridor.    Cardiovascular: Negative for chest pain, palpitations and leg swelling.   Gastrointestinal: Negative for abdominal pain, constipation, diarrhea, nausea and vomiting.   Genitourinary: Negative for dysuria and urgency.   Musculoskeletal: Positive for falls and joint pain (right leg). Negative for myalgias.   Skin: Positive for rash (right leg).   Neurological: Negative for dizziness, tingling, loss of consciousness, weakness and headaches.   Psychiatric/Behavioral: Negative for depression and suicidal ideas.    All other systems reviewed and are negative.      Past Medical History   has a past medical history of Abnormal electrocardiogram (2010); Arrhythmia; Arthritis; ASTHMA; Atrial fibrillation (HCC) (10/25/2011); Bronchospasm (2009); Diabetes (HCC); HTN (hypertension) (10/25/2011); Hypercholesteremia (10/25/2011); Long term (current) use of anticoagulants (10/25/2011); NASAL POLYPS (2009); Other nonspecific abnormal finding (2009); Pain; Shortness of breath; Sleep apnea (2009); and Wears glasses.    Surgical History   has a past surgical history that includes sinusotomies (,,2007); lumbar decompression (); hip arthroplasty total (2010); laminotomy; sinuscope; hip replacement, total; and knee arthroplasty total (Left, 10/2014).     Family History  family history includes Heart Disease in his mother.     Social History   reports that he quit smoking about 50 years ago. His smoking use included Cigarettes. He has a 5.00 pack-year smoking history. He has never used smokeless tobacco. He reports that he does not drink alcohol or use drugs.    Allergies  Allergies   Allergen Reactions   • Atenolol Shortness of Breath     DYSPNEA.   • Tetanus Toxoid Swelling       Medications  Prior to Admission Medications   Prescriptions Last Dose Informant Patient Reported? Taking?   DILTIAZem (CARDIZEM) 60 MG Tab   No No   Sig: Take 1 Tab by mouth 3 times a day.   SYMBICORT 160-4.5 MCG/ACT Aerosol   No No   Sig: INHALE 2 PUFFS BY MOUTH 2 TIMES A DAY. USE SPACER. RINSE MOUTH AFTER EACH USE.   albuterol (PROVENTIL) 2.5mg/3ml Nebu Soln solution for nebulization   Yes No   Si.5 mg by Nebulization route every four hours as needed for Shortness of Breath.   albuterol (VENTOLIN OR PROVENTIL) 108 (90 BASE) MCG/ACT AERS   Yes No   Sig: Inhale 2 Puffs by mouth every 6 hours as needed.   atorvastatin (LIPITOR) 80 MG tablet   No No   Sig: TAKE ONE TABLET BY MOUTH EVERY DAY   budesonide-formoterol  (SYMBICORT) 160-4.5 MCG/ACT Aerosol   No No   Sig: Inhale 2 Puffs by mouth 2 Times a Day. Use spacer. Rinse mouth after each use.   dexamethasone (DECADRON) 4 MG Tab   Yes No   Sig: dexamethasone 4 mg tablet   fluticasone (FLONASE) 50 MCG/ACT nasal spray   Yes No   Sig: Spray 2 Sprays in nose every day.   furosemide (LASIX) 40 MG Tab   Yes No   Sig: furosemide 40 mg tablet   Take 1 tablet every day by oral route as needed.   insulin detemir (LEVEMIR FLEXTOUCH) 100 UNIT/ML Solution Pen-injector injection   Yes No   Sig: Levemir FlexTouch U-100 Insulin 100 unit/mL (3 mL) subcutaneous pen   insulin lispro prot & lispro (HUMALOG MIX) (75-25) unit/mL Suspension inj   Yes No   Sig: Inject  as instructed 3 times a day before meals.   losartan (COZAAR) 25 MG Tab   Yes No   Sig: Take 25 mg by mouth every evening.   metoprolol SR (TOPROL XL) 25 MG TABLET SR 24 HR   No No   Sig: Take 1 Tab by mouth every day.   montelukast (SINGULAIR) 10 MG Tab   No No   Sig: Take 1 Tab by mouth every day.   potassium chloride (MICRO-K) 10 MEQ capsule   Yes No   Sig: potassium chloride ER 10 mEq tablet,extended release   prasugrel (EFFIENT) 10 MG Tab   No No   Sig: Take 1 Tab by mouth every day.   warfarin (COUMADIN) 5 MG Tab   No No   Sig: TAKE ONE TABLET BY MOUTH ONE TIME DAILY OR AS DIRECTED BY COUMADIN CLINIC      Facility-Administered Medications: None       Physical Exam  Temp:  [37.4 °C (99.3 °F)] 37.4 °C (99.3 °F)  Pulse:  [] 84  Resp:  [18] 18  BP: (156)/(73) 156/73  SpO2:  [90 %-93 %] 93 %    Physical Exam   Constitutional: He is oriented to person, place, and time. He appears well-developed and well-nourished.  Non-toxic appearance. No distress.   HENT:   Head: Normocephalic and atraumatic. Not macrocephalic and not microcephalic. Head is without raccoon's eyes and without Crowley's sign.   Right Ear: External ear normal.   Left Ear: External ear normal.   Mouth/Throat: Mucous membranes are dry. No oropharyngeal exudate.    Eyes: Conjunctivae are normal. Right eye exhibits no discharge. Left eye exhibits no discharge. No scleral icterus.   Neck: Normal range of motion. Neck supple. No tracheal deviation, no edema and no erythema present.   Cardiovascular: Normal rate, normal heart sounds and intact distal pulses.  An irregularly irregular rhythm present. Exam reveals no gallop, no friction rub and no decreased pulses.    No murmur heard.  Pulmonary/Chest: Effort normal and breath sounds normal. No stridor. No respiratory distress. He has no decreased breath sounds. He has no wheezes. He has no rhonchi. He has no rales. He exhibits no tenderness.   Abdominal: Soft. Bowel sounds are normal. He exhibits no distension. There is no splenomegaly or hepatomegaly. There is no tenderness. There is no rebound and no guarding.   Musculoskeletal:   Entire right leg with erythema, swelling and tenderness  Right knee abrasion   Lymphadenopathy:     He has no cervical adenopathy.   Neurological: He is alert and oriented to person, place, and time. No cranial nerve deficit. Coordination normal.   Skin: Skin is warm and dry. No rash noted. He is not diaphoretic. No cyanosis or erythema. No pallor. Nails show no clubbing.   Psychiatric: He has a normal mood and affect. His speech is normal and behavior is normal. Judgment and thought content normal. Cognition and memory are normal.   Nursing note and vitals reviewed.      Laboratory:  Recent Labs      06/16/19 2240   WBC  10.6   RBC  4.29*   HEMOGLOBIN  12.6*   HEMATOCRIT  38.8*   MCV  90.4   MCH  29.4   MCHC  32.5*   RDW  49.2   PLATELETCT  249   MPV  9.8     Recent Labs      06/16/19 2240   SODIUM  137   POTASSIUM  4.3   CHLORIDE  105   CO2  26   GLUCOSE  296*   BUN  39*   CREATININE  1.59*   CALCIUM  8.4     Recent Labs      06/16/19 2240   ALTSGPT  24   ASTSGOT  22   ALKPHOSPHAT  128*   TBILIRUBIN  1.6*   GLUCOSE  296*     Recent Labs      06/16/19 2240   APTT  39.7*   INR  2.83*              No results for input(s): TROPONINI in the last 72 hours.    Urinalysis:    No results found     Imaging:  No orders to display         Assessment/Plan:  I anticipate this patient will require at least two midnights for appropriate medical management, necessitating inpatient admission.    Cellulitis- (present on admission)   Assessment & Plan    -Profound, affecting the entire right leg  -Significant erythema and swelling associated with it  -Not causing sepsis  -start vanco and unasyn  -await culture results      Elevated liver enzymes   Assessment & Plan    -Mild elevation in alk phos as well as T bili  -Asymptomatic, repeat CMP in the morning  -I do not feel there is an obstruction     Acute on chronic renal failure (HCC)- (present on admission)   Assessment & Plan    -Likely due to dehydration  -Start IV fluids  -Repeat BMP in the morning     Dyslipidemia- (present on admission)   Assessment & Plan    -Continue statin     Atrial fibrillation (HCC)- (present on admission)   Assessment & Plan    -Chronic, currently rate controlled  -Continue metoprolol and diltiazem  -Patient is generally on Coumadin, orthopedic surgery has asked for this to be held  -Start PRN diltiazem     Type 2 diabetes mellitus with hyperglycemia (HCC)- (present on admission)   Assessment & Plan    -Patient will be n.p.o. however his hyperglycemia is significant  -Start insulin sliding scale  -Adjust as needed  -I am fearful this will significantly worsen due to his profound infection, he is at risk for DKA     HTN (hypertension)- (present on admission)   Assessment & Plan    -Continue home metoprolol, losartan and diltiazem  -Place PRN enalapril  -Adjust as needed         VTE prophylaxis: SCDs

## 2019-06-17 NOTE — PROGRESS NOTES
Inpatient Anticoagulation Service Note    Date: 6/17/2019    Reason for Anticoagulation: Atrial Fibrillation   Target INR: 2.0 to 3.0  [id number] VASc Score: 4  HAS-BLED Score: 4   Hemoglobin Value: (!) 12.6  Hematocrit Value: (!) 38.8    INR from last 7 days     Date/Time INR Value    06/16/19 2240 (!)  2.83        Dose from last 7 days     Date/Time Dose (mg)    06/17/19 1245  5        Average Dose (mg):  (Clinic: 2.5 mg Sundays, 5 mg all other days)  Significant Interactions: Antibiotics, Statin  Bridge Therapy: No         Plan:  Coumadin 5 mg PO tonight for INR 2.83  Education Material Provided?: No  Pharmacist suggested discharge dosing: Continue outpatient regimen as above     Marianne NelsonD

## 2019-06-17 NOTE — PROGRESS NOTES
Pt brought to room 211-1. Pt assessed, awake, alert, and oriented x4 with NAD. Wound to RLE noted. Respirations even and unlabored. Initiated falls precautions. Bed locked and in lowest position. Bed alarm on and functioning. WCTM

## 2019-06-17 NOTE — CARE PLAN
Problem: Safety  Goal: Will remain free from injury  Outcome: PROGRESSING AS EXPECTED  Pt calls appropriately for assistance, hourly rounding, room near nursing station     Problem: Pain Management  Goal: Pain level will decrease to patient's comfort goal  Outcome: PROGRESSING AS EXPECTED  Reports pain at a tolerable level, declines intervention at this tiem

## 2019-06-17 NOTE — ASSESSMENT & PLAN NOTE
With related hyperglycemia  Continue insulin sliding scale  Increase lantus to 6U nightly (up titrate as able to home dose of Levemir 14U nightly)  High risk for complications. Watch close.   Goal glucose is less than 150

## 2019-06-17 NOTE — PROGRESS NOTES
[facility] Medicine Daily Progress Note    Date of Service  6/17/2019    Chief Complaint  74 y.o. male admitted 6/16/2019 with Right leg pain, swelling and erythema.     Hospital Course    He was admitted with Right leg cellulitis. He had been on keflex for this as an outpatient and had a recent abrasion to his knee after a fall. He failed outpatient keflex and was treated with IV antibiotics for cellulitis of his right knee and leg. Orthopedics was consulted and did not initially feel that he required surgical intervention.       Interval Problem Update  New renal failure noted. I continued his fluids and started a workup for this. He thinks he is feeling improved. Leg is a little less painful. Family here and we discussed plan of care.     I reviewed his imaging below.     US renal 2015:    1.  Moderate left hydronephrosis and possible mild-to-moderate right hydronephrosis versus numerous parapelvic cysts.  2.  Neither ureteral jet identified.  3.  Large post void residual.  Echocardiography Laboratory    CONCLUSIONS  No prior study is available for comparison.   Mild concentric left ventricular hypertrophy.  Normal left ventricular systolic function.  Left ventricular ejection fraction is visually estimated to be 60%.  Diastolic function is difficult to assess with atrial fibrillation.  Severely dilated left atrium.  Estimated right ventricular systolic pressure is 37 mmHg + JVP.    Consultants/Specialty  ortho    Code Status  full    Disposition  Home once improved likely. TBD    Review of Systems  Review of Systems   Constitutional: Negative for chills and fever.   HENT: Negative for sore throat.    Eyes: Negative for blurred vision and pain.   Respiratory: Negative for cough and shortness of breath.    Cardiovascular: Positive for leg swelling. Negative for chest pain and palpitations.   Gastrointestinal: Negative for abdominal pain, nausea and vomiting.   Genitourinary: Negative for dysuria and urgency.    Musculoskeletal: Negative for back pain and neck pain.        Left leg pain   Skin: Positive for rash. Negative for itching.   Neurological: Negative for dizziness, tingling and headaches.   Psychiatric/Behavioral: Negative for depression. The patient does not have insomnia.    All other systems reviewed and are negative.       Physical Exam  Temp:  [36.3 °C (97.4 °F)-37.4 °C (99.3 °F)] 36.3 °C (97.4 °F)  Pulse:  [] 77  Resp:  [16-18] 18  BP: (116-156)/(60-93) 116/60  SpO2:  [90 %-97 %] 97 %    Physical Exam   Constitutional: He is oriented to person, place, and time. He appears well-developed and well-nourished. No distress.   Patient seen and examined  Plan discussed with RN   JIMENEZT:   Right Ear: External ear normal.   Left Ear: External ear normal.   Nose: Nose normal.   Eyes: Right eye exhibits no discharge. Left eye exhibits no discharge. No scleral icterus.   Neck: No JVD present. No tracheal deviation present.   Cardiovascular: Normal rate, normal heart sounds and intact distal pulses.    No murmur heard.  Cap refill 2sec  Pulses 2+ throughout     Pulmonary/Chest: Effort normal and breath sounds normal. No respiratory distress. He has no wheezes. He has no rales.   Abdominal: Soft. Bowel sounds are normal. He exhibits no distension. There is no tenderness. There is no guarding.   Musculoskeletal: He exhibits edema. He exhibits no tenderness.   Scabbed over left knee contusion with severe cellulitis extending down to his ankle.    Neurological: He is alert and oriented to person, place, and time.   Skin: Skin is warm and dry. He is not diaphoretic. No erythema.   Normal skin color   Psychiatric: He has a normal mood and affect. His behavior is normal.   Nursing note and vitals reviewed.      Fluids    Intake/Output Summary (Last 24 hours) at 06/17/19 0923  Last data filed at 06/17/19 0800   Gross per 24 hour   Intake              360 ml   Output                0 ml   Net              360 ml        Laboratory  Recent Labs      06/16/19   2240   WBC  10.6   RBC  4.29*   HEMOGLOBIN  12.6*   HEMATOCRIT  38.8*   MCV  90.4   MCH  29.4   MCHC  32.5*   RDW  49.2   PLATELETCT  249   MPV  9.8     Recent Labs      06/16/19   2240   SODIUM  137   POTASSIUM  4.3   CHLORIDE  105   CO2  26   GLUCOSE  296*   BUN  39*   CREATININE  1.59*   CALCIUM  8.4     Recent Labs      06/16/19   2240   APTT  39.7*   INR  2.83*               Imaging  US-RENAL    (Results Pending)        Assessment/Plan  Cellulitis- (present on admission)   Assessment & Plan    -Profound, affecting the entire right leg  -Significant erythema and swelling not improved today  -continue vanco and unasyn  Watch renal function and trend exam.   -await culture results      Elevated liver enzymes- (present on admission)   Assessment & Plan    -Mild elevation in alk phos as well as T bili  -Asymptomatic, repeat CMP in the morning  No evidence for obstruction on exam.      Acute on chronic renal failure (HCC)- (present on admission)   Assessment & Plan    Not improved. Watch close with vanco on board.   -continue IV fluids  -Repeat BMP in the morning     Dyslipidemia- (present on admission)   Assessment & Plan    -Continue statin     S/P coronary artery stent placement- (present on admission)   Assessment & Plan    effient.     Atrial fibrillation (HCC)- (present on admission)   Assessment & Plan    rate controlled  -Continue metoprolol and diltiazem  Restart Coumadin as no intervention planned by orthopedic surgery  -Start PRN diltiazem     Type 2 diabetes mellitus with hyperglycemia (HCC)- (present on admission)   Assessment & Plan    With related hyperglycemia  -continue insulin sliding scale  High risk for complications. Watch close.        HTN (hypertension)- (present on admission)   Assessment & Plan    -Continue home metoprolol, losartan and diltiazem  -Place PRN enalapril            VTE prophylaxis: coumadin

## 2019-06-17 NOTE — CONSULTS
DATE OF SERVICE:  06/17/2019    ORTHOPEDIC CONSULTATION    CHIEF COMPLAINT:  Right leg swelling.    HISTORY OF PRESENT ILLNESS:  The patient is a 74-year-old male who 10 days ago   had an abrasion over the anterior medial knee.  He went to the emergency room   and had a blister in that area, was lanced, he was not placed on antibiotics.    He was seen Dr. Peguero on Friday, was placed on Keflex and the redness and   swelling got worse over the weekend.  He came to the emergency room yesterday.    PAST MEDICAL HISTORY:  Significant for atrial fibrillation, diabetes, and   hypercholesterolemia.    PAST SURGICAL HISTORY:  Significant for sinus surgery, lumbar decompression,   total hip arthroplasty.    FAMILY HISTORY:  Heart disease.    SOCIAL HISTORY:  The patient quit smoking 50 years ago, does not drink or use   drugs.    MEDICATIONS:  Per the med reconciliation.    ALLERGIES:  ATENOLOL AND TETANUS TOXOID.    PHYSICAL EXAMINATION:  VITAL SIGNS:  The patient is afebrile, vital signs are stable.  HEENT:  Normal.  RESPIRATIONS:  Unlabored.  MUSCULOSKELETAL:  There is some abrasion on the anteromedial knee.  It is   really not that tender and not clearly fluctuant.  There is an abrasion in   that area.  He has got cellulitis on the anterior aspect of the leg into the   foot.  He can move his knee and ankle without really much increased pain.    There is really no spreading of the cellulitis from where it was marked.    LABORATORY DATA:  Laboratory evaluation reveals white blood cell count is   normal at 10.6.  Sodium is normal.  INR is 2.83.  Sed rate and CRP are   slightly high.    IMPRESSION:  Right leg cellulitis following abrasions.    PLAN:  At this point, the antibiotics will be working, he is getting   dramatically better.  I would recommend ice, elevation and observation.  At   this point, it does not look like he needs surgical intervention.       ____________________________________     YURIDIA SAUER MD    [facility]  / [place]    DD:  06/17/2019 06:16:49  DT:  06/17/2019 06:38:26    D#:  [id number]  Job#:  [id number]

## 2019-06-17 NOTE — ED PROVIDER NOTES
ED Provider Note  Primary care provider: Deng Corbin D.O.  Means of arrival: private vehicle  History obtained from: patient  History limited by: none    CHIEF COMPLAINT  Chief Complaint   Patient presents with   • Leg Swelling     Pt tripped and fell 1 week ago thurs, was seen in the ER and told no fracture. Last friday was placed on ABX by Ortho  for cellulitis, has since has increased redness, swelling and pain.       HPI  Yrui De León is a 74 y.o. male who presents to the Emergency Department right leg erythema, swelling, and pain.  Patient reports that on 6/7/2019 he fell and sustained an abrasion to his right knee.  He was evaluated in the emergency department for fracture, and x-ray demonstrated no evidence of fracture.  X-ray on 6/7/2019 did demonstrate joint effusion and prominent prepatellar soft tissue swelling likely related to his fall.  Patient reports that a few days after the initial injury he started to develop some mild erythema surrounding the wound with some purulent discharge from the wound.  Patient subsequently followed up with orthopedic surgeon Dr. Zaldivar 2 days ago who started him on Keflex for presumed cellulitis.  Patient reports that unfortunately despite taking the antibiotics he has had worsening erythema extending down his entire right leg into his right foot with associated leg swelling and mild throbbing leg pain.  He denies numbness, tingling, or weakness.  He does have a history of diabetes.    REVIEW OF SYSTEMS  Review of Systems   Constitutional: Negative for chills and fever.   Respiratory: Negative for shortness of breath.    Cardiovascular: Negative for chest pain.   Gastrointestinal: Negative for abdominal pain, nausea and vomiting.   Musculoskeletal:        + for right leg pain and swelling   Neurological: Negative for sensory change.   All other systems reviewed and are negative.        PAST MEDICAL HISTORY   has a past medical history of Abnormal  electrocardiogram (8/13/2010); Arrhythmia; Arthritis; ASTHMA; Atrial fibrillation (HCC) (10/25/2011); Bronchospasm (8/6/2009); Diabetes (HCC); HTN (hypertension) (10/25/2011); Hypercholesteremia (10/25/2011); Long term (current) use of anticoagulants (10/25/2011); NASAL POLYPS (8/6/2009); Other nonspecific abnormal finding (8/6/2009); Pain; Shortness of breath; Sleep apnea (8/6/2009); and Wears glasses.    SURGICAL HISTORY   has a past surgical history that includes sinusotomies (2003,2005,2/2007); lumbar decompression (1984); hip arthroplasty total (8/31/2010); laminotomy; sinuscope; hip replacement, total; and knee arthroplasty total (Left, 10/2014).    SOCIAL HISTORY  Social History   Substance Use Topics   • Smoking status: Former Smoker     Packs/day: 0.50     Years: 10.00     Types: Cigarettes     Quit date: 12/10/1968   • Smokeless tobacco: Never Used   • Alcohol use No      History   Drug Use No       FAMILY HISTORY  Family History   Problem Relation Age of Onset   • Heart Disease Mother        CURRENT MEDICATIONS  Home Medications    Diltiazem, losartan, metoprolol, coumadin, prasugrel         ALLERGIES  Allergies   Allergen Reactions   • Atenolol Shortness of Breath     DYSPNEA.   • Tetanus Toxoid Swelling       PHYSICAL EXAM  VITAL SIGNS: /73   Pulse 99   Temp 37.4 °C (99.3 °F) (Temporal)   Resp 18   Ht 1.829 m (6')   Wt 116.3 kg (256 lb 6.3 oz)   SpO2 93%   BMI 34.77 kg/m²   Vitals reviewed by myself.  Physical Exam   Constitutional: He is well-developed, well-nourished, and in no distress. No distress.   HENT:   Head: Normocephalic.   Eyes: EOM are normal.   Neck: Normal range of motion.   Cardiovascular: Normal rate, regular rhythm and normal heart sounds.  Exam reveals no gallop and no friction rub.    No murmur heard.  Pulmonary/Chest: Effort normal and breath sounds normal. No respiratory distress. He has no wheezes. He has no rales.   Musculoskeletal:   Right knee healing abrasion is  present with erythema extending distally to the foot. Patient has full ROM of right knee.  Right lower extremity with 3+ pitting edema. Dependent ecchymosis is present in the right foot. 2+ palpable DP pulses bialterally   Neurological:   Sensation intact in bilateral lower extremities   Skin:   Erythema to right leg         DIAGNOSTIC STUDIES /  LABS  Labs Reviewed   CRP QUANTITIVE (NON-CARDIAC) - Abnormal; Notable for the following:        Result Value    Stat C-Reactive Protein 5.71 (*)     All other components within normal limits    Narrative:     Indicate which anticoagulants the patient is on:->COUMADIN   WESTERGREN SED RATE - Abnormal; Notable for the following:     Sed Rate Westergren 32 (*)     All other components within normal limits    Narrative:     Indicate which anticoagulants the patient is on:->COUMADIN   CBC WITH DIFFERENTIAL - Abnormal; Notable for the following:     RBC 4.29 (*)     Hemoglobin 12.6 (*)     Hematocrit 38.8 (*)     MCHC 32.5 (*)     Neutrophils-Polys 76.10 (*)     Lymphocytes 9.70 (*)     Neutrophils (Absolute) 8.04 (*)     Monos (Absolute) 1.25 (*)     All other components within normal limits    Narrative:     Indicate which anticoagulants the patient is on:->COUMADIN   COMP METABOLIC PANEL - Abnormal; Notable for the following:     Glucose 296 (*)     Bun 39 (*)     Creatinine 1.59 (*)     Alkaline Phosphatase 128 (*)     Total Bilirubin 1.6 (*)     Albumin 3.0 (*)     All other components within normal limits    Narrative:     Indicate which anticoagulants the patient is on:->COUMADIN   APTT - Abnormal; Notable for the following:     APTT 39.7 (*)     All other components within normal limits    Narrative:     Indicate which anticoagulants the patient is on:->COUMADIN   PROTHROMBIN TIME - Abnormal; Notable for the following:     PT 30.6 (*)     INR 2.83 (*)     All other components within normal limits    Narrative:     Indicate which anticoagulants the patient is  "on:->COUMADIN   ESTIMATED GFR - Abnormal; Notable for the following:     GFR If  52 (*)     GFR If Non  43 (*)     All other components within normal limits    Narrative:     Indicate which anticoagulants the patient is on:->COUMADIN   BLOOD CULTURE    Narrative:     Per Hospital Policy: Only change Specimen Src: to \"Line\" if  specified by physician order.   BLOOD CULTURE    Narrative:     Per Hospital Policy: Only change Specimen Src: to \"Line\" if  specified by physician order.   LACTIC ACID    Narrative:     Indicate which anticoagulants the patient is on:->COUMADIN   BLOOD CULTURE   BLOOD CULTURE   CULTURE WOUND W/ GRAM STAIN       All labs reviewed by me.    REASSESSMENT  HYDRATION: Based on the patient's presentation of Acute Kidney Injury the patient was given IV fluids. IV Hydration was used because oral hydration was not adequate alone. Upon recheck following hydration, the patient was feeling improved.      COURSE & MEDICAL DECISION MAKING  Nursing notes, VS, PMSFHx reviewed in chart.    Patient is a 34-year-old male who comes in for right leg erythema and swelling.  Differential diagnosis includes cellulitis, abscess, necrotizing fasciitis.  Diagnostic work-up includes labs.    Patient's initial vitals are within normal limits.  Patient is offered pain medication but declines.  Given the rapid progression of the infection over the last 2 days despite being on Keflex and the way the leg looks with the erythema and swelling I am concerned about necrotizing fasciitis and elect to start the patient on Unasyn, vancomycin, and clindamycin.  Patient's labs returned and white count is within normal limits at 10.6.  Patient is mildly anemic with a hemoglobin of 12.6.  He has acute kidney injury with a creatinine of 1.5.  Inflammatory markers are also elevated with a CRP of 5.7 and a sed rate of 32.  Given my clinical suspicion for necrotizing fasciitis I did discuss the case with " on-call orthopedic surgeon Dr. Alves who advises that patient remain n.p.o, hold anticoagulation. and continue antibiotics, plan will be for orthopedic surgery to follow with the patient, he does not believe emergent surgery is indicated tonight.  I discussed this plan with patient and he is agreeable to this plan.  I discussed the case with Dr. Guillory has accepted the admission.  At time of admission patient is in guarded condition.      FINAL IMPRESSION  1. Cellulitis of right leg    2. Leg swelling    3. YANETH (acute kidney injury) (HCC)

## 2019-06-17 NOTE — ASSESSMENT & PLAN NOTE
-Mild elevation in alk phos as well as T bili  -Asymptomatic, they have trended down to normal  -No evidence for obstruction on exam.

## 2019-06-17 NOTE — THERAPY
"Physical Therapy Treatment completed.   Bed Mobility:  Supine to Sit: Supervised  Transfers: Sit to Stand: Minimal Assist (for saftey and cues )  Gait: Level Of Assist: Minimal Assist with Front-Wheel Walker       Plan of Care: Will benefit from Physical Therapy 4 times per week  Discharge Recommendations: Equipment: Front-Wheel Walker. Recommend outpatient or home health transitional care services for continued physical therapy services.    See \"Rehab Therapy-Acute\" Patient Summary Report for complete documentation.     Pt was recently admitted for RLE cellulitis secondary to recent fall. Pt presented with impaired balance, impaired coordination, weakness, pain, poor saftey awareness, and dec activity tolerance. Pt was able to demonstrate SPV to Min A for all functional mobility with FWW use. Pt was primarily limtied due to pain and poor saftey awareness with mobility as pt demonstrates with poor enviornmental awareness and nagivation with IV pole. Pt educated on icing, RLE elevation, and LE positioning to assist with dec inflammation and dec healing time. Pt positioned in reclined position with ice on RLE. Pt will continue to benefit from skilled PT while in house to address above decfits and for stair training, anticipate pt to d/c home with family suppport and HH or OP therapy services.   "

## 2019-06-17 NOTE — ASSESSMENT & PLAN NOTE
Creatinine has been fluctuating, I suspect due to volume depletion in addition to vancomycin and lisinopril.  Normalized today  Renal ultrasound showed some fluid retention (364mL), encourage ambulation  Follow I/o carefully  DC'd vancomycin and lisinopril  DC IVF  -Repeat BMP in the morning

## 2019-06-17 NOTE — ASSESSMENT & PLAN NOTE
-Profound, affecting the entire right leg, Break in the skin present on right knee but doesn't appear to have joint involvement.  Failed outpatient Keflex  -Slight improvement of pain today, wbc unchanged  -MRSA swab negative, DC linezolid  -Continue Unasyn --?> Augmentin on DC  -Blood cx negative so far  -I discussed the case with Ortho, no plan for surgery at this time as he is improving

## 2019-06-17 NOTE — PROGRESS NOTES
Med rec updated and complete  Allergies reviewed  Pt had a list of medications, went over list of medicaitons and returned list of medications back to pt at bedside.  Pt reports that he has not taken his FUROSEMIDE 40MG, Albuterol inhaler, or his ALBUTEROL nebu in over a month or longer.

## 2019-06-17 NOTE — PROGRESS NOTES
2 RN skin check completed with Alesia YE. Right leg cellulitis noted, marked, and uploaded. Skin otherwise clean, dry, and intact.

## 2019-06-17 NOTE — THERAPY
"Occupational Therapy Evaluation completed.   Functional Status:  73 yo male admit after fall at home, injured R knee, developed severe swelling, redness, infection.  Admit for cellulitis in RLE.  On ABX.  Pt normally indep at home, drives, completes own ADL's.  Lives with spouse at home.  Currently pt requires Zandra for sit to stand, and for walking in room and in conrad with FWW.  Pt is impulsive, has decreased safety awareness with FWW and decreased awareness of IV/IV pole.  Needs cues and close by assist for safety with mobility as he frequently abandons the cane.  Pt already wearing underwear, denied desire to demo LB dressing with OT.  Supervised to stand and urinate with use of grab bar, Zandra grooming at sink- mostly for balance in standing.  Pt will benefit from a few more therapy visits while in house to make sure he is progressing with mobility and safe with ADL's prior to home.  May need HH depending on progress with antibiotics and monitoring of infection.  Will follow while in house.  Plan of Care: Will benefit from Occupational Therapy 2 times per week  Discharge Recommendations:  Equipment: Will Continue to Assess for Equipment Needs. Post-acute therapy Discharge to home with outpatient or home health for additional skilled therapy services.    See \"Rehab Therapy-Acute\" Patient Summary Report for complete documentation.    "

## 2019-06-17 NOTE — ASSESSMENT & PLAN NOTE
Rate controlled  Continue metoprolol and diltiazem  Continue Coumadin as no intervention planned by orthopedic surgery  Trend INR, therapeutic today

## 2019-06-18 ENCOUNTER — APPOINTMENT (OUTPATIENT)
Dept: RADIOLOGY | Facility: MEDICAL CENTER | Age: 74
DRG: 603 | End: 2019-06-18
Attending: HOSPITALIST
Payer: MEDICARE

## 2019-06-18 LAB
ALBUMIN SERPL BCP-MCNC: 3 G/DL (ref 3.2–4.9)
ALBUMIN/GLOB SERPL: 1.1 G/DL
ALP SERPL-CCNC: 101 U/L (ref 30–99)
ALT SERPL-CCNC: 22 U/L (ref 2–50)
ANION GAP SERPL CALC-SCNC: 8 MMOL/L (ref 0–11.9)
AST SERPL-CCNC: 18 U/L (ref 12–45)
BASOPHILS # BLD AUTO: 0.5 % (ref 0–1.8)
BASOPHILS # BLD: 0.06 K/UL (ref 0–0.12)
BILIRUB SERPL-MCNC: 1.8 MG/DL (ref 0.1–1.5)
BUN SERPL-MCNC: 26 MG/DL (ref 8–22)
CALCIUM SERPL-MCNC: 8.2 MG/DL (ref 8.4–10.2)
CHLORIDE SERPL-SCNC: 105 MMOL/L (ref 96–112)
CO2 SERPL-SCNC: 26 MMOL/L (ref 20–33)
CREAT SERPL-MCNC: 1.27 MG/DL (ref 0.5–1.4)
EOSINOPHIL # BLD AUTO: 0.17 K/UL (ref 0–0.51)
EOSINOPHIL NFR BLD: 1.3 % (ref 0–6.9)
ERYTHROCYTE [DISTWIDTH] IN BLOOD BY AUTOMATED COUNT: 51.2 FL (ref 35.9–50)
GLOBULIN SER CALC-MCNC: 2.8 G/DL (ref 1.9–3.5)
GLUCOSE BLD-MCNC: 193 MG/DL (ref 65–99)
GLUCOSE BLD-MCNC: 194 MG/DL (ref 65–99)
GLUCOSE BLD-MCNC: 213 MG/DL (ref 65–99)
GLUCOSE SERPL-MCNC: 157 MG/DL (ref 65–99)
HCT VFR BLD AUTO: 38.7 % (ref 42–52)
HGB BLD-MCNC: 12.1 G/DL (ref 14–18)
IMM GRANULOCYTES # BLD AUTO: 0.04 K/UL (ref 0–0.11)
IMM GRANULOCYTES NFR BLD AUTO: 0.3 % (ref 0–0.9)
INR PPP: 2.95 (ref 0.87–1.13)
LYMPHOCYTES # BLD AUTO: 0.63 K/UL (ref 1–4.8)
LYMPHOCYTES NFR BLD: 4.9 % (ref 22–41)
MCH RBC QN AUTO: 29 PG (ref 27–33)
MCHC RBC AUTO-ENTMCNC: 31.3 G/DL (ref 33.7–35.3)
MCV RBC AUTO: 92.8 FL (ref 81.4–97.8)
MONOCYTES # BLD AUTO: 1.58 K/UL (ref 0–0.85)
MONOCYTES NFR BLD AUTO: 12.2 % (ref 0–13.4)
NEUTROPHILS # BLD AUTO: 10.46 K/UL (ref 1.82–7.42)
NEUTROPHILS NFR BLD: 80.8 % (ref 44–72)
NRBC # BLD AUTO: 0 K/UL
NRBC BLD-RTO: 0 /100 WBC
PLATELET # BLD AUTO: 253 K/UL (ref 164–446)
PMV BLD AUTO: 10 FL (ref 9–12.9)
POTASSIUM SERPL-SCNC: 4.3 MMOL/L (ref 3.6–5.5)
PROT SERPL-MCNC: 5.8 G/DL (ref 6–8.2)
PROTHROMBIN TIME: 31.6 SEC (ref 12–14.6)
RBC # BLD AUTO: 4.17 M/UL (ref 4.7–6.1)
SODIUM SERPL-SCNC: 139 MMOL/L (ref 135–145)
VANCOMYCIN TROUGH SERPL-MCNC: 13.9 UG/ML (ref 10–20)
WBC # BLD AUTO: 12.9 K/UL (ref 4.8–10.8)

## 2019-06-18 PROCEDURE — 700111 HCHG RX REV CODE 636 W/ 250 OVERRIDE (IP): Performed by: HOSPITALIST

## 2019-06-18 PROCEDURE — 700102 HCHG RX REV CODE 250 W/ 637 OVERRIDE(OP): Performed by: INTERNAL MEDICINE

## 2019-06-18 PROCEDURE — 700105 HCHG RX REV CODE 258: Performed by: HOSPITALIST

## 2019-06-18 PROCEDURE — 770006 HCHG ROOM/CARE - MED/SURG/GYN SEMI*

## 2019-06-18 PROCEDURE — 76775 US EXAM ABDO BACK WALL LIM: CPT

## 2019-06-18 PROCEDURE — 80053 COMPREHEN METABOLIC PANEL: CPT

## 2019-06-18 PROCEDURE — 80202 ASSAY OF VANCOMYCIN: CPT

## 2019-06-18 PROCEDURE — A9270 NON-COVERED ITEM OR SERVICE: HCPCS | Performed by: HOSPITALIST

## 2019-06-18 PROCEDURE — 87070 CULTURE OTHR SPECIMN AEROBIC: CPT

## 2019-06-18 PROCEDURE — 700105 HCHG RX REV CODE 258: Performed by: INTERNAL MEDICINE

## 2019-06-18 PROCEDURE — 700111 HCHG RX REV CODE 636 W/ 250 OVERRIDE (IP): Performed by: INTERNAL MEDICINE

## 2019-06-18 PROCEDURE — 85025 COMPLETE CBC W/AUTO DIFF WBC: CPT

## 2019-06-18 PROCEDURE — 82962 GLUCOSE BLOOD TEST: CPT | Mod: 91

## 2019-06-18 PROCEDURE — 99233 SBSQ HOSP IP/OBS HIGH 50: CPT | Performed by: INTERNAL MEDICINE

## 2019-06-18 PROCEDURE — 87205 SMEAR GRAM STAIN: CPT

## 2019-06-18 PROCEDURE — A9270 NON-COVERED ITEM OR SERVICE: HCPCS | Performed by: INTERNAL MEDICINE

## 2019-06-18 PROCEDURE — 36415 COLL VENOUS BLD VENIPUNCTURE: CPT

## 2019-06-18 PROCEDURE — 700102 HCHG RX REV CODE 250 W/ 637 OVERRIDE(OP): Performed by: HOSPITALIST

## 2019-06-18 PROCEDURE — 85610 PROTHROMBIN TIME: CPT

## 2019-06-18 RX ORDER — WARFARIN SODIUM 3 MG/1
3 TABLET ORAL
Status: COMPLETED | OUTPATIENT
Start: 2019-06-18 | End: 2019-06-18

## 2019-06-18 RX ORDER — INSULIN GLARGINE 100 [IU]/ML
5 INJECTION, SOLUTION SUBCUTANEOUS EVERY EVENING
Status: DISCONTINUED | OUTPATIENT
Start: 2019-06-18 | End: 2019-06-19

## 2019-06-18 RX ORDER — LOSARTAN POTASSIUM 25 MG/1
50 TABLET ORAL EVERY EVENING
Status: DISCONTINUED | OUTPATIENT
Start: 2019-06-18 | End: 2019-06-19

## 2019-06-18 RX ADMIN — PRASUGREL 10 MG: 10 TABLET, FILM COATED ORAL at 08:04

## 2019-06-18 RX ADMIN — AMPICILLIN SODIUM AND SULBACTAM SODIUM 3 G: 2; 1 INJECTION, POWDER, FOR SOLUTION INTRAMUSCULAR; INTRAVENOUS at 11:25

## 2019-06-18 RX ADMIN — DILTIAZEM HYDROCHLORIDE 60 MG: 30 TABLET, FILM COATED ORAL at 15:06

## 2019-06-18 RX ADMIN — LOSARTAN POTASSIUM 50 MG: 25 TABLET ORAL at 17:13

## 2019-06-18 RX ADMIN — DILTIAZEM HYDROCHLORIDE 60 MG: 30 TABLET, FILM COATED ORAL at 23:33

## 2019-06-18 RX ADMIN — WARFARIN SODIUM 3 MG: 3 TABLET ORAL at 18:15

## 2019-06-18 RX ADMIN — INSULIN HUMAN 2 UNITS: 100 INJECTION, SOLUTION PARENTERAL at 17:15

## 2019-06-18 RX ADMIN — INSULIN HUMAN 2 UNITS: 100 INJECTION, SOLUTION PARENTERAL at 21:16

## 2019-06-18 RX ADMIN — AMPICILLIN SODIUM AND SULBACTAM SODIUM 3 G: 2; 1 INJECTION, POWDER, FOR SOLUTION INTRAMUSCULAR; INTRAVENOUS at 23:41

## 2019-06-18 RX ADMIN — INSULIN HUMAN 3 UNITS: 100 INJECTION, SOLUTION PARENTERAL at 11:37

## 2019-06-18 RX ADMIN — BUDESONIDE AND FORMOTEROL FUMARATE DIHYDRATE 2 PUFF: 160; 4.5 AEROSOL RESPIRATORY (INHALATION) at 05:54

## 2019-06-18 RX ADMIN — ACETAMINOPHEN 650 MG: 325 TABLET, FILM COATED ORAL at 19:33

## 2019-06-18 RX ADMIN — BUDESONIDE AND FORMOTEROL FUMARATE DIHYDRATE 2 PUFF: 160; 4.5 AEROSOL RESPIRATORY (INHALATION) at 17:12

## 2019-06-18 RX ADMIN — ATORVASTATIN CALCIUM 80 MG: 40 TABLET, FILM COATED ORAL at 05:53

## 2019-06-18 RX ADMIN — METOPROLOL SUCCINATE 25 MG: 25 TABLET, EXTENDED RELEASE ORAL at 05:54

## 2019-06-18 RX ADMIN — AMPICILLIN SODIUM AND SULBACTAM SODIUM 3 G: 2; 1 INJECTION, POWDER, FOR SOLUTION INTRAMUSCULAR; INTRAVENOUS at 17:12

## 2019-06-18 RX ADMIN — VANCOMYCIN HYDROCHLORIDE 1600 MG: 10 INJECTION, POWDER, LYOPHILIZED, FOR SOLUTION INTRAVENOUS at 19:33

## 2019-06-18 RX ADMIN — DILTIAZEM HYDROCHLORIDE 60 MG: 30 TABLET, FILM COATED ORAL at 08:04

## 2019-06-18 RX ADMIN — CELECOXIB 200 MG: 200 CAPSULE ORAL at 17:13

## 2019-06-18 RX ADMIN — AMPICILLIN SODIUM AND SULBACTAM SODIUM 3 G: 2; 1 INJECTION, POWDER, FOR SOLUTION INTRAMUSCULAR; INTRAVENOUS at 05:54

## 2019-06-18 RX ADMIN — SENNOSIDES, DOCUSATE SODIUM 2 TABLET: 50; 8.6 TABLET, FILM COATED ORAL at 05:53

## 2019-06-18 RX ADMIN — INSULIN GLARGINE 5 UNITS: 100 INJECTION, SOLUTION SUBCUTANEOUS at 17:16

## 2019-06-18 ASSESSMENT — ENCOUNTER SYMPTOMS
FEVER: 0
TINGLING: 0
ABDOMINAL PAIN: 0
BLURRED VISION: 0
NECK PAIN: 0
SORE THROAT: 0
COUGH: 0
EYE PAIN: 0
CHILLS: 0
BRUISES/BLEEDS EASILY: 1
DIZZINESS: 0
HEADACHES: 0
VOMITING: 0
BACK PAIN: 0
SHORTNESS OF BREATH: 0
PALPITATIONS: 0
NAUSEA: 0
INSOMNIA: 0
DEPRESSION: 0

## 2019-06-18 NOTE — PROGRESS NOTES
[facility] Medicine Daily Progress Note    Date of Service  6/18/2019    Chief Complaint  74 y.o. male admitted 6/16/2019 with Right leg pain, swelling and erythema.     Hospital Course    Hx of CAD s/p stent, afib on coumadin, HTN, admitted for right knee and leg pain redness and swelling after a fall, refractory to outpatient Keflex. Orthopedics was consulted and does not initially feel that he required surgical intervention.       Interval Problem Update  6/17: Mild YANETH.  Renal ultrasound with moderate left hydronephrosis, mild urine retention  6/18: Patient feels slightly improved, renal function normalized    Consultants/Specialty  ortho    Code Status  full    Disposition  Home once improved, poss HH  Timing TBD, when medically stable    Review of Systems  Review of Systems   Constitutional: Negative for chills and fever.   HENT: Negative for sore throat.    Eyes: Negative for blurred vision and pain.   Respiratory: Negative for cough and shortness of breath.    Cardiovascular: Positive for leg swelling. Negative for chest pain and palpitations.   Gastrointestinal: Negative for abdominal pain, nausea and vomiting.   Genitourinary: Negative for dysuria and urgency.   Musculoskeletal: Negative for back pain and neck pain.        Left leg pain, decreased ROM of knee due to pain, normal ROM at ankle per patient   Skin: Positive for rash. Negative for itching.   Neurological: Negative for dizziness, tingling and headaches.   Endo/Heme/Allergies: Bruises/bleeds easily.   Psychiatric/Behavioral: Negative for depression. The patient does not have insomnia.    All other systems reviewed and are negative.       Physical Exam  Temp:  [36.3 °C (97.4 °F)-37.1 °C (98.7 °F)] 36.3 °C (97.4 °F)  Pulse:  [] 72  Resp:  [17-18] 18  BP: (131-172)/(63-99) 135/63  SpO2:  [94 %-99 %] 94 %    Physical Exam   Constitutional: He is oriented to person, place, and time. He appears well-developed and well-nourished. No distress.    Patient seen and examined  Plan discussed with RN   JIMENEZT:   Right Ear: External ear normal.   Left Ear: External ear normal.   Nose: Nose normal.   Eyes: Right eye exhibits no discharge. Left eye exhibits no discharge. No scleral icterus.   Neck: No JVD present. No tracheal deviation present.   Cardiovascular: Normal rate, normal heart sounds and intact distal pulses.    No murmur heard.  Pulmonary/Chest: Effort normal and breath sounds normal. No respiratory distress. He has no wheezes. He has no rales.   Abdominal: Soft. Bowel sounds are normal. He exhibits no distension. There is no tenderness. There is no guarding.   Musculoskeletal: He exhibits edema and tenderness.   Scabbed over left knee contusion with severe cellulitis extending down to his ankle.    Neurological: He is alert and oriented to person, place, and time.   Skin: Skin is warm and dry. Rash noted. He is not diaphoretic. There is erythema.   Psychiatric: He has a normal mood and affect. His behavior is normal.   Nursing note and vitals reviewed.      Fluids    Intake/Output Summary (Last 24 hours) at 06/18/19 1442  Last data filed at 06/18/19 1200   Gross per 24 hour   Intake              700 ml   Output                0 ml   Net              700 ml       Laboratory  Recent Labs      06/16/19 2240 06/18/19 0432   WBC  10.6  12.9*   RBC  4.29*  4.17*   HEMOGLOBIN  12.6*  12.1*   HEMATOCRIT  38.8*  38.7*   MCV  90.4  92.8   MCH  29.4  29.0   MCHC  32.5*  31.3*   RDW  49.2  51.2*   PLATELETCT  249  253   MPV  9.8  10.0     Recent Labs      06/16/19 2240 06/18/19   0432   SODIUM  137  139   POTASSIUM  4.3  4.3   CHLORIDE  105  105   CO2  26  26   GLUCOSE  296*  157*   BUN  39*  26*   CREATININE  1.59*  1.27   CALCIUM  8.4  8.2*     Recent Labs      06/16/19 2240 06/18/19 0432   APTT  39.7*   --    INR  2.83*  2.95*               Imaging  US-RENAL   Final Result         1.  Mild prominence the right renal pelvis, could represent extra  renal pelvis morphology or mild hydronephrosis.   2.  Post void bladder residual 364 mL.           Assessment/Plan  Cellulitis- (present on admission)   Assessment & Plan    -Profound, affecting the entire right leg, Break in the skin present on right knee.  Failed outpatient Keflex  Slight improvement of pain today but wbc went up  Continue vanco and unasyn  Watch renal function and trend exam.   -await culture results   -I discussed the case with Ortho, no plan for surgery at this time.     Elevated liver enzymes- (present on admission)   Assessment & Plan    -Mild elevation in alk phos as well as T bili  -Asymptomatic, repeat CMP in the morning  No evidence for obstruction on exam.      Acute on chronic renal failure (HCC)- (present on admission)   Assessment & Plan    Improved today, I suspect due to volume depletion in the setting of infection at home.   Renal ultrasound showed some fluid retention (364mL)  Follow I/o carefully  Watch close with vanco on board.   Continue IV fluids  -Repeat BMP in the morning     Dyslipidemia- (present on admission)   Assessment & Plan    -Continue statin     S/P coronary artery stent placement- (present on admission)   Assessment & Plan    Continue Effient.     Atrial fibrillation (HCC)- (present on admission)   Assessment & Plan    Rate controlled  Continue metoprolol and diltiazem  Restart Coumadin as no intervention planned by orthopedic surgery  -Start PRN diltiazem  Trend INR     Type 2 diabetes mellitus with hyperglycemia (HCC)- (present on admission)   Assessment & Plan    With related hyperglycemia  Continue insulin sliding scale  Resume long acting 5U nightly (up titrate as able to home dose of 14U nightly)  High risk for complications. Watch close.   Goal glucose is less than 150       HTN (hypertension)- (present on admission)   Assessment & Plan    Not at goal, pressures have been in the 170s systolic.  -Continue home metoprolol, losartan (increase if BP remains  high) and diltiazem  -Place PRN enalapril        Total time:  40 minutes.  I spent greater than 50% of the time for patient care, counseling, and coordination on this date, including unit/floor time, and face-to-face time with the patient as per interval events and assessment and plan above        VTE prophylaxis: coumadin

## 2019-06-18 NOTE — PROGRESS NOTES
Pharmacy Kinetics 74 y.o. male on vancomycin day # 1 2019    Currently on Vancomycin 2900 mg iv 1 (loading dose)    Indication for Treatment: R leg cellulitis    Pertinent history per medical record: Admitted on 2019 for R leg pain, swelling, and erythema.  Patient failed outpatient Keflex. Surgery is not recommending a surgical intervention.       Other antibiotics: Unasyn    Allergies: Atenolol and Tetanus toxoid     List concerns for renal function: BUN/SCr ratio > 20:1, obesity, YANETH    Pertinent cultures to date:    BCx NGTD   WCx NGTD   BCx NGTD    MRSA nares swab if pneumonia is a concern (ordered/positive/negative/n-a): n/a    Recent Labs      19   2240   WBC  10.6   NEUTSPOLYS  76.10*     Recent Labs      19   2240   BUN  39*   CREATININE  1.59*   ALBUMIN  3.0*     Recent Labs      19   1720   VANCORANDOM  14.7     Intake/Output Summary (Last 24 hours) at 19 1830  Last data filed at 19 1200   Gross per 24 hour   Intake              720 ml   Output                0 ml   Net              720 ml      /78   Pulse 80   Temp 36.3 °C (97.4 °F) (Oral)   Resp 18   Ht 1.829 m (6')   Wt 117.6 kg (259 lb 4.2 oz)   SpO2 96%  Temp (24hrs), Av.6 °C (97.9 °F), Min:36.3 °C (97.4 °F), Max:37.4 °C (99.3 °F)      A/P   1. Vancomycin dose change: vancomycin 1600 mg IV q24h (0)  2. Next vancomycin level:  at 1700  3. Goal trough: 12-16 mcg/ml  4. Comments: obesity and renal impairment warrant close monitoring of level    Mckinley Mcelroy, LeslieD

## 2019-06-18 NOTE — PROGRESS NOTES
Inpatient Anticoagulation Service Note    Date: 6/18/2019    Reason for Anticoagulation: Atrial Fibrillation   Target INR: 2.0 to 3.0  [id number] VASc Score: 4  HAS-BLED Score: 4   Hemoglobin Value: (!) 12.1  Hematocrit Value: (!) 38.7    INR from last 7 days     Date/Time INR Value    06/18/19 0432 (!)  2.95    06/16/19 2240 (!)  2.83        Dose from last 7 days     Date/Time Dose (mg)    06/18/19 0900  3    06/17/19 1245  5        Average Dose (mg):  (Clinic: 2.5 mg Sundays, 5 mg all other days)  Significant Interactions: Antibiotics, Statin  Bridge Therapy: No (If less than 5 days and overlap therapy discontinued -- document reason (i.e. Bleed Risk))    (If still on overlap therapy, if No -- document reason (i.e. Bleed Risk))         Plan:  Will give warfairn 3 mg po [facility] for INR of 2.95  Education Material Provided?: No  Pharmacist suggested discharge dosing: Resume home regimen with initial close monitoring.     Clinton Lares [facility]

## 2019-06-18 NOTE — RESPIRATORY CARE
COPD EDUCATION by COPD CLINICAL EDUCATOR  6/18/2019 at 10:40 AM by Ramona Lovelace     Patient reviewed by COPD education team. Patient does not have a history or diagnosis of COPD and is a non-smoker, therefore does not qualify for the COPD program.

## 2019-06-18 NOTE — PROGRESS NOTES
Orthopaedics  Patient seen and examined  Cellulitis anterior tibia/knee  Responding to therapy- feels better and pain less  Outlined redness slightly less than past 24 hours  Still no specific zone of abscess or indication for MRI  P: would continue on, Staph/Strep coverage  No indication for surgery currently  Zaldivar

## 2019-06-18 NOTE — WOUND TEAM
"[facility]  Inpatient Services  Initial Wound and Skin Care Evaluation    Admission Date:  6/16/2019   HPI, PMH, SH: Reviewed  Unit where seen by Wound Team: 2211/01    WOUND CONSULT RELATED TO: RLE    SUBJECTIVE:  \"I tripped over a sofa leg.\"     Self Report / Pain Level: some pain while trying to lift an edge of the scab      OBJECTIVE: sitting up in chair with legs elevated, family @ bs  WOUND TYPE, LOCATION, CHARACTERISTICS (Pressure ulcers: location, stage, POA or date identified)    Wound 06/17/19 Other (comment) Leg (Active)      6/16/2019 10:53 PM   Site Assessment Red;Brown;Dry    Anastacia-wound Assessment Edema;Red    Margins Attached edges;Defined edges    Wound Length (cm) 5.5 cm 6/17/2019  7:00 PM   Wound Width (cm) 2.5 cm 6/17/2019  7:00 PM   Wound Depth (cm) 0 cm 6/17/2019  7:00 PM   Wound Surface Area (cm^2) 13.75 cm^2 6/17/2019  7:00 PM   Tunneling 0 cm 6/17/2019  7:00 PM   Undermining 0 cm 6/17/2019  7:00 PM   Drainage Amount None    Treatments Cleansed    Cleansing Normal Saline Irrigation    Periwound Protectant Not Applicable    Dressing Options Open to Air    NEXT Weekly Photo (Inpatient Only) 06/24/19    Odor None     Exposed Structures None     Tissue Type and Percentage 100% adherent dry scab      Vascular:  Wounds not r/t vascular problem    Lab Values:    WBC:       WBC   Date/Time Value Ref Range Status   06/16/2019 10:40 PM 10.6 4.8 - 10.8 K/uL Final     AIC:      Lab Results   Component Value Date/Time    HBA1C 1.7 02/12/2019 01:12 PM          Culture:  No drainage or tissue to culture    INTERVENTIONS BY WOUND TEAM:palpated scabs to knee, cleaned with NS, left RICH.  Dressing Options: Open to Air    Interdisciplinary consultation:   With Nursing;With Patient     EVALUATION:pt has edematous erythemic RLE. There are firmly adherent scabs to knee. No induration or fluctuance with palpation. Unable to lift edge of scab and no drainage present, so unable to perform culture. Wound " left RICH.     Factors affecting wound healing: cellulitis, DM, CAD, HTN, chronic kidney disease  Goals:  Slow decrease in wound area and depth weekly       NURSING PLAN OF CARE ORDERS (X):    Dressing changes: See Dressing Care orders:     Skin care: See Skin Care orders:   Rectal tube care: See Rectal Tube Care orders:   Other orders:    RSKIN: CURRENT (X) ORDERED (O)  pt performs self care  Q shift Darrick:  X  Q shift pressure point assessments:  X  Pressure redistribution mattress    x        SEBAS          Bariatric SEBAS        Bariatric foam           Heel float boots         Float Heels off Bed with Pillows              Barrier wipes         Barrier Cream         Barrier paste        Sacral silicone dressing         Silicone O2 tubing         Anchorfast         Cannula fixation Device (Tender )          Gray Foam Ear protectors           Trach with split foam               Waffle cushion        Waffle Overlay         Rectal tube or BMS         Antifungal tx   Interdry         Reposition q 2 hours    pt performs   Up to chair        Ambulate      PT/OT        Dietician        Diabetes Education      PO  x  TF     TPN     NPO   # days   Other     WOUND TEAM PLAN OF CARE (X):   NPWT change 3 x week:        Dressing changes by wound team:       Follow up as needed: id wound opens      Other (explain):    Anticipated discharge plans (X):  SNF:           Home Care:           Outpatient Wound Center:            Self Care:            Other:   Unsure @ this time

## 2019-06-19 LAB
ALBUMIN SERPL BCP-MCNC: 2.9 G/DL (ref 3.2–4.9)
ALBUMIN/GLOB SERPL: 1 G/DL
ALP SERPL-CCNC: 91 U/L (ref 30–99)
ALT SERPL-CCNC: 22 U/L (ref 2–50)
ANION GAP SERPL CALC-SCNC: 6 MMOL/L (ref 0–11.9)
AST SERPL-CCNC: 18 U/L (ref 12–45)
BASOPHILS # BLD AUTO: 0.4 % (ref 0–1.8)
BASOPHILS # BLD: 0.05 K/UL (ref 0–0.12)
BILIRUB SERPL-MCNC: 1.5 MG/DL (ref 0.1–1.5)
BUN SERPL-MCNC: 32 MG/DL (ref 8–22)
CALCIUM SERPL-MCNC: 8.2 MG/DL (ref 8.4–10.2)
CHLORIDE SERPL-SCNC: 109 MMOL/L (ref 96–112)
CO2 SERPL-SCNC: 26 MMOL/L (ref 20–33)
CREAT SERPL-MCNC: 1.42 MG/DL (ref 0.5–1.4)
EOSINOPHIL # BLD AUTO: 0.17 K/UL (ref 0–0.51)
EOSINOPHIL NFR BLD: 1.5 % (ref 0–6.9)
ERYTHROCYTE [DISTWIDTH] IN BLOOD BY AUTOMATED COUNT: 50.9 FL (ref 35.9–50)
GLOBULIN SER CALC-MCNC: 3 G/DL (ref 1.9–3.5)
GLUCOSE BLD-MCNC: 142 MG/DL (ref 65–99)
GLUCOSE BLD-MCNC: 147 MG/DL (ref 65–99)
GLUCOSE BLD-MCNC: 162 MG/DL (ref 65–99)
GLUCOSE BLD-MCNC: 225 MG/DL (ref 65–99)
GLUCOSE SERPL-MCNC: 158 MG/DL (ref 65–99)
GRAM STN SPEC: NORMAL
HCT VFR BLD AUTO: 37.4 % (ref 42–52)
HGB BLD-MCNC: 11.8 G/DL (ref 14–18)
IMM GRANULOCYTES # BLD AUTO: 0.03 K/UL (ref 0–0.11)
IMM GRANULOCYTES NFR BLD AUTO: 0.3 % (ref 0–0.9)
INR PPP: 3.13 (ref 0.87–1.13)
LYMPHOCYTES # BLD AUTO: 0.87 K/UL (ref 1–4.8)
LYMPHOCYTES NFR BLD: 7.8 % (ref 22–41)
MAGNESIUM SERPL-MCNC: 1.9 MG/DL (ref 1.5–2.5)
MCH RBC QN AUTO: 29 PG (ref 27–33)
MCHC RBC AUTO-ENTMCNC: 31.6 G/DL (ref 33.7–35.3)
MCV RBC AUTO: 91.9 FL (ref 81.4–97.8)
MONOCYTES # BLD AUTO: 1.47 K/UL (ref 0–0.85)
MONOCYTES NFR BLD AUTO: 13.2 % (ref 0–13.4)
MRSA DNA SPEC QL NAA+PROBE: NORMAL
NEUTROPHILS # BLD AUTO: 8.54 K/UL (ref 1.82–7.42)
NEUTROPHILS NFR BLD: 76.8 % (ref 44–72)
NRBC # BLD AUTO: 0 K/UL
NRBC BLD-RTO: 0 /100 WBC
PHOSPHATE SERPL-MCNC: 2.7 MG/DL (ref 2.5–4.5)
PLATELET # BLD AUTO: 247 K/UL (ref 164–446)
PMV BLD AUTO: 9.5 FL (ref 9–12.9)
POTASSIUM SERPL-SCNC: 4.3 MMOL/L (ref 3.6–5.5)
PROCALCITONIN SERPL-MCNC: 0.06 NG/ML
PROT SERPL-MCNC: 5.9 G/DL (ref 6–8.2)
PROTHROMBIN TIME: 33.1 SEC (ref 12–14.6)
RBC # BLD AUTO: 4.07 M/UL (ref 4.7–6.1)
SIGNIFICANT IND 70042: NORMAL
SIGNIFICANT IND 70042: NORMAL
SITE SITE: NORMAL
SITE SITE: NORMAL
SODIUM SERPL-SCNC: 141 MMOL/L (ref 135–145)
SOURCE SOURCE: NORMAL
SOURCE SOURCE: NORMAL
WBC # BLD AUTO: 11.1 K/UL (ref 4.8–10.8)

## 2019-06-19 PROCEDURE — 85025 COMPLETE CBC W/AUTO DIFF WBC: CPT

## 2019-06-19 PROCEDURE — A9270 NON-COVERED ITEM OR SERVICE: HCPCS | Performed by: INTERNAL MEDICINE

## 2019-06-19 PROCEDURE — 700102 HCHG RX REV CODE 250 W/ 637 OVERRIDE(OP): Performed by: INTERNAL MEDICINE

## 2019-06-19 PROCEDURE — 97535 SELF CARE MNGMENT TRAINING: CPT

## 2019-06-19 PROCEDURE — 87641 MR-STAPH DNA AMP PROBE: CPT

## 2019-06-19 PROCEDURE — 85610 PROTHROMBIN TIME: CPT

## 2019-06-19 PROCEDURE — 700111 HCHG RX REV CODE 636 W/ 250 OVERRIDE (IP): Performed by: INTERNAL MEDICINE

## 2019-06-19 PROCEDURE — 84145 PROCALCITONIN (PCT): CPT

## 2019-06-19 PROCEDURE — 83735 ASSAY OF MAGNESIUM: CPT

## 2019-06-19 PROCEDURE — 99233 SBSQ HOSP IP/OBS HIGH 50: CPT | Performed by: INTERNAL MEDICINE

## 2019-06-19 PROCEDURE — 700102 HCHG RX REV CODE 250 W/ 637 OVERRIDE(OP): Performed by: HOSPITALIST

## 2019-06-19 PROCEDURE — A9270 NON-COVERED ITEM OR SERVICE: HCPCS | Performed by: HOSPITALIST

## 2019-06-19 PROCEDURE — 770006 HCHG ROOM/CARE - MED/SURG/GYN SEMI*

## 2019-06-19 PROCEDURE — 80053 COMPREHEN METABOLIC PANEL: CPT

## 2019-06-19 PROCEDURE — 84100 ASSAY OF PHOSPHORUS: CPT

## 2019-06-19 PROCEDURE — 700105 HCHG RX REV CODE 258: Performed by: INTERNAL MEDICINE

## 2019-06-19 PROCEDURE — 82962 GLUCOSE BLOOD TEST: CPT

## 2019-06-19 PROCEDURE — 36415 COLL VENOUS BLD VENIPUNCTURE: CPT

## 2019-06-19 RX ORDER — AMLODIPINE BESYLATE 5 MG/1
10 TABLET ORAL
Status: DISCONTINUED | OUTPATIENT
Start: 2019-06-19 | End: 2019-06-21 | Stop reason: HOSPADM

## 2019-06-19 RX ORDER — LINEZOLID 600 MG/1
600 TABLET, FILM COATED ORAL EVERY 12 HOURS
Status: DISCONTINUED | OUTPATIENT
Start: 2019-06-19 | End: 2019-06-20

## 2019-06-19 RX ORDER — INSULIN GLARGINE 100 [IU]/ML
6 INJECTION, SOLUTION SUBCUTANEOUS EVERY EVENING
Status: DISCONTINUED | OUTPATIENT
Start: 2019-06-19 | End: 2019-06-21 | Stop reason: HOSPADM

## 2019-06-19 RX ADMIN — CELECOXIB 200 MG: 200 CAPSULE ORAL at 17:03

## 2019-06-19 RX ADMIN — ACETAMINOPHEN 650 MG: 325 TABLET, FILM COATED ORAL at 11:24

## 2019-06-19 RX ADMIN — METOPROLOL SUCCINATE 25 MG: 25 TABLET, EXTENDED RELEASE ORAL at 05:14

## 2019-06-19 RX ADMIN — AMPICILLIN SODIUM AND SULBACTAM SODIUM 3 G: 2; 1 INJECTION, POWDER, FOR SOLUTION INTRAMUSCULAR; INTRAVENOUS at 11:00

## 2019-06-19 RX ADMIN — AMPICILLIN SODIUM AND SULBACTAM SODIUM 3 G: 2; 1 INJECTION, POWDER, FOR SOLUTION INTRAMUSCULAR; INTRAVENOUS at 17:03

## 2019-06-19 RX ADMIN — ATORVASTATIN CALCIUM 80 MG: 40 TABLET, FILM COATED ORAL at 05:11

## 2019-06-19 RX ADMIN — INSULIN HUMAN 3 UNITS: 100 INJECTION, SOLUTION PARENTERAL at 21:24

## 2019-06-19 RX ADMIN — SODIUM CHLORIDE: 9 INJECTION, SOLUTION INTRAVENOUS at 15:26

## 2019-06-19 RX ADMIN — BUDESONIDE AND FORMOTEROL FUMARATE DIHYDRATE 2 PUFF: 160; 4.5 AEROSOL RESPIRATORY (INHALATION) at 05:29

## 2019-06-19 RX ADMIN — PRASUGREL 10 MG: 10 TABLET, FILM COATED ORAL at 05:15

## 2019-06-19 RX ADMIN — AMPICILLIN SODIUM AND SULBACTAM SODIUM 3 G: 2; 1 INJECTION, POWDER, FOR SOLUTION INTRAMUSCULAR; INTRAVENOUS at 05:10

## 2019-06-19 RX ADMIN — DILTIAZEM HYDROCHLORIDE 60 MG: 30 TABLET, FILM COATED ORAL at 15:26

## 2019-06-19 RX ADMIN — INSULIN HUMAN 2 UNITS: 100 INJECTION, SOLUTION PARENTERAL at 11:24

## 2019-06-19 RX ADMIN — LINEZOLID 600 MG: 600 TABLET, FILM COATED ORAL at 17:03

## 2019-06-19 RX ADMIN — DILTIAZEM HYDROCHLORIDE 60 MG: 30 TABLET, FILM COATED ORAL at 08:18

## 2019-06-19 RX ADMIN — LINEZOLID 600 MG: 600 TABLET, FILM COATED ORAL at 11:44

## 2019-06-19 RX ADMIN — AMLODIPINE BESYLATE 10 MG: 5 TABLET ORAL at 11:44

## 2019-06-19 RX ADMIN — DILTIAZEM HYDROCHLORIDE 60 MG: 30 TABLET, FILM COATED ORAL at 21:17

## 2019-06-19 RX ADMIN — BUDESONIDE AND FORMOTEROL FUMARATE DIHYDRATE 2 PUFF: 160; 4.5 AEROSOL RESPIRATORY (INHALATION) at 17:03

## 2019-06-19 RX ADMIN — INSULIN GLARGINE 6 UNITS: 100 INJECTION, SOLUTION SUBCUTANEOUS at 17:16

## 2019-06-19 ASSESSMENT — ENCOUNTER SYMPTOMS
COUGH: 0
SORE THROAT: 0
EYE PAIN: 0
CHILLS: 0
PALPITATIONS: 0
BACK PAIN: 0
ABDOMINAL PAIN: 0
BLURRED VISION: 0
NAUSEA: 0
DEPRESSION: 0
INSOMNIA: 0
HEADACHES: 0
NECK PAIN: 0
FEVER: 0
BRUISES/BLEEDS EASILY: 1
TINGLING: 0
DIZZINESS: 0
VOMITING: 0
SHORTNESS OF BREATH: 0

## 2019-06-19 NOTE — PROGRESS NOTES
Inpatient Anticoagulation Service Note    Date: 6/19/2019    Reason for Anticoagulation: Atrial Fibrillation   Target INR: 2.0 to 3.0  [id number] VASc Score: 4  HAS-BLED Score: 4   Hemoglobin Value: (!) 11.8  Hematocrit Value: (!) 37.4    INR from last 7 days     Date/Time INR Value    06/19/19 0442 (!)  3.13    06/18/19 0432 (!)  2.95    06/16/19 2240 (!)  2.83        Dose from last 7 days     Date/Time Dose (mg)    06/19/19 1000  0    06/18/19 0900  3    06/17/19 1245  5        Average Dose (mg):  (Clinic: 2.5 mg Sundays, 5 mg all other days)  Significant Interactions: Antibiotics, Statin  Bridge Therapy: No (If less than 5 days and overlap therapy discontinued -- document reason (i.e. Bleed Risk))    (If still on overlap therapy, if No -- document reason (i.e. Bleed Risk))         Plan:  Hold warfarin tonight for INR of 3.13  Education Material Provided?: No  Pharmacist suggested discharge dosing: Resume home regimen with close initial monitoring.     Clinton Lares [facility]

## 2019-06-19 NOTE — PROGRESS NOTES
Less pain and swelling.  Knee and ankle joint move well.  Cellulitis improving.  No surgical indications at this time.  WBC decreasing

## 2019-06-19 NOTE — PROGRESS NOTES
Hospital Medicine Daily Progress Note    Date of Service  6/19/2019    Chief Complaint  74 y.o. male admitted 6/16/2019 with Right leg pain, swelling and erythema.     Hospital Course    Hx of CAD s/p stent, afib on coumadin, HTN, admitted for right knee and leg pain redness and swelling after a fall, refractory to outpatient Keflex. Orthopedics was consulted and does not initially feel that he required surgical intervention.       Interval Problem Update  6/17: Mild YANETH.  Renal ultrasound with moderate left hydronephrosis, mild urine retention  6/18: Patient feels slightly improved, renal function normalized  6/19: Range of motion slightly improved, redness receding.  Creatinine slightly up, vancomycin changed to linezolid, MRSA swab pending    Consultants/Specialty  ortho    Code Status  full    Disposition  Home once improved, poss  for wound care  Timing TBD, when medically stable    Review of Systems  Review of Systems   Constitutional: Negative for chills and fever.   HENT: Negative for sore throat.    Eyes: Negative for blurred vision and pain.   Respiratory: Negative for cough and shortness of breath.    Cardiovascular: Positive for leg swelling (Slightly improved). Negative for chest pain and palpitations.   Gastrointestinal: Negative for abdominal pain, nausea and vomiting.   Genitourinary: Negative for dysuria and urgency.   Musculoskeletal: Negative for back pain and neck pain.        Left leg pain, decreased ROM of knee due swelling but this has improved from yesterday   Skin: Positive for rash. Negative for itching.   Neurological: Negative for dizziness, tingling and headaches.   Endo/Heme/Allergies: Bruises/bleeds easily.   Psychiatric/Behavioral: Negative for depression. The patient does not have insomnia.    All other systems reviewed and are negative.       Physical Exam  Temp:  [36.6 °C (97.8 °F)-36.7 °C (98.1 °F)] 36.6 °C (97.9 °F)  Pulse:  [70-89] 84  Resp:  [17-18] 18  BP: (111-161)/(64-81)  111/74  SpO2:  [90 %-97 %] 97 %    Physical Exam   Constitutional: He is oriented to person, place, and time. He appears well-developed and well-nourished. No distress.   Patient seen and examined  Plan discussed with CASSI GAONAT:   Right Ear: External ear normal.   Left Ear: External ear normal.   Nose: Nose normal.   Eyes: Right eye exhibits no discharge. Left eye exhibits no discharge. No scleral icterus.   Neck: No JVD present. No tracheal deviation present.   Cardiovascular: Normal rate, normal heart sounds and intact distal pulses.    No murmur heard.  Pulmonary/Chest: Effort normal and breath sounds normal. No respiratory distress. He has no wheezes. He has no rales.   Abdominal: Soft. Bowel sounds are normal. He exhibits no distension. There is no tenderness. There is no guarding.   Musculoskeletal: He exhibits edema and tenderness.   Scabbed over left knee contusion with severe cellulitis extending down to his ankle.    Neurological: He is alert and oriented to person, place, and time.   Skin: Skin is warm and dry. Rash noted. He is not diaphoretic. There is erythema.   Ecchymosis around the R ankle   Psychiatric: He has a normal mood and affect. His behavior is normal.   Nursing note and vitals reviewed.      Fluids    Intake/Output Summary (Last 24 hours) at 06/19/19 1646  Last data filed at 06/19/19 1600   Gross per 24 hour   Intake           6754.5 ml   Output                0 ml   Net           6754.5 ml       Laboratory  Recent Labs      06/16/19   2240  06/18/19   0432  06/19/19   0442   WBC  10.6  12.9*  11.1*   RBC  4.29*  4.17*  4.07*   HEMOGLOBIN  12.6*  12.1*  11.8*   HEMATOCRIT  38.8*  38.7*  37.4*   MCV  90.4  92.8  91.9   MCH  29.4  29.0  29.0   MCHC  32.5*  31.3*  31.6*   RDW  49.2  51.2*  50.9*   PLATELETCT  249  253  247   MPV  9.8  10.0  9.5     Recent Labs      06/16/19   2240  06/18/19   0432  06/19/19   0442   SODIUM  137  139  141   POTASSIUM  4.3  4.3  4.3   CHLORIDE  105  105  109    CO2  26  26  26   GLUCOSE  296*  157*  158*   BUN  39*  26*  32*   CREATININE  1.59*  1.27  1.42*   CALCIUM  8.4  8.2*  8.2*     Recent Labs      06/16/19   2240  06/18/19   0432  06/19/19   0442   APTT  39.7*   --    --    INR  2.83*  2.95*  3.13*               Imaging  US-RENAL   Final Result         1.  Mild prominence the right renal pelvis, could represent extra renal pelvis morphology or mild hydronephrosis.   2.  Post void bladder residual 364 mL.           Assessment/Plan  Cellulitis- (present on admission)   Assessment & Plan    -Profound, affecting the entire right leg, Break in the skin present on right knee but doesn't appear to have joint involvement.  Failed outpatient Keflex  -Slight improvement of pain today, wbc down  -Change vanc to linzeolid due to andrei  -If MRSA swab negative, DC linezolid  -Continue Unasyn  -await culture results, blood cx negative so far  -I discussed the case with Ortho, no plan for surgery at this time as he is improving     Elevated liver enzymes- (present on admission)   Assessment & Plan    -Mild elevation in alk phos as well as T bili  -Asymptomatic, they have trended down to normal  -No evidence for obstruction on exam.      Acute on chronic renal failure (HCC)- (present on admission)   Assessment & Plan    Creatinine has been fluctuating, I suspect due to volume depletion in addition to vancomycin and lisinopril  Renal ultrasound showed some fluid retention (364mL)   Follow I/o carefully  DC vancomycin and lisinopril  Continue IV fluids  -Repeat BMP in the morning     Dyslipidemia- (present on admission)   Assessment & Plan    -Continue statin     S/P coronary artery stent placement- (present on admission)   Assessment & Plan    Continue Effient.     Atrial fibrillation (HCC)- (present on admission)   Assessment & Plan    Rate controlled  Continue metoprolol and diltiazem  Continue Coumadin as no intervention planned by orthopedic surgery  -Start PRN diltiazem  Trend  INR     Type 2 diabetes mellitus with hyperglycemia (HCC)- (present on admission)   Assessment & Plan    With related hyperglycemia  Continue insulin sliding scale  Increase lantus to 6U nightly (up titrate as able to home dose of Levemir 14U nightly)  High risk for complications. Watch close.   Goal glucose is less than 150       HTN (hypertension)- (present on admission)   Assessment & Plan    Not at goal, pressures have been in the 170s systolic.  Hold Lisinopril due to rising creatinine  Start amlodipine 10 mg daily  -Continue home metoprolol and diltiazem  -Place PRN enalapril             VTE prophylaxis: coumadin

## 2019-06-19 NOTE — CARE PLAN
Problem: Communication  Goal: The ability to communicate needs accurately and effectively will improve  Outcome: PROGRESSING AS EXPECTED  Pt calls appropriately, makes needs known    Problem: Safety  Goal: Will remain free from injury  Outcome: PROGRESSING AS EXPECTED    Goal: Will remain free from falls  Outcome: PROGRESSING AS EXPECTED      Problem: Infection  Goal: Will remain free from infection  Outcome: PROGRESSING SLOWER THAN EXPECTED  Active cellulitis, mild improvement    Problem: Pain Management  Goal: Pain level will decrease to patient's comfort goal  Outcome: PROGRESSING AS EXPECTED  Pain managed with tylenol and cold packs

## 2019-06-19 NOTE — CARE PLAN
Problem: Safety  Goal: Will remain free from injury  Outcome: PROGRESSING AS EXPECTED  Pt up to the bathroom with cane and stand by assist. Pt restarted on coumadin today. Pt aware he needs to call for assistance.     Problem: Pain Management  Goal: Pain level will decrease to patient's comfort goal  Outcome: PROGRESSING AS EXPECTED  Pt complains of aching R lower ankle pain that subsides after a minute of standing. RLE edematous and red--pt refuses pain medication when offered by RN.

## 2019-06-19 NOTE — THERAPY
"Occupational Therapy Treatment completed with focus on ADLs, ADL transfers, patient education and caregiver training.  Functional Status: OT went to see pt, found him walking in [place]rad with quad cane in R hand, IV pole in L hand.  Slow, shuffled gait, appears unsteady when changing direction quickly but did not lose balance.  OT took IV pole away, and pt able to continue walking in conrad from 1 end to the other, down to front window, and back to room.  SOB during walk which was slow paced and required 3-4 rest breaks.  Pt still demos impaired safety awareness keyanna with regards to balance with mobility.  He reports he has been toileting himself, and spouse present, reports she can assist with any ADL's he needs at home.  Pt adamant with OT that he is \"fine\" and \"this is how I always am\" and \"I don't need any more of your help.\"  Wife assured OT she will monitor balance after d/c and not allow his to go without quad cane if he is unsteady.  At this time, pt is at or near baseline, and is also refusing to continue with OT.  Will d/c from therapy at this time.  Plan of Care: Patient with no further skilled OT needs in the acute care setting at this time  Discharge Recommendations:  Equipment No Equipment Needed. Post-acute therapy Discharge to home with outpatient or home health for additional skilled therapy services if needed in his home environment.  (may benefit more from out pt for balance when cleared of cellulitis).    See \"Rehab Therapy-Acute\" Patient Summary Report for complete documentation.   "

## 2019-06-19 NOTE — PROGRESS NOTES
Pharmacy Kinetics 74 y.o. male on vancomycin day # 3 2019    Currently on Vancomycin 1600 mg iv q24hr    Indication for Treatment: Right leg cellulitis    Pertinent history per medical record: Admitted on 2019 for right leg pain, swelling and rythema.  Patient failed outpatient Keflex.    Other antibiotics: Unasyn 3 Gm IV every 6 hours.    Allergies: Atenolol and Tetanus toxoid     List concerns for renal function:  BUN/SCr ratio > 20:1, obesity, YANETH  Pertinent cultures to date:    BCx NGTD   WCx NGTD   BCx NGTD    MRSA nares swab if pneumonia is a concern (ordered/positive/negative/n-a): N/A    Recent Labs      19   2240  19   0432   WBC  10.6  12.9*   NEUTSPOLYS  76.10*  80.80*     Recent Labs      19   2240  19   0432   BUN  39*  26*   CREATININE  1.59*  1.27   ALBUMIN  3.0*  3.0*     Recent Labs      19   1720  19   1710   VANCOTROUGH   --   13.9   VANCORANDOM  14.7   --      Intake/Output Summary (Last 24 hours) at 19 1810  Last data filed at 19 1700   Gross per 24 hour   Intake              840 ml   Output                0 ml   Net              840 ml      /63   Pulse 72   Temp 36.3 °C (97.4 °F) (Oral)   Resp 18   Ht 1.829 m (6')   Wt 117.6 kg (259 lb 4.2 oz)   SpO2 94%  Temp (24hrs), Av.6 °C (97.8 °F), Min:36.3 °C (97.4 °F), Max:37.1 °C (98.7 °F)      A/P   1. Vancomycin dose change: continue 1600 mg IV every 24 hours  2. Next vancomycin level: couple of days.  3. Goal trough: 12-16 mcg/ml  4. Comments: Will monitor and adjust regimen per protocol.    Clinton Lares [facility]

## 2019-06-19 NOTE — CARE PLAN
"Problem: Knowledge Deficit  Goal: Knowledge of disease process/condition, treatment plan, diagnostic tests, and medications will improve  Outcome: MET Date Met: 06/19/19  Pt started on Norvasc per Dr. Olsen for better kidney function and lisinopril stopped. Pt educated by physician with Rn present.    Problem: Pain Management  Goal: Pain level will decrease to patient's comfort goal  Outcome: MET Date Met: 06/19/19  Pt given tylenol as needed, states his ankle \"is less painful than yesterday\".      "

## 2019-06-20 PROBLEM — L03.115 CELLULITIS OF RIGHT LOWER EXTREMITY: Status: ACTIVE | Noted: 2019-06-17

## 2019-06-20 LAB
ALBUMIN SERPL BCP-MCNC: 2.7 G/DL (ref 3.2–4.9)
BASOPHILS # BLD AUTO: 0.5 % (ref 0–1.8)
BASOPHILS # BLD: 0.06 K/UL (ref 0–0.12)
BUN SERPL-MCNC: 28 MG/DL (ref 8–22)
CALCIUM SERPL-MCNC: 8.1 MG/DL (ref 8.4–10.2)
CHLORIDE SERPL-SCNC: 107 MMOL/L (ref 96–112)
CO2 SERPL-SCNC: 25 MMOL/L (ref 20–33)
CREAT SERPL-MCNC: 1.35 MG/DL (ref 0.5–1.4)
EOSINOPHIL # BLD AUTO: 0.17 K/UL (ref 0–0.51)
EOSINOPHIL NFR BLD: 1.5 % (ref 0–6.9)
ERYTHROCYTE [DISTWIDTH] IN BLOOD BY AUTOMATED COUNT: 50.3 FL (ref 35.9–50)
GLUCOSE BLD-MCNC: 143 MG/DL (ref 65–99)
GLUCOSE BLD-MCNC: 192 MG/DL (ref 65–99)
GLUCOSE BLD-MCNC: 211 MG/DL (ref 65–99)
GLUCOSE BLD-MCNC: 235 MG/DL (ref 65–99)
GLUCOSE SERPL-MCNC: 157 MG/DL (ref 65–99)
HCT VFR BLD AUTO: 36.4 % (ref 42–52)
HGB BLD-MCNC: 11.6 G/DL (ref 14–18)
IMM GRANULOCYTES # BLD AUTO: 0.03 K/UL (ref 0–0.11)
IMM GRANULOCYTES NFR BLD AUTO: 0.3 % (ref 0–0.9)
INR PPP: 2.93 (ref 0.87–1.13)
LYMPHOCYTES # BLD AUTO: 0.71 K/UL (ref 1–4.8)
LYMPHOCYTES NFR BLD: 6.2 % (ref 22–41)
MCH RBC QN AUTO: 29.4 PG (ref 27–33)
MCHC RBC AUTO-ENTMCNC: 31.9 G/DL (ref 33.7–35.3)
MCV RBC AUTO: 92.2 FL (ref 81.4–97.8)
MONOCYTES # BLD AUTO: 1.68 K/UL (ref 0–0.85)
MONOCYTES NFR BLD AUTO: 14.7 % (ref 0–13.4)
NEUTROPHILS # BLD AUTO: 8.8 K/UL (ref 1.82–7.42)
NEUTROPHILS NFR BLD: 76.8 % (ref 44–72)
NRBC # BLD AUTO: 0 K/UL
NRBC BLD-RTO: 0 /100 WBC
PHOSPHATE SERPL-MCNC: 2.6 MG/DL (ref 2.5–4.5)
PLATELET # BLD AUTO: 236 K/UL (ref 164–446)
PMV BLD AUTO: 9.3 FL (ref 9–12.9)
POTASSIUM SERPL-SCNC: 4 MMOL/L (ref 3.6–5.5)
PROTHROMBIN TIME: 31.4 SEC (ref 12–14.6)
RBC # BLD AUTO: 3.95 M/UL (ref 4.7–6.1)
SODIUM SERPL-SCNC: 141 MMOL/L (ref 135–145)
WBC # BLD AUTO: 11.5 K/UL (ref 4.8–10.8)

## 2019-06-20 PROCEDURE — 97116 GAIT TRAINING THERAPY: CPT

## 2019-06-20 PROCEDURE — A9270 NON-COVERED ITEM OR SERVICE: HCPCS | Performed by: HOSPITALIST

## 2019-06-20 PROCEDURE — 36415 COLL VENOUS BLD VENIPUNCTURE: CPT

## 2019-06-20 PROCEDURE — 700105 HCHG RX REV CODE 258: Performed by: INTERNAL MEDICINE

## 2019-06-20 PROCEDURE — 770006 HCHG ROOM/CARE - MED/SURG/GYN SEMI*

## 2019-06-20 PROCEDURE — 700102 HCHG RX REV CODE 250 W/ 637 OVERRIDE(OP): Performed by: INTERNAL MEDICINE

## 2019-06-20 PROCEDURE — 97530 THERAPEUTIC ACTIVITIES: CPT

## 2019-06-20 PROCEDURE — A9270 NON-COVERED ITEM OR SERVICE: HCPCS | Performed by: INTERNAL MEDICINE

## 2019-06-20 PROCEDURE — 82962 GLUCOSE BLOOD TEST: CPT

## 2019-06-20 PROCEDURE — 80069 RENAL FUNCTION PANEL: CPT

## 2019-06-20 PROCEDURE — 85610 PROTHROMBIN TIME: CPT

## 2019-06-20 PROCEDURE — 700111 HCHG RX REV CODE 636 W/ 250 OVERRIDE (IP): Performed by: INTERNAL MEDICINE

## 2019-06-20 PROCEDURE — 99232 SBSQ HOSP IP/OBS MODERATE 35: CPT | Performed by: INTERNAL MEDICINE

## 2019-06-20 PROCEDURE — 700102 HCHG RX REV CODE 250 W/ 637 OVERRIDE(OP): Performed by: HOSPITALIST

## 2019-06-20 PROCEDURE — 85025 COMPLETE CBC W/AUTO DIFF WBC: CPT

## 2019-06-20 RX ORDER — WARFARIN SODIUM 2 MG/1
2 TABLET ORAL
Status: COMPLETED | OUTPATIENT
Start: 2019-06-20 | End: 2019-06-20

## 2019-06-20 RX ADMIN — ACETAMINOPHEN 650 MG: 325 TABLET, FILM COATED ORAL at 00:22

## 2019-06-20 RX ADMIN — AMPICILLIN SODIUM AND SULBACTAM SODIUM 3 G: 2; 1 INJECTION, POWDER, FOR SOLUTION INTRAMUSCULAR; INTRAVENOUS at 00:17

## 2019-06-20 RX ADMIN — PRASUGREL 10 MG: 10 TABLET, FILM COATED ORAL at 05:31

## 2019-06-20 RX ADMIN — WARFARIN SODIUM 2 MG: 2 TABLET ORAL at 17:10

## 2019-06-20 RX ADMIN — DILTIAZEM HYDROCHLORIDE 60 MG: 30 TABLET, FILM COATED ORAL at 09:00

## 2019-06-20 RX ADMIN — INSULIN GLARGINE 6 UNITS: 100 INJECTION, SOLUTION SUBCUTANEOUS at 17:13

## 2019-06-20 RX ADMIN — INSULIN HUMAN 2 UNITS: 100 INJECTION, SOLUTION PARENTERAL at 11:28

## 2019-06-20 RX ADMIN — AMPICILLIN SODIUM AND SULBACTAM SODIUM 3 G: 2; 1 INJECTION, POWDER, FOR SOLUTION INTRAMUSCULAR; INTRAVENOUS at 11:27

## 2019-06-20 RX ADMIN — ACETAMINOPHEN 650 MG: 325 TABLET, FILM COATED ORAL at 12:57

## 2019-06-20 RX ADMIN — LINEZOLID 600 MG: 600 TABLET, FILM COATED ORAL at 05:30

## 2019-06-20 RX ADMIN — DILTIAZEM HYDROCHLORIDE 60 MG: 30 TABLET, FILM COATED ORAL at 21:10

## 2019-06-20 RX ADMIN — SODIUM CHLORIDE: 9 INJECTION, SOLUTION INTRAVENOUS at 00:16

## 2019-06-20 RX ADMIN — INSULIN HUMAN 3 UNITS: 100 INJECTION, SOLUTION PARENTERAL at 17:14

## 2019-06-20 RX ADMIN — METOPROLOL SUCCINATE 25 MG: 25 TABLET, EXTENDED RELEASE ORAL at 05:30

## 2019-06-20 RX ADMIN — ATORVASTATIN CALCIUM 80 MG: 40 TABLET, FILM COATED ORAL at 05:30

## 2019-06-20 RX ADMIN — BUDESONIDE AND FORMOTEROL FUMARATE DIHYDRATE 2 PUFF: 160; 4.5 AEROSOL RESPIRATORY (INHALATION) at 17:11

## 2019-06-20 RX ADMIN — AMPICILLIN SODIUM AND SULBACTAM SODIUM 3 G: 2; 1 INJECTION, POWDER, FOR SOLUTION INTRAMUSCULAR; INTRAVENOUS at 17:11

## 2019-06-20 RX ADMIN — BUDESONIDE AND FORMOTEROL FUMARATE DIHYDRATE 2 PUFF: 160; 4.5 AEROSOL RESPIRATORY (INHALATION) at 05:31

## 2019-06-20 RX ADMIN — AMPICILLIN SODIUM AND SULBACTAM SODIUM 3 G: 2; 1 INJECTION, POWDER, FOR SOLUTION INTRAMUSCULAR; INTRAVENOUS at 05:28

## 2019-06-20 RX ADMIN — DILTIAZEM HYDROCHLORIDE 60 MG: 30 TABLET, FILM COATED ORAL at 14:21

## 2019-06-20 RX ADMIN — AMLODIPINE BESYLATE 10 MG: 5 TABLET ORAL at 05:29

## 2019-06-20 RX ADMIN — CELECOXIB 200 MG: 200 CAPSULE ORAL at 17:10

## 2019-06-20 RX ADMIN — INSULIN HUMAN 3 UNITS: 100 INJECTION, SOLUTION PARENTERAL at 21:13

## 2019-06-20 ASSESSMENT — ENCOUNTER SYMPTOMS
DEPRESSION: 0
SHORTNESS OF BREATH: 0
HEADACHES: 0
BLURRED VISION: 0
NECK PAIN: 0
EYE PAIN: 0
VOMITING: 0
FEVER: 0
CHILLS: 0
SORE THROAT: 0
DIZZINESS: 0
BACK PAIN: 0
COUGH: 0
NAUSEA: 0
BRUISES/BLEEDS EASILY: 1
TINGLING: 0
INSOMNIA: 0
PALPITATIONS: 0
ABDOMINAL PAIN: 0

## 2019-06-20 ASSESSMENT — GAIT ASSESSMENTS
DEVIATION: ANTALGIC;STEP TO
GAIT LEVEL OF ASSIST: SUPERVISED
DISTANCE (FEET): 100
ASSISTIVE DEVICE: QUAD CANE

## 2019-06-20 NOTE — DISCHARGE PLANNING
Care Transition Team Assessment    SW met with this patient and his spouse bedside to complete assessment.  Patient lives in [place] with his spouse.  SW offered HH services however patient declined stating that if he needs PT he will go to [place] [facility].  Patient made aware that he would have to get PT order from his PCP.  SW will continue to monitor and assist as needed.     Information Source  Orientation : Oriented x 4  Information Given By: Patient  Informant's Name: Yuri  Who is responsible for making decisions for patient? : Patient    Elopement Risk  Legal Hold: No  Ambulatory or Self Mobile in Wheelchair: Yes  Disoriented: No  Psychiatric Symptoms: None  History of Wandering: No  Elopement this Admit: No  Vocalizing Wanting to Leave: No  Displays Behaviors, Body Language Wanting to Leave: No-Not at Risk for Elopement  Elopement Risk: Not at Risk for Elopement    Interdisciplinary Discharge Planning  Lives with - Patient's Self Care Capacity: Spouse  Patient or legal guardian wants to designate a caregiver (see row info): No  Housing / Facility: [address] [facility]  Prior Services: None    Discharge Preparedness  What is your plan after discharge?: Home with help  Prior Functional Level: Independent with Activities of Daily Living    Functional Assesment  Prior Functional Level: Independent with Activities of Daily Living    Finances  Financial Barriers to Discharge: No  Prescription Coverage: Yes    Vision / Hearing Impairment  Vision Impairment : Yes  Hearing Impairment : Yes  Hearing Impairment: Both Ears, Hearing Device(s) Available  Does Pt Need Special Equipment for the Hearing Impaired?: No              Domestic Abuse  Have you ever been the victim of abuse or violence?: No  Physical Abuse or Sexual Abuse: No  Verbal Abuse or Emotional Abuse: No  Possible Abuse Reported to:: Not Applicable    Psychological Assessment  History of Substance Abuse: None    Discharge Risks or Barriers  Discharge  risks or barriers?: No    Anticipated Discharge Information  Anticipated discharge disposition: Home, Outpatient therapy (PT, OT, SLP)

## 2019-06-20 NOTE — PROGRESS NOTES
Orthopaedics  Patient seen and examined  Swelling down, afebrile, wounds dry, redness continues to ikmprove  Ok home by me on po suppression tomorrow  No indication for OR currently  Follow up 10 days Zen medrano

## 2019-06-20 NOTE — CARE PLAN
Problem: Safety  Goal: Will remain free from injury  Outcome: PROGRESSING AS EXPECTED  NO REPORTED FALLS/INJURY THIS ADMISSION; FALL PRECAUTIONS ONGOING.

## 2019-06-20 NOTE — THERAPY
"Physical Therapy Treatment completed.   Bed Mobility:  Supine to Sit:  ([facility] pre and post)  Transfers: Sit to Stand: Supervised  Gait: Level Of Assist: Supervised with Quad Cane  X 100 feet x 6 stairs    Plan of Care: Will benefit from Physical Therapy 4 times per week  Discharge Recommendations: Equipment: Front-Wheel Walker. Post-acute therapy Discharge to home with outpatient or home health for additional skilled therapy services.   improved transfers and ambulation,Pt is still a little impulsive but appears to be safe for home  See \"Rehab Therapy-Acute\" Patient Summary Report for complete documentation.       "

## 2019-06-20 NOTE — CARE PLAN
Problem: Communication  Goal: The ability to communicate needs accurately and effectively will improve  Outcome: PROGRESSING SLOWER THAN EXPECTED  PLAN OF CARE REVIEWED WITH PATIENT; PATIENT VERBALIZES UNDERSTANDING

## 2019-06-20 NOTE — PROGRESS NOTES
[facility] Medicine Daily Progress Note    Date of Service  6/20/2019    Chief Complaint  74 y.o. male admitted 6/16/2019 with Right leg pain, swelling and erythema.     Hospital Course    Hx of CAD s/p stent, afib on coumadin, HTN, admitted for right knee and leg pain redness and swelling after a fall, refractory to outpatient Keflex. Orthopedics was consulted and does not initially feel that he required surgical intervention.       Interval Problem Update  6/17: Mild YANETH.  Renal ultrasound with moderate left hydronephrosis, mild urine retention  6/18: Patient feels slightly improved, renal function normalized  6/19: Range of motion slightly improved, redness receding.  Creatinine slightly up, vancomycin changed to linezolid, MRSA swab pending  6/20: Improved redness, swelling and pain.  Cr improved.  MRSA neg, linezolid discontinued    Consultants/Specialty  ortho    Code Status  full    Disposition  Home once improved, poss HH for wound care  Poss in AM    Review of Systems  Review of Systems   Constitutional: Negative for chills and fever.   HENT: Negative for sore throat.    Eyes: Negative for blurred vision and pain.   Respiratory: Negative for cough and shortness of breath.    Cardiovascular: Positive for leg swelling (Improved). Negative for chest pain and palpitations.   Gastrointestinal: Negative for abdominal pain, nausea and vomiting.   Genitourinary: Negative for dysuria and urgency.   Musculoskeletal: Negative for back pain and neck pain.        Left leg pain, decreased ROM of knee due swelling but this has improved from yesterday   Skin: Positive for rash. Negative for itching.   Neurological: Negative for dizziness, tingling and headaches.   Endo/Heme/Allergies: Bruises/bleeds easily.   Psychiatric/Behavioral: Negative for depression. The patient does not have insomnia.    All other systems reviewed and are negative.       Physical Exam  Temp:  [36.3 °C (97.3 °F)-37 °C (98.6 °F)] 36.3 °C (97.3  °F)  Pulse:  [74-85] 83  Resp:  [18-20] 20  BP: (111-165)/(68-81) 128/74  SpO2:  [90 %-93 %] 92 %    Physical Exam   Constitutional: He is oriented to person, place, and time. He appears well-developed and well-nourished. No distress.   Patient seen and examined  Plan discussed with CASSI GAONAT:   Right Ear: External ear normal.   Left Ear: External ear normal.   Nose: Nose normal.   Eyes: Right eye exhibits no discharge. Left eye exhibits no discharge. No scleral icterus.   Neck: No JVD present. No tracheal deviation present.   Cardiovascular: Normal rate, normal heart sounds and intact distal pulses.    No murmur heard.  Pulmonary/Chest: Effort normal and breath sounds normal. No respiratory distress. He has no wheezes. He has no rales.   Abdominal: Soft. Bowel sounds are normal. He exhibits no distension. There is no tenderness. There is no guarding.   Musculoskeletal: He exhibits edema (Decreased) and tenderness.   Scab over left knee, cellulitis Receding from previous area of demarcation   Neurological: He is alert and oriented to person, place, and time. No cranial nerve deficit.   Skin: Skin is warm and dry. No rash noted. He is not diaphoretic. There is erythema.   Ecchymosis around the R ankle   Psychiatric: He has a normal mood and affect. His behavior is normal.   Nursing note and vitals reviewed.      Fluids    Intake/Output Summary (Last 24 hours) at 06/20/19 1413  Last data filed at 06/20/19 1200   Gross per 24 hour   Intake             2076 ml   Output                0 ml   Net             2076 ml       Laboratory  Recent Labs      06/18/19   0432  06/19/19   0442  06/20/19   0448   WBC  12.9*  11.1*  11.5*   RBC  4.17*  4.07*  3.95*   HEMOGLOBIN  12.1*  11.8*  11.6*   HEMATOCRIT  38.7*  37.4*  36.4*   MCV  92.8  91.9  92.2   MCH  29.0  29.0  29.4   MCHC  31.3*  31.6*  31.9*   RDW  51.2*  50.9*  50.3*   PLATELETCT  253  247  236   MPV  10.0  9.5  9.3     Recent Labs      06/18/19   0432  06/19/19   0442   06/20/19   0448   SODIUM  139  141  141   POTASSIUM  4.3  4.3  4.0   CHLORIDE  105  109  107   CO2  26  26  25   GLUCOSE  157*  158*  157*   BUN  26*  32*  28*   CREATININE  1.27  1.42*  1.35   CALCIUM  8.2*  8.2*  8.1*     Recent Labs      06/18/19   0432  06/19/19   0442  06/20/19 0448   INR  2.95*  3.13*  2.93*               Imaging  US-RENAL   Final Result         1.  Mild prominence the right renal pelvis, could represent extra renal pelvis morphology or mild hydronephrosis.   2.  Post void bladder residual 364 mL.           Assessment/Plan  * Cellulitis of right lower extremity- (present on admission)   Assessment & Plan    -Profound, affecting the entire right leg, Break in the skin present on right knee but doesn't appear to have joint involvement.  Failed outpatient Keflex  -Slight improvement of pain today, wbc unchanged  -MRSA swab negative, DC linezolid  -Continue Unasyn --?> Augmentin on DC  -Blood cx negative so far  -I discussed the case with Ortho, no plan for surgery at this time as he is improving     Elevated liver enzymes- (present on admission)   Assessment & Plan    -Mild elevation in alk phos as well as T bili  -Asymptomatic, they have trended down to normal  -No evidence for obstruction on exam.      Acute on chronic renal failure (HCC)- (present on admission)   Assessment & Plan    Creatinine has been fluctuating, I suspect due to volume depletion in addition to vancomycin and lisinopril.  Normalized today  Renal ultrasound showed some fluid retention (364mL), encourage ambulation  Follow I/o carefully  DC'd vancomycin and lisinopril  DC IVF  -Repeat BMP in the morning     Dyslipidemia- (present on admission)   Assessment & Plan    -Continue statin     S/P coronary artery stent placement- (present on admission)   Assessment & Plan    Continue Effient.     Atrial fibrillation (HCC)- (present on admission)   Assessment & Plan    Rate controlled  Continue metoprolol and diltiazem  Continue  Coumadin as no intervention planned by orthopedic surgery  Trend INR, therapeutic today     Type 2 diabetes mellitus with hyperglycemia (HCC)- (present on admission)   Assessment & Plan    With related hyperglycemia  Continue insulin sliding scale  Increase lantus to 6U nightly (up titrate as able to home dose of Levemir 14U nightly)  High risk for complications. Watch close.   Goal glucose is less than 150       HTN (hypertension)- (present on admission)   Assessment & Plan    BP improved but not at goal, pressures have been in the 150-160s systolic.  Hold Lisinopril due to rising creatinine  Started amlodipine 10 mg daily  -Continue home metoprolol and diltiazem  -Place PRN enalapril             VTE prophylaxis: coumadin

## 2019-06-20 NOTE — CARE PLAN
Problem: Infection  Goal: Will remain free from infection  Outcome: PROGRESSING AS EXPECTED  LLE erythema improving, still noticably edematous- pt continuing abx treatment.    Problem: Venous Thromboembolism (VTW)/Deep Vein Thrombosis (DVT) Prevention:  Goal: Patient will participate in Venous Thrombosis (VTE)/Deep Vein Thrombosis (DVT)Prevention Measures  Outcome: PROGRESSING AS EXPECTED  Pt working with PT and up ambulating in the halls. Pt receiving home anticoagulant.

## 2019-06-20 NOTE — PROGRESS NOTES
Inpatient Anticoagulation Service Note    Date: 6/20/2019    Reason for Anticoagulation: Atrial Fibrillation   Target INR: 2.0 to 3.0  [id number] VASc Score: 4  HAS-BLED Score: 4   Hemoglobin Value: (!) 11.6  Hematocrit Value: (!) 36.4    INR from last 7 days     Date/Time INR Value    06/20/19 0448 (!)  2.93    06/19/19 0442 (!)  3.13    06/18/19 0432 (!)  2.95    06/16/19 2240 (!)  2.83        Dose from last 7 days     Date/Time Dose (mg)    06/20/19 1400  2    06/19/19 1000  0    06/18/19 0900  3    06/17/19 1245  5        Average Dose (mg):  (Clinic: 2.5 mg Sundays, 5 mg all other days)  Significant Interactions: Antibiotics, Statin  Bridge Therapy: No (If less than 5 days and overlap therapy discontinued -- document reason (i.e. Bleed Risk))    (If still on overlap therapy, if No -- document reason (i.e. Bleed Risk))         Plan:  Will give warfarin 2 mg po tonight for INR of 2.93   Education Material Provided?: No  Pharmacist suggested discharge dosing: Resume home regimen with initial close monitoring.     Clinton Lares [facility]

## 2019-06-21 ENCOUNTER — PATIENT OUTREACH (OUTPATIENT)
Dept: HEALTH INFORMATION MANAGEMENT | Facility: OTHER | Age: 74
End: 2019-06-21

## 2019-06-21 VITALS
HEART RATE: 83 BPM | TEMPERATURE: 97.9 F | SYSTOLIC BLOOD PRESSURE: 146 MMHG | WEIGHT: 259.26 LBS | DIASTOLIC BLOOD PRESSURE: 83 MMHG | OXYGEN SATURATION: 94 % | HEIGHT: 72 IN | BODY MASS INDEX: 35.12 KG/M2 | RESPIRATION RATE: 18 BRPM

## 2019-06-21 LAB
BACTERIA WND AEROBE CULT: NORMAL
GLUCOSE BLD-MCNC: 128 MG/DL (ref 65–99)
GRAM STN SPEC: NORMAL
INR PPP: 2.94 (ref 0.87–1.13)
PROTHROMBIN TIME: 31.5 SEC (ref 12–14.6)
SIGNIFICANT IND 70042: NORMAL
SITE SITE: NORMAL
SOURCE SOURCE: NORMAL

## 2019-06-21 PROCEDURE — 36415 COLL VENOUS BLD VENIPUNCTURE: CPT

## 2019-06-21 PROCEDURE — 99239 HOSP IP/OBS DSCHRG MGMT >30: CPT | Performed by: INTERNAL MEDICINE

## 2019-06-21 PROCEDURE — 700111 HCHG RX REV CODE 636 W/ 250 OVERRIDE (IP): Performed by: INTERNAL MEDICINE

## 2019-06-21 PROCEDURE — A9270 NON-COVERED ITEM OR SERVICE: HCPCS | Performed by: INTERNAL MEDICINE

## 2019-06-21 PROCEDURE — 700102 HCHG RX REV CODE 250 W/ 637 OVERRIDE(OP): Performed by: INTERNAL MEDICINE

## 2019-06-21 PROCEDURE — 85610 PROTHROMBIN TIME: CPT

## 2019-06-21 PROCEDURE — A9270 NON-COVERED ITEM OR SERVICE: HCPCS | Performed by: HOSPITALIST

## 2019-06-21 PROCEDURE — 700102 HCHG RX REV CODE 250 W/ 637 OVERRIDE(OP): Performed by: HOSPITALIST

## 2019-06-21 PROCEDURE — 82962 GLUCOSE BLOOD TEST: CPT

## 2019-06-21 PROCEDURE — 700105 HCHG RX REV CODE 258: Performed by: INTERNAL MEDICINE

## 2019-06-21 RX ORDER — AMOXICILLIN AND CLAVULANATE POTASSIUM 875; 125 MG/1; MG/1
1 TABLET, FILM COATED ORAL 2 TIMES DAILY
Qty: 10 TAB | Refills: 0 | Status: SHIPPED | OUTPATIENT
Start: 2019-06-21 | End: 2019-06-26

## 2019-06-21 RX ORDER — AMLODIPINE BESYLATE 10 MG/1
10 TABLET ORAL DAILY
Qty: 30 TAB | Refills: 1 | Status: SHIPPED | OUTPATIENT
Start: 2019-06-22 | End: 2020-12-01

## 2019-06-21 RX ADMIN — PRASUGREL 10 MG: 10 TABLET, FILM COATED ORAL at 05:10

## 2019-06-21 RX ADMIN — METOPROLOL SUCCINATE 25 MG: 25 TABLET, EXTENDED RELEASE ORAL at 05:10

## 2019-06-21 RX ADMIN — ATORVASTATIN CALCIUM 80 MG: 40 TABLET, FILM COATED ORAL at 05:09

## 2019-06-21 RX ADMIN — BUDESONIDE AND FORMOTEROL FUMARATE DIHYDRATE 2 PUFF: 160; 4.5 AEROSOL RESPIRATORY (INHALATION) at 05:17

## 2019-06-21 RX ADMIN — DILTIAZEM HYDROCHLORIDE 60 MG: 30 TABLET, FILM COATED ORAL at 07:30

## 2019-06-21 RX ADMIN — AMPICILLIN SODIUM AND SULBACTAM SODIUM 3 G: 2; 1 INJECTION, POWDER, FOR SOLUTION INTRAMUSCULAR; INTRAVENOUS at 00:07

## 2019-06-21 RX ADMIN — ACETAMINOPHEN 650 MG: 325 TABLET, FILM COATED ORAL at 07:30

## 2019-06-21 RX ADMIN — AMPICILLIN SODIUM AND SULBACTAM SODIUM 3 G: 2; 1 INJECTION, POWDER, FOR SOLUTION INTRAMUSCULAR; INTRAVENOUS at 05:07

## 2019-06-21 RX ADMIN — AMLODIPINE BESYLATE 10 MG: 5 TABLET ORAL at 05:10

## 2019-06-21 NOTE — DISCHARGE SUMMARY
Discharge Summary    CHIEF COMPLAINT ON ADMISSION  Chief Complaint   Patient presents with   • Leg Swelling     Pt tripped and fell 1 week ago thurs, was seen in the ER and told no fracture. Last friday was placed on ABX by Ortho Dr for cellulitis, has since has increased redness, swelling and pain.       Reason for Admission  Leg Pain     Admission Date  6/16/2019    CODE STATUS  Full    HPI & HOSPITAL COURSE  This is a 74 y.o. male with a Hx of CAD s/p stent, afib on coumadin, HTN, admitted for right knee and leg pain redness and swelling several days after a fall.  The redness was refractory to outpatient Keflex.  Upon admission, orthopedics was consulted and did not recommend any surgical intervention as the right knee joint did not appear to be involved.  He did have an abrasion of the skin however it appeared to be superficial.  The patient was initiated on empiric antibiotic therapy including MRSA coverage.  He did have mild acute kidney injury after 1 day of treatment therefore vancomycin was changed to oral linezolid.  MRSA nasal swab was negative therefore MRSA coverage was discontinued and he was continued on Unasyn therapy.  Each day he did show significant clinical improvement.  His range of motion of both the right knee and right ankle significantly improved and the redness receded noticeably from the initial line of demarcation.  He had improvement of his white blood cell count and felt much better.  He was discharged to complete 5 additional days of antibiotic therapy, and will follow up with orthopedic surgery in approximately 2 weeks.  He will follow-up with his PCP within approximately 1 week to ensure the infection resolves.  His Coumadin was continued during his hospital stay and he will follow-up with the INR clinic to ensure he remains in the therapeutic range.    He was discharged on Augmentin    Therefore, he is discharged in good and stable condition to home with close outpatient  follow-up.    The patient met 2-midnight criteria for an inpatient stay at the time of discharge.    Discharge Date  6/21/2019    FOLLOW UP ITEMS POST DISCHARGE  Follow-up with PCP  Follow-up with orthopedic surgery within 2 weeks    DISCHARGE DIAGNOSES  Principal Problem:    Cellulitis of right lower extremity POA: Yes  Active Problems:    HTN (hypertension) (Chronic) POA: Yes    Type 2 diabetes mellitus with hyperglycemia (HCC) POA: Yes    Atrial fibrillation (HCC) POA: Yes      Overview: [facility] update 10/1/2016    S/P coronary artery stent placement POA: Yes    Dyslipidemia POA: Yes    Acute on chronic renal failure (HCC) POA: Yes    Elevated liver enzymes POA: Yes  Resolved Problems:    * No resolved hospital problems. *      FOLLOW UP  Future Appointments  Date Time Provider Department Center   7/16/2019 1:00 PM TELEMED ANTICOAG VASCULAR MED VMED None   8/22/2019 2:20 PM Sanjay Sorensen M.D. [facility] None   8/26/2019 1:15 PM Carlene Jacques M.D. PULM None     Deng Corbin D.O.  [id number]  [place] [facility] [address]  [phone]    Go on 6/26/2019  PLEASE ARRIVE AT 1:00PM FOR YOUR APPOINTMENT. THANK YOU    Jah Zaldivar M.D.  [address] [facility] [address]  [phone]    In 1 week        MEDICATIONS ON DISCHARGE     Medication List      START taking these medications      Instructions   amLODIPine 10 MG Tabs  Start taking on:  6/22/2019  Commonly known as:  NORVASC   Take 1 Tab by mouth every day.  Dose:  10 mg     amoxicillin-clavulanate 875-125 MG Tabs  Commonly known as:  AUGMENTIN   Take 1 Tab by mouth 2 times a day for 5 days.  Dose:  1 Tab        CONTINUE taking these medications      Instructions   acetaminophen 500 MG Tabs  Commonly known as:  TYLENOL   Take 1,000 mg by mouth every 6 hours as needed for Moderate Pain.  Dose:  1000 mg     atorvastatin 80 MG tablet  Commonly known as:  LIPITOR   TAKE ONE TABLET BY MOUTH EVERY DAY     budesonide-formoterol 160-4.5 MCG/ACT Aero  Commonly known as:   SYMBICORT   Doctor's comments:  90 day supply  Inhale 2 Puffs by mouth 2 Times a Day. Use spacer. Rinse mouth after each use.  Dose:  2 Puff     DILTIAZem 60 MG Tabs  Commonly known as:  CARDIZEM   Take 1 Tab by mouth 3 times a day.  Dose:  60 mg     HUMALOG 100 UNIT/ML Soln  Generic drug:  insulin lispro   Inject 5-10 Units as instructed as needed for High Blood Sugar (Only if blood sugar is 180 or higher).  Dose:  5-10 Units     LEVEMIR FLEXTOUCH 100 UNIT/ML Sopn injection  Generic drug:  insulin detemir   Inject 14 Units as instructed every evening.  Dose:  14 Units     metoprolol SR 25 MG Tb24  Commonly known as:  TOPROL XL   Take 1 Tab by mouth every day.  Dose:  25 mg     montelukast 10 MG Tabs  Commonly known as:  SINGULAIR   Take 10 mg by mouth every evening.  Dose:  10 mg     prasugrel 10 MG Tabs  Commonly known as:  EFFIENT   Take 1 Tab by mouth every day.  Dose:  10 mg     warfarin 5 MG Tabs  Commonly known as:  COUMADIN   Take 2.5-5 mg by mouth every day. Pt takes 2.5MG only on Sunday, all other days 5MG  Dose:  2.5-5 mg        STOP taking these medications    celecoxib 200 MG Caps  Commonly known as:  CELEBREX     cephALEXin 500 MG Caps  Commonly known as:  KEFLEX     losartan 25 MG Tabs  Commonly known as:  COZAAR            Allergies  Allergies   Allergen Reactions   • Atenolol Shortness of Breath     DYSPNEA.   • Tetanus Toxoid Swelling       DIET  No orders of the defined types were placed in this encounter.      ACTIVITY  As tolerated.  Weight bearing as tolerated    CONSULTATIONS  Dr. Zaldivar, orthopedic surgery    PROCEDURES  None    LABORATORY  Lab Results   Component Value Date    SODIUM 141 06/20/2019    POTASSIUM 4.0 06/20/2019    CHLORIDE 107 06/20/2019    CO2 25 06/20/2019    GLUCOSE 157 (H) 06/20/2019    BUN 28 (H) 06/20/2019    CREATININE 1.35 06/20/2019        Lab Results   Component Value Date    WBC 11.5 (H) 06/20/2019    HEMOGLOBIN 11.6 (L) 06/20/2019    HEMATOCRIT 36.4 (L) 06/20/2019     PLATELETCT 236 06/20/2019        Total time of the discharge process exceeds 38 minutes.

## 2019-06-21 NOTE — CARE PLAN
Problem: Knowledge Deficit  Goal: Knowledge of the prescribed therapeutic regimen will improve  POC discussed. Pt understands the plan is to continue AB and possibly discharge today. All questions and concerns addressed.

## 2019-06-21 NOTE — PROGRESS NOTES
Pt is AAO x4.  Pt reports a 3/10 R leg pain level.  Medicated per MAR.  VS WNL.  R knee scab and redness observed. Elevated.   L PIV patent, saline locked.   Pt up in chair.  POC discussed.  All needs met at this time.  Bed in low position.  Call light within reach.  Rounding in place.

## 2019-06-21 NOTE — PROGRESS NOTES
Pt awake, up in reclining chair. Pt aware of POC- safety, IV atb, pain mgm't.. Call light use for assistance encouraged. Will continue to monitor.

## 2019-06-21 NOTE — CARE PLAN
Problem: Safety  Goal: Will remain free from injury  Outcome: PROGRESSING AS EXPECTED  Patient will be free from injury or fall incident- instruction for use of call light given    Problem: Knowledge Deficit  Goal: Knowledge of disease process/condition, treatment plan, diagnostic tests, and medications will improve    Intervention: Assess knowledge level of disease process/condition, treatment plan, diagnostic tests, and medications  Patient/ family member updated on Plan Of Care and Nurses communications with Doctors.Patient/ family member updated on Plan Of Care and Nurses communications with Doctors.

## 2019-06-21 NOTE — DISCHARGE INSTRUCTIONS
Discharge Instructions    Discharged to home by car with relative. Discharged via wheelchair, hospital escort: Yes.  Special equipment needed: Not Applicable    Be sure to schedule a follow-up appointment with your primary care doctor or any specialists as instructed.     Make INR apt to follow coumadin levels/dosing   Follow up with Dr. Zaldivar at [facility] in ~2 weeks   Needs f/u with PCP in ~1 week   DC Celebrex as it may increase risk of GIB while on blood thinner.  Take tylenol instead for pain relief    Discharge Plan:   Diet Plan: Discussed  Activity Level: Discussed  Confirmed Follow up Appointment: Patient to Call and Schedule Appointment  Confirmed Symptoms Management: Discussed  Medication Reconciliation Updated: Yes  Pneumococcal Vaccine Administered/Refused: Not given - Patient refused pneumococcal vaccine  Influenza Vaccine Indication: Patient Refuses    I understand that a diet low in cholesterol, fat, and sodium is recommended for good health. Unless I have been given specific instructions below for another diet, I accept this instruction as my diet prescription.   Other diet: Diabetic/cardiac    Special Instructions: None    · Is patient discharged on Warfarin / Coumadin?   No     Depression / Suicide Risk    As you are discharged from this [facility] facility, it is important to learn how to keep safe from harming yourself.    Recognize the warning signs:  · Abrupt changes in personality, positive or negative- including increase in energy   · Giving away possessions  · Change in eating patterns- significant weight changes-  positive or negative  · Change in sleeping patterns- unable to sleep or sleeping all the time   · Unwillingness or inability to communicate  · Depression  · Unusual sadness, discouragement and loneliness  · Talk of wanting to die  · Neglect of personal appearance   · Rebelliousness- reckless behavior  · Withdrawal from people/activities they love  · Confusion- inability to  concentrate     If you or a loved one observes any of these behaviors or has concerns about self-harm, here's what you can do:  · Talk about it- your feelings and reasons for harming yourself  · Remove any means that you might use to hurt yourself (examples: pills, rope, extension cords, firearm)  · Get professional help from the community (Mental Health, Substance Abuse, psychological counseling)  · Do not be alone:Call your Safe Contact- someone whom you trust who will be there for you.  · Call your local CRISIS HOTLINE 531-2367 or 517-926-9241  · Call your local Children's Mobile Crisis Response Team [place] (311) 426-0506 or www.[employer]  · Call the toll free National Suicide Prevention Hotlines   · National Suicide Prevention Lifeline 373-893-PNPF (4430)  · National Hope Line Network 800-SUICIDE (826-7582)

## 2019-06-21 NOTE — PROGRESS NOTES
Pt discharged to home via wheelchair escorted with transport.  All discharge instructions given, questions and concerns addressed.   All prescriptions sent to pharmacy.   PIV removed without complications.  Follow up appt discussed.   All belongings with pt.

## 2019-06-22 LAB
BACTERIA BLD CULT: NORMAL
SIGNIFICANT IND 70042: NORMAL
SITE SITE: NORMAL
SOURCE SOURCE: NORMAL

## 2019-06-24 RX ORDER — FUROSEMIDE 40 MG/1
40 TABLET ORAL DAILY
Qty: 2 TAB | Refills: 0 | Status: SHIPPED | OUTPATIENT
Start: 2019-06-24 | End: 2019-06-26

## 2019-07-10 ENCOUNTER — HOSPITAL ENCOUNTER (OUTPATIENT)
Dept: LAB | Facility: MEDICAL CENTER | Age: 74
End: 2019-07-10
Attending: INTERNAL MEDICINE
Payer: MEDICARE

## 2019-07-10 LAB
ALBUMIN SERPL BCP-MCNC: 4 G/DL (ref 3.2–4.9)
BUN SERPL-MCNC: 36 MG/DL (ref 8–22)
CALCIUM SERPL-MCNC: 10.3 MG/DL (ref 8.5–10.5)
CHLORIDE SERPL-SCNC: 104 MMOL/L (ref 96–112)
CO2 SERPL-SCNC: 29 MMOL/L (ref 20–33)
CREAT SERPL-MCNC: 1.6 MG/DL (ref 0.5–1.4)
CREAT UR-MCNC: 40.4 MG/DL
GLUCOSE SERPL-MCNC: 125 MG/DL (ref 65–99)
MAGNESIUM SERPL-MCNC: 2 MG/DL (ref 1.5–2.5)
PHOSPHATE SERPL-MCNC: 3.4 MG/DL (ref 2.5–4.5)
POTASSIUM SERPL-SCNC: 5 MMOL/L (ref 3.6–5.5)
PROT UR-MCNC: 27.4 MG/DL (ref 0–15)
PROT/CREAT UR: 678 MG/G (ref 15–68)
SODIUM SERPL-SCNC: 140 MMOL/L (ref 135–145)

## 2019-07-10 PROCEDURE — 82570 ASSAY OF URINE CREATININE: CPT

## 2019-07-10 PROCEDURE — 83735 ASSAY OF MAGNESIUM: CPT

## 2019-07-10 PROCEDURE — 84156 ASSAY OF PROTEIN URINE: CPT

## 2019-07-10 PROCEDURE — 36415 COLL VENOUS BLD VENIPUNCTURE: CPT

## 2019-07-10 PROCEDURE — 80069 RENAL FUNCTION PANEL: CPT

## 2019-07-16 ENCOUNTER — APPOINTMENT (OUTPATIENT)
Dept: MEDICAL GROUP | Facility: PHYSICIAN GROUP | Age: 74
End: 2019-07-16
Payer: MEDICARE

## 2019-07-16 ENCOUNTER — NON-PROVIDER VISIT (OUTPATIENT)
Dept: MEDICAL GROUP | Facility: PHYSICIAN GROUP | Age: 74
End: 2019-07-16
Payer: MEDICARE

## 2019-07-16 ENCOUNTER — ANTICOAGULATION MONITORING (OUTPATIENT)
Dept: VASCULAR LAB | Facility: MEDICAL CENTER | Age: 74
End: 2019-07-16

## 2019-07-16 VITALS
OXYGEN SATURATION: 93 % | DIASTOLIC BLOOD PRESSURE: 68 MMHG | RESPIRATION RATE: 14 BRPM | HEART RATE: 84 BPM | SYSTOLIC BLOOD PRESSURE: 130 MMHG

## 2019-07-16 DIAGNOSIS — I48.91 ATRIAL FIBRILLATION WITH RAPID VENTRICULAR RESPONSE (HCC): ICD-10-CM

## 2019-07-16 DIAGNOSIS — Z79.01 LONG TERM CURRENT USE OF ANTICOAGULANT THERAPY: ICD-10-CM

## 2019-07-16 DIAGNOSIS — Z79.01 CHRONIC ANTICOAGULATION: ICD-10-CM

## 2019-07-16 LAB
INR BLD: 4.2 (ref 0.9–1.2)
INR PPP: 4.2 (ref 2–3.5)
POC PROTIME: ABNORMAL

## 2019-07-16 PROCEDURE — 85610 PROTHROMBIN TIME: CPT | Performed by: FAMILY MEDICINE

## 2019-07-16 NOTE — DOCUMENTATION QUERY
[facility]                                                                       Query Response Note      PATIENT:               LUCILA MONTANEZ  ACCT #:                  [id number]  MRN:                     [id number]  :                      1945  ADMIT DATE:       2019 10:02 PM  DISCH DATE:        2019 9:55 AM  RESPONDING  PROVIDER #:        [id number]           QUERY TEXT:    Chronic Kidney Disease (CKD) is documented in the Medical Record.  Please specify the disease stage (includes probable or suspected)    Stages are defined by the National Kidney Foundation as follows:  CKD Stage I  GFR >= 90 ml / min per 1.73 m2 and persistent albuminuria  CKD Stage 2 GFR between 60 and 89 with persistent albuminuria  CKD Stage 3 GFR between 30 and 59   CKD Stage 4 GFR between 15 and 29   CKD Stage 5 GFR between <15 or End Stage Renal Disease    The patient's Clinical Indicators include:  Labs 2019  -Creatinine 1.59  -BUN 39  -GFR 43    Labs 3/12/2019  -Creatinine 1.3  -BUN 31  -GFR 54    Progress Note 2019  -Acute on chronic renal failure (HCC)- (present on admission)  Assessment & Plan    Not improved. Watch close with vanco on board.   -continue IV fluids  -Repeat BMP in the morning  Options provided:   -- Chronic kidney disease Stage 1   -- Chronic kidney disease Stage 2   -- Chronic kidney disease Stage 3   -- Chronic kidney disease Stage 4   -- Chronic kidney disease Stage 5   -- Chronic kidney disease Stage 5, requiring dialysis   -- End Stage Renal Disease      Query created by: Sherlyn Kidd on 2019 12:07 PM    RESPONSE TEXT:    Chronic kidney disease Stage 3          Electronically signed by:  BRANDEN JESUS 2019 4:36 AM

## 2019-07-16 NOTE — PROGRESS NOTES
OP Anticoagulation Service Note    THIS VISIT CONDUCTED WITH [employer] UTILIZING SECURE AND ENCRYPTED VIDEOCONFERENCING EQUIPMENT  ROS:      Anticoagulation Summary  As of 7/16/2019    INR goal:   2.0-3.0   TTR:   64.2 % (3.9 y)   INR used for dosing:      Warfarin maintenance plan:   2.5 mg (5 mg x 0.5) every Sun; 5 mg (5 mg x 1) all other days   Weekly warfarin total:   32.5 mg   Plan last modified:   MARIAA Garcia (11/20/2018)   Next INR check:      Target end date:   Indefinite    Indications    Atrial fibrillation with rapid ventricular response (HCC) [I48.91]  Long term current use of anticoagulant therapy [Z79.01]  Atrial fibrillation (HCC) [I48.91]             Anticoagulation Episode Summary     INR check location:       Preferred lab:       Send INR reminders to:       Comments:         Anticoagulation Care Providers     Provider Role Specialty Phone number    Sanjay Sorensen M.D. Referring Cardiology [phone]        Anticoagulation Patient Findings                HPI:  Yuri De León, on anticoagulation therapy with warfarin for AFib.   Changes to current medical/health status since last appt: Pt was treated for course of cellulitis; no longer on antibiotics.   Denies signs/symptoms of bleeding and/or thrombosis since the last appt.    Denies any interval changes to diet  Denies any interval changes to medications since last appt.   Denies any complications or cost restrictions with current therapy.     A/P   INR  SUPRA-therapeutic.   Hold today then Pt is to continue with current warfarin dosing regimen.     Next INR in 1 week(s).    Extremities: No redness, or edema.     Next Appointment:  7 days.      Review all of your home medications and newly ordered medications with your doctor and / or pharmacist. Follow medication instructions as directed by your doctor and / or pharmacist. Please keep your medication list with you and share with your physician. Update the  information when medications are discontinued, doses are changed, or new medications (including over-the-counter products) are added; and carry medication information at all times in the event of emergency situations.      For questions, please contact Outpatient Anticoagulation Service [phone].     Cedric Martinez, LeslieD

## 2019-07-18 ENCOUNTER — TELEPHONE (OUTPATIENT)
Dept: CARDIOLOGY | Facility: MEDICAL CENTER | Age: 74
End: 2019-07-18

## 2019-07-18 NOTE — TELEPHONE ENCOUNTER
PIPO/Dena Vang has some questions about Metoprolol and Amlodipine. He would please like a call back at [phone]

## 2019-07-23 ENCOUNTER — NON-PROVIDER VISIT (OUTPATIENT)
Dept: MEDICAL GROUP | Facility: PHYSICIAN GROUP | Age: 74
End: 2019-07-23
Payer: MEDICARE

## 2019-07-23 ENCOUNTER — ANTICOAGULATION MONITORING (OUTPATIENT)
Dept: VASCULAR LAB | Facility: MEDICAL CENTER | Age: 74
End: 2019-07-23
Payer: MEDICARE

## 2019-07-23 ENCOUNTER — APPOINTMENT (OUTPATIENT)
Dept: MEDICAL GROUP | Facility: PHYSICIAN GROUP | Age: 74
End: 2019-07-23
Payer: MEDICARE

## 2019-07-23 VITALS
SYSTOLIC BLOOD PRESSURE: 116 MMHG | OXYGEN SATURATION: 94 % | DIASTOLIC BLOOD PRESSURE: 68 MMHG | HEART RATE: 76 BPM | RESPIRATION RATE: 12 BRPM

## 2019-07-23 DIAGNOSIS — Z79.01 CHRONIC ANTICOAGULATION: ICD-10-CM

## 2019-07-23 DIAGNOSIS — Z79.01 LONG TERM CURRENT USE OF ANTICOAGULANT THERAPY: ICD-10-CM

## 2019-07-23 DIAGNOSIS — I48.91 ATRIAL FIBRILLATION WITH RAPID VENTRICULAR RESPONSE (HCC): ICD-10-CM

## 2019-07-23 DIAGNOSIS — I48.20 CHRONIC ATRIAL FIBRILLATION (HCC): ICD-10-CM

## 2019-07-23 LAB
INR BLD: 3.2 (ref 0.9–1.2)
INR PPP: 3.2 (ref 2–3.5)
POC PROTIME: ABNORMAL

## 2019-07-23 PROCEDURE — 85610 PROTHROMBIN TIME: CPT | Performed by: FAMILY MEDICINE

## 2019-07-23 PROCEDURE — 99212 OFFICE O/P EST SF 10 MIN: CPT | Performed by: NURSE PRACTITIONER

## 2019-07-23 NOTE — PROGRESS NOTES
OP Anticoagulation Service Note    Date: 7/23/2019  Blood Pressure : 116/68  Pulse: 76  Respiration: 12    Anticoagulation Summary  As of 7/23/2019    INR goal:   2.0-3.0   TTR:   63.9 % (3.9 y)   INR used for dosing:   3.20! (7/23/2019)   Warfarin maintenance plan:   2.5 mg (5 mg x 0.5) every Sun; 5 mg (5 mg x 1) all other days   Weekly warfarin total:   32.5 mg   Plan last modified:   MARIAA Garcia (11/20/2018)   Next INR check:   7/30/2019   Target end date:   Indefinite    Indications    Atrial fibrillation with rapid ventricular response (HCC) [I48.91]  Long term current use of anticoagulant therapy [Z79.01]  Atrial fibrillation (HCC) [I48.91]             Anticoagulation Episode Summary     INR check location:       Preferred lab:       Send INR reminders to:       Comments:         Anticoagulation Care Providers     Provider Role Specialty Phone number    Sanjay Sorensen M.D. Referring Cardiology [phone]        Anticoagulation Patient Findings  Patient Findings     Negatives:   Signs/symptoms of thrombosis, Signs/symptoms of bleeding, Change in health, Change in alcohol use, Change in activity, Missed doses, Extra doses, Change in medications, Change in diet/appetite, Bruising          THIS VISIT CONDUCTED WITH PRESENTER [employer] UTILIZING SECURE AND ENCRYPTED VIDEOCONFERENCING EQUIPMENT  ROS:    Pulm: Denies SOB, chest pain.    Card: Denies syncope, edema, palpitations.    Extremities: Denies redness, pain.    PE:    Pulm: No SOB, even and unlabored.    Card: Normal rate and rhythm.   Extremities: No redness, or edema.     INR  supra-therapeutic.    Denies signs/symptoms of bleeding and/or thrombosis.    Denies changes to diet or medications.   Follow up appt in 1 weeks     Plan:  Take 2.5 mg tonight then back to the usual dose.     Medication: Warfarin (Coumadin)     Sunday Monday Tuesday Wednesday Thursday Friday Saturday      2.5 mg    5 mg    2.5 mg    5 mg    5 mg     5 mg    5 mg      1/2 tab(s)    1 tab(s)    1/2 tab(s)    1 tab(s)    1 tab(s)    1 tab(s)  1 tab(s)     Next Appointment: Tuesday, July 30 @ 4:45     Review all of your home medications and newly ordered medications with your doctor and / or pharmacist. Follow medication instructions as directed by your doctor and / or pharmacist. Please keep your medication list with you and share with your physician. Update the information when medications are discontinued, doses are changed, or new medications (including over-the-counter products) are added; and carry medication information at all times in the event of emergency situations.      For questions, please contact Outpatient Anticoagulation Service [phone].       The patient is on a high risk medication and is supra-therapeudic. This increases the patients risk of bleeding and therefore requires frequent monitoring and follow up.       CHEST guidelines recommend frequent INR monitoring at regular intervals (a few days up to a max of 12 weeks) to ensure they are on the proper dose of warfarin and not having any complications from therapy.  INRs can dramatically change over a short time period due to diet, medications, and medical conditions.   The patient instructed to go to the ER for falls with a head injury,  blood in urine or stool or any bleeding that last longer than 20 min.     APRIL Garcia.

## 2019-07-30 ENCOUNTER — ANTICOAGULATION MONITORING (OUTPATIENT)
Dept: VASCULAR LAB | Facility: MEDICAL CENTER | Age: 74
End: 2019-07-30
Payer: MEDICARE

## 2019-07-30 ENCOUNTER — APPOINTMENT (OUTPATIENT)
Dept: MEDICAL GROUP | Facility: PHYSICIAN GROUP | Age: 74
End: 2019-07-30
Payer: MEDICARE

## 2019-07-30 ENCOUNTER — NON-PROVIDER VISIT (OUTPATIENT)
Dept: MEDICAL GROUP | Facility: PHYSICIAN GROUP | Age: 74
End: 2019-07-30
Payer: MEDICARE

## 2019-07-30 VITALS
SYSTOLIC BLOOD PRESSURE: 140 MMHG | DIASTOLIC BLOOD PRESSURE: 70 MMHG | RESPIRATION RATE: 20 BRPM | OXYGEN SATURATION: 92 % | HEART RATE: 76 BPM

## 2019-07-30 DIAGNOSIS — Z79.01 LONG TERM CURRENT USE OF ANTICOAGULANT THERAPY: ICD-10-CM

## 2019-07-30 DIAGNOSIS — D68.9 COAGULATION DISORDER (HCC): ICD-10-CM

## 2019-07-30 DIAGNOSIS — I48.20 CHRONIC ATRIAL FIBRILLATION (HCC): ICD-10-CM

## 2019-07-30 DIAGNOSIS — I48.91 ATRIAL FIBRILLATION WITH RAPID VENTRICULAR RESPONSE (HCC): ICD-10-CM

## 2019-07-30 LAB
INR BLD: 3.1 (ref 0.9–1.2)
INR PPP: 3.1 (ref 2–3.5)
POC PROTIME: ABNORMAL

## 2019-07-30 PROCEDURE — 99212 OFFICE O/P EST SF 10 MIN: CPT | Performed by: NURSE PRACTITIONER

## 2019-07-30 PROCEDURE — 85610 PROTHROMBIN TIME: CPT | Performed by: NURSE PRACTITIONER

## 2019-07-30 NOTE — PROGRESS NOTES
OP Anticoagulation Service Note    Date: 7/30/2019  Blood Pressure : 140/70  Pulse: 76  Respiration: 20    Anticoagulation Summary  As of 7/30/2019    INR goal:   2.0-3.0   TTR:   63.6 % (4 y)   INR used for dosing:   3.10! (7/30/2019)   Warfarin maintenance plan:   2.5 mg (5 mg x 0.5) every Sun, Tue, Thu; 5 mg (5 mg x 1) all other days   Weekly warfarin total:   27.5 mg   Plan last modified:   MARIAA Garcia (7/30/2019)   Next INR check:   8/13/2019   Target end date:   Indefinite    Indications    Atrial fibrillation with rapid ventricular response (HCC) [I48.91]  Long term current use of anticoagulant therapy [Z79.01]  Atrial fibrillation (HCC) [I48.91]             Anticoagulation Episode Summary     INR check location:       Preferred lab:       Send INR reminders to:       Comments:         Anticoagulation Care Providers     Provider Role Specialty Phone number    Sanjay Sorensen M.D. Referring Cardiology [phone]        Anticoagulation Patient Findings      THIS VISIT CONDUCTED WITH [employer] UTILIZING SECURE AND ENCRYPTED VIDEOCONFERENCING EQUIPMENT  ROS:    Pulm: Denies SOB, chest pain.    Card: Denies syncope, edema, palpitations.    Extremities: Denies redness, pain.    PE:    Pulm: No SOB, even and unlabored.    Card: Normal rate and rhythm.   Extremities: No redness, or edema.     INR  supra-therapeutic.    Denies signs/symptoms of bleeding and/or thrombosis.    Denies changes to diet or medications.   Follow up appt in 2 weeks     Plan:  Decrease dose some more    Medication: Warfarin (Coumadin)     Sunday Monday Tuesday Wednesday Thursday Friday Saturday      2.5 mg    5 mg    2.5 mg    5 mg    2.5 mg    5 mg    5 mg      1/2 tab(s)    1 tab(s)    1/2 tab(s)    1 tab(s)    1/2 tab(s)    1 tab(s)  1 tab(s)     Next Appointment: Tuesday, August 13 @ 4:00    Review all of your home medications and newly ordered medications with your doctor and / or  pharmacist. Follow medication instructions as directed by your doctor and / or pharmacist. Please keep your medication list with you and share with your physician. Update the information when medications are discontinued, doses are changed, or new medications (including over-the-counter products) are added; and carry medication information at all times in the event of emergency situations.      For questions, please contact Outpatient Anticoagulation Service [phone].     The patient is on a high risk medication and is supra-therapeudic. This increases the patients risk of bleeding and therefore requires frequent monitoring and follow up.          CHEST guidelines recommend frequent INR monitoring at regular intervals (a few days up to a max of 12 weeks) to ensure they are on the proper dose of warfarin and not having any complications from therapy.  INRs can dramatically change over a short time period due to diet, medications, and medical conditions.   The patient instructed to go to the ER for falls with a head injury,  blood in urine or stool or any bleeding that last longer than 20 min.     APRIL Garcia.

## 2019-08-13 ENCOUNTER — NON-PROVIDER VISIT (OUTPATIENT)
Dept: MEDICAL GROUP | Facility: PHYSICIAN GROUP | Age: 74
End: 2019-08-13
Payer: MEDICARE

## 2019-08-13 ENCOUNTER — ANTICOAGULATION MONITORING (OUTPATIENT)
Dept: VASCULAR LAB | Facility: MEDICAL CENTER | Age: 74
End: 2019-08-13

## 2019-08-13 ENCOUNTER — APPOINTMENT (OUTPATIENT)
Dept: MEDICAL GROUP | Facility: PHYSICIAN GROUP | Age: 74
End: 2019-08-13
Payer: MEDICARE

## 2019-08-13 VITALS
HEART RATE: 74 BPM | OXYGEN SATURATION: 94 % | SYSTOLIC BLOOD PRESSURE: 124 MMHG | DIASTOLIC BLOOD PRESSURE: 72 MMHG | RESPIRATION RATE: 12 BRPM

## 2019-08-13 DIAGNOSIS — I48.20 CHRONIC ATRIAL FIBRILLATION (HCC): ICD-10-CM

## 2019-08-13 DIAGNOSIS — Z79.01 CHRONIC ANTICOAGULATION: ICD-10-CM

## 2019-08-13 DIAGNOSIS — I48.91 ATRIAL FIBRILLATION WITH RAPID VENTRICULAR RESPONSE (HCC): ICD-10-CM

## 2019-08-13 DIAGNOSIS — Z79.01 LONG TERM CURRENT USE OF ANTICOAGULANT THERAPY: ICD-10-CM

## 2019-08-13 LAB
INR BLD: 2.7 (ref 0.9–1.2)
INR PPP: 2.7 (ref 2–3.5)
POC PROTIME: ABNORMAL

## 2019-08-13 PROCEDURE — 85610 PROTHROMBIN TIME: CPT | Performed by: FAMILY MEDICINE

## 2019-08-13 NOTE — PROGRESS NOTES
OP Anticoagulation Service Note    Date: 2019  Blood Pressure : 124/72  Pulse: 74  Respiration: 12    Anticoagulation Summary  As of 2019    INR goal:   2.0-3.0   TTR:   63.7 % (4 y)   INR used for dosin.70 (2019)   Warfarin maintenance plan:   2.5 mg (5 mg x 0.5) every Sun, Tue, Thu; 5 mg (5 mg x 1) all other days   Weekly warfarin total:   27.5 mg   Plan last modified:   MARIAA Garcia (2019)   Next INR check:      Target end date:   Indefinite    Indications    Atrial fibrillation with rapid ventricular response (HCC) [I48.91]  Long term current use of anticoagulant therapy [Z79.01]  Atrial fibrillation (HCC) [I48.91]             Anticoagulation Episode Summary     INR check location:       Preferred lab:       Send INR reminders to:       Comments:         Anticoagulation Care Providers     Provider Role Specialty Phone number    Sanjay Sorensen M.D. Referring Cardiology [phone]        Anticoagulation Patient Findings      THIS VISIT CONDUCTED WITH [employer] UTILIZING SECURE AND ENCRYPTED VIDEOCONFERENCING EQUIPMENT  ROS:    Pulm: Denies SOB, chest pain.    Card: Denies syncope, edema, palpitations.    Extremities: Denies redness, pain.     PE:    Pulm: No SOB, even and unlabored.    Card: Normal rate and rhythm.    Extremities: No redness, or edema.     INR  is-therapeutic.    Denies signs/symptoms of bleeding and/or thrombosis.    Denies changes to diet or medications.   Follow up appt in 2 weeks     Plan:  Continue current medication regimen.     Medication: Warfarin (Coumadin)           2.5 mg    5 mg    2.5 mg    5 mg    2.5 mg    5 mg    5 mg      1/2 tab(s)    1 tab(s)    1/2 tab(s)    1 tab(s)    1/2 tab(s)    1 tab(s)  1 tab(s)     Next Appointment: Tuesday, Aug 27 @ 2:45     Review all of your home medications and newly ordered medications with your doctor and / or  pharmacist. Follow medication instructions as directed by your doctor and / or pharmacist. Please keep your medication list with you and share with your physician. Update the information when medications are discontinued, doses are changed, or new medications (including over-the-counter products) are added; and carry medication information at all times in the event of emergency situations.      For questions, please contact Outpatient Anticoagulation Service [phone].      Patient is on a high risk medication and therefore requires close monitoring and follow up.     CHEST guidelines recommend frequent INR monitoring at regular intervals (a few days up to a max of 12 weeks) to ensure they are on the proper dose of warfarin and not having any complications from therapy.  INRs can dramatically change over a short time period due to diet, medications, and medical conditions.   The patient instructed to go to the ER for falls with a head injury,  blood in urine or stool or any bleeding that last longer than 20 min.     APRIL Garcia.

## 2019-08-22 ENCOUNTER — OFFICE VISIT (OUTPATIENT)
Dept: CARDIOLOGY | Facility: MEDICAL CENTER | Age: 74
End: 2019-08-22
Payer: MEDICARE

## 2019-08-22 VITALS
BODY MASS INDEX: 33.72 KG/M2 | OXYGEN SATURATION: 96 % | HEIGHT: 72 IN | DIASTOLIC BLOOD PRESSURE: 70 MMHG | WEIGHT: 249 LBS | SYSTOLIC BLOOD PRESSURE: 126 MMHG | HEART RATE: 66 BPM

## 2019-08-22 DIAGNOSIS — I25.10 CORONARY ARTERY DISEASE, OCCLUSIVE: ICD-10-CM

## 2019-08-22 DIAGNOSIS — Z95.5 S/P CORONARY ARTERY STENT PLACEMENT: ICD-10-CM

## 2019-08-22 DIAGNOSIS — I48.20 ATRIAL FIBRILLATION, CHRONIC (HCC): ICD-10-CM

## 2019-08-22 DIAGNOSIS — E78.5 DYSLIPIDEMIA: ICD-10-CM

## 2019-08-22 DIAGNOSIS — Z79.01 LONG TERM CURRENT USE OF ANTICOAGULANT THERAPY: Chronic | ICD-10-CM

## 2019-08-22 DIAGNOSIS — I10 ESSENTIAL HYPERTENSION, BENIGN: ICD-10-CM

## 2019-08-22 PROCEDURE — 99214 OFFICE O/P EST MOD 30 MIN: CPT | Performed by: INTERNAL MEDICINE

## 2019-08-22 RX ORDER — PEN NEEDLE, DIABETIC 31 G X1/4"
NEEDLE, DISPOSABLE MISCELLANEOUS
Refills: 5 | COMMUNITY
Start: 2019-08-01 | End: 2021-07-14

## 2019-08-22 RX ORDER — SULFAMETHOXAZOLE AND TRIMETHOPRIM 800; 160 MG/1; MG/1
TABLET ORAL
Refills: 0 | COMMUNITY
Start: 2019-06-14 | End: 2019-11-15

## 2019-08-22 RX ORDER — INSULIN ASPART 100 [IU]/ML
INJECTION, SOLUTION INTRAVENOUS; SUBCUTANEOUS
Refills: 11 | COMMUNITY
Start: 2019-08-16 | End: 2021-10-26

## 2019-08-22 RX ORDER — CLOPIDOGREL BISULFATE 75 MG/1
75 TABLET ORAL DAILY
Qty: 90 TAB | Refills: 3 | Status: SHIPPED | OUTPATIENT
Start: 2019-08-22 | End: 2020-07-14

## 2019-08-22 RX ORDER — AMOXICILLIN AND CLAVULANATE POTASSIUM 875; 125 MG/1; MG/1
TABLET, FILM COATED ORAL
Refills: 0 | COMMUNITY
Start: 2019-06-26 | End: 2019-11-15

## 2019-08-22 ASSESSMENT — ENCOUNTER SYMPTOMS
FEVER: 0
ABDOMINAL PAIN: 0
BLURRED VISION: 0
SHORTNESS OF BREATH: 0
INSOMNIA: 0
PALPITATIONS: 0
DIZZINESS: 0
MYALGIAS: 0
BRUISES/BLEEDS EASILY: 1
CHILLS: 0
PND: 0
ORTHOPNEA: 0
LOSS OF CONSCIOUSNESS: 0

## 2019-08-22 NOTE — PROGRESS NOTES
Chief Complaint   Patient presents with   •  CAD       Subjective:   Yuri De León is a 73 y.o. male who presents today for follow-up evaluation of CAD, NSTEMI, RCA stent, chronic fibrillation, chronic anticoagulation, hypertension and hyperlipidemia.    Last seen on 2/5/2019.    Since 2/5/2019 patient has had no cardiac problems.  In 6/2019 hospitalized after a fall injuring his right knee, resultant cellulitis unresponsive to outpatient oral antibiotics with Keflex requiring hospitalization and IV antibiotics x5 days.  Received IV fluids and during his hospitalization with subsequent significant lower extremity edema responsive to brief course of Lasix and potassium.    Past medical history  Since his coronary intervention he has had no cardiac symptoms.  He is completed 1 month of triple drug therapy and currently on warfarin and Prasugrel.    Past Medical History:   Diagnosis Date   • Abnormal electrocardiogram 8/13/2010   • Arrhythmia     Atrial Fibrillation; Cardiologist, Dr. Sorensen   • Arthritis     knees, left hip   • ASTHMA     inhalers   • Atrial fibrillation (HCC) 10/25/2011   • Bronchospasm 8/6/2009   • Diabetes (HCC)     insulin   • HTN (hypertension) 10/25/2011   • Hypercholesteremia 10/25/2011   • Long term (current) use of anticoagulants 10/25/2011   • NASAL POLYPS 8/6/2009   • Other nonspecific abnormal finding 8/6/2009   • Pain     left hip   • Shortness of breath    • Sleep apnea 8/6/2009    o2 at night   • Wears glasses      Past Surgical History:   Procedure Laterality Date   • KNEE ARTHROPLASTY TOTAL Left 10/2014   • HIP ARTHROPLASTY TOTAL  8/31/2010    Performed by OSGOOD, PATRICK J at [facility] ORS   • LUMBAR DECOMPRESSION  1984   • HIP REPLACEMENT, TOTAL     • LAMINOTOMY     • SINUSCOPE     • SINUSOTOMIES  2003,2005,2/2007     Family History   Problem Relation Age of Onset   • Heart Disease Mother      Social History     Socioeconomic History   • Marital status:       Spouse name: Not on file   • Number of children: Not on file   • Years of education: Not on file   • Highest education level: Not on file   Occupational History   • Not on file   Social Needs   • Financial resource strain: Not on file   • Food insecurity:     Worry: Not on file     Inability: Not on file   • Transportation needs:     Medical: Not on file     Non-medical: Not on file   Tobacco Use   • Smoking status: Former Smoker     Packs/day: 0.50     Years: 10.00     Pack years: 5.00     Types: Cigarettes     Last attempt to quit: 12/10/1968     Years since quittin.7   • Smokeless tobacco: Never Used   Substance and Sexual Activity   • Alcohol use: No   • Drug use: No   • Sexual activity: Not on file   Lifestyle   • Physical activity:     Days per week: Not on file     Minutes per session: Not on file   • Stress: Not on file   Relationships   • Social connections:     Talks on phone: Not on file     Gets together: Not on file     Attends Worship service: Not on file     Active member of club or organization: Not on file     Attends meetings of clubs or organizations: Not on file     Relationship status: Not on file   • Intimate partner violence:     Fear of current or ex partner: Not on file     Emotionally abused: Not on file     Physically abused: Not on file     Forced sexual activity: Not on file   Other Topics Concern   • Not on file   Social History Narrative   • Not on file     Allergies   Allergen Reactions   • Atenolol Shortness of Breath     DYSPNEA.   • Tetanus Toxoid Swelling     Outpatient Encounter Medications as of 2019   Medication Sig Dispense Refill   • NOVOLOG FLEXPEN 100 UNIT/ML solution for injection INJECT 5-10 UNITS THREE TIMES A DAY AS NEEDED  11   • clopidogrel (PLAVIX) 75 MG Tab Take 1 Tab by mouth every day. 90 Tab 3   • amLODIPine (NORVASC) 10 MG Tab Take 1 Tab by mouth every day. 30 Tab 1   • insulin detemir (LEVEMIR FLEXTOUCH) 100 UNIT/ML Solution Pen-injector  injection Inject 14 Units as instructed every evening.     • insulin lispro (HUMALOG) 100 UNIT/ML Solution Inject 5-10 Units as instructed as needed for High Blood Sugar (Only if blood sugar is 180 or higher).     • montelukast (SINGULAIR) 10 MG Tab Take 10 mg by mouth every evening.     • warfarin (COUMADIN) 5 MG Tab Take 2.5-5 mg by mouth every day. Pt takes 2.5MG only on Sunday, all other days 5MG     • acetaminophen (TYLENOL) 500 MG Tab Take 1,000 mg by mouth every 6 hours as needed for Moderate Pain.     • metoprolol SR (TOPROL XL) 25 MG TABLET SR 24 HR Take 1 Tab by mouth every day. 90 Tab 2   • atorvastatin (LIPITOR) 80 MG tablet TAKE ONE TABLET BY MOUTH EVERY DAY 90 Tab 3   • DILTIAZem (CARDIZEM) 60 MG Tab Take 1 Tab by mouth 3 times a day. 90 Tab 0   • sulfamethoxazole-trimethoprim (BACTRIM DS) 800-160 MG tablet TAKE ONE TABLET BY MOUTH TWO TIMES A DAY FOR 10 DAYS  0   • TRUEPLUS PEN NEEDLES 31G X 6 MM Misc USE 2 TO 3 NEEDLES PER DAY  5   • amoxicillin-clavulanate (AUGMENTIN) 875-125 MG Tab TAKE 1 TABLET BY MOUTH EVERY 12 HOURS FOR 10 DAYS.  0   • [DISCONTINUED] prasugrel (EFFIENT) 10 MG Tab Take 1 Tab by mouth every day. 30 Tab 6   • budesonide-formoterol (SYMBICORT) 160-4.5 MCG/ACT Aerosol Inhale 2 Puffs by mouth 2 Times a Day. Use spacer. Rinse mouth after each use. 3 Inhaler 3     No facility-administered encounter medications on file as of 8/22/2019.      Review of Systems   Constitutional: Negative for chills and fever.   HENT: Negative for congestion.    Eyes: Negative for blurred vision.   Respiratory: Negative for shortness of breath.    Cardiovascular: Negative for chest pain, palpitations, orthopnea, leg swelling and PND.   Gastrointestinal: Negative for abdominal pain.   Genitourinary: Negative for dysuria.   Musculoskeletal: Negative for joint pain and myalgias.   Skin: Negative for rash.   Neurological: Negative for dizziness and loss of consciousness.   Endo/Heme/Allergies: Bruises/bleeds  easily.   Psychiatric/Behavioral: The patient does not have insomnia.         Objective:   /70 (BP Location: Left arm, Patient Position: Sitting, BP Cuff Size: Adult)   Pulse 66   Ht 1.829 m (6')   Wt 112.9 kg (249 lb)   SpO2 96%   BMI 33.77 kg/m²     Physical Exam   Constitutional: He is oriented to person, place, and time. He appears well-developed and well-nourished.   Neck: No JVD present.   Cardiovascular: Normal rate, normal heart sounds and intact distal pulses. An irregularly irregular rhythm present.   No murmur heard.  Pulmonary/Chest: Effort normal and breath sounds normal. No respiratory distress. He has no wheezes. He has no rales.   Increased AP diameter.   Abdominal:   Overweight.   Musculoskeletal: He exhibits edema.   Mild edema.   Neurological: He is alert and oriented to person, place, and time.   Skin: Skin is warm and dry.   Psychiatric: He has a normal mood and affect. His behavior is normal.     CARDIAC CATHETERIZATION 08/15/2018  A.  Left heart catheterization.  B.  Left ventriculography.  C.  Selective coronary angiography.  D.  Coronary stent implantation of the mid right coronary artery with   overlapping stents: 4.5x18 mm Ultra non-drug eluting stent and 4.0x38 mm Xience   Gianna drug-eluting stent   E.  Right radial artery approach.   PREOPERATIVE DIAGNOSES:  1.  Unstable angina pectoris.  2.  Abnormal myocardial perfusion scan with inferior perfusion abnormality.  3.  Chronic atrial fibrillation.  4.  Chronic anticoagulation with warfarin.  5.  Diabetes mellitus.   POSTOPERATIVE DIAGNOSES:  1.  Coronary artery disease, 3-vessel, involving the dominant mid right   coronary artery, distal circumflex artery and diagonal branch ostium.  2.  Left ventricular ejection fraction 60%.    ECHOCARDIOGRAM   No prior study is available for comparison.   Mild concentric left ventricular hypertrophy.  Normal left ventricular systolic function.  Left ventricular ejection fraction is visually  estimated to be 60%.  Diastolic function is difficult to assess with atrial fibrillation.  Severely dilated left atrium.  Estimated right ventricular systolic pressure is 37 mmHg + JVP.    Assessment:     1. Coronary artery disease, occlusive     2. S/P coronary artery stent placement     3. Atrial fibrillation, chronic (HCC)     4. Dyslipidemia  Comp Metabolic Panel    LIPID PANEL   5. Essential hypertension, benign     6. Long term current use of anticoagulant therapy         Medical Decision Making:  Today's Assessment / Status / Plan:     Assessment  1.  CAD.  Clinically stable.  2.  RCA stent 8/15/2018  3.  Atrial fibrillation.  Chronic.  Asymptomatic, rate controlled.  4.  Anticoagulation.  On warfarin.  5.  Hypertension.  Controlled  6.  Dyslipidemia.  On atorvastatin  7.  Cellulitis.  Right knee.  6/2019.    Recommendation Discussion  1.  Switch Effient to Plavix 75 mg daily.  2.  Continue warfarin for thromboembolic prophylaxis for atrial fibrillation.  3.  Discontinue metoprolol because of fatigue which he also experienced on atenolol.  4.  Continue diltiazem and atorvastatin.  5.  Check lipid panel.  6.  Follow-up 4 months.

## 2019-08-26 ENCOUNTER — OFFICE VISIT (OUTPATIENT)
Dept: PULMONOLOGY | Facility: HOSPICE | Age: 74
End: 2019-08-26
Payer: MEDICARE

## 2019-08-26 VITALS
WEIGHT: 247 LBS | OXYGEN SATURATION: 93 % | SYSTOLIC BLOOD PRESSURE: 146 MMHG | HEART RATE: 82 BPM | HEIGHT: 72 IN | BODY MASS INDEX: 33.46 KG/M2 | DIASTOLIC BLOOD PRESSURE: 80 MMHG

## 2019-08-26 DIAGNOSIS — I48.20 ATRIAL FIBRILLATION, CHRONIC (HCC): ICD-10-CM

## 2019-08-26 DIAGNOSIS — Z95.5 S/P CORONARY ARTERY STENT PLACEMENT: ICD-10-CM

## 2019-08-26 DIAGNOSIS — J45.30 MILD PERSISTENT ASTHMA WITHOUT COMPLICATION: ICD-10-CM

## 2019-08-26 DIAGNOSIS — R60.0 LEG EDEMA, LEFT: ICD-10-CM

## 2019-08-26 PROCEDURE — 99214 OFFICE O/P EST MOD 30 MIN: CPT | Performed by: INTERNAL MEDICINE

## 2019-08-26 RX ORDER — ALBUTEROL SULFATE 2.5 MG/3ML
2.5 SOLUTION RESPIRATORY (INHALATION) EVERY 4 HOURS PRN
COMMUNITY
End: 2021-03-01 | Stop reason: SDUPTHER

## 2019-08-26 RX ORDER — ALBUTEROL SULFATE 90 UG/1
2 AEROSOL, METERED RESPIRATORY (INHALATION) EVERY 6 HOURS PRN
COMMUNITY
End: 2021-07-14

## 2019-08-26 RX ORDER — FUROSEMIDE 40 MG/1
40 TABLET ORAL
COMMUNITY
End: 2020-12-01

## 2019-08-26 RX ORDER — POTASSIUM CHLORIDE 750 MG/1
10 CAPSULE, EXTENDED RELEASE ORAL
COMMUNITY
End: 2021-02-16

## 2019-08-26 NOTE — PATIENT INSTRUCTIONS
Ken comes in today to follow-up on his mild reactive airways.  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  Fortunately his cellulitis resolved, and the right leg edema has slowly diminished.    Health maintenance issues include current on vaccinations, body mass index at 33, portion control and gentle exercise is much as tolerated.    He has had stents, cardiology saw him recently, and atrial fibrillation rate is controlled.  He will continue on the Symbicort, rare use of rescue inhaler.  Cough with sputum but not purulent or bloody, is somewhat salty but does not appear to be infected.  Reassess in 6 months sooner if needed.  Of note he no longer requires oxygen and this is encouraging.

## 2019-08-26 NOTE — PROGRESS NOTES
Yuri De León is a 74 y.o. male here for mild asthma on maintenance inhalers with prior hypoxemia requiring oxygen. Patient was referred by his primary care doctor.    History of Present Illness:      Ken comes in today to follow-up on his mild reactive airways.  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  Fortunately his cellulitis resolved, and the right leg edema has slowly diminished.    Health maintenance issues include current on vaccinations, body mass index at 33, portion control and gentle exercise is much as tolerated.    He has had stents, cardiology saw him recently, and atrial fibrillation rate is controlled.  He will continue on the Symbicort, rare use of rescue inhaler.  Cough with sputum but not purulent or bloody, is somewhat salty but does not appear to be infected.  Reassess in 6 months sooner if needed.  Of note he no longer requires oxygen and this is encouraging.    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  He attributes his weight gain to her good cooking.    Constitutional ROS: No unexpected change in weight, No unexplained fevers  Eyes: No change in vision or blurring or double vision  Mouth/Throat ROS: No sore throat, No recent change in voice or hoarseness  Pulmonary ROS: See present history for pertinent positives  Cardiovascular ROS: No chest pain to suggest acute coronary syndrome  Gastrointestinal ROS: No abdominal pain to suggest peptic disease  Musculoskeletal/Extremities ROS: no acute artritis or unusual swelling  Hematologic/Lymphatic ROS: No easy bleeding or unusual lymph node swelling  Neurologic ROS: No new or unusual weakness  Psychiatric ROS: No hallucinations  Allergic/Immunologic: No  urticaria or allergic  rash      Current Outpatient Medications   Medication Sig Dispense Refill   • albuterol (VENTOLIN HFA) 108 (90 Base) MCG/ACT Aero Soln inhalation aerosol Inhale 2 Puffs by mouth every 6 hours as needed for Shortness of Breath.     • albuterol (PROVENTIL) 2.5mg/3ml Nebu Soln solution for nebulization 2.5 mg by Nebulization route every four hours as needed for Shortness of Breath.     • furosemide (LASIX) 40 MG Tab Take 40 mg by mouth 1 time daily as needed.     • potassium chloride (MICRO-K) 10 MEQ capsule Take 10 mEq by mouth 1 time daily as needed.     • TRUEPLUS PEN NEEDLES 31G X 6 MM Misc USE 2 TO 3 NEEDLES PER DAY  5   • NOVOLOG FLEXPEN 100 UNIT/ML solution for injection INJECT 5-10 UNITS THREE TIMES A DAY AS NEEDED  11   • clopidogrel (PLAVIX) 75 MG Tab Take 1 Tab by mouth every day. 90 Tab 3   • amLODIPine (NORVASC) 10 MG Tab Take 1 Tab by mouth every day. 30 Tab 1   • insulin detemir (LEVEMIR FLEXTOUCH) 100 UNIT/ML Solution Pen-injector injection Inject 14 Units as instructed every evening.     • insulin lispro (HUMALOG) 100 UNIT/ML Solution Inject 5-10 Units as instructed as needed for High Blood Sugar (Only if blood sugar is 180 or higher).     • warfarin (COUMADIN) 5 MG Tab Take 2.5-5 mg by mouth every day. Pt takes 2.5MG only on Sunday, all other days 5MG     • acetaminophen (TYLENOL) 500 MG Tab Take 1,000 mg by mouth every 6 hours as needed for Moderate Pain.     • atorvastatin (LIPITOR) 80 MG tablet TAKE ONE TABLET BY MOUTH EVERY DAY 90 Tab 3   • budesonide-formoterol (SYMBICORT) 160-4.5 MCG/ACT Aerosol Inhale 2 Puffs by mouth 2 Times a Day. Use spacer. Rinse mouth after each use. (Patient taking differently: Inhale 1 Puff by mouth 2 Times a Day. Use spacer. Rinse mouth after each use.) 3 Inhaler 3   • DILTIAZem (CARDIZEM) 60 MG Tab Take 1 Tab by mouth 3 times a day. 90 Tab 0   • sulfamethoxazole-trimethoprim (BACTRIM DS) 800-160 MG tablet TAKE ONE TABLET BY MOUTH TWO TIMES A DAY FOR 10 DAYS  0   •  amoxicillin-clavulanate (AUGMENTIN) 875-125 MG Tab TAKE 1 TABLET BY MOUTH EVERY 12 HOURS FOR 10 DAYS.  0   • montelukast (SINGULAIR) 10 MG Tab Take 10 mg by mouth every evening.     • metoprolol SR (TOPROL XL) 25 MG TABLET SR 24 HR Take 1 Tab by mouth every day. 90 Tab 2     No current facility-administered medications for this visit.        Social History     Tobacco Use   • Smoking status: Former Smoker     Packs/day: 0.50     Years: 10.00     Pack years: 5.00     Types: Cigarettes     Last attempt to quit: 12/10/1968     Years since quittin.7   • Smokeless tobacco: Never Used   Substance Use Topics   • Alcohol use: No   • Drug use: No        Past Medical History:   Diagnosis Date   • Abnormal electrocardiogram 2010   • Arrhythmia     Atrial Fibrillation; Cardiologist, Dr. Sorensen   • Arthritis     knees, left hip   • ASTHMA     inhalers   • Atrial fibrillation (HCC) 10/25/2011   • Bronchospasm 2009   • Diabetes (HCC)     insulin   • HTN (hypertension) 10/25/2011   • Hypercholesteremia 10/25/2011   • Long term (current) use of anticoagulants 10/25/2011   • NASAL POLYPS 2009   • Other nonspecific abnormal finding 2009   • Pain     left hip   • Shortness of breath    • Sleep apnea 2009    o2 at night   • Wears glasses        Past Surgical History:   Procedure Laterality Date   • KNEE ARTHROPLASTY TOTAL Left 10/2014   • HIP ARTHROPLASTY TOTAL  2010    Performed by OSGOOD, PATRICK J at [facility]   • LUMBAR DECOMPRESSION     • HIP REPLACEMENT, TOTAL     • LAMINOTOMY     • SINUSCOPE     • SINUSOTOMIES  ,,2007       Allergies: Atenolol and Tetanus toxoid    Family History   Problem Relation Age of Onset   • Heart Disease Mother        Physical Examination    Vitals:    19 1342   Height: 1.829 m (6')   Weight: 112 kg (247 lb)   Weight % change since last entry.: 0 %   BP: 146/80   Pulse: 82   BMI (Calculated): 33.5   O2 sat % room air: 93 %        General Appearance: alert, no distress  Skin: Skin color, texture, turgor normal. No rashes or lesions.  Eyes: negative  Oropharynx: Lips, mucosa, and tongue normal. Teeth and gums normal. Oropharynx moist and without lesion  Lungs: positive findings: Quiet and clear without wheezes or rhonchi  Heart: negative. RRR without murmur, gallop, or rubs.  No ectopy.  Abdomen: Abdomen soft, non-tender. . No masses,  No organomegaly  Extremities:  No deformities, edema, or skin discoloration  Joints: No acute arthritis  Peripheral Pulses:perfused  Neurologic: intact grossly  Right leg trace edema without active cellulitis    Imaging: None today    PFTS: None today      Assessment and Plan  1. Mild persistent asthma without complication  On maintenance inhaler, rare rescue inhaler    2. Leg edema, left  Resolved    3. BMI 31.0-31.9,adult  - OBESITY COUNSELING (No Charge): Patient identified as having weight management issue.  Appropriate orders and counseling given.    4. Atrial fibrillation, chronic (HCC)    5. S/P coronary artery stent placement  Sees cardiology regularly and remains on anticoagulation      Followup Return in about 6 months (around 2/26/2020) for follow up visit with Dr. Carlene Jacques.

## 2019-08-27 ENCOUNTER — TELEPHONE (OUTPATIENT)
Dept: VASCULAR LAB | Facility: MEDICAL CENTER | Age: 74
End: 2019-08-27

## 2019-08-27 ENCOUNTER — NON-PROVIDER VISIT (OUTPATIENT)
Dept: MEDICAL GROUP | Facility: PHYSICIAN GROUP | Age: 74
End: 2019-08-27
Payer: MEDICARE

## 2019-08-27 ENCOUNTER — ANTICOAGULATION MONITORING (OUTPATIENT)
Dept: VASCULAR LAB | Facility: MEDICAL CENTER | Age: 74
End: 2019-08-27
Payer: MEDICARE

## 2019-08-27 ENCOUNTER — APPOINTMENT (OUTPATIENT)
Dept: MEDICAL GROUP | Facility: PHYSICIAN GROUP | Age: 74
End: 2019-08-27
Payer: MEDICARE

## 2019-08-27 VITALS
RESPIRATION RATE: 14 BRPM | SYSTOLIC BLOOD PRESSURE: 130 MMHG | HEART RATE: 70 BPM | OXYGEN SATURATION: 95 % | DIASTOLIC BLOOD PRESSURE: 60 MMHG

## 2019-08-27 DIAGNOSIS — I48.20 CHRONIC ATRIAL FIBRILLATION (HCC): ICD-10-CM

## 2019-08-27 DIAGNOSIS — Z79.01 CHRONIC ANTICOAGULATION: ICD-10-CM

## 2019-08-27 DIAGNOSIS — I48.91 ATRIAL FIBRILLATION WITH RAPID VENTRICULAR RESPONSE (HCC): ICD-10-CM

## 2019-08-27 DIAGNOSIS — Z79.01 LONG TERM CURRENT USE OF ANTICOAGULANT THERAPY: ICD-10-CM

## 2019-08-27 LAB
INR BLD: 1.9 (ref 0.9–1.2)
INR PPP: 1.9 (ref 2–3.5)
POC PROTIME: ABNORMAL

## 2019-08-27 PROCEDURE — 99212 OFFICE O/P EST SF 10 MIN: CPT | Performed by: NURSE PRACTITIONER

## 2019-08-27 PROCEDURE — 85610 PROTHROMBIN TIME: CPT | Performed by: INTERNAL MEDICINE

## 2019-08-27 NOTE — PROGRESS NOTES
OP Anticoagulation Service Note    Date: 2019  Blood Pressure : 130/60  Pulse: 70  Respiration: 14    Anticoagulation Summary  As of 2019    INR goal:   2.0-3.0   TTR:   63.9 % (4 y)   INR used for dosin.90! (2019)   Warfarin maintenance plan:   2.5 mg (5 mg x 0.5) every Sun, Tue, Thu; 5 mg (5 mg x 1) all other days   Weekly warfarin total:   27.5 mg   Plan last modified:   MARIAA Garcia (2019)   Next INR check:   9/10/2019   Target end date:   Indefinite    Indications    Atrial fibrillation with rapid ventricular response (HCC) [I48.91]  Long term current use of anticoagulant therapy [Z79.01]  Atrial fibrillation (HCC) [I48.91]             Anticoagulation Episode Summary     INR check location:       Preferred lab:       Send INR reminders to:       Comments:         Anticoagulation Care Providers     Provider Role Specialty Phone number    Sanjay Sorensen M.D. Referring Cardiology [phone]        Anticoagulation Patient Findings  Patient Findings     Positives:   Change in medications, Change in diet/appetite    Negatives:   Signs/symptoms of thrombosis, Signs/symptoms of bleeding, Change in health, Change in alcohol use, Change in activity, Missed doses, Extra doses, Bruising          THIS VISIT CONDUCTED WITH [employer] UTILIZING SECURE AND ENCRYPTED VIDEOCONFERENCING EQUIPMENT  ROS:    Pulm: Denies SOB, chest pain.    Card: Denies syncope, edema, palpitations.    Extremities: Denies redness, pain.     PE:    Pulm: No SOB, even and unlabored.    Card: Normal rate and rhythm.    Extremities: No redness, or edema.     INR  sub-therapeutic.    Denies signs/symptoms of bleeding and/or thrombosis.    Denies changes to diet or medications.   Follow up appt in 2 weeks     Plan:  Take 5 mg tonight then follow below    Medication: Warfarin (Coumadin)           2.5 mg    5 mg    2.5 mg    5 mg     2.5 mg    5 mg    5 mg      1/2 tab(s)    1 tab(s)    1/2 tab(s)    1 tab(s)    1/2 tab(s)    1 tab(s)  1 tab(s)     Next Appointment: Tuesday, Sept 10 @  3:00    Review all of your home medications and newly ordered medications with your doctor and / or pharmacist. Follow medication instructions as directed by your doctor and / or pharmacist. Please keep your medication list with you and share with your physician. Update the information when medications are discontinued, doses are changed, or new medications (including over-the-counter products) are added; and carry medication information at all times in the event of emergency situations.      For questions, please contact Outpatient Anticoagulation Service [phone].   The patient is on a high risk medication and is sub- therapeutic. This could lead to clot formation or risk of stroke. Therefore this patient requires close monitoring and follow up.          CHEST guidelines recommend frequent INR monitoring at regular intervals (a few days up to a max of 12 weeks) to ensure they are on the proper dose of warfarin and not having any complications from therapy.  INRs can dramatically change over a short time period due to diet, medications, and medical conditions.   The patient instructed to go to the ER for falls with a head injury,  blood in urine or stool or any bleeding that last longer than 20 min.     APRIL Garcia.

## 2019-09-10 ENCOUNTER — NON-PROVIDER VISIT (OUTPATIENT)
Dept: MEDICAL GROUP | Facility: PHYSICIAN GROUP | Age: 74
End: 2019-09-10
Payer: MEDICARE

## 2019-09-10 ENCOUNTER — APPOINTMENT (OUTPATIENT)
Dept: MEDICAL GROUP | Facility: PHYSICIAN GROUP | Age: 74
End: 2019-09-10
Payer: MEDICARE

## 2019-09-10 ENCOUNTER — ANTICOAGULATION MONITORING (OUTPATIENT)
Dept: VASCULAR LAB | Facility: MEDICAL CENTER | Age: 74
End: 2019-09-10
Payer: MEDICARE

## 2019-09-10 VITALS
RESPIRATION RATE: 12 BRPM | DIASTOLIC BLOOD PRESSURE: 72 MMHG | OXYGEN SATURATION: 93 % | HEART RATE: 80 BPM | SYSTOLIC BLOOD PRESSURE: 120 MMHG

## 2019-09-10 DIAGNOSIS — I48.91 ATRIAL FIBRILLATION WITH RAPID VENTRICULAR RESPONSE (HCC): ICD-10-CM

## 2019-09-10 DIAGNOSIS — Z79.01 CHRONIC ANTICOAGULATION: ICD-10-CM

## 2019-09-10 DIAGNOSIS — I48.20 CHRONIC ATRIAL FIBRILLATION (HCC): ICD-10-CM

## 2019-09-10 DIAGNOSIS — Z79.01 LONG TERM CURRENT USE OF ANTICOAGULANT THERAPY: ICD-10-CM

## 2019-09-10 LAB
INR BLD: 2.5 (ref 0.9–1.2)
INR PPP: 2.5 (ref 2–3.5)
POC PROTIME: ABNORMAL

## 2019-09-10 PROCEDURE — 85610 PROTHROMBIN TIME: CPT | Performed by: FAMILY MEDICINE

## 2019-09-10 PROCEDURE — 99212 OFFICE O/P EST SF 10 MIN: CPT | Performed by: NURSE PRACTITIONER

## 2019-09-10 NOTE — PROGRESS NOTES
OP Anticoagulation Service Note    Date: 9/10/2019  Blood Pressure : 120/72  Pulse: 80  Respiration: 12    Anticoagulation Summary  As of 9/10/2019    INR goal:   2.0-3.0   TTR:   64.1 % (4.1 y)   INR used for dosin.50 (9/10/2019)   Warfarin maintenance plan:   2.5 mg (5 mg x 0.5) every Sun, Tue, Thu; 5 mg (5 mg x 1) all other days   Weekly warfarin total:   27.5 mg   Plan last modified:   MARIAA Garcia (2019)   Next INR check:   10/1/2019   Target end date:   Indefinite    Indications    Atrial fibrillation with rapid ventricular response (HCC) [I48.91]  Long term current use of anticoagulant therapy [Z79.01]  Atrial fibrillation (HCC) [I48.91]             Anticoagulation Episode Summary     INR check location:       Preferred lab:       Send INR reminders to:       Comments:         Anticoagulation Care Providers     Provider Role Specialty Phone number    Sanjay Sorensen M.D. Referring Cardiology [phone]        Anticoagulation Patient Findings      THIS VISIT CONDUCTED WITH [employer] UTIL[employer]ING SECURE AND ENCRYPTED VIDEOCONFERENCING EQUIPMENT  ROS:    Pulm: Denies SOB, chest pain.    Card: Denies syncope, edema, palpitations.    Extremities: Denies redness, pain.     PE:    Pulm: No SOB, even and unlabored.    Card: Normal rate and rhythm.    Extremities: No redness, or edema.     INR  is-therapeutic.    Denies signs/symptoms of bleeding and/or thrombosis.    Denies changes to diet or medications.   Follow up appt in 3 weeks     Plan:  Continue current medication regimen.       Medication: Warfarin (Coumadin)           2.5 mg    5 mg    2.5 mg    5 mg    2.5 mg    5 mg    5 mg      1/2 tab(s)    1 tab(s)    1/2 tab(s)    1 tab(s)    1/2 tab(s)    1 tab(s)  1 tab(s)     Next Appointment: Tuesday, Oct 1 @ 2:45     Review all of your home medications and newly ordered medications with your doctor and /  or pharmacist. Follow medication instructions as directed by your doctor and / or pharmacist. Please keep your medication list with you and share with your physician. Update the information when medications are discontinued, doses are changed, or new medications (including over-the-counter products) are added; and carry medication information at all times in the event of emergency situations.      For questions, please contact Outpatient Anticoagulation Service [phone].         Patient is on a high risk medication and therefore requires close monitoring and follow up.     CHEST guidelines recommend frequent INR monitoring at regular intervals (a few days up to a max of 12 weeks) to ensure they are on the proper dose of warfarin and not having any complications from therapy.  INRs can dramatically change over a short time period due to diet, medications, and medical conditions.   The patient instructed to go to the ER for falls with a head injury,  blood in urine or stool or any bleeding that last longer than 20 min.     APRIL Garcia.

## 2019-09-12 ENCOUNTER — HOSPITAL ENCOUNTER (OUTPATIENT)
Dept: LAB | Facility: MEDICAL CENTER | Age: 74
End: 2019-09-12
Attending: INTERNAL MEDICINE
Payer: MEDICARE

## 2019-09-12 LAB
ANION GAP SERPL CALC-SCNC: 9 MMOL/L (ref 0–11.9)
BUN SERPL-MCNC: 30 MG/DL (ref 8–22)
CALCIUM SERPL-MCNC: 9.1 MG/DL (ref 8.5–10.5)
CHLORIDE SERPL-SCNC: 105 MMOL/L (ref 96–112)
CO2 SERPL-SCNC: 26 MMOL/L (ref 20–33)
CREAT SERPL-MCNC: 1.39 MG/DL (ref 0.5–1.4)
GLUCOSE SERPL-MCNC: 178 MG/DL (ref 65–99)
POTASSIUM SERPL-SCNC: 4.2 MMOL/L (ref 3.6–5.5)
SODIUM SERPL-SCNC: 140 MMOL/L (ref 135–145)

## 2019-09-12 PROCEDURE — 36415 COLL VENOUS BLD VENIPUNCTURE: CPT

## 2019-09-12 PROCEDURE — 80048 BASIC METABOLIC PNL TOTAL CA: CPT

## 2019-10-01 ENCOUNTER — APPOINTMENT (OUTPATIENT)
Dept: MEDICAL GROUP | Facility: PHYSICIAN GROUP | Age: 74
End: 2019-10-01
Payer: MEDICARE

## 2019-10-01 ENCOUNTER — ANTICOAGULATION MONITORING (OUTPATIENT)
Dept: VASCULAR LAB | Facility: MEDICAL CENTER | Age: 74
End: 2019-10-01
Payer: MEDICARE

## 2019-10-01 ENCOUNTER — NON-PROVIDER VISIT (OUTPATIENT)
Dept: MEDICAL GROUP | Facility: PHYSICIAN GROUP | Age: 74
End: 2019-10-01
Payer: MEDICARE

## 2019-10-01 VITALS
OXYGEN SATURATION: 95 % | SYSTOLIC BLOOD PRESSURE: 132 MMHG | DIASTOLIC BLOOD PRESSURE: 90 MMHG | RESPIRATION RATE: 14 BRPM | HEART RATE: 68 BPM

## 2019-10-01 DIAGNOSIS — Z79.01 CHRONIC ANTICOAGULATION: ICD-10-CM

## 2019-10-01 DIAGNOSIS — I48.0 PAROXYSMAL ATRIAL FIBRILLATION (HCC): ICD-10-CM

## 2019-10-01 DIAGNOSIS — Z79.01 LONG TERM CURRENT USE OF ANTICOAGULANT THERAPY: ICD-10-CM

## 2019-10-01 DIAGNOSIS — I48.91 ATRIAL FIBRILLATION WITH RAPID VENTRICULAR RESPONSE (HCC): ICD-10-CM

## 2019-10-01 LAB
INR BLD: 2 (ref 0.9–1.2)
INR PPP: 2 (ref 2–3.5)
POC PROTIME: ABNORMAL

## 2019-10-01 PROCEDURE — 99212 OFFICE O/P EST SF 10 MIN: CPT | Performed by: NURSE PRACTITIONER

## 2019-10-01 PROCEDURE — 85610 PROTHROMBIN TIME: CPT | Performed by: FAMILY MEDICINE

## 2019-10-01 NOTE — PROGRESS NOTES
OP Anticoagulation Service Note    Date: 10/1/2019  Blood Pressure : 132/90  Pulse: 68  Respiration: 14    Anticoagulation Summary  As of 10/1/2019    INR goal:   2.0-3.0   TTR:   64.6 % (4.1 y)   INR used for dosin.00 (10/1/2019)   Warfarin maintenance plan:   2.5 mg (5 mg x 0.5) every Sun; 5 mg (5 mg x 1) all other days   Weekly warfarin total:   32.5 mg   Plan last modified:   MARIAA Garcia (10/1/2019)   Next INR check:   10/22/2019   Target end date:   Indefinite    Indications    Atrial fibrillation with rapid ventricular response (HCC) [I48.91]  Long term current use of anticoagulant therapy [Z79.01]  Atrial fibrillation (HCC) [I48.91]             Anticoagulation Episode Summary     INR check location:       Preferred lab:       Send INR reminders to:       Comments:         Anticoagulation Care Providers     Provider Role Specialty Phone number    Sanjay Sorensen M.D. Referring Cardiology [phone]        Anticoagulation Patient Findings      THIS VISIT CONDUCTED WITH [employer]LE[employer] UTILIZING SECURE AND ENCRYPTED VIDEOCONFERENCING EQUIPMENT  ROS:    Pulm: Denies SOB, chest pain.    Card: Denies syncope, edema, palpitations.    Extremities: Denies redness, pain.     PE:    Pulm: No SOB, even and unlabored.    Card: Normal rate and rhythm.    Extremities: No redness, or edema.     INR  is-therapeutic.    Denies signs/symptoms of bleeding and/or thrombosis.    Denies changes to diet or medications.   Follow up appt in 3 weeks     Plan:  Continue current medication regimen.    5 mg every day  Medication: Warfarin (Coumadin)        5 mg    5 mg    5 mg    5 mg    5 mg    5 mg    5 mg      1tab(s)    1 tab(s)    1 tab(s)    1 tab(s)    1 tab(s)    1 tab(s)  1 tab(s)     Next Appointment: Tuesday, Oct 22 @ 1:00      Review all of your home medications and newly ordered medications with your doctor and / or  pharmacist. Follow medication instructions as directed by your doctor and / or pharmacist. Please keep your medication list with you and share with your physician. Update the information when medications are discontinued, doses are changed, or new medications (including over-the-counter products) are added; and carry medication information at all times in the event of emergency situations.      For questions, please contact Outpatient Anticoagulation Service [phone].        Patient is on a high risk medication and therefore requires close monitoring and follow up.     CHEST guidelines recommend frequent INR monitoring at regular intervals (a few days up to a max of 12 weeks) to ensure they are on the proper dose of warfarin and not having any complications from therapy.  INRs can dramatically change over a short time period due to diet, medications, and medical conditions.   The patient instructed to go to the ER for falls with a head injury,  blood in urine or stool or any bleeding that last longer than 20 min.     APRIL Garcia.

## 2019-10-15 DIAGNOSIS — J44.9 CHRONIC OBSTRUCTIVE PULMONARY DISEASE, UNSPECIFIED COPD TYPE (HCC): ICD-10-CM

## 2019-10-15 RX ORDER — BUDESONIDE AND FORMOTEROL FUMARATE DIHYDRATE 160; 4.5 UG/1; UG/1
2 AEROSOL RESPIRATORY (INHALATION) 2 TIMES DAILY
Qty: 3 INHALER | Refills: 3 | Status: SHIPPED | OUTPATIENT
Start: 2019-10-15 | End: 2020-11-10

## 2019-10-15 NOTE — TELEPHONE ENCOUNTER
Pt notified that Rx was sent to     [facility] [place], [place] - [address]  [address]  [place] [address]  Phone: [phone] Fax: [phone]

## 2019-10-15 NOTE — TELEPHONE ENCOUNTER
Have we ever prescribed this med? Yes.  If yes, what date? 7/31/18    Last OV: 8/26/19 GLEN Jacques MD     Next OV: No Pending follow up; Pt not due back until 2/26/2020    DX: Chronic obstructive pulmonary disease, unspecified COPD type (HCC) (J44.9)    Medications: SYMBICORT 160-4.5 MCG/ACT Aerosol

## 2019-10-22 ENCOUNTER — NON-PROVIDER VISIT (OUTPATIENT)
Dept: MEDICAL GROUP | Facility: PHYSICIAN GROUP | Age: 74
End: 2019-10-22
Payer: MEDICARE

## 2019-10-22 ENCOUNTER — APPOINTMENT (OUTPATIENT)
Dept: MEDICAL GROUP | Facility: PHYSICIAN GROUP | Age: 74
End: 2019-10-22
Payer: MEDICARE

## 2019-10-22 ENCOUNTER — ANTICOAGULATION MONITORING (OUTPATIENT)
Dept: VASCULAR LAB | Facility: MEDICAL CENTER | Age: 74
End: 2019-10-22
Payer: MEDICARE

## 2019-10-22 VITALS
SYSTOLIC BLOOD PRESSURE: 132 MMHG | OXYGEN SATURATION: 96 % | DIASTOLIC BLOOD PRESSURE: 88 MMHG | HEART RATE: 69 BPM | RESPIRATION RATE: 12 BRPM

## 2019-10-22 DIAGNOSIS — Z79.01 LONG TERM CURRENT USE OF ANTICOAGULANT THERAPY: ICD-10-CM

## 2019-10-22 DIAGNOSIS — I48.91 ATRIAL FIBRILLATION WITH RAPID VENTRICULAR RESPONSE (HCC): ICD-10-CM

## 2019-10-22 DIAGNOSIS — I48.0 PAROXYSMAL ATRIAL FIBRILLATION (HCC): ICD-10-CM

## 2019-10-22 DIAGNOSIS — Z79.01 CHRONIC ANTICOAGULATION: ICD-10-CM

## 2019-10-22 LAB
INR BLD: 2.8 (ref 0.9–1.2)
INR PPP: 2.8 (ref 2–3.5)
POC PROTIME: ABNORMAL

## 2019-10-22 PROCEDURE — 85610 PROTHROMBIN TIME: CPT | Performed by: FAMILY MEDICINE

## 2019-10-22 PROCEDURE — 99212 OFFICE O/P EST SF 10 MIN: CPT | Performed by: NURSE PRACTITIONER

## 2019-10-22 NOTE — PROGRESS NOTES
OP Anticoagulation Service Note    Date: 10/22/2019  Blood Pressure : 132/88  Pulse: 69  Respiration: 12    Anticoagulation Summary  As of 10/22/2019    INR goal:   2.0-3.0   TTR:   65.1 % (4.2 y)   INR used for dosin.80 (10/22/2019)   Warfarin maintenance plan:   5 mg (5 mg x 1) every day   Weekly warfarin total:   35 mg   Plan last modified:   MARIAA Garcia (10/22/2019)   Next INR check:   2019   Target end date:   Indefinite    Indications    Atrial fibrillation with rapid ventricular response (HCC) [I48.91]  Long term current use of anticoagulant therapy [Z79.01]  Atrial fibrillation (HCC) [I48.91]             Anticoagulation Episode Summary     INR check location:       Preferred lab:       Send INR reminders to:       Comments:         Anticoagulation Care Providers     Provider Role Specialty Phone number    Sanjay Sorensen M.D. Referring Cardiology [phone]        Anticoagulation Patient Findings      THIS VISIT CONDUCTED WITH [employer] TE[employer] UTILIZING SECURE AND ENCRYPTED VIDEOCONFERENCING EQUIPMENT  ROS:    Pulm: Denies SOB, chest pain.    Card: Denies syncope, edema, palpitations.    Extremities: Denies redness, pain.     PE:    Pulm: No SOB, even and unlabored.    Card: Normal rate and rhythm.    Extremities: No redness, or edema.     INR  is-therapeutic.    Denies signs/symptoms of bleeding and/or thrombosis.    Denies changes to diet or medications.   Follow up appt in 4 weeks     Plan:  Continue current medication regimen.       Medication: Warfarin (Coumadin)           5 mg    5 mg    5 mg    5 mg    5 mg    5 mg    5 mg      1 tab(s)    1 tab(s)    1 tab(s)    1 tab(s)    1 tab(s)    1 tab(s)  1 tab(s)     Next Appointment:  n@ 2:00     Review all of your home medications and newly ordered medications with your doctor and / or pharmacist. Follow medication instructions as  directed by your doctor and / or pharmacist. Please keep your medication list with you and share with your physician. Update the information when medications are discontinued, doses are changed, or new medications (including over-the-counter products) are added; and carry medication information at all times in the event of emergency situations.      For questions, please contact Outpatient Anticoagulation Service [phone].   .      Patient is on a high risk medication and therefore requires close monitoring and follow up.     CHEST guidelines recommend frequent INR monitoring at regular intervals (a few days up to a max of 12 weeks) to ensure they are on the proper dose of warfarin and not having any complications from therapy.  INRs can dramatically change over a short time period due to diet, medications, and medical conditions.   The patient instructed to go to the ER for falls with a head injury,  blood in urine or stool or any bleeding that last longer than 20 min.     APRIL Garcia.

## 2019-11-15 ENCOUNTER — TELEPHONE (OUTPATIENT)
Dept: PULMONOLOGY | Facility: HOSPICE | Age: 74
End: 2019-11-15

## 2019-11-15 RX ORDER — AMOXICILLIN AND CLAVULANATE POTASSIUM 875; 125 MG/1; MG/1
1 TABLET, FILM COATED ORAL EVERY 12 HOURS
Qty: 28 TAB | Refills: 0 | Status: SHIPPED | OUTPATIENT
Start: 2019-11-15 | End: 2019-12-03

## 2019-11-15 RX ORDER — PREDNISONE 10 MG/1
TABLET ORAL
Qty: 40 TAB | Refills: 0 | Status: SHIPPED | OUTPATIENT
Start: 2019-11-15 | End: 2019-12-03

## 2019-11-15 NOTE — TELEPHONE ENCOUNTER
Pt has been complaining of cough, sore throat and mild fever since Thursday. He feels like it is now going into his luns and would like ABX sent to [facility] pharmacy in [place] is possible.   Pt states he has not used anything over the counter , because he feels they don't work.   Pt denies needing nebulizer at this point, but has been using his maintenance regularly. Please Advise

## 2019-11-15 NOTE — TELEPHONE ENCOUNTER
RX for prednisone taper and augmentin. Needs sick visit if symptoms do not improve in 48-72hrs, otherwise urgent care/PCP

## 2019-11-19 ENCOUNTER — NON-PROVIDER VISIT (OUTPATIENT)
Dept: MEDICAL GROUP | Facility: PHYSICIAN GROUP | Age: 74
End: 2019-11-19
Payer: MEDICARE

## 2019-11-19 ENCOUNTER — ANTICOAGULATION MONITORING (OUTPATIENT)
Dept: VASCULAR LAB | Facility: MEDICAL CENTER | Age: 74
End: 2019-11-19
Payer: MEDICARE

## 2019-11-19 ENCOUNTER — APPOINTMENT (OUTPATIENT)
Dept: MEDICAL GROUP | Facility: PHYSICIAN GROUP | Age: 74
End: 2019-11-19
Payer: MEDICARE

## 2019-11-19 VITALS
OXYGEN SATURATION: 92 % | HEART RATE: 80 BPM | SYSTOLIC BLOOD PRESSURE: 112 MMHG | RESPIRATION RATE: 14 BRPM | DIASTOLIC BLOOD PRESSURE: 62 MMHG

## 2019-11-19 DIAGNOSIS — Z79.01 LONG TERM CURRENT USE OF ANTICOAGULANT THERAPY: ICD-10-CM

## 2019-11-19 DIAGNOSIS — Z79.01 CHRONIC ANTICOAGULATION: ICD-10-CM

## 2019-11-19 DIAGNOSIS — I48.20 CHRONIC ATRIAL FIBRILLATION (HCC): ICD-10-CM

## 2019-11-19 DIAGNOSIS — I48.91 ATRIAL FIBRILLATION WITH RAPID VENTRICULAR RESPONSE (HCC): ICD-10-CM

## 2019-11-19 LAB
INR BLD: 3.4 (ref 0.9–1.2)
INR PPP: 3.4 (ref 2–3.5)
POC PROTIME: ABNORMAL

## 2019-11-19 PROCEDURE — 85610 PROTHROMBIN TIME: CPT | Performed by: FAMILY MEDICINE

## 2019-11-19 PROCEDURE — 99212 OFFICE O/P EST SF 10 MIN: CPT | Performed by: NURSE PRACTITIONER

## 2019-11-19 NOTE — PROGRESS NOTES
OP Anticoagulation Service Note    Date: 11/19/2019  Blood Pressure : 112/62  Pulse: 80  Respiration: 14    Anticoagulation Summary  As of 11/19/2019    INR goal:   2.0-3.0   TTR:   64.5 % (4.3 y)   INR used for dosing:   3.40! (11/19/2019)   Warfarin maintenance plan:   5 mg (5 mg x 1) every day   Weekly warfarin total:   35 mg   Plan last modified:   MARIAA Garcia (10/22/2019)   Next INR check:   12/3/2019   Target end date:   Indefinite    Indications    Atrial fibrillation with rapid ventricular response (HCC) [I48.91]  Long term current use of anticoagulant therapy [Z79.01]  Atrial fibrillation (HCC) [I48.91]             Anticoagulation Episode Summary     INR check location:       Preferred lab:       Send INR reminders to:       Comments:         Anticoagulation Care Providers     Provider Role Specialty Phone number    Sanjay Sorensen M.D. Referring Cardiology [phone]        Anticoagulation Patient Findings      THIS VISIT CONDUCTED WITH [employer] TE[employer] UTILIZING SECURE AND ENCRYPTED VIDEOCONFERENCING EQUIPMENT  ROS:    Pulm: Denies SOB, chest pain.    Card: Denies syncope, edema, palpitations.    Extremities: Denies redness, pain.     PE:    Pulm: No SOB, even and unlabored.    Card: Normal rate and rhythm.    Extremities: No redness, or edema.     INR  supra-therapeutic. Taking Prednisone will be tapering dose for 9 more days.     Denies signs/symptoms of bleeding and/or thrombosis.    Denies changes to diet or medications.   Follow up appt in 2 weeks     Plan:  Take a decreased dose then back to usual see below     Medication: Warfarin (Coumadin)     Sunday Monday Tuesday Wednesday Thursday Friday Saturday      5 mg    5 mg    2.5 mg    5 mg    2.5 mg    5 mg    2.5 mg      1 tab(s)      1 tab(s)    1/2 tab(s)    1 tab(s)    1/2 tab(s)    1 tab(s)  1/2 tab(s)          Sunday Monday Tuesday Wednesday Thursday Friday Saturday      5 mg    5 mg    2.5  mg    5 mg    5 mg    5 mg    5 mg      1 tab(s)    1 tab(s)    1/2 tab(s)    1 tab(s)    1 tab(s)    1 tab(s)  1 tab(s)        Sunday Monday Tuesday Wednesday Thursday Friday Saturday      5 mg    5 mg    5 mg    5 mg    5 mg    5 mg    5 mg      1 tab(s)    1 tab(s)    1 tab(s)    1 tab(s)    1 tab(s)    1 tab(s)  1 tab(s)     Next Appointment: Tuesday, Dec 3 @  2:30    Review all of your home medications and newly ordered medications with your doctor and / or pharmacist. Follow medication instructions as directed by your doctor and / or pharmacist. Please keep your medication list with you and share with your physician. Update the information when medications are discontinued, doses are changed, or new medications (including over-the-counter products) are added; and carry medication information at all times in the event of emergency situations.      For questions, please contact Outpatient Anticoagulation Service [phone].        The patient is on a high risk medication and is supra-therapeudic. This increases the patients risk of bleeding and therefore requires frequent monitoring and follow up.          CHEST guidelines recommend frequent INR monitoring at regular intervals (a few days up to a max of 12 weeks) to ensure they are on the proper dose of warfarin and not having any complications from therapy.  INRs can dramatically change over a short time period due to diet, medications, and medical conditions.   The patient instructed to go to the ER for falls with a head injury,  blood in urine or stool or any bleeding that last longer than 20 min.     APRIL Garcia.

## 2019-12-03 ENCOUNTER — ANTICOAGULATION MONITORING (OUTPATIENT)
Dept: VASCULAR LAB | Facility: MEDICAL CENTER | Age: 74
End: 2019-12-03

## 2019-12-03 ENCOUNTER — APPOINTMENT (OUTPATIENT)
Dept: MEDICAL GROUP | Facility: PHYSICIAN GROUP | Age: 74
End: 2019-12-03
Payer: MEDICARE

## 2019-12-03 ENCOUNTER — NON-PROVIDER VISIT (OUTPATIENT)
Dept: MEDICAL GROUP | Facility: PHYSICIAN GROUP | Age: 74
End: 2019-12-03
Payer: MEDICARE

## 2019-12-03 VITALS — SYSTOLIC BLOOD PRESSURE: 132 MMHG | RESPIRATION RATE: 14 BRPM | HEART RATE: 97 BPM | DIASTOLIC BLOOD PRESSURE: 82 MMHG

## 2019-12-03 DIAGNOSIS — I48.91 ATRIAL FIBRILLATION WITH RAPID VENTRICULAR RESPONSE (HCC): ICD-10-CM

## 2019-12-03 DIAGNOSIS — Z79.01 CHRONIC ANTICOAGULATION: ICD-10-CM

## 2019-12-03 DIAGNOSIS — Z79.01 LONG TERM CURRENT USE OF ANTICOAGULANT THERAPY: ICD-10-CM

## 2019-12-03 LAB
INR BLD: 2.9 (ref 0.9–1.2)
INR PPP: 2.9 (ref 2–3.5)
POC PROTIME: ABNORMAL

## 2019-12-03 PROCEDURE — 85610 PROTHROMBIN TIME: CPT | Performed by: FAMILY MEDICINE

## 2019-12-03 NOTE — PROGRESS NOTES
OP Anticoagulation Service Note    Date: 12/3/2019  Blood Pressure : 132/82  Pulse: 97  Respiration: 14    Anticoagulation Summary  As of 12/3/2019    INR goal:   2.0-3.0   TTR:   64.1 % (4.3 y)   INR used for dosin.90 (12/3/2019)   Warfarin maintenance plan:   5 mg (5 mg x 1) every day   Weekly warfarin total:   35 mg   Plan last modified:   Ilya Rachel PharmD (12/3/2019)   Next INR check:   2019   Target end date:   Indefinite    Indications    Atrial fibrillation with rapid ventricular response (HCC) [I48.91]  Long term current use of anticoagulant therapy [Z79.01]  Atrial fibrillation (HCC) [I48.91]             Anticoagulation Episode Summary     INR check location:       Preferred lab:       Send INR reminders to:       Comments:         Anticoagulation Care Providers     Provider Role Specialty Phone number    Sanjay Sorensen M.D. Referring Cardiology [phone]        Anticoagulation Patient Findings    INR  therapeutic.    Denies signs/symptoms of bleeding and/or thrombosis.    He is off the steroid and ABx.   Follow up appt in 2 weeks     Plan:  Continue weekly warfarin dose as noted      Next Appointment: Tuesday, 2019 at  3pm     Review all of your home medications and newly ordered medications with your doctor and / or pharmacist. Follow medication instructions as directed by your doctor and / or pharmacist. Please keep your medication list with you and share with your physician. Update the information when medications are discontinued, doses are changed, or new medications (including over-the-counter products) are added; and carry medication information at all times in the event of emergency situations.      For questions, please contact Outpatient Anticoagulation Service [phone].     Ilya Rachel PharmD

## 2019-12-17 ENCOUNTER — APPOINTMENT (OUTPATIENT)
Dept: MEDICAL GROUP | Facility: PHYSICIAN GROUP | Age: 74
End: 2019-12-17
Payer: MEDICARE

## 2019-12-19 ENCOUNTER — HOSPITAL ENCOUNTER (OUTPATIENT)
Dept: LAB | Facility: MEDICAL CENTER | Age: 74
End: 2019-12-19
Attending: INTERNAL MEDICINE
Payer: MEDICARE

## 2019-12-19 DIAGNOSIS — E78.5 DYSLIPIDEMIA: ICD-10-CM

## 2019-12-19 LAB
ALBUMIN SERPL BCP-MCNC: 3.8 G/DL (ref 3.2–4.9)
ALBUMIN SERPL BCP-MCNC: 3.9 G/DL (ref 3.2–4.9)
ALBUMIN/GLOB SERPL: 1.4 G/DL
ALP SERPL-CCNC: 111 U/L (ref 30–99)
ALT SERPL-CCNC: 20 U/L (ref 2–50)
ANION GAP SERPL CALC-SCNC: 6 MMOL/L (ref 0–11.9)
AST SERPL-CCNC: 18 U/L (ref 12–45)
BILIRUB SERPL-MCNC: 0.7 MG/DL (ref 0.1–1.5)
BUN SERPL-MCNC: 29 MG/DL (ref 8–22)
BUN SERPL-MCNC: 29 MG/DL (ref 8–22)
CALCIUM SERPL-MCNC: 9.2 MG/DL (ref 8.5–10.5)
CALCIUM SERPL-MCNC: 9.2 MG/DL (ref 8.5–10.5)
CHLORIDE SERPL-SCNC: 103 MMOL/L (ref 96–112)
CHLORIDE SERPL-SCNC: 103 MMOL/L (ref 96–112)
CO2 SERPL-SCNC: 30 MMOL/L (ref 20–33)
CO2 SERPL-SCNC: 30 MMOL/L (ref 20–33)
CREAT SERPL-MCNC: 1.48 MG/DL (ref 0.5–1.4)
CREAT SERPL-MCNC: 1.5 MG/DL (ref 0.5–1.4)
GLOBULIN SER CALC-MCNC: 2.8 G/DL (ref 1.9–3.5)
GLUCOSE SERPL-MCNC: 216 MG/DL (ref 65–99)
GLUCOSE SERPL-MCNC: 217 MG/DL (ref 65–99)
MAGNESIUM SERPL-MCNC: 1.9 MG/DL (ref 1.5–2.5)
PHOSPHATE SERPL-MCNC: 3 MG/DL (ref 2.5–4.5)
POTASSIUM SERPL-SCNC: 4.6 MMOL/L (ref 3.6–5.5)
POTASSIUM SERPL-SCNC: 4.7 MMOL/L (ref 3.6–5.5)
PROT SERPL-MCNC: 6.7 G/DL (ref 6–8.2)
SODIUM SERPL-SCNC: 139 MMOL/L (ref 135–145)
SODIUM SERPL-SCNC: 140 MMOL/L (ref 135–145)

## 2019-12-19 PROCEDURE — 80069 RENAL FUNCTION PANEL: CPT

## 2019-12-19 PROCEDURE — 80053 COMPREHEN METABOLIC PANEL: CPT

## 2019-12-19 PROCEDURE — 36415 COLL VENOUS BLD VENIPUNCTURE: CPT

## 2019-12-19 PROCEDURE — 83735 ASSAY OF MAGNESIUM: CPT

## 2020-01-14 ENCOUNTER — NON-PROVIDER VISIT (OUTPATIENT)
Dept: MEDICAL GROUP | Facility: PHYSICIAN GROUP | Age: 75
End: 2020-01-14
Payer: MEDICARE

## 2020-01-14 ENCOUNTER — APPOINTMENT (OUTPATIENT)
Dept: MEDICAL GROUP | Facility: PHYSICIAN GROUP | Age: 75
End: 2020-01-14
Payer: MEDICARE

## 2020-01-14 ENCOUNTER — ANTICOAGULATION MONITORING (OUTPATIENT)
Dept: VASCULAR LAB | Facility: MEDICAL CENTER | Age: 75
End: 2020-01-14
Payer: MEDICARE

## 2020-01-14 VITALS
DIASTOLIC BLOOD PRESSURE: 84 MMHG | HEART RATE: 86 BPM | OXYGEN SATURATION: 96 % | SYSTOLIC BLOOD PRESSURE: 132 MMHG | RESPIRATION RATE: 12 BRPM

## 2020-01-14 DIAGNOSIS — Z79.01 LONG TERM CURRENT USE OF ANTICOAGULANT THERAPY: ICD-10-CM

## 2020-01-14 DIAGNOSIS — I48.91 ATRIAL FIBRILLATION, UNSPECIFIED TYPE (HCC): ICD-10-CM

## 2020-01-14 DIAGNOSIS — Z79.01 CHRONIC ANTICOAGULATION: ICD-10-CM

## 2020-01-14 DIAGNOSIS — I48.91 ATRIAL FIBRILLATION WITH RAPID VENTRICULAR RESPONSE (HCC): ICD-10-CM

## 2020-01-14 LAB
INR BLD: 3.1 (ref 0.9–1.2)
INR PPP: 3 (ref 2–3.5)
POC PROTIME: ABNORMAL

## 2020-01-14 PROCEDURE — 85610 PROTHROMBIN TIME: CPT | Performed by: FAMILY MEDICINE

## 2020-01-14 PROCEDURE — 99212 OFFICE O/P EST SF 10 MIN: CPT | Performed by: NURSE PRACTITIONER

## 2020-01-14 NOTE — PROGRESS NOTES
OP Anticoagulation Service Note    Date: 1/14/2020  Blood Pressure : 132/84  Pulse: 86  Respiration: 12    Anticoagulation Summary  As of 1/14/2020    INR goal:   2.0-3.0   TTR:   65.1 % (4.4 y)   INR used for dosing:   3.00 (1/14/2020)   Warfarin maintenance plan:   5 mg (5 mg x 1) every day   Weekly warfarin total:   35 mg   Plan last modified:   Ilya Rachel, PharmD (12/3/2019)   Next INR check:   2/25/2020   Target end date:   Indefinite    Indications    Atrial fibrillation with rapid ventricular response (HCC) [I48.91]  Long term current use of anticoagulant therapy [Z79.01]  Atrial fibrillation (HCC) [I48.91]             Anticoagulation Episode Summary     INR check location:       Preferred lab:       Send INR reminders to:       Comments:         Anticoagulation Care Providers     Provider Role Specialty Phone number    Sanjay Sorensen M.D. Referring Cardiology [phone]        Anticoagulation Patient Findings      THIS VISIT CONDUCTED WITH [employer] TELE[employer] UTILIZING SECURE AND ENCRYPTED VIDEOCONFERENCING EQUIPMENT  ROS:    Pulm: Denies SOB, chest pain.    Card: Denies syncope, edema, palpitations.    Extremities: Denies redness, pain.     PE:    Pulm: No SOB, even and unlabored.    Card: Normal rate and rhythm.    Extremities: No redness, or edema.     INR  is-therapeutic.    Denies signs/symptoms of bleeding and/or thrombosis.    Denies changes to diet or medications.   Follow up appt in 6 weeks     Plan:  Take 2.5 mg tonight due to eye surgery tomorrow     Medication: Warfarin (Coumadin)     Sunday Monday Tuesday Wednesday Thursday Friday Saturday      5 mg    5 mg    5 mg    5 mg    5 mg    5 mg    5 mg      1 tab(s)    1 tab(s)    1 tab(s)    1 tab(s)    1 tab(s)    1 tab(s)  1 tab(s)     Next Appointment: Tuesday, Feb 25 @ 2:00  & 1:45 on Jan 28 due to cat surgery and steroid drops.     Review all of your home medications and newly ordered medications with your  doctor and / or pharmacist. Follow medication instructions as directed by your doctor and / or pharmacist. Please keep your medication list with you and share with your physician. Update the information when medications are discontinued, doses are changed, or new medications (including over-the-counter products) are added; and carry medication information at all times in the event of emergency situations.      For questions, please contact Outpatient Anticoagulation Service [phone].        Patient is on a high risk medication and therefore requires close monitoring and follow up.     CHEST guidelines recommend frequent INR monitoring at regular intervals (a few days up to a max of 12 weeks) to ensure they are on the proper dose of warfarin and not having any complications from therapy.  INRs can dramatically change over a short time period due to diet, medications, and medical conditions.   The patient instructed to go to the ER for falls with a head injury,  blood in urine or stool or any bleeding that last longer than 20 min.     APRIL Garcia.

## 2020-01-28 ENCOUNTER — ANTICOAGULATION MONITORING (OUTPATIENT)
Dept: VASCULAR LAB | Facility: MEDICAL CENTER | Age: 75
End: 2020-01-28
Payer: MEDICARE

## 2020-01-28 ENCOUNTER — APPOINTMENT (OUTPATIENT)
Dept: MEDICAL GROUP | Facility: PHYSICIAN GROUP | Age: 75
End: 2020-01-28
Payer: MEDICARE

## 2020-01-28 ENCOUNTER — NON-PROVIDER VISIT (OUTPATIENT)
Dept: MEDICAL GROUP | Facility: PHYSICIAN GROUP | Age: 75
End: 2020-01-28
Payer: MEDICARE

## 2020-01-28 VITALS
HEART RATE: 79 BPM | OXYGEN SATURATION: 95 % | RESPIRATION RATE: 12 BRPM | DIASTOLIC BLOOD PRESSURE: 74 MMHG | SYSTOLIC BLOOD PRESSURE: 118 MMHG

## 2020-01-28 DIAGNOSIS — I48.91 ATRIAL FIBRILLATION, UNSPECIFIED TYPE (HCC): ICD-10-CM

## 2020-01-28 DIAGNOSIS — I48.91 ATRIAL FIBRILLATION WITH RAPID VENTRICULAR RESPONSE (HCC): ICD-10-CM

## 2020-01-28 DIAGNOSIS — Z79.01 CHRONIC ANTICOAGULATION: ICD-10-CM

## 2020-01-28 DIAGNOSIS — Z79.01 LONG TERM CURRENT USE OF ANTICOAGULANT THERAPY: ICD-10-CM

## 2020-01-28 LAB
INR BLD: 2.6 (ref 0.9–1.2)
INR PPP: 2.6 (ref 2–3.5)
POC PROTIME: ABNORMAL

## 2020-01-28 PROCEDURE — 99212 OFFICE O/P EST SF 10 MIN: CPT | Performed by: NURSE PRACTITIONER

## 2020-01-28 PROCEDURE — 85610 PROTHROMBIN TIME: CPT | Performed by: FAMILY MEDICINE

## 2020-01-28 NOTE — PROGRESS NOTES
OP Anticoagulation Service Note    Date: 2020  Blood Pressure : 118/74  Pulse: 79  Respiration: 12    Anticoagulation Summary  As of 2020    INR goal:   2.0-3.0   TTR:   65.4 % (4.5 y)   INR used for dosin.60 (2020)   Warfarin maintenance plan:   5 mg (5 mg x 1) every day   Weekly warfarin total:   35 mg   Plan last modified:   Ilya Rachel, PharmD (12/3/2019)   Next INR check:   2020   Target end date:   Indefinite    Indications    Atrial fibrillation with rapid ventricular response (HCC) [I48.91]  Long term current use of anticoagulant therapy [Z79.01]  Atrial fibrillation (HCC) [I48.91]             Anticoagulation Episode Summary     INR check location:       Preferred lab:       Send INR reminders to:       Comments:         Anticoagulation Care Providers     Provider Role Specialty Phone number    Sanjay Sorensen M.D. Referring Cardiology [phone]        Anticoagulation Patient Findings      THIS VISIT CONDUCTED WITH PRES[employer]IA TELE[employer]ICINE UTILIZING SECURE AND ENCRYPTED VIDEOCONFERENCING EQUIPMENT  ROS:    Pulm: Denies SOB, chest pain.    Card: Denies syncope, edema, palpitations.    Extremities: Denies redness, pain.     PE:    Pulm: No SOB, even and unlabored.    Card: Normal rate and rhythm.    Extremities: No redness, or edema.     INR  is-therapeutic. Cataract surgery on Monday.     Denies signs/symptoms of bleeding and/or thrombosis.    Denies changes to diet or medications.   Follow up appt in 2 weeks     Plan:  Continue current medication regimen.     Medication: Warfarin (Coumadin)           5 mg    5 mg    5 mg    5 mg    5 mg    5 mg    5 mg      1 tab(s)    1 tab(s)    1 tab(s)    1 tab(s)    1 tab(s)    1 tab(s)  1 tab(s)     Next Appointment:  @ 3:00    Review all of your home medications and newly ordered medications with your doctor and / or pharmacist. Follow  medication instructions as directed by your doctor and / or pharmacist. Please keep your medication list with you and share with your physician. Update the information when medications are discontinued, doses are changed, or new medications (including over-the-counter products) are added; and carry medication information at all times in the event of emergency situations.      For questions, please contact Outpatient Anticoagulation Service [phone].         Patient is on a high risk medication and therefore requires close monitoring and follow up.     CHEST guidelines recommend frequent INR monitoring at regular intervals (a few days up to a max of 12 weeks) to ensure they are on the proper dose of warfarin and not having any complications from therapy.  INRs can dramatically change over a short time period due to diet, medications, and medical conditions.   The patient instructed to go to the ER for falls with a head injury,  blood in urine or stool or any bleeding that last longer than 20 min.     APRIL Garcia.

## 2020-02-11 ENCOUNTER — ANTICOAGULATION MONITORING (OUTPATIENT)
Dept: VASCULAR LAB | Facility: MEDICAL CENTER | Age: 75
End: 2020-02-11
Payer: MEDICARE

## 2020-02-11 ENCOUNTER — APPOINTMENT (OUTPATIENT)
Dept: MEDICAL GROUP | Facility: PHYSICIAN GROUP | Age: 75
End: 2020-02-11
Payer: MEDICARE

## 2020-02-11 ENCOUNTER — NON-PROVIDER VISIT (OUTPATIENT)
Dept: MEDICAL GROUP | Facility: PHYSICIAN GROUP | Age: 75
End: 2020-02-11
Payer: MEDICARE

## 2020-02-11 VITALS
DIASTOLIC BLOOD PRESSURE: 78 MMHG | OXYGEN SATURATION: 90 % | SYSTOLIC BLOOD PRESSURE: 126 MMHG | RESPIRATION RATE: 12 BRPM | HEART RATE: 83 BPM

## 2020-02-11 DIAGNOSIS — I48.91 ATRIAL FIBRILLATION WITH RAPID VENTRICULAR RESPONSE (HCC): ICD-10-CM

## 2020-02-11 DIAGNOSIS — Z79.01 LONG TERM CURRENT USE OF ANTICOAGULANT THERAPY: ICD-10-CM

## 2020-02-11 DIAGNOSIS — I48.91 ATRIAL FIBRILLATION, UNSPECIFIED TYPE (HCC): ICD-10-CM

## 2020-02-11 DIAGNOSIS — Z79.01 CHRONIC ANTICOAGULATION: ICD-10-CM

## 2020-02-11 LAB
INR BLD: 3.2 (ref 0.9–1.2)
INR PPP: 3.2 (ref 2–3.5)
POC PROTIME: ABNORMAL

## 2020-02-11 PROCEDURE — 85610 PROTHROMBIN TIME: CPT | Performed by: FAMILY MEDICINE

## 2020-02-11 PROCEDURE — 99212 OFFICE O/P EST SF 10 MIN: CPT | Performed by: NURSE PRACTITIONER

## 2020-02-11 NOTE — PROGRESS NOTES
OP Anticoagulation Service Note    Date: 2/11/2020  Blood Pressure : 126/78  Pulse: 83  Respiration: 12    Anticoagulation Summary  As of 2/11/2020    INR goal:   2.0-3.0   TTR:   65.4 % (4.5 y)   INR used for dosing:   3.20! (2/11/2020)   Warfarin maintenance plan:   5 mg (5 mg x 1) every day   Weekly warfarin total:   35 mg   Plan last modified:   Ilya Rachel, PharmD (12/3/2019)   Next INR check:   3/3/2020   Target end date:   Indefinite    Indications    Atrial fibrillation with rapid ventricular response (HCC) [I48.91]  Long term current use of anticoagulant therapy [Z79.01]  Atrial fibrillation (HCC) [I48.91]             Anticoagulation Episode Summary     INR check location:       Preferred lab:       Send INR reminders to:       Comments:         Anticoagulation Care Providers     Provider Role Specialty Phone number    Sanjay Sorensen M.D. Referring Cardiology [phone]        Anticoagulation Patient Findings      THIS VISIT CONDUCTED WITH PRESENTER VIA TELEMEDICINE UTILIZING SECURE AND ENCRYPTED VIDEOCONFERENCING EQUIPMENT  ROS:    Pulm: Denies SOB, chest pain.    Card: Denies syncope, edema, palpitations.    Extremities: Denies redness, pain.     PE:    Pulm: No SOB, even and unlabored.    Card: Normal rate and rhythm.    Extremities: No redness, or edema.     INR  supra-therapeutic.    Denies signs/symptoms of bleeding and/or thrombosis.    Denies changes to diet or medications.   Follow up appt in 3 weeks     Plan:  Decrease see below     Medication: Warfarin (Coumadin)     Sunday Monday Tuesday Wednesday Thursday Friday Saturday      5 mg    5 mg    2.5 mg    5 mg    5 mg    5 mg    5 mg      1 tab(s)    1 tab(s)    1/2 tab(s)    1 tab(s)    1 tab(s)    1 tab(s)  1 tab(s)     Next Appointment: Tuesday, March 3 @  1:30    Review all of your home medications and newly ordered medications with your doctor and / or pharmacist. Follow medication instructions as directed by your  doctor and / or pharmacist. Please keep your medication list with you and share with your physician. Update the information when medications are discontinued, doses are changed, or new medications (including over-the-counter products) are added; and carry medication information at all times in the event of emergency situations.      For questions, please contact Outpatient Anticoagulation Service [phone].        The patient is on a high risk medication and is supra-therapeudic. This increases the patients risk of bleeding and therefore requires frequent monitoring and follow up.         CHEST guidelines recommend frequent INR monitoring at regular intervals (a few days up to a max of 12 weeks) to ensure they are on the proper dose of warfarin and not having any complications from therapy.  INRs can dramatically change over a short time period due to diet, medications, and medical conditions.   The patient instructed to go to the ER for falls with a head injury,  blood in urine or stool or any bleeding that last longer than 20 min.     APRIL Garcia.

## 2020-03-02 DIAGNOSIS — J44.9 CHRONIC OBSTRUCTIVE PULMONARY DISEASE, UNSPECIFIED COPD TYPE (HCC): ICD-10-CM

## 2020-03-03 RX ORDER — AMOXICILLIN AND CLAVULANATE POTASSIUM 875; 125 MG/1; MG/1
1 TABLET, FILM COATED ORAL EVERY 12 HOURS
Qty: 28 TAB | Refills: 0 | Status: CANCELLED | OUTPATIENT
Start: 2020-03-03

## 2020-03-03 NOTE — TELEPHONE ENCOUNTER
POLO Guzmán.  You 12 minutes ago (8:01 AM)      Patient given this in November. He needs to schedule OV to be seen now.   Advise he go to UC/PCP to be seen and symptoms assessed.      LVM informing patient to go to UC/PCP

## 2020-03-03 NOTE — TELEPHONE ENCOUNTER
Have we ever prescribed this med? Yes.  If yes, what date? 11/15/19 Alfredito APRN    Last OV: 08/26/19    Followup Return in about 6 months (around 2/26/2020) for follow up visit with Dr. Carlene Jacques.    Next OV: No Pending Appointment     DX: Chronic obstructive pulmonary disease, unspecified COPD type    Medications: amoxicillin-clavulanate (AUGMENTIN) 875-125 MG Tab    Pt is requesting Augmenting , states that he has a sore throat, coughing up white phlegm, slight wheezing, no fever or chills. Has been Using Albuterol HFA about two time during the night and Symbicort. Pt states he has been feeling like this for about 1 week    Please Advise

## 2020-03-10 ENCOUNTER — APPOINTMENT (OUTPATIENT)
Dept: MEDICAL GROUP | Facility: PHYSICIAN GROUP | Age: 75
End: 2020-03-10
Payer: MEDICARE

## 2020-03-17 ENCOUNTER — TELEPHONE (OUTPATIENT)
Dept: VASCULAR LAB | Facility: MEDICAL CENTER | Age: 75
End: 2020-03-17

## 2020-03-17 ENCOUNTER — NON-PROVIDER VISIT (OUTPATIENT)
Dept: MEDICAL GROUP | Facility: PHYSICIAN GROUP | Age: 75
End: 2020-03-17
Payer: MEDICARE

## 2020-03-17 ENCOUNTER — APPOINTMENT (OUTPATIENT)
Dept: MEDICAL GROUP | Facility: PHYSICIAN GROUP | Age: 75
End: 2020-03-17
Payer: MEDICARE

## 2020-03-17 ENCOUNTER — ANTICOAGULATION MONITORING (OUTPATIENT)
Dept: VASCULAR LAB | Facility: MEDICAL CENTER | Age: 75
End: 2020-03-17
Payer: MEDICARE

## 2020-03-17 VITALS
SYSTOLIC BLOOD PRESSURE: 130 MMHG | HEART RATE: 87 BPM | RESPIRATION RATE: 12 BRPM | DIASTOLIC BLOOD PRESSURE: 78 MMHG | OXYGEN SATURATION: 91 %

## 2020-03-17 DIAGNOSIS — Z79.01 LONG TERM CURRENT USE OF ANTICOAGULANT THERAPY: ICD-10-CM

## 2020-03-17 DIAGNOSIS — I48.91 ATRIAL FIBRILLATION WITH RAPID VENTRICULAR RESPONSE (HCC): ICD-10-CM

## 2020-03-17 DIAGNOSIS — Z79.01 CHRONIC ANTICOAGULATION: ICD-10-CM

## 2020-03-17 DIAGNOSIS — I48.91 ATRIAL FIBRILLATION, UNSPECIFIED TYPE (HCC): ICD-10-CM

## 2020-03-17 LAB
INR BLD: 2.4 (ref 0.9–1.2)
INR PPP: 2.4 (ref 2–3.5)
POC PROTIME: ABNORMAL

## 2020-03-17 PROCEDURE — 85610 PROTHROMBIN TIME: CPT | Performed by: NURSE PRACTITIONER

## 2020-03-17 PROCEDURE — 99212 OFFICE O/P EST SF 10 MIN: CPT | Performed by: NURSE PRACTITIONER

## 2020-03-17 NOTE — PROGRESS NOTES
OP Anticoagulation Service Note    Date: 3/17/2020  Blood Pressure : 130/78  Pulse: 87  Respiration: 12    Anticoagulation Summary  As of 3/17/2020    INR goal:   2.0-3.0   TTR:   65.6 % (4.6 y)   INR used for dosin.40 (3/17/2020)   Warfarin maintenance plan:   2.5 mg (5 mg x 0.5) every Tue; 5 mg (5 mg x 1) all other days   Weekly warfarin total:   32.5 mg   Plan last modified:   MARIAA Garcia (2020)   Next INR check:   2020   Target end date:   Indefinite    Indications    Atrial fibrillation with rapid ventricular response (HCC) [I48.91]  Long term current use of anticoagulant therapy [Z79.01]  Atrial fibrillation (HCC) [I48.91]             Anticoagulation Episode Summary     INR check location:       Preferred lab:       Send INR reminders to:       Comments:         Anticoagulation Care Providers     Provider Role Specialty Phone number    Sanjay Sorensen M.D. Referring Cardiology [phone]        Anticoagulation Patient Findings      THIS VISIT CONDUCTED WITH [employer] UTILIZING SECURE AND ENCRYPTED VIDEOCONFERENCING EQUIPMENT  ROS:    Pulm: Denies SOB, chest pain.    Card: Denies syncope, edema, palpitations.    Extremities: Denies redness, pain.     PE:    Pulm: No SOB, even and unlabored.    Card: Normal rate and rhythm.    Extremities: No redness, or edema.     INR  is-therapeutic.    Denies signs/symptoms of bleeding and/or thrombosis.    Denies changes to diet or medications.   Follow up appt in 5 weeks     Plan:  Continue current medication regimen.     Medication: Warfarin (Coumadin)           5 mg    5 mg    2.5 mg    5 mg    5 mg    5 mg    5 mg      1 tab(s)    1 tab(s)    1/2 tab(s)    1 tab(s)    1 tab(s)    1 tab(s)  1 tab(s)     Next Appointment:  @ 2:15    Review all of your home medications and newly ordered medications with your doctor and / or pharmacist.  Follow medication instructions as directed by your doctor and / or pharmacist. Please keep your medication list with you and share with your physician. Update the information when medications are discontinued, doses are changed, or new medications (including over-the-counter products) are added; and carry medication information at all times in the event of emergency situations.      For questions, please contact Outpatient Anticoagulation Service [phone].        Patient is on a high risk medication and therefore requires close monitoring and follow up.     CHEST guidelines recommend frequent INR monitoring at regular intervals (a few days up to a max of 12 weeks) to ensure they are on the proper dose of warfarin and not having any complications from therapy.  INRs can dramatically change over a short time period due to diet, medications, and medical conditions.   The patient instructed to go to the ER for falls with a head injury,  blood in urine or stool or any bleeding that last longer than 20 min.     APRIL Garcia.

## 2020-04-21 ENCOUNTER — APPOINTMENT (OUTPATIENT)
Dept: MEDICAL GROUP | Facility: PHYSICIAN GROUP | Age: 75
End: 2020-04-21
Payer: MEDICARE

## 2020-04-21 ENCOUNTER — ANTICOAGULATION MONITORING (OUTPATIENT)
Dept: VASCULAR LAB | Facility: MEDICAL CENTER | Age: 75
End: 2020-04-21
Payer: MEDICARE

## 2020-04-21 ENCOUNTER — NON-PROVIDER VISIT (OUTPATIENT)
Dept: MEDICAL GROUP | Facility: PHYSICIAN GROUP | Age: 75
End: 2020-04-21
Payer: MEDICARE

## 2020-04-21 VITALS
DIASTOLIC BLOOD PRESSURE: 80 MMHG | HEART RATE: 78 BPM | OXYGEN SATURATION: 92 % | RESPIRATION RATE: 12 BRPM | SYSTOLIC BLOOD PRESSURE: 132 MMHG

## 2020-04-21 DIAGNOSIS — Z79.01 LONG TERM CURRENT USE OF ANTICOAGULANT THERAPY: ICD-10-CM

## 2020-04-21 DIAGNOSIS — Z79.01 CHRONIC ANTICOAGULATION: Primary | ICD-10-CM

## 2020-04-21 DIAGNOSIS — I48.91 ATRIAL FIBRILLATION WITH RAPID VENTRICULAR RESPONSE (HCC): ICD-10-CM

## 2020-04-21 DIAGNOSIS — I48.0 PAROXYSMAL ATRIAL FIBRILLATION (HCC): ICD-10-CM

## 2020-04-21 LAB
INR BLD: 1.5 (ref 0.9–1.2)
INR PPP: 1.5 (ref 2–3.5)
POC PROTIME: ABNORMAL

## 2020-04-21 PROCEDURE — 99212 OFFICE O/P EST SF 10 MIN: CPT | Performed by: NURSE PRACTITIONER

## 2020-04-21 PROCEDURE — 85610 PROTHROMBIN TIME: CPT | Performed by: FAMILY MEDICINE

## 2020-04-21 NOTE — PROGRESS NOTES
OP Anticoagulation Service Note    Date: 2020  Blood Pressure : 132/80  Pulse: 78  Respiration: 12    Anticoagulation Summary  As of 2020    INR goal:   2.0-3.0   TTR:   65.1 % (4.7 y)   INR used for dosin.50! (2020)   Warfarin maintenance plan:   7.5 mg (5 mg x 1.5) every Tue; 5 mg (5 mg x 1) all other days   Weekly warfarin total:   37.5 mg   Plan last modified:   MARIAA Garcia (2020)   Next INR check:   2020   Target end date:   Indefinite    Indications    Atrial fibrillation with rapid ventricular response (HCC) [I48.91]  Long term current use of anticoagulant therapy [Z79.01]  Atrial fibrillation (HCC) [I48.91]             Anticoagulation Episode Summary     INR check location:       Preferred lab:       Send INR reminders to:       Comments:         Anticoagulation Care Providers     Provider Role Specialty Phone number    Sanjay Sorensen M.D. Referring Cardiology [phone]        Anticoagulation Patient Findings  Patient Findings     Negatives:   Signs/symptoms of thrombosis, Signs/symptoms of bleeding, Change in health, Change in alcohol use, Change in activity, Missed doses, Extra doses, Change in medications, Change in diet/appetite, Bruising          THIS VISIT CONDUCTED WITH [employer] UTIL[employer]ING SECURE AND ENCRYPTED VIDEOCONFERENCING EQUIPMENT  ROS:    Pulm: Denies SOB, chest pain.    Card: Denies syncope, edema, palpitations.    Extremities: Denies redness, pain.     PE:    Pulm: No SOB, even and unlabored.    Card: Normal rate and rhythm.    Extremities: No redness, or edema.     INR  sub-therapeutic.    Denies signs/symptoms of bleeding and/or thrombosis.    Denies changes to diet or medications.   Follow up appt in 2 weeks     Plan: Take 7.5 mg tonight and  Increase dose see below     Medication: Warfarin (Coumadin)           5 mg    5 mg    5 mg    7.5 mg    5 mg    5  mg    5 mg      1 tab(s)    1 tab(s)    1 tab(s)    1.5 tab(s)    1 tab(s)    1 tab(s)  1 tab(s)     Next Appointment: Tuesday, May 5 @  2:45    Review all of your home medications and newly ordered medications with your doctor and / or pharmacist. Follow medication instructions as directed by your doctor and / or pharmacist. Please keep your medication list with you and share with your physician. Update the information when medications are discontinued, doses are changed, or new medications (including over-the-counter products) are added; and carry medication information at all times in the event of emergency situations.      For questions, please contact Outpatient Anticoagulation Service [phone].   The patient is on a high risk medication and is sub- therapeutic. This could lead to clot formation or risk of stroke. Therefore this patient requires close monitoring and follow up.          CHEST guidelines recommend frequent INR monitoring at regular intervals (a few days up to a max of 12 weeks) to ensure they are on the proper dose of warfarin and not having any complications from therapy.  INRs can dramatically change over a short time period due to diet, medications, and medical conditions.   The patient instructed to go to the ER for falls with a head injury,  blood in urine or stool or any bleeding that last longer than 20 min.     APRIL Garcia.

## 2020-05-05 ENCOUNTER — NON-PROVIDER VISIT (OUTPATIENT)
Dept: MEDICAL GROUP | Facility: PHYSICIAN GROUP | Age: 75
End: 2020-05-05
Payer: MEDICARE

## 2020-05-05 ENCOUNTER — ANTICOAGULATION MONITORING (OUTPATIENT)
Dept: VASCULAR LAB | Facility: MEDICAL CENTER | Age: 75
End: 2020-05-05
Payer: MEDICARE

## 2020-05-05 ENCOUNTER — APPOINTMENT (OUTPATIENT)
Dept: MEDICAL GROUP | Facility: PHYSICIAN GROUP | Age: 75
End: 2020-05-05
Payer: MEDICARE

## 2020-05-05 VITALS
RESPIRATION RATE: 12 BRPM | DIASTOLIC BLOOD PRESSURE: 88 MMHG | SYSTOLIC BLOOD PRESSURE: 138 MMHG | HEART RATE: 78 BPM | OXYGEN SATURATION: 91 %

## 2020-05-05 DIAGNOSIS — Z79.01 LONG TERM CURRENT USE OF ANTICOAGULANT THERAPY: ICD-10-CM

## 2020-05-05 DIAGNOSIS — I48.91 ATRIAL FIBRILLATION WITH RAPID VENTRICULAR RESPONSE (HCC): ICD-10-CM

## 2020-05-05 DIAGNOSIS — Z79.01 CHRONIC ANTICOAGULATION: ICD-10-CM

## 2020-05-05 DIAGNOSIS — I48.19 PERSISTENT ATRIAL FIBRILLATION (HCC): ICD-10-CM

## 2020-05-05 LAB
INR BLD: 3 (ref 0.9–1.2)
INR PPP: 3 (ref 2–3.5)
POC PROTIME: ABNORMAL

## 2020-05-05 PROCEDURE — 85610 PROTHROMBIN TIME: CPT | Performed by: FAMILY MEDICINE

## 2020-05-05 PROCEDURE — 99212 OFFICE O/P EST SF 10 MIN: CPT | Performed by: NURSE PRACTITIONER

## 2020-05-05 NOTE — PROGRESS NOTES
OP Anticoagulation Service Note    Date: 5/5/2020  Blood Pressure : 138/88  Pulse: 78  Respiration: 12    Anticoagulation Summary  As of 5/5/2020    INR goal:   2.0-3.0   TTR:   65.2 % (4.7 y)   INR used for dosing:   3.00 (5/5/2020)   Warfarin maintenance plan:   5 mg (5 mg x 1) every day   Weekly warfarin total:   35 mg   Plan last modified:   MARIAA Garcia (5/5/2020)   Next INR check:   5/19/2020   Target end date:   Indefinite    Indications    Atrial fibrillation with rapid ventricular response (HCC) [I48.91]  Long term current use of anticoagulant therapy [Z79.01]  Atrial fibrillation (HCC) [I48.91]             Anticoagulation Episode Summary     INR check location:       Preferred lab:       Send INR reminders to:       Comments:         Anticoagulation Care Providers     Provider Role Specialty Phone number    Sanjay Sorensen M.D. Referring Cardiology [phone]        Anticoagulation Patient Findings  Patient Findings     Negatives:   Signs/symptoms of thrombosis, Signs/symptoms of bleeding, Change in health, Change in alcohol use, Change in activity, Missed doses, Extra doses, Change in medications, Change in diet/appetite, Bruising          THIS VISIT CONDUCTED WITH [employer] UTIL[employer]ING SECURE AND ENCRYPTED VIDEOCONFERENCING EQUIPMENT  ROS:    Pulm: Denies SOB, chest pain.    Card: Denies syncope, edema, palpitations.    Extremities: Denies redness, pain.     PE:    Pulm: No SOB, even and unlabored.    Card: Normal rate and rhythm.    Extremities: No redness, or edema.     INR  is-therapeutic.    Denies signs/symptoms of bleeding and/or thrombosis.    Denies changes to diet or medications.   Follow up appt in 2 weeks     Plan:  Back to 5 mg daily     Medication: Warfarin (Coumadin)     Sunday Monday Tuesday Wednesday Thursday Friday Saturday      5 mg    5 mg    5 mg    5 mg    5 mg    5 mg    5 mg      1 tab(s)    1 tab(s)    1 tab(s)    1 tab(s)    1 tab(s)     1 tab(s)  1 tab(s)     Next Appointment: Tuesday, May 19 @ 3:15     Review all of your home medications and newly ordered medications with your doctor and / or pharmacist. Follow medication instructions as directed by your doctor and / or pharmacist. Please keep your medication list with you and share with your physician. Update the information when medications are discontinued, doses are changed, or new medications (including over-the-counter products) are added; and carry medication information at all times in the event of emergency situations.      For questions, please contact Outpatient Anticoagulation Service [phone].         Patient is on a high risk medication and therefore requires close monitoring and follow up.     CHEST guidelines recommend frequent INR monitoring at regular intervals (a few days up to a max of 12 weeks) to ensure they are on the proper dose of warfarin and not having any complications from therapy.  INRs can dramatically change over a short time period due to diet, medications, and medical conditions.    The patient instructed to go to the ER for falls with a head injury,  blood in urine or stool or any bleeding that last longer than 20 min.       APRIL Garcia.

## 2020-05-19 ENCOUNTER — NON-PROVIDER VISIT (OUTPATIENT)
Dept: MEDICAL GROUP | Facility: PHYSICIAN GROUP | Age: 75
End: 2020-05-19
Payer: MEDICARE

## 2020-05-19 ENCOUNTER — ANTICOAGULATION MONITORING (OUTPATIENT)
Dept: VASCULAR LAB | Facility: MEDICAL CENTER | Age: 75
End: 2020-05-19
Payer: MEDICARE

## 2020-05-19 ENCOUNTER — APPOINTMENT (OUTPATIENT)
Dept: MEDICAL GROUP | Facility: PHYSICIAN GROUP | Age: 75
End: 2020-05-19
Payer: MEDICARE

## 2020-05-19 VITALS
RESPIRATION RATE: 14 BRPM | SYSTOLIC BLOOD PRESSURE: 132 MMHG | DIASTOLIC BLOOD PRESSURE: 86 MMHG | OXYGEN SATURATION: 90 % | HEART RATE: 83 BPM

## 2020-05-19 DIAGNOSIS — Z79.01 LONG TERM CURRENT USE OF ANTICOAGULANT THERAPY: ICD-10-CM

## 2020-05-19 DIAGNOSIS — I48.0 PAROXYSMAL ATRIAL FIBRILLATION (HCC): ICD-10-CM

## 2020-05-19 DIAGNOSIS — I48.91 ATRIAL FIBRILLATION WITH RAPID VENTRICULAR RESPONSE (HCC): ICD-10-CM

## 2020-05-19 DIAGNOSIS — Z79.01 CHRONIC ANTICOAGULATION: ICD-10-CM

## 2020-05-19 LAB
INR BLD: 2.8 (ref 0.9–1.2)
INR PPP: 2.8 (ref 2–3.5)
POC PROTIME: ABNORMAL

## 2020-05-19 PROCEDURE — 99212 OFFICE O/P EST SF 10 MIN: CPT | Performed by: NURSE PRACTITIONER

## 2020-05-19 PROCEDURE — 85610 PROTHROMBIN TIME: CPT | Performed by: FAMILY MEDICINE

## 2020-05-19 NOTE — PROGRESS NOTES
OP Anticoagulation Service Note    Date: 2020  Blood Pressure : 132/86  Pulse: 83  Respiration: 14    Anticoagulation Summary  As of 2020    INR goal:   2.0-3.0   TTR:   65.4 % (4.8 y)   INR used for dosin.80 (2020)   Warfarin maintenance plan:   5 mg (5 mg x 1) every day   Weekly warfarin total:   35 mg   Plan last modified:   MARIAA Garcia (2020)   Next INR check:   2020   Target end date:   Indefinite    Indications    Atrial fibrillation with rapid ventricular response (HCC) [I48.91]  Long term current use of anticoagulant therapy [Z79.01]  Atrial fibrillation (HCC) [I48.91]             Anticoagulation Episode Summary     INR check location:       Preferred lab:       Send INR reminders to:       Comments:         Anticoagulation Care Providers     Provider Role Specialty Phone number    Sanjay Sorensen M.D. Referring Cardiology [phone]        Anticoagulation Patient Findings  Patient Findings     Negatives:   Signs/symptoms of thrombosis, Signs/symptoms of bleeding, Change in health, Change in alcohol use, Change in activity, Missed doses, Extra doses, Change in medications, Change in diet/appetite, Bruising          THIS VISIT CONDUCTED WITH [employer] UTIL[employer]ING SECURE AND ENCRYPTED VIDEOCONFERENCING EQUIPMENT  ROS:    Pulm: Denies SOB, chest pain.    Card: Denies syncope, edema, palpitations.    Extremities: Denies redness, pain.     PE:    Pulm: No SOB, even and unlabored.    Card: Normal rate and rhythm.    Extremities: No redness, or edema.     INR  is-therapeutic.    Denies signs/symptoms of bleeding and/or thrombosis.    Denies changes to diet or medications.   Follow up appt in 3 weeks     Plan:  Continue current medication regimen.     Medication: Warfarin (Coumadin)           5 mg    5 mg    5 mg    5 mg    5 mg    5 mg    5 mg      1 tab(s)    1 tab(s)    1 tab(s)    1  tab(s)    1 tab(s)    1 tab(s)  1 tab(s)     Next Appointment: Tuesday, June 9 @  1:45    Review all of your home medications and newly ordered medications with your doctor and / or pharmacist. Follow medication instructions as directed by your doctor and / or pharmacist. Please keep your medication list with you and share with your physician. Update the information when medications are discontinued, doses are changed, or new medications (including over-the-counter products) are added; and carry medication information at all times in the event of emergency situations.      For questions, please contact Outpatient Anticoagulation Service [phone].        Patient is on a high risk medication and therefore requires close monitoring and follow up.     CHEST guidelines recommend frequent INR monitoring at regular intervals (a few days up to a max of 12 weeks) to ensure they are on the proper dose of warfarin and not having any complications from therapy.  INRs can dramatically change over a short time period due to diet, medications, and medical conditions.   The patient instructed to go to the ER for falls with a head injury,  blood in urine or stool or any bleeding that last longer than 20 min.     APRIL Garcia.

## 2020-06-09 ENCOUNTER — NON-PROVIDER VISIT (OUTPATIENT)
Dept: MEDICAL GROUP | Facility: PHYSICIAN GROUP | Age: 75
End: 2020-06-09
Payer: MEDICARE

## 2020-06-09 ENCOUNTER — APPOINTMENT (OUTPATIENT)
Dept: MEDICAL GROUP | Facility: PHYSICIAN GROUP | Age: 75
End: 2020-06-09
Payer: MEDICARE

## 2020-06-09 ENCOUNTER — ANTICOAGULATION MONITORING (OUTPATIENT)
Dept: VASCULAR LAB | Facility: MEDICAL CENTER | Age: 75
End: 2020-06-09
Payer: MEDICARE

## 2020-06-09 VITALS
OXYGEN SATURATION: 92 % | RESPIRATION RATE: 14 BRPM | HEART RATE: 115 BPM | SYSTOLIC BLOOD PRESSURE: 132 MMHG | DIASTOLIC BLOOD PRESSURE: 88 MMHG

## 2020-06-09 DIAGNOSIS — Z79.01 CHRONIC ANTICOAGULATION: ICD-10-CM

## 2020-06-09 DIAGNOSIS — Z79.01 LONG TERM CURRENT USE OF ANTICOAGULANT THERAPY: ICD-10-CM

## 2020-06-09 DIAGNOSIS — I48.91 ATRIAL FIBRILLATION WITH RAPID VENTRICULAR RESPONSE (HCC): ICD-10-CM

## 2020-06-09 DIAGNOSIS — I48.0 PAROXYSMAL ATRIAL FIBRILLATION (HCC): ICD-10-CM

## 2020-06-09 LAB
INR BLD: 2.9 (ref 0.9–1.2)
INR PPP: 2.9 (ref 2–3.5)
POC PROTIME: ABNORMAL

## 2020-06-09 PROCEDURE — 85610 PROTHROMBIN TIME: CPT | Performed by: FAMILY MEDICINE

## 2020-06-09 PROCEDURE — 99212 OFFICE O/P EST SF 10 MIN: CPT | Performed by: NURSE PRACTITIONER

## 2020-06-09 NOTE — PROGRESS NOTES
OP Anticoagulation Service Note    Date: 2020  Blood Pressure : 132/88  Pulse: (!) 115  Respiration: 14    Anticoagulation Summary  As of 2020    INR goal:   2.0-3.0   TTR:   65.8 % (4.8 y)   INR used for dosin.90 (2020)   Warfarin maintenance plan:   5 mg (5 mg x 1) every day   Weekly warfarin total:   35 mg   Plan last modified:   MARIAA Garcia (2020)   Next INR check:   2020   Target end date:   Indefinite    Indications    Atrial fibrillation with rapid ventricular response (HCC) [I48.91]  Long term current use of anticoagulant therapy [Z79.01]  Atrial fibrillation (HCC) [I48.91]             Anticoagulation Episode Summary     INR check location:       Preferred lab:       Send INR reminders to:       Comments:         Anticoagulation Care Providers     Provider Role Specialty Phone number    Sanjay Sorensen M.D. Referring Cardiology [phone]        Anticoagulation Patient Findings      THIS VISIT CONDUCTED WITH [employer] UTILIZING SECURE AND ENCRYPTED VIDEOCONFERENCING EQUIPMENT  ROS:    Pulm: Denies SOB, chest pain.    Card: Denies syncope, edema, palpitations.    Extremities: Denies redness, pain.     PE:    Pulm: No SOB, even and unlabored.    Card: Normal rate and rhythm.    Extremities: No redness, or edema.     INR  is-therapeutic.    Denies signs/symptoms of bleeding and/or thrombosis.    Denies changes to diet or medications.   Follow up appt in 4 weeks     Plan:  Continue current medication regimen.       Medication: Warfarin (Coumadin)           5 mg    5 mg    5 mg    5 mg    5 mg    5 mg    5 mg      1 tab(s)    1 tab(s)    1 tab(s)    1 tab(s)    1 tab(s)    1 tab(s)  1 tab(s)     Next Appointment:  @ 1:30    Review all of your home medications and newly ordered medications with your doctor and / or pharmacist. Follow medication instructions as directed  by your doctor and / or pharmacist. Please keep your medication list with you and share with your physician. Update the information when medications are discontinued, doses are changed, or new medications (including over-the-counter products) are added; and carry medication information at all times in the event of emergency situations.      For questions, please contact Outpatient Anticoagulation Service [phone].    Patient is on a high risk medication and therefore requires close monitoring and follow up.     CHEST guidelines recommend frequent INR monitoring at regular intervals (a few days up to a max of 12 weeks) to ensure they are on the proper dose of warfarin and not having any complications from therapy.  INRs can dramatically change over a short time period due to diet, medications, and medical conditions.     The patient instructed to go to the ER for falls with a head injury,  blood in urine or stool or any bleeding that last longer than 20 min.     APRIL Garcia.

## 2020-07-01 ENCOUNTER — OFFICE VISIT (OUTPATIENT)
Dept: CARDIOLOGY | Facility: MEDICAL CENTER | Age: 75
End: 2020-07-01
Payer: MEDICARE

## 2020-07-01 VITALS
OXYGEN SATURATION: 92 % | WEIGHT: 247 LBS | DIASTOLIC BLOOD PRESSURE: 92 MMHG | HEART RATE: 88 BPM | SYSTOLIC BLOOD PRESSURE: 140 MMHG | BODY MASS INDEX: 34.58 KG/M2 | HEIGHT: 71 IN

## 2020-07-01 DIAGNOSIS — I25.10 CORONARY ARTERY DISEASE, OCCLUSIVE: ICD-10-CM

## 2020-07-01 DIAGNOSIS — E78.5 DYSLIPIDEMIA: ICD-10-CM

## 2020-07-01 DIAGNOSIS — I48.20 ATRIAL FIBRILLATION, CHRONIC (HCC): ICD-10-CM

## 2020-07-01 DIAGNOSIS — Z95.5 S/P CORONARY ARTERY STENT PLACEMENT: ICD-10-CM

## 2020-07-01 DIAGNOSIS — I10 ESSENTIAL HYPERTENSION, BENIGN: ICD-10-CM

## 2020-07-01 PROCEDURE — 99214 OFFICE O/P EST MOD 30 MIN: CPT | Performed by: INTERNAL MEDICINE

## 2020-07-01 RX ORDER — AMOXICILLIN 500 MG/1
CAPSULE ORAL
COMMUNITY
Start: 2020-06-24 | End: 2020-12-01

## 2020-07-01 ASSESSMENT — ENCOUNTER SYMPTOMS
PALPITATIONS: 0
COUGH: 0
MYALGIAS: 0
LOSS OF CONSCIOUSNESS: 0
DIZZINESS: 0
SHORTNESS OF BREATH: 0

## 2020-07-01 ASSESSMENT — FIBROSIS 4 INDEX: FIB4 SCORE: 1.28

## 2020-07-07 ENCOUNTER — ANTICOAGULATION MONITORING (OUTPATIENT)
Dept: VASCULAR LAB | Facility: MEDICAL CENTER | Age: 75
End: 2020-07-07
Payer: MEDICARE

## 2020-07-07 ENCOUNTER — NON-PROVIDER VISIT (OUTPATIENT)
Dept: MEDICAL GROUP | Facility: PHYSICIAN GROUP | Age: 75
End: 2020-07-07
Payer: MEDICARE

## 2020-07-07 ENCOUNTER — APPOINTMENT (OUTPATIENT)
Dept: MEDICAL GROUP | Facility: PHYSICIAN GROUP | Age: 75
End: 2020-07-07
Payer: MEDICARE

## 2020-07-07 VITALS
RESPIRATION RATE: 12 BRPM | OXYGEN SATURATION: 93 % | SYSTOLIC BLOOD PRESSURE: 126 MMHG | DIASTOLIC BLOOD PRESSURE: 80 MMHG | HEART RATE: 85 BPM

## 2020-07-07 DIAGNOSIS — Z79.01 LONG TERM CURRENT USE OF ANTICOAGULANT THERAPY: ICD-10-CM

## 2020-07-07 DIAGNOSIS — I48.91 ATRIAL FIBRILLATION WITH RAPID VENTRICULAR RESPONSE (HCC): ICD-10-CM

## 2020-07-07 DIAGNOSIS — Z79.01 CHRONIC ANTICOAGULATION: ICD-10-CM

## 2020-07-07 LAB
INR BLD: 2.7 (ref 0.9–1.2)
INR PPP: 2.7 (ref 2–3.5)
POC PROTIME: ABNORMAL

## 2020-07-07 PROCEDURE — 99212 OFFICE O/P EST SF 10 MIN: CPT | Performed by: NURSE PRACTITIONER

## 2020-07-07 NOTE — PROGRESS NOTES
OP Anticoagulation Service Note    Date: 2020  Blood Pressure : 126/80  Pulse: 85  Respiration: 12    Anticoagulation Summary  As of 2020    INR goal:   2.0-3.0   TTR:   66.4 % (4.9 y)   INR used for dosin.70 (2020)   Warfarin maintenance plan:   5 mg (5 mg x 1) every day   Weekly warfarin total:   35 mg   Plan last modified:   MARIAA Garcia (2020)   Next INR check:   2020   Target end date:   Indefinite    Indications    Atrial fibrillation with rapid ventricular response (HCC) [I48.91]  Long term current use of anticoagulant therapy [Z79.01]  Atrial fibrillation (HCC) (Resolved) [I48.91]             Anticoagulation Episode Summary     INR check location:       Preferred lab:       Send INR reminders to:       Comments:         Anticoagulation Care Providers     Provider Role Specialty Phone number    Sanjay Sorensen M.D. Referring Cardiology [phone]        Anticoagulation Patient Findings  Patient Findings     Negatives:   Signs/symptoms of thrombosis, Signs/symptoms of bleeding, Change in health, Change in alcohol use, Change in activity, Missed doses, Extra doses, Change in medications, Change in diet/appetite, Bruising          THIS VISIT CONDUCTED WITH [employer] UTILIZING SECURE AND ENCRYPTED VIDEOCONFERENCING EQUIPMENT  ROS:    Pulm: Denies SOB, chest pain.    Card: Denies syncope, edema, palpitations.    Extremities: Denies redness, pain.     PE:    Pulm: No SOB, even and unlabored.    Card: Normal rate and rhythm.    Extremities: No redness, or edema.     INR  is-therapeutic.    Denies signs/symptoms of bleeding and/or thrombosis.    Denies changes to diet or medications.   Follow up appt in 5 weeks     Plan:  Continue current medication regimen.     Medication: Warfarin (Coumadin)           5 mg    5 mg    5 mg    5 mg    5 mg    5 mg    5 mg      1 tab(s)    1 tab(s)    1  tab(s)    1 tab(s)    1 tab(s)    1 tab(s)  1 tab(s)     Next Appointment: Tuesday, Aug 11 @  1:15    Review all of your home medications and newly ordered medications with your doctor and / or pharmacist. Follow medication instructions as directed by your doctor and / or pharmacist. Please keep your medication list with you and share with your physician. Update the information when medications are discontinued, doses are changed, or new medications (including over-the-counter products) are added; and carry medication information at all times in the event of emergency situations.      For questions, please contact Outpatient Anticoagulation Service [phone].   Up coming procedure not going off Warfarin.         Patient is on a high risk medication and therefore requires close monitoring and follow up.     CHEST guidelines recommend frequent INR monitoring at regular intervals (a few days up to a max of 12 weeks) to ensure they are on the proper dose of warfarin and not having any complications from therapy.  INRs can dramatically change over a short time period due to diet, medications, and medical conditions.   The patient instructed to go to the ER for falls with a head injury,  blood in urine or stool or any bleeding that last longer than 20 min.     APRIL Garcia.

## 2020-07-13 DIAGNOSIS — Z95.5 S/P CORONARY ARTERY STENT PLACEMENT: Primary | ICD-10-CM

## 2020-07-14 RX ORDER — CLOPIDOGREL BISULFATE 75 MG/1
TABLET ORAL
Qty: 90 TAB | Refills: 3 | Status: SHIPPED | OUTPATIENT
Start: 2020-07-14 | End: 2021-09-13

## 2020-08-03 ENCOUNTER — TELEPHONE (OUTPATIENT)
Dept: CARDIOLOGY | Facility: MEDICAL CENTER | Age: 75
End: 2020-08-03

## 2020-08-03 NOTE — LETTER
PROCEDURE/SURGERY CLEARANCE FORM      Encounter Date: 8/3/2020    Patient: Yuri De León  YOB: 1945    CARDIOLOGIST:  Dr. Sanjay Sorensen M.D.   REFERRING DOCTOR:  Dr. Buzz Mcnamara M.D .    The above patient is deemed low risk to have the following procedure/surgery: excision of squamous cell carcinoma with local fap reconstruction, and possible full thickness skin graft.                                           Additional comments: May hold Plavix x 7 days prior to procedure. Restart as soon as patient is hemodynamically stable. Coumadin instructions to be determined by our Anticoagulation Clinic. Please contact them at [phone].      Sincerely,  Sanjay Sorensen M.D.  Electronically signed

## 2020-08-03 NOTE — TELEPHONE ENCOUNTER
"SW    Pt states Dr. Mcnamara's office sent a clearance last week. He says he's getting a \"cancer spot\" removed from his face scheduled for the 7/14. He is following up on the status of this, [phone]  "

## 2020-08-04 ENCOUNTER — ANTICOAGULATION MONITORING (OUTPATIENT)
Dept: VASCULAR LAB | Facility: MEDICAL CENTER | Age: 75
End: 2020-08-04

## 2020-08-04 DIAGNOSIS — Z79.01 LONG TERM CURRENT USE OF ANTICOAGULANT THERAPY: ICD-10-CM

## 2020-08-04 DIAGNOSIS — I48.91 ATRIAL FIBRILLATION WITH RAPID VENTRICULAR RESPONSE (HCC): ICD-10-CM

## 2020-08-04 NOTE — TELEPHONE ENCOUNTER
"Spoke with patient and advised that clearance request was received, and that it will be addressed today. Patient verbalized appreciation for the attention given to this matter. Patient reports no new cardiac symptoms since last office visit. States he feels \"great\".  -------------------------------------------------------------------------------------------------------------------------------------  Patient on Plavix for RCA stent in 2018. On Coumadin for chronic afib. No hx of CVA. 2018 echo shows EF 60%.    Per Dr. Sorensen, patient is deemed low risk for excision of squamous cell carcinoma with local fap reconstruction and possible full thickness skin graft. May hold Plavix x 7 days prior to procedure. Coumadin instructions to be determined by Anticoagulation Clinic.    Letter drafted and faxed to [facility] at [phone]. Completed fax confirmation received. Encounter routed to Anticoagulation Clinic. Patient updated with this information.   "

## 2020-08-04 NOTE — PROGRESS NOTES
We got notification from cardiology that he is to hold Plavix for 7 days for a skin cancer removal.  They referred his warfarin directions to us.  I called the patient and instructed him to hold warfarin for 5 days prior to the procedure without bridge therapy.  He will follow-up in tele-med clinic in approximately 3 days after to determine what changes should be made to his dosing.    Ilya Rachel, PharmD, MS, BCACP, [facility]  Phone [phone] fax [phone]    This note was created using voice recognition software (Dragon). The accuracy of the dictation is limited by the abilities of the software. I have reviewed the note prior to signing, however some errors in grammar and context are still possible. If you have any questions related to this note please do not hesitate to contact our office.

## 2020-08-18 ENCOUNTER — ANTICOAGULATION MONITORING (OUTPATIENT)
Dept: VASCULAR LAB | Facility: MEDICAL CENTER | Age: 75
End: 2020-08-18
Payer: MEDICARE

## 2020-08-18 ENCOUNTER — APPOINTMENT (OUTPATIENT)
Dept: MEDICAL GROUP | Facility: PHYSICIAN GROUP | Age: 75
End: 2020-08-18
Payer: MEDICARE

## 2020-08-18 ENCOUNTER — NON-PROVIDER VISIT (OUTPATIENT)
Dept: MEDICAL GROUP | Facility: PHYSICIAN GROUP | Age: 75
End: 2020-08-18
Payer: MEDICARE

## 2020-08-18 VITALS
OXYGEN SATURATION: 90 % | RESPIRATION RATE: 14 BRPM | SYSTOLIC BLOOD PRESSURE: 130 MMHG | HEART RATE: 99 BPM | DIASTOLIC BLOOD PRESSURE: 84 MMHG

## 2020-08-18 DIAGNOSIS — Z79.01 CHRONIC ANTICOAGULATION: ICD-10-CM

## 2020-08-18 DIAGNOSIS — Z79.01 LONG TERM CURRENT USE OF ANTICOAGULANT THERAPY: ICD-10-CM

## 2020-08-18 DIAGNOSIS — I48.91 ATRIAL FIBRILLATION WITH RAPID VENTRICULAR RESPONSE (HCC): ICD-10-CM

## 2020-08-18 LAB
INR BLD: 1.4 (ref 0.9–1.2)
INR PPP: 1.4 (ref 2–3.5)
POC PROTIME: ABNORMAL

## 2020-08-18 PROCEDURE — 85610 PROTHROMBIN TIME: CPT | Performed by: FAMILY MEDICINE

## 2020-08-18 PROCEDURE — 99999 PR NO CHARGE: CPT | Performed by: INTERNAL MEDICINE

## 2020-08-18 PROCEDURE — 99212 OFFICE O/P EST SF 10 MIN: CPT | Performed by: NURSE PRACTITIONER

## 2020-08-18 NOTE — PROGRESS NOTES
.  OP Anticoagulation Service Note    Date: 2020  Blood Pressure : 130/84  Pulse: 99  Respiration: 14    Anticoagulation Summary  As of 2020    INR goal:  2.0-3.0   TTR:  66.1 % (5 y)   INR used for dosin.40 (2020)   Warfarin maintenance plan:  5 mg (5 mg x 1) every day   Weekly warfarin total:  35 mg   Plan last modified:  MARIAA Garcia (2020)   Next INR check:  2020   Target end date:  Indefinite    Indications    Atrial fibrillation with rapid ventricular response (HCC) [I48.91]  Long term current use of anticoagulant therapy [Z79.01]  Atrial fibrillation (HCC) (Resolved) [I48.91]             Anticoagulation Episode Summary     INR check location:      Preferred lab:      Send INR reminders to:      Comments:        Anticoagulation Care Providers     Provider Role Specialty Phone number    Sanjay Sorensen M.D. Referring Cardiology [phone]        Anticoagulation Patient Findings  Patient Findings     Positives:  Upcoming invasive procedure    Negatives:  Signs/symptoms of thrombosis, Signs/symptoms of bleeding, Change in health, Change in alcohol use, Change in activity, Missed doses, Extra doses, Change in medications, Change in diet/appetite, Bruising          THIS VISIT CONDUCTED WITH [employer] UTILIZING SECURE AND ENCRYPTED VIDEOCONFERENCING EQUIPMENT  ROS:    Pulm: Denies SOB, chest pain.    Card: Denies syncope, edema, palpitations.    Extremities: Denies redness, pain.     PE:    Pulm: No SOB, even and unlabored.    Card: Normal rate and rhythm.    Extremities: No redness, or edema.     INR  Sub -therapeutic.  Was holding for procedure   Denies signs/symptoms of bleeding and/or thrombosis.    Denies changes to diet or medications.   Follow up appt in 1 weeks  zackary can't come in 1 week 2 weeks out instead.     Plan:  Take 7.5 mg tonight then follow below    Medication: Warfarin (Coumadin)           Friday Saturday      5 mg    5 mg    5 mg    5 mg    5 mg    5 mg    5 mg      1 tab(s)    1 tab(s)    1 tab(s)    1 tab(s)    1 tab(s)    1 tab(s)  1 tab(s)     Next Appointment: Tuesday, Sept 1 @ 1:30     Review all of your home medications and newly ordered medications with your doctor and / or pharmacist. Follow medication instructions as directed by your doctor and / or pharmacist. Please keep your medication list with you and share with your physician. Update the information when medications are discontinued, doses are changed, or new medications (including over-the-counter products) are added; and carry medication information at all times in the event of emergency situations.      For questions, please contact Outpatient Anticoagulation Service [phone].   The patient is on a high risk medication and is sub- therapeutic. This could lead to clot formation or risk of stroke. Therefore this patient requires close monitoring and follow up.        CHEST guidelines recommend frequent INR monitoring at regular intervals (a few days up to a max of 12 weeks) to ensure they are on the proper dose of warfarin and not having any complications from therapy.  INRs can dramatically change over a short time period due to diet, medications, and medical conditions.   The patient instructed to go to the ER for falls with a head injury,  blood in urine or stool or any bleeding that last longer than 20 min.     APRIL Garcia.

## 2020-08-25 ENCOUNTER — ANTICOAGULATION MONITORING (OUTPATIENT)
Dept: VASCULAR LAB | Facility: MEDICAL CENTER | Age: 75
End: 2020-08-25
Payer: MEDICARE

## 2020-08-25 ENCOUNTER — NON-PROVIDER VISIT (OUTPATIENT)
Dept: MEDICAL GROUP | Facility: PHYSICIAN GROUP | Age: 75
End: 2020-08-25
Payer: MEDICARE

## 2020-08-25 ENCOUNTER — APPOINTMENT (OUTPATIENT)
Dept: MEDICAL GROUP | Facility: PHYSICIAN GROUP | Age: 75
End: 2020-08-25
Payer: MEDICARE

## 2020-08-25 VITALS
DIASTOLIC BLOOD PRESSURE: 80 MMHG | RESPIRATION RATE: 16 BRPM | OXYGEN SATURATION: 92 % | HEART RATE: 72 BPM | SYSTOLIC BLOOD PRESSURE: 138 MMHG | TEMPERATURE: 98.2 F

## 2020-08-25 DIAGNOSIS — Z79.01 LONG TERM CURRENT USE OF ANTICOAGULANT THERAPY: ICD-10-CM

## 2020-08-25 DIAGNOSIS — Z79.01 CHRONIC ANTICOAGULATION: ICD-10-CM

## 2020-08-25 DIAGNOSIS — I48.91 ATRIAL FIBRILLATION WITH RAPID VENTRICULAR RESPONSE (HCC): ICD-10-CM

## 2020-08-25 LAB
INR BLD: 3 (ref 0.9–1.2)
INR PPP: 3 (ref 2–3.5)
POC PROTIME: ABNORMAL

## 2020-08-25 PROCEDURE — 99999 PR NO CHARGE: CPT | Performed by: INTERNAL MEDICINE

## 2020-08-25 PROCEDURE — 85610 PROTHROMBIN TIME: CPT | Performed by: FAMILY MEDICINE

## 2020-08-25 PROCEDURE — 99212 OFFICE O/P EST SF 10 MIN: CPT | Performed by: NURSE PRACTITIONER

## 2020-08-25 NOTE — PROGRESS NOTES
OP Anticoagulation Service Note    Date: 8/25/2020  Blood Pressure : 138/80  Pulse: 72  Respiration: 16    Anticoagulation Summary  As of 8/25/2020    INR goal:  2.0-3.0   TTR:  66.1 % (5 y)   INR used for dosing:  3.00 (8/25/2020)   Warfarin maintenance plan:  5 mg (5 mg x 1) every day   Weekly warfarin total:  35 mg   Plan last modified:  MARIAA Garcia (5/5/2020)   Next INR check:  9/8/2020   Target end date:  Indefinite    Indications    Atrial fibrillation with rapid ventricular response (HCC) [I48.91]  Long term current use of anticoagulant therapy [Z79.01]  Atrial fibrillation (HCC) (Resolved) [I48.91]             Anticoagulation Episode Summary     INR check location:      Preferred lab:      Send INR reminders to:      Comments:        Anticoagulation Care Providers     Provider Role Specialty Phone number    Sanjay Sorensen M.D. Referring Cardiology [phone]        Anticoagulation Patient Findings  Patient Findings     Negatives:  Signs/symptoms of thrombosis, Signs/symptoms of bleeding, Change in health, Change in alcohol use, Change in activity, Missed doses, Extra doses, Change in medications, Change in diet/appetite, Bruising          THIS VISIT CONDUCTED WITH [employer] UTIL[employer] SECURE AND ENCRYPTED VIDEOCONFERENCING EQUIPMENT  ROS:    Pulm: Denies SOB, chest pain.    Card: Denies syncope, edema, palpitations.    Extremities: Denies redness, pain.     PE:    Pulm: No SOB, even and unlabored.    Card: Normal rate and rhythm.    Extremities: No redness, or edema.     INR  is-therapeutic.    Denies signs/symptoms of bleeding and/or thrombosis.    Denies changes to diet or medications.   Follow up appt in 2 weeks     Plan:  Take 2.5 mg tonight due to stiches coming out tomorrow and already oozing blood    Medication: Warfarin (Coumadin)     Sunday Monday Tuesday Wednesday Thursday Friday Saturday      5 mg    5 mg    5 mg    5 mg    5 mg    5 mg    5 mg       1 tab(s)    1 tab(s)    1 tab(s)    1 tab(s)    1 tab(s)    1 tab(s)  1 tab(s)     Next Appointment: Tuesday, Sept 8 @ 2:30      Review all of your home medications and newly ordered medications with your doctor and / or pharmacist. Follow medication instructions as directed by your doctor and / or pharmacist. Please keep your medication list with you and share with your physician. Update the information when medications are discontinued, doses are changed, or new medications (including over-the-counter products) are added; and carry medication information at all times in the event of emergency situations.      For questions, please contact Outpatient Anticoagulation Service [phone].      Patient is on a high risk medication and therefore requires close monitoring and follow up.     CHEST guidelines recommend frequent INR monitoring at regular intervals (a few days up to a max of 12 weeks) to ensure they are on the proper dose of warfarin and not having any complications from therapy.  INRs can dramatically change over a short time period due to diet, medications, and medical conditions.   The patient instructed to go to the ER for falls with a head injury,  blood in urine or stool or any bleeding that last longer than 20 min.     APRIL Garcia.

## 2020-09-08 ENCOUNTER — APPOINTMENT (OUTPATIENT)
Dept: MEDICAL GROUP | Facility: PHYSICIAN GROUP | Age: 75
End: 2020-09-08
Payer: MEDICARE

## 2020-09-08 ENCOUNTER — NON-PROVIDER VISIT (OUTPATIENT)
Dept: MEDICAL GROUP | Facility: PHYSICIAN GROUP | Age: 75
End: 2020-09-08
Payer: MEDICARE

## 2020-09-08 ENCOUNTER — ANTICOAGULATION MONITORING (OUTPATIENT)
Dept: VASCULAR LAB | Facility: MEDICAL CENTER | Age: 75
End: 2020-09-08

## 2020-09-08 VITALS — DIASTOLIC BLOOD PRESSURE: 58 MMHG | HEART RATE: 85 BPM | RESPIRATION RATE: 14 BRPM | SYSTOLIC BLOOD PRESSURE: 114 MMHG

## 2020-09-08 DIAGNOSIS — Z79.01 CHRONIC ANTICOAGULATION: ICD-10-CM

## 2020-09-08 DIAGNOSIS — Z79.01 LONG TERM CURRENT USE OF ANTICOAGULANT THERAPY: ICD-10-CM

## 2020-09-08 DIAGNOSIS — I48.91 ATRIAL FIBRILLATION WITH RAPID VENTRICULAR RESPONSE (HCC): ICD-10-CM

## 2020-09-08 LAB
INR BLD: 3.1 (ref 0.9–1.2)
INR PPP: 3.1 (ref 2–3.5)
POC PROTIME: ABNORMAL

## 2020-09-08 PROCEDURE — 99999 PR NO CHARGE: CPT | Performed by: NURSE PRACTITIONER

## 2020-09-08 PROCEDURE — 85610 PROTHROMBIN TIME: CPT | Performed by: FAMILY MEDICINE

## 2020-09-08 NOTE — PROGRESS NOTES
OP Anticoagulation Service Note    Date: 9/8/2020  Blood Pressure : 114/58  Pulse: 85  Respiration: 14    Anticoagulation Summary  As of 9/8/2020    INR goal:  2.0-3.0   TTR:  65.6 % (5.1 y)   INR used for dosing:  3.10 (9/8/2020)   Warfarin maintenance plan:  2.5 mg (5 mg x 0.5) every Tue; 5 mg (5 mg x 1) all other days   Weekly warfarin total:  32.5 mg   Plan last modified:  Ilya Rachel PharmD (9/8/2020)   Next INR check:  9/29/2020   Target end date:  Indefinite    Indications    Atrial fibrillation with rapid ventricular response (HCC) [I48.91]  Long term current use of anticoagulant therapy [Z79.01]  Atrial fibrillation (HCC) (Resolved) [I48.91]             Anticoagulation Episode Summary     INR check location:      Preferred lab:      Send INR reminders to:      Comments:        Anticoagulation Care Providers     Provider Role Specialty Phone number    Sanjay Sorensen M.D. Referring Cardiology [phone]        Anticoagulation Patient Findings      INR  supra-therapeutic.    Denies signs/symptoms of bleeding and/or thrombosis.    Denies changes to diet or medications.   Follow up appt in 3 weeks     Plan:  Decrease weekly warfarin dose as noted        Next Appointment: Tuesday, 9/29/2020  At 230pm     Review all of your home medications and newly ordered medications with your doctor and / or pharmacist. Follow medication instructions as directed by your doctor and / or pharmacist. Please keep your medication list with you and share with your physician. Update the information when medications are discontinued, doses are changed, or new medications (including over-the-counter products) are added; and carry medication information at all times in the event of emergency situations.      For questions, please contact Outpatient Anticoagulation Service [phone].     Ilya Rachel PharmD

## 2020-09-29 ENCOUNTER — APPOINTMENT (OUTPATIENT)
Dept: MEDICAL GROUP | Facility: PHYSICIAN GROUP | Age: 75
End: 2020-09-29
Payer: MEDICARE

## 2020-09-29 ENCOUNTER — NON-PROVIDER VISIT (OUTPATIENT)
Dept: MEDICAL GROUP | Facility: PHYSICIAN GROUP | Age: 75
End: 2020-09-29
Payer: MEDICARE

## 2020-09-29 ENCOUNTER — ANTICOAGULATION MONITORING (OUTPATIENT)
Dept: VASCULAR LAB | Facility: MEDICAL CENTER | Age: 75
End: 2020-09-29
Payer: MEDICARE

## 2020-09-29 VITALS
DIASTOLIC BLOOD PRESSURE: 80 MMHG | SYSTOLIC BLOOD PRESSURE: 126 MMHG | RESPIRATION RATE: 14 BRPM | OXYGEN SATURATION: 95 % | HEART RATE: 66 BPM

## 2020-09-29 DIAGNOSIS — I48.91 ATRIAL FIBRILLATION WITH RAPID VENTRICULAR RESPONSE (HCC): ICD-10-CM

## 2020-09-29 DIAGNOSIS — Z79.01 CHRONIC ANTICOAGULATION: ICD-10-CM

## 2020-09-29 DIAGNOSIS — Z79.01 LONG TERM CURRENT USE OF ANTICOAGULANT THERAPY: ICD-10-CM

## 2020-09-29 LAB
INR BLD: 2.2 (ref 0.9–1.2)
INR PPP: 2.2 (ref 2–3.5)
POC PROTIME: ABNORMAL

## 2020-09-29 PROCEDURE — 99999 PR NO CHARGE: CPT | Performed by: NURSE PRACTITIONER

## 2020-09-29 PROCEDURE — 99212 OFFICE O/P EST SF 10 MIN: CPT | Performed by: NURSE PRACTITIONER

## 2020-09-29 PROCEDURE — 85610 PROTHROMBIN TIME: CPT | Performed by: FAMILY MEDICINE

## 2020-09-29 NOTE — PROGRESS NOTES
OP Anticoagulation Service Note    Date: 2020  Blood Pressure : 126/80  Pulse: 66  Respiration: 14    Anticoagulation Summary  As of 2020    INR goal:  2.0-3.0   TTR:  65.8 % (5.1 y)   INR used for dosin.20 (2020)   Warfarin maintenance plan:  2.5 mg (5 mg x 0.5) every Tue; 5 mg (5 mg x 1) all other days   Weekly warfarin total:  32.5 mg   Plan last modified:  Ilya Rachel, PharmD (2020)   Next INR check:  10/20/2020   Target end date:  Indefinite    Indications    Atrial fibrillation with rapid ventricular response (HCC) [I48.91]  Long term current use of anticoagulant therapy [Z79.01]  Atrial fibrillation (HCC) (Resolved) [I48.91]             Anticoagulation Episode Summary     INR check location:      Preferred lab:      Send INR reminders to:      Comments:        Anticoagulation Care Providers     Provider Role Specialty Phone number    Sanjay Sorensen M.D. Referring Cardiology [phone]        Anticoagulation Patient Findings  Patient Findings     Negatives:  Signs/symptoms of thrombosis, Signs/symptoms of bleeding, Change in health, Change in alcohol use, Change in activity, Missed doses, Extra doses, Change in medications, Change in diet/appetite, Bruising          THIS VISIT CONDUCTED WITH [employer] UT[employer] SECURE AND ENCRYPTED VIDEOCONFERENCING EQUIPMENT  ROS:    Pulm: Denies SOB, chest pain.    Card: Denies syncope, edema, palpitations.    Extremities: Denies redness, pain.     PE:    Pulm: No SOB, even and unlabored.    Card: Normal rate and rhythm.    Extremities: No redness, or edema.     INR  is-therapeutic. Decreased the dose last night due to a scab falling off from his ear surgery.     Denies signs/symptoms of bleeding and/or thrombosis.    Denies changes to diet or medications.   Follow up appt in 3 weeks     Plan:  Continue current medication regimen.     Medication: Warfarin (Coumadin)           Friday Saturday      5 mg    5 mg    2.5 mg    5 mg  5 mg 5 mg  5 mg        1 tab(s)    1 tab(s)    1/2 tab(s)    1 tab(s)    1 tab(s)    1 tab(s)  1 tab(s)     Next Appointment: Tuesday, Oct 20 @ 2:30     Review all of your home medications and newly ordered medications with your doctor and / or pharmacist. Follow medication instructions as directed by your doctor and / or pharmacist. Please keep your medication list with you and share with your physician. Update the information when medications are discontinued, doses are changed, or new medications (including over-the-counter products) are added; and carry medication information at all times in the event of emergency situations.      For questions, please contact Outpatient Anticoagulation Service [phone].         Patient is on a high risk medication and therefore requires close monitoring and follow up.     CHEST guidelines recommend frequent INR monitoring at regular intervals (a few days up to a max of 12 weeks) to ensure they are on the proper dose of warfarin and not having any complications from therapy.  INRs can dramatically change over a short time period due to diet, medications, and medical conditions.   Will bring in med list at next visit was unsure ion what he takes.     APRIL Garcia.

## 2020-10-27 ENCOUNTER — APPOINTMENT (OUTPATIENT)
Dept: MEDICAL GROUP | Facility: PHYSICIAN GROUP | Age: 75
End: 2020-10-27
Payer: MEDICARE

## 2020-10-27 ENCOUNTER — NON-PROVIDER VISIT (OUTPATIENT)
Dept: MEDICAL GROUP | Facility: PHYSICIAN GROUP | Age: 75
End: 2020-10-27
Payer: MEDICARE

## 2020-10-27 ENCOUNTER — ANTICOAGULATION MONITORING (OUTPATIENT)
Dept: VASCULAR LAB | Facility: MEDICAL CENTER | Age: 75
End: 2020-10-27
Payer: MEDICARE

## 2020-10-27 VITALS
DIASTOLIC BLOOD PRESSURE: 80 MMHG | RESPIRATION RATE: 14 BRPM | SYSTOLIC BLOOD PRESSURE: 134 MMHG | HEART RATE: 74 BPM | OXYGEN SATURATION: 92 %

## 2020-10-27 DIAGNOSIS — I48.91 ATRIAL FIBRILLATION WITH RAPID VENTRICULAR RESPONSE (HCC): ICD-10-CM

## 2020-10-27 DIAGNOSIS — Z79.01 LONG TERM CURRENT USE OF ANTICOAGULANT THERAPY: ICD-10-CM

## 2020-10-27 DIAGNOSIS — Z79.01 CHRONIC ANTICOAGULATION: ICD-10-CM

## 2020-10-27 LAB
INR BLD: 2.3 (ref 0.9–1.2)
INR PPP: 2.3 (ref 2–3.5)
POC PROTIME: ABNORMAL

## 2020-10-27 PROCEDURE — 99212 OFFICE O/P EST SF 10 MIN: CPT | Performed by: NURSE PRACTITIONER

## 2020-10-27 PROCEDURE — 85610 PROTHROMBIN TIME: CPT | Performed by: FAMILY MEDICINE

## 2020-10-27 PROCEDURE — 99999 PR NO CHARGE: CPT | Performed by: NURSE PRACTITIONER

## 2020-10-27 NOTE — PROGRESS NOTES
OP Anticoagulation Service Note    Date: 10/27/2020  Blood Pressure : 134/80  Pulse: 74  Respiration: 14    Anticoagulation Summary  As of 10/27/2020    INR goal:  2.0-3.0   TTR:  66.3 % (5.2 y)   INR used for dosin.30 (10/27/2020)   Warfarin maintenance plan:  2.5 mg (5 mg x 0.5) every Tue; 5 mg (5 mg x 1) all other days   Weekly warfarin total:  32.5 mg   Plan last modified:  Ilya Rachel, PharmD (2020)   Next INR check:  2020   Target end date:  Indefinite    Indications    Atrial fibrillation with rapid ventricular response (HCC) [I48.91]  Long term current use of anticoagulant therapy [Z79.01]  Atrial fibrillation (HCC) (Resolved) [I48.91]             Anticoagulation Episode Summary     INR check location:      Preferred lab:      Send INR reminders to:      Comments:        Anticoagulation Care Providers     Provider Role Specialty Phone number    Sanjay Sorensen M.D. Referring Cardiology [phone]        Anticoagulation Patient Findings  Patient Findings     Negatives:  Signs/symptoms of thrombosis, Signs/symptoms of bleeding, Change in health, Change in alcohol use, Change in activity, Missed doses, Extra doses, Change in medications, Change in diet/appetite, Bruising          THIS VISIT CONDUCTED WITH [employer] UTIL[employer] SECURE AND ENCRYPTED VIDEOCONFERENCING EQUIPMENT  ROS:    Pulm: Denies SOB, chest pain.    Card: Denies syncope, edema, palpitations.    Extremities: Denies redness, pain.     PE:    Pulm: No SOB, even and unlabored.    Card: Normal rate and rhythm.    Extremities: No redness, or edema.     INR  is-therapeutic.    Denies signs/symptoms of bleeding and/or thrombosis.    Denies changes to diet or medications.   Follow up appt in 5 weeks     Plan:  Continue current medication regimen.     Medication: Warfarin (Coumadin)           5 mg    5 mg    2.5 mg    5 mg    5 mg    5 mg    5  mg      1 tab(s)    1 tab(s)    1/2 tab(s)    1 tab(s)    1 tab(s)    1 tab(s)  1 tab(s)     Next Appointment: Tuesday, Dec 1 @  1:30    Review all of your home medications and newly ordered medications with your doctor and / or pharmacist. Follow medication instructions as directed by your doctor and / or pharmacist. Please keep your medication list with you and share with your physician. Update the information when medications are discontinued, doses are changed, or new medications (including over-the-counter products) are added; and carry medication information at all times in the event of emergency situations.      For questions, please contact Outpatient Anticoagulation Service [phone].        Patient is on a high risk medication and therefore requires close monitoring and follow up.     CHEST guidelines recommend frequent INR monitoring at regular intervals (a few days up to a max of 12 weeks) to ensure they are on the proper dose of warfarin and not having any complications from therapy.  INRs can dramatically change over a short time period due to diet, medications, and medical conditions.   The patient instructed to go to the ER for falls with a head injury,  blood in urine or stool or any bleeding that last longer than 20 min.     APRIL Garcia.

## 2020-11-10 DIAGNOSIS — I48.91 ATRIAL FIBRILLATION WITH RAPID VENTRICULAR RESPONSE (HCC): ICD-10-CM

## 2020-11-10 DIAGNOSIS — J44.9 CHRONIC OBSTRUCTIVE PULMONARY DISEASE, UNSPECIFIED COPD TYPE (HCC): ICD-10-CM

## 2020-11-11 RX ORDER — BUDESONIDE AND FORMOTEROL FUMARATE DIHYDRATE 160; 4.5 UG/1; UG/1
2 AEROSOL RESPIRATORY (INHALATION) 2 TIMES DAILY
Qty: 1 EACH | Refills: 0 | Status: SHIPPED | OUTPATIENT
Start: 2020-11-11 | End: 2021-01-12 | Stop reason: SDUPTHER

## 2020-11-11 NOTE — TELEPHONE ENCOUNTER
Have we ever prescribed this med? Yes.  If yes, what date? 1/15/2019    Last OV: 08/26/2019 - Dr. Jacques    Next OV: was to follow up 2/2020    DX: COPD    Medications: Symbicort

## 2020-11-16 RX ORDER — WARFARIN SODIUM 5 MG/1
TABLET ORAL
Qty: 90 TAB | Refills: 2 | Status: SHIPPED | OUTPATIENT
Start: 2020-11-16 | End: 2021-02-19 | Stop reason: SDUPTHER

## 2020-12-01 ENCOUNTER — APPOINTMENT (OUTPATIENT)
Dept: MEDICAL GROUP | Facility: PHYSICIAN GROUP | Age: 75
End: 2020-12-01
Payer: MEDICARE

## 2020-12-01 ENCOUNTER — ANTICOAGULATION MONITORING (OUTPATIENT)
Dept: VASCULAR LAB | Facility: MEDICAL CENTER | Age: 75
End: 2020-12-01
Payer: MEDICARE

## 2020-12-01 ENCOUNTER — NON-PROVIDER VISIT (OUTPATIENT)
Dept: MEDICAL GROUP | Facility: PHYSICIAN GROUP | Age: 75
End: 2020-12-01
Payer: MEDICARE

## 2020-12-01 VITALS
RESPIRATION RATE: 14 BRPM | HEART RATE: 74 BPM | DIASTOLIC BLOOD PRESSURE: 82 MMHG | OXYGEN SATURATION: 92 % | SYSTOLIC BLOOD PRESSURE: 138 MMHG | TEMPERATURE: 97.6 F

## 2020-12-01 DIAGNOSIS — Z79.01 LONG TERM CURRENT USE OF ANTICOAGULANT THERAPY: ICD-10-CM

## 2020-12-01 DIAGNOSIS — Z79.01 CHRONIC ANTICOAGULATION: ICD-10-CM

## 2020-12-01 DIAGNOSIS — I48.91 ATRIAL FIBRILLATION WITH RAPID VENTRICULAR RESPONSE (HCC): ICD-10-CM

## 2020-12-01 LAB
INR BLD: 3.3 (ref 0.9–1.2)
INR PPP: 3.3 (ref 2–3.5)
POC PROTIME: ABNORMAL

## 2020-12-01 PROCEDURE — 99212 OFFICE O/P EST SF 10 MIN: CPT | Performed by: NURSE PRACTITIONER

## 2020-12-01 PROCEDURE — 99999 PR NO CHARGE: CPT | Performed by: INTERNAL MEDICINE

## 2020-12-01 PROCEDURE — 85610 PROTHROMBIN TIME: CPT | Performed by: FAMILY MEDICINE

## 2020-12-01 NOTE — PROGRESS NOTES
OP Anticoagulation Service Note    Date: 12/1/2020  Blood Pressure : 138/82  Pulse: 74  Respiration: 14    Anticoagulation Summary  As of 12/1/2020    INR goal:  2.0-3.0   TTR:  66.4 % (5.3 y)   INR used for dosing:  3.30 (12/1/2020)   Warfarin maintenance plan:  2.5 mg (5 mg x 0.5) every Tue, Thu; 5 mg (5 mg x 1) all other days   Weekly warfarin total:  30 mg   Plan last modified:  MARIAA Garcia (12/1/2020)   Next INR check:  12/15/2020   Target end date:  Indefinite    Indications    Atrial fibrillation with rapid ventricular response (HCC) [I48.91]  Long term current use of anticoagulant therapy [Z79.01]  Atrial fibrillation (HCC) (Resolved) [I48.91]             Anticoagulation Episode Summary     INR check location:      Preferred lab:      Send INR reminders to:      Comments:        Anticoagulation Care Providers     Provider Role Specialty Phone number    Sanjay Sorensen M.D. Referring Cardiology [phone]        Anticoagulation Patient Findings  Patient Findings     Negatives:  Signs/symptoms of thrombosis, Signs/symptoms of bleeding, Change in health, Change in alcohol use, Change in activity, Missed doses, Extra doses, Change in medications, Change in diet/appetite, Bruising          THIS VISIT CONDUCTED WITH [employer] UT[employer] SECURE AND ENCRYPTED VIDEOCONFERENCING EQUIPMENT  ROS:    Pulm: Denies SOB, chest pain.    Card: Denies syncope, edema, palpitations.    Extremities: Denies redness, pain.     PE:    Pulm: No SOB, even and unlabored.    Card: Normal rate and rhythm.    Extremities: No redness, or edema.     INR  supra-therapeutic.    Denies signs/symptoms of bleeding and/or thrombosis.    Denies changes to diet or medications.   Follow up appt in 2 weeks     Plan:  Decrease dose see below     Medication: Warfarin (Coumadin)     Sunday Monday Tuesday Wednesday Thursday Friday Saturday      5 mg    5 mg    2.5 mg    5 mg    2.5 mg    5 mg    5 mg      1  tab(s)    1 tab(s)    1/2 tab(s)    1 tab(s)    1/2 tab(s)    1 tab(s)  1 tab(s)     Next Appointment: Tuesday, Dec 15 @      Review all of your home medications and newly ordered medications with your doctor and / or pharmacist. Follow medication instructions as directed by your doctor and / or pharmacist. Please keep your medication list with you and share with your physician. Update the information when medications are discontinued, doses are changed, or new medications (including over-the-counter products) are added; and carry medication information at all times in the event of emergency situations.      For questions, please contact Outpatient Anticoagulation Service [phone].   The patient instructed to go to the ER for falls with a head injury,  blood in urine or stool or any bleeding that last longer than 20 min.       The patient is on a high risk medication and is supra-therapeudic. This increases the patients risk of bleeding and therefore requires frequent monitoring and follow up.          CHEST guidelines recommend frequent INR monitoring at regular intervals (a few days up to a max of 12 weeks) to ensure they are on the proper dose of warfarin and not having any complications from therapy.  INRs can dramatically change over a short time period due to diet, medications, and medical conditions.     APRIL Garcia.                jefferson

## 2020-12-02 ENCOUNTER — TELEPHONE (OUTPATIENT)
Dept: SLEEP MEDICINE | Facility: MEDICAL CENTER | Age: 75
End: 2020-12-02

## 2020-12-02 NOTE — TELEPHONE ENCOUNTER
Received a [facility] mail order [facility] RX request for symbicort. It seemed ODD so I called to talk to patient and he states he does not use a mail order pharmacy he only uses [facility] in [place].     Patient made an appointment for 12/17/20 to follow up as he had not seen him since August 2019.     Patient will discuss renewal of inhalers at his appointment.

## 2020-12-15 ENCOUNTER — NON-PROVIDER VISIT (OUTPATIENT)
Dept: MEDICAL GROUP | Facility: PHYSICIAN GROUP | Age: 75
End: 2020-12-15
Payer: MEDICARE

## 2020-12-15 ENCOUNTER — APPOINTMENT (OUTPATIENT)
Dept: MEDICAL GROUP | Facility: PHYSICIAN GROUP | Age: 75
End: 2020-12-15
Payer: MEDICARE

## 2020-12-15 ENCOUNTER — ANTICOAGULATION MONITORING (OUTPATIENT)
Dept: VASCULAR LAB | Facility: MEDICAL CENTER | Age: 75
End: 2020-12-15
Payer: MEDICARE

## 2020-12-15 VITALS
DIASTOLIC BLOOD PRESSURE: 80 MMHG | HEART RATE: 85 BPM | SYSTOLIC BLOOD PRESSURE: 136 MMHG | OXYGEN SATURATION: 92 % | RESPIRATION RATE: 12 BRPM

## 2020-12-15 DIAGNOSIS — Z79.01 LONG TERM CURRENT USE OF ANTICOAGULANT THERAPY: ICD-10-CM

## 2020-12-15 DIAGNOSIS — Z79.01 CHRONIC ANTICOAGULATION: ICD-10-CM

## 2020-12-15 DIAGNOSIS — I48.91 ATRIAL FIBRILLATION WITH RAPID VENTRICULAR RESPONSE (HCC): ICD-10-CM

## 2020-12-15 LAB
INR BLD: 2.4 (ref 0.9–1.2)
INR PPP: 2.4 (ref 2–3.5)
POC PROTIME: ABNORMAL

## 2020-12-15 PROCEDURE — 99999 PR NO CHARGE: CPT | Performed by: INTERNAL MEDICINE

## 2020-12-15 PROCEDURE — 99212 OFFICE O/P EST SF 10 MIN: CPT | Performed by: NURSE PRACTITIONER

## 2020-12-15 PROCEDURE — 85610 PROTHROMBIN TIME: CPT | Performed by: FAMILY MEDICINE

## 2020-12-15 NOTE — PROGRESS NOTES
OP Anticoagulation Service Note    Date: 12/15/2020  Blood Pressure : 136/80  Pulse: 85  Respiration: 12    Anticoagulation Summary  As of 12/15/2020    INR goal:  2.0-3.0   TTR:  66.4 % (5.3 y)   INR used for dosin.40 (12/15/2020)   Warfarin maintenance plan:  2.5 mg (5 mg x 0.5) every Tue, Thu; 5 mg (5 mg x 1) all other days   Weekly warfarin total:  30 mg   Plan last modified:  MARIAA Garcia (2020)   Next INR check:  2021   Target end date:  Indefinite    Indications    Atrial fibrillation with rapid ventricular response (HCC) [I48.91]  Long term current use of anticoagulant therapy [Z79.01]  Atrial fibrillation (HCC) (Resolved) [I48.91]             Anticoagulation Episode Summary     INR check location:      Preferred lab:      Send INR reminders to:      Comments:        Anticoagulation Care Providers     Provider Role Specialty Phone number    Sanjay Sorensen M.D. Referring Cardiology [phone]        Anticoagulation Patient Findings  Patient Findings     Negatives:  Signs/symptoms of thrombosis, Signs/symptoms of bleeding, Change in health, Change in activity, Missed doses, Extra doses, Change in medications, Change in diet/appetite, Bruising          THIS VISIT CONDUCTED WITH [employer] UT[employer] SECURE AND ENCRYPTED VIDEOCONFERENCING EQUIPMENT  ROS:    Pulm: Denies SOB, chest pain.    Card: Denies syncope, edema, palpitations.    Extremities: Denies redness, pain.     PE:    Pulm: No SOB, even and unlabored.    Card: Normal rate and rhythm.    Extremities: No redness, or edema.     INR  is-therapeutic.    Denies signs/symptoms of bleeding and/or thrombosis.    Denies changes to diet or medications.   Follow up appt in 3 weeks     Plan:  Continue current medication regimen.       Medication: Warfarin (Coumadin)           5 mg    5 mg    2.5 mg    5 mg    2.5 mg    5 mg    5 mg      1 tab(s)     1 tab(s)    1/2 tab(s)    1 tab(s)    1/2 tab(s)    1 tab(s)  1 tab(s)     Next Appointment: Tuesday, Jan 5 @ 2:00  Pt on antiplatelet therapy Plavix 75mg for RCA stent 8/1/2018 per cardiology  Indef.     Review all of your home medications and newly ordered medications with your doctor and / or pharmacist. Follow medication instructions as directed by your doctor and / or pharmacist. Please keep your medication list with you and share with your physician. Update the information when medications are discontinued, doses are changed, or new medications (including over-the-counter products) are added; and carry medication information at all times in the event of emergency situations.      For questions, please contact Outpatient Anticoagulation Service [phone].      Patient is on a high risk medication and therefore requires close monitoring and follow up.     CHEST guidelines recommend frequent INR monitoring at regular intervals (a few days up to a max of 12 weeks) to ensure they are on the proper dose of warfarin and not having any complications from therapy.  INRs can dramatically change over a short time period due to diet, medications, and medical conditions.   The patient instructed to go to the ER for falls with a head injury,  blood in urine or stool or any bleeding that last longer than 20 min.     APRIL Garcia.

## 2020-12-17 ENCOUNTER — OFFICE VISIT (OUTPATIENT)
Dept: SLEEP MEDICINE | Facility: MEDICAL CENTER | Age: 75
End: 2020-12-17
Payer: MEDICARE

## 2020-12-17 VITALS
RESPIRATION RATE: 16 BRPM | DIASTOLIC BLOOD PRESSURE: 82 MMHG | OXYGEN SATURATION: 90 % | HEART RATE: 99 BPM | SYSTOLIC BLOOD PRESSURE: 134 MMHG | WEIGHT: 245.4 LBS | HEIGHT: 72 IN | BODY MASS INDEX: 33.24 KG/M2

## 2020-12-17 DIAGNOSIS — J45.30 MILD PERSISTENT ASTHMA WITHOUT COMPLICATION: ICD-10-CM

## 2020-12-17 DIAGNOSIS — G47.33 OBSTRUCTIVE SLEEP APNEA SYNDROME: Chronic | ICD-10-CM

## 2020-12-17 DIAGNOSIS — I48.20 ATRIAL FIBRILLATION, CHRONIC (HCC): ICD-10-CM

## 2020-12-17 PROCEDURE — 99214 OFFICE O/P EST MOD 30 MIN: CPT | Performed by: INTERNAL MEDICINE

## 2020-12-17 ASSESSMENT — FIBROSIS 4 INDEX: FIB4 SCORE: 1.28

## 2020-12-17 NOTE — PATIENT INSTRUCTIONS
Ken comes in to follow-up on his reactive airways, uses Symbicort, was using his Symbicort at night has a rescue inhaler, I explained that it should be limited to 2 puffs in the morning 2 puffs at night rinse and gargle after.  If he awakens with the need for rescue inhaler, he should use his Ventolin, or his home nebulizer.    His episode of bronchitis last spring resolved after treatment with Augmentin.  He is anticipating a change in pharmacies, his [place] pharmacy has gone out of business and he will be changing over to [facility] in [place].    He declines the flu vaccine, his weight remains fairly stable, has gained a bit after the holidays and I cautioned him as this will reduce his oxygen levels and his exercise capacity.

## 2020-12-17 NOTE — PROGRESS NOTES
Yuri De León is a 75 y.o. male here for COPD/asthma. Patient was referred by primary care.    History of Present Illness: As follows  Ken comes in to follow-up on his reactive airways, uses Symbicort, was using his Symbicort at night has a rescue inhaler, I explained that it should be limited to 2 puffs in the morning 2 puffs at night rinse and gargle after.  If he awakens with the need for rescue inhaler, he should use his Ventolin, or his home nebulizer.    His episode of bronchitis last spring resolved after treatment with Augmentin.  He is anticipating a change in pharmacies, his [place] pharmacy has gone out of business and he will be changing over to [facility] in [place].    He declines the flu vaccine, his weight remains fairly stable, has gained a bit after the holidays and I cautioned him as this will reduce his oxygen levels and his exercise capacity.    Ken also comes in today bragging about his new [employer] tractor, the wheels are taller than he is.  250 hp  Constitutional ROS: No unexpected change in weight, No unexplained fevers  Eyes: No change in vision or blurring or double vision  Mouth/Throat ROS: No sore throat, No recent change in voice or hoarseness  Pulmonary ROS: See present history for pertinent positives  Cardiovascular ROS: No chest pain to suggest acute coronary syndrome  Gastrointestinal ROS: No abdominal pain to suggest peptic disease  Musculoskeletal/Extremities ROS: no acute artritis or unusual swelling  Hematologic/Lymphatic ROS: No easy bleeding or unusual lymph node swelling  Neurologic ROS: No new or unusual weakness  Allergic/Immunologic: No  urticaria or allergic rash      Current Outpatient Medications   Medication Sig Dispense Refill   • warfarin (COUMADIN) 5 MG Tab TAKE ONE TABLET BY MOUTH ONE TIME DAILY OR AS DIRECTED BY COUMADIN CLINIC 90 Tab 2   • SYMBICORT 160-4.5 MCG/ACT Aerosol INHALE 2 PUFFS BY MOUTH 2 TIMES A DAY. USE SPACER. RINSE MOUTH AFTER EACH USE. 1 Each 0    • clopidogrel (PLAVIX) 75 MG Tab TAKE ONE TABLET BY MOUTH EVERY DAY 90 Tab 3   • albuterol (VENTOLIN HFA) 108 (90 Base) MCG/ACT Aero Soln inhalation aerosol Inhale 2 Puffs by mouth every 6 hours as needed for Shortness of Breath.     • albuterol (PROVENTIL) 2.5mg/3ml Nebu Soln solution for nebulization 2.5 mg by Nebulization route every four hours as needed for Shortness of Breath.     • potassium chloride (MICRO-K) 10 MEQ capsule Take 10 mEq by mouth 1 time daily as needed.     • TRUEPLUS PEN NEEDLES 31G X 6 MM Misc USE 2 TO 3 NEEDLES PER DAY  5   • NOVOLOG FLEXPEN 100 UNIT/ML solution for injection INJECT 5-10 UNITS THREE TIMES A DAY AS NEEDED  11   • insulin detemir (LEVEMIR FLEXTOUCH) 100 UNIT/ML Solution Pen-injector injection Inject 14 Units as instructed every evening.     • insulin lispro (HUMALOG) 100 UNIT/ML Solution Inject 5-10 Units as instructed as needed for High Blood Sugar (Only if blood sugar is 180 or higher).     • montelukast (SINGULAIR) 10 MG Tab Take 10 mg by mouth every evening.     • budesonide-formoterol (SYMBICORT) 160-4.5 MCG/ACT Aerosol Inhale 2 Puffs by mouth 2 Times a Day. Use spacer. Rinse mouth after each use. (Patient taking differently: Inhale 1 Puff by mouth 2 Times a Day. Use spacer. Rinse mouth after each use.) 3 Inhaler 3   • DILTIAZem (CARDIZEM) 60 MG Tab Take 1 Tab by mouth 3 times a day. 90 Tab 0     No current facility-administered medications for this visit.        Social History     Tobacco Use   • Smoking status: Former Smoker     Packs/day: 0.50     Years: 10.00     Pack years: 5.00     Types: Cigarettes     Quit date: 12/10/1968     Years since quittin.0   • Smokeless tobacco: Never Used   Substance Use Topics   • Alcohol use: No   • Drug use: No        Past Medical History:   Diagnosis Date   • Abnormal electrocardiogram 2010   • Arrhythmia     Atrial Fibrillation; Cardiologist, Dr. Sorensen   • Arthritis     knees, left hip   • ASTHMA     inhalers   •  Atrial fibrillation (HCC) 10/25/2011   • Bronchospasm 8/6/2009   • Diabetes (HCC)     insulin   • HTN (hypertension) 10/25/2011   • Hypercholesteremia 10/25/2011   • Long term (current) use of anticoagulants 10/25/2011   • NASAL POLYPS 8/6/2009   • Other nonspecific abnormal finding 8/6/2009   • Pain     left hip   • Shortness of breath    • Sleep apnea 8/6/2009    o2 at night   • Wears glasses        Past Surgical History:   Procedure Laterality Date   • KNEE ARTHROPLASTY TOTAL Left 10/2014   • HIP ARTHROPLASTY TOTAL  8/31/2010    Performed by OSGOOD, PATRICK J at SURGERY [facility]   • LUMBAR DECOMPRESSION  1984   • HIP REPLACEMENT, TOTAL     • LAMINOTOMY     • SINUSCOPE     • SINUSOTOMIES  2003,2005,2/2007       Allergies: Atenolol and Tetanus toxoid    Family History   Problem Relation Age of Onset   • Heart Disease Mother        Physical Examination    Vitals:    12/17/20 1408 12/17/20 1413   Height: 1.829 m (6')    Weight: 111.3 kg (245 lb 6.4 oz)    Weight % change since last entry.: 0 %    BP: 134/82    Pulse: 99    BMI (Calculated): 33.28    Resp: 16    O2 sat % room air:  (!) 90 %       General Appearance: alert, no distress  Skin: Skin color, texture, turgor normal. No rashes or lesions.  Eyes: negative  Oropharynx: Lips, mucosa, and tongue normal. Teeth and gums normal. Oropharynx moist and without lesion  Lungs: positive findings: Scattered rhonchi and wheezes without prolonged phases  Heart: negative. RRR without murmur, gallop, or rubs.  No ectopy.  Abdomen: Abdomen soft, non-tender. . No masses,  No organomegaly  Extremities:  No deformities, edema, or skin discoloration  Joints: No acute arthritis  Peripheral Pulses:perfused  Neurologic: intact grossly  No clubbing or cyanosis      Imaging: None today    PFTS: Patient declines politely      Assessment and Plan  1. Mild persistent asthma without complication  On maintenance and rescue inhaler    2. Atrial fibrillation, chronic (HCC)  On  Coumadin    3. BMI 31.0-31.9,adult  Patient's body mass index is 33.28 kg/m². Exercise and nutrition counseling were performed at this visit.    4. Obstructive sleep apnea syndrome  Declined CPAP and oxygen      Followup Return in about 6 months (around 6/17/2021) for follow up visit with Dr. Carlene Jacques.

## 2021-01-05 ENCOUNTER — NON-PROVIDER VISIT (OUTPATIENT)
Dept: MEDICAL GROUP | Facility: PHYSICIAN GROUP | Age: 76
End: 2021-01-05
Payer: MEDICARE

## 2021-01-05 ENCOUNTER — TELEPHONE (OUTPATIENT)
Dept: VASCULAR LAB | Facility: MEDICAL CENTER | Age: 76
End: 2021-01-05

## 2021-01-05 ENCOUNTER — ANTICOAGULATION MONITORING (OUTPATIENT)
Dept: VASCULAR LAB | Facility: MEDICAL CENTER | Age: 76
End: 2021-01-05
Payer: MEDICARE

## 2021-01-05 ENCOUNTER — APPOINTMENT (OUTPATIENT)
Dept: MEDICAL GROUP | Facility: PHYSICIAN GROUP | Age: 76
End: 2021-01-05
Payer: MEDICARE

## 2021-01-05 VITALS
OXYGEN SATURATION: 92 % | SYSTOLIC BLOOD PRESSURE: 134 MMHG | RESPIRATION RATE: 12 BRPM | DIASTOLIC BLOOD PRESSURE: 84 MMHG | HEART RATE: 84 BPM

## 2021-01-05 DIAGNOSIS — Z79.01 LONG TERM CURRENT USE OF ANTICOAGULANT THERAPY: ICD-10-CM

## 2021-01-05 DIAGNOSIS — Z79.01 CHRONIC ANTICOAGULATION: ICD-10-CM

## 2021-01-05 DIAGNOSIS — I48.91 ATRIAL FIBRILLATION WITH RAPID VENTRICULAR RESPONSE (HCC): ICD-10-CM

## 2021-01-05 LAB
INR BLD: 2.3 (ref 0.9–1.2)
INR PPP: 2.3 (ref 2–3.5)
POC PROTIME: ABNORMAL

## 2021-01-05 PROCEDURE — 99212 OFFICE O/P EST SF 10 MIN: CPT | Performed by: NURSE PRACTITIONER

## 2021-01-05 PROCEDURE — 85610 PROTHROMBIN TIME: CPT | Performed by: FAMILY MEDICINE

## 2021-01-05 PROCEDURE — 99999 PR NO CHARGE: CPT | Performed by: NURSE PRACTITIONER

## 2021-01-05 NOTE — PROGRESS NOTES
OP Anticoagulation Service Note    Date: 2021  Blood Pressure : 134/84  Pulse: 84  Respiration: 12    Anticoagulation Summary  As of 2021    INR goal:  2.0-3.0   TTR:  66.8 % (5.4 y)   INR used for dosin.30 (2021)   Warfarin maintenance plan:  2.5 mg (5 mg x 0.5) every Tue, Th; 5 mg (5 mg x 1) all other days   Weekly warfarin total:  30 mg   Plan last modified:  MARIAA Garcia (2020)   Next INR check:  2021   Target end date:  Indefinite    Indications    Atrial fibrillation with rapid ventricular response (HCC) [I48.91]  Long term current use of anticoagulant therapy [Z79.01]  Atrial fibrillation (HCC) (Resolved) [I48.91]             Anticoagulation Episode Summary     INR check location:      Preferred lab:      Send INR reminders to:      Comments:        Anticoagulation Care Providers     Provider Role Specialty Phone number    Sanjay Sorensen M.D. Referring Cardiology [phone]        Anticoagulation Patient Findings  Patient Findings     Negatives:  Signs/symptoms of thrombosis, Signs/symptoms of bleeding, Change in health, Change in alcohol use, Change in activity, Missed doses, Extra doses, Change in medications, Change in diet/appetite, Bruising          THIS VISIT CONDUCTED WITH [employer] UTIL[employer]ING SECURE AND ENCRYPTED VIDEOCONFERENCING EQUIPMENT  ROS:    Pulm: Denies SOB, chest pain.    Card: Denies syncope, edema, palpitations.    Extremities: Denies redness, pain.     PE:    Pulm: No SOB, even and unlabored.    Card: Normal rate and rhythm.    Extremities: No redness, or edema.     INR  is-therapeutic.    Denies signs/symptoms of bleeding and/or thrombosis.    Denies changes to diet or medications.   Follow up appt in 4 weeks     Plan:  Continue current medication regimen.     Medication: Warfarin (Coumadin)           5 mg    5 mg    2.5 mg    5 mg    2.5 mg    5 mg    5 mg       1 tab(s)    1 tab(s)    1/2 tab(s)    1 tab(s)    1/2 tab(s)    1 tab(s)  1 tab(s)     Next Appointment: Tuesday, Feb 2 @ 1:45  Pt on antiplatelet therapy Plavix 75mg for CAD per cardiology .     Review all of your home medications and newly ordered medications with your doctor and / or pharmacist. Follow medication instructions as directed by your doctor and / or pharmacist. Please keep your medication list with you and share with your physician. Update the information when medications are discontinued, doses are changed, or new medications (including over-the-counter products) are added; and carry medication information at all times in the event of emergency situations.      For questions, please contact Outpatient Anticoagulation Service [phone].      Patient is on a high risk medication and therefore requires close monitoring and follow up.     CHEST guidelines recommend frequent INR monitoring at regular intervals (a few days up to a max of 12 weeks) to ensure they are on the proper dose of warfarin and not having any complications from therapy.  INRs can dramatically change over a short time period due to diet, medications, and medical conditions.   The patient instructed to go to the ER for falls with a head injury,  blood in urine or stool or any bleeding that last longer than 20 min.     APRIL Garcia.

## 2021-01-12 DIAGNOSIS — Z23 NEED FOR VACCINATION: ICD-10-CM

## 2021-01-12 DIAGNOSIS — J44.9 CHRONIC OBSTRUCTIVE PULMONARY DISEASE, UNSPECIFIED COPD TYPE (HCC): ICD-10-CM

## 2021-01-12 RX ORDER — BUDESONIDE AND FORMOTEROL FUMARATE DIHYDRATE 160; 4.5 UG/1; UG/1
2 AEROSOL RESPIRATORY (INHALATION) 2 TIMES DAILY
Qty: 1 EACH | Refills: 11 | Status: SHIPPED | OUTPATIENT
Start: 2021-01-12 | End: 2022-04-14 | Stop reason: SDUPTHER

## 2021-01-12 NOTE — TELEPHONE ENCOUNTER
Have we ever prescribed this med? Yes.  If yes, what date? 11/11/20    Last OV: 12/17/20    Next OV: 6/17/20    DX: asthma    Medications: symbicort

## 2021-02-02 ENCOUNTER — APPOINTMENT (OUTPATIENT)
Dept: MEDICAL GROUP | Facility: PHYSICIAN GROUP | Age: 76
End: 2021-02-02
Payer: MEDICARE

## 2021-02-02 ENCOUNTER — NON-PROVIDER VISIT (OUTPATIENT)
Dept: MEDICAL GROUP | Facility: PHYSICIAN GROUP | Age: 76
End: 2021-02-02
Payer: MEDICARE

## 2021-02-02 ENCOUNTER — ANTICOAGULATION MONITORING (OUTPATIENT)
Dept: VASCULAR LAB | Facility: MEDICAL CENTER | Age: 76
End: 2021-02-02
Payer: MEDICARE

## 2021-02-02 VITALS
DIASTOLIC BLOOD PRESSURE: 82 MMHG | HEART RATE: 78 BPM | SYSTOLIC BLOOD PRESSURE: 136 MMHG | RESPIRATION RATE: 12 BRPM | OXYGEN SATURATION: 91 %

## 2021-02-02 DIAGNOSIS — Z79.01 LONG TERM CURRENT USE OF ANTICOAGULANT THERAPY: ICD-10-CM

## 2021-02-02 DIAGNOSIS — I48.91 ATRIAL FIBRILLATION WITH RAPID VENTRICULAR RESPONSE (HCC): ICD-10-CM

## 2021-02-02 DIAGNOSIS — Z79.01 CHRONIC ANTICOAGULATION: ICD-10-CM

## 2021-02-02 LAB
INR BLD: 1.7 (ref 0.9–1.2)
INR PPP: 1.7 (ref 2–3.5)
POC PROTIME: ABNORMAL

## 2021-02-02 PROCEDURE — 85610 PROTHROMBIN TIME: CPT | Performed by: FAMILY MEDICINE

## 2021-02-02 PROCEDURE — 99999 PR NO CHARGE: CPT | Performed by: NURSE PRACTITIONER

## 2021-02-02 PROCEDURE — 99212 OFFICE O/P EST SF 10 MIN: CPT | Performed by: NURSE PRACTITIONER

## 2021-02-02 NOTE — PROGRESS NOTES
OP Anticoagulation Service Note    Date: 2021  Blood Pressure : 136/82  Pulse: 78  Respiration: 12    Anticoagulation Summary  As of 2021    INR goal:  2.0-3.0   TTR:  66.5 % (5.5 y)   INR used for dosin.70 (2021)   Warfarin maintenance plan:  2.5 mg (5 mg x 0.5) every Thu; 5 mg (5 mg x 1) all other days   Weekly warfarin total:  32.5 mg   Plan last modified:  MARIAA Garcia (2021)   Next INR check:  2021   Target end date:  Indefinite    Indications    Atrial fibrillation with rapid ventricular response (HCC) [I48.91]  Long term current use of anticoagulant therapy [Z79.01]  Atrial fibrillation (HCC) (Resolved) [I48.91]             Anticoagulation Episode Summary     INR check location:      Preferred lab:      Send INR reminders to:      Comments:        Anticoagulation Care Providers     Provider Role Specialty Phone number    Sanjay Sorensen M.D. Referring Cardiology [phone]        Anticoagulation Patient Findings  Patient Findings     Negatives:  Signs/symptoms of thrombosis, Signs/symptoms of bleeding, Change in health, Change in alcohol use, Change in activity, Missed doses, Extra doses, Change in medications, Change in diet/appetite, Bruising          THIS VISIT CONDUCTED WITH [employer] SECURE AND ENCRYPTED VIDEOCONFERENCING EQUIPMENT  ROS:    Pulm: Denies SOB, chest pain.    Card: Denies syncope, edema, palpitations.    Extremities: Denies redness, pain.     PE:    Pulm: No SOB, even and unlabored.    Card: Normal rate and rhythm.    Extremities: No redness, or edema.     INR  sub-therapeutic.    Denies signs/symptoms of bleeding and/or thrombosis.    Denies changes to diet or medications.   Follow up appt in 2 weeks     Plan:  See below     Medication: Warfarin (Coumadin)           5 mg    5 mg    5 mg    5 mg    2.5 mg    5 mg    5 mg      1 tab(s)    1 tab(s)    1  tab(s)    1 tab(s)    1/2 tab(s)    1 tab(s)  1 tab(s)     Next Appointment: Tuesday, Feb 16 @ 3:00    Pt on antiplatelet therapy Plavix 75mg for coronary artery occlusion with stent indef per cardiology.    Review all of your home medications and newly ordered medications with your doctor and / or pharmacist. Follow medication instructions as directed by your doctor and / or pharmacist. Please keep your medication list with you and share with your physician. Update the information when medications are discontinued, doses are changed, or new medications (including over-the-counter products) are added; and carry medication information at all times in the event of emergency situations.      For questions, please contact Outpatient Anticoagulation Service [phone].   The patient is on a high risk medication and is sub- therapeutic. This could lead to clot formation or risk of stroke. Therefore this patient requires close monitoring and follow up.        CHEST guidelines recommend frequent INR monitoring at regular intervals (a few days up to a max of 12 weeks) to ensure they are on the proper dose of warfarin and not having any complications from therapy.  INRs can dramatically change over a short time period due to diet, medications, and medical conditions.   The patient instructed to go to the ER for falls with a head injury,  blood in urine or stool or any bleeding that last longer than 20 min.     APRIL Garcia.

## 2021-02-16 ENCOUNTER — APPOINTMENT (OUTPATIENT)
Dept: MEDICAL GROUP | Facility: PHYSICIAN GROUP | Age: 76
End: 2021-02-16
Payer: MEDICARE

## 2021-02-16 ENCOUNTER — ANTICOAGULATION MONITORING (OUTPATIENT)
Dept: VASCULAR LAB | Facility: MEDICAL CENTER | Age: 76
End: 2021-02-16
Payer: MEDICARE

## 2021-02-16 ENCOUNTER — NON-PROVIDER VISIT (OUTPATIENT)
Dept: MEDICAL GROUP | Facility: PHYSICIAN GROUP | Age: 76
End: 2021-02-16
Payer: MEDICARE

## 2021-02-16 VITALS
SYSTOLIC BLOOD PRESSURE: 128 MMHG | OXYGEN SATURATION: 90 % | DIASTOLIC BLOOD PRESSURE: 82 MMHG | HEART RATE: 83 BPM | TEMPERATURE: 98.1 F | RESPIRATION RATE: 16 BRPM

## 2021-02-16 DIAGNOSIS — Z79.01 CHRONIC ANTICOAGULATION: ICD-10-CM

## 2021-02-16 DIAGNOSIS — Z79.01 LONG TERM CURRENT USE OF ANTICOAGULANT THERAPY: ICD-10-CM

## 2021-02-16 DIAGNOSIS — I48.91 ATRIAL FIBRILLATION WITH RAPID VENTRICULAR RESPONSE (HCC): ICD-10-CM

## 2021-02-16 LAB
INR BLD: 2.2 (ref 0.9–1.2)
INR PPP: 2.2 (ref 2–3.5)
POC PROTIME: ABNORMAL

## 2021-02-16 PROCEDURE — 99213 OFFICE O/P EST LOW 20 MIN: CPT | Performed by: NURSE PRACTITIONER

## 2021-02-16 PROCEDURE — 85610 PROTHROMBIN TIME: CPT | Performed by: FAMILY MEDICINE

## 2021-02-16 PROCEDURE — 99999 PR NO CHARGE: CPT | Performed by: INTERNAL MEDICINE

## 2021-02-16 NOTE — PROGRESS NOTES
OP Anticoagulation Service Note    Date: 2021  Blood Pressure : 128/82  Pulse: 83  Respiration: 16    Anticoagulation Summary  As of 2021    INR goal:  2.0-3.0   TTR:  66.4 % (5.5 y)   INR used for dosin.20 (2021)   Warfarin maintenance plan:  2.5 mg (5 mg x 0.5) every Thu; 5 mg (5 mg x 1) all other days   Weekly warfarin total:  32.5 mg   Plan last modified:  MARIAA Garcia (2021)   Next INR check:     Target end date:  Indefinite    Indications    Atrial fibrillation with rapid ventricular response (HCC) [I48.91]  Long term current use of anticoagulant therapy [Z79.01]  Atrial fibrillation (HCC) (Resolved) [I48.91]             Anticoagulation Episode Summary     INR check location:      Preferred lab:      Send INR reminders to:      Comments:        Anticoagulation Care Providers     Provider Role Specialty Phone number    Sanjay Sorensen M.D. Referring Cardiology [phone]        Anticoagulation Patient Findings  Patient Findings     Negatives:  Signs/symptoms of thrombosis, Signs/symptoms of bleeding, Change in health, Change in alcohol use, Change in activity, Missed doses, Extra doses, Change in medications, Change in diet/appetite, Bruising          THIS VISIT CONDUCTED WITH [employer] SECURE AND ENCRYPTED VIDEOCONFERENCING EQUIPMENT  ROS:    Pulm: Denies SOB, chest pain.    Card: Denies syncope, edema, palpitations.    Extremities: Denies redness, pain.     PE:    Pulm: No SOB, even and unlabored.    Card: Normal rate and rhythm.    Extremities: No redness, or edema.     INR  is-therapeutic.    Denies signs/symptoms of bleeding and/or thrombosis.    Denies changes to diet or medications.   Follow up appt in 2 weeks     Plan:  Continue current medication regimen.     Medication: Warfarin (Coumadin)           5 mg    5 mg    5 mg    5 mg    2.5 mg    5 mg    5 mg      1  tab(s)    1 tab(s)    1 tab(s)    1 tab(s)    1/2 tab(s)    1 tab(s)  1 tab(s)     Next Appointment: Tuesday, March 2 @ 1:15    Pt on antiplatelet therapy Plavix 75mg for coronary artery occlusion with stent indef per cardiology    Review all of your home medications and newly ordered medications with your doctor and / or pharmacist. Follow medication instructions as directed by your doctor and / or pharmacist. Please keep your medication list with you and share with your physician. Update the information when medications are discontinued, doses are changed, or new medications (including over-the-counter products) are added; and carry medication information at all times in the event of emergency situations.      For questions, please contact Outpatient Anticoagulation Service [phone].      Patient is on a high risk medication and therefore requires close monitoring and follow up.     CHEST guidelines recommend frequent INR monitoring at regular intervals (a few days up to a max of 12 weeks) to ensure they are on the proper dose of warfarin and not having any complications from therapy.  INRs can dramatically change over a short time period due to diet, medications, and medical conditions.   The patient instructed to go to the ER for falls with a head injury,  blood in urine or stool or any bleeding that last longer than 20 min.     APRIL Garcia.

## 2021-02-19 ENCOUNTER — TELEPHONE (OUTPATIENT)
Dept: CARDIOLOGY | Facility: MEDICAL CENTER | Age: 76
End: 2021-02-19

## 2021-02-19 DIAGNOSIS — I48.91 ATRIAL FIBRILLATION WITH RAPID VENTRICULAR RESPONSE (HCC): ICD-10-CM

## 2021-02-19 RX ORDER — WARFARIN SODIUM 5 MG/1
TABLET ORAL
Qty: 90 TABLET | Refills: 1 | Status: SHIPPED | OUTPATIENT
Start: 2021-02-19 | End: 2021-12-20

## 2021-02-19 NOTE — TELEPHONE ENCOUNTER
PIPO    NM: Yuri De León   PH: [phone]   ALT PH: NO   PT NM: Yuri De León   : [birth year]   REG DR: Dr Sorensen   RE: Pharmacy is closed so he would  like to have RX warfrin refilled at  the [facility] in [place] also would like a  call back to confirm its been sent    DISP HIST: 2021 12:27P SM p/disp  2021 12:41P DSC chkd      Thank you,    Bettie RDZ

## 2021-02-19 NOTE — PROGRESS NOTES
[facility]    Patient requesting refill on warfarin sent to [facility] in [place].   Called and left patient a VM to inform him that the rx was sent to [facility] in [place] per request.     Kennedi NelsonD

## 2021-03-01 ENCOUNTER — TELEPHONE (OUTPATIENT)
Dept: SLEEP MEDICINE | Facility: MEDICAL CENTER | Age: 76
End: 2021-03-01

## 2021-03-01 DIAGNOSIS — J44.9 CHRONIC OBSTRUCTIVE PULMONARY DISEASE, UNSPECIFIED COPD TYPE (HCC): ICD-10-CM

## 2021-03-01 RX ORDER — ALBUTEROL SULFATE 2.5 MG/3ML
2.5 SOLUTION RESPIRATORY (INHALATION) EVERY 4 HOURS PRN
Qty: 120 BULLET | Refills: 2 | Status: SHIPPED | OUTPATIENT
Start: 2021-03-01 | End: 2023-11-02

## 2021-03-01 RX ORDER — TIOTROPIUM BROMIDE INHALATION SPRAY 1.56 UG/1
2 SPRAY, METERED RESPIRATORY (INHALATION) DAILY
Qty: 1 EACH | Refills: 11 | Status: SHIPPED | OUTPATIENT
Start: 2021-03-01 | End: 2021-07-14

## 2021-03-01 NOTE — TELEPHONE ENCOUNTER
Advise using symbicort 2 puffs twice daily WITH spacer, rinse mouth.  Continue nebulizer every 4-6hrs as needed.  We could try adding spiriva respimat 1.25mcg 2 puffs once daily with his symbicort to see if this opens him up more.  When did he take prednisone last and how frequent has he been using this?  RX for albuterol signed and for spiriva to start.

## 2021-03-01 NOTE — TELEPHONE ENCOUNTER
His prednisone is 10 mg.  He took 3 for 3 days and is now on 2 for 3 days.  He last took it this morning.

## 2021-03-01 NOTE — TELEPHONE ENCOUNTER
The patient stated that when he uses the Symbicort,it is not opening up him up at all.  He is having shortness of breath and is coughing up white with pinkish tinge to it sometimes.  Other times it is just white.  He doesn't have a fever or any other symptoms.  He has been using his nebulizer and said that it works well.  He had some prednisone left over from a time before and said that is working really good.  He is asking or a refill of prednisone as he only has enough for a couple of days.  He also needs a refill of Albuterol for the nebulizer.  Please advise, thank you.

## 2021-03-01 NOTE — TELEPHONE ENCOUNTER
Caller: Ken    Phone Number: [phone] (home)     Message: Pt called and lm. He is having a hard time with Symbicort.  It doesn't last long.      03/01/21:  Called pt and lm.  Asking pt to return my call regarding his message he left.   Just need to ask more questions regarding it.   Sent [employer] message also

## 2021-03-02 ENCOUNTER — ANTICOAGULATION MONITORING (OUTPATIENT)
Dept: VASCULAR LAB | Facility: MEDICAL CENTER | Age: 76
End: 2021-03-02
Payer: MEDICARE

## 2021-03-02 ENCOUNTER — APPOINTMENT (OUTPATIENT)
Dept: MEDICAL GROUP | Facility: PHYSICIAN GROUP | Age: 76
End: 2021-03-02
Payer: MEDICARE

## 2021-03-02 ENCOUNTER — NON-PROVIDER VISIT (OUTPATIENT)
Dept: MEDICAL GROUP | Facility: PHYSICIAN GROUP | Age: 76
End: 2021-03-02
Payer: MEDICARE

## 2021-03-02 VITALS
DIASTOLIC BLOOD PRESSURE: 92 MMHG | RESPIRATION RATE: 12 BRPM | HEART RATE: 93 BPM | SYSTOLIC BLOOD PRESSURE: 132 MMHG | OXYGEN SATURATION: 91 %

## 2021-03-02 DIAGNOSIS — I48.91 ATRIAL FIBRILLATION WITH RAPID VENTRICULAR RESPONSE (HCC): ICD-10-CM

## 2021-03-02 DIAGNOSIS — Z79.01 CHRONIC ANTICOAGULATION: ICD-10-CM

## 2021-03-02 DIAGNOSIS — Z79.01 LONG TERM CURRENT USE OF ANTICOAGULANT THERAPY: ICD-10-CM

## 2021-03-02 LAB
INR BLD: 5.3 (ref 0.9–1.2)
INR PPP: 5.3 (ref 2–3.5)
POC PROTIME: ABNORMAL

## 2021-03-02 PROCEDURE — 85610 PROTHROMBIN TIME: CPT | Performed by: FAMILY MEDICINE

## 2021-03-02 PROCEDURE — 99213 OFFICE O/P EST LOW 20 MIN: CPT | Performed by: NURSE PRACTITIONER

## 2021-03-02 PROCEDURE — 99999 PR NO CHARGE: CPT | Performed by: NURSE PRACTITIONER

## 2021-03-02 NOTE — PROGRESS NOTES
OP Anticoagulation Service Note    Date: 3/2/2021  Blood Pressure : 132/92  Pulse: 93  Respiration: 12    Anticoagulation Summary  As of 3/2/2021    INR goal:  2.0-3.0   TTR:  66.1 % (5.6 y)   INR used for dosin.30 (3/2/2021)   Warfarin maintenance plan:  2.5 mg (5 mg x 0.5) every Thu; 5 mg (5 mg x 1) all other days   Weekly warfarin total:  32.5 mg   Plan last modified:  MARIAA Garcia (2021)   Next INR check:  3/9/2021   Target end date:  Indefinite    Indications    Atrial fibrillation with rapid ventricular response (HCC) [I48.91]  Long term current use of anticoagulant therapy [Z79.01]  Atrial fibrillation (HCC) (Resolved) [I48.91]             Anticoagulation Episode Summary     INR check location:      Preferred lab:      Send INR reminders to:      Comments:        Anticoagulation Care Providers     Provider Role Specialty Phone number    Sanjay Sorensen M.D. Referring Cardiology [phone]        Anticoagulation Patient Findings  Patient Findings     Positives:  Change in medications    Negatives:  Signs/symptoms of thrombosis, Signs/symptoms of bleeding, Change in health, Change in alcohol use, Change in activity, Missed doses, Extra doses, Change in diet/appetite, Bruising          THIS VISIT CONDUCTED WITH [employer] UT[employer] SECURE AND ENCRYPTED VIDEOCONFERENCING EQUIPMENT  ROS:    Pulm: Denies SOB, chest pain.    Card: Denies syncope, edema, palpitations.    Extremities: Denies redness, pain.     PE:    Pulm: No SOB, even and unlabored.    Card: Normal rate and rhythm.    Extremities: No redness, or edema.     INR  supra-therapeutic.  Started on Prednisone   Denies signs/symptoms of bleeding and/or thrombosis.    Denies changes to diet or medications.   Follow up appt in 1 weeks     Plan:  Hold dose tonight and 1/2 dose tomorrow nigh then back to usual dose    Medication: Warfarin (Coumadin)          Saturday      5 mg    5 mg    0 mg    2.5 mg    5 mg    5 mg    5 mg      1 tab(s)    1 tab(s)    0 tab(s)    1/2 tab(s)    1 tab(s)    1 tab(s)  1 tab(s)     Next Appointment: Tuesday, March 9 @ 3:45   Pt on antiplatelet therapy Plavix 75mg for coronary artery occlusion with stent indef per cardiology.    Review all of your home medications and newly ordered medications with your doctor and / or pharmacist. Follow medication instructions as directed by your doctor and / or pharmacist. Please keep your medication list with you and share with your physician. Update the information when medications are discontinued, doses are changed, or new medications (including over-the-counter products) are added; and carry medication information at all times in the event of emergency situations.      For questions, please contact Outpatient Anticoagulation Service [phone].        The patient is on a high risk medication and is supra-therapeudic. This increases the patients risk of bleeding and therefore requires frequent monitoring and follow up.          CHEST guidelines recommend frequent INR monitoring at regular intervals (a few days up to a max of 12 weeks) to ensure they are on the proper dose of warfarin and not having any complications from therapy.  INRs can dramatically change over a short time period due to diet, medications, and medical conditions.   The patient instructed to go to the ER for falls with a head injury,  blood in urine or stool or any bleeding that last longer than 20 min.     APRIL Garcia.

## 2021-03-08 ENCOUNTER — HOSPITAL ENCOUNTER (OUTPATIENT)
Dept: LAB | Facility: MEDICAL CENTER | Age: 76
End: 2021-03-08
Attending: INTERNAL MEDICINE
Payer: MEDICARE

## 2021-03-08 PROCEDURE — 82570 ASSAY OF URINE CREATININE: CPT

## 2021-03-08 PROCEDURE — 84550 ASSAY OF BLOOD/URIC ACID: CPT

## 2021-03-08 PROCEDURE — 83970 ASSAY OF PARATHORMONE: CPT

## 2021-03-08 PROCEDURE — 36415 COLL VENOUS BLD VENIPUNCTURE: CPT

## 2021-03-08 PROCEDURE — 80069 RENAL FUNCTION PANEL: CPT

## 2021-03-08 PROCEDURE — 81001 URINALYSIS AUTO W/SCOPE: CPT

## 2021-03-08 PROCEDURE — 82306 VITAMIN D 25 HYDROXY: CPT

## 2021-03-08 PROCEDURE — 85025 COMPLETE CBC W/AUTO DIFF WBC: CPT

## 2021-03-08 PROCEDURE — 84156 ASSAY OF PROTEIN URINE: CPT

## 2021-03-09 ENCOUNTER — ANTICOAGULATION MONITORING (OUTPATIENT)
Dept: VASCULAR LAB | Facility: MEDICAL CENTER | Age: 76
End: 2021-03-09
Payer: MEDICARE

## 2021-03-09 ENCOUNTER — APPOINTMENT (OUTPATIENT)
Dept: MEDICAL GROUP | Facility: PHYSICIAN GROUP | Age: 76
End: 2021-03-09
Payer: MEDICARE

## 2021-03-09 ENCOUNTER — NON-PROVIDER VISIT (OUTPATIENT)
Dept: MEDICAL GROUP | Facility: PHYSICIAN GROUP | Age: 76
End: 2021-03-09
Payer: MEDICARE

## 2021-03-09 VITALS
HEART RATE: 93 BPM | DIASTOLIC BLOOD PRESSURE: 80 MMHG | RESPIRATION RATE: 16 BRPM | SYSTOLIC BLOOD PRESSURE: 136 MMHG | OXYGEN SATURATION: 90 %

## 2021-03-09 DIAGNOSIS — Z79.01 LONG TERM CURRENT USE OF ANTICOAGULANT THERAPY: ICD-10-CM

## 2021-03-09 DIAGNOSIS — I48.91 ATRIAL FIBRILLATION WITH RAPID VENTRICULAR RESPONSE (HCC): ICD-10-CM

## 2021-03-09 DIAGNOSIS — Z79.01 CHRONIC ANTICOAGULATION: ICD-10-CM

## 2021-03-09 LAB
25(OH)D3 SERPL-MCNC: 36 NG/ML (ref 30–100)
ALBUMIN SERPL BCP-MCNC: 3.4 G/DL (ref 3.2–4.9)
APPEARANCE UR: CLEAR
BACTERIA #/AREA URNS HPF: NEGATIVE /HPF
BASOPHILS # BLD AUTO: 0.5 % (ref 0–1.8)
BASOPHILS # BLD: 0.08 K/UL (ref 0–0.12)
BILIRUB UR QL STRIP.AUTO: NEGATIVE
BUN SERPL-MCNC: 35 MG/DL (ref 8–22)
CALCIUM SERPL-MCNC: 8.9 MG/DL (ref 8.5–10.5)
CHLORIDE SERPL-SCNC: 104 MMOL/L (ref 96–112)
CO2 SERPL-SCNC: 25 MMOL/L (ref 20–33)
COLOR UR: YELLOW
CREAT SERPL-MCNC: 1.34 MG/DL (ref 0.5–1.4)
CREAT UR-MCNC: 56.5 MG/DL
EOSINOPHIL # BLD AUTO: 0.27 K/UL (ref 0–0.51)
EOSINOPHIL NFR BLD: 1.6 % (ref 0–6.9)
EPI CELLS #/AREA URNS HPF: NEGATIVE /HPF
ERYTHROCYTE [DISTWIDTH] IN BLOOD BY AUTOMATED COUNT: 54.1 FL (ref 35.9–50)
GLUCOSE SERPL-MCNC: 211 MG/DL (ref 65–99)
GLUCOSE UR STRIP.AUTO-MCNC: NEGATIVE MG/DL
HCT VFR BLD AUTO: 48 % (ref 42–52)
HGB BLD-MCNC: 14.8 G/DL (ref 14–18)
HYALINE CASTS #/AREA URNS LPF: ABNORMAL /LPF
IMM GRANULOCYTES # BLD AUTO: 0.08 K/UL (ref 0–0.11)
IMM GRANULOCYTES NFR BLD AUTO: 0.5 % (ref 0–0.9)
INR BLD: 4 (ref 0.9–1.2)
INR PPP: 4 (ref 2–3.5)
KETONES UR STRIP.AUTO-MCNC: NEGATIVE MG/DL
LEUKOCYTE ESTERASE UR QL STRIP.AUTO: NEGATIVE
LYMPHOCYTES # BLD AUTO: 0.67 K/UL (ref 1–4.8)
LYMPHOCYTES NFR BLD: 3.9 % (ref 22–41)
MCH RBC QN AUTO: 27.8 PG (ref 27–33)
MCHC RBC AUTO-ENTMCNC: 30.8 G/DL (ref 33.7–35.3)
MCV RBC AUTO: 90.2 FL (ref 81.4–97.8)
MICRO URNS: ABNORMAL
MONOCYTES # BLD AUTO: 1.7 K/UL (ref 0–0.85)
MONOCYTES NFR BLD AUTO: 9.8 % (ref 0–13.4)
NEUTROPHILS # BLD AUTO: 14.48 K/UL (ref 1.82–7.42)
NEUTROPHILS NFR BLD: 83.7 % (ref 44–72)
NITRITE UR QL STRIP.AUTO: NEGATIVE
NRBC # BLD AUTO: 0 K/UL
NRBC BLD-RTO: 0 /100 WBC
PH UR STRIP.AUTO: 5.5 [PH] (ref 5–8)
PHOSPHATE SERPL-MCNC: 2.3 MG/DL (ref 2.5–4.5)
PLATELET # BLD AUTO: 241 K/UL (ref 164–446)
PMV BLD AUTO: 10.6 FL (ref 9–12.9)
POC PROTIME: ABNORMAL
POTASSIUM SERPL-SCNC: 5.2 MMOL/L (ref 3.6–5.5)
PROT UR QL STRIP: 100 MG/DL
PROT UR-MCNC: 124 MG/DL (ref 0–15)
PROT/CREAT UR: 2195 MG/G (ref 15–68)
PTH-INTACT SERPL-MCNC: 123 PG/ML (ref 14–72)
RBC # BLD AUTO: 5.32 M/UL (ref 4.7–6.1)
RBC # URNS HPF: ABNORMAL /HPF
RBC UR QL AUTO: ABNORMAL
SODIUM SERPL-SCNC: 136 MMOL/L (ref 135–145)
SP GR UR STRIP.AUTO: 1.02
URATE SERPL-MCNC: 5.6 MG/DL (ref 2.5–8.3)
UROBILINOGEN UR STRIP.AUTO-MCNC: 0.2 MG/DL
WBC # BLD AUTO: 17.3 K/UL (ref 4.8–10.8)
WBC #/AREA URNS HPF: ABNORMAL /HPF

## 2021-03-09 PROCEDURE — 99213 OFFICE O/P EST LOW 20 MIN: CPT | Performed by: NURSE PRACTITIONER

## 2021-03-09 PROCEDURE — 99999 PR NO CHARGE: CPT | Performed by: NURSE PRACTITIONER

## 2021-03-09 PROCEDURE — 85610 PROTHROMBIN TIME: CPT | Performed by: PHYSICIAN ASSISTANT

## 2021-03-09 NOTE — PROGRESS NOTES
OP Anticoagulation Service Note    Date: 3/9/2021  Blood Pressure : 136/80  Pulse: 93  Respiration: 16    Anticoagulation Summary  As of 3/9/2021    INR goal:  2.0-3.0   TTR:  65.8 % (5.6 y)   INR used for dosin.00 (3/9/2021)   Warfarin maintenance plan:  2.5 mg (5 mg x 0.5) every Thu; 5 mg (5 mg x 1) all other days   Weekly warfarin total:  32.5 mg   Plan last modified:  MARIAA Garcia (2021)   Next INR check:  3/16/2021   Target end date:  Indefinite    Indications    Atrial fibrillation with rapid ventricular response (HCC) [I48.91]  Long term current use of anticoagulant therapy [Z79.01]  Atrial fibrillation (HCC) (Resolved) [I48.91]             Anticoagulation Episode Summary     INR check location:      Preferred lab:      Send INR reminders to:      Comments:        Anticoagulation Care Providers     Provider Role Specialty Phone number    Sanjay Sorensen M.D. Referring Cardiology [phone]        Anticoagulation Patient Findings  Patient Findings     Negatives:  Signs/symptoms of thrombosis, Signs/symptoms of bleeding, Change in health, Change in alcohol use, Change in activity, Missed doses, Extra doses, Change in medications, Change in diet/appetite, Bruising          THIS VISIT CONDUCTED WITH [employer] UTIL[employer]ING SECURE AND ENCRYPTED VIDEOCONFERENCING EQUIPMENT  ROS:    Pulm: Denies SOB, chest pain.    Card: Denies syncope, edema, palpitations.    Extremities: Denies redness, pain.     PE:    Pulm: No SOB, even and unlabored.    Card: Normal rate and rhythm.    Extremities: No redness, or edema.     INR  supra-therapeutic.  Prednisone completed on Friday  Denies signs/symptoms of bleeding and/or thrombosis.    Denies changes to diet or medications.   Follow up appt in 1 weeks     Plan:  Hold dose tonight     Medication: Warfarin (Coumadin)           5 mg    5 mg    0 mg    5 mg    5 mg    5 mg     5 mg      1 tab(s)    1 tab(s)    0 tab(s)    1 tab(s)    1 tab(s)    1 tab(s)  1 tab(s)     Next Appointment: Tuesday, March 16 @  2:45  Pt on antiplatelet therapy Plavix 75mg for coronary artery occlusion with stent indef per cardiology.    Review all of your home medications and newly ordered medications with your doctor and / or pharmacist. Follow medication instructions as directed by your doctor and / or pharmacist. Please keep your medication list with you and share with your physician. Update the information when medications are discontinued, doses are changed, or new medications (including over-the-counter products) are added; and carry medication information at all times in the event of emergency situations.      For questions, please contact Outpatient Anticoagulation Service [phone].        The patient is on a high risk medication and is supra-therapeudic. This increases the patients risk of bleeding and therefore requires frequent monitoring and follow up.          CHEST guidelines recommend frequent INR monitoring at regular intervals (a few days up to a max of 12 weeks) to ensure they are on the proper dose of warfarin and not having any complications from therapy.  INRs can dramatically change over a short time period due to diet, medications, and medical conditions.   The patient instructed to go to the ER for falls with a head injury,  blood in urine or stool or any bleeding that last longer than 20 min.     APRIL Garcia.

## 2021-03-16 ENCOUNTER — ANTICOAGULATION MONITORING (OUTPATIENT)
Dept: VASCULAR LAB | Facility: MEDICAL CENTER | Age: 76
End: 2021-03-16
Payer: MEDICARE

## 2021-03-16 ENCOUNTER — NON-PROVIDER VISIT (OUTPATIENT)
Dept: MEDICAL GROUP | Facility: PHYSICIAN GROUP | Age: 76
End: 2021-03-16
Payer: MEDICARE

## 2021-03-16 ENCOUNTER — APPOINTMENT (OUTPATIENT)
Dept: MEDICAL GROUP | Facility: PHYSICIAN GROUP | Age: 76
End: 2021-03-16
Payer: MEDICARE

## 2021-03-16 VITALS
DIASTOLIC BLOOD PRESSURE: 62 MMHG | HEART RATE: 102 BPM | RESPIRATION RATE: 12 BRPM | OXYGEN SATURATION: 90 % | SYSTOLIC BLOOD PRESSURE: 126 MMHG

## 2021-03-16 DIAGNOSIS — Z79.01 CHRONIC ANTICOAGULATION: ICD-10-CM

## 2021-03-16 DIAGNOSIS — Z79.01 LONG TERM CURRENT USE OF ANTICOAGULANT THERAPY: ICD-10-CM

## 2021-03-16 DIAGNOSIS — I48.91 ATRIAL FIBRILLATION WITH RAPID VENTRICULAR RESPONSE (HCC): ICD-10-CM

## 2021-03-16 LAB
INR BLD: 3.3 (ref 0.9–1.2)
INR PPP: 3.3 (ref 2–3.5)
POC PROTIME: ABNORMAL

## 2021-03-16 PROCEDURE — 99999 PR NO CHARGE: CPT | Performed by: NURSE PRACTITIONER

## 2021-03-16 PROCEDURE — 85610 PROTHROMBIN TIME: CPT | Performed by: NURSE PRACTITIONER

## 2021-03-16 PROCEDURE — 99213 OFFICE O/P EST LOW 20 MIN: CPT | Performed by: NURSE PRACTITIONER

## 2021-03-16 NOTE — PROGRESS NOTES
OP Anticoagulation Service Note    Date: 3/16/2021  Blood Pressure : 126/62  Pulse: (!) 102  Respiration: 12    Anticoagulation Summary  As of 3/16/2021    INR goal:  2.0-3.0   TTR:  65.6 % (5.6 y)   INR used for dosing:  3.30 (3/16/2021)   Warfarin maintenance plan:  2.5 mg (5 mg x 0.5) every Thu; 5 mg (5 mg x 1) all other days   Weekly warfarin total:  32.5 mg   Plan last modified:  MARIAA Garcia (2/2/2021)   Next INR check:  3/23/2021   Target end date:  Indefinite    Indications    Atrial fibrillation with rapid ventricular response (HCC) [I48.91]  Long term current use of anticoagulant therapy [Z79.01]  Atrial fibrillation (HCC) (Resolved) [I48.91]             Anticoagulation Episode Summary     INR check location:      Preferred lab:      Send INR reminders to:      Comments:        Anticoagulation Care Providers     Provider Role Specialty Phone number    Sanjay Sorensen M.D. Referring Cardiology [phone]        Anticoagulation Patient Findings  Patient Findings     Negatives:  Signs/symptoms of thrombosis, Signs/symptoms of bleeding, Change in health, Change in alcohol use, Change in activity, Missed doses, Change in medications, Change in diet/appetite, Bruising          THIS VISIT CONDUCTED WITH [employer] UT[employer] SECURE AND ENCRYPTED VIDEOCONFERENCING EQUIPMENT  ROS:    Pulm: Denies SOB, chest pain.    Card: Denies syncope, edema, palpitations.    Extremities: Denies redness, pain.     PE:    Pulm: No SOB, even and unlabored.    Card: Normal rate and rhythm.    Extremities: No redness, or edema.     INR  Supra -therapeutic.    Denies signs/symptoms of bleeding and/or thrombosis.    Denies changes to diet or medications.   Follow up appt in 2 weeks unable to come in on the 23      Plan:  .decrease dose see below     Medication: Warfarin (Coumadin)     Sunday Monday Tuesday Wednesday Thursday Friday Saturday      5 mg    5 mg    2.5 mg    5 mg    2.5 mg    5  mg    5 mg      1 tab(s)    1 tab(s)    1/2 tab(s)    1 tab(s)    1/2 tab(s)    1 tab(s)  1 tab(s)     Next Appointment: Tuesday, March 30 @  1:45  Pt on antiplatelet therapy {ANTI PLATELET Pt on antiplatelet therapy Plavix 75mg for coronary artery occlusion with stent indef per cardiology..    Review all of your home medications and newly ordered medications with your doctor and / or pharmacist. Follow medication instructions as directed by your doctor and / or pharmacist. Please keep your medication list with you and share with your physician. Update the information when medications are discontinued, doses are changed, or new medications (including over-the-counter products) are added; and carry medication information at all times in the event of emergency situations.      For questions, please contact Outpatient Anticoagulation Service [phone].   .     The patient is on a high risk medication and is supra-therapeudic. This increases the patients risk of bleeding and therefore requires frequent monitoring and follow up.           CHEST guidelines recommend frequent INR monitoring at regular intervals (a few days up to a max of 12 weeks) to ensure they are on the proper dose of warfarin and not having any complications from therapy.  INRs can dramatically change over a short time period due to diet, medications, and medical conditions.   The patient instructed to go to the ER for falls with a head injury,  blood in urine or stool or any bleeding that last longer than 20 min.     APRIL Garcia.

## 2021-03-30 ENCOUNTER — APPOINTMENT (OUTPATIENT)
Dept: MEDICAL GROUP | Facility: PHYSICIAN GROUP | Age: 76
End: 2021-03-30
Payer: MEDICARE

## 2021-03-30 ENCOUNTER — NON-PROVIDER VISIT (OUTPATIENT)
Dept: MEDICAL GROUP | Facility: PHYSICIAN GROUP | Age: 76
End: 2021-03-30
Payer: MEDICARE

## 2021-03-30 ENCOUNTER — ANTICOAGULATION MONITORING (OUTPATIENT)
Dept: VASCULAR LAB | Facility: MEDICAL CENTER | Age: 76
End: 2021-03-30

## 2021-03-30 VITALS
HEART RATE: 85 BPM | SYSTOLIC BLOOD PRESSURE: 128 MMHG | DIASTOLIC BLOOD PRESSURE: 84 MMHG | RESPIRATION RATE: 14 BRPM | OXYGEN SATURATION: 90 %

## 2021-03-30 DIAGNOSIS — Z79.01 CHRONIC ANTICOAGULATION: ICD-10-CM

## 2021-03-30 DIAGNOSIS — Z79.01 LONG TERM CURRENT USE OF ANTICOAGULANT THERAPY: ICD-10-CM

## 2021-03-30 DIAGNOSIS — I48.91 ATRIAL FIBRILLATION WITH RAPID VENTRICULAR RESPONSE (HCC): ICD-10-CM

## 2021-03-30 LAB
INR BLD: 2.9 (ref 0.9–1.2)
INR PPP: 2.9 (ref 2–3.5)
POC PROTIME: ABNORMAL

## 2021-03-30 PROCEDURE — 99999 PR NO CHARGE: CPT | Performed by: NURSE PRACTITIONER

## 2021-03-30 PROCEDURE — 85610 PROTHROMBIN TIME: CPT | Performed by: NURSE PRACTITIONER

## 2021-03-30 NOTE — PROGRESS NOTES
OP Anticoagulation Service Note    Date:   Date: 3/30/2021    Anticoagulation Summary  As of 3/30/2021    INR goal:  2.0-3.0   TTR:  65.3 % (5.6 y)   INR used for dosin.90 (3/30/2021)   Warfarin maintenance plan:  2.5 mg (5 mg x 0.5) every Thu; 5 mg (5 mg x 1) all other days   Weekly warfarin total:  32.5 mg   Plan last modified:  MARAIA Garcia (2021)   Next INR check:  2021   Target end date:  Indefinite    Indications    Atrial fibrillation with rapid ventricular response (HCC) [I48.91]  Long term current use of anticoagulant therapy [Z79.01]  Atrial fibrillation (HCC) (Resolved) [I48.91]             Anticoagulation Episode Summary     INR check location:      Preferred lab:      Send INR reminders to:      Comments:        Anticoagulation Care Providers     Provider Role Specialty Phone number    Sanjay Sorensen M.D. Referring Cardiology [phone]        Anticoagulation Patient Findings      vitals included with today's appt:  Vitals:    21 1340   BP: 128/84   Pulse: 85   Resp: 14   SpO2: 90%       HPI:     THIS VISIT CONDUCTED WITH [employer] UT[employer] SECURE AND ENCRYPTED VIDEOCONFERENCING EQUIPMENT    The reason for today's appt is to prevent morbidity and mortality from a blood clot and/or stroke and to reduce the risk of bleeding while on a anticoagulant.       Assessment:     • INR  therapeutic.   • Confirmed warfarin dosing regimen: Yes  • Interval Changes with foods rich in vitamin K: No  • Interval Changes in ETOH or cranberries:   No  • Interval Changes in smoking status:  No  • S/S of bleeding or bruising:  No  • Signs/symptoms  thrombosis since the last appt:  No  • Any upcoming procedures that require stopping warfarin and/or using bridge therapy: None  • Interval Changes in medication:  No   • Is pt on antiplatelet/NSAID therapy: Plavix 75mg for stents and must be reviewed again by cardiology yearly.      Current Outpatient Medications:   •   albuterol, 2.5 mg, Nebulization, Q4HRS PRN  •  Spiriva Respimat, 2 Inhalation, Oral, DAILY  •  warfarin, Take one-half tablet (2.5mg) on Thursdays and one tablet (5mg) all other days OR as directed by the Anticoagulation clinic.  •  budesonide-formoterol, 2 Puff, Inhalation, BID  •  clopidogrel, TAKE ONE TABLET BY MOUTH EVERY DAY  •  albuterol, 2 Puff, Inhalation, Q6HRS PRN  •  TRUEplus Pen Needles, USE 2 TO 3 NEEDLES PER DAY  •  NovoLOG FlexPen, INJECT 5-10 UNITS THREE TIMES A DAY AS NEEDED  •  insulin detemir, 14 Units, Subcutaneous, Q EVENING  •  insulin lispro, 5-10 Units, Subcutaneous, PRN  •  DILTIAZem, 60 mg, Oral, TID      Plan:     • Continue the same warfarin dose, as noted above.     Medication: Warfarin (Coumadin)     Sunday Monday Tuesday Wednesday Thursday Friday Saturday   5 mg 5 mg 5 mg 5 mg 2.5 mg 5 mg    5 mg      1 tab(s)    1 tab(s)    1 tab(s)    1 tab(s)    0.5 tab(s)    1 tab(s)  1 tab(s)        Follow-up:     • Follow up appt in 4 weeks   • Next appointment on: 4/27/21 at 1:30pm   • This decision was made using shared decision making with the pt and benefits vs risks were discussed.      Additional information discussed with patient:     • Pt educated to contact our clinic with any changes in medications or s/s of bleeding or thrombosis.  • Education was provided today regarding tips to reduce their bleed risk and dietary constraints while on a anticoagulant.    National recommendations regarding anticoagulation therapy:     The CHEST guidelines recommends frequent INR monitoring at regular intervals (a few days up to a max of 12 weeks) to ensure patients are on the proper dose of warfarin, and patients are not having any complications from therapy.  INRs can dramatically change over a short time period due to diet, medications, and medical conditions.       Ilya Rachel, PharmD, MS, BCACP, [facility]  Phone [phone] fax  [phone]    This note was created using voice recognition software (Dragon). The accuracy of the dictation is limited by the abilities of the software. I have reviewed the note prior to signing, however some errors in grammar and context are still possible. If you have any questions related to this note please do not hesitate to contact our office.

## 2021-04-27 ENCOUNTER — NON-PROVIDER VISIT (OUTPATIENT)
Dept: MEDICAL GROUP | Facility: PHYSICIAN GROUP | Age: 76
End: 2021-04-27
Payer: MEDICARE

## 2021-04-27 ENCOUNTER — ANTICOAGULATION MONITORING (OUTPATIENT)
Dept: VASCULAR LAB | Facility: MEDICAL CENTER | Age: 76
End: 2021-04-27
Payer: MEDICARE

## 2021-04-27 ENCOUNTER — APPOINTMENT (OUTPATIENT)
Dept: MEDICAL GROUP | Facility: PHYSICIAN GROUP | Age: 76
End: 2021-04-27
Payer: MEDICARE

## 2021-04-27 VITALS
DIASTOLIC BLOOD PRESSURE: 88 MMHG | RESPIRATION RATE: 14 BRPM | OXYGEN SATURATION: 90 % | SYSTOLIC BLOOD PRESSURE: 130 MMHG | HEART RATE: 86 BPM

## 2021-04-27 DIAGNOSIS — Z79.01 CHRONIC ANTICOAGULATION: ICD-10-CM

## 2021-04-27 DIAGNOSIS — Z79.01 LONG TERM CURRENT USE OF ANTICOAGULANT THERAPY: ICD-10-CM

## 2021-04-27 DIAGNOSIS — I48.91 ATRIAL FIBRILLATION WITH RAPID VENTRICULAR RESPONSE (HCC): ICD-10-CM

## 2021-04-27 LAB
INR BLD: 2.8 (ref 0.9–1.2)
INR PPP: 2.8 (ref 2–3.5)
POC PROTIME: ABNORMAL

## 2021-04-27 PROCEDURE — 85610 PROTHROMBIN TIME: CPT | Performed by: FAMILY MEDICINE

## 2021-04-27 PROCEDURE — 99999 PR NO CHARGE: CPT | Performed by: NURSE PRACTITIONER

## 2021-04-27 PROCEDURE — 99213 OFFICE O/P EST LOW 20 MIN: CPT | Performed by: NURSE PRACTITIONER

## 2021-04-27 NOTE — PROGRESS NOTES
OP Anticoagulation Service Note    Date: 2021  Blood Pressure : 130/88  Pulse: 86  Respiration: 14    Anticoagulation Summary  As of 2021    INR goal:  2.0-3.0   TTR:  65.8 % (5.7 y)   INR used for dosin.80 (2021)   Warfarin maintenance plan:  2.5 mg (5 mg x 0.5) every Thu; 5 mg (5 mg x 1) all other days   Weekly warfarin total:  32.5 mg   Plan last modified:  MARIAA Garcia (2021)   Next INR check:  2021   Target end date:  Indefinite    Indications    Atrial fibrillation with rapid ventricular response (HCC) [I48.91]  Long term current use of anticoagulant therapy [Z79.01]  Atrial fibrillation (HCC) (Resolved) [I48.91]             Anticoagulation Episode Summary     INR check location:      Preferred lab:      Send INR reminders to:      Comments:        Anticoagulation Care Providers     Provider Role Specialty Phone number    Sanjay Sorensen M.D. Referring Cardiology [phone]        Anticoagulation Patient Findings  Patient Findings     Negatives:  Signs/symptoms of thrombosis, Signs/symptoms of bleeding, Change in health, Change in alcohol use, Change in activity, Missed doses, Extra doses, Change in medications, Change in diet/appetite, Bruising          THIS VISIT CONDUCTED WITH [employer] UTIL[employer] SECURE AND ENCRYPTED VIDEOCONFERENCING EQUIPMENT  ROS:    Pulm: Denies SOB, chest pain.    Card: Denies syncope, edema, palpitations.    Extremities: Denies redness, pain.     PE:    Pulm: No SOB, even and unlabored.    Card: Normal rate and rhythm.    Extremities: No redness, or edema.     INR  is-therapeutic.    Denies signs/symptoms of bleeding and/or thrombosis.    Denies changes to diet or medications.   Follow up appt in 5 weeks     Plan:  Continue current medication regimen.     Medication: Warfarin (Coumadin)           5 mg    5 mg    5 mg    5 mg    2.5 mg    5 mg    5 mg       1 tab(s)    1 tab(s)    1 tab(s)    1 tab(s)    1/2 tab(s)    1 tab(s)  1 tab(s)     Next Appointment: Tuesday, June 1 @  1:15  per cardiology dual therapy  Pt on antiplatelet therapy Plavix 75mg for coronary artery occlusion with stent indef per cardiology.    Review all of your home medications and newly ordered medications with your doctor and / or pharmacist. Follow medication instructions as directed by your doctor and / or pharmacist. Please keep your medication list with you and share with your physician. Update the information when medications are discontinued, doses are changed, or new medications (including over-the-counter products) are added; and carry medication information at all times in the event of emergency situations.      For questions, please contact Outpatient Anticoagulation Service [phone].        Patient is on a high risk medication and therefore requires close monitoring and follow up.     CHEST guidelines recommend frequent INR monitoring at regular intervals (a few days up to a max of 12 weeks) to ensure they are on the proper dose of warfarin and not having any complications from therapy.  INRs can dramatically change over a short time period due to diet, medications, and medical conditions.   The patient instructed to go to the ER for falls with a head injury,  blood in urine or stool or any bleeding that last longer than 20 min.     APRIL Garcia.

## 2021-05-14 ENCOUNTER — TELEPHONE (OUTPATIENT)
Dept: CARDIOLOGY | Facility: MEDICAL CENTER | Age: 76
End: 2021-05-14

## 2021-05-14 NOTE — TELEPHONE ENCOUNTER
Chart Prep      Spoke to patient regarding fasting labs for CMP and Lipid needing to be completed before 5/21/2021 [facility] with DB. Patient states he will use the [facility] lab in [place]. Verbalizes understanding that labs are to be fasting.    SUMMER LOPEZ IN TO SEE THE PATIENT AND SPEAK TO HER ABOUT THE PACEMAKER AN LIVING WITH A PACER 
AT HOME

## 2021-05-18 ENCOUNTER — HOSPITAL ENCOUNTER (OUTPATIENT)
Dept: LAB | Facility: MEDICAL CENTER | Age: 76
End: 2021-05-18
Attending: INTERNAL MEDICINE
Payer: MEDICARE

## 2021-05-18 DIAGNOSIS — E78.5 DYSLIPIDEMIA: ICD-10-CM

## 2021-05-18 PROCEDURE — 80053 COMPREHEN METABOLIC PANEL: CPT

## 2021-05-18 PROCEDURE — 80061 LIPID PANEL: CPT

## 2021-05-18 PROCEDURE — 36415 COLL VENOUS BLD VENIPUNCTURE: CPT

## 2021-05-19 LAB
ALBUMIN SERPL BCP-MCNC: 3.8 G/DL (ref 3.2–4.9)
ALBUMIN/GLOB SERPL: 1.3 G/DL
ALP SERPL-CCNC: 139 U/L (ref 30–99)
ALT SERPL-CCNC: 17 U/L (ref 2–50)
ANION GAP SERPL CALC-SCNC: 8 MMOL/L (ref 7–16)
AST SERPL-CCNC: 22 U/L (ref 12–45)
BILIRUB SERPL-MCNC: 0.9 MG/DL (ref 0.1–1.5)
BUN SERPL-MCNC: 30 MG/DL (ref 8–22)
CALCIUM SERPL-MCNC: 9.1 MG/DL (ref 8.5–10.5)
CHLORIDE SERPL-SCNC: 106 MMOL/L (ref 96–112)
CHOLEST SERPL-MCNC: 147 MG/DL (ref 100–199)
CO2 SERPL-SCNC: 28 MMOL/L (ref 20–33)
CREAT SERPL-MCNC: 1.55 MG/DL (ref 0.5–1.4)
FASTING STATUS PATIENT QL REPORTED: NORMAL
GLOBULIN SER CALC-MCNC: 3 G/DL (ref 1.9–3.5)
GLUCOSE SERPL-MCNC: 114 MG/DL (ref 65–99)
HDLC SERPL-MCNC: 46 MG/DL
LDLC SERPL CALC-MCNC: 92 MG/DL
POTASSIUM SERPL-SCNC: 4.6 MMOL/L (ref 3.6–5.5)
PROT SERPL-MCNC: 6.8 G/DL (ref 6–8.2)
SODIUM SERPL-SCNC: 142 MMOL/L (ref 135–145)
TRIGL SERPL-MCNC: 46 MG/DL (ref 0–149)

## 2021-05-21 ENCOUNTER — OFFICE VISIT (OUTPATIENT)
Dept: CARDIOLOGY | Facility: MEDICAL CENTER | Age: 76
End: 2021-05-21
Payer: MEDICARE

## 2021-05-21 VITALS
OXYGEN SATURATION: 96 % | BODY MASS INDEX: 36.68 KG/M2 | HEIGHT: 71 IN | RESPIRATION RATE: 16 BRPM | HEART RATE: 86 BPM | DIASTOLIC BLOOD PRESSURE: 82 MMHG | SYSTOLIC BLOOD PRESSURE: 130 MMHG | WEIGHT: 262 LBS

## 2021-05-21 DIAGNOSIS — R93.1 ABNORMAL NUCLEAR CARDIAC IMAGING TEST: ICD-10-CM

## 2021-05-21 DIAGNOSIS — N18.4 CKD (CHRONIC KIDNEY DISEASE) STAGE 4, GFR 15-29 ML/MIN (HCC): ICD-10-CM

## 2021-05-21 DIAGNOSIS — I10 ESSENTIAL HYPERTENSION, BENIGN: ICD-10-CM

## 2021-05-21 DIAGNOSIS — Z79.899 HIGH RISK MEDICATION USE: ICD-10-CM

## 2021-05-21 DIAGNOSIS — R94.31 ABNORMAL ELECTROCARDIOGRAM: Chronic | ICD-10-CM

## 2021-05-21 DIAGNOSIS — Z95.5 S/P CORONARY ARTERY STENT PLACEMENT: ICD-10-CM

## 2021-05-21 DIAGNOSIS — R60.0 LEG EDEMA, LEFT: ICD-10-CM

## 2021-05-21 DIAGNOSIS — E78.5 DYSLIPIDEMIA: ICD-10-CM

## 2021-05-21 DIAGNOSIS — I48.20 ATRIAL FIBRILLATION, CHRONIC (HCC): ICD-10-CM

## 2021-05-21 DIAGNOSIS — I25.10 CORONARY ARTERY DISEASE, OCCLUSIVE: ICD-10-CM

## 2021-05-21 PROCEDURE — 99214 OFFICE O/P EST MOD 30 MIN: CPT | Performed by: NURSE PRACTITIONER

## 2021-05-21 RX ORDER — CELECOXIB 200 MG/1
200 CAPSULE ORAL
COMMUNITY
Start: 2021-03-25 | End: 2021-07-06

## 2021-05-21 RX ORDER — FUROSEMIDE 40 MG/1
40 TABLET ORAL
Qty: 60 TABLET | Refills: 11 | Status: SHIPPED | OUTPATIENT
Start: 2021-05-21 | End: 2021-07-14

## 2021-05-21 RX ORDER — FUROSEMIDE 40 MG/1
40 TABLET ORAL
COMMUNITY
Start: 2021-03-15 | End: 2021-05-21 | Stop reason: SDUPTHER

## 2021-05-21 RX ORDER — POTASSIUM CHLORIDE 750 MG/1
10 CAPSULE, EXTENDED RELEASE ORAL
COMMUNITY
Start: 2021-03-15 | End: 2021-08-27

## 2021-05-21 ASSESSMENT — ENCOUNTER SYMPTOMS
PND: 0
NAUSEA: 0
SHORTNESS OF BREATH: 0
VOMITING: 0
HEADACHES: 0
SPUTUM PRODUCTION: 0
COUGH: 0
CHILLS: 0
PALPITATIONS: 0
CLAUDICATION: 0
DIZZINESS: 0
HEMOPTYSIS: 0
FEVER: 0
ORTHOPNEA: 0
WHEEZING: 0

## 2021-05-21 ASSESSMENT — FIBROSIS 4 INDEX: FIB4 SCORE: 1.68

## 2021-05-21 NOTE — PROGRESS NOTES
Chief Complaint   Patient presents with   • Atrial Fibrillation     F/V Dx: Atrial fibrillation with rapid ventricular response (HCC)   • Coronary Artery Disease     F/V Dx: Coronary artery disease, occlusive   • Hyperlipidemia     F/V Dx: Hyperlipidemia, unspecified hyperlipidemia type       Subjective:   Ken De León is a 76 y.o. male who presents today for follow-up evaluation of CAD, NSTEMI, RCA stent (8/15/2018), chronic fibrillation, chronic anticoagulation, hypertension and hyperlipidemia.  Patient was last seen 7/1/2020 by Dr. Sorensen.     Since 7/1/2020, the patient has had significant lower extremity edema.  He bought pneumatic compression device which pushed the fluid from his feet up into his thighs and abdomen.  He is quite uncomfortable.  His lower extremities are still quite edematous 2-3+ and are now weeping.  Patient's most recent lab work from 5/18/2021 indicates creatinine 1.55 which is fairly consistent with previous creatinine levels.  Patient sees Dr. Austin with nephrology but is requesting referral to a different nephrologist.  His most recent echo from 2018 indicates EF 60% with diastolic dysfunction difficult to assess with atrial fibrillation, severely dilated left atrium, and RVSP 37 mmHg with JVP.  Previous MD suggested furosemide twice a day for 3 days but there was not much improvement.     Since 8/22/2019 the patient's had no cardiac problems or symptoms.  No bleeding problems.  Tolerating medications.     Since 2/5/2019 patient has had no cardiac problems.  In 6/2019 hospitalized after a fall injuring his right knee, resultant cellulitis unresponsive to outpatient oral antibiotics with Keflex requiring hospitalization and IV antibiotics x5 days.  Received IV fluids and during his hospitalization with subsequent significant lower extremity edema responsive to brief course of Lasix and potassium.     Past medical history  Since his coronary intervention he has had no cardiac  symptoms.  He is completed 1 month of triple drug therapy and currently on warfarin and Prasugrel.    Past Medical History:   Diagnosis Date   • Abnormal electrocardiogram 2010   • Arrhythmia     Atrial Fibrillation; Cardiologist, Dr. Sorensen   • Arthritis     knees, left hip   • ASTHMA     inhalers   • Atrial fibrillation (HCC) 10/25/2011   • Bronchospasm 2009   • Diabetes (HCC)     insulin   • HTN (hypertension) 10/25/2011   • Hypercholesteremia 10/25/2011   • Long term (current) use of anticoagulants 10/25/2011   • NASAL POLYPS 2009   • Other nonspecific abnormal finding 2009   • Pain     left hip   • Shortness of breath    • Sleep apnea 2009    o2 at night   • Wears glasses      Past Surgical History:   Procedure Laterality Date   • KNEE ARTHROPLASTY TOTAL Left 10/2014   • HIP ARTHROPLASTY TOTAL  2010    Performed by OSGOOD, PATRICK J at [facility]   • LUMBAR DECOMPRESSION     • HIP REPLACEMENT, TOTAL     • LAMINOTOMY     • SINUSCOPE     • SINUSOTOMIES  ,,2007     Family History   Problem Relation Age of Onset   • Heart Disease Mother      Social History     Socioeconomic History   • Marital status:      Spouse name: Not on file   • Number of children: Not on file   • Years of education: Not on file   • Highest education level: Not on file   Occupational History   • Not on file   Tobacco Use   • Smoking status: Former Smoker     Packs/day: 0.50     Years: 10.00     Pack years: 5.00     Types: Cigarettes     Quit date: 12/10/1968     Years since quittin.4   • Smokeless tobacco: Never Used   Vaping Use   • Vaping Use: Never used   Substance and Sexual Activity   • Alcohol use: No   • Drug use: No   • Sexual activity: Not on file   Other Topics Concern   • Not on file   Social History Narrative   • Not on file     Social Determinants of Health     Financial Resource Strain:    • Difficulty of Paying Living Expenses:    Food Insecurity:    •  Worried About Running Out of Food in the Last Year:    • Ran Out of Food in the Last Year:    Transportation Needs:    • Lack of Transportation (Medical):    • Lack of Transportation (Non-Medical):    Physical Activity:    • Days of Exercise per Week:    • Minutes of Exercise per Session:    Stress:    • Feeling of Stress :    Social Connections:    • Frequency of Communication with Friends and Family:    • Frequency of Social Gatherings with Friends and Family:    • Attends Christianity Services:    • Active Member of Clubs or Organizations:    • Attends Club or Organization Meetings:    • Marital Status:    Intimate Partner Violence:    • Fear of Current or Ex-Partner:    • Emotionally Abused:    • Physically Abused:    • Sexually Abused:      Allergies   Allergen Reactions   • Atenolol Shortness of Breath     DYSPNEA.   • Tetanus Toxoid Swelling     Outpatient Encounter Medications as of 5/21/2021   Medication Sig Dispense Refill   • furosemide (LASIX) 40 MG Tab Take 40 mg by mouth every day.     • potassium chloride (MICRO-K) 10 MEQ capsule Take 10 mEq by mouth every day.     • albuterol (PROVENTIL) 2.5mg/3ml Nebu Soln solution for nebulization Take 3 mL by nebulization every four hours as needed for Shortness of Breath. 120 Bullet 2   • Tiotropium Bromide Monohydrate (SPIRIVA RESPIMAT) 1.25 MCG/ACT Aero Soln Take 2 Inhalation by mouth every day. Please assemble. 1 Each 11   • warfarin (COUMADIN) 5 MG Tab Take one-half tablet (2.5mg) on Thursdays and one tablet (5mg) all other days OR as directed by the Anticoagulation clinic. 90 tablet 1   • budesonide-formoterol (SYMBICORT) 160-4.5 MCG/ACT Aerosol Inhale 2 Puffs 2 Times a Day. Use spacer. Rinse mouth after each use. 1 Each 11   • clopidogrel (PLAVIX) 75 MG Tab TAKE ONE TABLET BY MOUTH EVERY DAY 90 Tab 3   • albuterol (VENTOLIN HFA) 108 (90 Base) MCG/ACT Aero Soln inhalation aerosol Inhale 2 Puffs by mouth every 6 hours as needed for Shortness of Breath.     •  "NOVOLOG FLEXPEN 100 UNIT/ML solution for injection INJECT 5-10 UNITS THREE TIMES A DAY AS NEEDED  11   • insulin detemir (LEVEMIR FLEXTOUCH) 100 UNIT/ML Solution Pen-injector injection Inject 14 Units as instructed every evening.     • insulin lispro (HUMALOG) 100 UNIT/ML Solution Inject 5-10 Units as instructed as needed for High Blood Sugar (Only if blood sugar is 180 or higher).     • DILTIAZem (CARDIZEM) 60 MG Tab Take 1 Tab by mouth 3 times a day. 90 Tab 0   • TRUEPLUS PEN NEEDLES 31G X 6 MM Misc USE 2 TO 3 NEEDLES PER DAY  5     No facility-administered encounter medications on file as of 5/21/2021.     Review of Systems   Constitutional: Negative for chills and fever.   Respiratory: Negative for cough, hemoptysis, sputum production, shortness of breath and wheezing.    Cardiovascular: Negative for chest pain, palpitations, orthopnea, claudication, leg swelling and PND.   Gastrointestinal: Negative for nausea and vomiting.   Neurological: Negative for dizziness and headaches.   All other systems reviewed and are negative.       Objective:   /82 (BP Location: Left arm, Patient Position: Sitting, BP Cuff Size: Adult)   Pulse 86   Resp 16   Ht 1.803 m (5' 11\")   Wt 119 kg (262 lb)   SpO2 96%   BMI 36.54 kg/m²     Physical Exam   Constitutional: He appears well-developed.   Neck: No JVD present.   Cardiovascular: Normal rate, regular rhythm and normal heart sounds.   No murmur heard.  Pulmonary/Chest: Effort normal and breath sounds normal.   Abdominal: Soft.   Musculoskeletal:      Cervical back: Neck supple.   Neurological:   CN II-XII grossly intact   Skin: Skin is warm and dry.   Psychiatric: His behavior is normal. Judgment and thought content normal.   Nursing note and vitals reviewed.    CARDIAC CATHETERIZATION 08/15/2018  A.  Left heart catheterization.  B.  Left ventriculography.  C.  Selective coronary angiography.  D.  Coronary stent implantation of the mid right coronary artery with "   overlapping stents: 4.5x18 mm Ultra non-drug eluting stent and 4.0x38 mm Xience   Gianna drug-eluting stent   E.  Right radial artery approach.   PREOPERATIVE DIAGNOSES:  1.  Unstable angina pectoris.  2.  Abnormal myocardial perfusion scan with inferior perfusion abnormality.  3.  Chronic atrial fibrillation.  4.  Chronic anticoagulation with warfarin.  5.  Diabetes mellitus.   POSTOPERATIVE DIAGNOSES:  1.  Coronary artery disease, 3-vessel, involving the dominant mid right   coronary artery, distal circumflex artery and diagonal branch ostium.  2.  Left ventricular ejection fraction 60%.     ECHOCARDIOGRAM 3/23/2018  No prior study is available for comparison.   Mild concentric left ventricular hypertrophy.  Normal left ventricular systolic function.  Left ventricular ejection fraction is visually estimated to be 60%.  Diastolic function is difficult to assess with atrial fibrillation.  Severely dilated left atrium.  Estimated right ventricular systolic pressure is 37 mmHg + JVP.     Myocardial Perfusion Report 7/19/2018   NUCLEAR IMAGING INTERPRETATION    * Medium sized, moderate severity, mostly non-reversible defect in the    inferior wall, likely RCA territory.      * Small sized, moderate severity, mostly non-reversible defect in distal    anterior and mid anterolateral wall, likely in a diagonal territory.    * Total sum difference score of 4.    * Inferior wall hypokinesis with preserved EF of 58%.    ECG INTERPRETATION    Negative stress ECG for ischemia.    Assessment:     1. Atrial fibrillation, chronic (HCC)     2. Coronary artery disease, occlusive     3. Dyslipidemia     4. Essential hypertension, benign     5. S/P coronary artery stent placement     6. Abnormal nuclear cardiac imaging test     7. Abnormal electrocardiogram         Medical Decision Making:  Today's Assessment / Status / Plan:   We will increase furosemide 40 mg twice daily for 1 week and obtain BMP.  If kidney function is stable may  continue for another week depending on severity of edema.  Referral to [facility] nephrology placed.  Would prefer to see in [place] but is willing to drive to [place] for first available appointment.  Obtain echocardiogram.      Follow-up visit in July with Dr Sorensen.    Please note that this dictation was created using voice recognition software.  I have made every reasonable attempt to correct obvious errors, but it is possible there are errors of grammar or possibly content that I did not discover before finalizing the note.

## 2021-05-21 NOTE — PATIENT INSTRUCTIONS
1. Furosemide 40 mg in AM and noon with Pot chloride 10 mEq twice daily for 1 week  2. BMP blood test in 1 week

## 2021-05-27 ENCOUNTER — HOSPITAL ENCOUNTER (OUTPATIENT)
Dept: LAB | Facility: MEDICAL CENTER | Age: 76
End: 2021-05-27
Attending: NURSE PRACTITIONER
Payer: MEDICARE

## 2021-05-27 DIAGNOSIS — Z79.899 HIGH RISK MEDICATION USE: ICD-10-CM

## 2021-05-27 PROCEDURE — 36415 COLL VENOUS BLD VENIPUNCTURE: CPT

## 2021-05-27 PROCEDURE — 80048 BASIC METABOLIC PNL TOTAL CA: CPT

## 2021-05-28 ENCOUNTER — TELEPHONE (OUTPATIENT)
Dept: CARDIOLOGY | Facility: MEDICAL CENTER | Age: 76
End: 2021-05-28

## 2021-05-28 LAB
ANION GAP SERPL CALC-SCNC: 9 MMOL/L (ref 7–16)
BUN SERPL-MCNC: 45 MG/DL (ref 8–22)
CALCIUM SERPL-MCNC: 8.8 MG/DL (ref 8.5–10.5)
CHLORIDE SERPL-SCNC: 102 MMOL/L (ref 96–112)
CO2 SERPL-SCNC: 31 MMOL/L (ref 20–33)
CREAT SERPL-MCNC: 1.81 MG/DL (ref 0.5–1.4)
GLUCOSE SERPL-MCNC: 113 MG/DL (ref 65–99)
POTASSIUM SERPL-SCNC: 4.6 MMOL/L (ref 3.6–5.5)
SODIUM SERPL-SCNC: 142 MMOL/L (ref 135–145)

## 2021-05-28 NOTE — TELEPHONE ENCOUNTER
DB    Patient called to get the results to the lab work that was done. Pt can be reached at [phone].    Thank you,  Barbara YI

## 2021-05-28 NOTE — TELEPHONE ENCOUNTER
Left message for Mr. De León to call back and let us know how his edema is doing.  Creatinine up a bit.  Will check with  for further evaluation of furosemide     -10 lbs now at 240 pounds, dry weight is 235 pounds.  Patient will take furosemide 40 mg twice daily for 3 more days and then return to his normal dose of furosemide 40 mg daily and obtain BMP in approximately 2 weeks.  Cautioned him on using Celebrex.  He knows to contact our office should his weight start going up again.

## 2021-05-28 NOTE — TELEPHONE ENCOUNTER
Patient called back. He just missed DB call. Please give him a call back.    Thank you,    Bettie RDZ

## 2021-06-01 ENCOUNTER — NON-PROVIDER VISIT (OUTPATIENT)
Dept: MEDICAL GROUP | Facility: PHYSICIAN GROUP | Age: 76
End: 2021-06-01
Payer: MEDICARE

## 2021-06-01 ENCOUNTER — ANTICOAGULATION MONITORING (OUTPATIENT)
Dept: VASCULAR LAB | Facility: MEDICAL CENTER | Age: 76
End: 2021-06-01
Payer: MEDICARE

## 2021-06-01 ENCOUNTER — APPOINTMENT (OUTPATIENT)
Dept: MEDICAL GROUP | Facility: PHYSICIAN GROUP | Age: 76
End: 2021-06-01
Payer: MEDICARE

## 2021-06-01 VITALS
OXYGEN SATURATION: 94 % | RESPIRATION RATE: 12 BRPM | TEMPERATURE: 99.3 F | HEART RATE: 97 BPM | SYSTOLIC BLOOD PRESSURE: 130 MMHG | DIASTOLIC BLOOD PRESSURE: 84 MMHG

## 2021-06-01 DIAGNOSIS — Z79.01 LONG TERM CURRENT USE OF ANTICOAGULANT THERAPY: ICD-10-CM

## 2021-06-01 DIAGNOSIS — I48.91 ATRIAL FIBRILLATION WITH RAPID VENTRICULAR RESPONSE (HCC): ICD-10-CM

## 2021-06-01 DIAGNOSIS — Z79.01 CHRONIC ANTICOAGULATION: ICD-10-CM

## 2021-06-01 LAB
INR BLD: 2.1 (ref 0.9–1.2)
INR PPP: 2.1 (ref 2–3.5)
POC PROTIME: ABNORMAL

## 2021-06-01 PROCEDURE — 99213 OFFICE O/P EST LOW 20 MIN: CPT | Performed by: NURSE PRACTITIONER

## 2021-06-01 PROCEDURE — 85610 PROTHROMBIN TIME: CPT | Performed by: FAMILY MEDICINE

## 2021-06-01 NOTE — PROGRESS NOTES
OP Anticoagulation Service Note    Date: 2021  Blood Pressure : 130/84  Pulse: 97  Respiration: 12    Anticoagulation Summary  As of 2021    INR goal:  2.0-3.0   TTR:  66.4 % (5.8 y)   INR used for dosin.10 (2021)   Warfarin maintenance plan:  2.5 mg (5 mg x 0.5) every Thu; 5 mg (5 mg x 1) all other days   Weekly warfarin total:  32.5 mg   Plan last modified:  MARIAA Garcia (2021)   Next INR check:  2021   Target end date:  Indefinite    Indications    Atrial fibrillation with rapid ventricular response (HCC) [I48.91]  Long term current use of anticoagulant therapy [Z79.01]  Atrial fibrillation (HCC) (Resolved) [I48.91]             Anticoagulation Episode Summary     INR check location:      Preferred lab:      Send INR reminders to:      Comments:        Anticoagulation Care Providers     Provider Role Specialty Phone number    Sanjay Sorensen M.D. Referring Cardiology [phone]        Anticoagulation Patient Findings  Patient Findings     Negatives:  Signs/symptoms of thrombosis, Signs/symptoms of bleeding, Change in health, Change in alcohol use, Change in activity, Missed doses, Extra doses, Change in medications, Change in diet/appetite, Bruising          THIS VISIT CONDUCTED WITH PRESENTER VIA TELEMEDICINE UTILIZING SECURE AND ENCRYPTED VIDEOCONFERENCING EQUIPMENT  ROS:    Pulm: Denies SOB, chest pain.    Card: Denies syncope, edema, palpitations.    Extremities: Denies redness, pain.     PE:    Pulm: No SOB, even and unlabored.    Card: Normal rate and rhythm.    Extremities: No redness, or edema.     INR  is-therapeutic.    Denies signs/symptoms of bleeding and/or thrombosis.    Denies changes to diet or medications.   Follow up appt in 5 weeks     Plan:  Continue current medication regimen.       Medication: Warfarin (Coumadin)           5 mg    5 mg    5 mg    5 mg    2.5 mg    5 mg    5 mg       1 tab(s)    1 tab(s)    1 tab(s)    1 tab(s)    1/2 tab(s)    1 tab(s)  1 tab(s)     Next Appointment: Tuesday, July 13 @ 1:00   Pt on antiplatelet therapy Plavix 75mg for coronary artery occlusion with stent indef per cardiology.    Review all of your home medications and newly ordered medications with your doctor and / or pharmacist. Follow medication instructions as directed by your doctor and / or pharmacist. Please keep your medication list with you and share with your physician. Update the information when medications are discontinued, doses are changed, or new medications (including over-the-counter products) are added; and carry medication information at all times in the event of emergency situations.      For questions, please contact Outpatient Anticoagulation Service [phone].         Patient is on a high risk medication and therefore requires close monitoring and follow up.     CHEST guidelines recommend frequent INR monitoring at regular intervals (a few days up to a max of 12 weeks) to ensure they are on the proper dose of warfarin and not having any complications from therapy.  INRs can dramatically change over a short time period due to diet, medications, and medical conditions.   The patient instructed to go to the ER for falls with a head injury,  blood in urine or stool or any bleeding that last longer than 20 min.     APRIL Garcia.

## 2021-06-03 ENCOUNTER — TELEPHONE (OUTPATIENT)
Dept: CARDIOLOGY | Facility: MEDICAL CENTER | Age: 76
End: 2021-06-03

## 2021-06-03 DIAGNOSIS — N18.4 CKD (CHRONIC KIDNEY DISEASE) STAGE 4, GFR 15-29 ML/MIN (HCC): ICD-10-CM

## 2021-06-03 NOTE — TELEPHONE ENCOUNTER
DB    Patient is calling regarding a standing order to get a lab for DB. He states he needs a new order placed.    Thank you,    Bettie RDZ

## 2021-06-04 NOTE — TELEPHONE ENCOUNTER
LVM asking pt to call back and let us know what kind of lab he is requesting a standing order for.

## 2021-06-04 NOTE — TELEPHONE ENCOUNTER
Pt called back and is not sure what the name is. It was supposed to be ordred by DB. They said it was the last one they did. Pt can be reached at [phone].    Thank you,  Barbara YI

## 2021-06-09 NOTE — TELEPHONE ENCOUNTER
APRIL Dudley.  You 9 minutes ago (4:23 PM)     BMP please. Thanks, garrison    Message text    You  MARIAA Dudley 5 days ago     DB, did you want pt to have a standing lab order, possibly a BMP? I do not see anything documented about this.     Message text      Order placed. LVM on pt's personal VM informing him.

## 2021-06-11 ENCOUNTER — HOSPITAL ENCOUNTER (OUTPATIENT)
Dept: LAB | Facility: MEDICAL CENTER | Age: 76
End: 2021-06-11
Attending: NURSE PRACTITIONER
Payer: MEDICARE

## 2021-06-11 DIAGNOSIS — N18.4 CKD (CHRONIC KIDNEY DISEASE) STAGE 4, GFR 15-29 ML/MIN (HCC): ICD-10-CM

## 2021-06-11 PROCEDURE — 36415 COLL VENOUS BLD VENIPUNCTURE: CPT

## 2021-06-11 PROCEDURE — 80048 BASIC METABOLIC PNL TOTAL CA: CPT

## 2021-06-12 LAB
ANION GAP SERPL CALC-SCNC: 9 MMOL/L (ref 7–16)
BUN SERPL-MCNC: 31 MG/DL (ref 8–22)
CALCIUM SERPL-MCNC: 9.5 MG/DL (ref 8.5–10.5)
CHLORIDE SERPL-SCNC: 102 MMOL/L (ref 96–112)
CO2 SERPL-SCNC: 28 MMOL/L (ref 20–33)
CREAT SERPL-MCNC: 1.47 MG/DL (ref 0.5–1.4)
GLUCOSE SERPL-MCNC: 148 MG/DL (ref 65–99)
POTASSIUM SERPL-SCNC: 4.6 MMOL/L (ref 3.6–5.5)
SODIUM SERPL-SCNC: 139 MMOL/L (ref 135–145)

## 2021-06-21 ENCOUNTER — TELEPHONE (OUTPATIENT)
Dept: CARDIOLOGY | Facility: MEDICAL CENTER | Age: 76
End: 2021-06-21

## 2021-06-21 NOTE — TELEPHONE ENCOUNTER
Returned call. Informed them that DB has not yet given her advisement on labs but pt's kidney function does look improved from prior lab results. Advised that they keep appt w/ SW on 7/6. Pt's wife is upset that they have not been called about lab results yet.    To DB, please review labs and let me know if you have any recommendations prior to pt seeing SW on 7/6

## 2021-06-21 NOTE — TELEPHONE ENCOUNTER
DB    Patient wife, Avelino, called to get the results to the blood work and see if they need an appt. They can be reached at [phone].    Thank you,  Barbara YI

## 2021-06-23 NOTE — TELEPHONE ENCOUNTER
APRIL Dudley.  You 2 minutes ago (10:17 AM)     Not sure why I could not just reply back to the patient?  Yes he should keep his appointment with [facility] in July and his kidney function is better so continue with furosemide and KCl.  Thank you, Cristina    Message text

## 2021-07-01 ENCOUNTER — APPOINTMENT (OUTPATIENT)
Dept: SLEEP MEDICINE | Facility: MEDICAL CENTER | Age: 76
End: 2021-07-01
Payer: MEDICARE

## 2021-07-06 ENCOUNTER — OFFICE VISIT (OUTPATIENT)
Dept: CARDIOLOGY | Facility: MEDICAL CENTER | Age: 76
End: 2021-07-06
Payer: MEDICARE

## 2021-07-06 VITALS
HEIGHT: 71 IN | HEART RATE: 87 BPM | DIASTOLIC BLOOD PRESSURE: 80 MMHG | WEIGHT: 251 LBS | SYSTOLIC BLOOD PRESSURE: 134 MMHG | BODY MASS INDEX: 35.14 KG/M2 | OXYGEN SATURATION: 97 %

## 2021-07-06 DIAGNOSIS — I50.813 ACUTE ON CHRONIC RIGHT-SIDED HEART FAILURE (HCC): ICD-10-CM

## 2021-07-06 DIAGNOSIS — Z79.01 ANTICOAGULATED: ICD-10-CM

## 2021-07-06 DIAGNOSIS — Z95.5 S/P CORONARY ARTERY STENT PLACEMENT: ICD-10-CM

## 2021-07-06 DIAGNOSIS — E78.5 DYSLIPIDEMIA: ICD-10-CM

## 2021-07-06 DIAGNOSIS — I50.31 ACUTE HEART FAILURE WITH PRESERVED EJECTION FRACTION (HFPEF) (HCC): ICD-10-CM

## 2021-07-06 DIAGNOSIS — I10 ESSENTIAL HYPERTENSION, BENIGN: ICD-10-CM

## 2021-07-06 DIAGNOSIS — R60.0 BILATERAL LEG EDEMA: ICD-10-CM

## 2021-07-06 DIAGNOSIS — I48.20 ATRIAL FIBRILLATION, CHRONIC (HCC): ICD-10-CM

## 2021-07-06 DIAGNOSIS — I25.10 CORONARY ARTERY DISEASE, OCCLUSIVE: ICD-10-CM

## 2021-07-06 DIAGNOSIS — E11.65 TYPE 2 DIABETES MELLITUS WITH HYPERGLYCEMIA, WITHOUT LONG-TERM CURRENT USE OF INSULIN (HCC): ICD-10-CM

## 2021-07-06 DIAGNOSIS — R06.02 SOB (SHORTNESS OF BREATH): ICD-10-CM

## 2021-07-06 PROCEDURE — 99215 OFFICE O/P EST HI 40 MIN: CPT | Performed by: INTERNAL MEDICINE

## 2021-07-06 RX ORDER — ATORVASTATIN CALCIUM 10 MG/1
10 TABLET, FILM COATED ORAL DAILY
Qty: 90 TABLET | Refills: 3 | Status: SHIPPED | OUTPATIENT
Start: 2021-07-06 | End: 2021-07-14

## 2021-07-06 RX ORDER — METOLAZONE 2.5 MG/1
2.5 TABLET ORAL DAILY
Qty: 90 TABLET | Refills: 3 | Status: SHIPPED | OUTPATIENT
Start: 2021-07-06 | End: 2021-08-31

## 2021-07-06 ASSESSMENT — ENCOUNTER SYMPTOMS
LOSS OF CONSCIOUSNESS: 0
SHORTNESS OF BREATH: 0
COUGH: 0
DIZZINESS: 0
MYALGIAS: 0
PALPITATIONS: 0

## 2021-07-06 ASSESSMENT — FIBROSIS 4 INDEX: FIB4 SCORE: 1.68

## 2021-07-06 NOTE — PROGRESS NOTES
Chief Complaint   Patient presents with   •  CAD       Subjective:   Yuri De Lenó is a 76 y.o. male who presents today for follow-up evaluation of CAD, NSTEMI, RCA stent, chronic fibrillation, chronic anticoagulation, hypertension and hyperlipidemia.    Since 5/21/2020 appointment with FARHANA Grant patient is continued to have problems with shortness of breath, PND, increasing abdominal girth and lower extremity edema.  Developed a large blister sore medial right calf.  At 5/21 appointment Lasix increased to 40 mg BID with no improvement currently on 40 mg daily.  No history of liver disease.    Past Medical History:   Diagnosis Date   • Abnormal electrocardiogram 8/13/2010   • Arrhythmia     Atrial Fibrillation; Cardiologist, Dr. Sorensen   • Arthritis     knees, left hip   • ASTHMA     inhalers   • Atrial fibrillation (HCC) 10/25/2011   • Bronchospasm 8/6/2009   • Diabetes (HCC)     insulin   • HTN (hypertension) 10/25/2011   • Hypercholesteremia 10/25/2011   • Long term (current) use of anticoagulants 10/25/2011   • NASAL POLYPS 8/6/2009   • Other nonspecific abnormal finding 8/6/2009   • Pain     left hip   • Shortness of breath    • Sleep apnea 8/6/2009    o2 at night   • Wears glasses      Past Surgical History:   Procedure Laterality Date   • KNEE ARTHROPLASTY TOTAL Left 10/2014   • HIP ARTHROPLASTY TOTAL  8/31/2010    Performed by OSGOOD, PATRICK J at [facility]   • LUMBAR DECOMPRESSION  1984   • HIP REPLACEMENT, TOTAL     • LAMINOTOMY     • SINUSCOPE     • SINUSOTOMIES  2003,2005,2/2007     Family History   Problem Relation Age of Onset   • Heart Disease Mother      Social History     Socioeconomic History   • Marital status:      Spouse name: Not on file   • Number of children: Not on file   • Years of education: Not on file   • Highest education level: Not on file   Occupational History   • Not on file   Tobacco Use   • Smoking status: Former Smoker     Packs/day: 0.50      Years: 10.00     Pack years: 5.00     Types: Cigarettes     Quit date: 12/10/1968     Years since quittin.6   • Smokeless tobacco: Never Used   Vaping Use   • Vaping Use: Never used   Substance and Sexual Activity   • Alcohol use: No   • Drug use: No   • Sexual activity: Not on file   Other Topics Concern   • Not on file   Social History Narrative   • Not on file     Social Determinants of Health     Financial Resource Strain:    • Difficulty of Paying Living Expenses:    Food Insecurity:    • Worried About Running Out of Food in the Last Year:    • Ran Out of Food in the Last Year:    Transportation Needs:    • Lack of Transportation (Medical):    • Lack of Transportation (Non-Medical):    Physical Activity:    • Days of Exercise per Week:    • Minutes of Exercise per Session:    Stress:    • Feeling of Stress :    Social Connections:    • Frequency of Communication with Friends and Family:    • Frequency of Social Gatherings with Friends and Family:    • Attends [employer] Services:    • Active Member of Clubs or Organizations:    • Attends Club or Organization Meetings:    • Marital Status:    Intimate Partner Violence:    • Fear of Current or Ex-Partner:    • Emotionally Abused:    • Physically Abused:    • Sexually Abused:      Allergies   Allergen Reactions   • Atenolol Shortness of Breath     DYSPNEA.   • Tetanus Toxoid Swelling     Outpatient Encounter Medications as of 2021   Medication Sig Dispense Refill   • metOLazone (ZAROXOLYN) 2.5 MG Tab Take 1 tablet by mouth every day. 90 tablet 3   • atorvastatin (LIPITOR) 10 MG Tab Take 1 tablet by mouth every day. 90 tablet 3   • potassium chloride (MICRO-K) 10 MEQ capsule Take 10 mEq by mouth every day.     • furosemide (LASIX) 40 MG Tab Take 1 tablet by mouth 2 (two) times a day. (Patient taking differently: Take 40 mg by mouth every day.) 60 tablet 11   • albuterol (PROVENTIL) 2.5mg/3ml Nebu Soln solution for nebulization Take 3 mL by nebulization  "every four hours as needed for Shortness of Breath. 120 Bullet 2   • Tiotropium Bromide Monohydrate (SPIRIVA RESPIMAT) 1.25 MCG/ACT Aero Soln Take 2 Inhalation by mouth every day. Please assemble. 1 Each 11   • warfarin (COUMADIN) 5 MG Tab Take one-half tablet (2.5mg) on Thursdays and one tablet (5mg) all other days OR as directed by the Anticoagulation clinic. 90 tablet 1   • budesonide-formoterol (SYMBICORT) 160-4.5 MCG/ACT Aerosol Inhale 2 Puffs 2 Times a Day. Use spacer. Rinse mouth after each use. 1 Each 11   • clopidogrel (PLAVIX) 75 MG Tab TAKE ONE TABLET BY MOUTH EVERY DAY 90 Tab 3   • albuterol (VENTOLIN HFA) 108 (90 Base) MCG/ACT Aero Soln inhalation aerosol Inhale 2 Puffs by mouth every 6 hours as needed for Shortness of Breath.     • TRUEPLUS PEN NEEDLES 31G X 6 MM Misc USE 2 TO 3 NEEDLES PER DAY  5   • NOVOLOG FLEXPEN 100 UNIT/ML solution for injection INJECT 5-10 UNITS THREE TIMES A DAY AS NEEDED  11   • insulin detemir (LEVEMIR FLEXTOUCH) 100 UNIT/ML Solution Pen-injector injection Inject 14 Units as instructed every evening.     • insulin lispro (HUMALOG) 100 UNIT/ML Solution Inject 5-10 Units as instructed as needed for High Blood Sugar (Only if blood sugar is 180 or higher).     • DILTIAZem (CARDIZEM) 60 MG Tab Take 1 Tab by mouth 3 times a day. 90 Tab 0   • [DISCONTINUED] celecoxib (CELEBREX) 200 MG Cap Take 200 mg by mouth every day. (Patient not taking: Reported on 7/6/2021)       No facility-administered encounter medications on file as of 7/6/2021.     Review of Systems   Respiratory: Negative for cough and shortness of breath.    Cardiovascular: Negative for chest pain and palpitations.   Musculoskeletal: Negative for myalgias.   Neurological: Negative for dizziness and loss of consciousness.        Objective:   /80 (BP Location: Left arm, Patient Position: Sitting, BP Cuff Size: Adult)   Pulse 87   Ht 1.803 m (5' 11\")   Wt 114 kg (251 lb)   SpO2 97%   BMI 35.01 kg/m²     Physical " Exam   Constitutional: He is oriented to person, place, and time. He appears well-developed.   Eyes: Pupils are equal, round, and reactive to light.   Neck: No JVD present.   Jugular venous pressure may be slightly elevated.   Cardiovascular: Normal rate. An irregular rhythm present.   Murmur heard.  Pulmonary/Chest: Effort normal and breath sounds normal. No respiratory distress. He has no wheezes. He has no rales.   Abdominal:   Significantly obese?  Ascites.   Musculoskeletal:      Comments: Marked bilateral edema.  Diffuse erythema.  Circular sore medial right calf.   Neurological: He is alert and oriented to person, place, and time.   Skin: Skin is warm and dry.   Psychiatric: His behavior is normal.     CARDIAC CATHETERIZATION 08/15/2018  A.  Left heart catheterization.  B.  Left ventriculography.  C.  Selective coronary angiography.  D.  Coronary stent implantation of the mid right coronary artery with   overlapping stents: 4.5x18 mm Ultra non-drug eluting stent and 4.0x38 mm Xience   Gianna drug-eluting stent   E.  Right radial artery approach.   PREOPERATIVE DIAGNOSES:  1.  Unstable angina pectoris.  2.  Abnormal myocardial perfusion scan with inferior perfusion abnormality.  3.  Chronic atrial fibrillation.  4.  Chronic anticoagulation with warfarin.  5.  Diabetes mellitus.   POSTOPERATIVE DIAGNOSES:  1.  Coronary artery disease, 3-vessel, involving the dominant mid right coronary artery, distal circumflex artery and diagonal branch ostium.  2.  Left ventricular ejection fraction 60%.    ECHOCARDIOGRAM 3/23/2018  No prior study is available for comparison.   Mild concentric left ventricular hypertrophy.  Normal left ventricular systolic function.  Left ventricular ejection fraction is visually estimated to be 60%.  Diastolic function is difficult to assess with atrial fibrillation.  Severely dilated left atrium.  Estimated right ventricular systolic pressure is 37 mmHg + JVP.    Assessment:     1.  Bilateral leg edema  Basic Metabolic Panel   2. SOB (shortness of breath)  Basic Metabolic Panel   3. Type 2 diabetes mellitus with hyperglycemia, without long-term current use of insulin (HCC)  REFERRAL TO ENDOCRINOLOGY   4. Acute heart failure with preserved ejection fraction (HFpEF) (HCC)     5. Acute on chronic right-sided heart failure (HCC)     6. Atrial fibrillation, chronic (HCC)     7. Anticoagulated     8. Coronary artery disease, occlusive     9. S/P coronary artery stent placement     10. Dyslipidemia     11. Essential hypertension, benign         Medical Decision Making:  Today's Assessment / Status / Plan:     Assessment  0.  Volume overload suggestive of biventricular congestive heart failure with predominantly right heart involvement.  1.  CAD.  Clinically stable.  2.  RCA stent 8/15/2018  3.  Atrial fibrillation.  Chronic.  Asymptomatic, rate controlled.  4.  Anticoagulation.  On warfarin.  5.  Hypertension.    6.  Dyslipidemia.  On atorvastatin  7.  Lower extremity edema with right calf ulcer.  8.  Cellulitis.  Right knee.  6/2019.  9.  CKD followed by Dr Vicente    Recommendation Discussion  1.  The patient has clinical symptoms and findings suggestive of congestive heart failure with predominantly right heart involvement however liver disease needs to be continued.  2.  Increase Lasix to 80 mg daily.  3.  Record daily weight, BP, pulse.  4.  BMP weekly, standing order.  5.  Follow-up with PCP concerning wound care and referral to wound clinic.  6.  Echocardiogram pending scheduled 7/23/2021.  I reviewed the previous echocardiogram images from 2018 study which shows normal EF but right heart not visualized.  7.  Metolazone 2.5 mg as needed has been ordered.  8.  Instructed patient to call back tomorrow to notify me if he has gotten a diuretic response.  If the patient is refractory may need hospitalization for further evaluation.  9.  May need abdominal CT scan.  10.  Reviewed most recent blood  work including INR which is therapeutic.  11.  Otherwise stable from a cardiac standpoint concerning atrial fibrillation, anticoagulation and hypertension continue other cardiac therapy regarding atrial fibrillation, hypertension and CAD.  12.  RTC 1 week with EKG.    13920 new problem congestive heart failure requiring change in medication, follow-up monitoring monitoring, detailed instruction, and close follow-up with pending diagnostic tests.  3 stable cardiac problems.  Attempts at contacting Dr. Corbin are currently unsuccessful and will continue to make efforts and document.

## 2021-07-06 NOTE — LETTER
[facility]   [address]   [address]  [place] [address]  Phone: [phone]  Fax: [phone]              Yuri De León  1945    Encounter Date: 7/6/2021    Sanjay Sorensen M.D.          PROGRESS NOTE:  Chief Complaint   Patient presents with   •  CAD       Subjective:   Yuri De León is a 76 y.o. male who presents today for follow-up evaluation of CAD, NSTEMI, RCA stent, chronic fibrillation, chronic anticoagulation, hypertension and hyperlipidemia.    Since 5/21/2020 appointment with FARHANA Grant patient is continued to have problems with shortness of breath, PND, increasing abdominal girth and lower extremity edema.  Developed a large blister sore medial right calf.  At 5/21 appointment Lasix increased to 40 mg BID with no improvement currently on 40 mg daily.  No history of liver disease.    Past Medical History:   Diagnosis Date   • Abnormal electrocardiogram 8/13/2010   • Arrhythmia     Atrial Fibrillation; Cardiologist, Dr. Sorensen   • Arthritis     knees, left hip   • ASTHMA     inhalers   • Atrial fibrillation (HCC) 10/25/2011   • Bronchospasm 8/6/2009   • Diabetes (HCC)     insulin   • HTN (hypertension) 10/25/2011   • Hypercholesteremia 10/25/2011   • Long term (current) use of anticoagulants 10/25/2011   • NASAL POLYPS 8/6/2009   • Other nonspecific abnormal finding 8/6/2009   • Pain     left hip   • Shortness of breath    • Sleep apnea 8/6/2009    o2 at night   • Wears glasses      Past Surgical History:   Procedure Laterality Date   • KNEE ARTHROPLASTY TOTAL Left 10/2014   • HIP ARTHROPLASTY TOTAL  8/31/2010    Performed by OSGOOD, PATRICK J at [facility]   • LUMBAR DECOMPRESSION  1984   • HIP REPLACEMENT, TOTAL     • LAMINOTOMY     • SINUSCOPE     • SINUSOTOMIES  2003,2005,2/2007     Family History   Problem Relation Age of Onset   • Heart Disease Mother      Social History          Socioeconomic History   • Marital status:      Spouse name: Not on file   • Number of children: Not on file   • Years of education: Not on file   • Highest education level: Not on file   Occupational History   • Not on file   Tobacco Use   • Smoking status: Former Smoker     Packs/day: 0.50     Years: 10.00     Pack years: 5.00     Types: Cigarettes     Quit date: 12/10/1968     Years since quittin.6   • Smokeless tobacco: Never Used   Vaping Use   • Vaping Use: Never used   Substance and Sexual Activity   • Alcohol use: No   • Drug use: No   • Sexual activity: Not on file   Other Topics Concern   • Not on file   Social History Narrative   • Not on file     Social Determinants of Health     Financial Resource Strain:    • Difficulty of Paying Living Expenses:    Food Insecurity:    • Worried About Running Out of Food in the Last Year:    • Ran Out of Food in the Last Year:    Transportation Needs:    • Lack of Transportation (Medical):    • Lack of Transportation (Non-Medical):    Physical Activity:    • Days of Exercise per Week:    • Minutes of Exercise per Session:    Stress:    • Feeling of Stress :    Social Connections:    • Frequency of Communication with Friends and Family:    • Frequency of Social Gatherings with Friends and Family:    • Attends [employer] Services:    • Active Member of Clubs or Organizations:    • Attends Club or Organization Meetings:    • Marital Status:    Intimate Partner Violence:    • Fear of Current or Ex-Partner:    • Emotionally Abused:    • Physically Abused:    • Sexually Abused:      Allergies   Allergen Reactions   • Atenolol Shortness of Breath     DYSPNEA.   • Tetanus Toxoid Swelling     Outpatient Encounter Medications as of 2021   Medication Sig Dispense Refill   • potassium chloride (MICRO-K) 10 MEQ capsule Take 10 mEq by mouth every day.     • furosemide (LASIX) 40 MG Tab Take 1 tablet by mouth 2 (two) times a day. (Patient taking differently: Take 40  mg by mouth every day.) 60 tablet 11   • albuterol (PROVENTIL) 2.5mg/3ml Nebu Soln solution for nebulization Take 3 mL by nebulization every four hours as needed for Shortness of Breath. 120 Bullet 2   • Tiotropium Bromide Monohydrate (SPIRIVA RESPIMAT) 1.25 MCG/ACT Aero Soln Take 2 Inhalation by mouth every day. Please assemble. 1 Each 11   • warfarin (COUMADIN) 5 MG Tab Take one-half tablet (2.5mg) on Thursdays and one tablet (5mg) all other days OR as directed by the Anticoagulation clinic. 90 tablet 1   • budesonide-formoterol (SYMBICORT) 160-4.5 MCG/ACT Aerosol Inhale 2 Puffs 2 Times a Day. Use spacer. Rinse mouth after each use. 1 Each 11   • clopidogrel (PLAVIX) 75 MG Tab TAKE ONE TABLET BY MOUTH EVERY DAY 90 Tab 3   • albuterol (VENTOLIN HFA) 108 (90 Base) MCG/ACT Aero Soln inhalation aerosol Inhale 2 Puffs by mouth every 6 hours as needed for Shortness of Breath.     • TRUEPLUS PEN NEEDLES 31G X 6 MM Misc USE 2 TO 3 NEEDLES PER DAY  5   • NOVOLOG FLEXPEN 100 UNIT/ML solution for injection INJECT 5-10 UNITS THREE TIMES A DAY AS NEEDED  11   • insulin detemir (LEVEMIR FLEXTOUCH) 100 UNIT/ML Solution Pen-injector injection Inject 14 Units as instructed every evening.     • insulin lispro (HUMALOG) 100 UNIT/ML Solution Inject 5-10 Units as instructed as needed for High Blood Sugar (Only if blood sugar is 180 or higher).     • DILTIAZem (CARDIZEM) 60 MG Tab Take 1 Tab by mouth 3 times a day. 90 Tab 0   • celecoxib (CELEBREX) 200 MG Cap Take 200 mg by mouth every day. (Patient not taking: Reported on 7/6/2021)       No facility-administered encounter medications on file as of 7/6/2021.     Review of Systems   Respiratory: Negative for cough and shortness of breath.    Cardiovascular: Negative for chest pain and palpitations.   Musculoskeletal: Negative for myalgias.   Neurological: Negative for dizziness and loss of consciousness.        Objective:   /80 (BP Location: Left arm, Patient Position: Sitting, BP  "Cuff Size: Adult)   Pulse 87   Ht 1.803 m (5' 11\")   Wt 114 kg (251 lb)   SpO2 97%   BMI 35.01 kg/m²     Physical Exam   Constitutional: He is oriented to person, place, and time. He appears well-developed.   Eyes: Pupils are equal, round, and reactive to light.   Neck: No JVD present.   Jugular venous pressure may be slightly elevated.   Cardiovascular: Normal rate. An irregular rhythm present.   Murmur heard.  Pulmonary/Chest: Effort normal and breath sounds normal. No respiratory distress. He has no wheezes. He has no rales.   Abdominal:   Significantly obese?  Ascites.   Musculoskeletal:      Comments: Marked bilateral edema.  Diffuse erythema.  Circular sore medial right calf.   Neurological: He is alert and oriented to person, place, and time.   Skin: Skin is warm and dry.   Psychiatric: His behavior is normal.     CARDIAC CATHETERIZATION 08/15/2018  A.  Left heart catheterization.  B.  Left ventriculography.  C.  Selective coronary angiography.  D.  Coronary stent implantation of the mid right coronary artery with   overlapping stents: 4.5x18 mm Ultra non-drug eluting stent and 4.0x38 mm Xience   Gianna drug-eluting stent   E.  Right radial artery approach.   PREOPERATIVE DIAGNOSES:  1.  Unstable angina pectoris.  2.  Abnormal myocardial perfusion scan with inferior perfusion abnormality.  3.  Chronic atrial fibrillation.  4.  Chronic anticoagulation with warfarin.  5.  Diabetes mellitus.   POSTOPERATIVE DIAGNOSES:  1.  Coronary artery disease, 3-vessel, involving the dominant mid right coronary artery, distal circumflex artery and diagonal branch ostium.  2.  Left ventricular ejection fraction 60%.    ECHOCARDIOGRAM 3/23/2018  No prior study is available for comparison.   Mild concentric left ventricular hypertrophy.  Normal left ventricular systolic function.  Left ventricular ejection fraction is visually estimated to be 60%.  Diastolic function is difficult to assess with atrial " fibrillation.  Severely dilated left atrium.  Estimated right ventricular systolic pressure is 37 mmHg + JVP.    Assessment:     No diagnosis found.    Medical Decision Making:  Today's Assessment / Status / Plan:     Assessment  0.  Volume overload suggestive of biventricular congestive heart failure with predominantly right heart involvement.  1.  CAD.  Clinically stable.  2.  RCA stent 8/15/2018  3.  Atrial fibrillation.  Chronic.  Asymptomatic, rate controlled.  4.  Anticoagulation.  On warfarin.  5.  Hypertension.    6.  Dyslipidemia.  On atorvastatin  7.  Lower extremity edema with right calf ulcer.  8.  Cellulitis.  Right knee.  6/2019.    Recommendation Discussion  1.  The patient has clinical symptoms and findings suggestive of congestive heart failure with predominantly right heart involvement however liver disease needs to be continued.  2.  Increase Lasix to 80 mg daily.  3.  Record daily weight, BP, pulse.  4.  BMP weekly, standing order.  5.  Follow-up with PCP concerning wound care and referral to wound clinic.  6.  Echocardiogram pending scheduled 7/23/2021.  I reviewed the previous echocardiogram images from 2018 study which shows normal EF but right heart not visualized.  7.  Metolazone 2.5 mg as needed has been ordered.  8.  Instructed patient to call back tomorrow to notify me if he has gotten a diuretic response.  If the patient is refractory may need hospitalization for further evaluation.  9.  May need abdominal CT scan.  10.  Reviewed most recent blood work including INR which is therapeutic.  11.  Otherwise stable from a cardiac standpoint concerning atrial fibrillation, anticoagulation and hypertension continue other cardiac therapy regarding atrial fibrillation, hypertension and CAD.  12.  RTC 1 week with EKG.    92874 new problem congestive heart failure requiring change in medication, follow-up monitoring monitoring, detailed instruction, and close follow-up with pending diagnostic  tests.  3 stable cardiac problems.  Attempts at contacting Dr. Corbin are currently unsuccessful and will continue to make efforts and document.        Deng Corbin D.O.  [address] [facility] [address]  Via Mail

## 2021-07-09 PROBLEM — Z79.01 ANTICOAGULATED: Status: ACTIVE | Noted: 2021-07-09

## 2021-07-09 PROBLEM — I50.31 ACUTE HEART FAILURE WITH PRESERVED EJECTION FRACTION (HFPEF) (HCC): Status: ACTIVE | Noted: 2021-07-09

## 2021-07-09 PROBLEM — R60.0 BILATERAL LEG EDEMA: Status: ACTIVE | Noted: 2021-07-09

## 2021-07-09 PROBLEM — I50.813 ACUTE ON CHRONIC RIGHT-SIDED HEART FAILURE (HCC): Status: ACTIVE | Noted: 2021-07-09

## 2021-07-12 ENCOUNTER — HOSPITAL ENCOUNTER (OUTPATIENT)
Dept: LAB | Facility: MEDICAL CENTER | Age: 76
End: 2021-07-12
Attending: INTERNAL MEDICINE
Payer: MEDICARE

## 2021-07-12 DIAGNOSIS — R60.0 BILATERAL LEG EDEMA: ICD-10-CM

## 2021-07-12 DIAGNOSIS — R06.02 SOB (SHORTNESS OF BREATH): ICD-10-CM

## 2021-07-12 LAB
ANION GAP SERPL CALC-SCNC: 12 MMOL/L (ref 7–16)
BUN SERPL-MCNC: 43 MG/DL (ref 8–22)
CALCIUM SERPL-MCNC: 9.4 MG/DL (ref 8.5–10.5)
CHLORIDE SERPL-SCNC: 99 MMOL/L (ref 96–112)
CO2 SERPL-SCNC: 29 MMOL/L (ref 20–33)
CREAT SERPL-MCNC: 2 MG/DL (ref 0.5–1.4)
GLUCOSE SERPL-MCNC: 121 MG/DL (ref 65–99)
POTASSIUM SERPL-SCNC: 4.4 MMOL/L (ref 3.6–5.5)
SODIUM SERPL-SCNC: 140 MMOL/L (ref 135–145)

## 2021-07-12 PROCEDURE — 80048 BASIC METABOLIC PNL TOTAL CA: CPT

## 2021-07-12 PROCEDURE — 36415 COLL VENOUS BLD VENIPUNCTURE: CPT

## 2021-07-13 ENCOUNTER — APPOINTMENT (OUTPATIENT)
Dept: MEDICAL GROUP | Facility: PHYSICIAN GROUP | Age: 76
End: 2021-07-13
Payer: MEDICARE

## 2021-07-13 ENCOUNTER — NON-PROVIDER VISIT (OUTPATIENT)
Dept: MEDICAL GROUP | Facility: PHYSICIAN GROUP | Age: 76
End: 2021-07-13
Payer: MEDICARE

## 2021-07-13 ENCOUNTER — ANTICOAGULATION MONITORING (OUTPATIENT)
Dept: VASCULAR LAB | Facility: MEDICAL CENTER | Age: 76
End: 2021-07-13

## 2021-07-13 VITALS
SYSTOLIC BLOOD PRESSURE: 110 MMHG | OXYGEN SATURATION: 90 % | HEART RATE: 81 BPM | DIASTOLIC BLOOD PRESSURE: 62 MMHG | RESPIRATION RATE: 12 BRPM

## 2021-07-13 DIAGNOSIS — D68.9 COAGULATION DISORDER (HCC): ICD-10-CM

## 2021-07-13 DIAGNOSIS — I48.91 ATRIAL FIBRILLATION WITH RAPID VENTRICULAR RESPONSE (HCC): ICD-10-CM

## 2021-07-13 DIAGNOSIS — Z79.01 CHRONIC ANTICOAGULATION: ICD-10-CM

## 2021-07-13 DIAGNOSIS — Z79.01 LONG TERM CURRENT USE OF ANTICOAGULANT THERAPY: ICD-10-CM

## 2021-07-13 LAB
INR BLD: 2.2 (ref 0.9–1.2)
INR PPP: 2.2 (ref 2–3.5)
POC PROTIME: ABNORMAL

## 2021-07-13 PROCEDURE — 85610 PROTHROMBIN TIME: CPT | Performed by: FAMILY MEDICINE

## 2021-07-13 PROCEDURE — 99999 PR NO CHARGE: CPT | Performed by: NURSE PRACTITIONER

## 2021-07-13 NOTE — PROGRESS NOTES
OP Anticoagulation Service Note    THIS VISIT CONDUCTED WITH PRESENTER VIA TELEMEDICINE UTILIZING SECURE AND ENCRYPTED VIDEOCONFERENCING EQUIPMENT  ROS:      Anticoagulation Summary  As of 2021    INR goal:  2.0-3.0   TTR:  67.0 % (5.9 y)   INR used for dosin.20 (2021)   Warfarin maintenance plan:  2.5 mg (5 mg x 0.5) every Thu; 5 mg (5 mg x 1) all other days   Weekly warfarin total:  32.5 mg   Plan last modified:  MARIAA Garcia (2021)   Next INR check:  2021   Target end date:  Indefinite    Indications    Atrial fibrillation with rapid ventricular response (HCC) [I48.91]  Long term current use of anticoagulant therapy [Z79.01]  Atrial fibrillation (HCC) (Resolved) [I48.91]             Anticoagulation Episode Summary     INR check location:      Preferred lab:      Send INR reminders to:      Comments:        Anticoagulation Care Providers     Provider Role Specialty Phone number    Sanjay Sorensen M.D. Referring Cardiology [phone]        Anticoagulation Patient Findings                HPI:  Yuri De León, on anticoagulation therapy with warfarin for Afib.   Changes to current medical/health status since last appt: none  Denies signs/symptoms of bleeding and/or thrombosis since the last appt.    Denies any interval changes to diet  Denies any interval changes to medications since last appt (other than diuretics)  Denies any complications or cost restrictions with current therapy.     A/P   INR  therapeutic.   Pt is to continue with current warfarin dosing regimen.     Pt is to continue with current warfarin dosing regimen.     Next INR in 7 week(s).      Next Appointment:  21 at 1:00 pm     Review all of your home medications and newly ordered medications with your doctor and / or pharmacist. Follow medication instructions as directed by your doctor and / or pharmacist. Please keep your medication list with you and share with your physician. Update the  information when medications are discontinued, doses are changed, or new medications (including over-the-counter products) are added; and carry medication information at all times in the event of emergency situations.      For questions, please contact Outpatient Anticoagulation Service [phone].     Cedric Martinez, LeslieD

## 2021-07-13 NOTE — PROGRESS NOTES
Chief Complaint   Patient presents with   • Follow-Up     Asthma/COPD       HPI:  Yuri De León is a 76 y.o. year old male here today for follow-up on COPD and asthma.  Last seen by Dr. Jacques on December 17, 2020.  At last visit he was using Symbicort and Ventolin.     Patient states that since January 1 he has had significant shortness of breath secondary to lower extremity edema.  He has seen cardiology twice in the span.  During this time he also had weeping lower extremities with +3 edema.  Increased his Lasix to 40 mg twice daily.  He states that his weight and swelling have gone down significantly which definitely improved his shortness of breath.  Regards to COPD, he denies chest tightness, he does have chest congestion and morning cough, but denies any change or worsening of the symptoms.  He is currently only taking Symbicort 1 puff twice daily and Ventolin as needed.  Since the swelling is gone down, he has had no need for the Ventolin in the last week.  He is relatively active and works outdoors.  He does sleep in a chair is lying flat makes him feel like he is suffocating.  He also has a past medical history and diagnosis of LAUREL but declines further testing or treatment on CPAP.  He does state that he snores, but denies any morning headaches, excessive daytime sleepiness, or concentration and memory problems.  He denies any new or worsening dyspnea.  He states bending down makes him short of breath, but he is able to catch his breath and he is also able to work for significant amounts of time without being short of breath.    Last imaging was a chest x-ray done August 14, 2018 that showedcardiomediastinal silhouette is stable. No focal consolidation, pleural effusion or pneumothorax is identified.  Costophrenic angles are clear. Degenerative changes are seen in the spine.    Most recent PFT (7/29/2013) does shows severe airway obstruction and diffusion deficit at suggest emphysema. There is no  evidence of overinflation which indicates a concurrent restrictive process that may account for diffusion deficit. FEV FVC was reduced at 58%, FEV1 was 1.46 L or 39% of predicted, and FVC was 2.52 L or 54% of predicted. There was no significant bronchodilator response. Total lung capacity was 6.49 L or 87% of predicted, and DLCO was 2.54 or 73% predicted.      ROS: As per HPI and otherwise negative if not stated.    Past Medical History:   Diagnosis Date   • Abnormal electrocardiogram 2010   • Arrhythmia     Atrial Fibrillation; Cardiologist, Dr. Sorensen   • Arthritis     knees, left hip   • ASTHMA     inhalers   • Atrial fibrillation (HCC) 10/25/2011   • Bronchospasm 2009   • Diabetes (HCC)     insulin   • HTN (hypertension) 10/25/2011   • Hypercholesteremia 10/25/2011   • Long term (current) use of anticoagulants 10/25/2011   • NASAL POLYPS 2009   • Other nonspecific abnormal finding 2009   • Pain     left hip   • Shortness of breath    • Sleep apnea 2009    o2 at night   • Wears glasses        Past Surgical History:   Procedure Laterality Date   • KNEE ARTHROPLASTY TOTAL Left 10/2014   • HIP ARTHROPLASTY TOTAL  2010    Performed by OSGOOD, PATRICK J at SURGERY [facility] ORS   • LUMBAR DECOMPRESSION     • HIP REPLACEMENT, TOTAL     • LAMINOTOMY     • SINUSCOPE     • SINUSOTOMIES  ,,2007       Family History   Problem Relation Age of Onset   • Heart Disease Mother        Social History     Socioeconomic History   • Marital status:      Spouse name: Not on file   • Number of children: Not on file   • Years of education: Not on file   • Highest education level: Not on file   Occupational History   • Not on file   Tobacco Use   • Smoking status: Former Smoker     Packs/day: 0.50     Years: 10.00     Pack years: 5.00     Types: Cigarettes     Quit date: 12/10/1968     Years since quittin.6   • Smokeless tobacco: Never Used   Vaping Use   • Vaping Use: Never  "used   Substance and Sexual Activity   • Alcohol use: No   • Drug use: No   • Sexual activity: Not on file   Other Topics Concern   • Not on file   Social History Narrative   • Not on file     Social Determinants of Health     Financial Resource Strain:    • Difficulty of Paying Living Expenses:    Food Insecurity:    • Worried About Running Out of Food in the Last Year:    • Ran Out of Food in the Last Year:    Transportation Needs:    • Lack of Transportation (Medical):    • Lack of Transportation (Non-Medical):    Physical Activity:    • Days of Exercise per Week:    • Minutes of Exercise per Session:    Stress:    • Feeling of Stress :    Social Connections:    • Frequency of Communication with Friends and Family:    • Frequency of Social Gatherings with Friends and Family:    • Attends Gnosticism Services:    • Active Member of Clubs or Organizations:    • Attends Club or Organization Meetings:    • Marital Status:    Intimate Partner Violence:    • Fear of Current or Ex-Partner:    • Emotionally Abused:    • Physically Abused:    • Sexually Abused:        Allergies as of 07/14/2021 - Reviewed 07/14/2021   Allergen Reaction Noted   • Atenolol Shortness of Breath 08/06/2009   • Tetanus toxoid Swelling 08/23/2010        Vitals:  /68 (BP Location: Left arm, Patient Position: Sitting, BP Cuff Size: Adult)   Pulse 81   Resp 18   Ht 1.803 m (5' 11\")   Wt 106 kg (233 lb)   SpO2 94%     Current medications as of today   Current Outpatient Medications   Medication Sig Dispense Refill   • potassium chloride (MICRO-K) 10 MEQ capsule Take 10 mEq by mouth every day.     • furosemide (LASIX) 40 MG Tab Take 1 tablet by mouth 2 (two) times a day. (Patient taking differently: Take 40 mg by mouth every day.) 60 tablet 11   • albuterol (PROVENTIL) 2.5mg/3ml Nebu Soln solution for nebulization Take 3 mL by nebulization every four hours as needed for Shortness of Breath. 120 Bullet 2   • warfarin (COUMADIN) 5 MG Tab Take " one-half tablet (2.5mg) on Thursdays and one tablet (5mg) all other days OR as directed by the Anticoagulation clinic. 90 tablet 1   • budesonide-formoterol (SYMBICORT) 160-4.5 MCG/ACT Aerosol Inhale 2 Puffs 2 Times a Day. Use spacer. Rinse mouth after each use. 1 Each 11   • clopidogrel (PLAVIX) 75 MG Tab TAKE ONE TABLET BY MOUTH EVERY DAY 90 Tab 3   • albuterol (VENTOLIN HFA) 108 (90 Base) MCG/ACT Aero Soln inhalation aerosol Inhale 2 Puffs by mouth every 6 hours as needed for Shortness of Breath.     • NOVOLOG FLEXPEN 100 UNIT/ML solution for injection INJECT 5-10 UNITS THREE TIMES A DAY AS NEEDED  11   • insulin detemir (LEVEMIR FLEXTOUCH) 100 UNIT/ML Solution Pen-injector injection Inject 14 Units as instructed every evening.     • insulin lispro (HUMALOG) 100 UNIT/ML Solution Inject 5-10 Units as instructed as needed for High Blood Sugar (Only if blood sugar is 180 or higher).     • metOLazone (ZAROXOLYN) 2.5 MG Tab Take 1 tablet by mouth every day. (Patient not taking: Reported on 7/14/2021) 90 tablet 3   • atorvastatin (LIPITOR) 10 MG Tab Take 1 tablet by mouth every day. (Patient not taking: Reported on 7/14/2021) 90 tablet 3   • Tiotropium Bromide Monohydrate (SPIRIVA RESPIMAT) 1.25 MCG/ACT Aero Soln Take 2 Inhalation by mouth every day. Please assemble. (Patient not taking: Reported on 7/14/2021) 1 Each 11   • TRUEPLUS PEN NEEDLES 31G X 6 MM Misc USE 2 TO 3 NEEDLES PER DAY (Patient not taking: Reported on 7/14/2021)  5   • DILTIAZem (CARDIZEM) 60 MG Tab Take 1 Tab by mouth 3 times a day. (Patient not taking: Reported on 7/14/2021) 90 Tab 0     No current facility-administered medications for this visit.         Physical Exam:   Gen:           Alert and oriented, No apparent distress. Mood and affect appropriate, normal interaction with examiner.  Eyes:          PERRL, EOM intact, sclere white, conjunctive moist.  Ears:          Not examined. No lesions or deformities.  Hearing:     Grossly intact.  Nose:           Normal, no lesions or deformities.  Dentition:    Good dentition.  Oropharynx:   Tongue normal, posterior pharynx without erythema or exudate  Neck:        Supple, trachea midline, no masses.  Respiratory Effort: No intercostal retractions or use of accessory muscles.   Lung Auscultation:      Clear to auscultation bilaterally; no rales, rhonchi or wheezing.  CV:            Regular rate and rhythm. No murmurs, rubs or gallops.  Abd:           Not examined. Soft non tender, non distended. Normal active bowel sounds. No masses.  Lymphadenopathy: No palpable nodes or edema.  Gait and Station: Normal.  Digits and Nails: No clubbing, cyanosis, petechiae, or nodes.   Cranial Nerves: II-XII grossly intact.  Skin:        No rashes, lesions or ulcers noted.               Ext:           No cyanosis or edema.      Assessment:  1. Chronic obstructive pulmonary disease, unspecified COPD type (HCC)  DX-CHEST-2 VIEWS    PULMONARY FUNCTION TESTS -Test requested: Complete Pulmonary Function Test   2. SOB (shortness of breath)  PULMONARY FUNCTION TESTS -Test requested: Complete Pulmonary Function Test       CAT Score: 12   mMRC dyspnea scale of 2    Immunizations:    Pneumovax 23: 10/7/2014  Prevnar 13: 10/17/2018      Plan:  1.  Continue taking Symbicort and Ventolin as needed.  Currently only taking 1 puff of Symbicort twice daily.  If breathing worsens start with taking 2 puffs of Symbicort twice a day.  Remember to rinse and gargle with warm salt water to prevent any complications such as thrush.  If swelling occurs again and you decide you want to take Spiriva, request refill through MyChart.  Follow-up will be in 3 months.  We will update PFTs around that time.  Also ordered chest x-ray.  Reach out via videoNEXThart or phone with any questions or concerns before next appointment.  2.  Likely related more to edema and swelling than pulmonary conditions.  We will update PFTs and obtain a chest x-ray to rule out pneumonia.   Vital signs currently stable, patient is currently nontachypneic or tachycardic.  O2 saturations remained stable at 94%.    Please note that this dictation was created using voice recognition software. I have made every reasonable attempt to correct obvious errors, but it is possible there are errors of grammar and possibly content that I did not discover before finalizing the note.

## 2021-07-14 ENCOUNTER — OFFICE VISIT (OUTPATIENT)
Dept: CARDIOLOGY | Facility: MEDICAL CENTER | Age: 76
End: 2021-07-14
Payer: MEDICARE

## 2021-07-14 ENCOUNTER — OFFICE VISIT (OUTPATIENT)
Dept: SLEEP MEDICINE | Facility: MEDICAL CENTER | Age: 76
End: 2021-07-14
Payer: MEDICARE

## 2021-07-14 VITALS
OXYGEN SATURATION: 94 % | HEART RATE: 76 BPM | SYSTOLIC BLOOD PRESSURE: 132 MMHG | DIASTOLIC BLOOD PRESSURE: 86 MMHG | BODY MASS INDEX: 32.65 KG/M2 | WEIGHT: 233.2 LBS | RESPIRATION RATE: 14 BRPM | HEIGHT: 71 IN

## 2021-07-14 VITALS
SYSTOLIC BLOOD PRESSURE: 132 MMHG | OXYGEN SATURATION: 94 % | WEIGHT: 233 LBS | HEART RATE: 81 BPM | BODY MASS INDEX: 32.62 KG/M2 | HEIGHT: 71 IN | RESPIRATION RATE: 18 BRPM | DIASTOLIC BLOOD PRESSURE: 68 MMHG

## 2021-07-14 DIAGNOSIS — I25.10 CORONARY ARTERY DISEASE INVOLVING NATIVE CORONARY ARTERY OF NATIVE HEART WITHOUT ANGINA PECTORIS: ICD-10-CM

## 2021-07-14 DIAGNOSIS — R60.0 BILATERAL LEG EDEMA: ICD-10-CM

## 2021-07-14 DIAGNOSIS — Z95.5 S/P CORONARY ARTERY STENT PLACEMENT: ICD-10-CM

## 2021-07-14 DIAGNOSIS — R94.31 ABNORMAL ELECTROCARDIOGRAM: ICD-10-CM

## 2021-07-14 DIAGNOSIS — E78.5 DYSLIPIDEMIA: ICD-10-CM

## 2021-07-14 DIAGNOSIS — N18.4 CKD (CHRONIC KIDNEY DISEASE) STAGE 4, GFR 15-29 ML/MIN (HCC): ICD-10-CM

## 2021-07-14 DIAGNOSIS — I50.31 ACUTE HEART FAILURE WITH PRESERVED EJECTION FRACTION (HFPEF) (HCC): ICD-10-CM

## 2021-07-14 DIAGNOSIS — J44.9 CHRONIC OBSTRUCTIVE PULMONARY DISEASE, UNSPECIFIED COPD TYPE (HCC): ICD-10-CM

## 2021-07-14 DIAGNOSIS — I48.20 ATRIAL FIBRILLATION, CHRONIC (HCC): ICD-10-CM

## 2021-07-14 DIAGNOSIS — Z79.01 LONG TERM CURRENT USE OF ANTICOAGULANT THERAPY: Chronic | ICD-10-CM

## 2021-07-14 DIAGNOSIS — R06.02 SOB (SHORTNESS OF BREATH): ICD-10-CM

## 2021-07-14 LAB — EKG IMPRESSION: NORMAL

## 2021-07-14 PROCEDURE — 93000 ELECTROCARDIOGRAM COMPLETE: CPT | Performed by: INTERNAL MEDICINE

## 2021-07-14 PROCEDURE — 99214 OFFICE O/P EST MOD 30 MIN: CPT | Performed by: NURSE PRACTITIONER

## 2021-07-14 RX ORDER — FUROSEMIDE 40 MG/1
80 TABLET ORAL DAILY
Qty: 30 TABLET | Refills: 11 | Status: SHIPPED | OUTPATIENT
Start: 2021-07-14 | End: 2021-07-15

## 2021-07-14 RX ORDER — ATORVASTATIN CALCIUM 40 MG/1
40 TABLET, FILM COATED ORAL DAILY
Qty: 90 TABLET | Refills: 3 | Status: SHIPPED | OUTPATIENT
Start: 2021-07-14 | End: 2022-01-05

## 2021-07-14 ASSESSMENT — FIBROSIS 4 INDEX
FIB4 SCORE: 1.68
FIB4 SCORE: 1.68

## 2021-07-14 ASSESSMENT — COPD QUESTIONNAIRES
MMRC DYSPNEA SCALE: I GET SHORT OF BREATH WHEN HURRYING ON LEVEL GROUND OR WALKING UP A SLIGHT HILL
QUESTION1_COUGHFREQUENCY: 2
QUESTION5_HOMEACTIVITIES: 2
GOLD_GROUP: GOLD GROUP B
QUESTION7_SLEEPQUALITY: SLEEPS VERY SOUNDLY
QUESTION6_LEAVINGHOUSE: CONFIDENT LEAVING HOME
QUESTION8_ENERGYLEVEL: 1
QUESTION2_PHLEGM: 3
CAT_TOTALSCORE: 12
QUESTION4_WALKINCLINE: 3
TOTAL_EXACERBATIONS_PASTYEAR: 0 OR 1 WITHOUT HOSPITALIZATION
QUESTION3_CHESTTIGHTNESS: NO TIGHTNESS AT ALL

## 2021-07-14 NOTE — PATIENT INSTRUCTIONS
1.  Continue taking Symbicort and Ventolin as needed.  Currently only taking 1 puff of Symbicort twice daily.  If breathing worsens start with taking 2 puffs of Symbicort twice a day.  Remember to rinse and gargle with warm salt water to prevent any complications such as thrush.  If swelling occurs again and you decide you want to take Spiriva, request refill through MyChart.  Follow-up will be in 3 months.  We will update PFTs around that time.  Also ordered chest x-ray.  Reach out via Moleculinhart or phone with any questions or concerns before next appointment.

## 2021-07-14 NOTE — PROGRESS NOTES
Cardiology Clinic Follow-up Note    Date of note:    7/15/2021  Primary Care Provider: Deng Corbin D.O.    Name:             Yuri De León  YOB: 1945  MRN:               [id number]    CC: 1 week follow-up     Primary Cardiologist: Dr. Sorensen    Patient HPI:   Yuri De León is a 76 y.o. male with current medical history including CAD, NSTEMI, RCA stent, permanent fibrillation, chronic anticoagulation, hypertension and hyperlipidemia.    Interim History:    Mr. De León was last seen in this cardiology office by Dr. Sorensen on 7/6/21.  At that time furosemide was increased to 80 mg daily.  Metolazone was also added 2.5 mg prn.  Patient was instructed to follow-up with cardiology office in regards to diuretic response, per notes that does not appear to have occurred.  He was also referred to endocrinology for DM2.  And recently changed nephrologist.    Patient presents today with wife. He endorses medication compliance.  He has not needed to take additional metolazone 2.5mg as he feels he has diuresed extremely well with 80 mg daily of Lasix. Pt has brought in sheet with daily vital signs and weights, he has lost 24 lbs over the past week. Baseline weight 230-240lbs    Just saw Nephrologist, Dr. Vicente 2 days ago, has another appt, in August. Per Patient Dr. Vicente is not concerned about 80 mg dose of Lasix affecting kidneys.     He denies palpitations, chest pain and recent weight gain.  His lower extremity edema is much improved, along with wound on R leg, did not need to follow-up with wound care.  His shortness of breath with exertion is also greatly improved.        Review of systems:  All others systems reviewed and negative except for what is outlined in the above HPI    Past Medical History:   Diagnosis Date   • Abnormal electrocardiogram 8/13/2010   • Arrhythmia     Atrial Fibrillation; Cardiologist, Dr. Sorensen   • Arthritis     knees, left hip   • ASTHMA     inhalers   •  Atrial fibrillation (HCC) 10/25/2011   • Bronchospasm 2009   • Diabetes (HCC)     insulin   • HTN (hypertension) 10/25/2011   • Hypercholesteremia 10/25/2011   • Long term (current) use of anticoagulants 10/25/2011   • NASAL POLYPS 2009   • Other nonspecific abnormal finding 2009   • Pain     left hip   • Shortness of breath    • Sleep apnea 2009    o2 at night   • Wears glasses      Past Surgical History:   Procedure Laterality Date   • KNEE ARTHROPLASTY TOTAL Left 10/2014   • HIP ARTHROPLASTY TOTAL  2010    Performed by OSGOOD, PATRICK J at [facility] ORS   • LUMBAR DECOMPRESSION     • HIP REPLACEMENT, TOTAL     • LAMINOTOMY     • SINUSCOPE     • SINUSOTOMIES  ,,2007     Family History   Problem Relation Age of Onset   • Heart Disease Mother      Social History     Socioeconomic History   • Marital status:      Spouse name: Not on file   • Number of children: Not on file   • Years of education: Not on file   • Highest education level: Not on file   Occupational History   • Not on file   Tobacco Use   • Smoking status: Former Smoker     Packs/day: 0.50     Years: 10.00     Pack years: 5.00     Types: Cigarettes     Quit date: 12/10/1968     Years since quittin.6   • Smokeless tobacco: Never Used   Vaping Use   • Vaping Use: Never used   Substance and Sexual Activity   • Alcohol use: No   • Drug use: No   • Sexual activity: Not on file   Other Topics Concern   • Not on file   Social History Narrative   • Not on file     Social Determinants of Health     Financial Resource Strain:    • Difficulty of Paying Living Expenses:    Food Insecurity:    • Worried About Running Out of Food in the Last Year:    • Ran Out of Food in the Last Year:    Transportation Needs:    • Lack of Transportation (Medical):    • Lack of Transportation (Non-Medical):    Physical Activity:    • Days of Exercise per Week:    • Minutes of Exercise per Session:    Stress:    • Feeling of  Stress :    Social Connections:    • Frequency of Communication with Friends and Family:    • Frequency of Social Gatherings with Friends and Family:    • Attends Church Services:    • Active Member of Clubs or Organizations:    • Attends Club or Organization Meetings:    • Marital Status:    Intimate Partner Violence:    • Fear of Current or Ex-Partner:    • Emotionally Abused:    • Physically Abused:    • Sexually Abused:      Allergies   Allergen Reactions   • Atenolol Shortness of Breath     DYSPNEA.   • Tetanus Toxoid Swelling     Current Outpatient Medications   Medication Sig Dispense Refill   • DILTIAZem (CARDIZEM) 60 MG Tab Take 1 tablet by mouth 3 times a day. 90 tablet 11   • furosemide (LASIX) 40 MG Tab Take 2 Tablets by mouth every day. 30 tablet 11   • atorvastatin (LIPITOR) 40 MG Tab Take 1 tablet by mouth every day. 90 tablet 3   • potassium chloride (MICRO-K) 10 MEQ capsule Take 10 mEq by mouth every day.     • albuterol (PROVENTIL) 2.5mg/3ml Nebu Soln solution for nebulization Take 3 mL by nebulization every four hours as needed for Shortness of Breath. 120 Bullet 2   • warfarin (COUMADIN) 5 MG Tab Take one-half tablet (2.5mg) on Thursdays and one tablet (5mg) all other days OR as directed by the Anticoagulation clinic. 90 tablet 1   • budesonide-formoterol (SYMBICORT) 160-4.5 MCG/ACT Aerosol Inhale 2 Puffs 2 Times a Day. Use spacer. Rinse mouth after each use. 1 Each 11   • clopidogrel (PLAVIX) 75 MG Tab TAKE ONE TABLET BY MOUTH EVERY DAY 90 Tab 3   • NOVOLOG FLEXPEN 100 UNIT/ML solution for injection INJECT 5-10 UNITS THREE TIMES A DAY AS NEEDED  11   • insulin detemir (LEVEMIR FLEXTOUCH) 100 UNIT/ML Solution Pen-injector injection Inject 14 Units as instructed every evening.     • insulin lispro (HUMALOG) 100 UNIT/ML Solution Inject 5-10 Units as instructed as needed for High Blood Sugar (Only if blood sugar is 180 or higher).     • metOLazone (ZAROXOLYN) 2.5 MG Tab Take 1 tablet by mouth  "every day. (Patient not taking: Reported on 7/14/2021) 90 tablet 3     No current facility-administered medications for this visit.       Physical Exam:  Ambulatory Vitals  /86 (BP Location: Left arm, Patient Position: Sitting, BP Cuff Size: Adult)   Pulse 76   Resp 14   Ht 1.803 m (5' 11\")   Wt 106 kg (233 lb 3.2 oz)   SpO2 94%    BP Readings from Last 4 Encounters:   07/14/21 132/86   07/14/21 132/68   07/13/21 110/62   07/06/21 134/80       Weight/BMI: Body mass index is 32.52 kg/m².  Wt Readings from Last 4 Encounters:   07/14/21 106 kg (233 lb 3.2 oz)   07/14/21 106 kg (233 lb)   07/06/21 114 kg (251 lb)   05/21/21 119 kg (262 lb)       General: No apparent distress. Well nourished.   Neck: No JVD. No caroid bruits, trachea midline  Lungs: CTAB, decreased breath sounds bilaterally. Normal effort, without crackles/rhonchi, no wheezing,   Heart: RRR. Normal S1/S2, no murmur, no rub. 1+ lower extremity edema. 2+ radial pulses, 2+ DT pulses  Ext: No clubbing or cyanosis.  Abdomen: soft, non tender, non distended, no jameel hepatomegaly.  Neurological: No focal deficits, no facial asymmetry.  Normal speech.  Psychiatric: Appropriate affect, alert and oriented x 4.   Skin: Warm and dry, no rash.  Bilateral lower extremities red, dry, flaky  Wound to right medial shin improved    Lab Data Review:  Lab Results   Component Value Date/Time    CHOLSTRLTOT 147 05/18/2021 10:43 AM    LDL 92 05/18/2021 10:43 AM    HDL 46 05/18/2021 10:43 AM    TRIGLYCERIDE 46 05/18/2021 10:43 AM       Lab Results   Component Value Date/Time    SODIUM 140 07/12/2021 12:16 PM    POTASSIUM 4.4 07/12/2021 12:16 PM    CHLORIDE 99 07/12/2021 12:16 PM    CO2 29 07/12/2021 12:16 PM    GLUCOSE 121 (H) 07/12/2021 12:16 PM    BUN 43 (H) 07/12/2021 12:16 PM    CREATININE 2.00 (H) 07/12/2021 12:16 PM     Lab Results   Component Value Date/Time    ALKPHOSPHAT 139 (H) 05/18/2021 10:43 AM    ASTSGOT 22 05/18/2021 10:43 AM    ALTSGPT 17 05/18/2021 " 10:43 AM    TBILIRUBIN 0.9 05/18/2021 10:43 AM      Lab Results   Component Value Date/Time    WBC 17.3 (H) 03/08/2021 12:27 PM         Cardiac Imaging and Procedures Review:    EKG7/14/21 : My Personal interpretation reveals atrial fibrillation 72, RBBB (chronic)    Echo 3/23/2018 :  CONCLUSIONS  No prior study is available for comparison.   Mild concentric left ventricular hypertrophy.  Normal left ventricular systolic function.  Left ventricular ejection fraction is visually estimated to be 60%.  Diastolic function is difficult to assess with atrial fibrillation.  Severely dilated left atrium.  Estimated right ventricular systolic pressure is 37 mmHg + JVP.    Stress Test 7/19/2018:  Myocardial Perfusion    Report    NUCLEAR IMAGING INTERPRETATION    * Medium sized, moderate severity, mostly non-reversible defect in the    inferior wall, likely RCA territory.      * Small sized, moderate severity, mostly non-reversible defect in distal    anterior and mid anterolateral wall, likely in a diagonal territory.    * Total sum difference score of 4.    * Inferior wall hypokinesis with preserved EF of 58%.    ECG INTERPRETATION    Negative stress ECG for ischemia.     [facility] 8/15/2018:  PROCEDURE:  Cardiac catheterization and percutaneous coronary intervention.  A.  Left heart catheterization.  B.  Left ventriculography.  C.  Selective coronary angiography.  D.  Coronary stent implantation of the mid right coronary artery with   overlapping stents: 4.5x18 mm Ultra non-drug eluting stent and 4.0x38 mm Xience   Gianna drug-eluting stent   E.  Right radial artery approach.     PREOPERATIVE DIAGNOSES:  1.  Unstable angina pectoris.  2.  Abnormal myocardial perfusion scan with inferior perfusion abnormality.  3.  Chronic atrial fibrillation.  4.  Chronic anticoagulation with warfarin.  5.  Diabetes mellitus.     POSTOPERATIVE DIAGNOSES:  1.  Coronary artery disease, 3-vessel, involving the dominant mid right   coronary artery,  distal circumflex artery and diagonal branch ostium.  2.  Left ventricular ejection fraction 60%.       Assessment and Clinical Decision Makin. Abnormal electrocardiogram  EKG    EKG   2. Long term current use of anticoagulant therapy     3. Acute heart failure with preserved ejection fraction (HFpEF) ([facility])     4. Bilateral leg edema     5. CKD (chronic kidney disease) stage 4, GFR 15-29 ml/min (CMS-[facility])     6. Coronary artery disease involving native coronary artery of native heart without angina pectoris  atorvastatin (LIPITOR) 40 MG Tab   7. S/P coronary artery stent placement     8. Atrial fibrillation, chronic ([facility])     9. Dyslipidemia       The following treatment plan was discussed    Abnormal electrocardiogram  -Chronic rate controlled atrial fibrillation  -Chronic right bundle branch block    HFpEF, bilateral leg edema  -Continue Lasix 80 mg daily in am  -Continue BMP weekly, standing order, will follow with results  -If increased lower extremity edema occurs may take prn metolazone 2.5mg  -Obtain new echocardiogram, scheduled, will follow up with results  -Continue to record daily weight, BP, pulse, contact us in week with results     Chronic atrial fibrillation  -Could be contributing factor to heart failure symptoms, will discuss necessity of possible Afib ablation with Dr. Sorensen  -Rate controlled on diltiazem 3 times daily  -Continue warfarin, follow-up with Coumadin clinic    Dyslipidemia  -Pt has not been taking Atorv 10mg, asking for new Rx  -Should be on 40mg, given Hx of CAD  -LDL goal < 70       Plan reviewed in detail with the patient, verbalizes understanding and is in agreement.  Pt is to follow up with Dr. Sorensen in 1 month  Encouraged Pt to follow up with us over the phone or electronically using my Tapdaqt as cardiac issues/concerns arise.      PLEASE NOTE: This dictation was created using voice recognition software. I have made every reasonable attempt to correct obvious  errors, but I expect that there are errors of grammar and possibly content that I did not discover before finalizing the note.       POLO Sandra.   [facility]  [phone]    Collaborating Physician: Dr. Lassiter.    ADDENDUM:  Given bump in creatinine of greater than 0.5, Cr now 2 will cut back 80 mg of Lasix to 40 mg daily.  Patient to have repeat BMP in 1 week.  If lower extremity edema  Returns may take 1 extra dose of Lasix total 80mg. Pt messaged through Norse.

## 2021-07-15 RX ORDER — FUROSEMIDE 40 MG/1
40 TABLET ORAL DAILY
Qty: 30 TABLET | Refills: 11 | Status: SHIPPED | OUTPATIENT
Start: 2021-07-15 | End: 2021-09-28

## 2021-07-15 RX ORDER — DILTIAZEM HYDROCHLORIDE 60 MG/1
60 TABLET, FILM COATED ORAL 3 TIMES DAILY
Qty: 90 TABLET | Refills: 11 | Status: SHIPPED | OUTPATIENT
Start: 2021-07-15 | End: 2022-01-01

## 2021-07-20 ENCOUNTER — HOSPITAL ENCOUNTER (OUTPATIENT)
Dept: LAB | Facility: MEDICAL CENTER | Age: 76
End: 2021-07-20
Attending: INTERNAL MEDICINE
Payer: MEDICARE

## 2021-07-20 DIAGNOSIS — R60.0 BILATERAL LEG EDEMA: ICD-10-CM

## 2021-07-20 DIAGNOSIS — R06.02 SOB (SHORTNESS OF BREATH): ICD-10-CM

## 2021-07-20 PROCEDURE — 36415 COLL VENOUS BLD VENIPUNCTURE: CPT

## 2021-07-20 PROCEDURE — 80048 BASIC METABOLIC PNL TOTAL CA: CPT

## 2021-07-21 ENCOUNTER — APPOINTMENT (OUTPATIENT)
Dept: RADIOLOGY | Facility: IMAGING CENTER | Age: 76
End: 2021-07-21
Attending: NURSE PRACTITIONER
Payer: MEDICARE

## 2021-07-21 ENCOUNTER — APPOINTMENT (OUTPATIENT)
Dept: URGENT CARE | Facility: PHYSICIAN GROUP | Age: 76
End: 2021-07-21
Payer: MEDICARE

## 2021-07-21 DIAGNOSIS — J44.9 CHRONIC OBSTRUCTIVE PULMONARY DISEASE, UNSPECIFIED COPD TYPE (HCC): ICD-10-CM

## 2021-07-21 LAB
ANION GAP SERPL CALC-SCNC: 12 MMOL/L (ref 7–16)
BUN SERPL-MCNC: 39 MG/DL (ref 8–22)
CALCIUM SERPL-MCNC: 9.2 MG/DL (ref 8.5–10.5)
CHLORIDE SERPL-SCNC: 100 MMOL/L (ref 96–112)
CO2 SERPL-SCNC: 27 MMOL/L (ref 20–33)
CREAT SERPL-MCNC: 1.76 MG/DL (ref 0.5–1.4)
GLUCOSE SERPL-MCNC: 110 MG/DL (ref 65–99)
POTASSIUM SERPL-SCNC: 5 MMOL/L (ref 3.6–5.5)
SODIUM SERPL-SCNC: 139 MMOL/L (ref 135–145)

## 2021-07-21 PROCEDURE — 71046 X-RAY EXAM CHEST 2 VIEWS: CPT | Mod: TC,FY | Performed by: NURSE PRACTITIONER

## 2021-07-23 ENCOUNTER — HOSPITAL ENCOUNTER (OUTPATIENT)
Dept: CARDIOLOGY | Facility: MEDICAL CENTER | Age: 76
End: 2021-07-23
Attending: NURSE PRACTITIONER
Payer: MEDICARE

## 2021-07-23 DIAGNOSIS — R60.0 LEG EDEMA, LEFT: ICD-10-CM

## 2021-07-23 LAB
LV EJECT FRACT  99904: 65
LV EJECT FRACT MOD 2C 99903: 36.72
LV EJECT FRACT MOD 4C 99902: 55.79
LV EJECT FRACT MOD BP 99901: 46.15

## 2021-07-23 PROCEDURE — 93306 TTE W/DOPPLER COMPLETE: CPT | Mod: 26 | Performed by: INTERNAL MEDICINE

## 2021-07-23 PROCEDURE — 93306 TTE W/DOPPLER COMPLETE: CPT

## 2021-07-29 ENCOUNTER — HOSPITAL ENCOUNTER (OUTPATIENT)
Dept: LAB | Facility: MEDICAL CENTER | Age: 76
End: 2021-07-29
Attending: INTERNAL MEDICINE
Payer: MEDICARE

## 2021-07-29 DIAGNOSIS — R06.02 SOB (SHORTNESS OF BREATH): ICD-10-CM

## 2021-07-29 DIAGNOSIS — R60.0 BILATERAL LEG EDEMA: ICD-10-CM

## 2021-07-29 LAB
ANION GAP SERPL CALC-SCNC: 11 MMOL/L (ref 7–16)
BUN SERPL-MCNC: 43 MG/DL (ref 8–22)
CALCIUM SERPL-MCNC: 9.4 MG/DL (ref 8.5–10.5)
CHLORIDE SERPL-SCNC: 106 MMOL/L (ref 96–112)
CO2 SERPL-SCNC: 26 MMOL/L (ref 20–33)
CREAT SERPL-MCNC: 1.59 MG/DL (ref 0.5–1.4)
GLUCOSE SERPL-MCNC: 159 MG/DL (ref 65–99)
POTASSIUM SERPL-SCNC: 5 MMOL/L (ref 3.6–5.5)
SODIUM SERPL-SCNC: 143 MMOL/L (ref 135–145)

## 2021-07-29 PROCEDURE — 36415 COLL VENOUS BLD VENIPUNCTURE: CPT

## 2021-07-29 PROCEDURE — 80048 BASIC METABOLIC PNL TOTAL CA: CPT

## 2021-08-05 ENCOUNTER — HOSPITAL ENCOUNTER (OUTPATIENT)
Dept: LAB | Facility: MEDICAL CENTER | Age: 76
End: 2021-08-05
Attending: INTERNAL MEDICINE
Payer: MEDICARE

## 2021-08-05 DIAGNOSIS — R60.0 BILATERAL LEG EDEMA: ICD-10-CM

## 2021-08-05 DIAGNOSIS — R06.02 SOB (SHORTNESS OF BREATH): ICD-10-CM

## 2021-08-05 LAB
25(OH)D3 SERPL-MCNC: 45 NG/ML (ref 30–100)
ALBUMIN SERPL BCP-MCNC: 4 G/DL (ref 3.2–4.9)
ANION GAP SERPL CALC-SCNC: 10 MMOL/L (ref 7–16)
APPEARANCE UR: CLEAR
BACTERIA #/AREA URNS HPF: NEGATIVE /HPF
BASOPHILS # BLD AUTO: 1.1 % (ref 0–1.8)
BASOPHILS # BLD: 0.1 K/UL (ref 0–0.12)
BILIRUB UR QL STRIP.AUTO: NEGATIVE
BUN SERPL-MCNC: 44 MG/DL (ref 8–22)
BUN SERPL-MCNC: 45 MG/DL (ref 8–22)
C3 SERPL-MCNC: 137.4 MG/DL (ref 87–200)
C4 SERPL-MCNC: 29.6 MG/DL (ref 19–52)
CALCIUM SERPL-MCNC: 9 MG/DL (ref 8.5–10.5)
CALCIUM SERPL-MCNC: 9.1 MG/DL (ref 8.5–10.5)
CHLORIDE SERPL-SCNC: 101 MMOL/L (ref 96–112)
CHLORIDE SERPL-SCNC: 104 MMOL/L (ref 96–112)
CO2 SERPL-SCNC: 25 MMOL/L (ref 20–33)
CO2 SERPL-SCNC: 25 MMOL/L (ref 20–33)
COLOR UR: YELLOW
CREAT SERPL-MCNC: 1.66 MG/DL (ref 0.5–1.4)
CREAT SERPL-MCNC: 1.69 MG/DL (ref 0.5–1.4)
CREAT UR-MCNC: 71.5 MG/DL
CREAT UR-MCNC: 73.93 MG/DL
EOSINOPHIL # BLD AUTO: 0.56 K/UL (ref 0–0.51)
EOSINOPHIL NFR BLD: 6.1 % (ref 0–6.9)
EPI CELLS #/AREA URNS HPF: NEGATIVE /HPF
ERYTHROCYTE [DISTWIDTH] IN BLOOD BY AUTOMATED COUNT: 54 FL (ref 35.9–50)
FERRITIN SERPL-MCNC: 85.1 NG/ML (ref 22–322)
GLUCOSE SERPL-MCNC: 137 MG/DL (ref 65–99)
GLUCOSE SERPL-MCNC: 138 MG/DL (ref 65–99)
GLUCOSE UR STRIP.AUTO-MCNC: NEGATIVE MG/DL
HCT VFR BLD AUTO: 44.8 % (ref 42–52)
HGB BLD-MCNC: 14 G/DL (ref 14–18)
HYALINE CASTS #/AREA URNS LPF: ABNORMAL /LPF
IMM GRANULOCYTES # BLD AUTO: 0.04 K/UL (ref 0–0.11)
IMM GRANULOCYTES NFR BLD AUTO: 0.4 % (ref 0–0.9)
IRON SATN MFR SERPL: 13 % (ref 15–55)
IRON SERPL-MCNC: 39 UG/DL (ref 50–180)
KETONES UR STRIP.AUTO-MCNC: NEGATIVE MG/DL
LEUKOCYTE ESTERASE UR QL STRIP.AUTO: NEGATIVE
LYMPHOCYTES # BLD AUTO: 0.75 K/UL (ref 1–4.8)
LYMPHOCYTES NFR BLD: 8.2 % (ref 22–41)
MCH RBC QN AUTO: 26.8 PG (ref 27–33)
MCHC RBC AUTO-ENTMCNC: 31.3 G/DL (ref 33.7–35.3)
MCV RBC AUTO: 85.8 FL (ref 81.4–97.8)
MICRO URNS: ABNORMAL
MICROALBUMIN UR-MCNC: 38.1 MG/DL
MICROALBUMIN/CREAT UR: 515 MG/G (ref 0–30)
MONOCYTES # BLD AUTO: 1.06 K/UL (ref 0–0.85)
MONOCYTES NFR BLD AUTO: 11.6 % (ref 0–13.4)
NEUTROPHILS # BLD AUTO: 6.66 K/UL (ref 1.82–7.42)
NEUTROPHILS NFR BLD: 72.6 % (ref 44–72)
NITRITE UR QL STRIP.AUTO: NEGATIVE
NRBC # BLD AUTO: 0 K/UL
NRBC BLD-RTO: 0 /100 WBC
PH UR STRIP.AUTO: 5.5 [PH] (ref 5–8)
PHOSPHATE SERPL-MCNC: 3.3 MG/DL (ref 2.5–4.5)
PLATELET # BLD AUTO: 252 K/UL (ref 164–446)
PMV BLD AUTO: 10.6 FL (ref 9–12.9)
POTASSIUM SERPL-SCNC: 5.3 MMOL/L (ref 3.6–5.5)
POTASSIUM SERPL-SCNC: 5.4 MMOL/L (ref 3.6–5.5)
PROT UR QL STRIP: 100 MG/DL
PROT UR-MCNC: 63 MG/DL (ref 0–15)
PROT/CREAT UR: 881 MG/G (ref 15–68)
PTH-INTACT SERPL-MCNC: 176 PG/ML (ref 14–72)
RBC # BLD AUTO: 5.22 M/UL (ref 4.7–6.1)
RBC # URNS HPF: ABNORMAL /HPF
RBC UR QL AUTO: ABNORMAL
SODIUM SERPL-SCNC: 136 MMOL/L (ref 135–145)
SODIUM SERPL-SCNC: 140 MMOL/L (ref 135–145)
SP GR UR STRIP.AUTO: 1.02
TIBC SERPL-MCNC: 300 UG/DL (ref 250–450)
UIBC SERPL-MCNC: 261 UG/DL (ref 110–370)
UROBILINOGEN UR STRIP.AUTO-MCNC: 0.2 MG/DL
WBC # BLD AUTO: 9.2 K/UL (ref 4.8–10.8)
WBC #/AREA URNS HPF: ABNORMAL /HPF

## 2021-08-05 PROCEDURE — 82728 ASSAY OF FERRITIN: CPT

## 2021-08-05 PROCEDURE — 86334 IMMUNOFIX E-PHORESIS SERUM: CPT

## 2021-08-05 PROCEDURE — 81001 URINALYSIS AUTO W/SCOPE: CPT

## 2021-08-05 PROCEDURE — 82306 VITAMIN D 25 HYDROXY: CPT | Mod: GA

## 2021-08-05 PROCEDURE — 86255 FLUORESCENT ANTIBODY SCREEN: CPT

## 2021-08-05 PROCEDURE — 36415 COLL VENOUS BLD VENIPUNCTURE: CPT

## 2021-08-05 PROCEDURE — 84155 ASSAY OF PROTEIN SERUM: CPT

## 2021-08-05 PROCEDURE — 84156 ASSAY OF PROTEIN URINE: CPT

## 2021-08-05 PROCEDURE — 83520 IMMUNOASSAY QUANT NOS NONAB: CPT | Mod: 91

## 2021-08-05 PROCEDURE — 82043 UR ALBUMIN QUANTITATIVE: CPT

## 2021-08-05 PROCEDURE — 83540 ASSAY OF IRON: CPT

## 2021-08-05 PROCEDURE — 83550 IRON BINDING TEST: CPT

## 2021-08-05 PROCEDURE — 84165 PROTEIN E-PHORESIS SERUM: CPT

## 2021-08-05 PROCEDURE — 80048 BASIC METABOLIC PNL TOTAL CA: CPT

## 2021-08-05 PROCEDURE — 86162 COMPLEMENT TOTAL (CH50): CPT

## 2021-08-05 PROCEDURE — 83970 ASSAY OF PARATHORMONE: CPT

## 2021-08-05 PROCEDURE — 82570 ASSAY OF URINE CREATININE: CPT | Mod: 91

## 2021-08-05 PROCEDURE — 86160 COMPLEMENT ANTIGEN: CPT | Mod: 91

## 2021-08-05 PROCEDURE — 80069 RENAL FUNCTION PANEL: CPT

## 2021-08-05 PROCEDURE — 85025 COMPLETE CBC W/AUTO DIFF WBC: CPT

## 2021-08-07 LAB
CH50 SERPL-ACNC: >95 U/ML (ref 38.7–89.9)
KAPPA LC FREE SER-MCNC: 75.07 MG/L (ref 3.3–19.4)
KAPPA LC FREE/LAMBDA FREE SER NEPH: 1.54 {RATIO} (ref 0.26–1.65)
LAMBDA LC FREE SERPL-MCNC: 48.73 MG/L (ref 5.71–26.3)

## 2021-08-08 LAB
ALBUMIN SERPL ELPH-MCNC: 3.77 G/DL (ref 3.75–5.01)
ALPHA1 GLOB SERPL ELPH-MCNC: 0.32 G/DL (ref 0.19–0.46)
ALPHA2 GLOB SERPL ELPH-MCNC: 0.83 G/DL (ref 0.48–1.05)
B-GLOBULIN SERPL ELPH-MCNC: 0.87 G/DL (ref 0.48–1.1)
GAMMA GLOB SERPL ELPH-MCNC: 1.21 G/DL (ref 0.62–1.51)
INTERPRETATION SERPL IFE-IMP: NORMAL
INTERPRETATION SERPL IFE-IMP: NORMAL
PATHOLOGY STUDY: NORMAL
PROT SERPL-MCNC: 7 G/DL (ref 6.3–8.2)

## 2021-08-10 LAB
ALBUMIN 24H MFR UR ELPH: 67.5 %
ALPHA1 GLOB 24H MFR UR ELPH: 3.7 %
ALPHA2 GLOB 24H MFR UR ELPH: 4.5 %
B-GLOBULIN 24H MFR UR ELPH: 13.4 %
COLLECT DURATION TIME SPEC: NORMAL HRS
EER MONOCLONAL PROTEIN STUDY, 24 HOUR U Q5964: NORMAL
GAMMA GLOB 24H MFR UR ELPH: 10.9 %
INTERPRETATION UR IFE-IMP: NORMAL
M PROTEIN 24H MFR UR ELPH: 0 %
M PROTEIN 24H UR ELPH-MRATE: NORMAL MG/24 HRS
PLA2R IGG SERPL QL IF: NORMAL
PROT 24H UR-MRATE: NORMAL MG/D (ref 40–150)
PROT UR-MCNC: 59 MG/DL
SPECIMEN VOL ?TM UR: NORMAL ML

## 2021-08-13 ENCOUNTER — HOSPITAL ENCOUNTER (OUTPATIENT)
Dept: LAB | Facility: MEDICAL CENTER | Age: 76
End: 2021-08-13
Attending: INTERNAL MEDICINE
Payer: MEDICARE

## 2021-08-13 DIAGNOSIS — R06.02 SOB (SHORTNESS OF BREATH): ICD-10-CM

## 2021-08-13 DIAGNOSIS — R60.0 BILATERAL LEG EDEMA: ICD-10-CM

## 2021-08-13 LAB
ANION GAP SERPL CALC-SCNC: 11 MMOL/L (ref 7–16)
BUN SERPL-MCNC: 39 MG/DL (ref 8–22)
CALCIUM SERPL-MCNC: 9.2 MG/DL (ref 8.5–10.5)
CHLORIDE SERPL-SCNC: 103 MMOL/L (ref 96–112)
CO2 SERPL-SCNC: 25 MMOL/L (ref 20–33)
CREAT SERPL-MCNC: 1.5 MG/DL (ref 0.5–1.4)
GLUCOSE SERPL-MCNC: 149 MG/DL (ref 65–99)
POTASSIUM SERPL-SCNC: 4.7 MMOL/L (ref 3.6–5.5)
SODIUM SERPL-SCNC: 139 MMOL/L (ref 135–145)

## 2021-08-13 PROCEDURE — 80048 BASIC METABOLIC PNL TOTAL CA: CPT

## 2021-08-13 PROCEDURE — 36415 COLL VENOUS BLD VENIPUNCTURE: CPT

## 2021-08-26 ENCOUNTER — HOSPITAL ENCOUNTER (OUTPATIENT)
Dept: LAB | Facility: MEDICAL CENTER | Age: 76
End: 2021-08-26
Attending: INTERNAL MEDICINE
Payer: MEDICARE

## 2021-08-26 DIAGNOSIS — R06.02 SOB (SHORTNESS OF BREATH): ICD-10-CM

## 2021-08-26 DIAGNOSIS — R60.0 BILATERAL LEG EDEMA: ICD-10-CM

## 2021-08-26 PROCEDURE — 36415 COLL VENOUS BLD VENIPUNCTURE: CPT

## 2021-08-26 PROCEDURE — 80048 BASIC METABOLIC PNL TOTAL CA: CPT

## 2021-08-27 ENCOUNTER — HOSPITAL ENCOUNTER (OUTPATIENT)
Facility: MEDICAL CENTER | Age: 76
End: 2021-08-27
Attending: UROLOGY
Payer: MEDICARE

## 2021-08-27 ENCOUNTER — HOSPITAL ENCOUNTER (OUTPATIENT)
Dept: LAB | Facility: MEDICAL CENTER | Age: 76
End: 2021-08-27
Attending: UROLOGY
Payer: MEDICARE

## 2021-08-27 ENCOUNTER — TELEPHONE (OUTPATIENT)
Dept: CARDIOLOGY | Facility: MEDICAL CENTER | Age: 76
End: 2021-08-27

## 2021-08-27 LAB
ANION GAP SERPL CALC-SCNC: 15 MMOL/L (ref 7–16)
BUN SERPL-MCNC: 57 MG/DL (ref 8–22)
CALCIUM SERPL-MCNC: 9 MG/DL (ref 8.5–10.5)
CHLORIDE SERPL-SCNC: 96 MMOL/L (ref 96–112)
CO2 SERPL-SCNC: 26 MMOL/L (ref 20–33)
CREAT SERPL-MCNC: 2.12 MG/DL (ref 0.5–1.4)
GLUCOSE SERPL-MCNC: 251 MG/DL (ref 65–99)
POTASSIUM SERPL-SCNC: 4.8 MMOL/L (ref 3.6–5.5)
SODIUM SERPL-SCNC: 137 MMOL/L (ref 135–145)

## 2021-08-27 PROCEDURE — 87086 URINE CULTURE/COLONY COUNT: CPT

## 2021-08-27 NOTE — TELEPHONE ENCOUNTER
Called and spoke to the patient. He stated Dr. Sever did a bunch of tests and his potassium was too high and he said to stop taking the potassium, but patient is still taking lasix.  He Prescribed Jardiance, started taking and having dry heaves and nausea, started getting blood in his urine the next day then it went away. Had a hard time urinating yesterday and all came back last night. I advised he needs to contact the office who prescribed it and notify them of everything happening, as SW and MR cannot really recommend on those things since he is seeing a specialist.  He stated he understands that and just wanted SW to know what was going on.  I discussed ER precautions for bleeding and worsening symptoms. He was also questioning stopping his potassium and I advised if they are monitoring his levels while he is on lasix then it should be fine.  He verbalized understanding, was appreciative of call. Answered all questions and concerns.     Routed to PIPO as FRANCY.

## 2021-08-30 LAB
BACTERIA UR CULT: NORMAL
SIGNIFICANT IND 70042: NORMAL
SITE SITE: NORMAL
SOURCE SOURCE: NORMAL

## 2021-08-31 ENCOUNTER — ANTICOAGULATION MONITORING (OUTPATIENT)
Dept: VASCULAR LAB | Facility: MEDICAL CENTER | Age: 76
End: 2021-08-31
Payer: MEDICARE

## 2021-08-31 VITALS
BODY MASS INDEX: 32.57 KG/M2 | HEART RATE: 86 BPM | RESPIRATION RATE: 16 BRPM | WEIGHT: 233.5 LBS | TEMPERATURE: 98.6 F | OXYGEN SATURATION: 92 % | SYSTOLIC BLOOD PRESSURE: 132 MMHG | DIASTOLIC BLOOD PRESSURE: 70 MMHG

## 2021-08-31 DIAGNOSIS — I48.91 ATRIAL FIBRILLATION WITH RAPID VENTRICULAR RESPONSE (HCC): ICD-10-CM

## 2021-08-31 DIAGNOSIS — Z79.01 LONG TERM CURRENT USE OF ANTICOAGULANT THERAPY: ICD-10-CM

## 2021-08-31 LAB — INR PPP: 2.3 (ref 2–3.5)

## 2021-08-31 PROCEDURE — 99213 OFFICE O/P EST LOW 20 MIN: CPT | Performed by: NURSE PRACTITIONER

## 2021-08-31 ASSESSMENT — FIBROSIS 4 INDEX: FIB4 SCORE: 1.61

## 2021-08-31 NOTE — PROGRESS NOTES
OP Anticoagulation Service Note    Date: 2021  Blood Pressure : 132/70  Pulse: 86  Respiration: 16    Anticoagulation Summary  As of 2021    INR goal:  2.0-3.0   TTR:  67.8 % (6.1 y)   INR used for dosin.30 (2021)   Warfarin maintenance plan:  2.5 mg (5 mg x 0.5) every Thu; 5 mg (5 mg x 1) all other days   Weekly warfarin total:  32.5 mg   Plan last modified:  MARIAA Garcia (2021)   Next INR check:  2021   Target end date:  Indefinite    Indications    Atrial fibrillation with rapid ventricular response (HCC) [I48.91]  Long term current use of anticoagulant therapy [Z79.01]  Atrial fibrillation (HCC) (Resolved) [I48.91]             Anticoagulation Episode Summary     INR check location:      Preferred lab:      Send INR reminders to:      Comments:        Anticoagulation Care Providers     Provider Role Specialty Phone number    Sanjay Sorensen M.D. Referring Cardiology [phone]        Anticoagulation Patient Findings  Patient Findings     Negatives:  Signs/symptoms of thrombosis, Signs/symptoms of bleeding, Laboratory test error suspected, Change in health, Change in alcohol use, Change in activity, Upcoming invasive procedure, Emergency department visit, Upcoming dental procedure, Missed doses, Extra doses, Change in medications, Change in diet/appetite, Hospital admission, Bruising, Other complaints          THIS VISIT CONDUCTED WITH PRESENTER VIA TELEMEDICINE UTILIZING SECURE AND ENCRYPTED VIDEOCONFERENCING EQUIPMENT  ROS:    Pulm: Denies SOB, chest pain.    Card: Denies syncope, edema, palpitations.    Extremities: Denies redness, pain.     PE:    Pulm: No SOB, even and unlabored.    Card: Normal rate and rhythm.    Extremities: No redness, or edema.     INR  is-therapeutic.    Denies signs/symptoms of bleeding and/or thrombosis.    Denies changes to diet or medications.   Follow up appt in 7 weeks     Plan:  Continue current medication regimen.       Medication:  Warfarin (Coumadin)     Sunday Monday Tuesday Wednesday Thursday Friday Saturday      5 mg    5 mg    5 mg    5 mg    2.5 mg    5 mg    5 mg      1 tab(s)    1 tab(s)    1 tab(s)    1 tab(s)    1/2 tab(s)    1 tab(s)  1 tab(s)     Next Appointment: Tuesday, Oct 19 @ 1:30    Pt on antiplatelet therapy Plavix 75mg for coronary artery occlusion with stent indef per cardiology.      Review all of your home medications and newly ordered medications with your doctor and / or pharmacist. Follow medication instructions as directed by your doctor and / or pharmacist. Please keep your medication list with you and share with your physician. Update the information when medications are discontinued, doses are changed, or new medications (including over-the-counter products) are added; and carry medication information at all times in the event of emergency situations.      For questions, please contact Outpatient Anticoagulation Service [phone].         Patient is on a high risk medication and therefore requires close monitoring and follow up.     CHEST guidelines recommend frequent INR monitoring at regular intervals (a few days up to a max of 12 weeks) to ensure they are on the proper dose of warfarin and not having any complications from therapy.  INRs can dramatically change over a short time period due to diet, medications, and medical conditions.   The patient instructed to go to the ER for falls with a head injury,  blood in urine or stool or any bleeding that last longer than 20 min.     APRIL Garcia.

## 2021-09-03 ENCOUNTER — HOSPITAL ENCOUNTER (OUTPATIENT)
Dept: LAB | Facility: MEDICAL CENTER | Age: 76
End: 2021-09-03
Attending: INTERNAL MEDICINE
Payer: MEDICARE

## 2021-09-03 ENCOUNTER — TELEPHONE (OUTPATIENT)
Dept: CARDIOLOGY | Facility: MEDICAL CENTER | Age: 76
End: 2021-09-03

## 2021-09-03 DIAGNOSIS — R06.02 SOB (SHORTNESS OF BREATH): ICD-10-CM

## 2021-09-03 DIAGNOSIS — N18.4 CKD (CHRONIC KIDNEY DISEASE) STAGE 4, GFR 15-29 ML/MIN (HCC): ICD-10-CM

## 2021-09-03 DIAGNOSIS — R60.0 BILATERAL LEG EDEMA: ICD-10-CM

## 2021-09-03 PROCEDURE — 80048 BASIC METABOLIC PNL TOTAL CA: CPT

## 2021-09-03 PROCEDURE — 36415 COLL VENOUS BLD VENIPUNCTURE: CPT

## 2021-09-03 NOTE — TELEPHONE ENCOUNTER
----- Message from Twyla Carrizales R.N. sent at 8/27/2021  8:26 AM PDT -----  To PIPO: SAM scheduled 09/28/21.

## 2021-09-03 NOTE — TELEPHONE ENCOUNTER
Called and spoke to the patient and relayed SW recommendations to him.  He stated he would go do it today. He then stated here is what happened, Couple weeks ago started bleeding, blood in urine, he took jardiance, stopped it and a couple days later no more bleeding, kidney doctor advised to stop taking potassium chloride due to too much in his system, he is only taking lasix 2 pills, stopped taking it for 2 days, now he is only taking 1 pill lasix daily.  Advised to keep doing what he is doing and we will review his lab work when we get it. Verbalized understanding and was appreciative of call.

## 2021-09-04 LAB
ANION GAP SERPL CALC-SCNC: 10 MMOL/L (ref 7–16)
BUN SERPL-MCNC: 44 MG/DL (ref 8–22)
CALCIUM SERPL-MCNC: 9.2 MG/DL (ref 8.5–10.5)
CHLORIDE SERPL-SCNC: 102 MMOL/L (ref 96–112)
CO2 SERPL-SCNC: 26 MMOL/L (ref 20–33)
CREAT SERPL-MCNC: 1.88 MG/DL (ref 0.5–1.4)
GLUCOSE SERPL-MCNC: 231 MG/DL (ref 65–99)
POTASSIUM SERPL-SCNC: 4.8 MMOL/L (ref 3.6–5.5)
SODIUM SERPL-SCNC: 138 MMOL/L (ref 135–145)

## 2021-09-12 DIAGNOSIS — Z95.5 S/P CORONARY ARTERY STENT PLACEMENT: ICD-10-CM

## 2021-09-13 ENCOUNTER — HOSPITAL ENCOUNTER (OUTPATIENT)
Dept: LAB | Facility: MEDICAL CENTER | Age: 76
End: 2021-09-13
Attending: INTERNAL MEDICINE
Payer: MEDICARE

## 2021-09-13 DIAGNOSIS — N18.4 CKD (CHRONIC KIDNEY DISEASE) STAGE 4, GFR 15-29 ML/MIN (HCC): ICD-10-CM

## 2021-09-13 LAB
ANION GAP SERPL CALC-SCNC: 12 MMOL/L (ref 7–16)
BUN SERPL-MCNC: 32 MG/DL (ref 8–22)
CALCIUM SERPL-MCNC: 9.4 MG/DL (ref 8.5–10.5)
CHLORIDE SERPL-SCNC: 99 MMOL/L (ref 96–112)
CO2 SERPL-SCNC: 26 MMOL/L (ref 20–33)
CREAT SERPL-MCNC: 1.65 MG/DL (ref 0.5–1.4)
GLUCOSE SERPL-MCNC: 286 MG/DL (ref 65–99)
POTASSIUM SERPL-SCNC: 4.5 MMOL/L (ref 3.6–5.5)
SODIUM SERPL-SCNC: 137 MMOL/L (ref 135–145)

## 2021-09-13 PROCEDURE — 36415 COLL VENOUS BLD VENIPUNCTURE: CPT

## 2021-09-13 PROCEDURE — 80048 BASIC METABOLIC PNL TOTAL CA: CPT

## 2021-09-14 RX ORDER — CLOPIDOGREL BISULFATE 75 MG/1
TABLET ORAL
Qty: 90 TABLET | Refills: 3 | Status: SHIPPED | OUTPATIENT
Start: 2021-09-14 | End: 2022-09-14

## 2021-09-28 ENCOUNTER — OFFICE VISIT (OUTPATIENT)
Dept: CARDIOLOGY | Facility: MEDICAL CENTER | Age: 76
End: 2021-09-28
Payer: MEDICARE

## 2021-09-28 ENCOUNTER — TELEPHONE (OUTPATIENT)
Dept: CARDIOLOGY | Facility: MEDICAL CENTER | Age: 76
End: 2021-09-28

## 2021-09-28 VITALS
HEART RATE: 70 BPM | BODY MASS INDEX: 32.23 KG/M2 | DIASTOLIC BLOOD PRESSURE: 74 MMHG | RESPIRATION RATE: 14 BRPM | SYSTOLIC BLOOD PRESSURE: 132 MMHG | WEIGHT: 230.2 LBS | HEIGHT: 71 IN

## 2021-09-28 DIAGNOSIS — I48.20 ATRIAL FIBRILLATION, CHRONIC (HCC): ICD-10-CM

## 2021-09-28 DIAGNOSIS — I10 ESSENTIAL HYPERTENSION, BENIGN: ICD-10-CM

## 2021-09-28 DIAGNOSIS — E11.65 TYPE 2 DIABETES MELLITUS WITH HYPERGLYCEMIA, WITHOUT LONG-TERM CURRENT USE OF INSULIN (HCC): ICD-10-CM

## 2021-09-28 DIAGNOSIS — I34.0 MODERATE MITRAL REGURGITATION: ICD-10-CM

## 2021-09-28 DIAGNOSIS — I50.812 CHRONIC RIGHT-SIDED HEART FAILURE (HCC): ICD-10-CM

## 2021-09-28 DIAGNOSIS — I27.20 PULMONARY HYPERTENSION (HCC): ICD-10-CM

## 2021-09-28 DIAGNOSIS — Z95.5 S/P CORONARY ARTERY STENT PLACEMENT: ICD-10-CM

## 2021-09-28 DIAGNOSIS — I25.10 CORONARY ARTERY DISEASE, OCCLUSIVE: ICD-10-CM

## 2021-09-28 DIAGNOSIS — I50.32 CHRONIC HEART FAILURE WITH PRESERVED EJECTION FRACTION (HCC): ICD-10-CM

## 2021-09-28 DIAGNOSIS — Z79.01 ANTICOAGULATED: ICD-10-CM

## 2021-09-28 PROBLEM — I50.31 ACUTE HEART FAILURE WITH PRESERVED EJECTION FRACTION (HFPEF) (HCC): Status: RESOLVED | Noted: 2021-07-09 | Resolved: 2021-09-28

## 2021-09-28 PROBLEM — I50.20 HFREF (HEART FAILURE WITH REDUCED EJECTION FRACTION) (HCC): Status: ACTIVE | Noted: 2021-09-28

## 2021-09-28 PROBLEM — I50.20 HFREF (HEART FAILURE WITH REDUCED EJECTION FRACTION) (HCC): Status: RESOLVED | Noted: 2021-09-28 | Resolved: 2021-09-28

## 2021-09-28 PROBLEM — I50.813: Status: ACTIVE | Noted: 2021-09-28

## 2021-09-28 PROBLEM — N18.1: Status: ACTIVE | Noted: 2021-09-28

## 2021-09-28 PROBLEM — I50.813 ACUTE ON CHRONIC RIGHT-SIDED HEART FAILURE (HCC): Status: RESOLVED | Noted: 2021-07-09 | Resolved: 2021-09-28

## 2021-09-28 PROBLEM — I13.0: Status: ACTIVE | Noted: 2021-09-28

## 2021-09-28 PROCEDURE — 99214 OFFICE O/P EST MOD 30 MIN: CPT | Performed by: INTERNAL MEDICINE

## 2021-09-28 ASSESSMENT — ENCOUNTER SYMPTOMS
PALPITATIONS: 0
DIZZINESS: 0
LOSS OF CONSCIOUSNESS: 0
SHORTNESS OF BREATH: 0
MYALGIAS: 0
COUGH: 0

## 2021-09-28 ASSESSMENT — FIBROSIS 4 INDEX: FIB4 SCORE: 1.61

## 2021-09-28 NOTE — PROGRESS NOTES
Chief Complaint   Patient presents with   •  CAD       Subjective:   Yuri De León is a 76 y.o. male who presents today for follow-up evaluation of CAD, NSTEMI, RCA stent, chronic fibrillation, chronic anticoagulation, hypertension and hyperlipidemia.    Since 7/14/2021 the patient's continued to improve with weight loss; total 25 pounds; stabilized at 220.  Symptomatically markedly improved.  Saw [facility] nephrology Dr Devi.  Started on Jardiance.  Developed hematuria and discontinued it.    Past Medical History:   Diagnosis Date   • Abnormal electrocardiogram 8/13/2010   • Arrhythmia     Atrial Fibrillation; Cardiologist, Dr. Sorensen   • Arthritis     knees, left hip   • ASTHMA     inhalers   • Atrial fibrillation (HCC) 10/25/2011   • Bronchospasm 8/6/2009   • Diabetes (HCC)     insulin   • HTN (hypertension) 10/25/2011   • Hypercholesteremia 10/25/2011   • Long term (current) use of anticoagulants 10/25/2011   • NASAL POLYPS 8/6/2009   • Other nonspecific abnormal finding 8/6/2009   • Pain     left hip   • Shortness of breath    • Sleep apnea 8/6/2009    o2 at night   • Wears glasses      Past Surgical History:   Procedure Laterality Date   • KNEE ARTHROPLASTY TOTAL Left 10/2014   • HIP ARTHROPLASTY TOTAL  8/31/2010    Performed by OSGOOD, PATRICK J at SUR[facility]   • LUMBAR DECOMPRESSION  1984   • HIP REPLACEMENT, TOTAL     • LAMINOTOMY     • SINUSCOPE     • SINUSOTOMIES  2003,2005,2/2007     Family History   Problem Relation Age of Onset   • Heart Disease Mother      Social History     Socioeconomic History   • Marital status:      Spouse name: Not on file   • Number of children: Not on file   • Years of education: Not on file   • Highest education level: Not on file   Occupational History   • Not on file   Tobacco Use   • Smoking status: Former Smoker     Packs/day: 0.50     Years: 10.00     Pack years: 5.00     Types: Cigarettes     Quit date: 12/10/1968     Years since  quittin.8   • Smokeless tobacco: Never Used   Vaping Use   • Vaping Use: Never used   Substance and Sexual Activity   • Alcohol use: No   • Drug use: No   • Sexual activity: Not on file   Other Topics Concern   • Not on file   Social History Narrative   • Not on file     Social Determinants of Health     Financial Resource Strain:    • Difficulty of Paying Living Expenses:    Food Insecurity:    • Worried About Running Out of Food in the Last Year:    • Ran Out of Food in the Last Year:    Transportation Needs:    • Lack of Transportation (Medical):    • Lack of Transportation (Non-Medical):    Physical Activity:    • Days of Exercise per Week:    • Minutes of Exercise per Session:    Stress:    • Feeling of Stress :    Social Connections:    • Frequency of Communication with Friends and Family:    • Frequency of Social Gatherings with Friends and Family:    • Attends Sikhism Services:    • Active Member of Clubs or Organizations:    • Attends Club or Organization Meetings:    • Marital Status:    Intimate Partner Violence:    • Fear of Current or Ex-Partner:    • Emotionally Abused:    • Physically Abused:    • Sexually Abused:      Allergies   Allergen Reactions   • Atenolol Shortness of Breath     DYSPNEA.   • Tetanus Toxoid Swelling     Outpatient Encounter Medications as of 2021   Medication Sig Dispense Refill   • clopidogrel (PLAVIX) 75 MG Tab TAKE 1 TABLET BY MOUTH EVERY DAY 90 Tablet 3   • DILTIAZem (CARDIZEM) 60 MG Tab Take 1 tablet by mouth 3 times a day. 90 tablet 11   • atorvastatin (LIPITOR) 40 MG Tab Take 1 tablet by mouth every day. 90 tablet 3   • albuterol (PROVENTIL) 2.5mg/3ml Nebu Soln solution for nebulization Take 3 mL by nebulization every four hours as needed for Shortness of Breath. 120 Bullet 2   • warfarin (COUMADIN) 5 MG Tab Take one-half tablet (2.5mg) on  and one tablet (5mg) all other days OR as directed by the Anticoagulation clinic. 90 tablet 1   •  "budesonide-formoterol (SYMBICORT) 160-4.5 MCG/ACT Aerosol Inhale 2 Puffs 2 Times a Day. Use spacer. Rinse mouth after each use. 1 Each 11   • NOVOLOG FLEXPEN 100 UNIT/ML solution for injection INJECT 5-10 UNITS THREE TIMES A DAY AS NEEDED  11   • insulin detemir (LEVEMIR FLEXTOUCH) 100 UNIT/ML Solution Pen-injector injection Inject 14 Units as instructed every evening.     • insulin lispro (HUMALOG) 100 UNIT/ML Solution Inject 5-10 Units as instructed as needed for High Blood Sugar (Only if blood sugar is 180 or higher).     • [DISCONTINUED] furosemide (LASIX) 40 MG Tab Take 1 tablet by mouth every day. (Patient not taking: Reported on 9/28/2021) 30 tablet 11     No facility-administered encounter medications on file as of 9/28/2021.     Review of Systems   Respiratory: Negative for cough and shortness of breath.    Cardiovascular: Negative for chest pain and palpitations.   Musculoskeletal: Negative for myalgias.   Neurological: Negative for dizziness and loss of consciousness.        Objective:   /74 (BP Location: Left arm, Patient Position: Sitting, BP Cuff Size: Adult)   Resp 14   Ht 1.803 m (5' 11\")   Wt 104 kg (230 lb 3.2 oz)   BMI 32.11 kg/m²     Physical Exam  Constitutional:       Appearance: He is well-developed.   Eyes:      Pupils: Pupils are equal, round, and reactive to light.   Neck:      Vascular: No JVD.      Comments: JVP appears normal.  Cardiovascular:      Rate and Rhythm: Normal rate. Rhythm irregular.      Heart sounds: Murmur heard.     Pulmonary:      Effort: Pulmonary effort is normal. No respiratory distress.      Breath sounds: Normal breath sounds. No wheezing or rales.   Abdominal:      Comments: Significantly obese?  Ascites.   Musculoskeletal:      Comments: Indurated bilateral nonpitting edema.   Skin:     General: Skin is warm and dry.   Neurological:      Mental Status: He is alert and oriented to person, place, and time.   Psychiatric:         Behavior: Behavior normal. "       CARDIAC CATHETERIZATION 08/15/2018  A.  Left heart catheterization.  B.  Left ventriculography.  C.  Selective coronary angiography.  D.  Coronary stent implantation of the mid right coronary artery with   overlapping stents: 4.5x18 mm Ultra non-drug eluting stent and 4.0x38 mm Xience   Gianna drug-eluting stent   E.  Right radial artery approach.   PREOPERATIVE DIAGNOSES:  1.  Unstable angina pectoris.  2.  Abnormal myocardial perfusion scan with inferior perfusion abnormality.  3.  Chronic atrial fibrillation.  4.  Chronic anticoagulation with warfarin.  5.  Diabetes mellitus.   POSTOPERATIVE DIAGNOSES:  1.  Coronary artery disease, 3-vessel, involving the dominant mid right coronary artery, distal circumflex artery and diagonal branch ostium.  2.  Left ventricular ejection fraction 60%.    ECHOCARDIOGRAM 3/23/2018  No prior study is available for comparison.   Mild concentric left ventricular hypertrophy.  Normal left ventricular systolic function.  Left ventricular ejection fraction is visually estimated to be 60%.  Diastolic function is difficult to assess with atrial fibrillation.  Severely dilated left atrium.  Estimated right ventricular systolic pressure is 37 mmHg + JVP.    Assessment:     1. Chronic heart failure with preserved ejection fraction (HCC)     2. Chronic right-sided heart failure (HCC)     3. Pulmonary hypertension (HCC)     4. Atrial fibrillation, chronic (HCC)     5. Anticoagulated     6. Coronary artery disease, occlusive     7. S/P coronary artery stent placement     8. Type 2 diabetes mellitus with hyperglycemia, without long-term current use of insulin ([facility])  HEMOGLOBIN A1C   9. Essential hypertension, benign     10. Moderate mitral regurgitation         Medical Decision Making:  Today's Assessment / Status / Plan:     Assessment  1.  HFpEF; predominant right heart failure.  2.  Pulmonary hypertension  3.  Mitral regurgitation, moderate  4.  CAD/PCI-RCA stent 8/15/2018  5.  Atrial  "fibrillation.  Chronic.  Asymptomatic, rate controlled.  6.  Anticoagulation.  On warfarin.  7.  Hypertension.    8.  Dyslipidemia.    9.  Lower extremity edema with right calf ulcer.  10.  Cellulitis.  Right knee.  6/2019.  11.  CKD followed by Dr Vicente    Recommendation Discussion  1.  The patient is symptomatically improved regarding his HFrEF with dominantly right heart failure.  2.  Current \"dry weight\"  220 pounds may have additional fluid to lose but has had worsening CKD with additional diuresis.  3.  Will retry Jardiance for diabetes control, renal protection and hopefully additional synergistic diuretic response.  4.  Continue to monitor renal function/BMP.  5.  Check A1c; refer to clinical pharmacist.  6.  Reviewed INR therapeutic 2.3.  7.  Recommend sleep study the patient declines has done this in the past probably major contributing factor to his pulmonary hypertension and cardiac problems.  8.  RTC 2 months.    "

## 2021-09-30 ENCOUNTER — DOCUMENTATION (OUTPATIENT)
Dept: VASCULAR LAB | Facility: MEDICAL CENTER | Age: 76
End: 2021-09-30

## 2021-09-30 NOTE — PROGRESS NOTES
[facility]    Received DM in cardiology referral from Dr. Gong on 9/28    Scheduled NP for 10/26    Insurance: Medicare  PCP: non [facility]  Locations to be seen: [facility]    [facility] at [phone], fax [phone]    Meredith Shipley, LeslieD

## 2021-10-19 ENCOUNTER — APPOINTMENT (OUTPATIENT)
Dept: MEDICAL GROUP | Facility: PHYSICIAN GROUP | Age: 76
End: 2021-10-19
Payer: MEDICARE

## 2021-10-19 ENCOUNTER — NON-PROVIDER VISIT (OUTPATIENT)
Dept: MEDICAL GROUP | Facility: PHYSICIAN GROUP | Age: 76
End: 2021-10-19
Payer: MEDICARE

## 2021-10-19 ENCOUNTER — ANTICOAGULATION MONITORING (OUTPATIENT)
Dept: VASCULAR LAB | Facility: MEDICAL CENTER | Age: 76
End: 2021-10-19

## 2021-10-19 VITALS
SYSTOLIC BLOOD PRESSURE: 122 MMHG | DIASTOLIC BLOOD PRESSURE: 68 MMHG | HEART RATE: 83 BPM | OXYGEN SATURATION: 95 % | RESPIRATION RATE: 16 BRPM | TEMPERATURE: 97.7 F

## 2021-10-19 DIAGNOSIS — Z79.01 CHRONIC ANTICOAGULATION: ICD-10-CM

## 2021-10-19 LAB
INR BLD: 2.9 (ref 0.9–1.2)
INR PPP: 2.9 (ref 2–3.5)
POC PROTIME: ABNORMAL

## 2021-10-19 PROCEDURE — 85610 PROTHROMBIN TIME: CPT | Performed by: NURSE PRACTITIONER

## 2021-10-19 PROCEDURE — 99213 OFFICE O/P EST LOW 20 MIN: CPT | Performed by: NURSE PRACTITIONER

## 2021-10-19 RX ORDER — FUROSEMIDE 40 MG/1
80 TABLET ORAL DAILY
COMMUNITY
End: 2022-09-20 | Stop reason: SDUPTHER

## 2021-10-19 RX ORDER — CELECOXIB 200 MG/1
200 CAPSULE ORAL
Status: ON HOLD | COMMUNITY
Start: 2021-09-25 | End: 2023-01-01

## 2021-10-19 NOTE — PROGRESS NOTES
OP Anticoagulation Service Note    Date: 10/19/2021  Blood Pressure : 122/68  Pulse: 83  Respiration: 16    Anticoagulation Summary  As of 10/19/2021    INR goal:  2.0-3.0   TTR:  68.5 % (6.2 y)   INR used for dosin.90 (10/19/2021)   Warfarin maintenance plan:  2.5 mg (5 mg x 0.5) every Thu; 5 mg (5 mg x 1) all other days   Weekly warfarin total:  32.5 mg   Plan last modified:  MARIAA Garcia (2021)   Next INR check:     Target end date:  Indefinite    Indications    Atrial fibrillation with rapid ventricular response (HCC) (Resolved) [I48.91]  Long term current use of anticoagulant therapy (Resolved) [Z79.01]  Atrial fibrillation (HCC) (Resolved) [I48.91]             Anticoagulation Episode Summary     INR check location:      Preferred lab:      Send INR reminders to:      Comments:        Anticoagulation Care Providers     Provider Role Specialty Phone number    Sanjay Sorensen M.D. Referring Cardiovascular Disease (Cardiology) [phone]        Anticoagulation Patient Findings  Patient Findings     Negatives:  Signs/symptoms of thrombosis, Signs/symptoms of bleeding, Laboratory test error suspected, Change in health, Change in alcohol use, Change in activity, Upcoming invasive procedure, Emergency department visit, Upcoming dental procedure, Missed doses, Extra doses, Change in medications, Change in diet/appetite, Hospital admission, Bruising, Other complaints          THIS VISIT CONDUCTED WITH PRESENTER VIA TELEMEDICINE UTILIZING SECURE AND ENCRYPTED VIDEOCONFERENCING EQUIPMENT  ROS:    Pulm: Denies SOB, chest pain.    Card: Denies syncope, edema, palpitations.    Extremities: Denies redness, pain.     PE:    Pulm: No SOB, even and unlabored.    Card: Normal rate and rhythm.    Extremities: No redness, or edema.     INR  is-therapeutic.    Denies signs/symptoms of bleeding and/or thrombosis.    Denies changes to diet or medications.   Follow up appt in 8 weeks     Plan:  Continue  current medication regimen.     Medication: Warfarin (Coumadin)     Sunday Monday Tuesday Wednesday Thursday Friday Saturday      5 mg    5 mg    5 mg    5 mg    2.5 mg    5 mg    5 mg      1 tab(s)    1 tab(s)    1 tab(s)    1 tab(s)    1/2 tab(s)    1 tab(s)  1 tab(s)     Next Appointment: Tuesday, Dec 14 @ 1:00   Pt on antiplatelet therapy Plavix 75mg for coronary artery occlusion with stent indef per cardiology.  .    Review all of your home medications and newly ordered medications with your doctor and / or pharmacist. Follow medication instructions as directed by your doctor and / or pharmacist. Please keep your medication list with you and share with your physician. Update the information when medications are discontinued, doses are changed, or new medications (including over-the-counter products) are added; and carry medication information at all times in the event of emergency situations.      For questions, please contact Outpatient Anticoagulation Service [phone].      Patient is on a high risk medication and therefore requires close monitoring and follow up.     CHEST guidelines recommend frequent INR monitoring at regular intervals (a few days up to a max of 12 weeks) to ensure they are on the proper dose of warfarin and not having any complications from therapy.  INRs can dramatically change over a short time period due to diet, medications, and medical conditions.   The patient instructed to go to the ER for falls with a head injury,  blood in urine or stool or any bleeding that last longer than 20 min.       APRIL Garcia.

## 2021-10-20 ENCOUNTER — HOSPITAL ENCOUNTER (OUTPATIENT)
Dept: LAB | Facility: MEDICAL CENTER | Age: 76
End: 2021-10-20
Attending: INTERNAL MEDICINE
Payer: MEDICARE

## 2021-10-20 DIAGNOSIS — E11.65 TYPE 2 DIABETES MELLITUS WITH HYPERGLYCEMIA, WITHOUT LONG-TERM CURRENT USE OF INSULIN (HCC): ICD-10-CM

## 2021-10-20 LAB
ALBUMIN SERPL BCP-MCNC: 4 G/DL (ref 3.2–4.9)
APPEARANCE UR: CLEAR
BACTERIA #/AREA URNS HPF: NEGATIVE /HPF
BASOPHILS # BLD AUTO: 0.8 % (ref 0–1.8)
BASOPHILS # BLD: 0.08 K/UL (ref 0–0.12)
BILIRUB UR QL STRIP.AUTO: NEGATIVE
BUN SERPL-MCNC: 43 MG/DL (ref 8–22)
CALCIUM SERPL-MCNC: 9.4 MG/DL (ref 8.5–10.5)
CHLORIDE SERPL-SCNC: 103 MMOL/L (ref 96–112)
CO2 SERPL-SCNC: 25 MMOL/L (ref 20–33)
COLOR UR: YELLOW
CREAT SERPL-MCNC: 1.67 MG/DL (ref 0.5–1.4)
EOSINOPHIL # BLD AUTO: 0.5 K/UL (ref 0–0.51)
EOSINOPHIL NFR BLD: 4.8 % (ref 0–6.9)
EPI CELLS #/AREA URNS HPF: NEGATIVE /HPF
ERYTHROCYTE [DISTWIDTH] IN BLOOD BY AUTOMATED COUNT: 58.5 FL (ref 35.9–50)
EST. AVERAGE GLUCOSE BLD GHB EST-MCNC: 209 MG/DL
FERRITIN SERPL-MCNC: 123 NG/ML (ref 22–322)
GLUCOSE SERPL-MCNC: 192 MG/DL (ref 65–99)
GLUCOSE UR STRIP.AUTO-MCNC: NEGATIVE MG/DL
HBA1C MFR BLD: 8.9 % (ref 4–5.6)
HCT VFR BLD AUTO: 43.4 % (ref 42–52)
HGB BLD-MCNC: 13.9 G/DL (ref 14–18)
IMM GRANULOCYTES # BLD AUTO: 0.05 K/UL (ref 0–0.11)
IMM GRANULOCYTES NFR BLD AUTO: 0.5 % (ref 0–0.9)
IRON SATN MFR SERPL: 17 % (ref 15–55)
IRON SERPL-MCNC: 50 UG/DL (ref 50–180)
KETONES UR STRIP.AUTO-MCNC: NEGATIVE MG/DL
LEUKOCYTE ESTERASE UR QL STRIP.AUTO: ABNORMAL
LYMPHOCYTES # BLD AUTO: 0.7 K/UL (ref 1–4.8)
LYMPHOCYTES NFR BLD: 6.7 % (ref 22–41)
MCH RBC QN AUTO: 28.3 PG (ref 27–33)
MCHC RBC AUTO-ENTMCNC: 32 G/DL (ref 33.7–35.3)
MCV RBC AUTO: 88.2 FL (ref 81.4–97.8)
MICRO URNS: ABNORMAL
MONOCYTES # BLD AUTO: 1.18 K/UL (ref 0–0.85)
MONOCYTES NFR BLD AUTO: 11.3 % (ref 0–13.4)
NEUTROPHILS # BLD AUTO: 7.91 K/UL (ref 1.82–7.42)
NEUTROPHILS NFR BLD: 75.9 % (ref 44–72)
NITRITE UR QL STRIP.AUTO: NEGATIVE
NRBC # BLD AUTO: 0 K/UL
NRBC BLD-RTO: 0 /100 WBC
PH UR STRIP.AUTO: 5.5 [PH] (ref 5–8)
PHOSPHATE SERPL-MCNC: 3.1 MG/DL (ref 2.5–4.5)
PLATELET # BLD AUTO: 268 K/UL (ref 164–446)
PMV BLD AUTO: 10.2 FL (ref 9–12.9)
POTASSIUM SERPL-SCNC: 4.5 MMOL/L (ref 3.6–5.5)
PROT UR QL STRIP: 100 MG/DL
PTH-INTACT SERPL-MCNC: 105 PG/ML (ref 14–72)
RBC # BLD AUTO: 4.92 M/UL (ref 4.7–6.1)
RBC # URNS HPF: ABNORMAL /HPF
RBC UR QL AUTO: NEGATIVE
SODIUM SERPL-SCNC: 139 MMOL/L (ref 135–145)
SP GR UR STRIP.AUTO: 1.02
TIBC SERPL-MCNC: 299 UG/DL (ref 250–450)
UIBC SERPL-MCNC: 249 UG/DL (ref 110–370)
UROBILINOGEN UR STRIP.AUTO-MCNC: 0.2 MG/DL
WBC # BLD AUTO: 10.4 K/UL (ref 4.8–10.8)
WBC #/AREA URNS HPF: ABNORMAL /HPF

## 2021-10-20 PROCEDURE — 82043 UR ALBUMIN QUANTITATIVE: CPT

## 2021-10-20 PROCEDURE — 81001 URINALYSIS AUTO W/SCOPE: CPT

## 2021-10-20 PROCEDURE — 83970 ASSAY OF PARATHORMONE: CPT

## 2021-10-20 PROCEDURE — 82728 ASSAY OF FERRITIN: CPT

## 2021-10-20 PROCEDURE — 83540 ASSAY OF IRON: CPT

## 2021-10-20 PROCEDURE — 36415 COLL VENOUS BLD VENIPUNCTURE: CPT | Mod: GA

## 2021-10-20 PROCEDURE — 83516 IMMUNOASSAY NONANTIBODY: CPT | Mod: 91

## 2021-10-20 PROCEDURE — 83550 IRON BINDING TEST: CPT

## 2021-10-20 PROCEDURE — 82570 ASSAY OF URINE CREATININE: CPT

## 2021-10-20 PROCEDURE — 83036 HEMOGLOBIN GLYCOSYLATED A1C: CPT | Mod: GA

## 2021-10-20 PROCEDURE — 80069 RENAL FUNCTION PANEL: CPT

## 2021-10-20 PROCEDURE — 85025 COMPLETE CBC W/AUTO DIFF WBC: CPT

## 2021-10-21 LAB
CREAT UR-MCNC: 76.18 MG/DL
CREAT UR-MCNC: 76.7 MG/DL
MICROALBUMIN UR-MCNC: 37.1 MG/DL
MICROALBUMIN/CREAT UR: 484 MG/G (ref 0–30)

## 2021-10-23 LAB
MYELOPEROXIDASE AB SER-ACNC: 1 AU/ML (ref 0–19)
PROTEINASE3 AB SER-ACNC: 2 AU/ML (ref 0–19)

## 2021-10-26 ENCOUNTER — PHARMACY VISIT (OUTPATIENT)
Dept: PHARMACY | Facility: MEDICAL CENTER | Age: 76
End: 2021-10-26
Payer: COMMERCIAL

## 2021-10-26 ENCOUNTER — NON-PROVIDER VISIT (OUTPATIENT)
Dept: CARDIOLOGY | Facility: MEDICAL CENTER | Age: 76
End: 2021-10-26
Payer: MEDICARE

## 2021-10-26 VITALS
DIASTOLIC BLOOD PRESSURE: 68 MMHG | SYSTOLIC BLOOD PRESSURE: 130 MMHG | HEIGHT: 71 IN | HEART RATE: 81 BPM | BODY MASS INDEX: 33.74 KG/M2 | WEIGHT: 241 LBS

## 2021-10-26 PROCEDURE — 99211 OFF/OP EST MAY X REQ PHY/QHP: CPT | Performed by: INTERNAL MEDICINE

## 2021-10-26 PROCEDURE — RXMED WILLOW AMBULATORY MEDICATION CHARGE: Performed by: NURSE PRACTITIONER

## 2021-10-26 ASSESSMENT — FIBROSIS 4 INDEX: FIB4 SCORE: 1.51

## 2021-10-26 NOTE — PROGRESS NOTES
CHF Pharmacotherapy visit - Visit  Date of Service: 10/26/21  Informed written consent was given on: 10/26/2021    Yuri De León is here for CHF and DM      HPI  Pertinent Interval History since last visit:   First visit  Most recent EF:  65%  Changes to laboratory values since last visit:  First visit  Current BP Medications - including dose:   Entresto or ACE/ARB:none  Beta blocker: none  Diuretic:furosemide 40mg po daily  Aldosterone antagonist: none  Diltiazem 60mg po tid       Changes to weight since last visit:   Pt has gained 11 lbs since last visit  Home BP:  Pt does not check BP at home    Current Adherence to CHF Therapies:  Complete    SOCIAL HISTORY  Social History     Tobacco Use   Smoking Status Former Smoker   • Packs/day: 0.50   • Years: 10.00   • Pack years: 5.00   • Types: Cigarettes   • Quit date: 12/10/1968   • Years since quittin.9   Smokeless Tobacco Never Used      Change in weight: pt continues to gain weight  Exercise habits: no regular exercise program   Diet: common adult      DATA REVIEW  Most Recent CMP Panel:   Lab Results   Component Value Date    SODIUM 139 10/20/2021    POTASSIUM 4.5 10/20/2021    CHLORIDE 103 10/20/2021    CO2 25 10/20/2021    ANION 12.0 2021    GLUCOSE 192 (H) 10/20/2021    BUN 43 (H) 10/20/2021    CREATININE 1.67 (H) 10/20/2021    CALCIUM 9.4 10/20/2021    ASTSGOT 22 2021    ALTSGPT 17 2021    ALKPHOSPHAT 139 (H) 2021    TBILIRUBIN 0.9 2021    ALBUMIN 4.0 10/20/2021    ALBUMIN 3.77 2021    AGRATIO 1.3 2021       Renal function:  39-47ml/min  Other Pertinent Blood Work:     Other:      Recent Imaging Studies:    None since last visit    ASSESSMENT AND PLAN  Changes to medications:    Lifestyle Recommendations From Today's Visit:   Eating Plan: Concentrate on  Low simple carb  Exercise: pt would benefit from daily dedicated walking 30 minutes daily  Recommendations for Other Cardiovascular Risk Factors, garrick all  "that apply:   Diabetes/Impaired Fasting Glucose- <100    Resulting BP medications today (changes are bolded)  Entresto or ACE/ARB:none  Beta blocker: none  Diuretic:furosemide 40mg po daily  Aldosterone antagonist: none  Diltiazem 60mg po tid     To reduce the risk of major adverse cardiovascular events (cardiovascular death, non-fatal MI or non-fatal stroke) and to reduce the risk of cardiovascular death and hospitalization for heart failure in adults with heart failure with reduced ejection fraction NYHA class II-IV.      As A1c remains above goal, (8.9).  Pt reports FSBG 130-150 (pt does not present with log nor glucometer for interrogation) which does not correlate with A1c (average FSBG around 200)    Pt continues to eat a diet rich in simple carbohydrate, cereal, bread, sugar etc.  Pt has been using berberine supplement for \"glucose control\" however A1c remains well above goal.    We   will begin Trulicity 0.75mg sub q weekly on Tuesday.  Will REDUCE Levemir to 6 units nightly (50% reduction)  Pt may continue prandial insulin for now.  First dose given in the office  Metformin not tolerated secondary to GI side effects.    Pt and wife report hematuria with Jardiance, will perhaps try farxiga at a later time        Studies Ordered at Todays Visit:  None   Blood Work Ordered At Today's visit:   None  Follow-Up:   4 weeks    Vani Santiago    CC:  Deng Corbin D.O.  No ref. provider found     Medications reconciled   Flow sheets updated      "

## 2021-10-28 ENCOUNTER — TELEPHONE (OUTPATIENT)
Dept: VASCULAR LAB | Facility: MEDICAL CENTER | Age: 76
End: 2021-10-28

## 2021-10-28 NOTE — TELEPHONE ENCOUNTER
Pt called to report that his AM FSBG 160,129  Pt is pleased with his progress.  Pt will call as FSBG approach 80 for further reduction of insulin.    Vani Santiago, Clinical Pharmacist, CDE, CACP

## 2021-11-23 ENCOUNTER — NON-PROVIDER VISIT (OUTPATIENT)
Dept: CARDIOLOGY | Facility: MEDICAL CENTER | Age: 76
End: 2021-11-23
Payer: MEDICARE

## 2021-11-23 ENCOUNTER — PHARMACY VISIT (OUTPATIENT)
Dept: PHARMACY | Facility: MEDICAL CENTER | Age: 76
End: 2021-11-23
Payer: COMMERCIAL

## 2021-11-23 VITALS
HEART RATE: 74 BPM | WEIGHT: 229 LBS | HEIGHT: 71 IN | BODY MASS INDEX: 32.06 KG/M2 | SYSTOLIC BLOOD PRESSURE: 135 MMHG | DIASTOLIC BLOOD PRESSURE: 57 MMHG

## 2021-11-23 PROCEDURE — 99211 OFF/OP EST MAY X REQ PHY/QHP: CPT | Performed by: INTERNAL MEDICINE

## 2021-11-23 PROCEDURE — RXMED WILLOW AMBULATORY MEDICATION CHARGE: Performed by: INTERNAL MEDICINE

## 2021-11-23 RX ORDER — DULAGLUTIDE 1.5 MG/.5ML
1 INJECTION, SOLUTION SUBCUTANEOUS
Qty: 2 ML | Refills: 0 | Status: SHIPPED | OUTPATIENT
Start: 2021-11-23 | End: 2021-12-21

## 2021-11-23 ASSESSMENT — FIBROSIS 4 INDEX: FIB4 SCORE: 1.51

## 2021-11-23 NOTE — NON-PROVIDER
CHF Pharmacotherapy visit - Visit  Date of Service: 21  Informed written consent was given on: 10/26/2021    Yuri De León is here for CHF and Dm      HPI  Pertinent Interval History since last visit:   none  Most recent EF:  65%  Changes to laboratory values since last visit:  None to evaluate    Current CHF Medications - including dose:   Entresto or ACE/ARB:none  Beta blocker: none  Diuretic:furosemide 40mg po daily  Aldosterone antagonist: none  Diltiazem 60mg po tid      Changes to weight since last visit:   Pt has lost 11 lbs since last visit  Home BP:  Pt does not check regularly    Current Adherence to CHF Therapies:  Complete    SOCIAL HISTORY  Social History     Tobacco Use   Smoking Status Former Smoker   • Packs/day: 0.50   • Years: 10.00   • Pack years: 5.00   • Types: Cigarettes   • Quit date: 12/10/1968   • Years since quittin.9   Smokeless Tobacco Never Used      Change in weight: pt has lost 11 lbs since last visit   Exercise habits: no regular exercise program works around the house, yesterday he loaded wood   Diet: common adult      DATA REVIEW  Most Recent CMP Panel:   Lab Results   Component Value Date    SODIUM 139 10/20/2021    POTASSIUM 4.5 10/20/2021    CHLORIDE 103 10/20/2021    CO2 25 10/20/2021    ANION 12.0 2021    GLUCOSE 192 (H) 10/20/2021    BUN 43 (H) 10/20/2021    CREATININE 1.67 (H) 10/20/2021    CALCIUM 9.4 10/20/2021    ASTSGOT 22 2021    ALTSGPT 17 2021    ALKPHOSPHAT 139 (H) 2021    TBILIRUBIN 0.9 2021    ALBUMIN 4.0 10/20/2021    ALBUMIN 3.77 2021    AGRATIO 1.3 2021       Renal function:  39-47ml/min  Other Pertinent Blood Work:     Other:      Recent Imaging Studies:    None since last visit    ASSESSMENT AND PLAN  Changes to medications:    Lifestyle Recommendations From Today's Visit:   Eating Plan: Concentrate on  Low simple carb  Exercise: pt would benefit from daily dedicated walking 30 minutes  daily  Recommendations for Other Cardiovascular Risk Factors, garrick all that apply:   Diabetes/Impaired Fasting Glucose- <100    Resulting CHF medications today (changes are bolded)  Entresto or ACE/ARB:none  Beta blocker: none  Diuretic:furosemide 40mg po daily  Aldosterone antagonist: none  Diltiazem 60mg po tid    To reduce the risk of major adverse cardiovascular events (cardiovascular death, non-fatal MI or non-fatal stroke) and to reduce the risk of cardiovascular death and hospitalization for heart failure in adults with heart failure with reduced ejection fraction NYHA class II-IV.       Pt does not tolerate Metformin secondary to GI side effects.    Pt has been tolerating Trulicity 0.75mg sub q weekly, will further optimize to 1.5mg sub q weekly.  Pt to  samples at [facility].     Pt continues on Levemir 6 units at bedtime. Ok to dc insulin.    Pt reports hematuria with Jardiance, will rechallenge with farxiga at a later time.  A1c not due until 1/2022          Studies Ordered at Todays Visit:  None   Blood Work Ordered At Today's visit:   None  Follow-Up:   4 weeks    Vani Santiago    CC:  Deng Corbin D.O.  No ref. provider found     Medications reconciled   Flow sheets updated

## 2021-11-30 ENCOUNTER — DOCUMENTATION (OUTPATIENT)
Dept: CARDIOLOGY | Facility: MEDICAL CENTER | Age: 76
End: 2021-11-30

## 2021-11-30 NOTE — PROGRESS NOTES
Returned pt call to discuss diabetes medications. VM is full  Vani Santiago, Clinical Pharmacist, CDE, CACP

## 2021-12-01 ENCOUNTER — TELEPHONE (OUTPATIENT)
Dept: CARDIOLOGY | Facility: PHYSICIAN GROUP | Age: 76
End: 2021-12-01

## 2021-12-01 NOTE — TELEPHONE ENCOUNTER
"Pt called to report that he can't \"feel\" his trulicity injection.  Pt coincidentally has a chest cold and has been vomiting.  Reassured patient that as long as he took off the grey cap, he should be fine.  Pt reassures me that he remains hydrated. Pt reports hyperglycemia, likely due to his illness  Vani Santiago, Clinical Pharmacist, CDE, CACP    "

## 2021-12-08 ENCOUNTER — TELEPHONE (OUTPATIENT)
Dept: CARDIOLOGY | Facility: MEDICAL CENTER | Age: 76
End: 2021-12-08

## 2021-12-14 ENCOUNTER — NON-PROVIDER VISIT (OUTPATIENT)
Dept: MEDICAL GROUP | Facility: PHYSICIAN GROUP | Age: 76
End: 2021-12-14
Payer: MEDICARE

## 2021-12-14 ENCOUNTER — ANTICOAGULATION MONITORING (OUTPATIENT)
Dept: VASCULAR LAB | Facility: MEDICAL CENTER | Age: 76
End: 2021-12-14

## 2021-12-14 VITALS
HEART RATE: 92 BPM | OXYGEN SATURATION: 90 % | RESPIRATION RATE: 14 BRPM | SYSTOLIC BLOOD PRESSURE: 132 MMHG | TEMPERATURE: 98.4 F | DIASTOLIC BLOOD PRESSURE: 74 MMHG

## 2021-12-14 DIAGNOSIS — Z79.01 CHRONIC ANTICOAGULATION: ICD-10-CM

## 2021-12-14 DIAGNOSIS — D68.59 HYPERCOAGULABLE STATE (HCC): ICD-10-CM

## 2021-12-14 LAB
INR BLD: 2.6 (ref 0.9–1.2)
INR PPP: 2.6 (ref 2–3.5)
POC PROTIME: ABNORMAL

## 2021-12-14 PROCEDURE — 85610 PROTHROMBIN TIME: CPT | Performed by: FAMILY MEDICINE

## 2021-12-14 PROCEDURE — 99213 OFFICE O/P EST LOW 20 MIN: CPT | Performed by: NURSE PRACTITIONER

## 2021-12-14 PROCEDURE — 99999 PR NO CHARGE: CPT | Performed by: NURSE PRACTITIONER

## 2021-12-14 RX ORDER — LISINOPRIL 5 MG/1
TABLET ORAL
COMMUNITY
Start: 2021-10-22 | End: 2022-02-08

## 2021-12-14 NOTE — PROGRESS NOTES
OP Anticoagulation Service Note    Date: 2021  Blood Pressure : 132/74  Pulse: 92  Respiration: 14    Anticoagulation Summary  As of 2021    INR goal:  2.0-3.0   TTR:  69.2 % (6.3 y)   INR used for dosin.60 (2021)   Warfarin maintenance plan:  2.5 mg (5 mg x 0.5) every Thu; 5 mg (5 mg x 1) all other days   Weekly warfarin total:  32.5 mg   Plan last modified:  MARIAA Garcia (2021)   Next INR check:  2022   Target end date:  Indefinite    Indications    Atrial fibrillation with rapid ventricular response (HCC) (Resolved) [I48.91]  Long term current use of anticoagulant therapy (Resolved) [Z79.01]  Atrial fibrillation (HCC) (Resolved) [I48.91]             Anticoagulation Episode Summary     INR check location:      Preferred lab:      Send INR reminders to:      Comments:        Anticoagulation Care Providers     Provider Role Specialty Phone number    Sanjay Sorensen M.D. Referring Cardiovascular Disease (Cardiology) [phone]        Anticoagulation Patient Findings      THIS VISIT CONDUCTED WITH PRESENTER VIA TELEMEDICINE UTILIZING SECURE AND ENCRYPTED VIDEOCONFERENCING EQUIPMENT  ROS:    Pulm: Denies SOB, chest pain.    Card: Denies syncope, edema, palpitations.    Extremities: Denies redness, pain.     PE:    Pulm: No SOB, even and unlabored.    Card: Normal rate and rhythm.    Extremities: No redness, or edema.     INR  is-therapeutic.    Denies signs/symptoms of bleeding and/or thrombosis.    Denies changes to diet or medications.   Follow up appt in 8 weeks     Plan:  Continue current medication regimen.     Medication: Warfarin (Coumadin)           5 mg    5 mg    5 mg    5 mg    2.5 mg    5 mg    5 mg      1 tab(s)    1 tab(s)    1 tab(s)    1 tab(s)    1/2 tab(s)    1 tab(s)  1 tab(s)     Next Appointment:  @ 1:00   Pt on antiplatelet therapy Plavix 75mg for coronary artery  occlusion with stent indef per cardiology.  Atrial fibrillation controlled on Diltiazem  Secondary hypercoagulable state due to Atrial Fibrillation/Flutter on Warfarin.        Review all of your home medications and newly ordered medications with your doctor and / or pharmacist. Follow medication instructions as directed by your doctor and / or pharmacist. Please keep your medication list with you and share with your physician. Update the information when medications are discontinued, doses are changed, or new medications (including over-the-counter products) are added; and carry medication information at all times in the event of emergency situations.      For questions, please contact Outpatient Anticoagulation Service [phone].        Patient is on a high risk medication and therefore requires close monitoring and follow up.     CHEST guidelines recommend frequent INR monitoring at regular intervals (a few days up to a max of 12 weeks) to ensure they are on the proper dose of warfarin and not having any complications from therapy.  INRs can dramatically change over a short time period due to diet, medications, and medical conditions.   The patient instructed to go to the ER for falls with a head injury,  blood in urine or stool or any bleeding that last longer than 20 min.     APRIL Garcia.

## 2021-12-18 DIAGNOSIS — I48.91 ATRIAL FIBRILLATION WITH RAPID VENTRICULAR RESPONSE (HCC): ICD-10-CM

## 2021-12-20 RX ORDER — WARFARIN SODIUM 5 MG/1
TABLET ORAL
Qty: 90 TABLET | Refills: 1 | Status: SHIPPED | OUTPATIENT
Start: 2021-12-20 | End: 2022-09-12

## 2021-12-21 ENCOUNTER — NON-PROVIDER VISIT (OUTPATIENT)
Dept: CARDIOLOGY | Facility: MEDICAL CENTER | Age: 76
End: 2021-12-21
Payer: MEDICARE

## 2021-12-21 VITALS
SYSTOLIC BLOOD PRESSURE: 141 MMHG | HEIGHT: 71 IN | WEIGHT: 232 LBS | BODY MASS INDEX: 32.48 KG/M2 | DIASTOLIC BLOOD PRESSURE: 74 MMHG | HEART RATE: 82 BPM

## 2021-12-21 PROCEDURE — 99211 OFF/OP EST MAY X REQ PHY/QHP: CPT | Performed by: INTERNAL MEDICINE

## 2021-12-21 RX ORDER — DULAGLUTIDE 3 MG/.5ML
1 INJECTION, SOLUTION SUBCUTANEOUS
Qty: 2 ML | Refills: 0 | Status: SHIPPED | OUTPATIENT
Start: 2021-12-21 | End: 2022-01-18

## 2021-12-21 ASSESSMENT — FIBROSIS 4 INDEX: FIB4 SCORE: 1.51

## 2021-12-21 NOTE — NON-PROVIDER
CHF Pharmacotherapy visit - Visit  Date of Service: 21  Informed written consent was given on: 10/26/2021    Yuri De León is here for CHF and DM      HPI  Pertinent Interval History since last visit:   none  Most recent EF:  65%  Changes to laboratory values since last visit:  None to evaluate  Current CHF Medications - including dose:   Entresto or ACE/ARB:none  Beta blocker: none  Diuretic:furosemide 40mg po daily  Aldosterone antagonist: none  Diltiazem 60mg po tid.      Changes to weight since last visit:   Pt continues to lose weight at an appropriate rate  Home BP:  Pt does not routinely check BP at home    Current Adherence to CHF Therapies:  Complete    SOCIAL HISTORY  Social History     Tobacco Use   Smoking Status Former Smoker   • Packs/day: 0.50   • Years: 10.00   • Pack years: 5.00   • Types: Cigarettes   • Quit date: 12/10/1968   • Years since quittin.0   Smokeless Tobacco Never Used      Change in weight: pt continues to lose weight at an appropriate rate  Exercise habits: no regular exercise program   Diet: common adult      DATA REVIEW  Most Recent CMP Panel:   Lab Results   Component Value Date    SODIUM 139 10/20/2021    POTASSIUM 4.5 10/20/2021    CHLORIDE 103 10/20/2021    CO2 25 10/20/2021    ANION 12.0 2021    GLUCOSE 192 (H) 10/20/2021    BUN 43 (H) 10/20/2021    CREATININE 1.67 (H) 10/20/2021    CALCIUM 9.4 10/20/2021    ASTSGOT 22 2021    ALTSGPT 17 2021    ALKPHOSPHAT 139 (H) 2021    TBILIRUBIN 0.9 2021    ALBUMIN 4.0 10/20/2021    ALBUMIN 3.77 2021    AGRATIO 1.3 2021       Renal function:  39-47 ml/min  Other Pertinent Blood Work:     Other:      Recent Imaging Studies:    None since last visit    ASSESSMENT AND PLAN  Changes to medications:    Lifestyle Recommendations From Today's Visit:   Eating Plan: Concentrate on  Low simple carb  Exercise: pt would benefit from daily dedicated walking  Recommendations for Other  Cardiovascular Risk Factors, garrick all that apply:   Diabetes/Impaired Fasting Glucose- <100    Resulting CHF medications today (changes are bolded)  Entresto or ACE/ARB:none  Beta blocker: none  Diuretic:furosemide 40mg po daily  Aldosterone antagonist: none  Diltiazem 60mg po tid.    To reduce the risk of major adverse cardiovascular events (cardiovascular death, non-fatal MI or non-fatal stroke) and to reduce the risk of cardiovascular death and hospitalization for heart failure in adults with heart failure with reduced ejection fraction NYHA class II-IV.      Pt has been tolerating Trulicity 1.5mg, will further optimize to 3mg sub q weekly. Voucher given, ordered at local drug outlet as requested.    Continue to HOLD insulin     Pt unable to test A1c until after 1/20/22 will order at next visit.    Pt reported hematuria with Jardiance, will rechallenge with Farxiga at a later date.          Studies Ordered at Todays Visit:  None   Blood Work Ordered At Today's visit:   None  Follow-Up:   4 weeks    Vani Santiago    CC:  Deng Corbin D.O.  No ref. provider found     Medications reconciled   Flow sheets updated

## 2021-12-29 ENCOUNTER — ANTICOAGULATION MONITORING (OUTPATIENT)
Dept: VASCULAR LAB | Facility: MEDICAL CENTER | Age: 76
End: 2021-12-29

## 2022-01-01 ENCOUNTER — NON-PROVIDER VISIT (OUTPATIENT)
Dept: MEDICAL GROUP | Facility: PHYSICIAN GROUP | Age: 77
End: 2022-01-01
Payer: MEDICARE

## 2022-01-01 ENCOUNTER — APPOINTMENT (OUTPATIENT)
Dept: VASCULAR LAB | Facility: MEDICAL CENTER | Age: 77
End: 2022-01-01
Payer: MEDICARE

## 2022-01-01 ENCOUNTER — ANTICOAGULATION MONITORING (OUTPATIENT)
Dept: VASCULAR LAB | Facility: MEDICAL CENTER | Age: 77
End: 2022-01-01

## 2022-01-01 ENCOUNTER — DOCUMENTATION (OUTPATIENT)
Dept: VASCULAR LAB | Facility: MEDICAL CENTER | Age: 77
End: 2022-01-01
Payer: MEDICARE

## 2022-01-01 ENCOUNTER — NON-PROVIDER VISIT (OUTPATIENT)
Dept: CARDIOLOGY | Facility: MEDICAL CENTER | Age: 77
End: 2022-01-01
Payer: MEDICARE

## 2022-01-01 ENCOUNTER — HOSPITAL ENCOUNTER (OUTPATIENT)
Dept: LAB | Facility: MEDICAL CENTER | Age: 77
End: 2022-12-20
Attending: NURSE PRACTITIONER
Payer: MEDICARE

## 2022-01-01 ENCOUNTER — OFFICE VISIT (OUTPATIENT)
Dept: SLEEP MEDICINE | Facility: MEDICAL CENTER | Age: 77
End: 2022-01-01
Payer: MEDICARE

## 2022-01-01 VITALS
SYSTOLIC BLOOD PRESSURE: 132 MMHG | HEART RATE: 71 BPM | RESPIRATION RATE: 16 BRPM | HEIGHT: 71 IN | BODY MASS INDEX: 30.8 KG/M2 | WEIGHT: 220 LBS | DIASTOLIC BLOOD PRESSURE: 84 MMHG | OXYGEN SATURATION: 94 %

## 2022-01-01 VITALS
SYSTOLIC BLOOD PRESSURE: 126 MMHG | DIASTOLIC BLOOD PRESSURE: 61 MMHG | HEART RATE: 69 BPM | WEIGHT: 218.4 LBS | BODY MASS INDEX: 30.46 KG/M2

## 2022-01-01 VITALS
DIASTOLIC BLOOD PRESSURE: 70 MMHG | SYSTOLIC BLOOD PRESSURE: 132 MMHG | RESPIRATION RATE: 12 BRPM | HEART RATE: 82 BPM | TEMPERATURE: 97.7 F | OXYGEN SATURATION: 95 %

## 2022-01-01 VITALS
OXYGEN SATURATION: 93 % | DIASTOLIC BLOOD PRESSURE: 78 MMHG | TEMPERATURE: 97.5 F | RESPIRATION RATE: 14 BRPM | HEART RATE: 76 BPM | SYSTOLIC BLOOD PRESSURE: 122 MMHG

## 2022-01-01 DIAGNOSIS — E11.65 TYPE 2 DIABETES MELLITUS WITH HYPERGLYCEMIA, WITHOUT LONG-TERM CURRENT USE OF INSULIN (HCC): ICD-10-CM

## 2022-01-01 DIAGNOSIS — I25.10 CORONARY ARTERY DISEASE, OCCLUSIVE: ICD-10-CM

## 2022-01-01 DIAGNOSIS — J45.30 MILD PERSISTENT ASTHMA WITHOUT COMPLICATION: ICD-10-CM

## 2022-01-01 DIAGNOSIS — I48.20 ATRIAL FIBRILLATION, CHRONIC (HCC): ICD-10-CM

## 2022-01-01 DIAGNOSIS — D68.59 HYPERCOAGULABLE STATE (HCC): ICD-10-CM

## 2022-01-01 DIAGNOSIS — Z79.01 CHRONIC ANTICOAGULATION: ICD-10-CM

## 2022-01-01 DIAGNOSIS — R06.02 SOB (SHORTNESS OF BREATH): ICD-10-CM

## 2022-01-01 LAB
ALBUMIN SERPL BCP-MCNC: 4.3 G/DL (ref 3.2–4.9)
ALBUMIN/GLOB SERPL: 1.3 G/DL
ALP SERPL-CCNC: 121 U/L (ref 30–99)
ALT SERPL-CCNC: 17 U/L (ref 2–50)
ANION GAP SERPL CALC-SCNC: 14 MMOL/L (ref 7–16)
AST SERPL-CCNC: 17 U/L (ref 12–45)
BILIRUB SERPL-MCNC: 0.8 MG/DL (ref 0.1–1.5)
BUN SERPL-MCNC: 47 MG/DL (ref 8–22)
CALCIUM ALBUM COR SERPL-MCNC: 9.6 MG/DL (ref 8.5–10.5)
CALCIUM SERPL-MCNC: 9.8 MG/DL (ref 8.5–10.5)
CHLORIDE SERPL-SCNC: 100 MMOL/L (ref 96–112)
CHOLEST SERPL-MCNC: 189 MG/DL (ref 100–199)
CO2 SERPL-SCNC: 26 MMOL/L (ref 20–33)
CREAT SERPL-MCNC: 2.59 MG/DL (ref 0.5–1.4)
EST. AVERAGE GLUCOSE BLD GHB EST-MCNC: 192 MG/DL
FASTING STATUS PATIENT QL REPORTED: NORMAL
GFR SERPLBLD CREATININE-BSD FMLA CKD-EPI: 25 ML/MIN/1.73 M 2
GLOBULIN SER CALC-MCNC: 3.3 G/DL (ref 1.9–3.5)
GLUCOSE SERPL-MCNC: 158 MG/DL (ref 65–99)
HBA1C MFR BLD: 8.3 % (ref 4–5.6)
HDLC SERPL-MCNC: 52 MG/DL
INR BLD: 1.6 (ref 0.9–1.2)
INR BLD: 2.2 (ref 0.9–1.2)
INR PPP: 1.6 (ref 2–3.5)
INR PPP: 2.2 (ref 2–3.5)
LDLC SERPL CALC-MCNC: 127 MG/DL
POC PROTIME: ABNORMAL
POC PROTIME: ABNORMAL
POTASSIUM SERPL-SCNC: 4.6 MMOL/L (ref 3.6–5.5)
PROT SERPL-MCNC: 7.6 G/DL (ref 6–8.2)
SODIUM SERPL-SCNC: 140 MMOL/L (ref 135–145)
TRIGL SERPL-MCNC: 52 MG/DL (ref 0–149)

## 2022-01-01 PROCEDURE — 80061 LIPID PANEL: CPT

## 2022-01-01 PROCEDURE — 99999 PR NO CHARGE: CPT | Performed by: FAMILY MEDICINE

## 2022-01-01 PROCEDURE — 83036 HEMOGLOBIN GLYCOSYLATED A1C: CPT | Mod: GA

## 2022-01-01 PROCEDURE — 99213 OFFICE O/P EST LOW 20 MIN: CPT

## 2022-01-01 PROCEDURE — 85610 PROTHROMBIN TIME: CPT | Performed by: FAMILY MEDICINE

## 2022-01-01 PROCEDURE — 99214 OFFICE O/P EST MOD 30 MIN: CPT

## 2022-01-01 PROCEDURE — 36415 COLL VENOUS BLD VENIPUNCTURE: CPT

## 2022-01-01 PROCEDURE — 99211 OFF/OP EST MAY X REQ PHY/QHP: CPT | Performed by: INTERNAL MEDICINE

## 2022-01-01 PROCEDURE — 80053 COMPREHEN METABOLIC PANEL: CPT

## 2022-01-01 PROCEDURE — 99213 OFFICE O/P EST LOW 20 MIN: CPT | Performed by: NURSE PRACTITIONER

## 2022-01-01 PROCEDURE — 99999 PR NO CHARGE: CPT | Performed by: NURSE PRACTITIONER

## 2022-01-01 RX ORDER — DILTIAZEM HYDROCHLORIDE 60 MG/1
TABLET, FILM COATED ORAL
Qty: 90 TABLET | Refills: 2 | Status: ON HOLD | OUTPATIENT
Start: 2022-01-01 | End: 2023-01-01

## 2022-01-01 ASSESSMENT — FIBROSIS 4 INDEX
FIB4 SCORE: 0.8
FIB4 SCORE: 0.8

## 2022-01-05 ENCOUNTER — OFFICE VISIT (OUTPATIENT)
Dept: CARDIOLOGY | Facility: MEDICAL CENTER | Age: 77
End: 2022-01-05
Payer: MEDICARE

## 2022-01-05 VITALS
DIASTOLIC BLOOD PRESSURE: 62 MMHG | BODY MASS INDEX: 30.24 KG/M2 | WEIGHT: 216 LBS | RESPIRATION RATE: 18 BRPM | OXYGEN SATURATION: 96 % | HEIGHT: 71 IN | SYSTOLIC BLOOD PRESSURE: 120 MMHG | HEART RATE: 74 BPM

## 2022-01-05 DIAGNOSIS — E78.5 DYSLIPIDEMIA: ICD-10-CM

## 2022-01-05 DIAGNOSIS — I34.0 MODERATE MITRAL REGURGITATION: ICD-10-CM

## 2022-01-05 DIAGNOSIS — I25.10 CORONARY ARTERY DISEASE INVOLVING NATIVE CORONARY ARTERY OF NATIVE HEART WITHOUT ANGINA PECTORIS: ICD-10-CM

## 2022-01-05 DIAGNOSIS — Z79.01 ANTICOAGULATED: ICD-10-CM

## 2022-01-05 DIAGNOSIS — N18.4 CKD (CHRONIC KIDNEY DISEASE) STAGE 4, GFR 15-29 ML/MIN (HCC): ICD-10-CM

## 2022-01-05 DIAGNOSIS — I50.32 CHRONIC HEART FAILURE WITH PRESERVED EJECTION FRACTION (HCC): ICD-10-CM

## 2022-01-05 DIAGNOSIS — E11.65 TYPE 2 DIABETES MELLITUS WITH HYPERGLYCEMIA, WITHOUT LONG-TERM CURRENT USE OF INSULIN (HCC): ICD-10-CM

## 2022-01-05 DIAGNOSIS — I48.20 ATRIAL FIBRILLATION, CHRONIC (HCC): ICD-10-CM

## 2022-01-05 DIAGNOSIS — Z95.5 S/P CORONARY ARTERY STENT PLACEMENT: ICD-10-CM

## 2022-01-05 DIAGNOSIS — I50.812 CHRONIC RIGHT-SIDED HEART FAILURE (HCC): ICD-10-CM

## 2022-01-05 PROCEDURE — 99214 OFFICE O/P EST MOD 30 MIN: CPT | Performed by: NURSE PRACTITIONER

## 2022-01-05 RX ORDER — ATORVASTATIN CALCIUM 80 MG/1
80 TABLET, FILM COATED ORAL DAILY
Qty: 90 TABLET | Refills: 3 | Status: SHIPPED | OUTPATIENT
Start: 2022-01-05 | End: 2023-01-01

## 2022-01-05 RX ORDER — POTASSIUM CHLORIDE 750 MG/1
CAPSULE, EXTENDED RELEASE ORAL
COMMUNITY
Start: 2021-12-17 | End: 2022-01-05

## 2022-01-05 ASSESSMENT — FIBROSIS 4 INDEX: FIB4 SCORE: 1.51

## 2022-01-05 NOTE — PROGRESS NOTES
Cardiology Clinic Follow-up Note    Date of note:    1/5/2022  Primary Care Provider: Deng Corbin D.O.    Name:             Yuri De León  YOB: 1945  MRN:               [id number]    CC: 2 Month F/U    Primary Cardiologist: Dr. Sorensen    Patient HPI:   Yuri De León is a 76 y.o. male with current medical problems including CAD, NSTEMI, RCA stent, chronic atrial fibrillation, chronic anticoagulation, right-sided heart failure with preserved EF, pulmonary hypertension, moderate mitral regurgitation, hypertension, and DM2.     Interim History:  Mr. De León was last seen in this cardiology office by Dr. Sorensen on 9/28/21.  At that time he had weight loss of 25 pounds, Since last visit he has lost 16 more pounds.     Pt did not retry Jardicance as it was $400 for 3 months, he also felt this caused him to have hematuria.    He has not yet had another sleep study, is thinking about it.     Patient endorses medication compliance.  Dr. Devi his nephrologist had stopped his potassium.  Continues on furosemide 40 mg daily.    He denies palpitations, chest pain, shortness of breath, dyspnea on exertion, dizziness or syncopal episodes, orthopnea, PND, lower extremity swelling, and recent weight gain.  No signs of blood in urine or stool.    Review of systems:  All others systems reviewed and negative except for what is outlined in the above HPI    Past Medical History:   Diagnosis Date   • Abnormal electrocardiogram 8/13/2010   • Arrhythmia     Atrial Fibrillation; Cardiologist, Dr. Sorensen   • Arthritis     knees, left hip   • ASTHMA     inhalers   • Atrial fibrillation (HCC) 10/25/2011   • Bronchospasm 8/6/2009   • Diabetes (HCC)     insulin   • HTN (hypertension) 10/25/2011   • Hypercholesteremia 10/25/2011   • Long term (current) use of anticoagulants 10/25/2011   • NASAL POLYPS 8/6/2009   • Other nonspecific abnormal finding 8/6/2009   • Pain     left hip   • Shortness of breath    •  Sleep apnea 2009    o2 at night   • Wears glasses      Past Surgical History:   Procedure Laterality Date   • KNEE ARTHROPLASTY TOTAL Left 10/2014   • HIP ARTHROPLASTY TOTAL  2010    Performed by OSGOOD, PATRICK J at [facility]   • LUMBAR DECOMPRESSION     • HIP REPLACEMENT, TOTAL     • LAMINOTOMY     • SINUSCOPE     • SINUSOTOMIES  ,,2007     Family History   Problem Relation Age of Onset   • Heart Disease Mother      Social History     Socioeconomic History   • Marital status:      Spouse name: Not on file   • Number of children: Not on file   • Years of education: Not on file   • Highest education level: Not on file   Occupational History   • Not on file   Tobacco Use   • Smoking status: Former Smoker     Packs/day: 0.50     Years: 10.00     Pack years: 5.00     Types: Cigarettes     Quit date: 12/10/1968     Years since quittin.1   • Smokeless tobacco: Never Used   Vaping Use   • Vaping Use: Never used   Substance and Sexual Activity   • Alcohol use: No   • Drug use: No   • Sexual activity: Not on file   Other Topics Concern   • Not on file   Social History Narrative   • Not on file     Social Determinants of Health     Financial Resource Strain:    • Difficulty of Paying Living Expenses: Not on file   Food Insecurity:    • Worried About Running Out of Food in the Last Year: Not on file   • Ran Out of Food in the Last Year: Not on file   Transportation Needs:    • Lack of Transportation (Medical): Not on file   • Lack of Transportation (Non-Medical): Not on file   Physical Activity:    • Days of Exercise per Week: Not on file   • Minutes of Exercise per Session: Not on file   Stress:    • Feeling of Stress : Not on file   Social Connections:    • Frequency of Communication with Friends and Family: Not on file   • Frequency of Social Gatherings with Friends and Family: Not on file   • Attends Orthodoxy Services: Not on file   • Active Member of Clubs or  Organizations: Not on file   • Attends Club or Organization Meetings: Not on file   • Marital Status: Not on file   Intimate Partner Violence:    • Fear of Current or Ex-Partner: Not on file   • Emotionally Abused: Not on file   • Physically Abused: Not on file   • Sexually Abused: Not on file   Housing Stability:    • Unable to Pay for Housing in the Last Year: Not on file   • Number of Places Lived in the Last Year: Not on file   • Unstable Housing in the Last Year: Not on file     Allergies   Allergen Reactions   • Atenolol Shortness of Breath     DYSPNEA.   • Tetanus Toxoid Swelling     Current Outpatient Medications   Medication Sig Dispense Refill   • atorvastatin (LIPITOR) 80 MG tablet Take 1 Tablet by mouth every day. 90 Tablet 3   • Dulaglutide (TRULICITY) 3 MG/0.5ML Solution Pen-injector Inject 1 Each under the skin every 7 days. 2 mL 0   • warfarin (COUMADIN) 5 MG Tab TAKE HALF TABLET BY MOUTH (2.5 MG) ON THURSDAYS AND 1 TABLET (5 MG) ALL OTHER DAYS OR AS DIRECTED BY ANTICOAGULATION CLINIC 90 Tablet 1   • Barberry-Oreg Grape-Goldenseal (BERBERINE COMPLEX) 200-200-50 MG Cap Take  by mouth.     • celecoxib (CELEBREX) 200 MG Cap      • furosemide (LASIX) 40 MG Tab Take 40 mg by mouth every day.     • clopidogrel (PLAVIX) 75 MG Tab TAKE 1 TABLET BY MOUTH EVERY DAY 90 Tablet 3   • DILTIAZem (CARDIZEM) 60 MG Tab Take 1 tablet by mouth 3 times a day. 90 tablet 11   • albuterol (PROVENTIL) 2.5mg/3ml Nebu Soln solution for nebulization Take 3 mL by nebulization every four hours as needed for Shortness of Breath. 120 Bullet 2   • budesonide-formoterol (SYMBICORT) 160-4.5 MCG/ACT Aerosol Inhale 2 Puffs 2 Times a Day. Use spacer. Rinse mouth after each use. 1 Each 11   • lisinopril (PRINIVIL) 5 MG Tab  (Patient not taking: Reported on 1/5/2022)       No current facility-administered medications for this visit.       Physical Exam:  Ambulatory Vitals  /62 (BP Location: Left arm, Patient Position: Sitting, BP  "Cuff Size: Adult)   Pulse 74   Resp 18   Ht 1.803 m (5' 10.98\")   Wt 98 kg (216 lb)   SpO2 96%    BP Readings from Last 4 Encounters:   01/05/22 120/62   12/21/21 141/74   12/14/21 132/74   11/23/21 135/57       Weight/BMI: Body mass index is 30.14 kg/m².  Wt Readings from Last 4 Encounters:   01/05/22 98 kg (216 lb)   12/21/21 105 kg (232 lb)   11/23/21 104 kg (229 lb)   10/26/21 109 kg (241 lb)     General: No apparent distress. Well nourished.   Neck: No JVD. No caroid bruits, trachea midline  Lungs: CTAB. Normal effort, without crackles/rhonchi, no wheezing  Heart: Irregular. Normal S1/S2, murmur present, no rub. trace lower extremity edema. 2+ radial pulses, 2+ DT pulses  Ext: No clubbing or cyanosis.  Abdomen: soft, non tender, non distended, no jameel hepatomegaly.  Neurological: No focal deficits, no facial asymmetry.  Normal speech.  Psychiatric: Appropriate affect, alert and oriented x 4.   Skin: Warm and dry, no rash.    Lab Data Review:  Lab Results   Component Value Date/Time    CHOLSTRLTOT 147 05/18/2021 10:43 AM    LDL 92 05/18/2021 10:43 AM    HDL 46 05/18/2021 10:43 AM    TRIGLYCERIDE 46 05/18/2021 10:43 AM       Lab Results   Component Value Date/Time    SODIUM 139 10/20/2021 12:14 PM    POTASSIUM 4.5 10/20/2021 12:14 PM    CHLORIDE 103 10/20/2021 12:14 PM    CO2 25 10/20/2021 12:14 PM    GLUCOSE 192 (H) 10/20/2021 12:14 PM    BUN 43 (H) 10/20/2021 12:14 PM    CREATININE 1.67 (H) 10/20/2021 12:14 PM     Lab Results   Component Value Date/Time    ALKPHOSPHAT 139 (H) 05/18/2021 10:43 AM    ASTSGOT 22 05/18/2021 10:43 AM    ALTSGPT 17 05/18/2021 10:43 AM    TBILIRUBIN 0.9 05/18/2021 10:43 AM      Lab Results   Component Value Date/Time    WBC 10.4 10/20/2021 12:14 PM       Cardiac Imaging and Procedures Review:      EKG 7/17/21: My Personal interpretation reveals Afib 72    CARDIAC CATHETERIZATION 08/15/2018  A.  Left heart catheterization.  B.  Left ventriculography.  C.  Selective coronary " angiography.  D.  Coronary stent implantation of the mid right coronary artery with   overlapping stents: 4.5x18 mm Ultra non-drug eluting stent and 4.0x38 mm Xience   Gianna drug-eluting stent   E.  Right radial artery approach.   PREOPERATIVE DIAGNOSES:  1.  Unstable angina pectoris.  2.  Abnormal myocardial perfusion scan with inferior perfusion abnormality.  3.  Chronic atrial fibrillation.  4.  Chronic anticoagulation with warfarin.  5.  Diabetes mellitus.   POSTOPERATIVE DIAGNOSES:  1.  Coronary artery disease, 3-vessel, involving the dominant mid right coronary artery, distal circumflex artery and diagonal branch ostium.  2.  Left ventricular ejection fraction 60%.     ECHOCARDIOGRAM 3/23/2018  No prior study is available for comparison.   Mild concentric left ventricular hypertrophy.  Normal left ventricular systolic function.  Left ventricular ejection fraction is visually estimated to be 60%.  Diastolic function is difficult to assess with atrial fibrillation.  Severely dilated left atrium.  Estimated right ventricular systolic pressure is 37 mmHg + JVP.    Echo 2021  Left ventricular ejection fraction is visually estimated to be 65%.  Flattening of the interventricular septum.  Moderately dilated right ventricle.  Moderate mitral regurgitation.  Moderate tricuspid regurgitation.  Estimated right ventricular systolic pressure  is 55 mmHg.    Assessment and Clinical Decision Makin. Coronary artery disease involving native coronary artery of native heart without angina pectoris  atorvastatin (LIPITOR) 80 MG tablet   2. S/P coronary artery stent placement     3. Type 2 diabetes mellitus with hyperglycemia, without long-term current use of insulin ([facility])  Basic Metabolic Panel    HEMOGLOBIN A1C (Glycohemoglobin GHB Total/A1C with MBG Estimate)   4. Dyslipidemia  Lipid Profile   5. CKD (chronic kidney disease) stage 4, GFR 15-29 ml/min (CMS-[facility])  Basic Metabolic Panel   6. Moderate mitral regurgitation      7. Chronic right-sided heart failure (HCC)     8. Chronic heart failure with preserved ejection fraction (HCC)     9. Atrial fibrillation, chronic (HCC)     10. Anticoagulated       The following treatment plan was discussed    CAD, Hx of NSTEMI, s/p SONYA stent to RCA, dyslipidemia  -Continue Plavix 75 mg, no aspirin given warfarin  -Increase atorvastatin to 80 mg, and LDL of 92, recheck LDL in 2 months, will < 70     DM2  -Check hemoglobin A1c prior to next pharmacotherapy appointment  -Patient did not restart Jardiance    CKD stage IV  -Follows with nephrology, Dr. Vicente  -[facility] prior to next appointment    Moderate mitral regurgitation, moderate tricuspid regurgitation  -Recent echo July 2021  -Repeat yearly surveillance echocardiograms for progression of valve dysfunction    HFpEF, chronic right-sided heart failure  -Continue furosemide 40 mg daily  -Encouraged follow-up with pulmonary for outpatient sleep study discussed secondary effects of undiagnosed LAUREL on right-sided hear     Afib  -Rate controlled, continue diltiazem 60 mg 3 times a day  -Continue warfarin, INR checks at Coumadin clinic, therapeutic range 2-3  -no complaints of abnormal bleeding    Plan reviewed in detail with the patient, verbalizes understanding and is in agreement.  Pt is to follow up with Dr. Sorensen in 3 months  Encouraged Pt to follow up with us over the phone or electronically using my [employer] as cardiac issues/concerns arise.      PLEASE NOTE: This dictation was created using voice recognition software. I have made every reasonable attempt to correct obvious errors, but I expect that there are errors of grammar and possibly content that I did not discover before finalizing the note.       POLO Sandra.   [facility]  [phone]    Collaborating Physician: Dr. Lassiter.

## 2022-01-12 ENCOUNTER — HOSPITAL ENCOUNTER (OUTPATIENT)
Dept: LAB | Facility: MEDICAL CENTER | Age: 77
End: 2022-01-12
Attending: NURSE PRACTITIONER
Payer: MEDICARE

## 2022-01-12 LAB
EST. AVERAGE GLUCOSE BLD GHB EST-MCNC: 200 MG/DL
HBA1C MFR BLD: 8.6 % (ref 4–5.6)

## 2022-01-12 PROCEDURE — 36415 COLL VENOUS BLD VENIPUNCTURE: CPT | Mod: GA

## 2022-01-12 PROCEDURE — 83036 HEMOGLOBIN GLYCOSYLATED A1C: CPT | Mod: GA

## 2022-01-13 ENCOUNTER — TELEPHONE (OUTPATIENT)
Dept: MEDICAL GROUP | Facility: MEDICAL CENTER | Age: 77
End: 2022-01-13

## 2022-01-13 NOTE — TELEPHONE ENCOUNTER
Pt called to report that he cannot afford his trulicity  Pt has enough until follow up visit.  Requested he bring in proof of income so that we may submit [facility] patient assistance  Vani Santiago, Clinical Pharmacist, CDE, CACP

## 2022-01-18 ENCOUNTER — NON-PROVIDER VISIT (OUTPATIENT)
Dept: CARDIOLOGY | Facility: MEDICAL CENTER | Age: 77
End: 2022-01-18
Payer: MEDICARE

## 2022-01-18 ENCOUNTER — PHARMACY VISIT (OUTPATIENT)
Dept: PHARMACY | Facility: MEDICAL CENTER | Age: 77
End: 2022-01-18
Payer: COMMERCIAL

## 2022-01-18 VITALS
HEIGHT: 71 IN | SYSTOLIC BLOOD PRESSURE: 150 MMHG | DIASTOLIC BLOOD PRESSURE: 81 MMHG | BODY MASS INDEX: 31.08 KG/M2 | HEART RATE: 88 BPM | WEIGHT: 222 LBS

## 2022-01-18 PROCEDURE — RXMED WILLOW AMBULATORY MEDICATION CHARGE: Performed by: INTERNAL MEDICINE

## 2022-01-18 PROCEDURE — 99211 OFF/OP EST MAY X REQ PHY/QHP: CPT | Performed by: STUDENT IN AN ORGANIZED HEALTH CARE EDUCATION/TRAINING PROGRAM

## 2022-01-18 RX ORDER — DULAGLUTIDE 4.5 MG/.5ML
1 INJECTION, SOLUTION SUBCUTANEOUS
Qty: 2 ML | Refills: 0 | Status: SHIPPED | OUTPATIENT
Start: 2022-01-18 | End: 2022-02-03

## 2022-01-18 ASSESSMENT — FIBROSIS 4 INDEX: FIB4 SCORE: 1.51

## 2022-01-18 NOTE — NON-PROVIDER
CHF Pharmacotherapy visit - Visit  Date of Service: 22   Informed written consent was given on: 10/26/2021    Yuri De León is here for CHF and DM       HPI  Pertinent Interval History since last visit:   none  Most recent EF:  60%  Changes to laboratory values since last visit:  Improved A1c  Current CHF Medications - including dose:   Entresto or ACE/ARB:none  Beta blocker: none  Diuretic:furosemide 40mg po daily  Aldosterone antagonist: none  Diltiazem 60mg po tid      Changes to weight since last visit:   No weight loss  Home BP:  Pt does not routinely check BP at home    Current Adherence to CHF Therapies:  Complete    SOCIAL HISTORY  Social History     Tobacco Use   Smoking Status Former Smoker   • Packs/day: 0.50   • Years: 10.00   • Pack years: 5.00   • Types: Cigarettes   • Quit date: 12/10/1968   • Years since quittin.1   Smokeless Tobacco Never Used      Change in weight: Stable  Exercise habits: minimal exercise   Diet: low carbohydrate      DATA REVIEW  Most Recent CMP Panel:   Lab Results   Component Value Date    SODIUM 139 10/20/2021    POTASSIUM 4.5 10/20/2021    CHLORIDE 103 10/20/2021    CO2 25 10/20/2021    ANION 12.0 2021    GLUCOSE 192 (H) 10/20/2021    BUN 43 (H) 10/20/2021    CREATININE 1.67 (H) 10/20/2021    CALCIUM 9.4 10/20/2021    ASTSGOT 22 2021    ALTSGPT 17 2021    ALKPHOSPHAT 139 (H) 2021    TBILIRUBIN 0.9 2021    ALBUMIN 4.0 10/20/2021    ALBUMIN 3.77 2021    AGRATIO 1.3 2021       Renal function:  39-47ml/min  Other Pertinent Blood Work:     Other:      Recent Imaging Studies:    None since last visit    ASSESSMENT AND PLAN  Changes to medications:    Lifestyle Recommendations From Today's Visit:   Eating Plan: Concentrate on  Low simple carb  Exercise: pt would benefit from daily dedicated walking 30 minutes daily  Recommendations for Other Cardiovascular Risk Factors, garrick all that apply:   Diabetes/Impaired Fasting  Glucose- <100    Resulting CHF medications today (changes are bolded)  Entresto or ACE/ARB:none  Beta blocker: none  Diuretic:furosemide 40mg po daily  Aldosterone antagonist: none  Diltiazem 60mg po tid    bp is at goal today.  Continue present management    To reduce the risk of major adverse cardiovascular events (cardiovascular death, non-fatal MI or non-fatal stroke) and to reduce the risk of cardiovascular death and hospitalization for heart failure in adults with heart failure with reduced ejection fraction NYHA class II-IV.  Pt is down 22 lbs from start of therapy.  Pt is currently tolerating Trulicity 3mg sub q weekly.  Will further optimize to 4.5mg sub q weekly.  Will fax in completed pap for trulicity. Voucher given  A1c has improved to 8.6 from 8.9 three months ago.  Pt will be due for A1c in April  Pt reports FSBG 110-150, will continue to monitor        Studies Ordered at Todays Visit:  None   Blood Work Ordered At Today's visit:   cmp/A1c  Follow-Up:   3 months    Vani Santiago    CC:  Deng Corbin D.O.  No ref. provider found     Medications reconciled   Flow sheets updated

## 2022-01-26 ENCOUNTER — TELEPHONE (OUTPATIENT)
Dept: MEDICAL GROUP | Facility: PHYSICIAN GROUP | Age: 77
End: 2022-01-26

## 2022-01-26 DIAGNOSIS — E11.65 TYPE 2 DIABETES MELLITUS WITH HYPERGLYCEMIA, WITHOUT LONG-TERM CURRENT USE OF INSULIN (HCC): ICD-10-CM

## 2022-01-26 RX ORDER — DULAGLUTIDE 4.5 MG/.5ML
1 INJECTION, SOLUTION SUBCUTANEOUS
Qty: 2 ML | Refills: 1 | Status: CANCELLED | OUTPATIENT
Start: 2022-01-26

## 2022-01-26 NOTE — TELEPHONE ENCOUNTER
Checked with [facility] on Trulicity PAP.  They report they did not receive it.  Refaxed PAP as requested. Conformation received.  Vani Santiago, Clinical Pharmacist, CDE, CACP

## 2022-01-28 DIAGNOSIS — E11.65 TYPE 2 DIABETES MELLITUS WITH HYPERGLYCEMIA, WITHOUT LONG-TERM CURRENT USE OF INSULIN (HCC): ICD-10-CM

## 2022-02-03 RX ORDER — DULAGLUTIDE 4.5 MG/.5ML
1 INJECTION, SOLUTION SUBCUTANEOUS
Qty: 6 ML | Refills: 5 | Status: SHIPPED | OUTPATIENT
Start: 2022-02-03 | End: 2023-01-01

## 2022-02-03 RX ORDER — DULAGLUTIDE 4.5 MG/.5ML
1 INJECTION, SOLUTION SUBCUTANEOUS
Qty: 2 ML | Refills: 0 | Status: CANCELLED | OUTPATIENT
Start: 2022-02-03

## 2022-02-08 ENCOUNTER — NON-PROVIDER VISIT (OUTPATIENT)
Dept: MEDICAL GROUP | Facility: PHYSICIAN GROUP | Age: 77
End: 2022-02-08
Payer: MEDICARE

## 2022-02-08 ENCOUNTER — ANTICOAGULATION MONITORING (OUTPATIENT)
Dept: VASCULAR LAB | Facility: MEDICAL CENTER | Age: 77
End: 2022-02-08

## 2022-02-08 VITALS
TEMPERATURE: 97.4 F | SYSTOLIC BLOOD PRESSURE: 112 MMHG | OXYGEN SATURATION: 97 % | DIASTOLIC BLOOD PRESSURE: 62 MMHG | HEART RATE: 72 BPM | RESPIRATION RATE: 12 BRPM

## 2022-02-08 DIAGNOSIS — Z79.01 CHRONIC ANTICOAGULATION: ICD-10-CM

## 2022-02-08 DIAGNOSIS — D68.59 HYPERCOAGULABLE STATE (HCC): ICD-10-CM

## 2022-02-08 LAB
INR BLD: 3.1 (ref 0.9–1.2)
INR PPP: 3.1 (ref 2–3.5)
POC PROTIME: ABNORMAL

## 2022-02-08 PROCEDURE — 85610 PROTHROMBIN TIME: CPT | Performed by: FAMILY MEDICINE

## 2022-02-08 PROCEDURE — 99213 OFFICE O/P EST LOW 20 MIN: CPT | Performed by: NURSE PRACTITIONER

## 2022-02-08 PROCEDURE — 99999 PR NO CHARGE: CPT | Performed by: NURSE PRACTITIONER

## 2022-02-08 NOTE — PROGRESS NOTES
OP Anticoagulation Service Note    Date: 2/8/2022  Blood Pressure : 112/62  Pulse: 72  Respiration: 12    Anticoagulation Summary  As of 2/8/2022    INR goal:  2.0-3.0   TTR:  69.5 % (6.5 y)   INR used for dosing:  3.10 (2/8/2022)   Warfarin maintenance plan:  2.5 mg (5 mg x 0.5) every Thu; 5 mg (5 mg x 1) all other days   Weekly warfarin total:  32.5 mg   Plan last modified:  MARIAA Garcia (2/2/2021)   Next INR check:  4/5/2022   Target end date:  Indefinite    Indications    Atrial fibrillation with rapid ventricular response (HCC) (Resolved) [I48.91]  Long term current use of anticoagulant therapy (Resolved) [Z79.01]  Atrial fibrillation (HCC) (Resolved) [I48.91]             Anticoagulation Episode Summary     INR check location:      Preferred lab:      Send INR reminders to:      Comments:        Anticoagulation Care Providers     Provider Role Specialty Phone number    Sanjay Sorensen M.D. Referring Cardiovascular Disease (Cardiology) [phone]        Anticoagulation Patient Findings  Patient Findings     Negatives:  Signs/symptoms of thrombosis, Signs/symptoms of bleeding, Laboratory test error suspected, Change in health, Change in alcohol use, Change in activity, Upcoming invasive procedure, Emergency department visit, Upcoming dental procedure, Missed doses, Extra doses, Change in medications, Change in diet/appetite, Hospital admission, Bruising, Other complaints          THIS VISIT CONDUCTED WITH [employer] UTILIZING SECURE AND ENCRYPTED VIDEOCONFERENCING EQUIPMENT  ROS:    Pulm: Denies SOB, chest pain.    Card: Denies syncope, edema, palpitations.    Extremities: Denies redness, pain.     PE:    Pulm: No SOB, even and unlabored.    Card: Normal rate and rhythm.    Extremities: No redness, or edema.     INR  supra-therapeutic.    Denies signs/symptoms of bleeding and/or thrombosis.    Denies changes to diet or medications.   Follow up appt in 5 weeks     Plan:  Take 2.5  mg tonight one time only then back to usual dose     Medication: Warfarin (Coumadin)     Sunday Monday Tuesday Wednesday Thursday Friday Saturday      5 mg    5 mg    5 mg    5 mg    2.5 mg    5 mg    5 mg      1 tab(s)    1 tab(s)    1 tab(s)    1 tab(s)    1/2 tab(s)    1 tab(s)  1 tab(s)     Next Appointment: Tuesday, March 15 @  1:15  Pt on antiplatelet therapy Plavix 75mg for coronary artery occlusion with stent indef per cardiology.  Atrial fibrillation controlled on Diltiazem.  Secondary hypercoagulable state due to Atrial Fibrillation/Flutter on Warfarin.      Review all of your home medications and newly ordered medications with your doctor and / or pharmacist. Follow medication instructions as directed by your doctor and / or pharmacist. Please keep your medication list with you and share with your physician. Update the information when medications are discontinued, doses are changed, or new medications (including over-the-counter products) are added; and carry medication information at all times in the event of emergency situations.      For questions, please contact Outpatient Anticoagulation Service [phone].     The patient is on a high risk medication and is supra-therapeudic. This increases the patients risk of bleeding and therefore requires frequent monitoring and follow up.         CHEST guidelines recommend frequent INR monitoring at regular intervals (a few days up to a max of 12 weeks) to ensure they are on the proper dose of warfarin and not having any complications from therapy.  INRs can dramatically change over a short time period due to diet, medications, and medical conditions.   The patient instructed to go to the ER for falls with a head injury,  blood in urine or stool or any bleeding that last longer than 20 min.     APRIL Garcia.

## 2022-02-23 NOTE — PROGRESS NOTES
Report received from Nahomi YE. Pt awake, alert, appropriate and pleasant. Reports 7/10 pain to RLE at this time and requesting PRN tylenol. Cellulitic area remains red, hot, swollen, shiny. Appears to be regressing slightly from marked area outlined on admission. Denies other needs. VSS. Will continue to monitor.    Constitutional: (-) fever  (-)chills  (-)sweats  Eyes/ENT: (-) blurry vision, (-) epistaxis  (-)rhinorrhea   (-) sore throat    Cardiovascular: (-) chest pain, (-) palpitations (-) edema (+) syncope   Respiratory: (-) cough, (-) shortness of breath (+) dyspnea   Gastrointestinal: (-)nausea  (-)vomiting, (-) diarrhea  (-) abdominal pain   :  (-)dysuria, (-)frequency, (-)urgency, (-)hematuria  Musculoskeletal: (-) neck pain, (-) back pain, (-) joint pain  Integumentary: (-) rash, (-) edema  Neurological: (-) headache, (-) altered mental status

## 2022-02-25 ENCOUNTER — HOSPITAL ENCOUNTER (OUTPATIENT)
Dept: LAB | Facility: MEDICAL CENTER | Age: 77
End: 2022-02-25
Attending: NURSE PRACTITIONER
Payer: MEDICARE

## 2022-02-25 DIAGNOSIS — E11.65 TYPE 2 DIABETES MELLITUS WITH HYPERGLYCEMIA, WITHOUT LONG-TERM CURRENT USE OF INSULIN (HCC): ICD-10-CM

## 2022-02-25 DIAGNOSIS — N18.4 CKD (CHRONIC KIDNEY DISEASE) STAGE 4, GFR 15-29 ML/MIN (HCC): ICD-10-CM

## 2022-02-25 DIAGNOSIS — E78.5 DYSLIPIDEMIA: ICD-10-CM

## 2022-02-25 LAB
ANION GAP SERPL CALC-SCNC: 10 MMOL/L (ref 7–16)
BUN SERPL-MCNC: 54 MG/DL (ref 8–22)
CALCIUM SERPL-MCNC: 9.4 MG/DL (ref 8.5–10.5)
CHLORIDE SERPL-SCNC: 101 MMOL/L (ref 96–112)
CHOLEST SERPL-MCNC: 119 MG/DL (ref 100–199)
CO2 SERPL-SCNC: 29 MMOL/L (ref 20–33)
CREAT SERPL-MCNC: 1.89 MG/DL (ref 0.5–1.4)
FASTING STATUS PATIENT QL REPORTED: NORMAL
GLUCOSE SERPL-MCNC: 148 MG/DL (ref 65–99)
HDLC SERPL-MCNC: 49 MG/DL
LDLC SERPL CALC-MCNC: 62 MG/DL
POTASSIUM SERPL-SCNC: 4.5 MMOL/L (ref 3.6–5.5)
SODIUM SERPL-SCNC: 140 MMOL/L (ref 135–145)
TRIGL SERPL-MCNC: 42 MG/DL (ref 0–149)

## 2022-02-25 PROCEDURE — 36415 COLL VENOUS BLD VENIPUNCTURE: CPT

## 2022-02-25 PROCEDURE — 80061 LIPID PANEL: CPT

## 2022-02-25 PROCEDURE — 80048 BASIC METABOLIC PNL TOTAL CA: CPT

## 2022-03-15 ENCOUNTER — ANTICOAGULATION MONITORING (OUTPATIENT)
Dept: VASCULAR LAB | Facility: MEDICAL CENTER | Age: 77
End: 2022-03-15

## 2022-03-15 ENCOUNTER — NON-PROVIDER VISIT (OUTPATIENT)
Dept: MEDICAL GROUP | Facility: PHYSICIAN GROUP | Age: 77
End: 2022-03-15
Payer: MEDICARE

## 2022-03-15 VITALS
SYSTOLIC BLOOD PRESSURE: 122 MMHG | DIASTOLIC BLOOD PRESSURE: 60 MMHG | OXYGEN SATURATION: 90 % | HEART RATE: 87 BPM | RESPIRATION RATE: 14 BRPM | TEMPERATURE: 97.9 F

## 2022-03-15 DIAGNOSIS — Z79.01 CHRONIC ANTICOAGULATION: ICD-10-CM

## 2022-03-15 LAB
INR BLD: 3.7 (ref 0.9–1.2)
INR PPP: 3.7 (ref 2–3.5)
POC PROTIME: ABNORMAL

## 2022-03-15 PROCEDURE — 99213 OFFICE O/P EST LOW 20 MIN: CPT | Performed by: NURSE PRACTITIONER

## 2022-03-15 PROCEDURE — 85610 PROTHROMBIN TIME: CPT | Performed by: FAMILY MEDICINE

## 2022-03-15 PROCEDURE — 99999 PR NO CHARGE: CPT | Performed by: NURSE PRACTITIONER

## 2022-03-15 NOTE — PROGRESS NOTES
OP Anticoagulation Service Note    Date: 3/15/2022  Blood Pressure : 122/60  Pulse: 87  Respiration: 14    Anticoagulation Summary  As of 3/15/2022    INR goal:  2.0-3.0   TTR:  68.5 % (6.6 y)   INR used for dosing:  3.70 (3/15/2022)   Warfarin maintenance plan:  5 mg (5 mg x 1) every Mon, Wed, Fri; 2.5 mg (5 mg x 0.5) all other days   Weekly warfarin total:  25 mg   Plan last modified:  MARIAA Garcia (3/15/2022)   Next INR check:     Target end date:  Indefinite    Indications    Atrial fibrillation with rapid ventricular response (HCC) (Resolved) [I48.91]  Long term current use of anticoagulant therapy (Resolved) [Z79.01]  Atrial fibrillation (HCC) (Resolved) [I48.91]             Anticoagulation Episode Summary     INR check location:      Preferred lab:      Send INR reminders to:      Comments:        Anticoagulation Care Providers     Provider Role Specialty Phone number    Sanjay Sorensen M.D. Referring Cardiovascular Disease (Cardiology) [phone]        Anticoagulation Patient Findings  Patient Findings     Negatives:  Signs/symptoms of thrombosis, Signs/symptoms of bleeding, Laboratory test error suspected, Change in health, Change in alcohol use, Change in activity, Upcoming invasive procedure, Emergency department visit, Upcoming dental procedure, Missed doses, Extra doses, Change in medications, Change in diet/appetite, Hospital admission, Bruising, Other complaints          THIS VISIT CONDUCTED WITH [employer] UTIL[employer]ING SECURE AND ENCRYPTED VIDEOCONFERENCING EQUIPMENT  ROS:    Pulm: Denies SOB, chest pain.    Card: Denies syncope, edema, palpitations.    Extremities: Denies redness, pain.     PE:    Pulm: No SOB, even and unlabored.    Card: Normal rate and rhythm.    Extremities: No redness, or edema.     INR  supra-therapeutic.Weight loss down 30 lbs    Denies signs/symptoms of bleeding and/or thrombosis.    Denies changes to diet or medications.   Follow up appt in 1  weeks     Plan:  Decrease dose see below     Medication: Warfarin (Coumadin)     Sunday Monday Tuesday Wednesday Thursday Friday Saturday      2.5 mg    5 mg    2.5 mg    5 mg    2.5 mg    5 mg    2.5 mg      1/2 tab(s)    1 tab(s)    1/2 tab(s)    1 tab(s)    1/2 tab(s)    1 tab(s)  1/2 tab(s)     Next Appointment: Tuesday, March 22 @ 2:15   Pt on antiplatelet therapy Plavix 75mg for coronary artery occlusion with stent indef per cardiology.    Review all of your home medications and newly ordered medications with your doctor and / or pharmacist. Follow medication instructions as directed by your doctor and / or pharmacist. Please keep your medication list with you and share with your physician. Update the information when medications are discontinued, doses are changed, or new medications (including over-the-counter products) are added; and carry medication information at all times in the event of emergency situations.      For questions, please contact Outpatient Anticoagulation Service [phone].     The patient is on a high risk medication and is supra-therapeudic. This increases the patients risk of bleeding and therefore requires frequent monitoring and follow up.        CHEST guidelines recommend frequent INR monitoring at regular intervals (a few days up to a max of 12 weeks) to ensure they are on the proper dose of warfarin and not having any complications from therapy.  INRs can dramatically change over a short time period due to diet, medications, and medical conditions.   The patient instructed to go to the ER for falls with a head injury,  blood in urine or stool or any bleeding that last longer than 20 min.     APRIL Garcia.

## 2022-03-20 ENCOUNTER — HOSPITAL ENCOUNTER (EMERGENCY)
Facility: MEDICAL CENTER | Age: 77
End: 2022-03-20
Attending: EMERGENCY MEDICINE
Payer: MEDICARE

## 2022-03-20 ENCOUNTER — APPOINTMENT (OUTPATIENT)
Dept: RADIOLOGY | Facility: MEDICAL CENTER | Age: 77
End: 2022-03-20
Attending: EMERGENCY MEDICINE
Payer: MEDICARE

## 2022-03-20 VITALS
OXYGEN SATURATION: 92 % | RESPIRATION RATE: 18 BRPM | BODY MASS INDEX: 31.17 KG/M2 | SYSTOLIC BLOOD PRESSURE: 145 MMHG | WEIGHT: 222.66 LBS | HEIGHT: 71 IN | TEMPERATURE: 98 F | HEART RATE: 72 BPM | DIASTOLIC BLOOD PRESSURE: 77 MMHG

## 2022-03-20 DIAGNOSIS — S09.90XA CLOSED HEAD INJURY, INITIAL ENCOUNTER: ICD-10-CM

## 2022-03-20 DIAGNOSIS — S83.411A SPRAIN OF MEDIAL COLLATERAL LIGAMENT OF RIGHT KNEE, INITIAL ENCOUNTER: ICD-10-CM

## 2022-03-20 DIAGNOSIS — S00.81XA FOREHEAD ABRASION, INITIAL ENCOUNTER: ICD-10-CM

## 2022-03-20 DIAGNOSIS — S41.111A LACERATION OF RIGHT UPPER EXTREMITY, INITIAL ENCOUNTER: ICD-10-CM
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
## 2022-03-20 PROCEDURE — 303353 HCHG DERMABOND SKIN ADHESIVE

## 2022-03-20 PROCEDURE — 99285 EMERGENCY DEPT VISIT HI MDM: CPT

## 2022-03-20 PROCEDURE — A9270 NON-COVERED ITEM OR SERVICE: HCPCS | Performed by: EMERGENCY MEDICINE

## 2022-03-20 PROCEDURE — 303747 HCHG EXTRA SUTURE

## 2022-03-20 PROCEDURE — 304217 HCHG IRRIGATION SYSTEM

## 2022-03-20 PROCEDURE — 700102 HCHG RX REV CODE 250 W/ 637 OVERRIDE(OP): Performed by: EMERGENCY MEDICINE

## 2022-03-20 PROCEDURE — 70450 CT HEAD/BRAIN W/O DYE: CPT | Mod: MG

## 2022-03-20 PROCEDURE — 73564 X-RAY EXAM KNEE 4 OR MORE: CPT | Mod: RT

## 2022-03-20 PROCEDURE — A6403 STERILE GAUZE>16 <= 48 SQ IN: HCPCS

## 2022-03-20 PROCEDURE — 304999 HCHG REPAIR-SIMPLE/INTERMED LEVEL 1

## 2022-03-20 PROCEDURE — 700101 HCHG RX REV CODE 250: Performed by: EMERGENCY MEDICINE

## 2022-03-20 PROCEDURE — 700101 HCHG RX REV CODE 250

## 2022-03-20 RX ORDER — OXYCODONE HYDROCHLORIDE AND ACETAMINOPHEN 5; 325 MG/1; MG/1
1 TABLET ORAL ONCE
Status: COMPLETED | OUTPATIENT
Start: 2022-03-20 | End: 2022-03-20

## 2022-03-20 RX ORDER — LIDOCAINE HYDROCHLORIDE 20 MG/ML
JELLY TOPICAL ONCE
Status: COMPLETED | OUTPATIENT
Start: 2022-03-20 | End: 2022-03-20

## 2022-03-20 RX ORDER — TRAMADOL HYDROCHLORIDE 50 MG/1
50 TABLET ORAL EVERY 4 HOURS PRN
Qty: 20 TABLET | Refills: 0 | Status: SHIPPED | OUTPATIENT
Start: 2022-03-20 | End: 2022-03-27

## 2022-03-20 RX ORDER — ACETAMINOPHEN 325 MG/1
650 TABLET ORAL ONCE
Status: COMPLETED | OUTPATIENT
Start: 2022-03-20 | End: 2022-03-20

## 2022-03-20 RX ORDER — LIDOCAINE HYDROCHLORIDE AND EPINEPHRINE BITARTRATE 20; .01 MG/ML; MG/ML
10 INJECTION, SOLUTION SUBCUTANEOUS ONCE
Status: COMPLETED | OUTPATIENT
Start: 2022-03-20 | End: 2022-03-20

## 2022-03-20 RX ADMIN — ACETAMINOPHEN 650 MG: 325 TABLET, FILM COATED ORAL at 12:34

## 2022-03-20 RX ADMIN — LIDOCAINE HYDROCHLORIDE,EPINEPHRINE BITARTRATE 10 ML: 20; .01 INJECTION, SOLUTION INFILTRATION; PERINEURAL at 13:00

## 2022-03-20 RX ADMIN — LIDOCAINE HYDROCHLORIDE 5 ML: 20 JELLY TOPICAL at 12:25

## 2022-03-20 RX ADMIN — OXYCODONE AND ACETAMINOPHEN 1 TABLET: 5; 325 TABLET ORAL at 12:34

## 2022-03-20 ASSESSMENT — FIBROSIS 4 INDEX: FIB4 SCORE: 1.51

## 2022-03-20 NOTE — DISCHARGE INSTRUCTIONS
Please follow-up with Dr. Pineda and take pain medication as directed and as needed.  Knee immobilizer whenever walking with walker for stability    No need for any suture removal and wound care as described above

## 2022-03-20 NOTE — ED NOTES
Chief Complaint   Patient presents with   • Fall     Pt states tripped over a curb     • T-5000 FALL   • Head Injury     Pt states did hit head, denies LOC, currently taking Coumadin    • Arm Injury     Right   • Knee Injury     Right     BP (!) 166/90   Pulse 75   Resp 20   SpO2 95%     Pt to ED via WC w/ visitors for c/o MGLF.    ERP at bedside.  Pt denies LOC.

## 2022-03-20 NOTE — ED TRIAGE NOTES
Pt states tripped over a curb while at [employer] this am, denies LOC, took scheduled Coumadin last night.  Abrasion noted to Right forehead, RFA, Right Elbow, and Right Knee.  Bleeding controlled at this time.  Pt c/o pain Right Knee w/ movement.  Pt denies c/o CP, back pain or neck pain.  Pt currently in Radiology.

## 2022-03-20 NOTE — ED PROVIDER NOTES
ED Provider Note    CHIEF COMPLAINT  Chief Complaint   Patient presents with   • Fall     Pt states tripped over a curb     • T-5000 FALL   • Head Injury     Pt states did hit head, denies LOC, currently taking Coumadin    • Arm Injury     Right   • Knee Injury     Right       HPI  Yuri De León is a 76 y.o. male who presents with a report that he had an accidental trip and fall over a curb witnessed by his family and he hit his head but did not lose consciousness and complains of right knee pain as well as forehead, right hand, elbow and brachium pain secondary to lacerations.  He says that his knee looks a little swollen compared to the left.  He is status post total knee replacement on the left but has not had any work done to his native right knee.  He denied feeling bad prior to the fall and believes this is purely mechanical.  He is on Coumadin for atrial fibrillation followed by Dr. Gong.  No other complaints    REVIEW OF SYSTEMS  See HPI for further details. All other systems are negative.     PAST MEDICAL HISTORY  Past Medical History:   Diagnosis Date   • Abnormal electrocardiogram 8/13/2010   • Arrhythmia     Atrial Fibrillation; Cardiologist, Dr. Sorensen   • Arthritis     knees, left hip   • ASTHMA     inhalers   • Atrial fibrillation (HCC) 10/25/2011   • Bronchospasm 8/6/2009   • Diabetes (HCC)     insulin   • HTN (hypertension) 10/25/2011   • Hypercholesteremia 10/25/2011   • Long term (current) use of anticoagulants 10/25/2011   • NASAL POLYPS 8/6/2009   • Other nonspecific abnormal finding 8/6/2009   • Pain     left hip   • Shortness of breath    • Sleep apnea 8/6/2009    o2 at night   • Wears glasses        FAMILY HISTORY  Family History   Problem Relation Age of Onset   • Heart Disease Mother        SOCIAL HISTORY   reports that he quit smoking about 53 years ago. His smoking use included cigarettes. He has a 5.00 pack-year smoking history. He has never used smokeless tobacco. He  "reports that he does not drink alcohol and does not use drugs.    SURGICAL HISTORY  Past Surgical History:   Procedure Laterality Date   • KNEE ARTHROPLASTY TOTAL Left 10/2014   • HIP ARTHROPLASTY TOTAL  8/31/2010    Performed by OSGOOD, PATRICK J at SURGERY [facility] ORS   • LUMBAR DECOMPRESSION  1984   • HIP REPLACEMENT, TOTAL     • LAMINOTOMY     • SINUSCOPE     • SINUSOTOMIES  2003,2005,2/2007       CURRENT MEDICATIONS  Home Medications    **Home medications have not yet been reviewed for this encounter**         ALLERGIES  Allergies   Allergen Reactions   • Atenolol Shortness of Breath     DYSPNEA.   • Tetanus Toxoid Swelling       PHYSICAL EXAM  VITAL SIGNS: BP (!) 166/90   Pulse 75   Temp 36.7 °C (98 °F) (Temporal)   Resp 20   Ht 1.803 m (5' 11\")   Wt 101 kg (222 lb 10.6 oz)   SpO2 95%   BMI 31.06 kg/m²    Constitutional: Well developed, Well nourished, No acute distress, Non-toxic appearance.   HENT: Normocephalic, Atraumatic, Bilateral external ears normal, Oropharynx is clear mucous membranes are moist. No oral exudates or nasal discharge.   Eyes: Pupils are equal round and reactive, EOMI, Conjunctiva normal, No discharge.   Neck: Normal range of motion, No tenderness, Supple, No stridor. No meningismus.  Lymphatic: No lymphadenopathy noted.   Cardiovascular: Regular rate and rhythm without murmur rub or gallop.  Thorax & Lungs: Clear breath sounds bilaterally without wheezes, rhonchi or rales. There is no chest wall tenderness.   Abdomen: Soft non-tender non-distended. There is no rebound or guarding. No organomegaly is appreciated. Bowel sounds are normal.  Skin: Skin tear/laceration at right elbow as well as dorsal mid brachium on the right as well as forehead .  Total laceration length on the right arm at 2 sites is approximately 8 cm.  2 cm large divot-like stellate laceration of the right forehead actively bleeding requiring direct pressure.  There is also significant abrasion surrounding " this area  Back: No CVA or spinal tenderness.   Extremities: Intact distal pulses, No edema, No tenderness, No cyanosis, No clubbing. Capillary refill is less than 2 seconds.  Musculoskeletal: Good range of motion in all major joints. No tenderness to palpation or major deformities noted.   Neurologic: Alert & oriented x 3, Normal motor function, Normal sensory function, No focal deficits noted. Reflexes are normal.  Psychiatric: Affect normal, Judgment normal, Mood normal. There is no suicidal ideation or patient reported hallucinations.       RADIOLOGY/PROCEDURES  DX-KNEE COMPLETE 4+ RIGHT   Final Result      No evidence of fracture or dislocation.   Moderate to severe osteoarthritis is present.      CT-HEAD W/O   Final Result         NO ACUTE ABNORMALITIES ARE NOTED ON CT SCAN OF THE HEAD.      Findings are consistent with atrophy.  Decreased attenuation in the periventricular white matter likely indicates microvascular ischemic disease.      Probable sinusitis.            COURSE & MEDICAL DECISION MAKING  Pertinent Labs & Imaging studies reviewed. (See chart for details)  Patient presents with fall and head trauma on Coumadin and therefore TBI protocol was activated and head scan was performed that is negative for bleed or skull fracture.    Additionally I did a right knee x-ray based on examination that is concerning for a medial collateral tear and this was negative for fracture and he is placed in a knee immobilizer after abrasion over the right knee is cleansed and managed.    His multiple lacerations were managed as follows:  Laceration Repair Procedure Note    Indication: Laceration    Procedure: The patient was placed in the appropriate position and anesthesia around the laceration was not required. The area was then cleansed using soap and water. The laceration was closed with Dermabond. A second laceration was closed with Dermabond. The wound area was then dressed with a sterile dressing.  A third  laceration of the forehead that was 2 cm in length but actively bleeding in spite of quick clot needed to be repaired with 4-0 Vicryl with a pursestring suture x2    Total repaired wound length: 10 cm.     Other Items: None    The patient tolerated the procedure well.    Complications: None    Patient understands to follow-up with Anthony Pineda and to use walker and use knee immobilizer whenever ambulatory.  I believe he a right collateral ligament strain that is somewhat unstable based on examination and he may need consideration for repair or right knee replacement    I prescribed him Ultram to be used with Tylenol as needed for pain control he is discharged in stable condition and continued therapy with Coumadin is okay      FINAL IMPRESSION  1. Fall, initial encounter    2. Closed head injury, initial encounter    3. Sprain of medial collateral ligament of right knee, initial encounter    4. Laceration of right upper extremity, initial encounter    5. Forehead abrasion, initial encounter    6.  Repair of lacerations with suture and Dermabond by ERP, 10 cm total         Electronically signed by: Collins Wyatt M.D., 3/20/2022 12:10 PM

## 2022-03-20 NOTE — ED NOTES
Written and verbal discharge instructions given to patient. Patient acknowledges and reports understanding of instructions.  Patient is agreeable to discharge at this time.  D/c to home with wife and family.

## 2022-03-20 NOTE — ED NOTES
Med rec completed per pt at bedside  Allergies reviewed    Pt takes warfarin daily. He reports that his dose changed as of Tuesday 3/15/22 to 5 mg on Mondays/Thursdays, 2.5 mg on all other days

## 2022-03-30 ENCOUNTER — TELEPHONE (OUTPATIENT)
Dept: VASCULAR LAB | Facility: MEDICAL CENTER | Age: 77
End: 2022-03-30
Payer: MEDICARE

## 2022-04-05 ENCOUNTER — ANTICOAGULATION MONITORING (OUTPATIENT)
Dept: VASCULAR LAB | Facility: MEDICAL CENTER | Age: 77
End: 2022-04-05

## 2022-04-05 ENCOUNTER — NON-PROVIDER VISIT (OUTPATIENT)
Dept: MEDICAL GROUP | Facility: PHYSICIAN GROUP | Age: 77
End: 2022-04-05
Payer: MEDICARE

## 2022-04-05 VITALS
TEMPERATURE: 98.3 F | DIASTOLIC BLOOD PRESSURE: 78 MMHG | RESPIRATION RATE: 12 BRPM | SYSTOLIC BLOOD PRESSURE: 132 MMHG | HEART RATE: 94 BPM | OXYGEN SATURATION: 93 %

## 2022-04-05 DIAGNOSIS — Z79.01 CHRONIC ANTICOAGULATION: ICD-10-CM

## 2022-04-05 LAB
INR BLD: 1.1 (ref 0.9–1.2)
INR PPP: 1.1 (ref 2–3.5)
POC PROTIME: NORMAL

## 2022-04-05 PROCEDURE — 85610 PROTHROMBIN TIME: CPT | Performed by: FAMILY MEDICINE

## 2022-04-05 PROCEDURE — 99213 OFFICE O/P EST LOW 20 MIN: CPT | Performed by: NURSE PRACTITIONER

## 2022-04-05 PROCEDURE — 99999 PR NO CHARGE: CPT | Performed by: NURSE PRACTITIONER

## 2022-04-05 NOTE — PROGRESS NOTES
OP Anticoagulation Service Note    Date: 2022  Blood Pressure : 132/78  Pulse: 94  Respiration: 12    Anticoagulation Summary  As of 2022    INR goal:  2.0-3.0   TTR:  68.2 % (6.6 y)   INR used for dosin.10 (2022)   Warfarin maintenance plan:  5 mg (5 mg x 1) every Mon, Wed, Fri; 2.5 mg (5 mg x 0.5) all other days   Weekly warfarin total:  25 mg   Plan last modified:  MARIAA Garcia (3/15/2022)   Next INR check:     Target end date:  Indefinite    Indications    Atrial fibrillation with rapid ventricular response (HCC) (Resolved) [I48.91]  Long term current use of anticoagulant therapy (Resolved) [Z79.01]  Atrial fibrillation (HCC) (Resolved) [I48.91]             Anticoagulation Episode Summary     INR check location:      Preferred lab:      Send INR reminders to:      Comments:        Anticoagulation Care Providers     Provider Role Specialty Phone number    Sanjay Sorensen M.D. Referring Cardiovascular Disease (Cardiology) [phone]        Anticoagulation Patient Findings  Patient Findings     Negatives:  Signs/symptoms of thrombosis, Signs/symptoms of bleeding, Laboratory test error suspected, Change in health, Change in alcohol use, Change in activity, Upcoming invasive procedure, Emergency department visit, Upcoming dental procedure, Missed doses, Extra doses, Change in medications, Change in diet/appetite, Hospital admission, Bruising, Other complaints          THIS VISIT CONDUCTED WITH [employer] UTILIZING SECURE AND ENCRYPTED VIDEOCONFERENCING EQUIPMENT  ROS:    Pulm: Denies SOB, chest pain.    Card: Denies syncope, edema, palpitations.    Extremities: Denies redness, pain.     PE:    Pulm: No SOB, even and unlabored.    Card: Normal rate and rhythm.    Extremities: No redness, or edema.     INR  sub-therapeutic.  Held doses due to injury and bleeding   Denies signs/symptoms of bleeding and/or thrombosis.    Denies changes to diet or medications.   Follow up  appt in 1 weeks     Plan:  See below    Medication: Warfarin (Coumadin)     Sunday Monday Tuesday Wednesday Thursday Friday Saturday      2.5 mg    5 mg    5 mg    5 mg    5 mg    5 mg    5 mg      1/2 tab(s)    1 tab(s)    1 tab(s)    1 tab(s)    1 tab(s)    1 tab(s)  1 tab(s)     Next Appointment: Tuesday, April 12 @ 3:45   Pt on antiplatelet therapy Plavix 75mg for coronary artery occlusion with stent indef per cardiology.     Review all of your home medications and newly ordered medications with your doctor and / or pharmacist. Follow medication instructions as directed by your doctor and / or pharmacist. Please keep your medication list with you and share with your physician. Update the information when medications are discontinued, doses are changed, or new medications (including over-the-counter products) are added; and carry medication information at all times in the event of emergency situations.      For questions, please contact Outpatient Anticoagulation Service [phone].   The patient is on a high risk medication and is sub- therapeutic. This could lead to clot formation or risk of stroke. Therefore this patient requires close monitoring and follow up.      CHEST guidelines recommend frequent INR monitoring at regular intervals (a few days up to a max of 12 weeks) to ensure they are on the proper dose of warfarin and not having any complications from therapy.  INRs can dramatically change over a short time period due to diet, medications, and medical conditions.   The patient instructed to go to the ER for falls with a head injury,  blood in urine or stool or any bleeding that last longer than 20 min.     APRIL Garcia.

## 2022-04-12 ENCOUNTER — ANTICOAGULATION MONITORING (OUTPATIENT)
Dept: VASCULAR LAB | Facility: MEDICAL CENTER | Age: 77
End: 2022-04-12

## 2022-04-12 ENCOUNTER — NON-PROVIDER VISIT (OUTPATIENT)
Dept: MEDICAL GROUP | Facility: PHYSICIAN GROUP | Age: 77
End: 2022-04-12
Payer: MEDICARE

## 2022-04-12 VITALS
TEMPERATURE: 98.1 F | SYSTOLIC BLOOD PRESSURE: 126 MMHG | DIASTOLIC BLOOD PRESSURE: 78 MMHG | RESPIRATION RATE: 12 BRPM | OXYGEN SATURATION: 97 % | HEART RATE: 67 BPM

## 2022-04-12 DIAGNOSIS — I48.20 ATRIAL FIBRILLATION, CHRONIC (HCC): ICD-10-CM

## 2022-04-12 DIAGNOSIS — D68.59 HYPERCOAGULABLE STATE (HCC): ICD-10-CM

## 2022-04-12 DIAGNOSIS — Z79.01 CHRONIC ANTICOAGULATION: ICD-10-CM

## 2022-04-12 LAB
INR BLD: 1.5 (ref 0.9–1.2)
INR PPP: 1.5 (ref 2–3.5)
POC PROTIME: ABNORMAL

## 2022-04-12 PROCEDURE — 85610 PROTHROMBIN TIME: CPT | Performed by: FAMILY MEDICINE

## 2022-04-12 PROCEDURE — 99213 OFFICE O/P EST LOW 20 MIN: CPT | Performed by: NURSE PRACTITIONER

## 2022-04-12 PROCEDURE — 99999 PR NO CHARGE: CPT | Performed by: NURSE PRACTITIONER

## 2022-04-12 NOTE — PROGRESS NOTES
OP Anticoagulation Service Note    Date: 2022  Blood Pressure : 126/78  Pulse: 67  Respiration: 12    Anticoagulation Summary  As of 2022    INR goal:  2.0-3.0   TTR:  68.0 % (6.7 y)   INR used for dosin.50 (2022)   Warfarin maintenance plan:  5 mg (5 mg x 1) every Mon, Wed, Fri; 2.5 mg (5 mg x 0.5) all other days   Weekly warfarin total:  25 mg   Plan last modified:  MARIAA Garcia (3/15/2022)   Next INR check:     Target end date:  Indefinite    Indications    Atrial fibrillation with rapid ventricular response (HCC) (Resolved) [I48.91]  Long term current use of anticoagulant therapy (Resolved) [Z79.01]  Atrial fibrillation (HCC) (Resolved) [I48.91]             Anticoagulation Episode Summary     INR check location:      Preferred lab:      Send INR reminders to:      Comments:        Anticoagulation Care Providers     Provider Role Specialty Phone number    Sanjay Sorensen M.D. Referring Cardiovascular Disease (Cardiology) [phone]        Anticoagulation Patient Findings  Patient Findings     Negatives:  Signs/symptoms of thrombosis, Signs/symptoms of bleeding, Laboratory test error suspected, Change in health, Change in alcohol use, Change in activity, Upcoming invasive procedure, Emergency department visit, Upcoming dental procedure, Missed doses, Extra doses, Change in medications, Change in diet/appetite, Hospital admission, Bruising, Other complaints          THIS VISIT CONDUCTED WITH PRESENTER [employer]IA [employer] UTILIZING SECURE AND ENCRYPTED VIDEOCONFERENCING EQUIPMENT  ROS:    Pulm: Denies SOB, chest pain.    Card: Denies syncope, edema, palpitations.    Extremities: Denies redness, pain.     PE:    Pulm: No SOB, even and unlabored.    Card: Normal rate and rhythm.    Extremities: No redness, or edema.     INR  sub-therapeutic.    Denies signs/symptoms of bleeding and/or thrombosis.    Denies changes to diet or medications.   Follow up appt in 1 weeks     Plan:  See  below    Medication: Warfarin (Coumadin)     Sunday Monday Tuesday Wednesday Thursday Friday Saturday      2.5 mg    5 mg    5 mg    5 mg    5 mg   2.5 mg    5 mg      1/2 tab(s)    1 tab(s)    1 tab(s)    1 tab(s)    1 tab(s)    1/2 tab(s)  1 tab(s)     Next Appointment: Tuesday, April 19 @  2:45  Pt on antiplatelet therapy Plavix 75mg for coronary artery occlusion with stent indef per cardiology.  Atrial fibrillation controlled on Diltiazem.  Secondary hypercoagulable state due to Atrial Fibrillation/Flutter on Warfarin.    Review all of your home medications and newly ordered medications with your doctor and / or pharmacist. Follow medication instructions as directed by your doctor and / or pharmacist. Please keep your medication list with you and share with your physician. Update the information when medications are discontinued, doses are changed, or new medications (including over-the-counter products) are added; and carry medication information at all times in the event of emergency situations.      For questions, please contact Outpatient Anticoagulation Service [phone].   .The patient is on a high risk medication and is sub- therapeutic. This could lead to clot formation or risk of stroke. Therefore this patient requires close monitoring and follow up.      CHEST guidelines recommend frequent INR monitoring at regular intervals (a few days up to a max of 12 weeks) to ensure they are on the proper dose of warfarin and not having any complications from therapy.  INRs can dramatically change over a short time period due to diet, medications, and medical conditions.   The patient instructed to go to the ER for falls with a head injury,  blood in urine or stool or any bleeding that last longer than 20 min.     APRIL Garcia.

## 2022-04-14 ENCOUNTER — NON-PROVIDER VISIT (OUTPATIENT)
Dept: CARDIOLOGY | Facility: MEDICAL CENTER | Age: 77
End: 2022-04-14

## 2022-04-14 ENCOUNTER — OFFICE VISIT (OUTPATIENT)
Dept: CARDIOLOGY | Facility: MEDICAL CENTER | Age: 77
End: 2022-04-14

## 2022-04-14 ENCOUNTER — OFFICE VISIT (OUTPATIENT)
Dept: SLEEP MEDICINE | Facility: MEDICAL CENTER | Age: 77
End: 2022-04-14
Payer: MEDICARE

## 2022-04-14 VITALS
WEIGHT: 219 LBS | BODY MASS INDEX: 29.66 KG/M2 | DIASTOLIC BLOOD PRESSURE: 74 MMHG | RESPIRATION RATE: 16 BRPM | SYSTOLIC BLOOD PRESSURE: 132 MMHG | HEIGHT: 72 IN | OXYGEN SATURATION: 96 % | HEART RATE: 70 BPM

## 2022-04-14 VITALS
OXYGEN SATURATION: 97 % | HEIGHT: 71 IN | HEART RATE: 72 BPM | SYSTOLIC BLOOD PRESSURE: 142 MMHG | WEIGHT: 220 LBS | DIASTOLIC BLOOD PRESSURE: 80 MMHG | RESPIRATION RATE: 16 BRPM | BODY MASS INDEX: 30.8 KG/M2

## 2022-04-14 DIAGNOSIS — I48.20 ATRIAL FIBRILLATION, CHRONIC (HCC): ICD-10-CM

## 2022-04-14 DIAGNOSIS — I34.0 MODERATE MITRAL REGURGITATION: ICD-10-CM

## 2022-04-14 DIAGNOSIS — E11.65 TYPE 2 DIABETES MELLITUS WITH HYPERGLYCEMIA, WITHOUT LONG-TERM CURRENT USE OF INSULIN (HCC): ICD-10-CM

## 2022-04-14 DIAGNOSIS — J44.9 CHRONIC OBSTRUCTIVE PULMONARY DISEASE, UNSPECIFIED COPD TYPE (HCC): ICD-10-CM

## 2022-04-14 DIAGNOSIS — I50.812 CHRONIC RIGHT-SIDED HEART FAILURE (HCC): ICD-10-CM

## 2022-04-14 DIAGNOSIS — E78.5 DYSLIPIDEMIA: ICD-10-CM

## 2022-04-14 DIAGNOSIS — Z95.5 S/P CORONARY ARTERY STENT PLACEMENT: ICD-10-CM

## 2022-04-14 DIAGNOSIS — I10 ESSENTIAL HYPERTENSION, BENIGN: ICD-10-CM

## 2022-04-14 DIAGNOSIS — Z79.01 ANTICOAGULATED: ICD-10-CM

## 2022-04-14 DIAGNOSIS — N18.4 CKD (CHRONIC KIDNEY DISEASE) STAGE 4, GFR 15-29 ML/MIN (HCC): ICD-10-CM

## 2022-04-14 DIAGNOSIS — I50.32 CHRONIC HEART FAILURE WITH PRESERVED EJECTION FRACTION (HCC): ICD-10-CM

## 2022-04-14 DIAGNOSIS — I25.10 CORONARY ARTERY DISEASE, OCCLUSIVE: ICD-10-CM

## 2022-04-14 DIAGNOSIS — I27.20 PULMONARY HYPERTENSION (HCC): ICD-10-CM

## 2022-04-14 PROCEDURE — 99214 OFFICE O/P EST MOD 30 MIN: CPT | Performed by: INTERNAL MEDICINE

## 2022-04-14 PROCEDURE — 99211 OFF/OP EST MAY X REQ PHY/QHP: CPT | Performed by: NURSE PRACTITIONER

## 2022-04-14 PROCEDURE — 99214 OFFICE O/P EST MOD 30 MIN: CPT | Performed by: NURSE PRACTITIONER

## 2022-04-14 RX ORDER — ALBUTEROL SULFATE 90 UG/1
2 AEROSOL, METERED RESPIRATORY (INHALATION) EVERY 4 HOURS PRN
Qty: 8.5 G | Refills: 5 | Status: ON HOLD | OUTPATIENT
Start: 2022-04-14 | End: 2022-09-07

## 2022-04-14 RX ORDER — BUDESONIDE AND FORMOTEROL FUMARATE DIHYDRATE 160; 4.5 UG/1; UG/1
2 AEROSOL RESPIRATORY (INHALATION) 2 TIMES DAILY
Qty: 1 EACH | Refills: 11 | Status: SHIPPED | OUTPATIENT
Start: 2022-04-14 | End: 2023-01-01 | Stop reason: SDUPTHER

## 2022-04-14 ASSESSMENT — ENCOUNTER SYMPTOMS
DIZZINESS: 0
LOSS OF CONSCIOUSNESS: 0
MYALGIAS: 0
PALPITATIONS: 0
COUGH: 0
SHORTNESS OF BREATH: 0

## 2022-04-14 ASSESSMENT — FIBROSIS 4 INDEX
FIB4 SCORE: 1.51
FIB4 SCORE: 1.51

## 2022-04-14 ASSESSMENT — PATIENT HEALTH QUESTIONNAIRE - PHQ9: CLINICAL INTERPRETATION OF PHQ2 SCORE: 0

## 2022-04-14 NOTE — PROGRESS NOTES
Chief Complaint   Patient presents with   • COPD       HPI:  Yuri De León is a 76 y.o. year old male here today for follow-up on COPD.  Last seen 7/14/2021 by me.  Current regimen includes Symbicort and Ventolin as needed.  Significant past medical history includes heart failure, pulmonary hypertension, type 2 diabetes, CKD, A. fib with Coumadin for anticoagulants, coronary artery disease status post stents.     Currently he denies any symptoms including chest tightness, or new or worsening dyspnea or shortness of breath.  He does have morning chest congestion with cough, but it is not problematic.  Currently only taking Symbicort 1 puff twice daily, but has not needed Ventolin or nebulizer.  He is relatively active and works outdoors.  He does sleep in a chair because lying flat makes him feel like he is suffocating.  Patient declined further work-up for LAUREL in the past.  He does snore but denies any excessive daytime sleepiness, morning headaches, or palpitations.    Chest x-ray (7/21/2021):  Stable enlargement of the cardiac silhouette and central vasculature probably due to chronic cardiac disease. There is no acute cardiopulmonary process.    Echocardiogram (7/23/2020):  Prior Echo 3/23/18  Left ventricular ejection fraction is visually estimated to be 65%. Flattening of the interventricular septum. Moderately dilated right ventricle. Moderate mitral regurgitation.  Moderate tricuspid regurgitation. Estimated right ventricular systolic pressure  is 55 mmHg.    ROS: As per HPI and otherwise negative if not stated.    Past Medical History:   Diagnosis Date   • Abnormal electrocardiogram 8/13/2010   • Arrhythmia     Atrial Fibrillation; Cardiologist, Dr. Sorensen   • Arthritis     knees, left hip   • ASTHMA     inhalers   • Atrial fibrillation (HCC) 10/25/2011   • Bronchospasm 8/6/2009   • Diabetes (HCC)     insulin   • HTN (hypertension) 10/25/2011   • Hypercholesteremia 10/25/2011   • Long term  (current) use of anticoagulants 10/25/2011   • NASAL POLYPS 2009   • Other nonspecific abnormal finding 2009   • Pain     left hip   • Shortness of breath    • Sleep apnea 2009    o2 at night   • Wears glasses        Past Surgical History:   Procedure Laterality Date   • KNEE ARTHROPLASTY TOTAL Left 10/2014   • HIP ARTHROPLASTY TOTAL  2010    Performed by OSGOOD, PATRICK J at SURGERY [facility]   • LUMBAR DECOMPRESSION     • HIP REPLACEMENT, TOTAL     • LAMINOTOMY     • SINUSCOPE     • SINUSOTOMIES  ,,2007       Family History   Problem Relation Age of Onset   • Heart Disease Mother        Social History     Socioeconomic History   • Marital status:      Spouse name: Not on file   • Number of children: Not on file   • Years of education: Not on file   • Highest education level: Not on file   Occupational History   • Not on file   Tobacco Use   • Smoking status: Former Smoker     Packs/day: 0.50     Years: 10.00     Pack years: 5.00     Types: Cigarettes     Quit date: 12/10/1968     Years since quittin.3   • Smokeless tobacco: Never Used   Vaping Use   • Vaping Use: Never used   Substance and Sexual Activity   • Alcohol use: No   • Drug use: No   • Sexual activity: Not on file   Other Topics Concern   • Not on file   Social History Narrative   • Not on file     Social Determinants of Health     Financial Resource Strain: Not on file   Food Insecurity: Not on file   Transportation Needs: Not on file   Physical Activity: Not on file   Stress: Not on file   Social Connections: Not on file   Intimate Partner Violence: Not on file   Housing Stability: Not on file       Allergies as of 2022 - Reviewed 2022   Allergen Reaction Noted   • Atenolol Shortness of Breath and Unspecified 2009   • Tetanus toxoid Swelling 2010        Vitals:  /74 (BP Location: Left arm, Patient Position: Sitting, BP Cuff Size: Adult)   Pulse 70   Resp 16   Ht 1.829 m  (6')   Wt 99.3 kg (219 lb)   SpO2 96%     Current medications as of today   Current Outpatient Medications   Medication Sig Dispense Refill   • budesonide-formoterol (SYMBICORT) 160-4.5 MCG/ACT Aerosol Inhale 2 Puffs 2 times a day. Use spacer. Rinse mouth after each use. 1 Each 11   • albuterol (VENTOLIN HFA) 108 (90 Base) MCG/ACT Aero Soln inhalation aerosol Inhale 2 Puffs every four hours as needed for Shortness of Breath (Wheezing). 8.5 g 5   • Dulaglutide (TRULICITY) 4.5 MG/0.5ML Solution Pen-injector Inject 1 Each under the skin every 7 days. 6 mL 5   • atorvastatin (LIPITOR) 80 MG tablet Take 1 Tablet by mouth every day. 90 Tablet 3   • warfarin (COUMADIN) 5 MG Tab TAKE HALF TABLET BY MOUTH (2.5 MG) ON THURSDAYS AND 1 TABLET (5 MG) ALL OTHER DAYS OR AS DIRECTED BY ANTICOAGULATION CLINIC (Patient taking differently: Take 2.5-5 mg by mouth every day. 5 mg on Mondays/Thursdays, 2.5 mg on all other days) 90 Tablet 1   • Barberry-Oreg Grape-Goldenseal (BERBERINE COMPLEX) 200-200-50 MG Cap Take 1 Capsule by mouth 2 times a day.     • celecoxib (CELEBREX) 200 MG Cap Take 200 mg by mouth 1 time a day as needed for Moderate Pain.     • furosemide (LASIX) 40 MG Tab Take 80 mg by mouth every day.     • clopidogrel (PLAVIX) 75 MG Tab TAKE 1 TABLET BY MOUTH EVERY DAY 90 Tablet 3   • DILTIAZem (CARDIZEM) 60 MG Tab Take 1 tablet by mouth 3 times a day. 90 tablet 11   • albuterol (PROVENTIL) 2.5mg/3ml Nebu Soln solution for nebulization Take 3 mL by nebulization every four hours as needed for Shortness of Breath. 120 Bullet 2     No current facility-administered medications for this visit.         Physical Exam:   Gen:           Alert and oriented, No apparent distress. Mood and affect appropriate, normal interaction with examiner.  Eyes:          PERRL, EOM intact, sclere white, conjunctive moist.  Ears:          Not examined. No lesions or deformities.  Hearing:     Grossly intact.  Nose:          Normal, no lesions or  deformities.  Dentition:    Good dentition.  Oropharynx:   Tongue normal, posterior pharynx without erythema or exudate.  Neck:        Supple, trachea midline, no masses.  Respiratory Effort: No intercostal retractions or use of accessory muscles.   Lung Auscultation:      Clear to auscultation bilaterally; no rales, rhonchi or wheezing.  CV:            Regular rate and rhythm. No murmurs, rubs or gallops.  Abd:           Not examined. Soft non tender, non distended. Normal active bowel sounds. No masses.  Lymphadenopathy: No palpable nodes or edema.  Gait and Station: Normal.  Digits and Nails: No clubbing, cyanosis, petechiae, or nodes.   Cranial Nerves: II-XII grossly intact.  Skin:        No rashes, lesions or ulcers noted.               Ext:           No cyanosis or edema.      Assessment:  1. Chronic obstructive pulmonary disease, unspecified COPD type (HCC)  budesonide-formoterol (SYMBICORT) 160-4.5 MCG/ACT Aerosol         Plan:  1.  Continue current regiment of Symbicort twice daily with albuterol as needed.  If any change in breathing occurs, please reach out for further instructions.  Patient declines pulmonary function test or work-up for LAUREL.  Patient denies any new or worsening dyspnea or shortness of breath.  Patient is clinically stable at this time and would like 6-month follow-up.    Please note that this dictation was created using voice recognition software. I have made every reasonable attempt to correct obvious errors, but it is possible there are errors of grammar and possibly content that I did not discover before finalizing the note.

## 2022-04-14 NOTE — PROGRESS NOTES
Chief Complaint   Patient presents with   • Other     F/V Dx: Chronic heart failure with preserved ejection fraction (HCC       Subjective     Ken De León is a 76 y.o. male who presents today for follow-up evaluation of CAD, NSTEMI, RCA stent, chronic fibrillation, chronic anticoagulation, hypertension and hyperlipidemia.    Since 1/5/2022 appointment with FARHANA Hess the patient has had no cardiac symptoms including chest pain, palpitations, shortness of breath.  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  Unfortunately on 3/20/2022 he tripped and fell, injured his right forehead resulting in laceration requiring suture in the ER, head CT scan was negative for intracranial injury.  Weight has been stable.  No heart failure symptoms.    Since 7/14/2021 the patient's continued to improve with weight loss; total 25 pounds; stabilized at 220.  Symptomatically markedly improved.  Saw [facility] nephrology Dr Devi.  Started on Jardiance.  Developed hematuria and discontinued it.       Past Medical History:   Diagnosis Date   • Abnormal electrocardiogram 8/13/2010   • Arrhythmia     Atrial Fibrillation; Cardiologist, Dr. Sorensen   • Arthritis     knees, left hip   • ASTHMA     inhalers   • Atrial fibrillation (HCC) 10/25/2011   • Bronchospasm 8/6/2009   • Diabetes (HCC)     insulin   • HTN (hypertension) 10/25/2011   • Hypercholesteremia 10/25/2011   • Long term (current) use of anticoagulants 10/25/2011   • NASAL POLYPS 8/6/2009   • Other nonspecific abnormal finding 8/6/2009   • Pain     left hip   • Shortness of breath    • Sleep apnea 8/6/2009    o2 at night   • Wears glasses      Past Surgical History:   Procedure Laterality Date   • KNEE ARTHROPLASTY TOTAL Left 10/2014   • HIP ARTHROPLASTY TOTAL  8/31/2010    Performed by OSGOOD, PATRICK J at [facility]   • LUMBAR DECOMPRESSION  1984   • HIP REPLACEMENT, TOTAL     • LAMINOTOMY     • SINUSCOPE     •  SINUSOTOMIES  ,,2007     Family History   Problem Relation Age of Onset   • Heart Disease Mother      Social History     Socioeconomic History   • Marital status:      Spouse name: Not on file   • Number of children: Not on file   • Years of education: Not on file   • Highest education level: Not on file   Occupational History   • Not on file   Tobacco Use   • Smoking status: Former Smoker     Packs/day: 0.50     Years: 10.00     Pack years: 5.00     Types: Cigarettes     Quit date: 12/10/1968     Years since quittin.3   • Smokeless tobacco: Never Used   Vaping Use   • Vaping Use: Never used   Substance and Sexual Activity   • Alcohol use: No   • Drug use: No   • Sexual activity: Not on file   Other Topics Concern   • Not on file   Social History Narrative   • Not on file     Social Determinants of Health     Financial Resource Strain: Not on file   Food Insecurity: Not on file   Transportation Needs: Not on file   Physical Activity: Not on file   Stress: Not on file   Social Connections: Not on file   Intimate Partner Violence: Not on file   Housing Stability: Not on file     Allergies   Allergen Reactions   • Atenolol Shortness of Breath and Unspecified     Shortness of breath, weakness   • Tetanus Toxoid Swelling     Outpatient Encounter Medications as of 2022   Medication Sig Dispense Refill   • Dulaglutide (TRULICITY) 4.5 MG/0.5ML Solution Pen-injector Inject 1 Each under the skin every 7 days. 6 mL 5   • atorvastatin (LIPITOR) 80 MG tablet Take 1 Tablet by mouth every day. 90 Tablet 3   • warfarin (COUMADIN) 5 MG Tab TAKE HALF TABLET BY MOUTH (2.5 MG) ON  AND 1 TABLET (5 MG) ALL OTHER DAYS OR AS DIRECTED BY ANTICOAGULATION CLINIC (Patient taking differently: Take 2.5-5 mg by mouth every day. 5 mg on /, 2.5 mg on all other days) 90 Tablet 1   • Barberry-Oreg Grape-Goldenseal (BERBERINE COMPLEX) 200-200-50 MG Cap Take 1 Capsule by mouth 2 times a day.     •  "celecoxib (CELEBREX) 200 MG Cap Take 200 mg by mouth 1 time a day as needed for Moderate Pain.     • furosemide (LASIX) 40 MG Tab Take 80 mg by mouth every day.     • clopidogrel (PLAVIX) 75 MG Tab TAKE 1 TABLET BY MOUTH EVERY DAY 90 Tablet 3   • DILTIAZem (CARDIZEM) 60 MG Tab Take 1 tablet by mouth 3 times a day. 90 tablet 11   • albuterol (PROVENTIL) 2.5mg/3ml Nebu Soln solution for nebulization Take 3 mL by nebulization every four hours as needed for Shortness of Breath. 120 Bullet 2   • budesonide-formoterol (SYMBICORT) 160-4.5 MCG/ACT Aerosol Inhale 2 Puffs 2 Times a Day. Use spacer. Rinse mouth after each use. 1 Each 11     No facility-administered encounter medications on file as of 4/14/2022.     Review of Systems   Respiratory: Negative for cough and shortness of breath.    Cardiovascular: Negative for chest pain and palpitations.   Musculoskeletal: Positive for joint pain. Negative for myalgias.   Neurological: Negative for dizziness and loss of consciousness.              Objective     /80 (BP Location: Left arm, Patient Position: Sitting, BP Cuff Size: Adult)   Resp 16   Ht 1.803 m (5' 11\")   Wt 99.8 kg (220 lb)   BMI 30.68 kg/m²     Physical Exam  Constitutional:       Appearance: He is well-developed.   Eyes:      Pupils: Pupils are equal, round, and reactive to light.   Neck:      Vascular: No JVD.      Comments: JVP appears normal.  Cardiovascular:      Rate and Rhythm: Normal rate. Rhythm irregular.      Heart sounds: Murmur heard.   Pulmonary:      Effort: Pulmonary effort is normal. No respiratory distress.      Breath sounds: Normal breath sounds. No wheezing or rales.   Abdominal:      Comments: Significantly obese?  Ascites.   Musculoskeletal:      Right lower leg: No edema.      Left lower leg: No edema.   Skin:     General: Skin is warm and dry.   Neurological:      Mental Status: He is alert and oriented to person, place, and time.   Psychiatric:         Behavior: Behavior normal. "            CARDIAC CATHETERIZATION 08/15/2018  A.  Left heart catheterization.  B.  Left ventriculography.  C.  Selective coronary angiography.  D.  Coronary stent implantation of the mid right coronary artery with   overlapping stents: 4.5x18 mm Ultra non-drug eluting stent and 4.0x38 mm Xience   Gianna drug-eluting stent   E.  Right radial artery approach.   PREOPERATIVE DIAGNOSES:  1.  Unstable angina pectoris.  2.  Abnormal myocardial perfusion scan with inferior perfusion abnormality.  3.  Chronic atrial fibrillation.  4.  Chronic anticoagulation with warfarin.  5.  Diabetes mellitus.   POSTOPERATIVE DIAGNOSES:  1.  Coronary artery disease, 3-vessel, involving the dominant mid right coronary artery, distal circumflex artery and diagonal branch ostium.  2.  Left ventricular ejection fraction 60%.     ECHOCARDIOGRAM 3/23/2018  No prior study is available for comparison.   Mild concentric left ventricular hypertrophy.  Normal left ventricular systolic function.  Left ventricular ejection fraction is visually estimated to be 60%.  Diastolic function is difficult to assess with atrial fibrillation.  Severely dilated left atrium.  Estimated right ventricular systolic pressure is 37 mmHg + JVP.      Assessment & Plan     1. Chronic heart failure with preserved ejection fraction (HCC)     2. Chronic right-sided heart failure (HCC)     3. Atrial fibrillation, chronic (HCC)     4. Anticoagulated     5. Coronary artery disease, occlusive     6. S/P coronary artery stent placement     7. Pulmonary hypertension (HCC)     8. Moderate mitral regurgitation     9. Essential hypertension, benign     10. CKD (chronic kidney disease) stage 4, GFR 15-29 ml/min (HCC)  Comp Metabolic Panel   11. Dyslipidemia         Medical Decision Making: Today's Assessment/Status/Plan:   Assessment  1.  HFpEF; predominant right heart failure.  2.  Pulmonary hypertension  3.  Mitral regurgitation, moderate  4.  CAD/PCI-RCA stent 8/15/2018  5.   Atrial fibrillation, chronic  6.  Anticoagulation, on warfarin.  7.  Hypertension.    8.  Dyslipidemia.    9.  Lower extremity edema with right calf ulcer.  10.  Cellulitis.  Right knee.  6/2019.  11.  CKD followed by Dr Vicente     Recommendation Discussion  1.  HFpEF: Clinically stable, euvolemic, dry weight, continue furosemide, not a candidate for spironolactone due to CKD.  2.  CAD: Clinically stable, continue clopidogrel, atorvastatin, Cardizem.  3.  Mitral regurgitation: Clinical monitoring, echocardiogram as dictated.  4.  Hypertension: Continue diltiazem, continue furosemide, continue outpatient monitoring.  5.  Atrial fibrillation: Rate controlled, asymptomatic, continue, resume warfarin and INR surveillance post temporary discontinuation after head trauma.  6.  Dyslipidemia: Continue atorvastatin and lipid monitoring.  7.  CKD: Recheck CMP 1 month.  8.  RTC 4 months.

## 2022-04-14 NOTE — PROGRESS NOTES
CHF Pharmacotherapy visit - Visit    Date of Service: 22    Informed written consent was given on: 10/26/2021    Yuri De León is here for CHF and DM    HPI  Pertinent Interval History since last visit:   • Pt has been tolerating increased dose of Trulicity 4.5 mg q 7 days  • Pt got labs drawn in Feb, but a1c was not collected    Most recent EF:  65% 2021      Current Outpatient Medications:   •  budesonide-formoterol, 2 Puff, Inhalation, BID  •  albuterol, 2 Puff, Inhalation, Q4HRS PRN  •  Trulicity, 1 Each, Subcutaneous, Q7 DAYS  •  atorvastatin, 80 mg, Oral, DAILY  •  warfarin, TAKE HALF TABLET BY MOUTH (2.5 MG) ON  AND 1 TABLET (5 MG) ALL OTHER DAYS OR AS DIRECTED BY ANTICOAGULATION CLINIC (Patient taking differently: 2.5-5 mg, Oral, DAILY, 5 mg on /, 2.5 mg on all other days)  •  Berberine Complex, 1 Capsule, Oral, BID  •  celecoxib, 200 mg, Oral, QDAY PRN  •  furosemide, 80 mg, Oral, DAILY  •  clopidogrel, TAKE 1 TABLET BY MOUTH EVERY DAY  •  DILTIAZem, 60 mg, Oral, TID  •  albuterol, 2.5 mg, Nebulization, Q4HRS PRN    Current Adherence to CHF Therapies:  Complete    Current CHF Medications - including dose:   • Entresto or ACE/ARB:none  • Beta blocker: none  • Diuretic:furosemide 40 mg daily  • Aldosterone antagonist: none    Current diabetes medications:  • Trulicity 4.5 mg q 7 days    There were no vitals filed for this visit. - See Dr Sorensen's note for vitals    Home BP and HR:  Pt does not check at home    Change in weight: lost 3 lbs since last visit    Exercise habits: minimal exercise     Diet: low carbohydrate    SOCIAL HISTORY  Social History     Tobacco Use   Smoking Status Former Smoker   • Packs/day: 0.50   • Years: 10.00   • Pack years: 5.00   • Types: Cigarettes   • Quit date: 12/10/1968   • Years since quittin.3   Smokeless Tobacco Never Used        DATA REVIEW  Lab Results   Component Value Date/Time    HBA1C 8.6 (H) 2022 12:03 PM           Lab Results   Component Value Date/Time    CHOLSTRLTOT 119 02/25/2022 10:43 AM    LDL 62 02/25/2022 10:43 AM    HDL 49 02/25/2022 10:43 AM    TRIGLYCERIDE 42 02/25/2022 10:43 AM       Lab Results   Component Value Date/Time    SODIUM 140 02/25/2022 10:43 AM    POTASSIUM 4.5 02/25/2022 10:43 AM    CHLORIDE 101 02/25/2022 10:43 AM    CO2 29 02/25/2022 10:43 AM    GLUCOSE 148 (H) 02/25/2022 10:43 AM    BUN 54 (H) 02/25/2022 10:43 AM    CREATININE 1.89 (H) 02/25/2022 10:43 AM     Lab Results   Component Value Date/Time    ALKPHOSPHAT 139 (H) 05/18/2021 10:43 AM    ASTSGOT 22 05/18/2021 10:43 AM    ALTSGPT 17 05/18/2021 10:43 AM    TBILIRUBIN 0.9 05/18/2021 10:43 AM    INR 1.50 (A) 04/12/2022 12:00 AM    ALBUMIN 4.0 10/20/2021 12:14 PM    ALBUMIN 3.77 08/05/2021 12:04 PM      No components found for: MICROALBUMINCREATRATIOURINE    Renal function:  Calculated creatinine clearance: 47 ml/min   eGFR 35    Other Pertinent Blood Work:     Other:  Immunization History   Administered Date(s) Administered   • Influenza Vaccine Quad Inj (Pf) 10/17/2018, 11/13/2019   • Pneumococcal Conjugate Vaccine (Prevnar/PCV-13) 10/17/2018   • Pneumococcal polysaccharide vaccine (PPSV-23) 10/07/2014     Up to date on pneumococcal vaccine? yes    Recent Imaging Studies:    None since last visit      ASSESSMENT AND PLAN  • Pt has been tolerating increased dose of Trulicity.  • Pt saw Dr. Gong prior to this visit; no med changes done  • Pt did get labs drawn, but a1c order was missed  o SCR increased    Resulting CHF medications today (changes are bolded)  • Entresto or ACE/ARB:none  • Beta blocker: none  • Diuretic:furosemide 40 mg daily  • Aldosterone antagonist: none    Diabetes medications (changes are bolded)  • Continue Trulicity 4.5 mg q 7 days; pt has been receiving shipment from [employer]  • Pt developed hematuria from Jardiance  • Pt unable to tolerate metformin    Lifestyle Recommendations From Today's Visit:   • Continue to limit  carb intake    Blood Work Ordered At Today's visit:   A1c reordered; BMP ordered by Dr Gong    Studies Ordered at Todays Visit:  None     Follow-Up:   3 months    Meredith Osborne, PharmD      CC:  Lashell Carmona D.O.  No ref. provider found    Medications reconciled  Flow sheets updated

## 2022-04-19 ENCOUNTER — ANTICOAGULATION MONITORING (OUTPATIENT)
Dept: VASCULAR LAB | Facility: MEDICAL CENTER | Age: 77
End: 2022-04-19

## 2022-04-19 ENCOUNTER — NON-PROVIDER VISIT (OUTPATIENT)
Dept: MEDICAL GROUP | Facility: PHYSICIAN GROUP | Age: 77
End: 2022-04-19
Payer: MEDICARE

## 2022-04-19 VITALS
HEART RATE: 73 BPM | SYSTOLIC BLOOD PRESSURE: 128 MMHG | OXYGEN SATURATION: 96 % | DIASTOLIC BLOOD PRESSURE: 66 MMHG | TEMPERATURE: 97.3 F | RESPIRATION RATE: 12 BRPM

## 2022-04-19 DIAGNOSIS — Z79.01 CHRONIC ANTICOAGULATION: ICD-10-CM

## 2022-04-19 DIAGNOSIS — D68.59 HYPERCOAGULABLE STATE (HCC): ICD-10-CM

## 2022-04-19 LAB
INR BLD: 2.1 (ref 0.9–1.2)
INR PPP: 2.1 (ref 2–3.5)
POC PROTIME: ABNORMAL

## 2022-04-19 PROCEDURE — 99213 OFFICE O/P EST LOW 20 MIN: CPT | Performed by: NURSE PRACTITIONER

## 2022-04-19 PROCEDURE — 99999 PR NO CHARGE: CPT | Performed by: NURSE PRACTITIONER

## 2022-04-19 NOTE — PROGRESS NOTES
OP Anticoagulation Service Note    Date: 2022  Blood Pressure : 128/66  Pulse: 73  Respiration: 12    Anticoagulation Summary  As of 2022    INR goal:  2.0-3.0   TTR:  67.9 % (6.7 y)   INR used for dosin.10 (2022)   Warfarin maintenance plan:  2.5 mg (5 mg x 0.5) every Sun, Wed, Fri; 5 mg (5 mg x 1) all other days   Weekly warfarin total:  27.5 mg   Plan last modified:  MARIAA Garcia (2022)   Next INR check:  5/10/2022   Target end date:  Indefinite    Indications    Atrial fibrillation with rapid ventricular response (HCC) (Resolved) [I48.91]  Long term current use of anticoagulant therapy (Resolved) [Z79.01]  Atrial fibrillation (HCC) (Resolved) [I48.91]  Hypercoagulable state (HCC) [D68.59]             Anticoagulation Episode Summary     INR check location:      Preferred lab:      Send INR reminders to:      Comments:        Anticoagulation Care Providers     Provider Role Specialty Phone number    Sanjay Sorensen M.D. Referring Cardiovascular Disease (Cardiology) [phone]        Anticoagulation Patient Findings  Patient Findings     Negatives:  Signs/symptoms of thrombosis, Signs/symptoms of bleeding, Laboratory test error suspected, Change in health, Change in alcohol use, Change in activity, Upcoming invasive procedure, Emergency department visit, Upcoming dental procedure, Missed doses, Extra doses, Change in medications, Change in diet/appetite, Hospital admission, Bruising, Other complaints          THIS VISIT CONDUCTED WITH PRESENTER [employer]IA [employer] UTILIZING SECURE AND ENCRYPTED VIDEOCONFERENCING EQUIPMENT  ROS:    Pulm: Denies SOB, chest pain.    Card: Denies syncope, edema, palpitations.    Extremities: Denies redness, pain.     PE:    Pulm: No SOB, even and unlabored.    Card: Normal rate and rhythm.    Extremities: No redness, or edema.     INR  is-therapeutic.    Denies signs/symptoms of bleeding and/or thrombosis.    Denies changes to diet or medications.    Follow up appt in 2 weeks     Plan:  See below    Medication: Warfarin (Coumadin)     Sunday Monday Tuesday Wednesday Thursday Friday Saturday      2.5 mg    5 mg    5 mg    2.5 mg    5 mg    2.5 mg    5 mg      1/2 tab(s)    1 tab(s)    1 tab(s)    1/2 tab(s)    1 tab(s)    1/2 tab(s)  1 tab(s)     Next Appointment: Tuesday, May 3 @ 4:45   Pt on antiplatelet therapy Plavix 75mg for coronary artery occlusion with stent indef per cardiology.  Atrial fibrillation controlled on Diltiazem.  Secondary hypercoagulable state due to Atrial Fibrillation/Flutter on Warfarin.  Review all of your home medications and newly ordered medications with your doctor and / or pharmacist. Follow medication instructions as directed by your doctor and / or pharmacist. Please keep your medication list with you and share with your physician. Update the information when medications are discontinued, doses are changed, or new medications (including over-the-counter products) are added; and carry medication information at all times in the event of emergency situations.      For questions, please contact Outpatient Anticoagulation Service [phone].      Patient is on a high risk medication and therefore requires close monitoring and follow up.     CHEST guidelines recommend frequent INR monitoring at regular intervals (a few days up to a max of 12 weeks) to ensure they are on the proper dose of warfarin and not having any complications from therapy.  INRs can dramatically change over a short time period due to diet, medications, and medical conditions.   The patient instructed to go to the ER for falls with a head injury,  blood in urine or stool or any bleeding that last longer than 20 min.     APRIL Garcia.

## 2022-05-03 ENCOUNTER — NON-PROVIDER VISIT (OUTPATIENT)
Dept: MEDICAL GROUP | Facility: PHYSICIAN GROUP | Age: 77
End: 2022-05-03
Payer: MEDICARE

## 2022-05-03 ENCOUNTER — ANTICOAGULATION MONITORING (OUTPATIENT)
Dept: VASCULAR LAB | Facility: MEDICAL CENTER | Age: 77
End: 2022-05-03

## 2022-05-03 VITALS
HEART RATE: 98 BPM | OXYGEN SATURATION: 93 % | DIASTOLIC BLOOD PRESSURE: 62 MMHG | TEMPERATURE: 97.7 F | SYSTOLIC BLOOD PRESSURE: 124 MMHG | RESPIRATION RATE: 12 BRPM

## 2022-05-03 DIAGNOSIS — D68.59 HYPERCOAGULABLE STATE (HCC): ICD-10-CM

## 2022-05-03 DIAGNOSIS — Z79.01 CHRONIC ANTICOAGULATION: ICD-10-CM

## 2022-05-03 LAB
INR BLD: 2.1 (ref 0.9–1.2)
INR PPP: 2.1 (ref 2–3.5)
POC PROTIME: ABNORMAL

## 2022-05-03 PROCEDURE — 85610 PROTHROMBIN TIME: CPT | Performed by: FAMILY MEDICINE

## 2022-05-03 PROCEDURE — 99211 OFF/OP EST MAY X REQ PHY/QHP: CPT | Performed by: NURSE PRACTITIONER

## 2022-05-03 PROCEDURE — 99213 OFFICE O/P EST LOW 20 MIN: CPT | Performed by: NURSE PRACTITIONER

## 2022-05-03 NOTE — PROGRESS NOTES
OP Anticoagulation Service Note    Date: 5/3/2022  Blood Pressure : 124/62  Pulse: 98  Respiration: 12    Anticoagulation Summary  As of 5/3/2022    INR goal:  2.0-3.0   TTR:  68.0 % (6.7 y)   INR used for dosin.10 (5/3/2022)   Warfarin maintenance plan:  2.5 mg (5 mg x 0.5) every Sun, Wed, Fri; 5 mg (5 mg x 1) all other days   Weekly warfarin total:  27.5 mg   Plan last modified:  MARIAA Garcia (2022)   Next INR check:  2022   Target end date:  Indefinite    Indications    Atrial fibrillation with rapid ventricular response (HCC) (Resolved) [I48.91]  Long term current use of anticoagulant therapy (Resolved) [Z79.01]  Atrial fibrillation (HCC) (Resolved) [I48.91]  Hypercoagulable state (HCC) [D68.59]             Anticoagulation Episode Summary     INR check location:      Preferred lab:      Send INR reminders to:      Comments:        Anticoagulation Care Providers     Provider Role Specialty Phone number    Sanjay Sorensen M.D. Referring Cardiovascular Disease (Cardiology) [phone]        Anticoagulation Patient Findings  Patient Findings     Negatives:  Signs/symptoms of thrombosis, Signs/symptoms of bleeding, Laboratory test error suspected, Change in health, Change in alcohol use, Change in activity, Upcoming invasive procedure, Emergency department visit, Upcoming dental procedure, Missed doses, Extra doses, Change in medications, Change in diet/appetite, Hospital admission, Bruising, Other complaints          THIS VISIT CONDUCTED WITH [employer] UTIL[employer]ING SECURE AND ENCRYPTED VIDEOCONFERENCING EQUIPMENT  ROS:    Pulm: Denies SOB, chest pain.    Card: Denies syncope, edema, palpitations.    Extremities: Denies redness, pain.     PE:    Pulm: No SOB, even and unlabored.    Card: Normal rate and rhythm.    Extremities: No redness, or edema.     INR  is-therapeutic.    Denies signs/symptoms of bleeding and/or thrombosis.    Denies changes to diet or medications.    Follow up appt in 3 weeks     Plan:  Continue current medication regimen.       Medication: Warfarin (Coumadin)     Sunday Monday Tuesday Wednesday Thursday Friday Saturday      2.5 mg    5 mg    5 mg    2.5 mg    5 mg    2.5 mg    5 mg      1/2 tab(s)    1 tab(s)    1 tab(s)    1/2 tab(s)    1 tab(s)    1/2 tab(s)  1 tab(s)     Next Appointment: Tuesday, May 24 @ 1:30  Pt on antiplatelet therapy Plavix 75mg for coronary artery occlusion with stent indef per cardiology.    Review all of your home medications and newly ordered medications with your doctor and / or pharmacist. Follow medication instructions as directed by your doctor and / or pharmacist. Please keep your medication list with you and share with your physician. Update the information when medications are discontinued, doses are changed, or new medications (including over-the-counter products) are added; and carry medication information at all times in the event of emergency situations.      For questions, please contact Outpatient Anticoagulation Service [phone].      Patient is on a high risk medication and therefore requires close monitoring and follow up.     CHEST guidelines recommend frequent INR monitoring at regular intervals (a few days up to a max of 12 weeks) to ensure they are on the proper dose of warfarin and not having any complications from therapy.  INRs can dramatically change over a short time period due to diet, medications, and medical conditions.   The patient instructed to go to the ER for falls with a head injury,  blood in urine or stool or any bleeding that last longer than 20 min.     APRIL Garcia.

## 2022-05-24 ENCOUNTER — NON-PROVIDER VISIT (OUTPATIENT)
Dept: MEDICAL GROUP | Facility: PHYSICIAN GROUP | Age: 77
End: 2022-05-24
Payer: MEDICARE

## 2022-05-24 ENCOUNTER — ANTICOAGULATION MONITORING (OUTPATIENT)
Dept: VASCULAR LAB | Facility: MEDICAL CENTER | Age: 77
End: 2022-05-24

## 2022-05-24 VITALS
RESPIRATION RATE: 12 BRPM | SYSTOLIC BLOOD PRESSURE: 120 MMHG | HEART RATE: 78 BPM | DIASTOLIC BLOOD PRESSURE: 52 MMHG | OXYGEN SATURATION: 93 % | TEMPERATURE: 97.9 F

## 2022-05-24 DIAGNOSIS — D68.59 HYPERCOAGULABLE STATE (HCC): ICD-10-CM

## 2022-05-24 DIAGNOSIS — Z79.01 CHRONIC ANTICOAGULATION: ICD-10-CM

## 2022-05-24 LAB
INR BLD: 2.8 (ref 0.9–1.2)
INR PPP: 2.8 (ref 2–3.5)
POC PROTIME: ABNORMAL

## 2022-05-24 PROCEDURE — 99211 OFF/OP EST MAY X REQ PHY/QHP: CPT | Performed by: NURSE PRACTITIONER

## 2022-05-24 PROCEDURE — 85610 PROTHROMBIN TIME: CPT | Performed by: FAMILY MEDICINE

## 2022-05-24 PROCEDURE — 99213 OFFICE O/P EST LOW 20 MIN: CPT | Performed by: NURSE PRACTITIONER

## 2022-05-24 NOTE — PROGRESS NOTES
This evaluation was conducted via Telemed using secure and encrypted videoconferencing technology. The patient was physically located at [facility] in [place], [place]. The patient was presented by a medical professional at the originating site.   The patient's identity was confirmed and verbal consent was obtained for this telemedicine encounter.  OP Anticoagulation Service Note    Date: 2022  Blood Pressure : 120/52  Pulse: 78  Respiration: 12    Anticoagulation Summary  As of 2022    INR goal:  2.0-3.0   TTR:  68.3 % (6.8 y)   INR used for dosin.80 (2022)   Warfarin maintenance plan:  2.5 mg (5 mg x 0.5) every Sun, Wed, Fri; 5 mg (5 mg x 1) all other days   Weekly warfarin total:  27.5 mg   Plan last modified:  MARIAA Garcia (2022)   Next INR check:  2022   Target end date:  Indefinite    Indications    Atrial fibrillation with rapid ventricular response (HCC) (Resolved) [I48.91]  Long term current use of anticoagulant therapy (Resolved) [Z79.01]  Atrial fibrillation (HCC) (Resolved) [I48.91]  Hypercoagulable state (HCC) [D68.59]             Anticoagulation Episode Summary     INR check location:      Preferred lab:      Send INR reminders to:      Comments:        Anticoagulation Care Providers     Provider Role Specialty Phone number    Sanjay Sorensen M.D. Referring Cardiovascular Disease (Cardiology) [phone]        Anticoagulation Patient Findings  Patient Findings     Negatives:  Signs/symptoms of thrombosis, Signs/symptoms of bleeding, Laboratory test error suspected, Change in health, Change in alcohol use, Change in activity, Upcoming invasive procedure, Emergency department visit, Upcoming dental procedure, Missed doses, Extra doses, Change in medications, Change in diet/appetite, Hospital admission, Bruising, Other complaints          THIS VISIT CONDUCTED WITH [employer] UTIL[employer]ING SECURE AND ENCRYPTED VIDEOCONFERENCING EQUIPMENT  ROS:     Pulm: Denies SOB, chest pain.    Card: Denies syncope, edema, palpitations.    Extremities: Denies redness, pain.     PE:    Pulm: No SOB, even and unlabored.    Card: Normal rate and rhythm.    Extremities: No redness, or edema.     INR  is-therapeutic.    Denies signs/symptoms of bleeding and/or thrombosis.    Denies changes to diet or medications.   Follow up appt in 4 weeks     Plan:  Continue current medication regimen.       Medication: Warfarin (Coumadin)     Sunday Monday Tuesday Wednesday Thursday Friday Saturday      5 mg    2.5 mg    5 mg    2.5 mg    5 mg    2.5 mg    5 mg      1 tab(s)    1/2 tab(s)    1 tab(s)    1/2 tab(s)    1 tab(s)    1/2 tab(s)  1 tab(s)     Next Appointment: Tuesday, June 21 @    Pt on antiplatelet therapy Plavix 75mg for coronary artery occlusion with stent indef per cardiology.      Review all of your home medications and newly ordered medications with your doctor and / or pharmacist. Follow medication instructions as directed by your doctor and / or pharmacist. Please keep your medication list with you and share with your physician. Update the information when medications are discontinued, doses are changed, or new medications (including over-the-counter products) are added; and carry medication information at all times in the event of emergency situations.      For questions, please contact Outpatient Anticoagulation Service [phone].      Patient is on a high risk medication and therefore requires close monitoring and follow up.     CHEST guidelines recommend frequent INR monitoring at regular intervals (a few days up to a max of 12 weeks) to ensure they are on the proper dose of warfarin and not having any complications from therapy.  INRs can dramatically change over a short time period due to diet, medications, and medical conditions.   The patient instructed to go to the ER for falls with a head injury,  blood in urine or stool or any bleeding that last  longer than 20 min.     APRIL Garcia.

## 2022-06-21 ENCOUNTER — TELEMEDICINE2 (OUTPATIENT)
Dept: VASCULAR LAB | Facility: MEDICAL CENTER | Age: 77
End: 2022-06-21

## 2022-06-21 ENCOUNTER — NON-PROVIDER VISIT (OUTPATIENT)
Dept: MEDICAL GROUP | Facility: PHYSICIAN GROUP | Age: 77
End: 2022-06-21
Payer: MEDICARE

## 2022-06-21 VITALS
TEMPERATURE: 98.8 F | OXYGEN SATURATION: 94 % | DIASTOLIC BLOOD PRESSURE: 60 MMHG | HEART RATE: 73 BPM | RESPIRATION RATE: 12 BRPM | SYSTOLIC BLOOD PRESSURE: 126 MMHG

## 2022-06-21 DIAGNOSIS — Z79.01 CHRONIC ANTICOAGULATION: ICD-10-CM

## 2022-06-21 DIAGNOSIS — D68.59 HYPERCOAGULABLE STATE (HCC): ICD-10-CM

## 2022-06-21 LAB
INR BLD: 3.2 (ref 0.9–1.2)
INR PPP: 3.2 (ref 2–3.5)
POC PROTIME: ABNORMAL

## 2022-06-21 PROCEDURE — 99213 OFFICE O/P EST LOW 20 MIN: CPT | Performed by: NURSE PRACTITIONER

## 2022-06-21 PROCEDURE — 85610 PROTHROMBIN TIME: CPT | Performed by: FAMILY MEDICINE

## 2022-06-21 NOTE — PROGRESS NOTES
This evaluation was conducted via Telemed using secure and encrypted videoconferencing technology. The patient was physically located at [facility] in [place], [place]. The patient was presented by a medical professional at the originating site.   The patient's identity was confirmed and verbal consent was obtained for this telemedicine encounter.      OP Anticoagulation Service Note    Date: 6/21/2022  Blood Pressure : 126/60  Pulse: 73  Respiration: 12    Anticoagulation Summary  As of 6/21/2022    INR goal:  2.0-3.0   TTR:  68.0 % (6.9 y)   INR used for dosing:  3.20 (6/21/2022)   Warfarin maintenance plan:  5 mg (5 mg x 1) every Tue, Thu, Sat; 2.5 mg (5 mg x 0.5) all other days   Weekly warfarin total:  25 mg   Plan last modified:  MARIAA Garcia (6/21/2022)   Next INR check:  7/12/2022   Target end date:  Indefinite    Indications    Atrial fibrillation with rapid ventricular response (HCC) (Resolved) [I48.91]  Long term current use of anticoagulant therapy (Resolved) [Z79.01]  Atrial fibrillation (HCC) (Resolved) [I48.91]  Hypercoagulable state (HCC) [D68.59]             Anticoagulation Episode Summary     INR check location:      Preferred lab:      Send INR reminders to:      Comments:        Anticoagulation Care Providers     Provider Role Specialty Phone number    Sanjay Sorensen M.D. Referring Cardiovascular Disease (Cardiology) [phone]        Anticoagulation Patient Findings  Patient Findings     Negatives:  Signs/symptoms of thrombosis, Signs/symptoms of bleeding, Laboratory test error suspected, Change in health, Change in alcohol use, Change in activity, Upcoming invasive procedure, Emergency department visit, Upcoming dental procedure, Missed doses, Extra doses, Change in medications, Change in diet/appetite, Hospital admission, Bruising, Other complaints          THIS VISIT CONDUCTED WITH PRES[employer]IA TELE[employer] UTILIZING SECURE AND ENCRYPTED VIDEOCONFERENCING  EQUIPMENT  ROS:    Pulm: Denies SOB, chest pain.    Card: Denies syncope, edema, palpitations.    Extremities: Denies redness, pain.     PE:    Pulm: No SOB, even and unlabored.    Card: Normal rate and rhythm.    Extremities: No redness, or edema.     INR  supra-therapeutic.    Denies signs/symptoms of bleeding and/or thrombosis.    Denies changes to diet or medications.   Follow up appt in 4 weeks     Plan:  Take 2.5 mg tonight one time only     Medication: Warfarin (Coumadin)     Sunday Monday Tuesday Wednesday Thursday Friday Saturday      2.5 mg    2.5 mg    5 mg    2.5 mg    5 mg    2.5 mg    5 mg      1/2 tab(s)    /2 tab(s)    1 tab(s)    1/2 tab(s)    1 tab(s)    1/2 tab(s)  1 tab(s)     Next Appointment: Tuesday, July 19 @ 1:15   Pt on antiplatelet therapy Plavix 75mg for coronary artery occlusion with stent indef per cardiology.    Review all of your home medications and newly ordered medications with your doctor and / or pharmacist. Follow medication instructions as directed by your doctor and / or pharmacist. Please keep your medication list with you and share with your physician. Update the information when medications are discontinued, doses are changed, or new medications (including over-the-counter products) are added; and carry medication information at all times in the event of emergency situations.      For questions, please contact Outpatient Anticoagulation Service [phone].       The patient is on a high risk medication and is supra-therapeudic. This increases the patients risk of bleeding and therefore requires frequent monitoring and follow up.          CHEST guidelines recommend frequent INR monitoring at regular intervals (a few days up to a max of 12 weeks) to ensure they are on the proper dose of warfarin and not having any complications from therapy.  INRs can dramatically change over a short time period due to diet, medications, and medical conditions.   The patient  instructed to go to the ER for falls with a head injury,  blood in urine or stool or any bleeding that last longer than 20 min.     APRIL Garcia.

## 2022-07-11 ENCOUNTER — HOSPITAL ENCOUNTER (OUTPATIENT)
Dept: LAB | Facility: MEDICAL CENTER | Age: 77
End: 2022-07-11
Attending: INTERNAL MEDICINE
Payer: MEDICARE

## 2022-07-11 DIAGNOSIS — E11.65 TYPE 2 DIABETES MELLITUS WITH HYPERGLYCEMIA, WITHOUT LONG-TERM CURRENT USE OF INSULIN (HCC): ICD-10-CM

## 2022-07-11 DIAGNOSIS — N18.4 CKD (CHRONIC KIDNEY DISEASE) STAGE 4, GFR 15-29 ML/MIN (HCC): ICD-10-CM

## 2022-07-11 LAB
ALBUMIN SERPL BCP-MCNC: 4.1 G/DL (ref 3.2–4.9)
ALBUMIN/GLOB SERPL: 1.2 G/DL
ALP SERPL-CCNC: 125 U/L (ref 30–99)
ALT SERPL-CCNC: 15 U/L (ref 2–50)
ANION GAP SERPL CALC-SCNC: 13 MMOL/L (ref 7–16)
AST SERPL-CCNC: 14 U/L (ref 12–45)
BILIRUB SERPL-MCNC: 0.6 MG/DL (ref 0.1–1.5)
BUN SERPL-MCNC: 57 MG/DL (ref 8–22)
CALCIUM SERPL-MCNC: 9.3 MG/DL (ref 8.5–10.5)
CHLORIDE SERPL-SCNC: 106 MMOL/L (ref 96–112)
CO2 SERPL-SCNC: 25 MMOL/L (ref 20–33)
CREAT SERPL-MCNC: 2.48 MG/DL (ref 0.5–1.4)
EST. AVERAGE GLUCOSE BLD GHB EST-MCNC: 160 MG/DL
GFR SERPLBLD CREATININE-BSD FMLA CKD-EPI: 26 ML/MIN/1.73 M 2
GLOBULIN SER CALC-MCNC: 3.5 G/DL (ref 1.9–3.5)
GLUCOSE SERPL-MCNC: 131 MG/DL (ref 65–99)
HBA1C MFR BLD: 7.2 % (ref 4–5.6)
POTASSIUM SERPL-SCNC: 4.7 MMOL/L (ref 3.6–5.5)
PROT SERPL-MCNC: 7.6 G/DL (ref 6–8.2)
SODIUM SERPL-SCNC: 144 MMOL/L (ref 135–145)

## 2022-07-11 PROCEDURE — 36415 COLL VENOUS BLD VENIPUNCTURE: CPT

## 2022-07-11 PROCEDURE — 80053 COMPREHEN METABOLIC PANEL: CPT

## 2022-07-11 PROCEDURE — 83036 HEMOGLOBIN GLYCOSYLATED A1C: CPT | Mod: GA

## 2022-07-12 ENCOUNTER — NON-PROVIDER VISIT (OUTPATIENT)
Dept: CARDIOLOGY | Facility: MEDICAL CENTER | Age: 77
End: 2022-07-12
Payer: MEDICARE

## 2022-07-12 VITALS
SYSTOLIC BLOOD PRESSURE: 128 MMHG | BODY MASS INDEX: 31.22 KG/M2 | HEIGHT: 71 IN | HEART RATE: 75 BPM | WEIGHT: 223 LBS | DIASTOLIC BLOOD PRESSURE: 67 MMHG

## 2022-07-12 DIAGNOSIS — E11.65 TYPE 2 DIABETES MELLITUS WITH HYPERGLYCEMIA, WITHOUT LONG-TERM CURRENT USE OF INSULIN (HCC): ICD-10-CM

## 2022-07-12 DIAGNOSIS — I48.20 ATRIAL FIBRILLATION, CHRONIC (HCC): ICD-10-CM

## 2022-07-12 PROCEDURE — 99211 OFF/OP EST MAY X REQ PHY/QHP: CPT | Performed by: INTERNAL MEDICINE

## 2022-07-12 ASSESSMENT — FIBROSIS 4 INDEX: FIB4 SCORE: 1.04

## 2022-07-12 NOTE — Clinical Note
Dr. Sorensen,   Seen in CHF clinic today.  Worsening kidney function since last appointment.  I am ordering another CMP prior to your next appointment.  I did not adjust his Lasix today as he was euvolemic, recommended increasing hydration slightly, and discontinuing Celebrex if able.  Thanks,  Ilya Rachel, PharmD, MS, BCACP, [facility] of [facility] Phone: [phone],  Fax: [phone] On call: [phone]  This note was created using voice recognition software (Dragon). The accuracy of the dictation is limited by the abilities of the software. I have reviewed the note prior to signing, however some errors in grammar and context are still possible. If you have any questions related to this note please do not hesitate to contact our office.

## 2022-07-12 NOTE — PROGRESS NOTES
Patient Consult Note   07/12/22    CHF Pharmacotherapy visit   Informed written consent was given on: 10/26/21    HPI  Yuri De León is here for CHF and DM   Pertinent Interval History since last visit:   • none  Most recent EF:  65%, 7/23/2021    There were no vitals filed for this visit.  Home BP and HR:  Not testing   Change in weight: lost 30lbs over a few months.   Exercise habits: moderate regular exercise program   Diet: DASH diet      Current Outpatient Medications:   •  budesonide-formoterol, 2 Puff, Inhalation, BID  •  albuterol, 2 Puff, Inhalation, Q4HRS PRN  •  Trulicity, 1 Each, Subcutaneous, Q7 DAYS  •  atorvastatin, 80 mg, Oral, DAILY  •  warfarin, TAKE HALF TABLET BY MOUTH (2.5 MG) ON THURSDAYS AND 1 TABLET (5 MG) ALL OTHER DAYS OR AS DIRECTED BY ANTICOAGULATION CLINIC (Patient taking differently: 2.5-5 mg, Oral, DAILY, 5 mg on Mondays/Thursdays, 2.5 mg on all other days)  •  Berberine Complex, 1 Capsule, Oral, BID  •  celecoxib, 200 mg, Oral, QDAY PRN  •  furosemide, 80 mg, Oral, DAILY  •  clopidogrel, TAKE 1 TABLET BY MOUTH EVERY DAY  •  DILTIAZem, 60 mg, Oral, TID  •  albuterol, 2.5 mg, Nebulization, Q4HRS PRN    Current Adherence to CHF and other therapies:  Complete    DATA REVIEW  INR   Date Value Ref Range Status   06/21/2022 3.20 2 - 3.5 Final     POC INR   Date Value Ref Range Status   06/21/2022 3.2 (A) 0.9 - 1.2 Final     Comment:     ndc [id number] lot [id number] exp 07/31/2022 qc pass       Latest Reference Range & Units 10/20/21 12:08 01/12/22 12:03 07/11/22 08:35   Glycohemoglobin 4.0 - 5.6 % 8.9 (H) 8.6 (H) 7.2 (H)   (H): Data is abnormally high      Lab Results   Component Value Date/Time    CHOLSTRLTOT 119 02/25/2022 10:43 AM    LDL 62 02/25/2022 10:43 AM    HDL 49 02/25/2022 10:43 AM    TRIGLYCERIDE 42 02/25/2022 10:43 AM       Lab Results   Component Value Date/Time    SODIUM 144 07/11/2022 08:35 AM    POTASSIUM 4.7 07/11/2022 08:35 AM    CHLORIDE 106 07/11/2022 08:35 AM     CO2 25 2022 08:35 AM    GLUCOSE 131 (H) 2022 08:35 AM    BUN 57 (H) 2022 08:35 AM    CREATININE 2.48 (H) 2022 08:35 AM     Lab Results   Component Value Date/Time    ALKPHOSPHAT 125 (H) 2022 08:35 AM    ASTSGOT 14 2022 08:35 AM    ALTSGPT 15 2022 08:35 AM    TBILIRUBIN 0.6 2022 08:35 AM    INR 3.20 2022 12:00 AM    ALBUMIN 4.1 2022 08:35 AM    ALBUMIN 3.77 2021 12:04 PM      Lab Results   Component Value Date/Time    MALBCRT 484 (H) 10/20/2021 12:14 PM    MICROALBUR 37.1 10/20/2021 12:14 PM     Calculated creatinine clearance:     Significant changes to laboratory values since last visit that require repeat labs:  • n/a  Other Pertinent Blood Work:   • n/a    Immunization History   Administered Date(s) Administered   • Influenza Vaccine Quad Inj (Pf) 10/17/2018, 2019   • Pneumococcal Conjugate Vaccine (Prevnar/PCV-13) 10/17/2018   • Pneumococcal polysaccharide vaccine (PPSV-23) 10/07/2014         SOCIAL HISTORY  Social History     Tobacco Use   Smoking Status Former Smoker   • Packs/day: 0.50   • Years: 10.00   • Pack years: 5.00   • Types: Cigarettes   • Quit date: 12/10/1968   • Years since quittin.6   Smokeless Tobacco Never Used        Recent Imaging Studies:    None since last visit      ASSESSMENT AND PLAN  1. CHF & HTN:  • bp at goal   • No edema, no SOB  • CrCl is worse, repeat BMP  CHF medications:  • Continue Lasix 80 mg daily   • Try to stop celebrex.     2. DM   • A1C near goal   • No SGLT2 due to CrCl change since last appt.  May consider at subsequent appointments due to microalbuminuria.   Diabetes medication:   · Continue with Trulicity 4.5mg every 7 days           3. Lipids:  • ldl at goal   lipid medication:   • Continue with Lipitor 80mg     4. Lifestyle   Lifestyle Recommendations From Today's Visit:   • Continue to eat DASH/MED style diet.   • Continue to exercise as tolerated.      Blood Work Ordered At Today's visit:  cmp   Studies Ordered at Todays Visit: none  Follow-Up: 12 week(s)    Ilya Rachel, PharmD  [facility]  Phone: [phone], Fax: [phone]    This note was created using voice recognition software (Dragon). The accuracy of the dictation is limited by the abilities of the software. I have reviewed the note prior to signing, however some errors in grammar and context are still possible. If you have any questions related to this note please do not hesitate to contact our office.     CC:  Lashell Carmona D.O.  No ref. provider found

## 2022-07-15 ENCOUNTER — TELEPHONE (OUTPATIENT)
Dept: CARDIOLOGY | Facility: MEDICAL CENTER | Age: 77
End: 2022-07-15
Payer: MEDICARE

## 2022-07-15 NOTE — LETTER
PROCEDURE/SURGERY CLEARANCE FORM      Encounter Date: 7/15/2022    Patient: Yuri De León  YOB: 1945    CARDIOLOGIST:  Sanjay Sorensen MD    REFERRING DOCTOR:  Dr. Mcnamara      The above patient is cleared for ENT procedure, low risk.    Patient may hold plavix for 7 days prior to surgery and resume ASAP but per the surgeon based on when postop bleeding risk is acceptable to them.    Warfarin is to be managed by the [facility] anticoagulation clinic.  A message has been sent to them. Their contact number is [phone].                                              Electronically signed   Sanjay Sorensen MD

## 2022-07-15 NOTE — LETTER
[facility]   [address], [address]  [place] [address]  Phone: [phone]  Fax: [phone]              Yuri De León  1945    Encounter Date: 8/8/2022      Becky Knowles,    Patient may proceed with ENT procedure with Dr. Mcnamara. Please let us know if you have any questions.         Teresa Kaufman RN per Dr. Sanjay Sorensen.

## 2022-07-16 NOTE — TELEPHONE ENCOUNTER
Received stratification request from [facility]. Pt to have aflap reconstruction w removal of denuded and dried bone with general anesthesia with Dr. De Lóen, not scheduled yet. Fax [phone]    Last OV with SW 4/14/22    Echocardiogram 7/23/21: CONCLUSIONS  Prior Echo 3/23/18  Left ventricular ejection fraction is visually estimated to be 65%.  Flattening of the interventricular septum.  Moderately dilated right ventricle.  Moderate mitral regurgitation.  Moderate tricuspid regurgitation.  Estimated right ventricular systolic pressure  is 55 mmHg.     Ok to proceed?  Pt taking: Plavix 75mg, Warfarin 5mg

## 2022-07-19 ENCOUNTER — ANTICOAGULATION MONITORING (OUTPATIENT)
Dept: VASCULAR LAB | Facility: MEDICAL CENTER | Age: 77
End: 2022-07-19

## 2022-07-19 ENCOUNTER — NON-PROVIDER VISIT (OUTPATIENT)
Dept: MEDICAL GROUP | Facility: PHYSICIAN GROUP | Age: 77
End: 2022-07-19
Payer: MEDICARE

## 2022-07-19 VITALS
TEMPERATURE: 98 F | SYSTOLIC BLOOD PRESSURE: 120 MMHG | DIASTOLIC BLOOD PRESSURE: 66 MMHG | OXYGEN SATURATION: 96 % | RESPIRATION RATE: 12 BRPM | HEART RATE: 84 BPM

## 2022-07-19 DIAGNOSIS — D68.59 HYPERCOAGULABLE STATE (HCC): ICD-10-CM

## 2022-07-19 DIAGNOSIS — Z79.01 CHRONIC ANTICOAGULATION: ICD-10-CM

## 2022-07-19 LAB
INR BLD: 2.6 (ref 0.9–1.2)
INR PPP: 2.6 (ref 2–3.5)
POC PROTIME: ABNORMAL

## 2022-07-19 PROCEDURE — 85610 PROTHROMBIN TIME: CPT | Performed by: FAMILY MEDICINE

## 2022-07-19 PROCEDURE — 99213 OFFICE O/P EST LOW 20 MIN: CPT | Performed by: NURSE PRACTITIONER

## 2022-07-19 NOTE — PROGRESS NOTES
This evaluation was conducted via Telemed using secure and encrypted videoconferencing technology. The patient was physically located at [facility] in [place], [place]. The patient was presented by a medical professional at the originating site.   The patient's identity was confirmed and verbal consent was obtained for this telemedicine encounter.      OP Anticoagulation Service Note    Date: 2022  Blood Pressure : 120/66  Pulse: 84  Respiration: 12    Anticoagulation Summary  As of 2022    INR goal:  2.0-3.0   TTR:  68.1 % (6.9 y)   INR used for dosin.60 (2022)   Warfarin maintenance plan:  5 mg (5 mg x 1) every Tue, Thu, Sat; 2.5 mg (5 mg x 0.5) all other days   Weekly warfarin total:  25 mg   Plan last modified:  MARIAA Garcia (2022)   Next INR check:     Target end date:  Indefinite    Indications    Atrial fibrillation with rapid ventricular response (HCC) (Resolved) [I48.91]  Long term current use of anticoagulant therapy (Resolved) [Z79.01]  Atrial fibrillation (HCC) (Resolved) [I48.91]  Hypercoagulable state (HCC) [D68.59]             Anticoagulation Episode Summary     INR check location:      Preferred lab:      Send INR reminders to:      Comments:        Anticoagulation Care Providers     Provider Role Specialty Phone number    Sanjay Sorensen M.D. Referring Cardiovascular Disease (Cardiology) [phone]        Anticoagulation Patient Findings  Patient Findings     Positives:  Upcoming invasive procedure    Negatives:  Signs/symptoms of thrombosis, Signs/symptoms of bleeding, Laboratory test error suspected, Change in health, Change in alcohol use, Change in activity, Emergency department visit, Upcoming dental procedure, Missed doses, Extra doses, Change in medications, Change in diet/appetite, Hospital admission, Bruising, Other complaints          THIS VISIT CONDUCTED WITH PRESENTER VIA TELEMEDICINE UTILIZING SECURE AND ENCRYPTED VIDEOCONFERENCING  EQUIPMENT  ROS:    Pulm: Denies SOB, chest pain.    Card: Denies syncope, edema, palpitations.    Extremities: Denies redness, pain.     PE:    Pulm: No SOB, even and unlabored.    Card: Normal rate and rhythm.    Extremities: No redness, or edema.     INR  is-therapeutic.    Denies signs/symptoms of bleeding and/or thrombosis.    Denies changes to diet or medications.   Follow up appt in 4 weeks     Plan:  Continue current medication regimen.       Medication: Warfarin (Coumadin)     Sunday Monday Tuesday Wednesday Thursday Friday Saturday      2.5 mg    2.5 mg    5 mg    2.5 mg    5 mg    2.5 mg    5 mg      1/2 tab(s)    1/2 tab(s)    1 tab(s)    1/2 tab(s)    1 tab(s)    1/2 tab(s)  1 tab(s)     Next Appointment: Tuesday, Aug 16 @ 2:45  Pt on antiplatelet therapy Plavix 75mg for coronary artery occlusion with stent indef per cardiology.    Review all of your home medications and newly ordered medications with your doctor and / or pharmacist. Follow medication instructions as directed by your doctor and / or pharmacist. Please keep your medication list with you and share with your physician. Update the information when medications are discontinued, doses are changed, or new medications (including over-the-counter products) are added; and carry medication information at all times in the event of emergency situations.      For questions, please contact Outpatient Anticoagulation Service [phone].      Patient is on a high risk medication and therefore requires close monitoring and follow up.     CHEST guidelines recommend frequent INR monitoring at regular intervals (a few days up to a max of 12 weeks) to ensure they are on the proper dose of warfarin and not having any complications from therapy.  INRs can dramatically change over a short time period due to diet, medications, and medical conditions.   The patient instructed to go to the ER for falls with a head injury,  blood in urine or stool or any  bleeding that last longer than 20 min.     APRIL Garcia.

## 2022-07-22 ENCOUNTER — TELEPHONE (OUTPATIENT)
Dept: CARDIOLOGY | Facility: MEDICAL CENTER | Age: 77
End: 2022-07-22

## 2022-07-22 NOTE — TELEPHONE ENCOUNTER
PIPO      Caller: Becky from [facility]  Topic/issue: They were calling to follow up on the clearance they sent over. No date has been scheduled yet for the patient's procedure  Callback Number: [phone]    Thank you    -Daryn KRISHNA

## 2022-07-22 NOTE — TELEPHONE ENCOUNTER
Stratification sent 7 days ago to [facility], out on vacation this week.  Called Becky at [facility], informed we should hear back from [facility] next week, she will check back on Thursday if still no answer.

## 2022-07-22 NOTE — TELEPHONE ENCOUNTER
To Dr. Ruffin as ADD, see stratification request, SW back on Monday, wait for SW or can you advise?

## 2022-07-28 NOTE — TELEPHONE ENCOUNTER
Caller:[facility]    Topic/issue: Checking again on Medical Clearance    Callback Number: [phone]  Fax - [phone]    Thank you,   Dianne TRENT

## 2022-08-08 NOTE — TELEPHONE ENCOUNTER
PIPO        Caller: Becky from [facility]  Topic/issue: They were calling about the clearance they sent over    Callback Number: [phone], Fax# [phone]    Thank you    -Daryn KRISHNA

## 2022-08-09 NOTE — TELEPHONE ENCOUNTER
Sent letter, ok to proceed with procedure. No [facility] instruction given per SW, will consult with SW if needed after letter sent.

## 2022-08-09 NOTE — TELEPHONE ENCOUNTER
Received duplicate cardiac clearance request from [facility] with copy of clearance letter that was provided by Teresa YE attached.     Assume that this was sent back because they need clarification regarding holding blood thinners.     Dr. Sorensen cleared pt for procedure yesterday. Per Dr. Sorensen's protocol, pts who are cleared for procedures may hold plavix for 7 days prior to surgery and resume ASAP but per the pts surgeon based on when postop bleeding risk is acceptable to them. Per Dr. Sorensen's protocol, warfarin is to be managed by the o[facility] clinic.     Letter recreated with above information and faxed back to [facility] at [phone].     To o[facility] clinic pool to advise on holding warfarin

## 2022-08-11 ENCOUNTER — HOSPITAL ENCOUNTER (OUTPATIENT)
Dept: LAB | Facility: MEDICAL CENTER | Age: 77
End: 2022-08-11
Attending: INTERNAL MEDICINE
Payer: MEDICARE

## 2022-08-11 DIAGNOSIS — E11.65 TYPE 2 DIABETES MELLITUS WITH HYPERGLYCEMIA, WITHOUT LONG-TERM CURRENT USE OF INSULIN (HCC): ICD-10-CM

## 2022-08-11 LAB
ALBUMIN SERPL BCP-MCNC: 3.8 G/DL (ref 3.2–4.9)
ALBUMIN/GLOB SERPL: 1 G/DL
ALP SERPL-CCNC: 142 U/L (ref 30–99)
ALT SERPL-CCNC: 14 U/L (ref 2–50)
ANION GAP SERPL CALC-SCNC: 10 MMOL/L (ref 7–16)
AST SERPL-CCNC: 11 U/L (ref 12–45)
BILIRUB SERPL-MCNC: 0.7 MG/DL (ref 0.1–1.5)
BUN SERPL-MCNC: 45 MG/DL (ref 8–22)
CALCIUM SERPL-MCNC: 9.2 MG/DL (ref 8.5–10.5)
CHLORIDE SERPL-SCNC: 103 MMOL/L (ref 96–112)
CO2 SERPL-SCNC: 26 MMOL/L (ref 20–33)
CREAT SERPL-MCNC: 1.87 MG/DL (ref 0.5–1.4)
GFR SERPLBLD CREATININE-BSD FMLA CKD-EPI: 37 ML/MIN/1.73 M 2
GLOBULIN SER CALC-MCNC: 3.7 G/DL (ref 1.9–3.5)
GLUCOSE SERPL-MCNC: 206 MG/DL (ref 65–99)
POTASSIUM SERPL-SCNC: 4.6 MMOL/L (ref 3.6–5.5)
PROT SERPL-MCNC: 7.5 G/DL (ref 6–8.2)
SODIUM SERPL-SCNC: 139 MMOL/L (ref 135–145)

## 2022-08-11 PROCEDURE — 36415 COLL VENOUS BLD VENIPUNCTURE: CPT

## 2022-08-11 PROCEDURE — 80053 COMPREHEN METABOLIC PANEL: CPT

## 2022-08-12 ENCOUNTER — OFFICE VISIT (OUTPATIENT)
Dept: CARDIOLOGY | Facility: MEDICAL CENTER | Age: 77
End: 2022-08-12
Payer: MEDICARE

## 2022-08-12 VITALS
OXYGEN SATURATION: 96 % | HEART RATE: 70 BPM | DIASTOLIC BLOOD PRESSURE: 64 MMHG | HEIGHT: 71 IN | RESPIRATION RATE: 16 BRPM | WEIGHT: 222 LBS | SYSTOLIC BLOOD PRESSURE: 134 MMHG | BODY MASS INDEX: 31.08 KG/M2

## 2022-08-12 DIAGNOSIS — I50.32 CHRONIC HEART FAILURE WITH PRESERVED EJECTION FRACTION (HCC): ICD-10-CM

## 2022-08-12 DIAGNOSIS — Z79.01 ANTICOAGULATED: ICD-10-CM

## 2022-08-12 DIAGNOSIS — E78.5 DYSLIPIDEMIA: ICD-10-CM

## 2022-08-12 DIAGNOSIS — I10 ESSENTIAL HYPERTENSION, BENIGN: ICD-10-CM

## 2022-08-12 DIAGNOSIS — I48.20 ATRIAL FIBRILLATION, CHRONIC (HCC): ICD-10-CM

## 2022-08-12 DIAGNOSIS — Z95.5 S/P CORONARY ARTERY STENT PLACEMENT: ICD-10-CM

## 2022-08-12 DIAGNOSIS — I27.20 PULMONARY HYPERTENSION (HCC): ICD-10-CM

## 2022-08-12 DIAGNOSIS — I25.10 CORONARY ARTERY DISEASE, OCCLUSIVE: ICD-10-CM

## 2022-08-12 DIAGNOSIS — I34.0 MODERATE MITRAL REGURGITATION: ICD-10-CM

## 2022-08-12 PROCEDURE — 99214 OFFICE O/P EST MOD 30 MIN: CPT | Performed by: INTERNAL MEDICINE

## 2022-08-12 RX ORDER — LISINOPRIL 2.5 MG/1
2.5 TABLET ORAL DAILY
Qty: 90 TABLET | Refills: 3 | Status: SHIPPED | OUTPATIENT
Start: 2022-08-12 | End: 2023-01-01

## 2022-08-12 RX ORDER — LISINOPRIL 2.5 MG/1
2.5 TABLET ORAL DAILY
Qty: 90 TABLET | Refills: 3 | Status: SHIPPED | OUTPATIENT
Start: 2022-08-12 | End: 2022-08-12 | Stop reason: SDUPTHER

## 2022-08-12 ASSESSMENT — ENCOUNTER SYMPTOMS
LOSS OF CONSCIOUSNESS: 0
SHORTNESS OF BREATH: 0
MYALGIAS: 0
DIZZINESS: 0
COUGH: 0
PALPITATIONS: 0

## 2022-08-12 ASSESSMENT — FIBROSIS 4 INDEX: FIB4 SCORE: 0.84

## 2022-08-12 NOTE — PROGRESS NOTES
Chief Complaint   Patient presents with    Atrial Fibrillation     Follow up           Subjective     Ken De León is a 77 y.o. male who presents today for follow-up evaluation of CAD, NSTEMI, RCA stent, chronic fibrillation, chronic anticoagulation, hypertension and hyperlipidemia.    The patient has a past medical history of CAD, PCI mid RCA (4.5 x 18 mm BMS, 4.0 x 38 mm SONYA) 8/15/2018, chronic atrial fibrillation, anticoagulation with warfarin, HFpEF, moderate mitral regurgitation, pulmonary hypertension, dyslipidemia, hypertension, diabetes mellitus, COPD, CKD    Since 4/14/2022 appointment the patient has had no cardiac symptoms including chest pain, palpitations, shortness of breath.  He has a nonhealing wound of his right forehead from a traumatic injury, followed by Dr. Mcnamara ENT and may need surgical correction.  Was started on Trulicity with resultant 30 pound weight loss, no longer on empagliflozin.       Past Medical History:   Diagnosis Date    Abnormal electrocardiogram 8/13/2010    Arrhythmia     Atrial Fibrillation; Cardiologist, Dr. Sorensen    Arthritis     knees, left hip    ASTHMA     inhalers    Atrial fibrillation (HCC) 10/25/2011    Bronchospasm 8/6/2009    Diabetes (HCC)     insulin    HTN (hypertension) 10/25/2011    Hypercholesteremia 10/25/2011    Long term (current) use of anticoagulants 10/25/2011    NASAL POLYPS 8/6/2009    Other nonspecific abnormal finding 8/6/2009    Pain     left hip    Shortness of breath     Sleep apnea 8/6/2009    o2 at night    Wears glasses      Past Surgical History:   Procedure Laterality Date    KNEE ARTHROPLASTY TOTAL Left 10/2014    HIP ARTHROPLASTY TOTAL  8/31/2010    Performed by OSGOOD, PATRICK J at SURGERY [facility] ORS    LUMBAR DECOMPRESSION  1984    HIP REPLACEMENT, TOTAL      LAMINOTOMY      SINUSCOPE      SINUSOTOMIES  2003,2005,2/2007     Family History   Problem Relation Age of Onset    Heart Disease Mother      Social History      Socioeconomic History    Marital status:      Spouse name: Not on file    Number of children: Not on file    Years of education: Not on file    Highest education level: Not on file   Occupational History    Not on file   Tobacco Use    Smoking status: Former     Packs/day: 0.50     Years: 10.00     Pack years: 5.00     Types: Cigarettes     Quit date: 12/10/1968     Years since quittin.7    Smokeless tobacco: Never   Vaping Use    Vaping Use: Never used   Substance and Sexual Activity    Alcohol use: No    Drug use: No    Sexual activity: Not on file   Other Topics Concern    Not on file   Social History Narrative    Not on file     Social Determinants of Health     Financial Resource Strain: Not on file   Food Insecurity: Not on file   Transportation Needs: Not on file   Physical Activity: Not on file   Stress: Not on file   Social Connections: Not on file   Intimate Partner Violence: Not on file   Housing Stability: Not on file     Allergies   Allergen Reactions    Atenolol Shortness of Breath and Unspecified     Shortness of breath, weakness    Tetanus Toxoid Swelling     Outpatient Encounter Medications as of 2022   Medication Sig Dispense Refill    budesonide-formoterol (SYMBICORT) 160-4.5 MCG/ACT Aerosol Inhale 2 Puffs 2 times a day. Use spacer. Rinse mouth after each use. 1 Each 11    albuterol (VENTOLIN HFA) 108 (90 Base) MCG/ACT Aero Soln inhalation aerosol Inhale 2 Puffs every four hours as needed for Shortness of Breath (Wheezing). 8.5 g 5    Dulaglutide (TRULICITY) 4.5 MG/0.5ML Solution Pen-injector Inject 1 Each under the skin every 7 days. 6 mL 5    atorvastatin (LIPITOR) 80 MG tablet Take 1 Tablet by mouth every day. 90 Tablet 3    warfarin (COUMADIN) 5 MG Tab TAKE HALF TABLET BY MOUTH (2.5 MG) ON  AND 1 TABLET (5 MG) ALL OTHER DAYS OR AS DIRECTED BY ANTICOAGULATION CLINIC (Patient taking differently: Take 2.5-5 mg by mouth every day. 5 mg on /, 2.5 mg on  "all other days) 90 Tablet 1    Barberry-Oreg Grape-Goldenseal (BERBERINE COMPLEX) 200-200-50 MG Cap Take 1 Capsule by mouth 2 times a day.      celecoxib (CELEBREX) 200 MG Cap Take 200 mg by mouth 1 time a day as needed for Moderate Pain.      furosemide (LASIX) 40 MG Tab Take 80 mg by mouth every day.      clopidogrel (PLAVIX) 75 MG Tab TAKE 1 TABLET BY MOUTH EVERY DAY 90 Tablet 3    DILTIAZem (CARDIZEM) 60 MG Tab Take 1 tablet by mouth 3 times a day. 90 tablet 11    albuterol (PROVENTIL) 2.5mg/3ml Nebu Soln solution for nebulization Take 3 mL by nebulization every four hours as needed for Shortness of Breath. 120 Bullet 2     No facility-administered encounter medications on file as of 8/12/2022.     Review of Systems   Respiratory:  Negative for cough and shortness of breath.    Cardiovascular:  Negative for chest pain and palpitations.   Musculoskeletal:  Positive for joint pain. Negative for myalgias.   Neurological:  Negative for dizziness and loss of consciousness.            Objective     /64 (BP Location: Left arm, Patient Position: Sitting)   Pulse 70   Resp 16   Ht 1.803 m (5' 11\")   Wt 101 kg (222 lb)   SpO2 96%   BMI 30.96 kg/m²     Physical Exam  Constitutional:       Appearance: He is well-developed.   Eyes:      Pupils: Pupils are equal, round, and reactive to light.   Neck:      Vascular: No JVD.      Comments: JVP appears normal.  Cardiovascular:      Rate and Rhythm: Normal rate. Rhythm irregular.      Heart sounds: Murmur heard.   Pulmonary:      Effort: Pulmonary effort is normal. No respiratory distress.      Breath sounds: Normal breath sounds. No wheezing or rales.   Abdominal:      Comments: Significantly obese?  Ascites.   Musculoskeletal:      Right lower leg: No edema.      Left lower leg: No edema.   Skin:     General: Skin is warm and dry.   Neurological:      Mental Status: He is alert and oriented to person, place, and time.   Psychiatric:         Behavior: Behavior " normal.          CARDIAC CATHETERIZATION 08/15/2018  A.  Left heart catheterization.  B.  Left ventriculography.  C.  Selective coronary angiography.  D.  Coronary stent implantation of the mid right coronary artery with   overlapping stents: 4.5x18 mm Ultra non-drug eluting stent and 4.0x38 mm Xience   Gianna drug-eluting stent   E.  Right radial artery approach.   PREOPERATIVE DIAGNOSES:  1.  Unstable angina pectoris.  2.  Abnormal myocardial perfusion scan with inferior perfusion abnormality.  3.  Chronic atrial fibrillation.  4.  Chronic anticoagulation with warfarin.  5.  Diabetes mellitus.   POSTOPERATIVE DIAGNOSES:  1.  Coronary artery disease, 3-vessel, involving the dominant mid right coronary artery, distal circumflex artery and diagonal branch ostium.  2.  Left ventricular ejection fraction 60%.     ECHOCARDIOGRAM 3/23/2018  No prior study is available for comparison.   Mild concentric left ventricular hypertrophy.  Normal left ventricular systolic function.  Left ventricular ejection fraction is visually estimated to be 60%.  Diastolic function is difficult to assess with atrial fibrillation.  Severely dilated left atrium.  Estimated right ventricular systolic pressure is 37 mmHg + JVP.    ECHOCARDIOGRAM 7/23/2021  Left ventricular ejection fraction is visually estimated to be 65%.  Flattening of the interventricular septum.  Moderately dilated right ventricle.  Moderate mitral regurgitation.  Moderate tricuspid regurgitation.  Estimated right ventricular systolic pressure  is 55 mmHg.    Assessment & Plan     1. Chronic heart failure with preserved ejection fraction (HCC)        2. Pulmonary hypertension (HCC)        3. Moderate mitral regurgitation        4. Coronary artery disease, occlusive        5. S/P coronary artery stent placement        6. Atrial fibrillation, chronic (HCC)        7. Anticoagulated        8. Essential hypertension, benign        9. Dyslipidemia            Medical Decision  Making: Today's Assessment/Status/Plan:   Assessment  1.  HFpEF; predominant right heart failure.  2.  Pulmonary hypertension  3.  Mitral regurgitation, moderate  4.  CAD/PCI-RCA stent 8/15/2018  5.  Atrial fibrillation, chronic  6.  Anticoagulation, on warfarin.  7.  Hypertension.    8.  Dyslipidemia.    9.  Diabetes mellitus  10.  Lower extremity edema with right calf ulcer.  11.  Cellulitis.  Right knee.  6/2019.  12.  CKD followed by Dr Vicente  13.  Nonhealing forehead wound, posttraumatic.    Recommendation Discussion  1.  HFpEF: Clinically stable, euvolemic, continue furosemide, add lisinopril 2.5 mg daily, recheck BMP 1 month, previously started on empagliflozin by nephrology and patient states he developed hematuria and it was too expensive.  2.  CAD/PCI: Clinically stable, continue clopidogrel, atorvastatin, diltiazem.  3.  Mitral regurgitation: Continue clinical monitoring, follow-up echocardiograms.  4.  Hypertension: Add lisinopril 2.5 mg daily, continue diltiazem, continue furosemide, continue outpatient monitoring.  5.  Atrial fibrillation: Rate controlled, continue warfarin, diltiazem.  6.  Dyslipidemia: Continue atorvastatin and lipid monitoring.  7.  Diabetes mellitus: Per clinical pharmacist, A1c improved 7.2% on Trulicity with substantial weight loss.  8.  CKD: Reviewed most recent blood work, renal function improving.  9.  Cleared from a cardiac standpoint to have forehead wound surgery at a moderate perioperative risk for cardiac events, can stop anticoagulation for needed period of time will coordinate with anticoagulation clinic.  10.  RTC 4 months.

## 2022-08-16 ENCOUNTER — DOCUMENTATION (OUTPATIENT)
Dept: VASCULAR LAB | Facility: MEDICAL CENTER | Age: 77
End: 2022-08-16
Payer: MEDICARE

## 2022-08-16 NOTE — PROGRESS NOTES
Pt was a no show for her/his telemed anticoag visit today. LM on VM asking pt to call back to reschedule. APRIL Garcia.

## 2022-08-19 ENCOUNTER — ANTICOAGULATION VISIT (OUTPATIENT)
Dept: MEDICAL GROUP | Facility: PHYSICIAN GROUP | Age: 77
End: 2022-08-19
Payer: MEDICARE

## 2022-08-19 VITALS
WEIGHT: 223 LBS | SYSTOLIC BLOOD PRESSURE: 147 MMHG | HEART RATE: 89 BPM | BODY MASS INDEX: 31.1 KG/M2 | DIASTOLIC BLOOD PRESSURE: 84 MMHG

## 2022-08-19 DIAGNOSIS — D68.59 HYPERCOAGULABLE STATE (HCC): ICD-10-CM

## 2022-08-19 LAB — INR PPP: 1.8 (ref 2–3.5)

## 2022-08-19 PROCEDURE — 99211 OFF/OP EST MAY X REQ PHY/QHP: CPT | Performed by: FAMILY MEDICINE

## 2022-08-19 PROCEDURE — 85610 PROTHROMBIN TIME: CPT | Performed by: FAMILY MEDICINE

## 2022-08-19 ASSESSMENT — FIBROSIS 4 INDEX: FIB4 SCORE: 0.84

## 2022-08-19 NOTE — PROGRESS NOTES
Anticoagulation Summary  As of 2022      INR goal:  2.0-3.0   TTR:  68.1 % (7 y)   INR used for dosin.80 (2022)   Warfarin maintenance plan:  2.5 mg (5 mg x 0.5) every Mon, Wed, Fri; 5 mg (5 mg x 1) all other days   Weekly warfarin total:  27.5 mg   Plan last modified:  Cedric Martinez, PharmD (2022)   Next INR check:  2022   Target end date:  Indefinite    Indications    Atrial fibrillation with rapid ventricular response (HCC) (Resolved) [I48.91]  Long term current use of anticoagulant therapy (Resolved) [Z79.01]  Atrial fibrillation (HCC) (Resolved) [I48.91]  Hypercoagulable state (HCC) [D68.59]                 Anticoagulation Episode Summary       INR check location:      Preferred lab:      Send INR reminders to:      Comments:            Anticoagulation Care Providers       Provider Role Specialty Phone number    Sanjay Sorensen M.D. Referring Cardiovascular Disease (Cardiology) [phone]          Anticoagulation Patient Findings      HPI:  Yuri De León seen in clinic today, on anticoagulation therapy with warfarin for Afib.  Reason for today's visit (per our collaborative practice policy) is because their last INR was overdue.   Pt on antiplatelet therapy Plavix 75mg for coronary artery occlusion with stent indef per cardiology.  Changes to current medical/health status since last appt: none  No signs/symptoms of bleeding and/or thrombosis since the last appt.    No interval changes to diet or any interval changes to medications since last appt.   No complications or cost restrictions with current therapy.   BP recorded in vitals.  Confirmed dosing regimen.     A/P   INR  SUB-therapeutic.   Possible reason(s) INR is not in range today: Unknown cause    Pt was taking more warfarin than our record reflect.  Dosing calendar was updated.  Bolus today then pt to continue warfarin as he has been taking it.     He has an upcoming invasive procedure. Pt has helds warfarin in the  past without bridging.  No hx of TIA or stroke per pt.      Pt would prefer to forgo enoxaparin.  Pt was told by his neurosurgeon to hold warfarin 7 days prior to procedure.      Instructed pt to restart when he is cleared by his operating team.    Follow up appointment in 2 weeks to reduce risk of adverse events related to this high risk medication, Warfarin.    Purpose of next visit:  They have a TTR of 68% which is not at target (TTR target/goal is 100%) and requires close follow up to prevent a adverse event (the lower the TTR the higher risk of clots, strokes, or bleeding).     Other info:  Pt educated to contact our clinic with any changes in medications or s/s of bleeding or thrombosis  CHEST guidelines recommend frequent INR monitoring at regular intervals (a few days up to a max of 12 weeks) to ensure they are on the proper dose of warfarin and not having any complications from therapy. INRs can dramatically change over a short time period due to diet, medications, and medical conditions.     Cedric Martinez, PharmD

## 2022-08-30 ENCOUNTER — APPOINTMENT (OUTPATIENT)
Dept: VASCULAR LAB | Facility: MEDICAL CENTER | Age: 77
End: 2022-08-30
Attending: NURSE PRACTITIONER
Payer: MEDICARE

## 2022-08-30 ENCOUNTER — NON-PROVIDER VISIT (OUTPATIENT)
Dept: MEDICAL GROUP | Facility: PHYSICIAN GROUP | Age: 77
End: 2022-08-30
Payer: MEDICARE

## 2022-08-30 ENCOUNTER — ANTICOAGULATION MONITORING (OUTPATIENT)
Dept: VASCULAR LAB | Facility: MEDICAL CENTER | Age: 77
End: 2022-08-30

## 2022-08-30 VITALS
TEMPERATURE: 97.8 F | DIASTOLIC BLOOD PRESSURE: 60 MMHG | OXYGEN SATURATION: 95 % | RESPIRATION RATE: 12 BRPM | HEART RATE: 69 BPM | SYSTOLIC BLOOD PRESSURE: 110 MMHG

## 2022-08-30 DIAGNOSIS — D68.59 HYPERCOAGULABLE STATE (HCC): ICD-10-CM

## 2022-08-30 DIAGNOSIS — Z79.01 CHRONIC ANTICOAGULATION: ICD-10-CM

## 2022-08-30 LAB
INR BLD: 1.8 (ref 0.9–1.2)
INR PPP: 1.8 (ref 2–3.5)
POC PROTIME: ABNORMAL

## 2022-08-30 PROCEDURE — 99213 OFFICE O/P EST LOW 20 MIN: CPT | Performed by: NURSE PRACTITIONER

## 2022-08-30 PROCEDURE — 99999 PR NO CHARGE: CPT | Performed by: NURSE PRACTITIONER

## 2022-08-30 PROCEDURE — 85610 PROTHROMBIN TIME: CPT | Performed by: FAMILY MEDICINE

## 2022-08-30 NOTE — PROGRESS NOTES
This evaluation was conducted via Telemed using secure and encrypted videoconferencing technology. The patient was physically located at [facility] in [place], [place]. The patient was presented by a medical professional at the originating site.   The patient's identity was confirmed and verbal consent was obtained for this telemedicine encounter.      OP Anticoagulation Service Note    Date: 2022  Blood Pressure : 110/60  Pulse: 69  Respiration: 12    Anticoagulation Summary  As of 2022      INR goal:  2.0-3.0   TTR:  67.8 % (7.1 y)   INR used for dosin.80 (2022)   Warfarin maintenance plan:  2.5 mg (5 mg x 0.5) every Mon, Wed, Fri; 5 mg (5 mg x 1) all other days   Weekly warfarin total:  27.5 mg   Plan last modified:  Cedric Martinez, PharmD (2022)   Next INR check:     Target end date:  Indefinite    Indications    Atrial fibrillation with rapid ventricular response (HCC) (Resolved) [I48.91]  Long term current use of anticoagulant therapy (Resolved) [Z79.01]  Atrial fibrillation (HCC) (Resolved) [I48.91]  Hypercoagulable state (HCC) [D68.59]                 Anticoagulation Episode Summary       INR check location:      Preferred lab:      Send INR reminders to:      Comments:            Anticoagulation Care Providers       Provider Role Specialty Phone number    Sanjay Sorensen M.D. Referring Cardiovascular Disease (Cardiology) [phone]          Anticoagulation Patient Findings  Patient Findings       Negatives:  Signs/symptoms of thrombosis, Signs/symptoms of bleeding, Laboratory test error suspected, Change in health, Change in alcohol use, Change in activity, Upcoming invasive procedure, Emergency department visit, Upcoming dental procedure, Missed doses, Extra doses, Change in medications, Change in diet/appetite, Hospital admission, Bruising, Other complaints            THIS VISIT CONDUCTED WITH PRESENTER VIA TELEMEDICINE UTILIZING SECURE AND ENCRYPTED VIDEOCONFERENCING  EQUIPMENT  ROS:    Pulm: Denies SOB, chest pain.    Card: Denies syncope, edema, palpitations.    Extremities: Denies redness, pain.     PE:    Pulm: No SOB, even and unlabored.    Card: Normal rate and rhythm.    Extremities: No redness, or edema.     INR  sub-therapeutic.    Denies signs/symptoms of bleeding and/or thrombosis.    Denies changes to diet or medications.   Follow up appt in 2 weeks     Plan:  Take 5 mg tonight then hold for 7 days     Medication: Warfarin (Coumadin)     Sunday Monday Tuesday Wednesday Thursday Friday Saturday      5 mg    2.5 mg    5 mg    0 mg    0 mg    0 mg    0 mg      1 tab(s)    1/2 tab(s)    1 tab(s)    0 tab(s)    0 tab(s)    0 tab(s)  0 tab(s)        Sunday Monday Tuesday Wednesday Thursday Friday Saturday      0mg   0  mg    0 mg    5 mg    7.5 mg    5 mg    5 mg      0 tab(s)    0 tab(s)    0 tab(s)    1 tab(s)     1.5tab(s)    1 tab(s)  1 tab(s)          Sunday Monday Tuesday Wednesday Thursday Friday Saturday      5mg   2.5  mg    5 mg    2.5 mg    5 mg    2.5 mg    5 mg      1 tab(s)    1/2 tab(s)    1 tab(s)    1/2 tab(s)    1 tab(s)    1/2 tab(s)  1 tab(s)       Next Appointment: Tuesday, Sept 13 @ 2:15   Pt on antiplatelet therapy Plavix 75mg for coronary artery occlusion with stent indef per cardiology.    Review all of your home medications and newly ordered medications with your doctor and / or pharmacist. Follow medication instructions as directed by your doctor and / or pharmacist. Please keep your medication list with you and share with your physician. Update the information when medications are discontinued, doses are changed, or new medications (including over-the-counter products) are added; and carry medication information at all times in the event of emergency situations.    The patient is on a high risk medication and is sub- therapeutic. This could lead to clot formation or risk of stroke. Therefore this patient  requires close monitoring and follow up.      CHEST guidelines recommend frequent INR monitoring at regular intervals (a few days up to a max of 12 weeks) to ensure they are on the proper dose of warfarin and not having any complications from therapy.  INRs can dramatically change over a short time period due to diet, medications, and medical conditions.   The patient instructed to go to the ER for falls with a head injury,  blood in urine or stool or any bleeding that last longer than 20 min.     For questions, please contact Outpatient Anticoagulation Service [phone].     APRIL Garcia.

## 2022-09-02 ENCOUNTER — PRE-ADMISSION TESTING (OUTPATIENT)
Dept: ADMISSIONS | Facility: MEDICAL CENTER | Age: 77
End: 2022-09-02
Attending: OTOLARYNGOLOGY
Payer: MEDICARE

## 2022-09-02 VITALS — BODY MASS INDEX: 31.1 KG/M2 | HEIGHT: 71 IN

## 2022-09-02 NOTE — PREPROCEDURE INSTRUCTIONS
Pt preadmitted via phone, instructions emailed. Questions answered. Pt instructed to continue regularly prescribed meds through day of procedure. Per anesthesia protocol instructed to take these medications the day of procedure- Albuterol inh and/or neb if needed, lipitor, symbicort inh and cardizem. Pt states was instructed to stop both plavix and warfarin 7 days prior to surgery.METs score >4. Has a hx of LAUREL but no longer uses O2 at night. Placed on stop bang protocol. Dr Urbina notified of LAUREL hx.

## 2022-09-02 NOTE — PREPROCEDURE INSTRUCTIONS
Reviewed patient's medical history and medications with Dr Urbina, based upon information available, Dr Urbina indicates that the patient is a candidate to have the procedure at [facility] as scheduled on 9/7/22.(We should be OK to go as this seems to be a relatively low risk procedure.  Thank you.  Car Urbina M.D.

## 2022-09-02 NOTE — OR NURSING
"Pt called stating he bump his arm and it \"broke the skin because it is so thin from Warfarin\" He states he has discontinued Warfarin per MD instructions and did not have much bleeding. Instructed to wash and place a gauze bandage on the wound. Instructed to watch for increased redness,s welling and pain and for perulent smell and/or drainage; if any of these symptoms develop to notify both his PCP or go to urgent care or ED especially if he develops a fever. Pt states he understands these instructions.  "

## 2022-09-07 ENCOUNTER — ANESTHESIA (OUTPATIENT)
Dept: SURGERY | Facility: MEDICAL CENTER | Age: 77
End: 2022-09-07
Payer: MEDICARE

## 2022-09-07 ENCOUNTER — HOSPITAL ENCOUNTER (OUTPATIENT)
Facility: MEDICAL CENTER | Age: 77
End: 2022-09-07
Attending: OTOLARYNGOLOGY | Admitting: OTOLARYNGOLOGY
Payer: MEDICARE

## 2022-09-07 ENCOUNTER — ANESTHESIA EVENT (OUTPATIENT)
Dept: SURGERY | Facility: MEDICAL CENTER | Age: 77
End: 2022-09-07
Payer: MEDICARE

## 2022-09-07 VITALS
OXYGEN SATURATION: 95 % | HEART RATE: 68 BPM | SYSTOLIC BLOOD PRESSURE: 136 MMHG | TEMPERATURE: 97.3 F | WEIGHT: 215.39 LBS | DIASTOLIC BLOOD PRESSURE: 78 MMHG | HEIGHT: 71 IN | RESPIRATION RATE: 16 BRPM | BODY MASS INDEX: 30.15 KG/M2

## 2022-09-07 LAB
EKG IMPRESSION: NORMAL
ERYTHROCYTE [DISTWIDTH] IN BLOOD BY AUTOMATED COUNT: 49.2 FL (ref 35.9–50)
GLUCOSE BLD STRIP.AUTO-MCNC: 124 MG/DL (ref 65–99)
HCT VFR BLD AUTO: 40.3 % (ref 42–52)
HGB BLD-MCNC: 13 G/DL (ref 14–18)
INR PPP: 1.15 (ref 0.87–1.13)
MCH RBC QN AUTO: 28.4 PG (ref 27–33)
MCHC RBC AUTO-ENTMCNC: 32.3 G/DL (ref 33.7–35.3)
MCV RBC AUTO: 88 FL (ref 81.4–97.8)
PATHOLOGY CONSULT NOTE: NORMAL
PLATELET # BLD AUTO: 282 K/UL (ref 164–446)
PMV BLD AUTO: 10 FL (ref 9–12.9)
PROTHROMBIN TIME: 14.2 SEC (ref 12–14.6)
RBC # BLD AUTO: 4.58 M/UL (ref 4.7–6.1)
WBC # BLD AUTO: 9.3 K/UL (ref 4.8–10.8)

## 2022-09-07 PROCEDURE — 700101 HCHG RX REV CODE 250: Performed by: ANESTHESIOLOGY

## 2022-09-07 PROCEDURE — 160025 RECOVERY II MINUTES (STATS): Performed by: OTOLARYNGOLOGY

## 2022-09-07 PROCEDURE — 99100 ANES PT EXTEME AGE<1 YR&>70: CPT | Performed by: ANESTHESIOLOGY

## 2022-09-07 PROCEDURE — 160009 HCHG ANES TIME/MIN: Performed by: OTOLARYNGOLOGY

## 2022-09-07 PROCEDURE — 85027 COMPLETE CBC AUTOMATED: CPT

## 2022-09-07 PROCEDURE — 160035 HCHG PACU - 1ST 60 MINS PHASE I: Performed by: OTOLARYNGOLOGY

## 2022-09-07 PROCEDURE — 93005 ELECTROCARDIOGRAM TRACING: CPT | Performed by: OTOLARYNGOLOGY

## 2022-09-07 PROCEDURE — 160048 HCHG OR STATISTICAL LEVEL 1-5: Performed by: OTOLARYNGOLOGY

## 2022-09-07 PROCEDURE — 82962 GLUCOSE BLOOD TEST: CPT

## 2022-09-07 PROCEDURE — 85610 PROTHROMBIN TIME: CPT

## 2022-09-07 PROCEDURE — 700105 HCHG RX REV CODE 258: Performed by: OTOLARYNGOLOGY

## 2022-09-07 PROCEDURE — 88304 TISSUE EXAM BY PATHOLOGIST: CPT

## 2022-09-07 PROCEDURE — 160046 HCHG PACU - 1ST 60 MINS PHASE II: Performed by: OTOLARYNGOLOGY

## 2022-09-07 PROCEDURE — 160002 HCHG RECOVERY MINUTES (STAT): Performed by: OTOLARYNGOLOGY

## 2022-09-07 PROCEDURE — 160042 HCHG SURGERY MINUTES - EA ADDL 1 MIN LEVEL 5: Performed by: OTOLARYNGOLOGY

## 2022-09-07 PROCEDURE — 160036 HCHG PACU - EA ADDL 30 MINS PHASE I: Performed by: OTOLARYNGOLOGY

## 2022-09-07 PROCEDURE — 36415 COLL VENOUS BLD VENIPUNCTURE: CPT

## 2022-09-07 PROCEDURE — 160031 HCHG SURGERY MINUTES - 1ST 30 MINS LEVEL 5: Performed by: OTOLARYNGOLOGY

## 2022-09-07 PROCEDURE — 700111 HCHG RX REV CODE 636 W/ 250 OVERRIDE (IP): Performed by: ANESTHESIOLOGY

## 2022-09-07 PROCEDURE — 700102 HCHG RX REV CODE 250 W/ 637 OVERRIDE(OP): Performed by: OTOLARYNGOLOGY

## 2022-09-07 PROCEDURE — 00190 ANES PX FACIAL B1/SKULL NOS: CPT | Performed by: ANESTHESIOLOGY

## 2022-09-07 PROCEDURE — A9270 NON-COVERED ITEM OR SERVICE: HCPCS | Performed by: OTOLARYNGOLOGY

## 2022-09-07 PROCEDURE — 700102 HCHG RX REV CODE 250 W/ 637 OVERRIDE(OP): Performed by: ANESTHESIOLOGY

## 2022-09-07 PROCEDURE — A9270 NON-COVERED ITEM OR SERVICE: HCPCS | Performed by: ANESTHESIOLOGY

## 2022-09-07 PROCEDURE — 700101 HCHG RX REV CODE 250: Performed by: OTOLARYNGOLOGY

## 2022-09-07 RX ORDER — ACETAMINOPHEN 325 MG/1
650 TABLET ORAL ONCE
Status: COMPLETED | OUTPATIENT
Start: 2022-09-07 | End: 2022-09-07

## 2022-09-07 RX ORDER — OXYCODONE HCL 5 MG/5 ML
10 SOLUTION, ORAL ORAL
Status: DISCONTINUED | OUTPATIENT
Start: 2022-09-07 | End: 2022-09-07 | Stop reason: HOSPADM

## 2022-09-07 RX ORDER — HYDROMORPHONE HYDROCHLORIDE 1 MG/ML
0.4 INJECTION, SOLUTION INTRAMUSCULAR; INTRAVENOUS; SUBCUTANEOUS
Status: DISCONTINUED | OUTPATIENT
Start: 2022-09-07 | End: 2022-09-07 | Stop reason: HOSPADM

## 2022-09-07 RX ORDER — HYDROMORPHONE HYDROCHLORIDE 1 MG/ML
0.1 INJECTION, SOLUTION INTRAMUSCULAR; INTRAVENOUS; SUBCUTANEOUS
Status: DISCONTINUED | OUTPATIENT
Start: 2022-09-07 | End: 2022-09-07 | Stop reason: HOSPADM

## 2022-09-07 RX ORDER — MEPERIDINE HYDROCHLORIDE 25 MG/ML
12.5 INJECTION INTRAMUSCULAR; INTRAVENOUS; SUBCUTANEOUS
Status: DISCONTINUED | OUTPATIENT
Start: 2022-09-07 | End: 2022-09-07 | Stop reason: HOSPADM

## 2022-09-07 RX ORDER — SODIUM CHLORIDE, SODIUM LACTATE, POTASSIUM CHLORIDE, CALCIUM CHLORIDE 600; 310; 30; 20 MG/100ML; MG/100ML; MG/100ML; MG/100ML
INJECTION, SOLUTION INTRAVENOUS CONTINUOUS
Status: ACTIVE | OUTPATIENT
Start: 2022-09-07 | End: 2022-09-07

## 2022-09-07 RX ORDER — SODIUM CHLORIDE, SODIUM LACTATE, POTASSIUM CHLORIDE, CALCIUM CHLORIDE 600; 310; 30; 20 MG/100ML; MG/100ML; MG/100ML; MG/100ML
INJECTION, SOLUTION INTRAVENOUS CONTINUOUS
Status: DISCONTINUED | OUTPATIENT
Start: 2022-09-07 | End: 2022-09-07 | Stop reason: HOSPADM

## 2022-09-07 RX ORDER — OXYCODONE HCL 5 MG/5 ML
5 SOLUTION, ORAL ORAL
Status: DISCONTINUED | OUTPATIENT
Start: 2022-09-07 | End: 2022-09-07 | Stop reason: HOSPADM

## 2022-09-07 RX ORDER — ONDANSETRON 2 MG/ML
4 INJECTION INTRAMUSCULAR; INTRAVENOUS
Status: DISCONTINUED | OUTPATIENT
Start: 2022-09-07 | End: 2022-09-07 | Stop reason: HOSPADM

## 2022-09-07 RX ORDER — LIDOCAINE HYDROCHLORIDE AND EPINEPHRINE 10; 10 MG/ML; UG/ML
INJECTION, SOLUTION INFILTRATION; PERINEURAL
Status: DISCONTINUED | OUTPATIENT
Start: 2022-09-07 | End: 2022-09-07 | Stop reason: HOSPADM

## 2022-09-07 RX ORDER — HALOPERIDOL 5 MG/ML
1 INJECTION INTRAMUSCULAR
Status: DISCONTINUED | OUTPATIENT
Start: 2022-09-07 | End: 2022-09-07 | Stop reason: HOSPADM

## 2022-09-07 RX ORDER — CEFAZOLIN SODIUM 1 G/3ML
INJECTION, POWDER, FOR SOLUTION INTRAMUSCULAR; INTRAVENOUS PRN
Status: DISCONTINUED | OUTPATIENT
Start: 2022-09-07 | End: 2022-09-07 | Stop reason: SURG

## 2022-09-07 RX ORDER — ONDANSETRON 2 MG/ML
INJECTION INTRAMUSCULAR; INTRAVENOUS PRN
Status: DISCONTINUED | OUTPATIENT
Start: 2022-09-07 | End: 2022-09-07 | Stop reason: SURG

## 2022-09-07 RX ORDER — LABETALOL HYDROCHLORIDE 5 MG/ML
5 INJECTION, SOLUTION INTRAVENOUS
Status: DISCONTINUED | OUTPATIENT
Start: 2022-09-07 | End: 2022-09-07 | Stop reason: HOSPADM

## 2022-09-07 RX ORDER — DIPHENHYDRAMINE HYDROCHLORIDE 50 MG/ML
12.5 INJECTION INTRAMUSCULAR; INTRAVENOUS
Status: DISCONTINUED | OUTPATIENT
Start: 2022-09-07 | End: 2022-09-07 | Stop reason: HOSPADM

## 2022-09-07 RX ORDER — HYDROMORPHONE HYDROCHLORIDE 1 MG/ML
0.2 INJECTION, SOLUTION INTRAMUSCULAR; INTRAVENOUS; SUBCUTANEOUS
Status: DISCONTINUED | OUTPATIENT
Start: 2022-09-07 | End: 2022-09-07 | Stop reason: HOSPADM

## 2022-09-07 RX ORDER — MIDAZOLAM HYDROCHLORIDE 1 MG/ML
1 INJECTION INTRAMUSCULAR; INTRAVENOUS
Status: DISCONTINUED | OUTPATIENT
Start: 2022-09-07 | End: 2022-09-07 | Stop reason: HOSPADM

## 2022-09-07 RX ORDER — HYDRALAZINE HYDROCHLORIDE 20 MG/ML
5 INJECTION INTRAMUSCULAR; INTRAVENOUS
Status: DISCONTINUED | OUTPATIENT
Start: 2022-09-07 | End: 2022-09-07 | Stop reason: HOSPADM

## 2022-09-07 RX ORDER — LIDOCAINE HYDROCHLORIDE 20 MG/ML
INJECTION, SOLUTION EPIDURAL; INFILTRATION; INTRACAUDAL; PERINEURAL PRN
Status: DISCONTINUED | OUTPATIENT
Start: 2022-09-07 | End: 2022-09-07 | Stop reason: SURG

## 2022-09-07 RX ORDER — IPRATROPIUM BROMIDE AND ALBUTEROL SULFATE 2.5; .5 MG/3ML; MG/3ML
3 SOLUTION RESPIRATORY (INHALATION)
Status: DISCONTINUED | OUTPATIENT
Start: 2022-09-07 | End: 2022-09-07 | Stop reason: HOSPADM

## 2022-09-07 RX ORDER — ROCURONIUM BROMIDE 10 MG/ML
INJECTION, SOLUTION INTRAVENOUS PRN
Status: DISCONTINUED | OUTPATIENT
Start: 2022-09-07 | End: 2022-09-07 | Stop reason: SURG

## 2022-09-07 RX ORDER — BACITRACIN ZINC 500 [USP'U]/G
OINTMENT TOPICAL
Status: DISCONTINUED | OUTPATIENT
Start: 2022-09-07 | End: 2022-09-07 | Stop reason: HOSPADM

## 2022-09-07 RX ADMIN — ONDANSETRON 4 MG: 2 INJECTION INTRAMUSCULAR; INTRAVENOUS at 15:23

## 2022-09-07 RX ADMIN — PROPOFOL 160 MG: 10 INJECTION, EMULSION INTRAVENOUS at 14:07

## 2022-09-07 RX ADMIN — ACETAMINOPHEN 650 MG: 325 TABLET, FILM COATED ORAL at 16:30

## 2022-09-07 RX ADMIN — FENTANYL CITRATE 50 MCG: 50 INJECTION, SOLUTION INTRAMUSCULAR; INTRAVENOUS at 14:04

## 2022-09-07 RX ADMIN — SUGAMMADEX 200 MG: 100 INJECTION, SOLUTION INTRAVENOUS at 15:25

## 2022-09-07 RX ADMIN — LIDOCAINE HYDROCHLORIDE 40 MG: 20 INJECTION, SOLUTION EPIDURAL; INFILTRATION; INTRACAUDAL at 14:07

## 2022-09-07 RX ADMIN — SODIUM CHLORIDE, POTASSIUM CHLORIDE, SODIUM LACTATE AND CALCIUM CHLORIDE: 600; 310; 30; 20 INJECTION, SOLUTION INTRAVENOUS at 12:51

## 2022-09-07 RX ADMIN — PROPOFOL 100 MCG/KG/MIN: 10 INJECTION, EMULSION INTRAVENOUS at 14:10

## 2022-09-07 RX ADMIN — FENTANYL CITRATE 50 MCG: 50 INJECTION, SOLUTION INTRAMUSCULAR; INTRAVENOUS at 14:30

## 2022-09-07 RX ADMIN — ROCURONIUM BROMIDE 50 MG: 10 INJECTION, SOLUTION INTRAVENOUS at 14:07

## 2022-09-07 RX ADMIN — CEFAZOLIN 3 G: 330 INJECTION, POWDER, FOR SOLUTION INTRAMUSCULAR; INTRAVENOUS at 14:20

## 2022-09-07 ASSESSMENT — FIBROSIS 4 INDEX: FIB4 SCORE: 0.84

## 2022-09-07 ASSESSMENT — PAIN SCALES - GENERAL: PAIN_LEVEL: 2

## 2022-09-07 ASSESSMENT — PAIN DESCRIPTION - PAIN TYPE: TYPE: SURGICAL PAIN

## 2022-09-07 NOTE — OR NURSING
1531: To PACU from OR via gurney,  respirations spontaneous and non-labored. Pt able to cough, has a hard time clearing sputum but eventually swallowed. Unable to open eyes for more than a moment, nods appropriately, denies pain and nausea. Plan to keep pt in PACU for full hour per [facility] protocol.   1545: Pt coughing, able to cough up sputum, having increased work of breathing with coughing but states he feels much better after coughing, lung fields clear.  1600: Pt more alert, states he is having some mild pain but it is tolerable, states breathing is improved, denies nausea.  1615: Pt states pain at surgical site is increased, anesthesia contacted for tylenol order, waiting for order to be approved and then plan analgesia.  1630: pain medication approved by pharmacy, plan analgesia.  1645: Pt states pain is much improved. Tolerating sips of water.   1655: No change in surgical site assessment. Meets criteria to transfer to Stage 2. Report called to Karely YE.

## 2022-09-07 NOTE — ANESTHESIA POSTPROCEDURE EVALUATION
Patient: Yuri De León    Procedure Summary     Date: 09/07/22 Room / Location:  OR  / SURGERY [facility]    Anesthesia Start: 1400 Anesthesia Stop: 1534    Procedure: LOCAL FLAP RECONSTRUCTION WITH REMOVAL OF DENUDED AND DRIED BONE OF THE FOREHEAD (Face) Diagnosis: (FACIAL LACERATION, LESION OF FACE, SQUAMOUS CELL CARINOMA OF FACE)    Surgeons: Buzz Mcnamara M.D. Responsible Provider: Shashank Ashraf M.D.    Anesthesia Type: general ASA Status: 3          Final Anesthesia Type: general  Last vitals  BP   Blood Pressure : (!) 146/82    Temp   37 °C (98.6 °F)    Pulse   65   Resp   20    SpO2   97 %      Anesthesia Post Evaluation    Patient location during evaluation: PACU  Patient participation: complete - patient participated  Level of consciousness: awake and alert  Pain score: 2    Airway patency: patent  Anesthetic complications: no  Cardiovascular status: hemodynamically stable  Respiratory status: acceptable  Hydration status: euvolemic    PONV: none          No notable events documented.     Nurse Pain Score: 4 (NPRS)

## 2022-09-07 NOTE — ANESTHESIA PROCEDURE NOTES
Airway    Date/Time: 9/7/2022 2:08 PM  Performed by: Shashank Ashraf M.D.  Authorized by: Shashank Ashraf M.D.     Location:  OR  Urgency:  Elective  Difficult Airway: No    Indications for Airway Management:  Anesthesia      Spontaneous Ventilation: absent    Sedation Level:  Deep  Preoxygenated: Yes    Patient Position:  Sniffing  Final Airway Type:  Endotracheal airway  Final Endotracheal Airway:  ETT  Cuffed: Yes    Technique Used for Successful ETT Placement:  Direct laryngoscopy    Insertion Site:  Oral  Blade Type:  Tim  Laryngoscope Blade/Videolaryngoscope Blade Size:  3  ETT Size (mm):  8.0  Measured from:  Lips  ETT to Lips (cm):  24  Placement Verified by: auscultation and capnometry    Cormack-Lehane Classification:  Grade IIa - partial view of glottis  Number of Attempts at Approach:  1  Number of Other Approaches Attempted:  0

## 2022-09-07 NOTE — ANESTHESIA TIME REPORT
Anesthesia Start and Stop Event Times     Date Time Event    9/7/2022 1307 Ready for Procedure     1400 Anesthesia Start     1534 Anesthesia Stop        Responsible Staff  09/07/22    Name Role Begin End    Shashank Ashraf M.D. Anesth 1400 1534        Overtime Reason:  no overtime (within assigned shift)    Comments:

## 2022-09-07 NOTE — PROCEDURES
Preoperative diagnosis: None healing forehead laceration, sequestrum of bone, osteomyelitis    Postoperative diagnosis: Same    Procedure: Forehead advancement flap with excision of bony sequestrum of the skull    Surgeon: Anedrs    Anesthesia: General endotracheal Dr. Ashraf    Indications: Nonhealing forehead laceration with exposed bone    Timeout:  Appropriate time-outs taken    Description: Informed consent was obtained.  The patient was brought to the operating room placed in the supine position.  Anesthesia was induced and the patient was intubated without difficulty.  An advancement flap was marked out with excision lines which would contain the fistula, sequestrum of the skull, and allow the excision of the sequestrum of the bony skull.  This was done with 2 horizontal incisions connected by a vertical incision, along the relaxed lines of skin tension.    The patient was prepped and draped in sterile fashion.  Appropriate timeout was taken.  1% Xylocaine with epinephrine 1 100,000 dilution was used for subcutaneous injection.  The 15 blade was used to incise the skin.  This was taken down to the galea aponeurotic.  The flaps were raised by incising the galeal tissues.    The drill was brought in.  And all the bur was used to shave the skull down to fresh bleeding bone, removing yellowed bone until fresh white bone was visualized.  The sequestrum and skin edges were sent for permanent pathology.    Galeal tissues were reapproximated with 4-0 Polysorb suture, advancing the flap and securing strength for wound closure with multiple interrupted sutures in the galea, with corner stitches placed inferiorly and superiorly as well.    Without tension on the wound, 5-0 fast-absorbing gut was used in simple interrupted fashion, also with corner stitches to reapproximate the skin.  Excellent reapproximation was obtained.  This was dressed with bacitracin, 2 x 2, and Tegaderm over it.    The patient tolerated the  procedure well.    Specimens: Forehead skin fistula and bony sequestrum    Complications: None apparent

## 2022-09-07 NOTE — ANESTHESIA PREPROCEDURE EVALUATION
Case: [id number] Date/Time: 09/07/22 1315    Procedures:       LOCAL FLAP RECONSTRUCTION WITH REMOVAL OF DENUDED AND DRIED BONE OF THE FOREHEAD      REMOVAL, FOREIGN BODY    Pre-op diagnosis: FACIAL LACERATION, LESION OF FACE, SQUAMOUS CELL CARINOMA OF FACE    Location:  OR 01 / SURGERY [facility]    Surgeons: Buzz Mcnamara M.D.          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 and stage 1 chronic kidney disease (HCC)      ENDO   (positive) Type 2 diabetes mellitus with hyperglycemia ([facility])       Physical Exam    Airway   Mallampati: II  TM distance: >3 FB  Neck ROM: full       Cardiovascular - normal exam  Rhythm: regular  Rate: normal  (-) murmur     Dental - normal exam           Pulmonary - normal exam  Breath sounds clear to auscultation     Abdominal    Neurological - normal exam                 Anesthesia Plan    ASA 3 (chart)       Plan - general       Airway plan will be ETT          Induction: intravenous    Postoperative Plan: Postoperative administration of opioids is intended.    Pertinent diagnostic labs and testing reviewed    Informed Consent:    Anesthetic plan and risks discussed with patient.    Use of blood products discussed with: patient whom consented to blood products.

## 2022-09-07 NOTE — DISCHARGE INSTRUCTIONS
If any questions arise, call your provider.  If your provider is not available, please feel free to call the Surgical Center at [phone].    MEDICATIONS: Resume taking daily medication.  Take prescribed pain medication with food.  If no medication is prescribed, you may take non-aspirin pain medication if needed.  PAIN MEDICATION CAN BE VERY CONSTIPATING.  Take a stool softener or laxative such as senokot, pericolace, or milk of magnesia if needed.    Last pain medication given at     What to Expect Post Anesthesia    Rest and take it easy for the first 24 hours.  A responsible adult is recommended to remain with you during that time.  It is normal to feel sleepy.  We encourage you to not do anything that requires balance, judgment or coordination.    FOR 24 HOURS DO NOT:  Drive, operate machinery or run household appliances.  Drink beer or alcoholic beverages.  Make important decisions or sign legal documents.    To avoid nausea, slowly advance diet as tolerated, avoiding spicy or greasy foods for the first day.  Add more substantial food to your diet according to your provider's instructions. INCREASE FLUIDS AND FIBER TO AVOID CONSTIPATION.    MILD FLU-LIKE SYMPTOMS ARE NORMAL.  YOU MAY EXPERIENCE GENERALIZED MUSCLE ACHES, THROAT IRRITATION, HEADACHE AND/OR SOME NAUSEA.    Discharge Instructions:    BATHING:   You may shower after 3 days (Saturday), but do not submerge in a bath or a pool until you after your first postoperative visit.    WOUND CARE:   You may remove the bandage after 3 days (Saturday) and then continue to dress the wound as you did before until instructed otherwise by the surgeon.    BOWEL FUNCTION:  Prescription pain medication may cause constipation. If you are having problems, use what you normally would or call your provider for suggestions. It also helps to stay regular by including fiber in your diet (for example: bran or fruits and vegetables) and drink plenty of liquids (water,  juice, etc.).

## 2022-09-10 DIAGNOSIS — I48.91 ATRIAL FIBRILLATION WITH RAPID VENTRICULAR RESPONSE (HCC): ICD-10-CM

## 2022-09-10 DIAGNOSIS — Z95.5 S/P CORONARY ARTERY STENT PLACEMENT: ICD-10-CM

## 2022-09-12 RX ORDER — WARFARIN SODIUM 5 MG/1
2.5-5 TABLET ORAL DAILY
Qty: 90 TABLET | Refills: 1 | Status: SHIPPED | OUTPATIENT
Start: 2022-09-12 | End: 2023-01-01

## 2022-09-12 NOTE — TELEPHONE ENCOUNTER
Is the patient due for a refill? No    Was the patient seen the past year? Yes    Date of last office visit: 8/12/2022    Does the patient have an upcoming appointment?  No     Provider to refill: PIPO     Does the patients insurance require a 100 day supply?  No

## 2022-09-13 ENCOUNTER — APPOINTMENT (OUTPATIENT)
Dept: VASCULAR LAB | Facility: MEDICAL CENTER | Age: 77
End: 2022-09-13
Attending: NURSE PRACTITIONER
Payer: MEDICARE

## 2022-09-13 ENCOUNTER — ANTICOAGULATION MONITORING (OUTPATIENT)
Dept: VASCULAR LAB | Facility: MEDICAL CENTER | Age: 77
End: 2022-09-13
Payer: MEDICARE

## 2022-09-13 ENCOUNTER — NON-PROVIDER VISIT (OUTPATIENT)
Dept: MEDICAL GROUP | Facility: PHYSICIAN GROUP | Age: 77
End: 2022-09-13
Payer: MEDICARE

## 2022-09-13 VITALS
DIASTOLIC BLOOD PRESSURE: 58 MMHG | SYSTOLIC BLOOD PRESSURE: 118 MMHG | OXYGEN SATURATION: 98 % | HEART RATE: 64 BPM | RESPIRATION RATE: 12 BRPM | TEMPERATURE: 98 F

## 2022-09-13 DIAGNOSIS — Z79.01 CHRONIC ANTICOAGULATION: ICD-10-CM

## 2022-09-13 DIAGNOSIS — D68.59 HYPERCOAGULABLE STATE (HCC): ICD-10-CM

## 2022-09-13 LAB
INR BLD: 1.4 (ref 0.9–1.2)
INR PPP: 1.4 (ref 2–3.5)
POC PROTIME: ABNORMAL

## 2022-09-13 PROCEDURE — 99213 OFFICE O/P EST LOW 20 MIN: CPT | Performed by: NURSE PRACTITIONER

## 2022-09-13 PROCEDURE — 85610 PROTHROMBIN TIME: CPT | Performed by: FAMILY MEDICINE

## 2022-09-13 PROCEDURE — 99999 PR NO CHARGE: CPT | Performed by: NURSE PRACTITIONER

## 2022-09-13 NOTE — PROGRESS NOTES
This evaluation was conducted via Telemed using secure and encrypted videoconferencing technology. The patient was physically located at [facility] in [place], [place]. The patient was presented by a medical professional at the originating site.   The patient's identity was confirmed and verbal consent was obtained for this telemedicine encounter.      OP Anticoagulation Service Note    Date: 2022  Blood Pressure : 118/58  Pulse: 64  Respiration: 12    Anticoagulation Summary  As of 2022      INR goal:  2.0-3.0   TTR:  67.4 % (7.1 y)   INR used for dosin.40 (2022)   Warfarin maintenance plan:  2.5 mg (5 mg x 0.5) every Mon, Wed, Fri; 5 mg (5 mg x 1) all other days   Weekly warfarin total:  27.5 mg   Plan last modified:  Cedric Martinez, PharmD (2022)   Next INR check:  2022   Target end date:  Indefinite    Indications    Atrial fibrillation with rapid ventricular response (HCC) (Resolved) [I48.91]  Long term current use of anticoagulant therapy (Resolved) [Z79.01]  Atrial fibrillation (HCC) (Resolved) [I48.91]  Hypercoagulable state (HCC) [D68.59]                 Anticoagulation Episode Summary       INR check location:      Preferred lab:      Send INR reminders to:      Comments:            Anticoagulation Care Providers       Provider Role Specialty Phone number    Sanjay Sorensen M.D. Referring Cardiovascular Disease (Cardiology) [phone]          Anticoagulation Patient Findings  Patient Findings       Positives:  Change in health    Negatives:  Signs/symptoms of thrombosis, Signs/symptoms of bleeding, Laboratory test error suspected, Change in alcohol use, Change in activity, Upcoming invasive procedure, Emergency department visit, Upcoming dental procedure, Missed doses, Extra doses, Change in medications, Change in diet/appetite, Hospital admission, Bruising, Other complaints            THIS VISIT CONDUCTED WITH PRESENTER VIA TELEMEDICINE UTILIZING SECURE AND  ENCRYPTED VIDEOCONFERENCING EQUIPMENT  ROS:    Pulm: Denies SOB, chest pain.    Card: Denies syncope, edema, palpitations.    Extremities: Denies redness, pain.     PE:    Pulm: No SOB, even and unlabored.    Card: Normal rate and rhythm.    Extremities: No redness, or edema.     INR  sub-therapeutic.  Holding for procedure  Denies signs/symptoms of bleeding and/or thrombosis.    Denies changes to diet or medications.   Follow up appt in 1 weeks     Plan:  See below     Medication: Warfarin (Coumadin)     Sunday Monday Tuesday Wednesday Thursday Friday Saturday      5 mg    5 mg    5 mg    5 mg    5 mg    5 mg    5 mg      1 tab(s)    1 tab(s)    1 tab(s)    1 tab(s)    1 tab(s)    1 tab(s)  1 tab(s)     Next Appointment: Tuesday, Sept 20 @  [facility] coumadin [facility] at 2:15  Pt on antiplatelet therapy Plavix 75mg for coronary artery occlusion with stent indef per cardiology.    Review all of your home medications and newly ordered medications with your doctor and / or pharmacist. Follow medication instructions as directed by your doctor and / or pharmacist. Please keep your medication list with you and share with your physician. Update the information when medications are discontinued, doses are changed, or new medications (including over-the-counter products) are added; and carry medication information at all times in the event of emergency situations.    The patient is on a high risk medication and is sub- therapeutic. This could lead to clot formation or risk of stroke. Therefore this patient requires close monitoring and follow up.        CHEST guidelines recommend frequent INR monitoring at regular intervals (a few days up to a max of 12 weeks) to ensure they are on the proper dose of warfarin and not having any complications from therapy.  INRs can dramatically change over a short time period due to diet, medications, and medical conditions.   The patient instructed to go to the  ER for falls with a head injury,  blood in urine or stool or any bleeding that last longer than 20 min.     For questions, please contact Outpatient Anticoagulation Service [phone].     APRIL Garcia.

## 2022-09-14 RX ORDER — CLOPIDOGREL BISULFATE 75 MG/1
TABLET ORAL
Qty: 90 TABLET | Refills: 3 | Status: SHIPPED | OUTPATIENT
Start: 2022-09-14 | End: 2023-01-01

## 2022-09-15 ENCOUNTER — HOSPITAL ENCOUNTER (OUTPATIENT)
Dept: LAB | Facility: MEDICAL CENTER | Age: 77
End: 2022-09-15
Attending: INTERNAL MEDICINE
Payer: MEDICARE

## 2022-09-15 DIAGNOSIS — I10 ESSENTIAL HYPERTENSION, BENIGN: ICD-10-CM

## 2022-09-15 LAB
ANION GAP SERPL CALC-SCNC: 11 MMOL/L (ref 7–16)
BUN SERPL-MCNC: 61 MG/DL (ref 8–22)
CALCIUM SERPL-MCNC: 9.3 MG/DL (ref 8.5–10.5)
CHLORIDE SERPL-SCNC: 104 MMOL/L (ref 96–112)
CO2 SERPL-SCNC: 26 MMOL/L (ref 20–33)
CREAT SERPL-MCNC: 2.46 MG/DL (ref 0.5–1.4)
GFR SERPLBLD CREATININE-BSD FMLA CKD-EPI: 26 ML/MIN/1.73 M 2
GLUCOSE SERPL-MCNC: 151 MG/DL (ref 65–99)
POTASSIUM SERPL-SCNC: 4.8 MMOL/L (ref 3.6–5.5)
SODIUM SERPL-SCNC: 141 MMOL/L (ref 135–145)

## 2022-09-15 PROCEDURE — 80048 BASIC METABOLIC PNL TOTAL CA: CPT

## 2022-09-15 PROCEDURE — 36415 COLL VENOUS BLD VENIPUNCTURE: CPT

## 2022-09-20 ENCOUNTER — NON-PROVIDER VISIT (OUTPATIENT)
Dept: CARDIOLOGY | Facility: MEDICAL CENTER | Age: 77
End: 2022-09-20
Payer: MEDICARE

## 2022-09-20 ENCOUNTER — ANTICOAGULATION VISIT (OUTPATIENT)
Dept: VASCULAR LAB | Facility: MEDICAL CENTER | Age: 77
End: 2022-09-20
Payer: MEDICARE

## 2022-09-20 VITALS
SYSTOLIC BLOOD PRESSURE: 124 MMHG | DIASTOLIC BLOOD PRESSURE: 64 MMHG | WEIGHT: 220 LBS | BODY MASS INDEX: 30.68 KG/M2 | HEART RATE: 78 BPM

## 2022-09-20 DIAGNOSIS — E11.65 TYPE 2 DIABETES MELLITUS WITH HYPERGLYCEMIA, WITHOUT LONG-TERM CURRENT USE OF INSULIN (HCC): ICD-10-CM

## 2022-09-20 DIAGNOSIS — D68.59 HYPERCOAGULABLE STATE (HCC): ICD-10-CM

## 2022-09-20 DIAGNOSIS — I50.32 CHRONIC HEART FAILURE WITH PRESERVED EJECTION FRACTION (HCC): ICD-10-CM

## 2022-09-20 LAB
INR BLD: 3.2 (ref 0.9–1.2)
INR PPP: 3.2 (ref 2–3.5)

## 2022-09-20 PROCEDURE — 99212 OFFICE O/P EST SF 10 MIN: CPT

## 2022-09-20 PROCEDURE — 99211 OFF/OP EST MAY X REQ PHY/QHP: CPT | Performed by: INTERNAL MEDICINE

## 2022-09-20 PROCEDURE — 85610 PROTHROMBIN TIME: CPT

## 2022-09-20 RX ORDER — FUROSEMIDE 40 MG/1
80 TABLET ORAL DAILY
Qty: 180 TABLET | Refills: 1 | Status: SHIPPED | OUTPATIENT
Start: 2022-09-20 | End: 2023-01-01 | Stop reason: SDUPTHER

## 2022-09-20 ASSESSMENT — FIBROSIS 4 INDEX: FIB4 SCORE: 0.8

## 2022-09-20 NOTE — PROGRESS NOTES
Anticoagulation Summary  As of 9/20/2022      INR goal:  2.0-3.0   TTR:  67.3 % (7.1 y)   INR used for dosing:  3.20 (9/20/2022)   Warfarin maintenance plan:  2.5 mg (5 mg x 0.5) every Mon, Wed, Fri; 5 mg (5 mg x 1) all other days   Weekly warfarin total:  27.5 mg   Plan last modified:  Leslie AlonzoD (8/19/2022)   Next INR check:  10/4/2022   Target end date:  Indefinite    Indications    Atrial fibrillation with rapid ventricular response (HCC) (Resolved) [I48.91]  Long term current use of anticoagulant therapy (Resolved) [Z79.01]  Atrial fibrillation (HCC) (Resolved) [I48.91]  Hypercoagulable state (HCC) [D68.59]                 Anticoagulation Episode Summary       INR check location:      Preferred lab:      Send INR reminders to:      Comments:            Anticoagulation Care Providers       Provider Role Specialty Phone number    Sanjay Sorensen M.D. Referring Cardiovascular Disease (Cardiology) [phone]                  Refer to Patient Findings for HPI:      There were no vitals filed for this visit.   pt declined vitals    Verified current warfarin dosing schedule.    Medications reconciled   Pt on antiplatelet therapy Plavix 75mg for coronary artery occlusion with stent indefinitely per cardiology      A/P   INR  SUPRA-therapeutic.     Warfarin dosing recommendation: Reduce dose today to 2.5 mg and resume warfarin dosing of 2.5 mg MWF and 5 mg all other days. (Patient was taking prior to surgical procedure)    Pt educated to contact our clinic with any changes in medications or s/s of bleeding or thrombosis. Pt is aware to seek immediate medical attention for falls, head injury or deep cuts.    Follow up appointment in 2 week(s) via telemedicine in [place] per patient request.    Matilde Jacques, LeslieD

## 2022-09-20 NOTE — PROGRESS NOTES
Patient Consult Note     CHF Pharmacotherapy visit   Informed written consent was given on: 10/26/21    [facility]  Yuri De León is here for CHF and DM   Pertinent Interval History since last visit:   none  Most recent EF:  65%, 7/23/2021    Vitals:    09/20/22 1317   BP: 124/64   Pulse: 78     Home BP and HR:  Not testing   Change in weight: up about 10lbs   Exercise habits: moderate regular exercise program   Diet: DASH diet      Current Outpatient Medications:     clopidogrel, Take 1 tablet by mouth once daily    warfarin, 2.5-5 mg, Oral, DAILY    multivitamin, 1 Tablet, Oral, DAILY    lisinopril, 2.5 mg, Oral, DAILY    budesonide-formoterol, 2 Puff, Inhalation, BID    Trulicity, 1 Each, Subcutaneous, Q7 DAYS    atorvastatin, 80 mg, Oral, DAILY    Berberine Complex, 1 Capsule, Oral, BID    celecoxib, 200 mg, Oral, QDAY PRN    furosemide, 80 mg, Oral, DAILY    DILTIAZem, 60 mg, Oral, TID    albuterol, 2.5 mg, Nebulization, Q4HRS PRN    Current Adherence to CHF and other therapies:  Complete    DATA REVIEW  INR   Date Value Ref Range Status   09/13/2022 1.40 (A) 2 - 3.5 Final     POC INR   Date Value Ref Range Status   09/13/2022 1.4 (A) 0.9 - 1.2 Final     Comment:     [id number] lot [id number] exp 12/31/2022 qc pass       Latest Reference Range & Units 10/20/21 12:08 01/12/22 12:03 07/11/22 08:35   Glycohemoglobin 4.0 - 5.6 % 8.9 (H) 8.6 (H) 7.2 (H)   (H): Data is abnormally high      Lab Results   Component Value Date/Time    CHOLSTRLTOT 119 02/25/2022 10:43 AM    LDL 62 02/25/2022 10:43 AM    HDL 49 02/25/2022 10:43 AM    TRIGLYCERIDE 42 02/25/2022 10:43 AM       Lab Results   Component Value Date/Time    SODIUM 141 09/15/2022 12:20 PM    POTASSIUM 4.8 09/15/2022 12:20 PM    CHLORIDE 104 09/15/2022 12:20 PM    CO2 26 09/15/2022 12:20 PM    GLUCOSE 151 (H) 09/15/2022 12:20 PM    BUN 61 (H) 09/15/2022 12:20 PM    CREATININE 2.46 (H) 09/15/2022 12:20 PM     Lab Results   Component Value Date/Time    ALKPHOSPHAT  142 (H) 2022 10:42 AM    ASTSGOT 11 (L) 2022 10:42 AM    ALTSGPT 14 2022 10:42 AM    TBILIRUBIN 0.7 2022 10:42 AM    INR 1.40 (A) 2022 12:00 AM    ALBUMIN 3.8 2022 10:42 AM    ALBUMIN 3.77 2021 12:04 PM      Lab Results   Component Value Date/Time    MALBCRT 484 (H) 10/20/2021 12:14 PM    MICROALBUR 37.1 10/20/2021 12:14 PM        Latest Reference Range & Units 22 10:43 22 08:35 22 10:42 9/15/22 12:20   Creatinine 0.50 - 1.40 mg/dL 1.89 (H) 2.48 (H) 1.87 (H) 2.46 (H)   GFR (CKD-EPI) >60 mL/min/1.73 m 2  26 ! 37 ! 26 !   (H): Data is abnormally high  !: Data is abnormal    Calculated creatinine clearance:     Significant changes to laboratory values since last visit that require repeat labs:  n/a  Other Pertinent Blood Work:   n/a    Immunization History   Administered Date(s) Administered    Influenza Vaccine Quad Inj (Pf) 10/17/2018, 2019    Pneumococcal Conjugate Vaccine (Prevnar/PCV-13) 10/17/2018    Pneumococcal polysaccharide vaccine (PPSV-23) 10/07/2014         SOCIAL HISTORY  Social History     Tobacco Use   Smoking Status Former    Packs/day: 0.50    Years: 10.00    Pack years: 5.00    Types: Cigarettes    Quit date: 12/10/1968    Years since quittin.8   Smokeless Tobacco Never        Recent Imaging Studies:    None since last visit      ASSESSMENT AND PLAN  CHF & HTN:  bp at goal   No edema, no SOB  Up 10lb since last appt, mostly fat he thinks  CrCl is stable but only near 30ml/min, make appt with nephrologist    He has not been taking his lasix, he will restart at 40mg daily   He did stop the Celebrex  CHF medications:  Restart Lasix 40 mg daily, increase to 80mg as tolerated   Continue lisinopril 2.5mg QD  Continue Diltiazem 60mg BID-TID, per pt     2. DM   A1C near goal   No SGLT2 due to CrCl change since last appt.  May consider at subsequent appointments due to microalbuminuria.   Diabetes medication:   Continue with Trulicity 4.5mg  every 7 days           3. Lipids:  ldl at goal   lipid medication:   Continue with Lipitor 80mg     4. Lifestyle   Lifestyle Recommendations From Today's Visit:   Continue to eat DASH/MED style diet.   Continue to exercise as tolerated.      Blood Work Ordered At Today's visit: cmp and A1C  Studies Ordered at Todays Visit: none  Follow-Up: 12 week(s)    Ilya Rachel, PharmD  [facility]  Phone: [phone], Fax: [phone]    This note was created using voice recognition software (Dragon). The accuracy of the dictation is limited by the abilities of the software. I have reviewed the note prior to signing, however some errors in grammar and context are still possible. If you have any questions related to this note please do not hesitate to contact our office.     CC:  Lashell Carmona D.O.  No ref. provider found

## 2022-10-03 ENCOUNTER — TELEPHONE (OUTPATIENT)
Dept: VASCULAR LAB | Facility: MEDICAL CENTER | Age: 77
End: 2022-10-03
Payer: MEDICARE

## 2022-10-03 NOTE — TELEPHONE ENCOUNTER
[facility]    Pt was accidentally scheduled by our clinic in a time where another pt was being seen. Left VM with pt we need to move his appt to 2:30 pm and to call our clinic if this time does not work.    Cedric Martinez, PharmD

## 2022-10-17 ENCOUNTER — TELEPHONE (OUTPATIENT)
Dept: SLEEP MEDICINE | Facility: MEDICAL CENTER | Age: 77
End: 2022-10-17
Payer: MEDICARE

## 2022-10-17 NOTE — TELEPHONE ENCOUNTER
10-17-22  1st NO SHOW  Date of No Show: 10-14-22  Provider: Davey Nogueira  Reason For Visit: FV/ 6 mos   Outcome of call:    Appt rescheduled with patient   Reason Missed: Forgot about appointment   ACM

## 2022-10-18 ENCOUNTER — APPOINTMENT (OUTPATIENT)
Dept: VASCULAR LAB | Facility: MEDICAL CENTER | Age: 77
End: 2022-10-18
Payer: MEDICARE

## 2022-10-18 ENCOUNTER — NON-PROVIDER VISIT (OUTPATIENT)
Dept: MEDICAL GROUP | Facility: PHYSICIAN GROUP | Age: 77
End: 2022-10-18
Payer: MEDICARE

## 2022-10-18 ENCOUNTER — ANTICOAGULATION MONITORING (OUTPATIENT)
Dept: VASCULAR LAB | Facility: MEDICAL CENTER | Age: 77
End: 2022-10-18

## 2022-10-18 VITALS
OXYGEN SATURATION: 94 % | HEART RATE: 71 BPM | DIASTOLIC BLOOD PRESSURE: 66 MMHG | TEMPERATURE: 98 F | SYSTOLIC BLOOD PRESSURE: 134 MMHG | RESPIRATION RATE: 12 BRPM

## 2022-10-18 DIAGNOSIS — Z79.01 CHRONIC ANTICOAGULATION: ICD-10-CM

## 2022-10-18 DIAGNOSIS — D68.59 HYPERCOAGULABLE STATE (HCC): ICD-10-CM

## 2022-10-18 LAB
INR BLD: 2.1 (ref 0.9–1.2)
INR PPP: 2.1 (ref 2–3.5)
POC PROTIME: ABNORMAL

## 2022-10-18 PROCEDURE — 85610 PROTHROMBIN TIME: CPT | Performed by: FAMILY MEDICINE

## 2022-10-18 PROCEDURE — 99213 OFFICE O/P EST LOW 20 MIN: CPT | Performed by: NURSE PRACTITIONER

## 2022-10-18 NOTE — PROGRESS NOTES
This evaluation was conducted via Telemed using secure and encrypted videoconferencing technology. The patient was physically located at [facility] in [place], [place]. The patient was presented by a medical professional at the originating site.   The patient's identity was confirmed and verbal consent was obtained for this telemedicine encounter.      OP Anticoagulation Service Note    Date: 10/18/2022       Anticoagulation Summary  As of 10/18/2022      INR goal:  2.0-3.0   TTR:  67.3 % (7.1 y)   INR used for dosing:     Plan last modified:  Cedric Martinez, PharmD (8/19/2022)   Next INR check:     Target end date:  Indefinite    Indications    Atrial fibrillation with rapid ventricular response (HCC) (Resolved) [I48.91]  Long term current use of anticoagulant therapy (Resolved) [Z79.01]  Atrial fibrillation (HCC) (Resolved) [I48.91]  Hypercoagulable state (HCC) [D68.59]                 Anticoagulation Episode Summary       INR check location:      Preferred lab:      Send INR reminders to:      Comments:            Anticoagulation Care Providers       Provider Role Specialty Phone number    Sanjay Sorensen M.D. Referring Cardiovascular Disease (Cardiology) [phone]          Anticoagulation Patient Findings      THIS VISIT CONDUCTED WITH PRESENTER VIA TELEMEDICINE UTILIZING SECURE AND ENCRYPTED VIDEOCONFERENCING EQUIPMENT  ROS:    Pulm: Denies SOB, chest pain.    Card: Denies syncope, edema, palpitations.    Extremities: Denies redness, pain.     PE:    Pulm: No SOB, even and unlabored.    Card: Normal rate and rhythm.    Extremities: No redness, or edema.     INR  is-therapeutic.    Denies signs/symptoms of bleeding and/or thrombosis.    Denies changes to diet or medications.   Follow up appt in 3 weeks     Plan:  see below    Medication: Warfarin (Coumadin)     Sunday Monday Tuesday Wednesday Thursday Friday Saturday      5 mg    2.5 mg    5 mg    2.5 mg    5 mg    2.5 mg    5 mg      1  tab(s)    1/2 tab(s)    1 tab(s)    1/2 tab(s)    1 tab(s)    1/2 tab(s)  1 tab(s)     Next Appointment: Tuesday, Nov 8 @1:45   Pt on antiplatelet therapy Plavix 75mg for coronary artery occlusion with stent indef per cardiology.    Review all of your home medications and newly ordered medications with your doctor and / or pharmacist. Follow medication instructions as directed by your doctor and / or pharmacist. Please keep your medication list with you and share with your physician. Update the information when medications are discontinued, doses are changed, or new medications (including over-the-counter products) are added; and carry medication information at all times in the event of emergency situations.       Patient is on a high risk medication and therefore requires close monitoring and follow up.     CHEST guidelines recommend frequent INR monitoring at regular intervals (a few days up to a max of 12 weeks) to ensure they are on the proper dose of warfarin and not having any complications from therapy.  INRs can dramatically change over a short time period due to diet, medications, and medical conditions.   The patient instructed to go to the ER for falls with a head injury,  blood in urine or stool or any bleeding that last longer than 20 min.   For questions, please contact Outpatient Anticoagulation Service [phone].     APRIL Eldridge.

## 2022-11-08 NOTE — PROGRESS NOTES
This evaluation was conducted via Telemed using secure and encrypted videoconferencing technology. The patient was physically located at [facility] in [place], [place]. The patient was presented by a medical professional at the originating site.   The patient's identity was confirmed and verbal consent was obtained for this telemedicine encounter.      OP Anticoagulation Service Note    Date: 11/8/2022       Anticoagulation Summary  As of 11/8/2022      INR goal:  2.0-3.0   TTR:  67.5 % (7.2 y)   INR used for dosing:     Warfarin maintenance plan:  2.5 mg (5 mg x 0.5) every Mon, Wed, Fri; 5 mg (5 mg x 1) all other days; Starting 11/8/2022   Weekly warfarin total:  27.5 mg   Plan last modified:  Cedric Martinez, PharmD (8/19/2022)   Next INR check:     Target end date:  Indefinite    Indications    Atrial fibrillation with rapid ventricular response (HCC) (Resolved) [I48.91]  Long term current use of anticoagulant therapy (Resolved) [Z79.01]  Atrial fibrillation (HCC) (Resolved) [I48.91]  Hypercoagulable state (HCC) [D68.59]                 Anticoagulation Episode Summary       INR check location:      Preferred lab:      Send INR reminders to:      Comments:            Anticoagulation Care Providers       Provider Role Specialty Phone number    Sanjay Sorensen M.D. Referring Cardiovascular Disease (Cardiology) [phone]          Anticoagulation Patient Findings      THIS VISIT CONDUCTED WITH PRESENTER VIA TELEMEDICINE UTILIZING SECURE AND ENCRYPTED VIDEOCONFERENCING EQUIPMENT  ROS:    Pulm: Denies SOB, chest pain.    Card: Denies syncope, edema, palpitations.    Extremities: Denies redness, pain.     PE:    Pulm: No SOB, even and unlabored.    Card: Normal rate and rhythm.    Extremities: No redness, or edema.     INR  is-therapeutic.    Denies signs/symptoms of bleeding and/or thrombosis.    Denies changes to diet or medications.   Follow up appt in 5 weeks per pt preference    Plan:  continue current  dosing    Medication: Warfarin (Coumadin)        Next Appointment: Tuesday, 12/13/22 @1pm   Pt on antiplatelet therapy Plavix 75mg for coronary artery occlusion with stent indef per cardiology.  Review all of your home medications and newly ordered medications with your doctor and / or pharmacist. Follow medication instructions as directed by your doctor and / or pharmacist. Please keep your medication list with you and share with your physician. Update the information when medications are discontinued, doses are changed, or new medications (including over-the-counter products) are added; and carry medication information at all times in the event of emergency situations.       Patient is on a high risk medication and therefore requires close monitoring and follow up.     CHEST guidelines recommend frequent INR monitoring at regular intervals (a few days up to a max of 12 weeks) to ensure they are on the proper dose of warfarin and not having any complications from therapy.  INRs can dramatically change over a short time period due to diet, medications, and medical conditions.   The patient instructed to go to the ER for falls with a head injury,  blood in urine or stool or any bleeding that last longer than 20 min.   For questions, please contact Outpatient Anticoagulation Service [phone].     POLO Hughes.

## 2022-11-10 NOTE — PROGRESS NOTES
Chief Complaint   Patient presents with    COPD    Asthma    Follow-Up       HPI:  Yuri De León is a 77 y.o. year old male here today for follow-up on asthma.  Last seen by me on 4/14/2022. Current regimen includes Symbicort and Ventolin as needed.  Significant past medical history includes heart failure, pulmonary hypertension, type 2 diabetes, CKD, A. fib with Coumadin for anticoagulants, coronary artery disease status post stents.      Currently he denies any symptoms including chest tightness, or new or worsening dyspnea or shortness of breath.  He does have morning chest congestion with cough, but it is not problematic.  His cough is generally worsened by high allergen counts.  He is currently only taking Symbicort 1 puff twice daily, but has not needed Ventolin or nebulizer.  He is relatively active and works outdoors.  He does sleep in a chair because lying flat makes him feel like he is suffocating.  Patient declined further work-up for LAUREL in the past.  He does snore but denies any excessive daytime sleepiness, morning headaches, or palpitations.  He denies any hospitalizations, exacerbations, or urgent care visits since last visit.  He does follow ENT and does have a history of nasal polyps.      Chest x-ray (7/21/2021):  Stable enlargement of the cardiac silhouette and central vasculature probably due to chronic cardiac disease. There is no acute cardiopulmonary process.     Echocardiogram (7/23/2020):  Prior Echo 3/23/18  Left ventricular ejection fraction is visually estimated to be 65%. Flattening of the interventricular septum. Moderately dilated right ventricle. Moderate mitral regurgitation.  Moderate tricuspid regurgitation. Estimated right ventricular systolic pressure  is 55 mmHg.       ROS: As per HPI and otherwise negative if not stated.    Past Medical History:   Diagnosis Date    Abnormal electrocardiogram 08/13/2010    Arrhythmia     Atrial Fibrillation; Cardiologist, Dr. Sorensen  "   Arthritis     knees, left hip    ASTHMA     inhalers    Atrial fibrillation (HCC) 10/25/2011    Bronchospasm 08/06/2009    Cancer (HCC)     skin cancer R post ear, removed    Cataract     IOL OU    Dental disorder     a few missing molars    Diabetes (HCC)     insulin    Heart valve disease     mitral regurg    Hemorrhagic disorder (HCC)     on blood thinners    High cholesterol     HTN (hypertension) 10/25/2011    Hypercholesteremia 10/25/2011    Long term (current) use of anticoagulants 10/25/2011    NASAL POLYPS 08/06/2009    Other nonspecific abnormal finding 08/06/2009    Pain     left hip    Pneumonia     Pulmonary hypertension (HCC)     Renal disorder     CKD stage 4    Shortness of breath     Sleep apnea 08/06/2009    No O2 or device, no retest    Wears glasses        Past Surgical History:   Procedure Laterality Date    PB ADJ TISS XFER HEAD,FAC,HAND 10.1-30 N/A 9/7/2022    Procedure: LOCAL FLAP RECONSTRUCTION WITH REMOVAL OF DENUDED AND DRIED BONE OF THE FOREHEAD;  Surgeon: Buzz Mcnamara M.D.;  Location: [facility];  Service: Ent    KNEE ARTHROPLASTY TOTAL Left 10/2014    HIP ARTHROPLASTY TOTAL  08/31/2010    LEFT Performed by OSGOOD, PATRICK J at SURGERY [facility] ORS    LUMBAR DECOMPRESSION  1984    CATARACT EXTRACTION WITH IOL Bilateral     CYSTOSCOPY      cannot remember exact procedure    LAMINOTOMY      SINUSCOPE      SINUSOTOMIES  2003,2005,2/2007       Family History   Problem Relation Age of Onset    Heart Disease Mother        Allergies as of 11/10/2022 - Reviewed 11/10/2022   Allergen Reaction Noted    Atenolol Shortness of Breath and Unspecified 08/06/2009    Tetanus toxoid Swelling 08/23/2010    Atorvastatin Nausea 09/02/2022        Vitals:  /84   Pulse 71   Resp 16   Ht 1.803 m (5' 11\")   Wt 99.8 kg (220 lb)   SpO2 94%     Current medications as of today   Current Outpatient Medications   Medication Sig Dispense Refill    furosemide (LASIX) 40 MG Tab Take 2 " Tablets by mouth every day. Or as needed 180 Tablet 1    clopidogrel (PLAVIX) 75 MG Tab Take 1 tablet by mouth once daily 90 Tablet 3    warfarin (COUMADIN) 5 MG Tab Take 0.5-1 Tablets by mouth every day. As directed by [facility] 90 Tablet 1    multivitamin (THERAGRAN) Tab Take 1 Tablet by mouth every day.      lisinopril (PRINIVIL) 2.5 MG Tab Take 1 Tablet by mouth every day. 90 Tablet 3    budesonide-formoterol (SYMBICORT) 160-4.5 MCG/ACT Aerosol Inhale 2 Puffs 2 times a day. Use spacer. Rinse mouth after each use. 1 Each 11    Dulaglutide (TRULICITY) 4.5 MG/0.5ML Solution Pen-injector Inject 1 Each under the skin every 7 days. 6 mL 5    atorvastatin (LIPITOR) 80 MG tablet Take 1 Tablet by mouth every day. 90 Tablet 3    Barberry-Oreg Grape-Goldenseal (BERBERINE COMPLEX) 200-200-50 MG Cap Take 1 Capsule by mouth 2 times a day.      celecoxib (CELEBREX) 200 MG Cap Take 200 mg by mouth 1 time a day as needed for Moderate Pain. (Patient not taking: No sig reported)      DILTIAZem (CARDIZEM) 60 MG Tab Take 1 tablet by mouth 3 times a day. 90 tablet 11    albuterol (PROVENTIL) 2.5mg/3ml Nebu Soln solution for nebulization Take 3 mL by nebulization every four hours as needed for Shortness of Breath. 120 Bullet 2     No current facility-administered medications for this visit.         Physical Exam:   Gen:           Alert and oriented, No apparent distress. Mood and affect appropriate, normal interaction with examiner.  Eyes:          PERRL, EOM intact, sclere white, conjunctive moist.  Ears:          Not examined.   Hearing:     Grossly intact.  Nose:          Normal, no lesions or deformities.  Dentition:    Good dentition.  Oropharynx:   Tongue normal, posterior pharynx without erythema or exudate  Neck:        Supple, trachea midline, no masses.  Respiratory Effort: No intercostal retractions or use of accessory muscles.   Lung Auscultation:      Clear to auscultation bilaterally; no rales,  rhonchi or wheezing.  CV:            Regular rate and rhythm. No murmurs, rubs or gallops.  Abd:           Not examined.   Lymphadenopathy: Not examined.  Gait and Station: Normal.  Digits and Nails: No clubbing, cyanosis, petechiae, or nodes.   Cranial Nerves: II-XII grossly intact.  Skin:        No rashes, lesions or ulcers noted.               Ext:           No cyanosis or edema.      Assessment:  1. SOB (shortness of breath)        2. Mild persistent asthma without complication            Plan:  Continue current regimen of Symbicort and Ventolin.  Advised patient to follow-up yearly, but can be seen sooner if needed.  Improved.    Please note that this dictation was created using voice recognition software. I have made every reasonable attempt to correct obvious errors, but it is possible there are errors of grammar and possibly content that I did not discover before finalizing the note.

## 2022-11-15 NOTE — PROGRESS NOTES
Patient Consult Note     CHF Pharmacotherapy visit   Informed written consent was given on: 10/26/21    [facility]  Yuri De León is here for CHF and DM   Pertinent Interval History since last visit:   none  Most recent EF:  65%, 7/23/2021  60%, 3/23/2018  Vitals:    11/15/22 1403   BP: 126/61   Pulse: 69       Home BP and HR:  Not testing   Change in weight: up about 10lbs   Exercise habits: moderate regular exercise program   Diet: DASH diet      Current Outpatient Medications:     furosemide, 80 mg, Oral, DAILY    clopidogrel, Take 1 tablet by mouth once daily    warfarin, 2.5-5 mg, Oral, DAILY    multivitamin, 1 Tablet, Oral, DAILY    lisinopril, 2.5 mg, Oral, DAILY    budesonide-formoterol, 2 Puff, Inhalation, BID    Trulicity, 1 Each, Subcutaneous, Q7 DAYS    atorvastatin, 80 mg, Oral, DAILY (Patient taking differently: 40 mg, Oral, DAILY)    Berberine Complex, 1 Capsule, Oral, BID    celecoxib, 200 mg, Oral, QDAY PRN (Patient not taking: No sig reported)    dilTIAZem, 60 mg, Oral, TID    albuterol, 2.5 mg, Nebulization, Q4HRS PRN    Current Adherence to CHF and other therapies:  Complete    DATA REVIEW  INR   Date Value Ref Range Status   11/08/2022 2.20 2 - 3.5 Final     POC INR   Date Value Ref Range Status   11/08/2022 2.2 (A) 0.9 - 1.2 Final     Comment:     ndc [id number] lot [id number] qc pass exp 07/31/2023       Latest Reference Range & Units 10/20/21 12:08 01/12/22 12:03 07/11/22 08:35   Glycohemoglobin 4.0 - 5.6 % 8.9 (H) 8.6 (H) 7.2 (H)   (H): Data is abnormally high      Lab Results   Component Value Date/Time    CHOLSTRLTOT 119 02/25/2022 10:43 AM    LDL 62 02/25/2022 10:43 AM    HDL 49 02/25/2022 10:43 AM    TRIGLYCERIDE 42 02/25/2022 10:43 AM       Lab Results   Component Value Date/Time    SODIUM 141 09/15/2022 12:20 PM    POTASSIUM 4.8 09/15/2022 12:20 PM    CHLORIDE 104 09/15/2022 12:20 PM    CO2 26 09/15/2022 12:20 PM    GLUCOSE 151 (H) 09/15/2022 12:20 PM    BUN 61 (H) 09/15/2022 12:20 PM     CREATININE 2.46 (H) 09/15/2022 12:20 PM     Lab Results   Component Value Date/Time    ALKPHOSPHAT 142 (H) 2022 10:42 AM    ASTSGOT 11 (L) 2022 10:42 AM    ALTSGPT 14 2022 10:42 AM    TBILIRUBIN 0.7 2022 10:42 AM    INR 2.20 2022 12:00 AM    ALBUMIN 3.8 2022 10:42 AM    ALBUMIN 3.77 2021 12:04 PM      Lab Results   Component Value Date/Time    MALBCRT 484 (H) 10/20/2021 12:14 PM    MICROALBUR 37.1 10/20/2021 12:14 PM        Latest Reference Range & Units 22 10:43 22 08:35 22 10:42 9/15/22 12:20   Creatinine 0.50 - 1.40 mg/dL 1.89 (H) 2.48 (H) 1.87 (H) 2.46 (H)   GFR (CKD-EPI) >60 mL/min/1.73 m 2  26 ! 37 ! 26 !   (H): Data is abnormally high  !: Data is abnormal    Calculated creatinine clearance:     Significant changes to laboratory values since last visit that require repeat labs:  n/a  Other Pertinent Blood Work:   n/a    Immunization History   Administered Date(s) Administered    Influenza Vaccine Quad Inj (Pf) 10/17/2018, 2019    Pneumococcal Conjugate Vaccine (Prevnar/PCV-13) 10/17/2018    Pneumococcal polysaccharide vaccine (PPSV-23) 10/07/2014         SOCIAL HISTORY  Social History     Tobacco Use   Smoking Status Former    Packs/day: 0.50    Years: 10.00    Pack years: 5.00    Types: Cigarettes    Quit date: 12/10/1968    Years since quittin.9   Smokeless Tobacco Never        Recent Imaging Studies:    None since last visit      ASSESSMENT AND PLAN  CHF & HTN:  bp at goal   No edema, no SOB  Up 10lb since last appt, mostly fat he thinks  CrCl is stable but only near 30ml/min, make appt with nephrologist    He has not been taking his lasix, he will restart at 40mg daily   He did stop the Celebrex  CHF medications:  Restart Lasix 40 mg daily, increase to 80mg as tolerated   Continue lisinopril 2.5mg QD  Continue Diltiazem 60mg BID-TID, per pt     2. DM   A1C near goal   No SGLT2 due to CrCl change since last appt.  May consider at subsequent  appointments due to microalbuminuria, if CrCl is stable   Diabetes medication:   Continue with Trulicity 4.5mg every 7 days - getting via the PAP          3. Lipids:  ldl at goal   lipid medication:   Continue with Lipitor 40mg , due to Nausea     4. Lifestyle   Lifestyle Recommendations From Today's Visit:   Continue to eat DASH/MED style diet.   Continue to exercise as tolerated.      Blood Work Ordered At Today's visit: cmp and A1C  Studies Ordered at Todays Visit: none  Follow-Up: 12 week(s)    Ilya Rachel PharmD  [facility]  Phone: [phone], Fax: [phone]    This note was created using voice recognition software (Dragon). The accuracy of the dictation is limited by the abilities of the software. I have reviewed the note prior to signing, however some errors in grammar and context are still possible. If you have any questions related to this note please do not hesitate to contact our office.     CC:  Lashell Carmona D.O.  No ref. provider found

## 2022-11-30 ENCOUNTER — APPOINTMENT (RX ONLY)
Dept: URBAN - METROPOLITAN AREA CLINIC 6 | Facility: CLINIC | Age: 77
Setting detail: DERMATOLOGY
End: 2022-11-30

## 2022-11-30 DIAGNOSIS — L57.8 OTHER SKIN CHANGES DUE TO CHRONIC EXPOSURE TO NONIONIZING RADIATION: ICD-10-CM

## 2022-11-30 DIAGNOSIS — L82.1 OTHER SEBORRHEIC KERATOSIS: ICD-10-CM

## 2022-11-30 DIAGNOSIS — D22 MELANOCYTIC NEVI: ICD-10-CM

## 2022-11-30 DIAGNOSIS — L81.4 OTHER MELANIN HYPERPIGMENTATION: ICD-10-CM

## 2022-11-30 DIAGNOSIS — D18.0 HEMANGIOMA: ICD-10-CM

## 2022-11-30 DIAGNOSIS — Z71.89 OTHER SPECIFIED COUNSELING: ICD-10-CM

## 2022-11-30 DIAGNOSIS — L40.8 OTHER PSORIASIS: ICD-10-CM | Status: INADEQUATELY CONTROLLED

## 2022-11-30 PROBLEM — D48.5 NEOPLASM OF UNCERTAIN BEHAVIOR OF SKIN: Status: ACTIVE | Noted: 2022-11-30

## 2022-11-30 PROBLEM — D22.5 MELANOCYTIC NEVI OF TRUNK: Status: ACTIVE | Noted: 2022-11-30

## 2022-11-30 PROBLEM — L30.9 DERMATITIS, UNSPECIFIED: Status: ACTIVE | Noted: 2022-11-30

## 2022-11-30 PROBLEM — D22.62 MELANOCYTIC NEVI OF LEFT UPPER LIMB, INCLUDING SHOULDER: Status: ACTIVE | Noted: 2022-11-30

## 2022-11-30 PROBLEM — D18.01 HEMANGIOMA OF SKIN AND SUBCUTANEOUS TISSUE: Status: ACTIVE | Noted: 2022-11-30

## 2022-11-30 PROBLEM — D22.61 MELANOCYTIC NEVI OF RIGHT UPPER LIMB, INCLUDING SHOULDER: Status: ACTIVE | Noted: 2022-11-30

## 2022-11-30 PROCEDURE — 99204 OFFICE O/P NEW MOD 45 MIN: CPT | Mod: 25

## 2022-11-30 PROCEDURE — ? BIOPSY BY SHAVE METHOD

## 2022-11-30 PROCEDURE — ? COUNSELING

## 2022-11-30 PROCEDURE — ? PRESCRIPTION

## 2022-11-30 PROCEDURE — ? DIAGNOSIS COMMENT

## 2022-11-30 PROCEDURE — 11103 TANGNTL BX SKIN EA SEP/ADDL: CPT

## 2022-11-30 PROCEDURE — 11102 TANGNTL BX SKIN SINGLE LES: CPT

## 2022-11-30 RX ADMIN — TRIAMCINOLONE ACETONIDE 1: 1 CREAM TOPICAL at 00:00

## 2022-11-30 ASSESSMENT — LOCATION ZONE DERM
LOCATION ZONE: TRUNK
LOCATION ZONE: LEG
LOCATION ZONE: HAND
LOCATION ZONE: FACE
LOCATION ZONE: ARM

## 2022-11-30 ASSESSMENT — LOCATION DETAILED DESCRIPTION DERM
LOCATION DETAILED: RIGHT VENTRAL DISTAL FOREARM
LOCATION DETAILED: LEFT ANTERIOR DISTAL UPPER ARM
LOCATION DETAILED: RIGHT VENTRAL PROXIMAL FOREARM
LOCATION DETAILED: LEFT PROXIMAL ULNAR DORSAL FOREARM
LOCATION DETAILED: RIGHT SUPERIOR LATERAL MIDBACK
LOCATION DETAILED: LEFT CENTRAL MALAR CHEEK
LOCATION DETAILED: RIGHT MID-UPPER BACK
LOCATION DETAILED: LEFT INFERIOR UPPER BACK
LOCATION DETAILED: RIGHT ANTERIOR DISTAL UPPER ARM
LOCATION DETAILED: LEFT VENTRAL DISTAL FOREARM
LOCATION DETAILED: RIGHT MEDIAL INFERIOR CHEST
LOCATION DETAILED: LEFT RADIAL DORSAL HAND
LOCATION DETAILED: LEFT PROXIMAL PRETIBIAL REGION
LOCATION DETAILED: LEFT INFERIOR FOREHEAD
LOCATION DETAILED: LEFT PROXIMAL DORSAL FOREARM
LOCATION DETAILED: RIGHT RADIAL DORSAL HAND
LOCATION DETAILED: RIGHT PROXIMAL DORSAL FOREARM

## 2022-11-30 ASSESSMENT — LOCATION SIMPLE DESCRIPTION DERM
LOCATION SIMPLE: RIGHT HAND
LOCATION SIMPLE: LEFT PRETIBIAL REGION
LOCATION SIMPLE: LEFT HAND
LOCATION SIMPLE: CHEST
LOCATION SIMPLE: LEFT FOREARM
LOCATION SIMPLE: LEFT CHEEK
LOCATION SIMPLE: RIGHT UPPER ARM
LOCATION SIMPLE: LEFT FOREHEAD
LOCATION SIMPLE: RIGHT UPPER BACK
LOCATION SIMPLE: RIGHT FOREARM
LOCATION SIMPLE: RIGHT LOWER BACK
LOCATION SIMPLE: LEFT UPPER BACK
LOCATION SIMPLE: LEFT UPPER ARM

## 2022-11-30 NOTE — PROCEDURE: DIAGNOSIS COMMENT
Detail Level: Simple
Render Risk Assessment In Note?: no
Comment: Extremely severe actinic damage - discussed scheduling PDT Red after LN2 next month; deferred LN2 today due to extensive amount of biopsies done at the time of the visit/pt unable to tolerate additional procedures.

## 2022-11-30 NOTE — PROCEDURE: BIOPSY BY SHAVE METHOD
Detail Level: Detailed
Depth Of Biopsy: dermis
Was A Bandage Applied: Yes
Size Of Lesion In Cm: 0.8
X Size Of Lesion In Cm: 0
Biopsy Type: H and E
Biopsy Method: Dermablade
Anesthesia Type: 1% lidocaine with epinephrine
Anesthesia Volume In Cc: 0.5
Hemostasis: Electrocautery
Wound Care: Petrolatum
Dressing: bandage
Destruction After The Procedure: No
Type Of Destruction Used: Electrodesiccation and Curettage
Curettage Text: The wound bed was treated with curettage after the biopsy was performed.
Cryotherapy Text: The wound bed was treated with cryotherapy after the biopsy was performed.
Electrodesiccation Text: The wound bed was treated with electrodesiccation after the biopsy was performed.
Electrodesiccation And Curettage Text: The wound bed was treated with electrodesiccation and curettage after the biopsy was performed. Treated x 3
Silver Nitrate Text: The wound bed was treated with silver nitrate after the biopsy was performed.
Lab: [id number]
Lab Facility: 
Consent: Written consent was obtained and risks were reviewed including but not limited to scarring, infection, bleeding, scabbing, incomplete removal, nerve damage and allergy to anesthesia.
Post-Care Instructions: I reviewed with the patient in detail post-care instructions. Patient is to keep the biopsy site dry overnight, and then apply bacitracin twice daily until healed. Patient may apply hydrogen peroxide soaks to remove any crusting.
Notification Instructions: Patient will be notified of biopsy results. However, patient instructed to call the office if not contacted within 2 weeks.
Billing Type: Third-Party Bill
Information: Selecting Yes will display possible errors in your note based on the variables you have selected. This validation is only offered as a suggestion for you. PLEASE NOTE THAT THE VALIDATION TEXT WILL BE REMOVED WHEN YOU FINALIZE YOUR NOTE. IF YOU WANT TO FAX A PRELIMINARY NOTE YOU WILL NEED TO TOGGLE THIS TO 'NO' IF YOU DO NOT WANT IT IN YOUR FAXED NOTE.
Size Of Lesion In Cm: 1
Hemostasis: Drysol
Size Of Lesion In Cm: 2
X Size Of Lesion In Cm: 1.5
Size Of Lesion In Cm: 1.2

## 2022-12-01 RX ORDER — TRIAMCINOLONE ACETONIDE 1 MG/G
1 CREAM TOPICAL BID
Qty: 80 | Refills: 1 | Status: ERX | COMMUNITY
Start: 2022-11-30

## 2022-12-13 NOTE — PROGRESS NOTES
Pt missed telemed anticoagulation appt today.  Will route to [facility] to have pt rescheduled.    Amanda CUELLAR  [facility]

## 2022-12-21 NOTE — TELEPHONE ENCOUNTER
Is the patient due for a refill? Yes    Was the patient seen the past year? Yes    Date of last office visit: 8/12/22    Does the patient have an upcoming appointment?  No   If yes, When?     Provider to refill:PIPO    Does the patients insurance require a 100 day supply?  No

## 2022-12-27 NOTE — PROGRESS NOTES
This evaluation was conducted via Telemed using secure and encrypted videoconferencing technology. The patient was physically located at [facility] in [place], [place]. The patient was presented by a medical professional at the originating site.   The patient's identity was confirmed and verbal consent was obtained for this telemedicine encounter.      OP Anticoagulation Service Note    Date: 2022       Anticoagulation Summary  As of 2022      INR goal:  2.0-3.0   TTR:  67.1 % (7.4 y)   INR used for dosin.60 (2022)   Warfarin maintenance plan:  2.5 mg (5 mg x 0.5) every Mon, Wed, Fri; 5 mg (5 mg x 1) all other days   Weekly warfarin total:  27.5 mg   Plan last modified:  Cedric Martinez, PharmD (2022)   Next INR check:  1/3/2023   Target end date:  Indefinite    Indications    Atrial fibrillation with rapid ventricular response (HCC) (Resolved) [I48.91]  Long term current use of anticoagulant therapy (Resolved) [Z79.01]  Atrial fibrillation (HCC) (Resolved) [I48.91]  Hypercoagulable state (HCC) [D68.59]                 Anticoagulation Episode Summary       INR check location:      Preferred lab:      Send INR reminders to:      Comments:            Anticoagulation Care Providers       Provider Role Specialty Phone number    Sanjay Sorensen M.D. Referring Cardiovascular Disease (Cardiology) [phone]          Anticoagulation Patient Findings  Patient Findings       Positives:  Missed doses    Negatives:  Signs/symptoms of thrombosis, Signs/symptoms of bleeding, Laboratory test error suspected, Change in health, Change in alcohol use, Change in activity, Upcoming invasive procedure, Emergency department visit, Upcoming dental procedure, Extra doses, Change in medications, Change in diet/appetite, Hospital admission, Bruising, Other complaints            THIS VISIT CONDUCTED WITH PRESENTER VIA TELEMEDICINE UTILIZING SECURE AND ENCRYPTED VIDEOCONFERENCING EQUIPMENT  ROS:    Pulm:  Denies SOB, chest pain.    Card: Denies syncope, edema, palpitations.    Extremities: Denies redness, pain.     PE:    Pulm: No SOB, even and unlabored.    Card: Normal rate and rhythm.    Extremities: No redness, or edema.     INR  sub-therapeutic.    Denies signs/symptoms of bleeding and/or thrombosis.    Denies changes to diet or medications.   Follow up appt in 1 weeks     Plan:  take 1.5 tablets tonight, and full tablet tomorrow.  Follow up next week.    Medication: Warfarin (Coumadin)        Next Appointment: Tuesday, 1/3/2023 @ 2:45pm       Pt on antiplatelet therapy Plavix 75mg for coronary artery occlusion with stent indef per cardiology    Review all of your home medications and newly ordered medications with your doctor and / or pharmacist. Follow medication instructions as directed by your doctor and / or pharmacist. Please keep your medication list with you and share with your physician. Update the information when medications are discontinued, doses are changed, or new medications (including over-the-counter products) are added; and carry medication information at all times in the event of emergency situations.      The patient is on a high risk medication and is sub- therapeutic. This could lead to clot formation or risk of stroke. Therefore this patient requires close monitoring and follow up.      CHEST guidelines recommend frequent INR monitoring at regular intervals (a few days up to a max of 12 weeks) to ensure they are on the proper dose of warfarin and not having any complications from therapy.  INRs can dramatically change over a short time period due to diet, medications, and medical conditions.   The patient instructed to go to the ER for falls with a head injury,  blood in urine or stool or any bleeding that last longer than 20 min.   For questions, please contact Outpatient Anticoagulation Service [phone].     POLO Hughes.

## 2022-12-28 ENCOUNTER — APPOINTMENT (RX ONLY)
Dept: URBAN - METROPOLITAN AREA CLINIC 6 | Facility: CLINIC | Age: 77
Setting detail: DERMATOLOGY
End: 2022-12-28

## 2022-12-28 DIAGNOSIS — L57.0 ACTINIC KERATOSIS: ICD-10-CM

## 2022-12-28 PROBLEM — D04.39 CARCINOMA IN SITU OF SKIN OF OTHER PARTS OF FACE: Status: ACTIVE | Noted: 2022-12-28

## 2022-12-28 PROBLEM — C44.41 BASAL CELL CARCINOMA OF SKIN OF SCALP AND NECK: Status: ACTIVE | Noted: 2022-12-28

## 2022-12-28 PROBLEM — D48.5 NEOPLASM OF UNCERTAIN BEHAVIOR OF SKIN: Status: ACTIVE | Noted: 2022-12-28

## 2022-12-28 PROCEDURE — ? BIOPSY BY SHAVE METHOD

## 2022-12-28 PROCEDURE — 11103 TANGNTL BX SKIN EA SEP/ADDL: CPT

## 2022-12-28 PROCEDURE — ? PRESCRIPTION

## 2022-12-28 PROCEDURE — 17272 DSTR MAL LES S/N/H/F/G 1.1-2: CPT

## 2022-12-28 PROCEDURE — ? DIAGNOSIS COMMENT

## 2022-12-28 PROCEDURE — ? CURETTAGE AND DESTRUCTION

## 2022-12-28 PROCEDURE — 99213 OFFICE O/P EST LOW 20 MIN: CPT | Mod: 25

## 2022-12-28 PROCEDURE — 17004 DESTROY PREMAL LESIONS 15/>: CPT | Mod: 59

## 2022-12-28 PROCEDURE — ? LIQUID NITROGEN

## 2022-12-28 PROCEDURE — ? COUNSELING

## 2022-12-28 PROCEDURE — 11102 TANGNTL BX SKIN SINGLE LES: CPT | Mod: 59

## 2022-12-28 RX ORDER — FLUOROURACIL 2 G/40G
1 CREAM TOPICAL
Qty: 40 | Refills: 0 | Status: ERX | COMMUNITY
Start: 2022-12-28

## 2022-12-28 RX ADMIN — FLUOROURACIL 1: 2 CREAM TOPICAL at 00:00

## 2022-12-28 ASSESSMENT — LOCATION ZONE DERM
LOCATION ZONE: ARM
LOCATION ZONE: EAR
LOCATION ZONE: NECK
LOCATION ZONE: TRUNK
LOCATION ZONE: FACE
LOCATION ZONE: NOSE

## 2022-12-28 ASSESSMENT — LOCATION DETAILED DESCRIPTION DERM
LOCATION DETAILED: LEFT DORSAL WRIST
LOCATION DETAILED: LEFT CENTRAL BUCCAL CHEEK
LOCATION DETAILED: LEFT LATERAL SUPERIOR CHEST
LOCATION DETAILED: LEFT LATERAL ZYGOMA
LOCATION DETAILED: LEFT NASAL ALA
LOCATION DETAILED: RIGHT CLAVICULAR SKIN
LOCATION DETAILED: LEFT CENTRAL MALAR CHEEK
LOCATION DETAILED: LEFT SUPERIOR FOREHEAD
LOCATION DETAILED: LEFT PROXIMAL DORSAL FOREARM
LOCATION DETAILED: RIGHT SUPERIOR UPPER BACK
LOCATION DETAILED: LEFT INFERIOR CENTRAL MALAR CHEEK
LOCATION DETAILED: LEFT NASAL DORSUM
LOCATION DETAILED: LEFT LATERAL MALAR CHEEK
LOCATION DETAILED: RIGHT LATERAL SUPERIOR CHEST
LOCATION DETAILED: LEFT DISTAL DORSAL FOREARM
LOCATION DETAILED: MID POSTERIOR NECK
LOCATION DETAILED: RIGHT CLAVICULAR NECK
LOCATION DETAILED: RIGHT MEDIAL TRAPEZIAL NECK
LOCATION DETAILED: LEFT FOREHEAD
LOCATION DETAILED: LEFT MEDIAL FOREHEAD
LOCATION DETAILED: RIGHT LATERAL ZYGOMA
LOCATION DETAILED: LEFT INFERIOR MEDIAL FOREHEAD
LOCATION DETAILED: RIGHT PROXIMAL DORSAL FOREARM
LOCATION DETAILED: LEFT DISTAL POSTERIOR UPPER ARM
LOCATION DETAILED: LEFT MEDIAL SUPERIOR CHEST
LOCATION DETAILED: LEFT SUPERIOR HELIX
LOCATION DETAILED: LEFT SUPERIOR LATERAL FOREHEAD
LOCATION DETAILED: LEFT INFERIOR FOREHEAD
LOCATION DETAILED: RIGHT DISTAL DORSAL FOREARM
LOCATION DETAILED: STERNUM
LOCATION DETAILED: LEFT LATERAL DORSAL WRIST
LOCATION DETAILED: LEFT PROXIMAL RADIAL DORSAL FOREARM
LOCATION DETAILED: LEFT SUPERIOR CENTRAL MALAR CHEEK
LOCATION DETAILED: RIGHT MEDIAL SUPERIOR CHEST
LOCATION DETAILED: LEFT SUPERIOR MEDIAL UPPER BACK
LOCATION DETAILED: LEFT POSTERIOR NECK
LOCATION DETAILED: RIGHT POSTERIOR SHOULDER

## 2022-12-28 ASSESSMENT — LOCATION SIMPLE DESCRIPTION DERM
LOCATION SIMPLE: RIGHT UPPER BACK
LOCATION SIMPLE: LEFT NOSE
LOCATION SIMPLE: RIGHT SHOULDER
LOCATION SIMPLE: LEFT ZYGOMA
LOCATION SIMPLE: LEFT UPPER BACK
LOCATION SIMPLE: RIGHT FOREARM
LOCATION SIMPLE: RIGHT ZYGOMA
LOCATION SIMPLE: LEFT FOREHEAD
LOCATION SIMPLE: POSTERIOR NECK
LOCATION SIMPLE: LEFT FOREARM
LOCATION SIMPLE: LEFT UPPER ARM
LOCATION SIMPLE: NOSE
LOCATION SIMPLE: LEFT WRIST
LOCATION SIMPLE: CHEST
LOCATION SIMPLE: RIGHT CLAVICULAR SKIN
LOCATION SIMPLE: LEFT EAR
LOCATION SIMPLE: RIGHT ANTERIOR NECK
LOCATION SIMPLE: LEFT CHEEK

## 2022-12-28 NOTE — PROCEDURE: DIAGNOSIS COMMENT
Comment: [id number] A: will begin treating with 5FU\\n\\nPt with extremely severe actinic damage/AKs on face, also arms, chest, back. Pt was able to tolerate LN2 to arms, chest, back and left side of face but was unable to tolerate LN2 to right side. We will reschedule for this. These lesions are very painful for him and he would be unlikely to tolerate PDT. Pt will use 5-FU to select lesions on face and could consider additional field treatment following this.
Render Risk Assessment In Note?: no
Detail Level: Simple
Comment: [id number] B: will begin treating with 5FU, elects this over Mohs - understands off label indication\\nPt was shown biopsy sites in mirror and photo taken on his phone

## 2022-12-28 NOTE — PROCEDURE: CURETTAGE AND DESTRUCTION
Detail Level: Detailed
Biopsy Photograph Reviewed: Yes
Accession # (Optional): [id number]
Number Of Curettages: 3
Size Of Lesion In Cm: 0.8
Size Of Lesion After Curettage: 1.2
Add Intralesional Injection: No
Concentration (Mg/Ml Or Millions Of Plaque Forming Units/Cc): 0.01
Total Volume (Ccs): 1
Anesthesia Type: 1% lidocaine with epinephrine
Cautery Type: electrodesiccation
What Was Performed First?: Curettage
Final Size Statement: The size of the lesion after curettage was
Additional Information: (Optional): The wound was cleaned, and a pressure dressing was applied.  The patient received detailed post-op instructions.
Consent was obtained from the patient. The risks, benefits and alternatives to therapy were discussed in detail. Specifically, the risks of infection, scarring, bleeding, prolonged wound healing, nerve injury, incomplete removal, allergy to anesthesia and recurrence were addressed. Alternatives to ED&C, such as: surgical removal and XRT were also discussed.  Prior to the procedure, the treatment site was clearly identified and confirmed by the patient. All components of Universal Protocol/PAUSE Rule completed.
Post-Care Instructions: I reviewed with the patient in detail post-care instructions. Patient is to keep the area dry for 48 hours, and not to engage in any swimming until the area is healed. Should the patient develop any fevers, chills, bleeding, severe pain patient will contact the office immediately.
Bill As A Line Item Or As Units: Line Item

## 2022-12-28 NOTE — PROCEDURE: BIOPSY BY SHAVE METHOD
Detail Level: Detailed
Depth Of Biopsy: dermis
Was A Bandage Applied: Yes
Size Of Lesion In Cm: 0
Biopsy Type: H and E
Biopsy Method: Dermablade
Anesthesia Type: 1% lidocaine with epinephrine
Anesthesia Volume In Cc: 0.5
Hemostasis: Drysol
Wound Care: Petrolatum
Dressing: bandage
Destruction After The Procedure: No
Type Of Destruction Used: Electrodesiccation and Curettage
Curettage Text: The wound bed was treated with curettage after the biopsy was performed.
Cryotherapy Text: The wound bed was treated with cryotherapy after the biopsy was performed.
Electrodesiccation Text: The wound bed was treated with electrodesiccation after the biopsy was performed.
Electrodesiccation And Curettage Text: The wound bed was treated with electrodesiccation and curettage after the biopsy was performed. Treated x 3
Silver Nitrate Text: The wound bed was treated with silver nitrate after the biopsy was performed.
Lab: [id number]
Lab Facility: 
Path Notes (To The Dermatopathologist): Re-biopsy of [id number] site I
Consent: Written consent was obtained and risks were reviewed including but not limited to scarring, infection, bleeding, scabbing, incomplete removal, nerve damage and allergy to anesthesia.
Post-Care Instructions: I reviewed with the patient in detail post-care instructions. Patient is to keep the biopsy site dry overnight, and then apply bacitracin twice daily until healed. Patient may apply hydrogen peroxide soaks to remove any crusting.
Notification Instructions: Patient will be notified of biopsy results. However, patient instructed to call the office if not contacted within 2 weeks.
Billing Type: Third-Party Bill
Information: Selecting Yes will display possible errors in your note based on the variables you have selected. This validation is only offered as a suggestion for you. PLEASE NOTE THAT THE VALIDATION TEXT WILL BE REMOVED WHEN YOU FINALIZE YOUR NOTE. IF YOU WANT TO FAX A PRELIMINARY NOTE YOU WILL NEED TO TOGGLE THIS TO 'NO' IF YOU DO NOT WANT IT IN YOUR FAXED NOTE.
Size Of Lesion In Cm: 0.6

## 2023-01-01 ENCOUNTER — APPOINTMENT (OUTPATIENT)
Dept: CARDIOLOGY | Facility: MEDICAL CENTER | Age: 78
DRG: 291 | End: 2023-01-01
Attending: INTERNAL MEDICINE
Payer: MEDICARE

## 2023-01-01 ENCOUNTER — ANTICOAGULATION MONITORING (OUTPATIENT)
Dept: VASCULAR LAB | Facility: MEDICAL CENTER | Age: 78
End: 2023-01-01

## 2023-01-01 ENCOUNTER — NON-PROVIDER VISIT (OUTPATIENT)
Dept: MEDICAL GROUP | Facility: PHYSICIAN GROUP | Age: 78
End: 2023-01-01
Payer: MEDICARE

## 2023-01-01 ENCOUNTER — APPOINTMENT (OUTPATIENT)
Dept: RADIOLOGY | Facility: MEDICAL CENTER | Age: 78
DRG: 291 | End: 2023-01-01
Payer: MEDICARE

## 2023-01-01 ENCOUNTER — HOSPITAL ENCOUNTER (INPATIENT)
Facility: MEDICAL CENTER | Age: 78
LOS: 11 days | DRG: 291 | End: 2023-05-26
Attending: EMERGENCY MEDICINE | Admitting: FAMILY MEDICINE
Payer: MEDICARE

## 2023-01-01 ENCOUNTER — APPOINTMENT (OUTPATIENT)
Dept: RADIOLOGY | Facility: MEDICAL CENTER | Age: 78
DRG: 177 | End: 2023-01-01
Attending: INTERNAL MEDICINE
Payer: MEDICARE

## 2023-01-01 ENCOUNTER — PATIENT OUTREACH (OUTPATIENT)
Dept: SCHEDULING | Facility: IMAGING CENTER | Age: 78
End: 2023-01-01

## 2023-01-01 ENCOUNTER — ANTICOAGULATION MONITORING (OUTPATIENT)
Dept: VASCULAR LAB | Facility: MEDICAL CENTER | Age: 78
End: 2023-01-01
Payer: MEDICARE

## 2023-01-01 ENCOUNTER — HOSPITAL ENCOUNTER (OUTPATIENT)
Dept: LAB | Facility: MEDICAL CENTER | Age: 78
End: 2023-08-04
Payer: MEDICARE

## 2023-01-01 ENCOUNTER — APPOINTMENT (OUTPATIENT)
Dept: RADIOLOGY | Facility: IMAGING CENTER | Age: 78
End: 2023-01-01
Payer: MEDICARE

## 2023-01-01 ENCOUNTER — HOSPITAL ENCOUNTER (OUTPATIENT)
Dept: LAB | Facility: MEDICAL CENTER | Age: 78
End: 2023-08-08
Payer: MEDICARE

## 2023-01-01 ENCOUNTER — OFFICE VISIT (OUTPATIENT)
Dept: SLEEP MEDICINE | Facility: MEDICAL CENTER | Age: 78
End: 2023-01-01
Attending: NURSE PRACTITIONER
Payer: MEDICARE

## 2023-01-01 ENCOUNTER — APPOINTMENT (OUTPATIENT)
Dept: CARDIOLOGY | Facility: MEDICAL CENTER | Age: 78
End: 2023-01-01
Attending: INTERNAL MEDICINE
Payer: MEDICARE

## 2023-01-01 ENCOUNTER — TELEPHONE (OUTPATIENT)
Dept: CARDIOLOGY | Facility: MEDICAL CENTER | Age: 78
End: 2023-01-01
Payer: MEDICARE

## 2023-01-01 ENCOUNTER — HOSPITAL ENCOUNTER (OUTPATIENT)
Dept: LAB | Facility: MEDICAL CENTER | Age: 78
End: 2023-07-19
Attending: INTERNAL MEDICINE
Payer: MEDICARE

## 2023-01-01 ENCOUNTER — HOSPITAL ENCOUNTER (INPATIENT)
Facility: REHABILITATION | Age: 78
End: 2023-01-01
Attending: PHYSICAL MEDICINE & REHABILITATION | Admitting: PHYSICAL MEDICINE & REHABILITATION
Payer: MEDICARE

## 2023-01-01 ENCOUNTER — OFFICE VISIT (OUTPATIENT)
Dept: URGENT CARE | Facility: PHYSICIAN GROUP | Age: 78
End: 2023-01-01
Payer: MEDICARE

## 2023-01-01 ENCOUNTER — HOSPITAL ENCOUNTER (OUTPATIENT)
Facility: MEDICAL CENTER | Age: 78
End: 2023-07-06
Attending: UROLOGY
Payer: MEDICARE

## 2023-01-01 ENCOUNTER — APPOINTMENT (OUTPATIENT)
Dept: VASCULAR LAB | Facility: MEDICAL CENTER | Age: 78
End: 2023-01-01
Payer: MEDICARE

## 2023-01-01 ENCOUNTER — NON-PROVIDER VISIT (OUTPATIENT)
Dept: CARDIOLOGY | Facility: MEDICAL CENTER | Age: 78
End: 2023-01-01
Payer: MEDICARE

## 2023-01-01 ENCOUNTER — APPOINTMENT (OUTPATIENT)
Dept: RADIOLOGY | Facility: MEDICAL CENTER | Age: 78
DRG: 291 | End: 2023-01-01
Attending: HOSPITALIST
Payer: MEDICARE

## 2023-01-01 ENCOUNTER — TELEPHONE (OUTPATIENT)
Dept: SLEEP MEDICINE | Facility: MEDICAL CENTER | Age: 78
End: 2023-01-01
Payer: MEDICARE

## 2023-01-01 ENCOUNTER — APPOINTMENT (OUTPATIENT)
Dept: RADIOLOGY | Facility: MEDICAL CENTER | Age: 78
DRG: 291 | End: 2023-01-01
Attending: EMERGENCY MEDICINE
Payer: MEDICARE

## 2023-01-01 ENCOUNTER — HOSPITAL ENCOUNTER (OUTPATIENT)
Dept: RADIOLOGY | Facility: MEDICAL CENTER | Age: 78
End: 2023-06-05
Attending: UROLOGY
Payer: MEDICARE

## 2023-01-01 ENCOUNTER — ANTICOAGULATION MONITORING (OUTPATIENT)
Dept: CARDIOLOGY | Facility: MEDICAL CENTER | Age: 78
End: 2023-01-01
Payer: MEDICARE

## 2023-01-01 ENCOUNTER — DOCUMENTATION (OUTPATIENT)
Dept: VASCULAR LAB | Facility: MEDICAL CENTER | Age: 78
End: 2023-01-01
Payer: MEDICARE

## 2023-01-01 ENCOUNTER — APPOINTMENT (OUTPATIENT)
Dept: RADIOLOGY | Facility: MEDICAL CENTER | Age: 78
DRG: 291 | End: 2023-01-01
Attending: INTERNAL MEDICINE
Payer: MEDICARE

## 2023-01-01 ENCOUNTER — HOSPITAL ENCOUNTER (INPATIENT)
Facility: MEDICAL CENTER | Age: 78
LOS: 5 days | DRG: 177 | End: 2023-11-01
Attending: INTERNAL MEDICINE | Admitting: INTERNAL MEDICINE
Payer: MEDICARE

## 2023-01-01 ENCOUNTER — TELEPHONE (OUTPATIENT)
Dept: VASCULAR LAB | Facility: MEDICAL CENTER | Age: 78
End: 2023-01-01
Payer: MEDICARE

## 2023-01-01 ENCOUNTER — HOSPITAL ENCOUNTER (OUTPATIENT)
Dept: LAB | Facility: MEDICAL CENTER | Age: 78
End: 2023-06-01
Attending: UROLOGY
Payer: MEDICARE

## 2023-01-01 ENCOUNTER — APPOINTMENT (OUTPATIENT)
Dept: CARDIOLOGY | Facility: MEDICAL CENTER | Age: 78
DRG: 177 | End: 2023-01-01
Attending: INTERNAL MEDICINE
Payer: MEDICARE

## 2023-01-01 ENCOUNTER — ANTICOAGULATION VISIT (OUTPATIENT)
Dept: VASCULAR LAB | Facility: MEDICAL CENTER | Age: 78
End: 2023-01-01
Attending: INTERNAL MEDICINE
Payer: MEDICARE

## 2023-01-01 ENCOUNTER — HOSPITAL ENCOUNTER (OUTPATIENT)
Dept: LAB | Facility: MEDICAL CENTER | Age: 78
End: 2023-09-11
Attending: INTERNAL MEDICINE
Payer: MEDICARE

## 2023-01-01 ENCOUNTER — PHARMACY VISIT (OUTPATIENT)
Dept: PHARMACY | Facility: MEDICAL CENTER | Age: 78
End: 2023-01-01
Payer: MEDICARE

## 2023-01-01 ENCOUNTER — HOSPITAL ENCOUNTER (OUTPATIENT)
Dept: LAB | Facility: MEDICAL CENTER | Age: 78
End: 2023-10-23
Attending: INTERNAL MEDICINE
Payer: MEDICARE

## 2023-01-01 ENCOUNTER — HOSPITAL ENCOUNTER (OUTPATIENT)
Dept: LAB | Facility: MEDICAL CENTER | Age: 78
End: 2023-05-02
Attending: INTERNAL MEDICINE
Payer: MEDICARE

## 2023-01-01 VITALS
SYSTOLIC BLOOD PRESSURE: 114 MMHG | OXYGEN SATURATION: 95 % | TEMPERATURE: 98.1 F | RESPIRATION RATE: 14 BRPM | DIASTOLIC BLOOD PRESSURE: 70 MMHG | HEART RATE: 84 BPM

## 2023-01-01 VITALS
DIASTOLIC BLOOD PRESSURE: 78 MMHG | SYSTOLIC BLOOD PRESSURE: 124 MMHG | BODY MASS INDEX: 32.5 KG/M2 | SYSTOLIC BLOOD PRESSURE: 130 MMHG | WEIGHT: 233 LBS | DIASTOLIC BLOOD PRESSURE: 80 MMHG | RESPIRATION RATE: 22 BRPM | TEMPERATURE: 97.3 F | HEART RATE: 88 BPM | OXYGEN SATURATION: 94 %

## 2023-01-01 VITALS
DIASTOLIC BLOOD PRESSURE: 80 MMHG | SYSTOLIC BLOOD PRESSURE: 130 MMHG | OXYGEN SATURATION: 93 % | TEMPERATURE: 97.5 F | HEART RATE: 111 BPM

## 2023-01-01 VITALS
WEIGHT: 246 LBS | HEART RATE: 99 BPM | HEIGHT: 71 IN | BODY MASS INDEX: 34.44 KG/M2 | RESPIRATION RATE: 16 BRPM | DIASTOLIC BLOOD PRESSURE: 72 MMHG | OXYGEN SATURATION: 90 % | SYSTOLIC BLOOD PRESSURE: 130 MMHG

## 2023-01-01 VITALS
RESPIRATION RATE: 16 BRPM | TEMPERATURE: 97.8 F | SYSTOLIC BLOOD PRESSURE: 124 MMHG | OXYGEN SATURATION: 90 % | HEART RATE: 103 BPM | DIASTOLIC BLOOD PRESSURE: 68 MMHG

## 2023-01-01 VITALS
BODY MASS INDEX: 29.82 KG/M2 | SYSTOLIC BLOOD PRESSURE: 108 MMHG | OXYGEN SATURATION: 88 % | WEIGHT: 213 LBS | HEIGHT: 71 IN | RESPIRATION RATE: 16 BRPM | DIASTOLIC BLOOD PRESSURE: 58 MMHG | HEART RATE: 114 BPM

## 2023-01-01 VITALS
RESPIRATION RATE: 16 BRPM | WEIGHT: 226 LBS | OXYGEN SATURATION: 95 % | BODY MASS INDEX: 30.61 KG/M2 | HEART RATE: 84 BPM | SYSTOLIC BLOOD PRESSURE: 118 MMHG | DIASTOLIC BLOOD PRESSURE: 64 MMHG | HEIGHT: 72 IN

## 2023-01-01 VITALS
WEIGHT: 220 LBS | SYSTOLIC BLOOD PRESSURE: 122 MMHG | OXYGEN SATURATION: 94 % | HEIGHT: 71 IN | RESPIRATION RATE: 16 BRPM | DIASTOLIC BLOOD PRESSURE: 72 MMHG | BODY MASS INDEX: 30.8 KG/M2 | HEART RATE: 81 BPM

## 2023-01-01 VITALS
SYSTOLIC BLOOD PRESSURE: 122 MMHG | DIASTOLIC BLOOD PRESSURE: 70 MMHG | OXYGEN SATURATION: 95 % | TEMPERATURE: 97.6 F | HEART RATE: 87 BPM | RESPIRATION RATE: 14 BRPM

## 2023-01-01 VITALS
DIASTOLIC BLOOD PRESSURE: 73 MMHG | HEART RATE: 104 BPM | WEIGHT: 277.56 LBS | OXYGEN SATURATION: 100 % | BODY MASS INDEX: 38.86 KG/M2 | TEMPERATURE: 97.1 F | RESPIRATION RATE: 20 BRPM | HEIGHT: 71 IN | SYSTOLIC BLOOD PRESSURE: 136 MMHG

## 2023-01-01 VITALS
TEMPERATURE: 97.4 F | DIASTOLIC BLOOD PRESSURE: 80 MMHG | HEART RATE: 103 BPM | RESPIRATION RATE: 22 BRPM | OXYGEN SATURATION: 94 % | SYSTOLIC BLOOD PRESSURE: 122 MMHG

## 2023-01-01 VITALS
OXYGEN SATURATION: 94 % | RESPIRATION RATE: 12 BRPM | HEART RATE: 85 BPM | SYSTOLIC BLOOD PRESSURE: 138 MMHG | DIASTOLIC BLOOD PRESSURE: 80 MMHG | TEMPERATURE: 97.8 F

## 2023-01-01 VITALS
TEMPERATURE: 97.7 F | SYSTOLIC BLOOD PRESSURE: 138 MMHG | OXYGEN SATURATION: 91 % | HEART RATE: 128 BPM | DIASTOLIC BLOOD PRESSURE: 76 MMHG | RESPIRATION RATE: 16 BRPM

## 2023-01-01 VITALS
TEMPERATURE: 97.9 F | SYSTOLIC BLOOD PRESSURE: 136 MMHG | HEART RATE: 109 BPM | OXYGEN SATURATION: 90 % | RESPIRATION RATE: 14 BRPM | DIASTOLIC BLOOD PRESSURE: 94 MMHG

## 2023-01-01 VITALS — DIASTOLIC BLOOD PRESSURE: 60 MMHG | SYSTOLIC BLOOD PRESSURE: 120 MMHG | HEART RATE: 90 BPM | OXYGEN SATURATION: 92 %

## 2023-01-01 VITALS
DIASTOLIC BLOOD PRESSURE: 84 MMHG | HEIGHT: 72 IN | TEMPERATURE: 97.9 F | SYSTOLIC BLOOD PRESSURE: 135 MMHG | HEART RATE: 77 BPM | RESPIRATION RATE: 16 BRPM | OXYGEN SATURATION: 100 % | BODY MASS INDEX: 30.1 KG/M2 | WEIGHT: 222.22 LBS

## 2023-01-01 VITALS
HEART RATE: 96 BPM | DIASTOLIC BLOOD PRESSURE: 60 MMHG | RESPIRATION RATE: 12 BRPM | SYSTOLIC BLOOD PRESSURE: 114 MMHG | TEMPERATURE: 98.3 F | OXYGEN SATURATION: 93 %

## 2023-01-01 VITALS
DIASTOLIC BLOOD PRESSURE: 64 MMHG | RESPIRATION RATE: 16 BRPM | HEART RATE: 71 BPM | BODY MASS INDEX: 31.92 KG/M2 | HEIGHT: 71 IN | SYSTOLIC BLOOD PRESSURE: 110 MMHG | OXYGEN SATURATION: 94 % | WEIGHT: 228 LBS

## 2023-01-01 VITALS
OXYGEN SATURATION: 92 % | RESPIRATION RATE: 20 BRPM | HEIGHT: 72 IN | HEART RATE: 99 BPM | TEMPERATURE: 97.3 F | DIASTOLIC BLOOD PRESSURE: 88 MMHG | BODY MASS INDEX: 31.15 KG/M2 | SYSTOLIC BLOOD PRESSURE: 152 MMHG | WEIGHT: 230 LBS

## 2023-01-01 DIAGNOSIS — D68.59 HYPERCOAGULABLE STATE (HCC): ICD-10-CM

## 2023-01-01 DIAGNOSIS — S81.802A WOUND OF LEFT LOWER EXTREMITY, INITIAL ENCOUNTER: ICD-10-CM

## 2023-01-01 DIAGNOSIS — I50.32 CHRONIC HEART FAILURE WITH PRESERVED EJECTION FRACTION (HCC): ICD-10-CM

## 2023-01-01 DIAGNOSIS — I27.20 PULMONARY HYPERTENSION (HCC): ICD-10-CM

## 2023-01-01 DIAGNOSIS — I50.812 CHRONIC RIGHT-SIDED HEART FAILURE (HCC): ICD-10-CM

## 2023-01-01 DIAGNOSIS — Z95.5 S/P CORONARY ARTERY STENT PLACEMENT: ICD-10-CM

## 2023-01-01 DIAGNOSIS — Z79.01 CHRONIC ANTICOAGULATION: ICD-10-CM

## 2023-01-01 DIAGNOSIS — I34.0 MODERATE MITRAL REGURGITATION: ICD-10-CM

## 2023-01-01 DIAGNOSIS — I50.33 ACUTE ON CHRONIC HEART FAILURE WITH NORMAL EJECTION FRACTION (HCC): ICD-10-CM

## 2023-01-01 DIAGNOSIS — I48.91 ATRIAL FIBRILLATION WITH RVR (HCC): ICD-10-CM

## 2023-01-01 DIAGNOSIS — I50.813: ICD-10-CM

## 2023-01-01 DIAGNOSIS — J45.30 MILD PERSISTENT ASTHMA WITHOUT COMPLICATION: ICD-10-CM

## 2023-01-01 DIAGNOSIS — Z79.01 ANTICOAGULATED: ICD-10-CM

## 2023-01-01 DIAGNOSIS — I25.10 CORONARY ARTERY DISEASE, OCCLUSIVE: ICD-10-CM

## 2023-01-01 DIAGNOSIS — J96.11 CHRONIC RESPIRATORY FAILURE WITH HYPOXIA (HCC): ICD-10-CM

## 2023-01-01 DIAGNOSIS — I48.20 ATRIAL FIBRILLATION, CHRONIC (HCC): ICD-10-CM

## 2023-01-01 DIAGNOSIS — R05.1 ACUTE COUGH: ICD-10-CM

## 2023-01-01 DIAGNOSIS — J18.9 PNEUMONIA OF RIGHT LOWER LOBE DUE TO INFECTIOUS ORGANISM: ICD-10-CM

## 2023-01-01 DIAGNOSIS — D68.9 COAGULATION DISORDER (HCC): ICD-10-CM

## 2023-01-01 DIAGNOSIS — I50.9 CHRONIC HEART FAILURE, UNSPECIFIED HEART FAILURE TYPE (HCC): ICD-10-CM

## 2023-01-01 DIAGNOSIS — J44.9 CHRONIC OBSTRUCTIVE PULMONARY DISEASE, UNSPECIFIED COPD TYPE (HCC): ICD-10-CM

## 2023-01-01 DIAGNOSIS — E78.5 DYSLIPIDEMIA: ICD-10-CM

## 2023-01-01 DIAGNOSIS — N48.89 PENILE PAIN: ICD-10-CM

## 2023-01-01 DIAGNOSIS — I27.20 PULMONARY HTN (HCC): ICD-10-CM

## 2023-01-01 DIAGNOSIS — E87.70 HYPERVOLEMIA, UNSPECIFIED HYPERVOLEMIA TYPE: ICD-10-CM

## 2023-01-01 DIAGNOSIS — R53.81 DEBILITY: ICD-10-CM

## 2023-01-01 DIAGNOSIS — R60.0 BILATERAL LEG EDEMA: ICD-10-CM

## 2023-01-01 DIAGNOSIS — R60.0 EXTREMITY EDEMA: ICD-10-CM

## 2023-01-01 DIAGNOSIS — I13.0: ICD-10-CM

## 2023-01-01 DIAGNOSIS — R31.0 GROSS HEMATURIA: ICD-10-CM

## 2023-01-01 DIAGNOSIS — I48.91 ATRIAL FIBRILLATION WITH RAPID VENTRICULAR RESPONSE (HCC): ICD-10-CM

## 2023-01-01 DIAGNOSIS — N18.1: ICD-10-CM

## 2023-01-01 DIAGNOSIS — R60.1 ANASARCA: ICD-10-CM

## 2023-01-01 LAB
25(OH)D3 SERPL-MCNC: 37 NG/ML (ref 30–100)
ALBUMIN SERPL BCP-MCNC: 3.2 G/DL (ref 3.2–4.9)
ALBUMIN SERPL BCP-MCNC: 3.3 G/DL (ref 3.2–4.9)
ALBUMIN SERPL BCP-MCNC: 3.3 G/DL (ref 3.2–4.9)
ALBUMIN SERPL BCP-MCNC: 3.4 G/DL (ref 3.2–4.9)
ALBUMIN SERPL BCP-MCNC: 3.4 G/DL (ref 3.2–4.9)
ALBUMIN SERPL BCP-MCNC: 3.5 G/DL (ref 3.2–4.9)
ALBUMIN SERPL BCP-MCNC: 3.6 G/DL (ref 3.2–4.9)
ALBUMIN SERPL BCP-MCNC: 3.8 G/DL (ref 3.2–4.9)
ALBUMIN SERPL BCP-MCNC: 3.9 G/DL (ref 3.2–4.9)
ALBUMIN SERPL BCP-MCNC: 3.9 G/DL (ref 3.2–4.9)
ALBUMIN/GLOB SERPL: 0.9 G/DL
ALBUMIN/GLOB SERPL: 1 G/DL
ALBUMIN/GLOB SERPL: 1 G/DL
ALP SERPL-CCNC: 114 U/L (ref 30–99)
ALP SERPL-CCNC: 118 U/L (ref 30–99)
ALP SERPL-CCNC: 123 U/L (ref 30–99)
ALP SERPL-CCNC: 75 U/L (ref 30–99)
ALP SERPL-CCNC: 83 U/L (ref 30–99)
ALT SERPL-CCNC: 13 U/L (ref 2–50)
ALT SERPL-CCNC: 14 U/L (ref 2–50)
ALT SERPL-CCNC: 15 U/L (ref 2–50)
ALT SERPL-CCNC: 16 U/L (ref 2–50)
ALT SERPL-CCNC: 16 U/L (ref 2–50)
AMBIGUOUS DTTM AMBI4: NORMAL
AMMONIA PLAS-SCNC: 20 UMOL/L (ref 11–45)
ANION GAP SERPL CALC-SCNC: 10 MMOL/L (ref 7–16)
ANION GAP SERPL CALC-SCNC: 12 MMOL/L (ref 7–16)
ANION GAP SERPL CALC-SCNC: 12 MMOL/L (ref 7–16)
ANION GAP SERPL CALC-SCNC: 13 MMOL/L (ref 7–16)
ANION GAP SERPL CALC-SCNC: 14 MMOL/L (ref 7–16)
ANION GAP SERPL CALC-SCNC: 15 MMOL/L (ref 7–16)
ANION GAP SERPL CALC-SCNC: 15 MMOL/L (ref 7–16)
APPEARANCE UR: ABNORMAL
APPEARANCE UR: CLEAR
AST SERPL-CCNC: 11 U/L (ref 12–45)
AST SERPL-CCNC: 14 U/L (ref 12–45)
AST SERPL-CCNC: 16 U/L (ref 12–45)
AST SERPL-CCNC: 17 U/L (ref 12–45)
AST SERPL-CCNC: 9 U/L (ref 12–45)
BACTERIA #/AREA URNS HPF: ABNORMAL /HPF
BACTERIA #/AREA URNS HPF: NEGATIVE /HPF
BACTERIA BLD CULT: NORMAL
BACTERIA UR CULT: ABNORMAL
BACTERIA UR CULT: ABNORMAL
BACTERIA UR CULT: NORMAL
BACTERIA UR CULT: NORMAL
BASE EXCESS BLDA CALC-SCNC: -3 MMOL/L (ref -4–3)
BASE EXCESS BLDA CALC-SCNC: -4 MMOL/L (ref -4–3)
BASOPHILS # BLD AUTO: 0.1 % (ref 0–1.8)
BASOPHILS # BLD AUTO: 0.3 % (ref 0–1.8)
BASOPHILS # BLD AUTO: 1.2 % (ref 0–1.8)
BASOPHILS # BLD AUTO: 1.3 % (ref 0–1.8)
BASOPHILS # BLD: 0.01 K/UL (ref 0–0.12)
BASOPHILS # BLD: 0.04 K/UL (ref 0–0.12)
BASOPHILS # BLD: 0.09 K/UL (ref 0–0.12)
BASOPHILS # BLD: 0.1 K/UL (ref 0–0.12)
BILIRUB SERPL-MCNC: 0.5 MG/DL (ref 0.1–1.5)
BILIRUB SERPL-MCNC: 0.7 MG/DL (ref 0.1–1.5)
BILIRUB SERPL-MCNC: 0.8 MG/DL (ref 0.1–1.5)
BILIRUB UR QL STRIP.AUTO: NEGATIVE
BODY TEMPERATURE: ABNORMAL DEGREES
BUN SERPL-MCNC: 52 MG/DL (ref 8–22)
BUN SERPL-MCNC: 53 MG/DL (ref 8–22)
BUN SERPL-MCNC: 54 MG/DL (ref 8–22)
BUN SERPL-MCNC: 55 MG/DL (ref 8–22)
BUN SERPL-MCNC: 56 MG/DL (ref 8–22)
BUN SERPL-MCNC: 58 MG/DL (ref 8–22)
BUN SERPL-MCNC: 60 MG/DL (ref 8–22)
BUN SERPL-MCNC: 63 MG/DL (ref 8–22)
BUN SERPL-MCNC: 67 MG/DL (ref 8–22)
BUN SERPL-MCNC: 69 MG/DL (ref 8–22)
BUN SERPL-MCNC: 69 MG/DL (ref 8–22)
BUN SERPL-MCNC: 70 MG/DL (ref 8–22)
BUN SERPL-MCNC: 70 MG/DL (ref 8–22)
BUN SERPL-MCNC: 72 MG/DL (ref 8–22)
BUN SERPL-MCNC: 74 MG/DL (ref 8–22)
BUN SERPL-MCNC: 75 MG/DL (ref 8–22)
BUN SERPL-MCNC: 76 MG/DL (ref 8–22)
BUN SERPL-MCNC: 78 MG/DL (ref 8–22)
BUN SERPL-MCNC: 81 MG/DL (ref 8–22)
BUN SERPL-MCNC: 83 MG/DL (ref 8–22)
BUN SERPL-MCNC: 88 MG/DL (ref 8–22)
BUN SERPL-MCNC: 97 MG/DL (ref 8–22)
C DIFF DNA SPEC QL NAA+PROBE: NEGATIVE
C DIFF TOX GENS STL QL NAA+PROBE: NEGATIVE
CALCIUM ALBUM COR SERPL-MCNC: 9.2 MG/DL (ref 8.5–10.5)
CALCIUM ALBUM COR SERPL-MCNC: 9.2 MG/DL (ref 8.5–10.5)
CALCIUM ALBUM COR SERPL-MCNC: 9.3 MG/DL (ref 8.5–10.5)
CALCIUM ALBUM COR SERPL-MCNC: 9.3 MG/DL (ref 8.5–10.5)
CALCIUM ALBUM COR SERPL-MCNC: 9.4 MG/DL (ref 8.5–10.5)
CALCIUM ALBUM COR SERPL-MCNC: 9.4 MG/DL (ref 8.5–10.5)
CALCIUM ALBUM COR SERPL-MCNC: 9.5 MG/DL (ref 8.5–10.5)
CALCIUM ALBUM COR SERPL-MCNC: 9.5 MG/DL (ref 8.5–10.5)
CALCIUM ALBUM COR SERPL-MCNC: 9.6 MG/DL (ref 8.5–10.5)
CALCIUM ALBUM COR SERPL-MCNC: 9.6 MG/DL (ref 8.5–10.5)
CALCIUM ALBUM COR SERPL-MCNC: 9.7 MG/DL (ref 8.5–10.5)
CALCIUM ALBUM COR SERPL-MCNC: 9.9 MG/DL (ref 8.5–10.5)
CALCIUM SERPL-MCNC: 8.5 MG/DL (ref 8.5–10.5)
CALCIUM SERPL-MCNC: 8.6 MG/DL (ref 8.5–10.5)
CALCIUM SERPL-MCNC: 8.6 MG/DL (ref 8.5–10.5)
CALCIUM SERPL-MCNC: 8.8 MG/DL (ref 8.4–10.2)
CALCIUM SERPL-MCNC: 8.8 MG/DL (ref 8.4–10.2)
CALCIUM SERPL-MCNC: 8.8 MG/DL (ref 8.5–10.5)
CALCIUM SERPL-MCNC: 8.9 MG/DL (ref 8.4–10.2)
CALCIUM SERPL-MCNC: 8.9 MG/DL (ref 8.5–10.5)
CALCIUM SERPL-MCNC: 9 MG/DL (ref 8.4–10.2)
CALCIUM SERPL-MCNC: 9 MG/DL (ref 8.5–10.5)
CALCIUM SERPL-MCNC: 9 MG/DL (ref 8.5–10.5)
CALCIUM SERPL-MCNC: 9.1 MG/DL (ref 8.4–10.2)
CALCIUM SERPL-MCNC: 9.1 MG/DL (ref 8.5–10.5)
CALCIUM SERPL-MCNC: 9.2 MG/DL (ref 8.5–10.5)
CALCIUM SERPL-MCNC: 9.3 MG/DL (ref 8.5–10.5)
CALCIUM SERPL-MCNC: 9.4 MG/DL (ref 8.5–10.5)
CALCIUM SERPL-MCNC: 9.5 MG/DL (ref 8.5–10.5)
CALCIUM SERPL-MCNC: 9.6 MG/DL (ref 8.5–10.5)
CHLORIDE SERPL-SCNC: 100 MMOL/L (ref 96–112)
CHLORIDE SERPL-SCNC: 101 MMOL/L (ref 96–112)
CHLORIDE SERPL-SCNC: 102 MMOL/L (ref 96–112)
CHLORIDE SERPL-SCNC: 103 MMOL/L (ref 96–112)
CHLORIDE SERPL-SCNC: 103 MMOL/L (ref 96–112)
CHLORIDE SERPL-SCNC: 104 MMOL/L (ref 96–112)
CHLORIDE SERPL-SCNC: 107 MMOL/L (ref 96–112)
CHLORIDE SERPL-SCNC: 108 MMOL/L (ref 96–112)
CHLORIDE SERPL-SCNC: 109 MMOL/L (ref 96–112)
CHLORIDE SERPL-SCNC: 109 MMOL/L (ref 96–112)
CHLORIDE SERPL-SCNC: 97 MMOL/L (ref 96–112)
CHLORIDE SERPL-SCNC: 98 MMOL/L (ref 96–112)
CHLORIDE SERPL-SCNC: 99 MMOL/L (ref 96–112)
CO2 BLDA-SCNC: 25 MMOL/L (ref 20–33)
CO2 BLDA-SCNC: 26 MMOL/L (ref 20–33)
CO2 BLDA-SCNC: 27 MMOL/L (ref 20–33)
CO2 BLDA-SCNC: 27 MMOL/L (ref 20–33)
CO2 SERPL-SCNC: 20 MMOL/L (ref 20–33)
CO2 SERPL-SCNC: 21 MMOL/L (ref 20–33)
CO2 SERPL-SCNC: 21 MMOL/L (ref 20–33)
CO2 SERPL-SCNC: 22 MMOL/L (ref 20–33)
CO2 SERPL-SCNC: 23 MMOL/L (ref 20–33)
CO2 SERPL-SCNC: 23 MMOL/L (ref 20–33)
CO2 SERPL-SCNC: 24 MMOL/L (ref 20–33)
CO2 SERPL-SCNC: 25 MMOL/L (ref 20–33)
CO2 SERPL-SCNC: 25 MMOL/L (ref 20–33)
CO2 SERPL-SCNC: 26 MMOL/L (ref 20–33)
CO2 SERPL-SCNC: 26 MMOL/L (ref 20–33)
CO2 SERPL-SCNC: 27 MMOL/L (ref 20–33)
CO2 SERPL-SCNC: 27 MMOL/L (ref 20–33)
CO2 SERPL-SCNC: 28 MMOL/L (ref 20–33)
CO2 SERPL-SCNC: 30 MMOL/L (ref 20–33)
COLOR UR: YELLOW
CREAT SERPL-MCNC: 1.89 MG/DL (ref 0.5–1.4)
CREAT SERPL-MCNC: 2.11 MG/DL (ref 0.5–1.4)
CREAT SERPL-MCNC: 2.25 MG/DL (ref 0.5–1.4)
CREAT SERPL-MCNC: 2.25 MG/DL (ref 0.5–1.4)
CREAT SERPL-MCNC: 2.26 MG/DL (ref 0.5–1.4)
CREAT SERPL-MCNC: 2.4 MG/DL (ref 0.5–1.4)
CREAT SERPL-MCNC: 2.41 MG/DL (ref 0.5–1.4)
CREAT SERPL-MCNC: 2.51 MG/DL (ref 0.5–1.4)
CREAT SERPL-MCNC: 2.54 MG/DL (ref 0.5–1.4)
CREAT SERPL-MCNC: 2.59 MG/DL (ref 0.5–1.4)
CREAT SERPL-MCNC: 2.63 MG/DL (ref 0.5–1.4)
CREAT SERPL-MCNC: 2.64 MG/DL (ref 0.5–1.4)
CREAT SERPL-MCNC: 2.72 MG/DL (ref 0.5–1.4)
CREAT SERPL-MCNC: 2.73 MG/DL (ref 0.5–1.4)
CREAT SERPL-MCNC: 2.84 MG/DL (ref 0.5–1.4)
CREAT SERPL-MCNC: 2.88 MG/DL (ref 0.5–1.4)
CREAT SERPL-MCNC: 2.89 MG/DL (ref 0.5–1.4)
CREAT SERPL-MCNC: 3.06 MG/DL (ref 0.5–1.4)
CREAT SERPL-MCNC: 3.08 MG/DL (ref 0.5–1.4)
CREAT SERPL-MCNC: 3.17 MG/DL (ref 0.5–1.4)
CREAT SERPL-MCNC: 3.24 MG/DL (ref 0.5–1.4)
CREAT SERPL-MCNC: 3.28 MG/DL (ref 0.5–1.4)
CREAT UR-MCNC: 31.1 MG/DL
CREAT UR-MCNC: 31.24 MG/DL
CREAT UR-MCNC: 49.15 MG/DL
CREAT UR-MCNC: 60.88 MG/DL
CREAT UR-MCNC: 63.31 MG/DL
CREAT UR-MCNC: 66.72 MG/DL
DELSYS IDSYS: ABNORMAL
EKG IMPRESSION: NORMAL
EOSINOPHIL # BLD AUTO: 0 K/UL (ref 0–0.51)
EOSINOPHIL # BLD AUTO: 0.06 K/UL (ref 0–0.51)
EOSINOPHIL # BLD AUTO: 0.37 K/UL (ref 0–0.51)
EOSINOPHIL # BLD AUTO: 0.41 K/UL (ref 0–0.51)
EOSINOPHIL NFR BLD: 0 % (ref 0–6.9)
EOSINOPHIL NFR BLD: 0.5 % (ref 0–6.9)
EOSINOPHIL NFR BLD: 4.8 % (ref 0–6.9)
EOSINOPHIL NFR BLD: 5.5 % (ref 0–6.9)
EPI CELLS #/AREA URNS HPF: ABNORMAL /HPF
EPI CELLS #/AREA URNS HPF: ABNORMAL /HPF
EPI CELLS #/AREA URNS HPF: NEGATIVE /HPF
EPI CELLS #/AREA URNS HPF: NEGATIVE /HPF
ERYTHROCYTE [DISTWIDTH] IN BLOOD BY AUTOMATED COUNT: 51.2 FL (ref 35.9–50)
ERYTHROCYTE [DISTWIDTH] IN BLOOD BY AUTOMATED COUNT: 52.1 FL (ref 35.9–50)
ERYTHROCYTE [DISTWIDTH] IN BLOOD BY AUTOMATED COUNT: 52.4 FL (ref 35.9–50)
ERYTHROCYTE [DISTWIDTH] IN BLOOD BY AUTOMATED COUNT: 52.6 FL (ref 35.9–50)
ERYTHROCYTE [DISTWIDTH] IN BLOOD BY AUTOMATED COUNT: 53.2 FL (ref 35.9–50)
ERYTHROCYTE [DISTWIDTH] IN BLOOD BY AUTOMATED COUNT: 53.3 FL (ref 35.9–50)
ERYTHROCYTE [DISTWIDTH] IN BLOOD BY AUTOMATED COUNT: 53.5 FL (ref 35.9–50)
ERYTHROCYTE [DISTWIDTH] IN BLOOD BY AUTOMATED COUNT: 53.6 FL (ref 35.9–50)
ERYTHROCYTE [DISTWIDTH] IN BLOOD BY AUTOMATED COUNT: 56.4 FL (ref 35.9–50)
ERYTHROCYTE [DISTWIDTH] IN BLOOD BY AUTOMATED COUNT: 56.4 FL (ref 35.9–50)
ERYTHROCYTE [DISTWIDTH] IN BLOOD BY AUTOMATED COUNT: 56.6 FL (ref 35.9–50)
ERYTHROCYTE [DISTWIDTH] IN BLOOD BY AUTOMATED COUNT: 57 FL (ref 35.9–50)
ERYTHROCYTE [DISTWIDTH] IN BLOOD BY AUTOMATED COUNT: 57.3 FL (ref 35.9–50)
ERYTHROCYTE [DISTWIDTH] IN BLOOD BY AUTOMATED COUNT: 57.7 FL (ref 35.9–50)
ERYTHROCYTE [DISTWIDTH] IN BLOOD BY AUTOMATED COUNT: 58.6 FL (ref 35.9–50)
ERYTHROCYTE [DISTWIDTH] IN BLOOD BY AUTOMATED COUNT: 59.6 FL (ref 35.9–50)
EST. AVERAGE GLUCOSE BLD GHB EST-MCNC: 223 MG/DL
FERRITIN SERPL-MCNC: 199 NG/ML (ref 22–322)
FLUAV RNA SPEC QL NAA+PROBE: NEGATIVE
FLUAV RNA SPEC QL NAA+PROBE: NEGATIVE
FLUBV RNA SPEC QL NAA+PROBE: NEGATIVE
FLUBV RNA SPEC QL NAA+PROBE: NEGATIVE
GFR SERPLBLD CREATININE-BSD FMLA CKD-EPI: 18 ML/MIN/1.73 M 2
GFR SERPLBLD CREATININE-BSD FMLA CKD-EPI: 19 ML/MIN/1.73 M 2
GFR SERPLBLD CREATININE-BSD FMLA CKD-EPI: 19 ML/MIN/1.73 M 2
GFR SERPLBLD CREATININE-BSD FMLA CKD-EPI: 20 ML/MIN/1.73 M 2
GFR SERPLBLD CREATININE-BSD FMLA CKD-EPI: 20 ML/MIN/1.73 M 2
GFR SERPLBLD CREATININE-BSD FMLA CKD-EPI: 22 ML/MIN/1.73 M 2
GFR SERPLBLD CREATININE-BSD FMLA CKD-EPI: 23 ML/MIN/1.73 M 2
GFR SERPLBLD CREATININE-BSD FMLA CKD-EPI: 23 ML/MIN/1.73 M 2
GFR SERPLBLD CREATININE-BSD FMLA CKD-EPI: 24 ML/MIN/1.73 M 2
GFR SERPLBLD CREATININE-BSD FMLA CKD-EPI: 24 ML/MIN/1.73 M 2
GFR SERPLBLD CREATININE-BSD FMLA CKD-EPI: 25 ML/MIN/1.73 M 2
GFR SERPLBLD CREATININE-BSD FMLA CKD-EPI: 25 ML/MIN/1.73 M 2
GFR SERPLBLD CREATININE-BSD FMLA CKD-EPI: 26 ML/MIN/1.73 M 2
GFR SERPLBLD CREATININE-BSD FMLA CKD-EPI: 27 ML/MIN/1.73 M 2
GFR SERPLBLD CREATININE-BSD FMLA CKD-EPI: 27 ML/MIN/1.73 M 2
GFR SERPLBLD CREATININE-BSD FMLA CKD-EPI: 29 ML/MIN/1.73 M 2
GFR SERPLBLD CREATININE-BSD FMLA CKD-EPI: 31 ML/MIN/1.73 M 2
GFR SERPLBLD CREATININE-BSD FMLA CKD-EPI: 36 ML/MIN/1.73 M 2
GLOBULIN SER CALC-MCNC: 3.6 G/DL (ref 1.9–3.5)
GLOBULIN SER CALC-MCNC: 3.6 G/DL (ref 1.9–3.5)
GLOBULIN SER CALC-MCNC: 3.7 G/DL (ref 1.9–3.5)
GLOBULIN SER CALC-MCNC: 3.7 G/DL (ref 1.9–3.5)
GLOBULIN SER CALC-MCNC: 4 G/DL (ref 1.9–3.5)
GLUCOSE BLD STRIP.AUTO-MCNC: 109 MG/DL (ref 65–99)
GLUCOSE BLD STRIP.AUTO-MCNC: 121 MG/DL (ref 65–99)
GLUCOSE BLD STRIP.AUTO-MCNC: 135 MG/DL (ref 65–99)
GLUCOSE BLD STRIP.AUTO-MCNC: 139 MG/DL (ref 65–99)
GLUCOSE BLD STRIP.AUTO-MCNC: 139 MG/DL (ref 65–99)
GLUCOSE BLD STRIP.AUTO-MCNC: 140 MG/DL (ref 65–99)
GLUCOSE BLD STRIP.AUTO-MCNC: 142 MG/DL (ref 65–99)
GLUCOSE BLD STRIP.AUTO-MCNC: 150 MG/DL (ref 65–99)
GLUCOSE BLD STRIP.AUTO-MCNC: 155 MG/DL (ref 65–99)
GLUCOSE BLD STRIP.AUTO-MCNC: 157 MG/DL (ref 65–99)
GLUCOSE BLD STRIP.AUTO-MCNC: 161 MG/DL (ref 65–99)
GLUCOSE BLD STRIP.AUTO-MCNC: 163 MG/DL (ref 65–99)
GLUCOSE BLD STRIP.AUTO-MCNC: 165 MG/DL (ref 65–99)
GLUCOSE BLD STRIP.AUTO-MCNC: 168 MG/DL (ref 65–99)
GLUCOSE BLD STRIP.AUTO-MCNC: 172 MG/DL (ref 65–99)
GLUCOSE BLD STRIP.AUTO-MCNC: 176 MG/DL (ref 65–99)
GLUCOSE BLD STRIP.AUTO-MCNC: 183 MG/DL (ref 65–99)
GLUCOSE BLD STRIP.AUTO-MCNC: 184 MG/DL (ref 65–99)
GLUCOSE BLD STRIP.AUTO-MCNC: 184 MG/DL (ref 65–99)
GLUCOSE BLD STRIP.AUTO-MCNC: 188 MG/DL (ref 65–99)
GLUCOSE BLD STRIP.AUTO-MCNC: 189 MG/DL (ref 65–99)
GLUCOSE BLD STRIP.AUTO-MCNC: 193 MG/DL (ref 65–99)
GLUCOSE BLD STRIP.AUTO-MCNC: 197 MG/DL (ref 65–99)
GLUCOSE BLD STRIP.AUTO-MCNC: 198 MG/DL (ref 65–99)
GLUCOSE BLD STRIP.AUTO-MCNC: 199 MG/DL (ref 65–99)
GLUCOSE BLD STRIP.AUTO-MCNC: 200 MG/DL (ref 65–99)
GLUCOSE BLD STRIP.AUTO-MCNC: 201 MG/DL (ref 65–99)
GLUCOSE BLD STRIP.AUTO-MCNC: 203 MG/DL (ref 65–99)
GLUCOSE BLD STRIP.AUTO-MCNC: 204 MG/DL (ref 65–99)
GLUCOSE BLD STRIP.AUTO-MCNC: 204 MG/DL (ref 65–99)
GLUCOSE BLD STRIP.AUTO-MCNC: 205 MG/DL (ref 65–99)
GLUCOSE BLD STRIP.AUTO-MCNC: 206 MG/DL (ref 65–99)
GLUCOSE BLD STRIP.AUTO-MCNC: 209 MG/DL (ref 65–99)
GLUCOSE BLD STRIP.AUTO-MCNC: 215 MG/DL (ref 65–99)
GLUCOSE BLD STRIP.AUTO-MCNC: 215 MG/DL (ref 65–99)
GLUCOSE BLD STRIP.AUTO-MCNC: 216 MG/DL (ref 65–99)
GLUCOSE BLD STRIP.AUTO-MCNC: 217 MG/DL (ref 65–99)
GLUCOSE BLD STRIP.AUTO-MCNC: 217 MG/DL (ref 65–99)
GLUCOSE BLD STRIP.AUTO-MCNC: 218 MG/DL (ref 65–99)
GLUCOSE BLD STRIP.AUTO-MCNC: 221 MG/DL (ref 65–99)
GLUCOSE BLD STRIP.AUTO-MCNC: 224 MG/DL (ref 65–99)
GLUCOSE BLD STRIP.AUTO-MCNC: 224 MG/DL (ref 65–99)
GLUCOSE BLD STRIP.AUTO-MCNC: 229 MG/DL (ref 65–99)
GLUCOSE BLD STRIP.AUTO-MCNC: 232 MG/DL (ref 65–99)
GLUCOSE BLD STRIP.AUTO-MCNC: 239 MG/DL (ref 65–99)
GLUCOSE BLD STRIP.AUTO-MCNC: 240 MG/DL (ref 65–99)
GLUCOSE BLD STRIP.AUTO-MCNC: 243 MG/DL (ref 65–99)
GLUCOSE BLD STRIP.AUTO-MCNC: 245 MG/DL (ref 65–99)
GLUCOSE BLD STRIP.AUTO-MCNC: 248 MG/DL (ref 65–99)
GLUCOSE BLD STRIP.AUTO-MCNC: 252 MG/DL (ref 65–99)
GLUCOSE BLD STRIP.AUTO-MCNC: 255 MG/DL (ref 65–99)
GLUCOSE BLD STRIP.AUTO-MCNC: 258 MG/DL (ref 65–99)
GLUCOSE BLD STRIP.AUTO-MCNC: 261 MG/DL (ref 65–99)
GLUCOSE BLD STRIP.AUTO-MCNC: 264 MG/DL (ref 65–99)
GLUCOSE BLD STRIP.AUTO-MCNC: 269 MG/DL (ref 65–99)
GLUCOSE BLD STRIP.AUTO-MCNC: 290 MG/DL (ref 65–99)
GLUCOSE BLD STRIP.AUTO-MCNC: 291 MG/DL (ref 65–99)
GLUCOSE BLD STRIP.AUTO-MCNC: 311 MG/DL (ref 65–99)
GLUCOSE BLD STRIP.AUTO-MCNC: 320 MG/DL (ref 65–99)
GLUCOSE BLD STRIP.AUTO-MCNC: 325 MG/DL (ref 65–99)
GLUCOSE BLD STRIP.AUTO-MCNC: 329 MG/DL (ref 65–99)
GLUCOSE BLD STRIP.AUTO-MCNC: 373 MG/DL (ref 65–99)
GLUCOSE BLD STRIP.AUTO-MCNC: 391 MG/DL (ref 65–99)
GLUCOSE SERPL-MCNC: 105 MG/DL (ref 65–99)
GLUCOSE SERPL-MCNC: 113 MG/DL (ref 65–99)
GLUCOSE SERPL-MCNC: 113 MG/DL (ref 65–99)
GLUCOSE SERPL-MCNC: 115 MG/DL (ref 65–99)
GLUCOSE SERPL-MCNC: 136 MG/DL (ref 65–99)
GLUCOSE SERPL-MCNC: 148 MG/DL (ref 65–99)
GLUCOSE SERPL-MCNC: 148 MG/DL (ref 65–99)
GLUCOSE SERPL-MCNC: 149 MG/DL (ref 65–99)
GLUCOSE SERPL-MCNC: 161 MG/DL (ref 65–99)
GLUCOSE SERPL-MCNC: 172 MG/DL (ref 65–99)
GLUCOSE SERPL-MCNC: 176 MG/DL (ref 65–99)
GLUCOSE SERPL-MCNC: 178 MG/DL (ref 65–99)
GLUCOSE SERPL-MCNC: 192 MG/DL (ref 65–99)
GLUCOSE SERPL-MCNC: 199 MG/DL (ref 65–99)
GLUCOSE SERPL-MCNC: 203 MG/DL (ref 65–99)
GLUCOSE SERPL-MCNC: 207 MG/DL (ref 65–99)
GLUCOSE SERPL-MCNC: 227 MG/DL (ref 65–99)
GLUCOSE SERPL-MCNC: 229 MG/DL (ref 65–99)
GLUCOSE SERPL-MCNC: 251 MG/DL (ref 65–99)
GLUCOSE SERPL-MCNC: 252 MG/DL (ref 65–99)
GLUCOSE SERPL-MCNC: 278 MG/DL (ref 65–99)
GLUCOSE SERPL-MCNC: 312 MG/DL (ref 65–99)
GLUCOSE UR STRIP.AUTO-MCNC: NEGATIVE MG/DL
HBA1C MFR BLD: 9.4 % (ref 4–5.6)
HCO3 BLDA-SCNC: 23.2 MMOL/L (ref 17–25)
HCO3 BLDA-SCNC: 24.1 MMOL/L (ref 17–25)
HCO3 BLDA-SCNC: 24.9 MMOL/L (ref 17–25)
HCO3 BLDA-SCNC: 24.9 MMOL/L (ref 17–25)
HCT VFR BLD AUTO: 37.9 % (ref 42–52)
HCT VFR BLD AUTO: 38.5 % (ref 42–52)
HCT VFR BLD AUTO: 38.8 % (ref 42–52)
HCT VFR BLD AUTO: 39 % (ref 42–52)
HCT VFR BLD AUTO: 39.2 % (ref 42–52)
HCT VFR BLD AUTO: 39.8 % (ref 42–52)
HCT VFR BLD AUTO: 39.9 % (ref 42–52)
HCT VFR BLD AUTO: 39.9 % (ref 42–52)
HCT VFR BLD AUTO: 40.1 % (ref 42–52)
HCT VFR BLD AUTO: 40.2 % (ref 42–52)
HCT VFR BLD AUTO: 40.3 % (ref 42–52)
HCT VFR BLD AUTO: 40.5 % (ref 42–52)
HCT VFR BLD AUTO: 40.6 % (ref 42–52)
HCT VFR BLD AUTO: 40.9 % (ref 42–52)
HCT VFR BLD AUTO: 41.7 % (ref 42–52)
HCT VFR BLD AUTO: 42.2 % (ref 42–52)
HGB BLD-MCNC: 11.9 G/DL (ref 14–18)
HGB BLD-MCNC: 12 G/DL (ref 14–18)
HGB BLD-MCNC: 12.2 G/DL (ref 14–18)
HGB BLD-MCNC: 12.2 G/DL (ref 14–18)
HGB BLD-MCNC: 12.3 G/DL (ref 14–18)
HGB BLD-MCNC: 12.4 G/DL (ref 14–18)
HGB BLD-MCNC: 12.5 G/DL (ref 14–18)
HGB BLD-MCNC: 12.6 G/DL (ref 14–18)
HGB BLD-MCNC: 12.8 G/DL (ref 14–18)
HGB BLD-MCNC: 13 G/DL (ref 14–18)
HGB BLD-MCNC: 13.3 G/DL (ref 14–18)
HGB BLD-MCNC: 13.4 G/DL (ref 14–18)
HGB BLD-MCNC: 13.4 G/DL (ref 14–18)
HOROWITZ INDEX BLDA+IHG-RTO: 153 MM[HG]
HOROWITZ INDEX BLDA+IHG-RTO: 187 MM[HG]
HOROWITZ INDEX BLDA+IHG-RTO: 193 MM[HG]
HYALINE CASTS #/AREA URNS LPF: ABNORMAL /LPF
IMM GRANULOCYTES # BLD AUTO: 0.03 K/UL (ref 0–0.11)
IMM GRANULOCYTES # BLD AUTO: 0.03 K/UL (ref 0–0.11)
IMM GRANULOCYTES # BLD AUTO: 0.04 K/UL (ref 0–0.11)
IMM GRANULOCYTES # BLD AUTO: 0.05 K/UL (ref 0–0.11)
IMM GRANULOCYTES NFR BLD AUTO: 0.4 % (ref 0–0.9)
IMM GRANULOCYTES NFR BLD AUTO: 0.5 % (ref 0–0.9)
INR BLD: 1.1 (ref 0.9–1.2)
INR BLD: 1.2 (ref 0.9–1.2)
INR BLD: 1.4 (ref 0.9–1.2)
INR BLD: 1.9 (ref 0.9–1.2)
INR BLD: 2 (ref 0.9–1.2)
INR BLD: 2.1 (ref 0.9–1.2)
INR BLD: 2.2 (ref 0.9–1.2)
INR BLD: 2.3 (ref 0.9–1.2)
INR BLD: 2.5 (ref 0.9–1.2)
INR BLD: 2.5 (ref 0.9–1.2)
INR BLD: 3 (ref 0.9–1.2)
INR PPP: 1.1 (ref 2–3.5)
INR PPP: 1.2 (ref 2–3.5)
INR PPP: 1.34 (ref 0.87–1.13)
INR PPP: 1.4 (ref 2–3.5)
INR PPP: 1.5 (ref 0.87–1.13)
INR PPP: 1.6 (ref 2–3.5)
INR PPP: 1.8 (ref 0.87–1.13)
INR PPP: 1.83 (ref 0.87–1.13)
INR PPP: 1.9 (ref 2–3.5)
INR PPP: 1.94 (ref 0.87–1.13)
INR PPP: 2 (ref 2–3.5)
INR PPP: 2.04 (ref 0.87–1.13)
INR PPP: 2.1 (ref 2–3.5)
INR PPP: 2.2 (ref 2–3.5)
INR PPP: 2.3 (ref 2–3.5)
INR PPP: 2.34 (ref 0.87–1.13)
INR PPP: 2.4 (ref 0.87–1.13)
INR PPP: 2.45 (ref 0.87–1.13)
INR PPP: 2.49 (ref 0.87–1.13)
INR PPP: 2.5 (ref 0.87–1.13)
INR PPP: 2.5 (ref 2–3.5)
INR PPP: 2.5 (ref 2–3.5)
INR PPP: 2.59 (ref 0.87–1.13)
INR PPP: 2.65 (ref 0.87–1.13)
INR PPP: 2.82 (ref 0.87–1.13)
INR PPP: 3 (ref 2–3.5)
INR PPP: 3.05 (ref 0.87–1.13)
INR PPP: 3.11 (ref 0.87–1.13)
IRON SATN MFR SERPL: 15 % (ref 15–55)
IRON SERPL-MCNC: 44 UG/DL (ref 50–180)
KETONES UR STRIP.AUTO-MCNC: NEGATIVE MG/DL
LACTATE BLD-SCNC: 0.7 MMOL/L (ref 0.5–2)
LACTATE BLD-SCNC: 0.8 MMOL/L (ref 0.5–2)
LACTATE BLD-SCNC: 1 MMOL/L (ref 0.5–2)
LACTATE BLD-SCNC: 1.2 MMOL/L (ref 0.5–2)
LACTATE SERPL-SCNC: 1 MMOL/L (ref 0.5–2)
LEUKOCYTE ESTERASE UR QL STRIP.AUTO: ABNORMAL
LOT NUMBER   180167: ABNORMAL
LOT NUMBER   180167: NORMAL
LOT NUMBER   180167: NORMAL
LPM ILPM: 3 LPM
LPM ILPM: 30 LPM
LPM ILPM: 30 LPM
LPM ILPM: 40 LPM
LV EJECT FRACT  99904: 55
LV EJECT FRACT  99904: 60
LV EJECT FRACT MOD 2C 99903: 24.82
LV EJECT FRACT MOD 2C 99903: 60.84
LV EJECT FRACT MOD 4C 99902: 53.15
LV EJECT FRACT MOD 4C 99902: 63.83
LV EJECT FRACT MOD BP 99901: 29.74
LV EJECT FRACT MOD BP 99901: 62.08
LYMPHOCYTES # BLD AUTO: 0.24 K/UL (ref 1–4.8)
LYMPHOCYTES # BLD AUTO: 0.32 K/UL (ref 1–4.8)
LYMPHOCYTES # BLD AUTO: 0.58 K/UL (ref 1–4.8)
LYMPHOCYTES # BLD AUTO: 0.6 K/UL (ref 1–4.8)
LYMPHOCYTES NFR BLD: 2.6 % (ref 22–41)
LYMPHOCYTES NFR BLD: 2.9 % (ref 22–41)
LYMPHOCYTES NFR BLD: 7.5 % (ref 22–41)
LYMPHOCYTES NFR BLD: 8.1 % (ref 22–41)
MAGNESIUM SERPL-MCNC: 1.9 MG/DL (ref 1.5–2.5)
MAGNESIUM SERPL-MCNC: 1.9 MG/DL (ref 1.5–2.5)
MAGNESIUM SERPL-MCNC: 2 MG/DL (ref 1.5–2.5)
MAGNESIUM SERPL-MCNC: 2.3 MG/DL (ref 1.5–2.5)
MAGNESIUM SERPL-MCNC: 2.3 MG/DL (ref 1.5–2.5)
MAGNESIUM SERPL-MCNC: 2.5 MG/DL (ref 1.5–2.5)
MAGNESIUM SERPL-MCNC: 2.5 MG/DL (ref 1.5–2.5)
MAGNESIUM SERPL-MCNC: 2.6 MG/DL (ref 1.5–2.5)
MAGNESIUM SERPL-MCNC: 2.7 MG/DL (ref 1.5–2.5)
MCH RBC QN AUTO: 27.7 PG (ref 27–33)
MCH RBC QN AUTO: 27.8 PG (ref 27–33)
MCH RBC QN AUTO: 27.8 PG (ref 27–33)
MCH RBC QN AUTO: 27.9 PG (ref 27–33)
MCH RBC QN AUTO: 28.1 PG (ref 27–33)
MCH RBC QN AUTO: 28.4 PG (ref 27–33)
MCH RBC QN AUTO: 28.5 PG (ref 27–33)
MCH RBC QN AUTO: 28.7 PG (ref 27–33)
MCH RBC QN AUTO: 28.7 PG (ref 27–33)
MCH RBC QN AUTO: 28.8 PG (ref 27–33)
MCH RBC QN AUTO: 28.9 PG (ref 27–33)
MCH RBC QN AUTO: 29.1 PG (ref 27–33)
MCH RBC QN AUTO: 29.3 PG (ref 27–33)
MCH RBC QN AUTO: 29.4 PG (ref 27–33)
MCHC RBC AUTO-ENTMCNC: 30.8 G/DL (ref 33.7–35.3)
MCHC RBC AUTO-ENTMCNC: 30.9 G/DL (ref 33.7–35.3)
MCHC RBC AUTO-ENTMCNC: 31.1 G/DL (ref 32.3–36.5)
MCHC RBC AUTO-ENTMCNC: 31.1 G/DL (ref 33.7–35.3)
MCHC RBC AUTO-ENTMCNC: 31.2 G/DL (ref 32.3–36.5)
MCHC RBC AUTO-ENTMCNC: 31.3 G/DL (ref 32.3–36.5)
MCHC RBC AUTO-ENTMCNC: 31.3 G/DL (ref 33.7–35.3)
MCHC RBC AUTO-ENTMCNC: 31.4 G/DL (ref 32.3–36.5)
MCHC RBC AUTO-ENTMCNC: 31.4 G/DL (ref 33.7–35.3)
MCHC RBC AUTO-ENTMCNC: 31.5 G/DL (ref 33.7–35.3)
MCHC RBC AUTO-ENTMCNC: 32.1 G/DL (ref 32.3–36.5)
MCHC RBC AUTO-ENTMCNC: 32.1 G/DL (ref 33.7–35.3)
MCHC RBC AUTO-ENTMCNC: 32.4 G/DL (ref 32.3–36.5)
MCHC RBC AUTO-ENTMCNC: 32.8 G/DL (ref 32.3–36.5)
MCV RBC AUTO: 86.8 FL (ref 81.4–97.8)
MCV RBC AUTO: 86.8 FL (ref 81.4–97.8)
MCV RBC AUTO: 87.4 FL (ref 81.4–97.8)
MCV RBC AUTO: 89 FL (ref 81.4–97.8)
MCV RBC AUTO: 89.5 FL (ref 81.4–97.8)
MCV RBC AUTO: 89.8 FL (ref 81.4–97.8)
MCV RBC AUTO: 89.8 FL (ref 81.4–97.8)
MCV RBC AUTO: 89.9 FL (ref 81.4–97.8)
MCV RBC AUTO: 90.4 FL (ref 81.4–97.8)
MCV RBC AUTO: 90.5 FL (ref 81.4–97.8)
MCV RBC AUTO: 90.9 FL (ref 81.4–97.8)
MCV RBC AUTO: 91.9 FL (ref 81.4–97.8)
MCV RBC AUTO: 92.4 FL (ref 81.4–97.8)
MCV RBC AUTO: 93.1 FL (ref 81.4–97.8)
MCV RBC AUTO: 93.6 FL (ref 81.4–97.8)
MCV RBC AUTO: 93.9 FL (ref 81.4–97.8)
MICRO URNS: ABNORMAL
MICROALBUMIN UR-MCNC: 14 MG/DL
MICROALBUMIN UR-MCNC: 14.6 MG/DL
MICROALBUMIN/CREAT UR: 231 MG/G (ref 0–30)
MICROALBUMIN/CREAT UR: 450 MG/G (ref 0–30)
MONOCYTES # BLD AUTO: 0.2 K/UL (ref 0–0.85)
MONOCYTES # BLD AUTO: 0.78 K/UL (ref 0–0.85)
MONOCYTES # BLD AUTO: 0.85 K/UL (ref 0–0.85)
MONOCYTES # BLD AUTO: 0.94 K/UL (ref 0–0.85)
MONOCYTES NFR BLD AUTO: 10.5 % (ref 0–13.4)
MONOCYTES NFR BLD AUTO: 11 % (ref 0–13.4)
MONOCYTES NFR BLD AUTO: 2.4 % (ref 0–13.4)
MONOCYTES NFR BLD AUTO: 7.8 % (ref 0–13.4)
NEUTROPHILS # BLD AUTO: 10.71 K/UL (ref 1.82–7.42)
NEUTROPHILS # BLD AUTO: 5.51 K/UL (ref 1.82–7.42)
NEUTROPHILS # BLD AUTO: 5.82 K/UL (ref 1.82–7.42)
NEUTROPHILS # BLD AUTO: 7.91 K/UL (ref 1.82–7.42)
NEUTROPHILS NFR BLD: 74.3 % (ref 44–72)
NEUTROPHILS NFR BLD: 74.9 % (ref 44–72)
NEUTROPHILS NFR BLD: 88.4 % (ref 44–72)
NEUTROPHILS NFR BLD: 94.2 % (ref 44–72)
NITRITE UR QL STRIP.AUTO: NEGATIVE
NITRITE UR QL STRIP.AUTO: POSITIVE
NRBC # BLD AUTO: 0 K/UL
NRBC BLD-RTO: 0 /100 WBC
NRBC BLD-RTO: 0 /100 WBC (ref 0–0.2)
NT-PROBNP SERPL IA-MCNC: 4149 PG/ML (ref 0–125)
NT-PROBNP SERPL IA-MCNC: 4205 PG/ML (ref 0–125)
NT-PROBNP SERPL IA-MCNC: 4929 PG/ML (ref 0–125)
O2/TOTAL GAS SETTING VFR VENT: 40 %
O2/TOTAL GAS SETTING VFR VENT: 40 %
O2/TOTAL GAS SETTING VFR VENT: 70 %
PCO2 BLDA: 51.5 MMHG (ref 26–37)
PCO2 BLDA: 56 MMHG (ref 26–37)
PCO2 BLDA: 58.6 MMHG (ref 26–37)
PCO2 BLDA: 59.2 MMHG (ref 26–37)
PH BLDA: 7.22 [PH] (ref 7.4–7.5)
PH BLDA: 7.23 [PH] (ref 7.4–7.5)
PH BLDA: 7.26 [PH] (ref 7.4–7.5)
PH BLDA: 7.26 [PH] (ref 7.4–7.5)
PH TEMP ADJ BLDA: 7.23 [PH] (ref 7.4–7.5)
PH TEMP ADJ BLDA: 7.23 [PH] (ref 7.4–7.5)
PH TEMP ADJ BLDA: 7.26 [PH] (ref 7.4–7.5)
PH TEMP ADJ BLDA: 7.27 [PH] (ref 7.4–7.5)
PH UR STRIP.AUTO: 5 [PH] (ref 5–8)
PH UR STRIP.AUTO: 5 [PH] (ref 5–8)
PH UR STRIP.AUTO: 5.5 [PH] (ref 5–8)
PH UR STRIP.AUTO: 6.5 [PH] (ref 5–8)
PHOSPHATE SERPL-MCNC: 3.4 MG/DL (ref 2.5–4.5)
PHOSPHATE SERPL-MCNC: 3.5 MG/DL (ref 2.5–4.5)
PHOSPHATE SERPL-MCNC: 3.6 MG/DL (ref 2.5–4.5)
PHOSPHATE SERPL-MCNC: 3.7 MG/DL (ref 2.5–4.5)
PHOSPHATE SERPL-MCNC: 4 MG/DL (ref 2.5–4.5)
PHOSPHATE SERPL-MCNC: 4.4 MG/DL (ref 2.5–4.5)
PHOSPHATE SERPL-MCNC: 6 MG/DL (ref 2.5–4.5)
PHOSPHATE SERPL-MCNC: 6.4 MG/DL (ref 2.5–4.5)
PLATELET # BLD AUTO: 186 K/UL (ref 164–446)
PLATELET # BLD AUTO: 197 K/UL (ref 164–446)
PLATELET # BLD AUTO: 198 K/UL (ref 164–446)
PLATELET # BLD AUTO: 209 K/UL (ref 164–446)
PLATELET # BLD AUTO: 216 K/UL (ref 164–446)
PLATELET # BLD AUTO: 217 K/UL (ref 164–446)
PLATELET # BLD AUTO: 218 K/UL (ref 164–446)
PLATELET # BLD AUTO: 220 K/UL (ref 164–446)
PLATELET # BLD AUTO: 221 K/UL (ref 164–446)
PLATELET # BLD AUTO: 223 K/UL (ref 164–446)
PLATELET # BLD AUTO: 225 K/UL (ref 164–446)
PLATELET # BLD AUTO: 226 K/UL (ref 164–446)
PLATELET # BLD AUTO: 241 K/UL (ref 164–446)
PLATELET # BLD AUTO: 262 K/UL (ref 164–446)
PMV BLD AUTO: 10 FL (ref 9–12.9)
PMV BLD AUTO: 10.1 FL (ref 9–12.9)
PMV BLD AUTO: 10.2 FL (ref 9–12.9)
PMV BLD AUTO: 10.2 FL (ref 9–12.9)
PMV BLD AUTO: 10.3 FL (ref 9–12.9)
PMV BLD AUTO: 10.5 FL (ref 9–12.9)
PMV BLD AUTO: 10.6 FL (ref 9–12.9)
PMV BLD AUTO: 10.9 FL (ref 9–12.9)
PMV BLD AUTO: 9.6 FL (ref 9–12.9)
PMV BLD AUTO: 9.8 FL (ref 9–12.9)
PO2 BLDA: 131 MMHG (ref 64–87)
PO2 BLDA: 60 MMHG (ref 64–87)
PO2 BLDA: 61 MMHG (ref 64–87)
PO2 BLDA: 77 MMHG (ref 64–87)
PO2 TEMP ADJ BLDA: 129 MMHG (ref 64–87)
PO2 TEMP ADJ BLDA: 60 MMHG (ref 64–87)
PO2 TEMP ADJ BLDA: 61 MMHG (ref 64–87)
PO2 TEMP ADJ BLDA: 75 MMHG (ref 64–87)
POC PROTIME: 1.1
POC PROTIME: 1.2
POC PROTIME: 1.4
POC PROTIME: 1.9
POC PROTIME: 2
POC PROTIME: 2.2
POC PROTIME: 2.3
POC PROTIME: 2.5
POC PROTIME: 3
POC PROTIME: ABNORMAL
POC PROTIME: ABNORMAL
POCT INT CON NEG: NEGATIVE
POCT INT CON POS: POSITIVE
POTASSIUM SERPL-SCNC: 3.8 MMOL/L (ref 3.6–5.5)
POTASSIUM SERPL-SCNC: 3.9 MMOL/L (ref 3.6–5.5)
POTASSIUM SERPL-SCNC: 4.2 MMOL/L (ref 3.6–5.5)
POTASSIUM SERPL-SCNC: 4.2 MMOL/L (ref 3.6–5.5)
POTASSIUM SERPL-SCNC: 4.4 MMOL/L (ref 3.6–5.5)
POTASSIUM SERPL-SCNC: 4.5 MMOL/L (ref 3.6–5.5)
POTASSIUM SERPL-SCNC: 4.6 MMOL/L (ref 3.6–5.5)
POTASSIUM SERPL-SCNC: 4.7 MMOL/L (ref 3.6–5.5)
POTASSIUM SERPL-SCNC: 4.7 MMOL/L (ref 3.6–5.5)
POTASSIUM SERPL-SCNC: 4.8 MMOL/L (ref 3.6–5.5)
POTASSIUM SERPL-SCNC: 5.1 MMOL/L (ref 3.6–5.5)
POTASSIUM SERPL-SCNC: 5.2 MMOL/L (ref 3.6–5.5)
POTASSIUM SERPL-SCNC: 5.3 MMOL/L (ref 3.6–5.5)
PROCALCITONIN SERPL-MCNC: 0.12 NG/ML
PROCALCITONIN SERPL-MCNC: 0.16 NG/ML
PROCALCITONIN SERPL-MCNC: 0.44 NG/ML
PROCALCITONIN SERPL-MCNC: 1.05 NG/ML
PROCALCITONIN SERPL-MCNC: 1.24 NG/ML
PROT SERPL-MCNC: 6.9 G/DL (ref 6–8.2)
PROT SERPL-MCNC: 7.2 G/DL (ref 6–8.2)
PROT SERPL-MCNC: 7.2 G/DL (ref 6–8.2)
PROT SERPL-MCNC: 7.4 G/DL (ref 6–8.2)
PROT SERPL-MCNC: 7.5 G/DL (ref 6–8.2)
PROT UR QL STRIP: 30 MG/DL
PROT UR QL STRIP: NEGATIVE MG/DL
PROT UR-MCNC: 35 MG/DL (ref 0–15)
PROT UR-MCNC: 4 MG/DL (ref 0–15)
PROT UR-MCNC: 57 MG/DL (ref 0–15)
PROT/CREAT UR: 1120 MG/G (ref 15–68)
PROT/CREAT UR: 854 MG/G (ref 15–68)
PROTHROMBIN TIME: 17.3 SEC (ref 12–14.6)
PROTHROMBIN TIME: 18.7 SEC (ref 12–14.6)
PROTHROMBIN TIME: 20.7 SEC (ref 12–14.6)
PROTHROMBIN TIME: 21.6 SEC (ref 12–14.6)
PROTHROMBIN TIME: 21.7 SEC (ref 12–14.6)
PROTHROMBIN TIME: 22.5 SEC (ref 12–14.6)
PROTHROMBIN TIME: 24.9 SEC (ref 12–14.6)
PROTHROMBIN TIME: 25.5 SEC (ref 12–14.6)
PROTHROMBIN TIME: 26.1 SEC (ref 12–14.6)
PROTHROMBIN TIME: 26.2 SEC (ref 12–14.6)
PROTHROMBIN TIME: 26.9 SEC (ref 12–14.6)
PROTHROMBIN TIME: 27.4 SEC (ref 12–14.6)
PROTHROMBIN TIME: 27.5 SEC (ref 12–14.6)
PROTHROMBIN TIME: 28.7 SEC (ref 12–14.6)
PROTHROMBIN TIME: 30.5 SEC (ref 12–14.6)
PROTHROMBIN TIME: 30.9 SEC (ref 12–14.6)
PTH-INTACT SERPL-MCNC: 111 PG/ML (ref 14–72)
RBC # BLD AUTO: 4.1 M/UL (ref 4.7–6.1)
RBC # BLD AUTO: 4.19 M/UL (ref 4.7–6.1)
RBC # BLD AUTO: 4.19 M/UL (ref 4.7–6.1)
RBC # BLD AUTO: 4.22 M/UL (ref 4.7–6.1)
RBC # BLD AUTO: 4.26 M/UL (ref 4.7–6.1)
RBC # BLD AUTO: 4.32 M/UL (ref 4.7–6.1)
RBC # BLD AUTO: 4.4 M/UL (ref 4.7–6.1)
RBC # BLD AUTO: 4.44 M/UL (ref 4.7–6.1)
RBC # BLD AUTO: 4.49 M/UL (ref 4.7–6.1)
RBC # BLD AUTO: 4.51 M/UL (ref 4.7–6.1)
RBC # BLD AUTO: 4.62 M/UL (ref 4.7–6.1)
RBC # BLD AUTO: 4.64 M/UL (ref 4.7–6.1)
RBC # BLD AUTO: 4.71 M/UL (ref 4.7–6.1)
RBC # BLD AUTO: 4.77 M/UL (ref 4.7–6.1)
RBC # URNS HPF: ABNORMAL /HPF
RBC UR QL AUTO: ABNORMAL
RBC UR QL AUTO: ABNORMAL
RBC UR QL AUTO: NEGATIVE
RBC UR QL AUTO: NEGATIVE
RENAL EPI CELLS #/AREA URNS HPF: ABNORMAL /HPF
RSV RNA SPEC QL NAA+PROBE: NEGATIVE
RSV RNA SPEC QL NAA+PROBE: NEGATIVE
SAO2 % BLDA: 85 % (ref 93–99)
SAO2 % BLDA: 87 % (ref 93–99)
SAO2 % BLDA: 92 % (ref 93–99)
SAO2 % BLDA: 98 % (ref 93–99)
SARS-COV-2 RNA RESP QL NAA+PROBE: DETECTED
SARS-COV-2 RNA RESP QL NAA+PROBE: NOTDETECTED
SCCMEC + MECA PNL NOSE NAA+PROBE: NEGATIVE
SIGNIFICANT IND 70042: ABNORMAL
SIGNIFICANT IND 70042: NORMAL
SITE SITE: ABNORMAL
SITE SITE: NORMAL
SODIUM SERPL-SCNC: 134 MMOL/L (ref 135–145)
SODIUM SERPL-SCNC: 135 MMOL/L (ref 135–145)
SODIUM SERPL-SCNC: 136 MMOL/L (ref 135–145)
SODIUM SERPL-SCNC: 136 MMOL/L (ref 135–145)
SODIUM SERPL-SCNC: 137 MMOL/L (ref 135–145)
SODIUM SERPL-SCNC: 138 MMOL/L (ref 135–145)
SODIUM SERPL-SCNC: 139 MMOL/L (ref 135–145)
SODIUM SERPL-SCNC: 140 MMOL/L (ref 135–145)
SODIUM SERPL-SCNC: 141 MMOL/L (ref 135–145)
SODIUM SERPL-SCNC: 143 MMOL/L (ref 135–145)
SODIUM SERPL-SCNC: 144 MMOL/L (ref 135–145)
SODIUM SERPL-SCNC: 145 MMOL/L (ref 135–145)
SODIUM UR-SCNC: 30 MMOL/L
SODIUM UR-SCNC: 71 MMOL/L
SOURCE SOURCE: ABNORMAL
SOURCE SOURCE: NORMAL
SP GR UR STRIP.AUTO: 1.01
SPECIMEN DRAWN FROM PATIENT: ABNORMAL
SPECIMEN SOURCE: ABNORMAL
SPECIMEN SOURCE: NORMAL
TIBC SERPL-MCNC: 295 UG/DL (ref 250–450)
TROPONIN T SERPL-MCNC: 70 NG/L (ref 6–19)
TROPONIN T SERPL-MCNC: 71 NG/L (ref 6–19)
TROPONIN T SERPL-MCNC: 75 NG/L (ref 6–19)
TSH SERPL DL<=0.005 MIU/L-ACNC: 2.14 UIU/ML (ref 0.38–5.33)
UIBC SERPL-MCNC: 251 UG/DL (ref 110–370)
UROBILINOGEN UR STRIP.AUTO-MCNC: 0.2 MG/DL
WBC # BLD AUTO: 11.2 K/UL (ref 4.8–10.8)
WBC # BLD AUTO: 12.1 K/UL (ref 4.8–10.8)
WBC # BLD AUTO: 13.3 K/UL (ref 4.8–10.8)
WBC # BLD AUTO: 6.9 K/UL (ref 4.8–10.8)
WBC # BLD AUTO: 6.9 K/UL (ref 4.8–10.8)
WBC # BLD AUTO: 7 K/UL (ref 4.8–10.8)
WBC # BLD AUTO: 7.4 K/UL (ref 4.8–10.8)
WBC # BLD AUTO: 7.5 K/UL (ref 4.8–10.8)
WBC # BLD AUTO: 7.7 K/UL (ref 4.8–10.8)
WBC # BLD AUTO: 7.8 K/UL (ref 4.8–10.8)
WBC # BLD AUTO: 7.9 K/UL (ref 4.8–10.8)
WBC # BLD AUTO: 8.1 K/UL (ref 4.8–10.8)
WBC # BLD AUTO: 8.4 K/UL (ref 4.8–10.8)
WBC # BLD AUTO: 9.1 K/UL (ref 4.8–10.8)
WBC #/AREA URNS HPF: ABNORMAL /HPF

## 2023-01-01 PROCEDURE — 84300 ASSAY OF URINE SODIUM: CPT

## 2023-01-01 PROCEDURE — 87641 MR-STAPH DNA AMP PROBE: CPT

## 2023-01-01 PROCEDURE — 700102 HCHG RX REV CODE 250 W/ 637 OVERRIDE(OP): Performed by: INTERNAL MEDICINE

## 2023-01-01 PROCEDURE — 85027 COMPLETE CBC AUTOMATED: CPT

## 2023-01-01 PROCEDURE — A9270 NON-COVERED ITEM OR SERVICE: HCPCS | Performed by: HOSPITALIST

## 2023-01-01 PROCEDURE — 82962 GLUCOSE BLOOD TEST: CPT

## 2023-01-01 PROCEDURE — 94640 AIRWAY INHALATION TREATMENT: CPT

## 2023-01-01 PROCEDURE — 99497 ADVNCD CARE PLAN 30 MIN: CPT | Performed by: INTERNAL MEDICINE

## 2023-01-01 PROCEDURE — 80048 BASIC METABOLIC PNL TOTAL CA: CPT

## 2023-01-01 PROCEDURE — 700102 HCHG RX REV CODE 250 W/ 637 OVERRIDE(OP): Performed by: FAMILY MEDICINE

## 2023-01-01 PROCEDURE — 36415 COLL VENOUS BLD VENIPUNCTURE: CPT

## 2023-01-01 PROCEDURE — 82962 GLUCOSE BLOOD TEST: CPT | Mod: 91

## 2023-01-01 PROCEDURE — A9270 NON-COVERED ITEM OR SERVICE: HCPCS | Performed by: INTERNAL MEDICINE

## 2023-01-01 PROCEDURE — 99214 OFFICE O/P EST MOD 30 MIN: CPT | Performed by: NURSE PRACTITIONER

## 2023-01-01 PROCEDURE — 80069 RENAL FUNCTION PANEL: CPT

## 2023-01-01 PROCEDURE — 83735 ASSAY OF MAGNESIUM: CPT

## 2023-01-01 PROCEDURE — 99214 OFFICE O/P EST MOD 30 MIN: CPT

## 2023-01-01 PROCEDURE — C9803 HOPD COVID-19 SPEC COLLECT: HCPCS | Performed by: INTERNAL MEDICINE

## 2023-01-01 PROCEDURE — 99999 PR NO CHARGE: CPT | Performed by: NURSE PRACTITIONER

## 2023-01-01 PROCEDURE — 87040 BLOOD CULTURE FOR BACTERIA: CPT | Mod: 91

## 2023-01-01 PROCEDURE — 93005 ELECTROCARDIOGRAM TRACING: CPT | Performed by: EMERGENCY MEDICINE

## 2023-01-01 PROCEDURE — 99213 OFFICE O/P EST LOW 20 MIN: CPT

## 2023-01-01 PROCEDURE — 700101 HCHG RX REV CODE 250: Performed by: FAMILY MEDICINE

## 2023-01-01 PROCEDURE — 80053 COMPREHEN METABOLIC PANEL: CPT

## 2023-01-01 PROCEDURE — 99233 SBSQ HOSP IP/OBS HIGH 50: CPT | Mod: 25 | Performed by: INTERNAL MEDICINE

## 2023-01-01 PROCEDURE — 85610 PROTHROMBIN TIME: CPT

## 2023-01-01 PROCEDURE — 99211 OFF/OP EST MAY X REQ PHY/QHP: CPT | Performed by: NURSE PRACTITIONER

## 2023-01-01 PROCEDURE — 85610 PROTHROMBIN TIME: CPT | Performed by: FAMILY MEDICINE

## 2023-01-01 PROCEDURE — 81001 URINALYSIS AUTO W/SCOPE: CPT

## 2023-01-01 PROCEDURE — 700111 HCHG RX REV CODE 636 W/ 250 OVERRIDE (IP): Mod: JZ | Performed by: INTERNAL MEDICINE

## 2023-01-01 PROCEDURE — 93005 ELECTROCARDIOGRAM TRACING: CPT | Performed by: INTERNAL MEDICINE

## 2023-01-01 PROCEDURE — 70450 CT HEAD/BRAIN W/O DYE: CPT

## 2023-01-01 PROCEDURE — 770020 HCHG ROOM/CARE - TELE (206)

## 2023-01-01 PROCEDURE — 87086 URINE CULTURE/COLONY COUNT: CPT

## 2023-01-01 PROCEDURE — 82570 ASSAY OF URINE CREATININE: CPT

## 2023-01-01 PROCEDURE — 3078F DIAST BP <80 MM HG: CPT

## 2023-01-01 PROCEDURE — A9270 NON-COVERED ITEM OR SERVICE: HCPCS | Performed by: STUDENT IN AN ORGANIZED HEALTH CARE EDUCATION/TRAINING PROGRAM

## 2023-01-01 PROCEDURE — 99214 OFFICE O/P EST MOD 30 MIN: CPT | Performed by: INTERNAL MEDICINE

## 2023-01-01 PROCEDURE — A9270 NON-COVERED ITEM OR SERVICE: HCPCS | Performed by: PHYSICIAN ASSISTANT

## 2023-01-01 PROCEDURE — 99213 OFFICE O/P EST LOW 20 MIN: CPT | Mod: 95 | Performed by: NURSE PRACTITIONER

## 2023-01-01 PROCEDURE — 3075F SYST BP GE 130 - 139MM HG: CPT

## 2023-01-01 PROCEDURE — 94660 CPAP INITIATION&MGMT: CPT

## 2023-01-01 PROCEDURE — 94761 N-INVAS EAR/PLS OXIMETRY MLT: CPT | Performed by: NURSE PRACTITIONER

## 2023-01-01 PROCEDURE — 770022 HCHG ROOM/CARE - ICU (200)

## 2023-01-01 PROCEDURE — 700102 HCHG RX REV CODE 250 W/ 637 OVERRIDE(OP): Performed by: HOSPITALIST

## 2023-01-01 PROCEDURE — 3079F DIAST BP 80-89 MM HG: CPT | Mod: 95 | Performed by: NURSE PRACTITIONER

## 2023-01-01 PROCEDURE — 82043 UR ALBUMIN QUANTITATIVE: CPT

## 2023-01-01 PROCEDURE — 99214 OFFICE O/P EST MOD 30 MIN: CPT | Mod: 95

## 2023-01-01 PROCEDURE — A9270 NON-COVERED ITEM OR SERVICE: HCPCS | Performed by: FAMILY MEDICINE

## 2023-01-01 PROCEDURE — 99232 SBSQ HOSP IP/OBS MODERATE 35: CPT | Performed by: INTERNAL MEDICINE

## 2023-01-01 PROCEDURE — 97166 OT EVAL MOD COMPLEX 45 MIN: CPT

## 2023-01-01 PROCEDURE — 71046 X-RAY EXAM CHEST 2 VIEWS: CPT | Mod: TC,FY | Performed by: RADIOLOGY

## 2023-01-01 PROCEDURE — 700111 HCHG RX REV CODE 636 W/ 250 OVERRIDE (IP): Performed by: INTERNAL MEDICINE

## 2023-01-01 PROCEDURE — 87040 BLOOD CULTURE FOR BACTERIA: CPT

## 2023-01-01 PROCEDURE — 94760 N-INVAS EAR/PLS OXIMETRY 1: CPT

## 2023-01-01 PROCEDURE — 3078F DIAST BP <80 MM HG: CPT | Performed by: NURSE PRACTITIONER

## 2023-01-01 PROCEDURE — 700101 HCHG RX REV CODE 250: Performed by: INTERNAL MEDICINE

## 2023-01-01 PROCEDURE — 83605 ASSAY OF LACTIC ACID: CPT

## 2023-01-01 PROCEDURE — 84100 ASSAY OF PHOSPHORUS: CPT

## 2023-01-01 PROCEDURE — 82728 ASSAY OF FERRITIN: CPT

## 2023-01-01 PROCEDURE — 700102 HCHG RX REV CODE 250 W/ 637 OVERRIDE(OP): Performed by: PHYSICIAN ASSISTANT

## 2023-01-01 PROCEDURE — 87493 C DIFF AMPLIFIED PROBE: CPT

## 2023-01-01 PROCEDURE — 99213 OFFICE O/P EST LOW 20 MIN: CPT | Performed by: INTERNAL MEDICINE

## 2023-01-01 PROCEDURE — 3078F DIAST BP <80 MM HG: CPT | Performed by: INTERNAL MEDICINE

## 2023-01-01 PROCEDURE — 93010 ELECTROCARDIOGRAM REPORT: CPT | Performed by: INTERNAL MEDICINE

## 2023-01-01 PROCEDURE — 3075F SYST BP GE 130 - 139MM HG: CPT | Mod: 95 | Performed by: NURSE PRACTITIONER

## 2023-01-01 PROCEDURE — 99213 OFFICE O/P EST LOW 20 MIN: CPT | Performed by: NURSE PRACTITIONER

## 2023-01-01 PROCEDURE — 82306 VITAMIN D 25 HYDROXY: CPT

## 2023-01-01 PROCEDURE — 87186 SC STD MICRODIL/AGAR DIL: CPT

## 2023-01-01 PROCEDURE — 700111 HCHG RX REV CODE 636 W/ 250 OVERRIDE (IP): Performed by: EMERGENCY MEDICINE

## 2023-01-01 PROCEDURE — 97530 THERAPEUTIC ACTIVITIES: CPT

## 2023-01-01 PROCEDURE — 84145 PROCALCITONIN (PCT): CPT

## 2023-01-01 PROCEDURE — 83880 ASSAY OF NATRIURETIC PEPTIDE: CPT

## 2023-01-01 PROCEDURE — 700102 HCHG RX REV CODE 250 W/ 637 OVERRIDE(OP): Performed by: STUDENT IN AN ORGANIZED HEALTH CARE EDUCATION/TRAINING PROGRAM

## 2023-01-01 PROCEDURE — 700105 HCHG RX REV CODE 258: Performed by: INTERNAL MEDICINE

## 2023-01-01 PROCEDURE — 99233 SBSQ HOSP IP/OBS HIGH 50: CPT | Performed by: INTERNAL MEDICINE

## 2023-01-01 PROCEDURE — 700101 HCHG RX REV CODE 250

## 2023-01-01 PROCEDURE — 82140 ASSAY OF AMMONIA: CPT

## 2023-01-01 PROCEDURE — 99291 CRITICAL CARE FIRST HOUR: CPT | Performed by: INTERNAL MEDICINE

## 2023-01-01 PROCEDURE — 700111 HCHG RX REV CODE 636 W/ 250 OVERRIDE (IP): Performed by: FAMILY MEDICINE

## 2023-01-01 PROCEDURE — 3074F SYST BP LT 130 MM HG: CPT

## 2023-01-01 PROCEDURE — 76775 US EXAM ABDO BACK WALL LIM: CPT

## 2023-01-01 PROCEDURE — 99212 OFFICE O/P EST SF 10 MIN: CPT

## 2023-01-01 PROCEDURE — 8E0ZXY6 ISOLATION: ICD-10-PCS | Performed by: INTERNAL MEDICINE

## 2023-01-01 PROCEDURE — RXMED WILLOW AMBULATORY MEDICATION CHARGE: Performed by: HOSPITALIST

## 2023-01-01 PROCEDURE — 82570 ASSAY OF URINE CREATININE: CPT | Mod: 91

## 2023-01-01 PROCEDURE — 99212 OFFICE O/P EST SF 10 MIN: CPT | Performed by: NURSE PRACTITIONER

## 2023-01-01 PROCEDURE — 71045 X-RAY EXAM CHEST 1 VIEW: CPT

## 2023-01-01 PROCEDURE — 85025 COMPLETE CBC W/AUTO DIFF WBC: CPT

## 2023-01-01 PROCEDURE — 83036 HEMOGLOBIN GLYCOSYLATED A1C: CPT

## 2023-01-01 PROCEDURE — 97602 WOUND(S) CARE NON-SELECTIVE: CPT

## 2023-01-01 PROCEDURE — 99232 SBSQ HOSP IP/OBS MODERATE 35: CPT | Performed by: HOSPITALIST

## 2023-01-01 PROCEDURE — 82803 BLOOD GASES ANY COMBINATION: CPT

## 2023-01-01 PROCEDURE — 74176 CT ABD & PELVIS W/O CONTRAST: CPT

## 2023-01-01 PROCEDURE — 84156 ASSAY OF PROTEIN URINE: CPT

## 2023-01-01 PROCEDURE — 83550 IRON BINDING TEST: CPT

## 2023-01-01 PROCEDURE — 99212 OFFICE O/P EST SF 10 MIN: CPT | Performed by: INTERNAL MEDICINE

## 2023-01-01 PROCEDURE — 93306 TTE W/DOPPLER COMPLETE: CPT | Mod: 26 | Performed by: INTERNAL MEDICINE

## 2023-01-01 PROCEDURE — 93005 ELECTROCARDIOGRAM TRACING: CPT

## 2023-01-01 PROCEDURE — 36600 WITHDRAWAL OF ARTERIAL BLOOD: CPT

## 2023-01-01 PROCEDURE — 3079F DIAST BP 80-89 MM HG: CPT

## 2023-01-01 PROCEDURE — 83540 ASSAY OF IRON: CPT

## 2023-01-01 PROCEDURE — 97535 SELF CARE MNGMENT TRAINING: CPT

## 2023-01-01 PROCEDURE — 0241U HCHG SARS-COV-2 COVID-19 NFCT DS RESP RNA 4 TRGT MIC: CPT

## 2023-01-01 PROCEDURE — 87077 CULTURE AEROBIC IDENTIFY: CPT

## 2023-01-01 PROCEDURE — 96374 THER/PROPH/DIAG INJ IV PUSH: CPT

## 2023-01-01 PROCEDURE — 99285 EMERGENCY DEPT VISIT HI MDM: CPT

## 2023-01-01 PROCEDURE — 3074F SYST BP LT 130 MM HG: CPT | Performed by: NURSE PRACTITIONER

## 2023-01-01 PROCEDURE — 99212 OFFICE O/P EST SF 10 MIN: CPT | Mod: XU | Performed by: NURSE PRACTITIONER

## 2023-01-01 PROCEDURE — 97162 PT EVAL MOD COMPLEX 30 MIN: CPT

## 2023-01-01 PROCEDURE — 3075F SYST BP GE 130 - 139MM HG: CPT | Performed by: INTERNAL MEDICINE

## 2023-01-01 PROCEDURE — 71250 CT THORAX DX C-: CPT

## 2023-01-01 PROCEDURE — 92610 EVALUATE SWALLOWING FUNCTION: CPT

## 2023-01-01 PROCEDURE — 3074F SYST BP LT 130 MM HG: CPT | Performed by: INTERNAL MEDICINE

## 2023-01-01 PROCEDURE — 99239 HOSP IP/OBS DSCHRG MGMT >30: CPT | Performed by: HOSPITALIST

## 2023-01-01 PROCEDURE — 99223 1ST HOSP IP/OBS HIGH 75: CPT | Performed by: PHYSICAL MEDICINE & REHABILITATION

## 2023-01-01 PROCEDURE — 83970 ASSAY OF PARATHORMONE: CPT

## 2023-01-01 PROCEDURE — 99223 1ST HOSP IP/OBS HIGH 75: CPT | Mod: AI | Performed by: FAMILY MEDICINE

## 2023-01-01 PROCEDURE — 84443 ASSAY THYROID STIM HORMONE: CPT

## 2023-01-01 PROCEDURE — 770001 HCHG ROOM/CARE - MED/SURG/GYN PRIV*

## 2023-01-01 PROCEDURE — 93306 TTE W/DOPPLER COMPLETE: CPT

## 2023-01-01 PROCEDURE — 92526 ORAL FUNCTION THERAPY: CPT

## 2023-01-01 PROCEDURE — 99223 1ST HOSP IP/OBS HIGH 75: CPT | Mod: AI | Performed by: INTERNAL MEDICINE

## 2023-01-01 PROCEDURE — 83605 ASSAY OF LACTIC ACID: CPT | Mod: 91

## 2023-01-01 PROCEDURE — 84484 ASSAY OF TROPONIN QUANT: CPT | Mod: 91

## 2023-01-01 RX ORDER — ONDANSETRON 2 MG/ML
4 INJECTION INTRAMUSCULAR; INTRAVENOUS EVERY 4 HOURS PRN
Status: DISCONTINUED | OUTPATIENT
Start: 2023-01-01 | End: 2023-01-01

## 2023-01-01 RX ORDER — DEXTROSE MONOHYDRATE 25 G/50ML
25 INJECTION, SOLUTION INTRAVENOUS
Status: DISCONTINUED | OUTPATIENT
Start: 2023-01-01 | End: 2023-01-01 | Stop reason: HOSPADM

## 2023-01-01 RX ORDER — WARFARIN SODIUM 5 MG/1
5 TABLET ORAL
Status: COMPLETED | OUTPATIENT
Start: 2023-01-01 | End: 2023-01-01

## 2023-01-01 RX ORDER — CLOPIDOGREL BISULFATE 75 MG/1
75 TABLET ORAL DAILY
Status: DISCONTINUED | OUTPATIENT
Start: 2023-01-01 | End: 2023-01-01 | Stop reason: HOSPADM

## 2023-01-01 RX ORDER — ONDANSETRON 2 MG/ML
8 INJECTION INTRAMUSCULAR; INTRAVENOUS EVERY 8 HOURS PRN
Status: DISCONTINUED | OUTPATIENT
Start: 2023-01-01 | End: 2023-01-01 | Stop reason: HOSPADM

## 2023-01-01 RX ORDER — BUDESONIDE AND FORMOTEROL FUMARATE DIHYDRATE 160; 4.5 UG/1; UG/1
2 AEROSOL RESPIRATORY (INHALATION) 2 TIMES DAILY
Status: DISCONTINUED | OUTPATIENT
Start: 2023-01-01 | End: 2023-01-01

## 2023-01-01 RX ORDER — PHENAZOPYRIDINE HYDROCHLORIDE 200 MG/1
100 TABLET, FILM COATED ORAL
Status: DISCONTINUED | OUTPATIENT
Start: 2023-01-01 | End: 2023-01-01

## 2023-01-01 RX ORDER — LORAZEPAM 2 MG/ML
1 CONCENTRATE ORAL
Status: DISCONTINUED | OUTPATIENT
Start: 2023-01-01 | End: 2023-01-01 | Stop reason: HOSPADM

## 2023-01-01 RX ORDER — PANTOPRAZOLE SODIUM 40 MG/10ML
40 INJECTION, POWDER, LYOPHILIZED, FOR SOLUTION INTRAVENOUS DAILY
Status: DISCONTINUED | OUTPATIENT
Start: 2023-01-01 | End: 2023-01-01

## 2023-01-01 RX ORDER — HYDROMORPHONE HYDROCHLORIDE 1 MG/ML
0.5 INJECTION, SOLUTION INTRAMUSCULAR; INTRAVENOUS; SUBCUTANEOUS EVERY 4 HOURS PRN
Status: DISCONTINUED | OUTPATIENT
Start: 2023-01-01 | End: 2023-01-01 | Stop reason: HOSPADM

## 2023-01-01 RX ORDER — FUROSEMIDE 40 MG/1
40 TABLET ORAL DAILY
Qty: 30 TABLET | Refills: 0 | Status: SHIPPED | OUTPATIENT
Start: 2023-01-01 | End: 2023-01-01

## 2023-01-01 RX ORDER — AMOXICILLIN 250 MG
2 CAPSULE ORAL 2 TIMES DAILY
Status: DISCONTINUED | OUTPATIENT
Start: 2023-01-01 | End: 2023-01-01

## 2023-01-01 RX ORDER — FUROSEMIDE 40 MG/1
80 TABLET ORAL DAILY
Qty: 180 TABLET | Refills: 3 | Status: ON HOLD | OUTPATIENT
Start: 2023-01-01 | End: 2023-01-01

## 2023-01-01 RX ORDER — ALBUTEROL SULFATE 90 UG/1
2 AEROSOL, METERED RESPIRATORY (INHALATION) EVERY 4 HOURS PRN
Status: DISCONTINUED | OUTPATIENT
Start: 2023-01-01 | End: 2023-01-01

## 2023-01-01 RX ORDER — TRAMADOL HYDROCHLORIDE 50 MG/1
50 TABLET ORAL EVERY 12 HOURS PRN
Qty: 10 TABLET | Refills: 0 | Status: SHIPPED | OUTPATIENT
Start: 2023-01-01 | End: 2023-01-01

## 2023-01-01 RX ORDER — INSULIN LISPRO 100 [IU]/ML
2-9 INJECTION, SOLUTION INTRAVENOUS; SUBCUTANEOUS EVERY 6 HOURS
Status: DISCONTINUED | OUTPATIENT
Start: 2023-01-01 | End: 2023-01-01

## 2023-01-01 RX ORDER — BISACODYL 10 MG
10 SUPPOSITORY, RECTAL RECTAL
Status: DISCONTINUED | OUTPATIENT
Start: 2023-01-01 | End: 2023-01-01

## 2023-01-01 RX ORDER — CEPHALEXIN 250 MG/1
250 CAPSULE ORAL 3 TIMES DAILY
Qty: 21 CAPSULE | Refills: 0 | Status: SHIPPED | OUTPATIENT
Start: 2023-01-01 | End: 2023-01-01

## 2023-01-01 RX ORDER — ONDANSETRON 4 MG/1
8 TABLET, ORALLY DISINTEGRATING ORAL EVERY 8 HOURS PRN
Status: DISCONTINUED | OUTPATIENT
Start: 2023-01-01 | End: 2023-01-01 | Stop reason: HOSPADM

## 2023-01-01 RX ORDER — WARFARIN SODIUM 5 MG/1
TABLET ORAL
Qty: 30 TABLET | Refills: 3 | Status: ACTIVE | OUTPATIENT
Start: 2023-01-01 | End: 2023-11-02

## 2023-01-01 RX ORDER — ONDANSETRON 4 MG/1
4 TABLET, ORALLY DISINTEGRATING ORAL EVERY 4 HOURS PRN
Status: DISCONTINUED | OUTPATIENT
Start: 2023-01-01 | End: 2023-01-01

## 2023-01-01 RX ORDER — CLOPIDOGREL BISULFATE 75 MG/1
75 TABLET ORAL DAILY
Status: DISCONTINUED | OUTPATIENT
Start: 2023-01-01 | End: 2023-01-01

## 2023-01-01 RX ORDER — DILTIAZEM HYDROCHLORIDE 60 MG/1
TABLET, FILM COATED ORAL
Qty: 90 TABLET | Refills: 0 | Status: SHIPPED | OUTPATIENT
Start: 2023-01-01 | End: 2023-01-01

## 2023-01-01 RX ORDER — PREDNISONE 20 MG/1
40 TABLET ORAL DAILY
Status: DISCONTINUED | OUTPATIENT
Start: 2023-01-01 | End: 2023-01-01

## 2023-01-01 RX ORDER — SULFAMETHOXAZOLE AND TRIMETHOPRIM 800; 160 MG/1; MG/1
TABLET ORAL
COMMUNITY
End: 2023-01-01

## 2023-01-01 RX ORDER — POLYETHYLENE GLYCOL 3350 17 G/17G
1 POWDER, FOR SOLUTION ORAL
Status: DISCONTINUED | OUTPATIENT
Start: 2023-01-01 | End: 2023-01-01 | Stop reason: HOSPADM

## 2023-01-01 RX ORDER — METOLAZONE 5 MG/1
5 TABLET ORAL
Status: DISCONTINUED | OUTPATIENT
Start: 2023-01-01 | End: 2023-01-01

## 2023-01-01 RX ORDER — WARFARIN SODIUM 2.5 MG/1
2.5 TABLET ORAL
Status: DISCONTINUED | OUTPATIENT
Start: 2023-01-01 | End: 2023-01-01 | Stop reason: HOSPADM

## 2023-01-01 RX ORDER — PROMETHAZINE HYDROCHLORIDE 25 MG/1
25 TABLET ORAL EVERY 6 HOURS PRN
Status: DISCONTINUED | OUTPATIENT
Start: 2023-01-01 | End: 2023-01-01 | Stop reason: HOSPADM

## 2023-01-01 RX ORDER — FINASTERIDE 5 MG/1
5 TABLET, FILM COATED ORAL DAILY
Qty: 30 TABLET | Refills: 0 | Status: SHIPPED | OUTPATIENT
Start: 2023-01-01

## 2023-01-01 RX ORDER — WARFARIN SODIUM 2.5 MG/1
2.5 TABLET ORAL
Status: DISCONTINUED | OUTPATIENT
Start: 2023-01-01 | End: 2023-01-01

## 2023-01-01 RX ORDER — MORPHINE SULFATE 4 MG/ML
2 INJECTION INTRAVENOUS ONCE
Status: COMPLETED | OUTPATIENT
Start: 2023-01-01 | End: 2023-01-01

## 2023-01-01 RX ORDER — INSULIN ASPART 100 [IU]/ML
5-7 INJECTION, SOLUTION INTRAVENOUS; SUBCUTANEOUS 2 TIMES DAILY WITH MEALS
COMMUNITY

## 2023-01-01 RX ORDER — DEXAMETHASONE 4 MG/1
6 TABLET ORAL DAILY
Status: DISCONTINUED | OUTPATIENT
Start: 2023-01-01 | End: 2023-01-01

## 2023-01-01 RX ORDER — METHYLPREDNISOLONE SODIUM SUCCINATE 125 MG/2ML
62.5 INJECTION, POWDER, LYOPHILIZED, FOR SOLUTION INTRAMUSCULAR; INTRAVENOUS EVERY 6 HOURS
Status: DISCONTINUED | OUTPATIENT
Start: 2023-01-01 | End: 2023-01-01

## 2023-01-01 RX ORDER — HYDRALAZINE HYDROCHLORIDE 20 MG/ML
10 INJECTION INTRAMUSCULAR; INTRAVENOUS EVERY 4 HOURS PRN
Status: DISCONTINUED | OUTPATIENT
Start: 2023-01-01 | End: 2023-01-01 | Stop reason: HOSPADM

## 2023-01-01 RX ORDER — FUROSEMIDE 40 MG/1
40 TABLET ORAL
Status: DISCONTINUED | OUTPATIENT
Start: 2023-01-01 | End: 2023-01-01 | Stop reason: HOSPADM

## 2023-01-01 RX ORDER — GUAIFENESIN 600 MG/1
600 TABLET, EXTENDED RELEASE ORAL EVERY 12 HOURS
Status: DISCONTINUED | OUTPATIENT
Start: 2023-01-01 | End: 2023-01-01 | Stop reason: HOSPADM

## 2023-01-01 RX ORDER — LORAZEPAM 2 MG/ML
1 INJECTION INTRAMUSCULAR
Status: DISCONTINUED | OUTPATIENT
Start: 2023-01-01 | End: 2023-01-01 | Stop reason: HOSPADM

## 2023-01-01 RX ORDER — WARFARIN SODIUM 5 MG/1
5 TABLET ORAL
Status: DISCONTINUED | OUTPATIENT
Start: 2023-01-01 | End: 2023-01-01 | Stop reason: HOSPADM

## 2023-01-01 RX ORDER — FUROSEMIDE 40 MG/1
80 TABLET ORAL
Qty: 180 TABLET | Refills: 0 | Status: SHIPPED | OUTPATIENT
Start: 2023-01-01 | End: 2023-01-01

## 2023-01-01 RX ORDER — FUROSEMIDE 10 MG/ML
40 INJECTION INTRAMUSCULAR; INTRAVENOUS
Status: DISCONTINUED | OUTPATIENT
Start: 2023-01-01 | End: 2023-01-01

## 2023-01-01 RX ORDER — FUROSEMIDE 10 MG/ML
80 INJECTION INTRAMUSCULAR; INTRAVENOUS
Status: DISCONTINUED | OUTPATIENT
Start: 2023-01-01 | End: 2023-01-01

## 2023-01-01 RX ORDER — BUDESONIDE AND FORMOTEROL FUMARATE DIHYDRATE 160; 4.5 UG/1; UG/1
2 AEROSOL RESPIRATORY (INHALATION) 2 TIMES DAILY
Qty: 1 EACH | Refills: 5 | Status: SHIPPED | OUTPATIENT
Start: 2023-01-01 | End: 2023-01-01

## 2023-01-01 RX ORDER — WARFARIN SODIUM 5 MG/1
5 TABLET ORAL
Status: DISCONTINUED | OUTPATIENT
Start: 2023-01-01 | End: 2023-01-01

## 2023-01-01 RX ORDER — DILTIAZEM HYDROCHLORIDE 5 MG/ML
25 INJECTION INTRAVENOUS ONCE
Status: COMPLETED | OUTPATIENT
Start: 2023-01-01 | End: 2023-01-01

## 2023-01-01 RX ORDER — TAMSULOSIN HYDROCHLORIDE 0.4 MG/1
0.4 CAPSULE ORAL
Qty: 30 CAPSULE | Refills: 0 | Status: SHIPPED | OUTPATIENT
Start: 2023-01-01

## 2023-01-01 RX ORDER — FUROSEMIDE 40 MG/1
80 TABLET ORAL
Status: DISCONTINUED | OUTPATIENT
Start: 2023-01-01 | End: 2023-01-01

## 2023-01-01 RX ORDER — ATROPINE SULFATE 10 MG/ML
2 SOLUTION/ DROPS OPHTHALMIC EVERY 4 HOURS PRN
Status: DISCONTINUED | OUTPATIENT
Start: 2023-01-01 | End: 2023-01-01 | Stop reason: HOSPADM

## 2023-01-01 RX ORDER — FUROSEMIDE 10 MG/ML
60 INJECTION INTRAMUSCULAR; INTRAVENOUS
Status: DISCONTINUED | OUTPATIENT
Start: 2023-01-01 | End: 2023-01-01

## 2023-01-01 RX ORDER — METHYLPREDNISOLONE SODIUM SUCCINATE 125 MG/2ML
125 INJECTION, POWDER, LYOPHILIZED, FOR SOLUTION INTRAMUSCULAR; INTRAVENOUS ONCE
Status: COMPLETED | OUTPATIENT
Start: 2023-01-01 | End: 2023-01-01

## 2023-01-01 RX ORDER — AMOXICILLIN 250 MG
2 CAPSULE ORAL 2 TIMES DAILY
Status: DISCONTINUED | OUTPATIENT
Start: 2023-01-01 | End: 2023-01-01 | Stop reason: HOSPADM

## 2023-01-01 RX ORDER — OXYBUTYNIN CHLORIDE 5 MG/1
5 TABLET ORAL 3 TIMES DAILY PRN
Qty: 90 TABLET | Refills: 0 | Status: SHIPPED | OUTPATIENT
Start: 2023-01-01

## 2023-01-01 RX ORDER — AMOXICILLIN 250 MG
2 CAPSULE ORAL 2 TIMES DAILY
Qty: 30 TABLET | Refills: 0 | Status: SHIPPED | OUTPATIENT
Start: 2023-01-01 | End: 2023-01-01

## 2023-01-01 RX ORDER — TAMSULOSIN HYDROCHLORIDE 0.4 MG/1
0.4 CAPSULE ORAL
Status: DISCONTINUED | OUTPATIENT
Start: 2023-01-01 | End: 2023-01-01

## 2023-01-01 RX ORDER — ACETAMINOPHEN 325 MG/1
650 TABLET ORAL EVERY 6 HOURS PRN
Status: DISCONTINUED | OUTPATIENT
Start: 2023-01-01 | End: 2023-01-01 | Stop reason: HOSPADM

## 2023-01-01 RX ORDER — BUDESONIDE AND FORMOTEROL FUMARATE DIHYDRATE 160; 4.5 UG/1; UG/1
AEROSOL RESPIRATORY (INHALATION)
Qty: 11 G | Refills: 0 | Status: SHIPPED | OUTPATIENT
Start: 2023-01-01 | End: 2023-11-02

## 2023-01-01 RX ORDER — FINASTERIDE 5 MG/1
5 TABLET, FILM COATED ORAL DAILY
Status: DISCONTINUED | OUTPATIENT
Start: 2023-01-01 | End: 2023-01-01 | Stop reason: HOSPADM

## 2023-01-01 RX ORDER — DILTIAZEM HYDROCHLORIDE 240 MG/1
240 CAPSULE, COATED, EXTENDED RELEASE ORAL
Status: DISCONTINUED | OUTPATIENT
Start: 2023-01-01 | End: 2023-01-01

## 2023-01-01 RX ORDER — OXYBUTYNIN CHLORIDE 5 MG/1
5 TABLET ORAL 3 TIMES DAILY PRN
Status: DISCONTINUED | OUTPATIENT
Start: 2023-01-01 | End: 2023-01-01

## 2023-01-01 RX ORDER — IPRATROPIUM BROMIDE AND ALBUTEROL SULFATE 2.5; .5 MG/3ML; MG/3ML
3 SOLUTION RESPIRATORY (INHALATION)
Status: DISCONTINUED | OUTPATIENT
Start: 2023-01-01 | End: 2023-01-01

## 2023-01-01 RX ORDER — TRAMADOL HYDROCHLORIDE 50 MG/1
50 TABLET ORAL EVERY 12 HOURS PRN
Status: DISCONTINUED | OUTPATIENT
Start: 2023-01-01 | End: 2023-01-01 | Stop reason: HOSPADM

## 2023-01-01 RX ORDER — LISINOPRIL 2.5 MG/1
2.5 TABLET ORAL DAILY
Status: DISCONTINUED | OUTPATIENT
Start: 2023-01-01 | End: 2023-01-01

## 2023-01-01 RX ORDER — BUDESONIDE AND FORMOTEROL FUMARATE DIHYDRATE 160; 4.5 UG/1; UG/1
2 AEROSOL RESPIRATORY (INHALATION)
Status: DISCONTINUED | OUTPATIENT
Start: 2023-01-01 | End: 2023-01-01

## 2023-01-01 RX ORDER — POTASSIUM CHLORIDE 20 MEQ/1
40 TABLET, EXTENDED RELEASE ORAL 2 TIMES DAILY
Status: DISCONTINUED | OUTPATIENT
Start: 2023-01-01 | End: 2023-01-01

## 2023-01-01 RX ORDER — AMOXICILLIN AND CLAVULANATE POTASSIUM 875; 125 MG/1; MG/1
1 TABLET, FILM COATED ORAL 2 TIMES DAILY
Status: ON HOLD | COMMUNITY
Start: 2023-01-01 | End: 2023-01-01

## 2023-01-01 RX ORDER — LINEZOLID 600 MG/1
600 TABLET, FILM COATED ORAL EVERY 12 HOURS
Status: DISCONTINUED | OUTPATIENT
Start: 2023-01-01 | End: 2023-01-01

## 2023-01-01 RX ORDER — BISACODYL 10 MG
10 SUPPOSITORY, RECTAL RECTAL
Status: DISCONTINUED | OUTPATIENT
Start: 2023-01-01 | End: 2023-01-01 | Stop reason: HOSPADM

## 2023-01-01 RX ORDER — FUROSEMIDE 10 MG/ML
80 INJECTION INTRAMUSCULAR; INTRAVENOUS ONCE
Status: COMPLETED | OUTPATIENT
Start: 2023-01-01 | End: 2023-01-01

## 2023-01-01 RX ORDER — METOPROLOL TARTRATE 50 MG/1
50 TABLET, FILM COATED ORAL 2 TIMES DAILY
Qty: 60 TABLET | Refills: 0 | Status: SHIPPED | OUTPATIENT
Start: 2023-01-01 | End: 2023-01-01

## 2023-01-01 RX ORDER — SCOLOPAMINE TRANSDERMAL SYSTEM 1 MG/1
1 PATCH, EXTENDED RELEASE TRANSDERMAL
Status: DISCONTINUED | OUTPATIENT
Start: 2023-01-01 | End: 2023-01-01 | Stop reason: HOSPADM

## 2023-01-01 RX ORDER — INSULIN LISPRO 100 [IU]/ML
2-9 INJECTION, SOLUTION INTRAVENOUS; SUBCUTANEOUS
Status: DISCONTINUED | OUTPATIENT
Start: 2023-01-01 | End: 2023-01-01

## 2023-01-01 RX ORDER — FINASTERIDE 5 MG/1
5 TABLET, FILM COATED ORAL DAILY
Status: DISCONTINUED | OUTPATIENT
Start: 2023-01-01 | End: 2023-01-01

## 2023-01-01 RX ORDER — FUROSEMIDE 40 MG/1
TABLET ORAL
Qty: 180 TABLET | Refills: 0 | Status: CANCELLED | OUTPATIENT
Start: 2023-01-01

## 2023-01-01 RX ORDER — TAMSULOSIN HYDROCHLORIDE 0.4 MG/1
0.4 CAPSULE ORAL
Status: DISCONTINUED | OUTPATIENT
Start: 2023-01-01 | End: 2023-01-01 | Stop reason: HOSPADM

## 2023-01-01 RX ORDER — TRAMADOL HYDROCHLORIDE 50 MG/1
50 TABLET ORAL EVERY 6 HOURS PRN
Status: DISCONTINUED | OUTPATIENT
Start: 2023-01-01 | End: 2023-01-01

## 2023-01-01 RX ORDER — WARFARIN SODIUM 3 MG/1
3 TABLET ORAL
Status: DISCONTINUED | OUTPATIENT
Start: 2023-01-01 | End: 2023-01-01

## 2023-01-01 RX ORDER — FUROSEMIDE 40 MG/1
80 TABLET ORAL
Qty: 180 TABLET | Refills: 1 | Status: SHIPPED | OUTPATIENT
Start: 2023-01-01 | End: 2023-11-02

## 2023-01-01 RX ORDER — POLYETHYLENE GLYCOL 3350 17 G/17G
17 POWDER, FOR SOLUTION ORAL
Qty: 15 EACH | Refills: 3 | Status: SHIPPED | OUTPATIENT
Start: 2023-01-01 | End: 2023-01-01

## 2023-01-01 RX ORDER — METOPROLOL TARTRATE 50 MG/1
50 TABLET, FILM COATED ORAL TWICE DAILY
Status: DISCONTINUED | OUTPATIENT
Start: 2023-01-01 | End: 2023-01-01 | Stop reason: HOSPADM

## 2023-01-01 RX ORDER — ATORVASTATIN CALCIUM 40 MG/1
40 TABLET, FILM COATED ORAL DAILY
Status: DISCONTINUED | OUTPATIENT
Start: 2023-01-01 | End: 2023-01-01

## 2023-01-01 RX ORDER — DEXMEDETOMIDINE HYDROCHLORIDE 4 UG/ML
INJECTION, SOLUTION INTRAVENOUS
Status: COMPLETED
Start: 2023-01-01 | End: 2023-01-01

## 2023-01-01 RX ORDER — DEXMEDETOMIDINE HYDROCHLORIDE 4 UG/ML
.1-1.5 INJECTION, SOLUTION INTRAVENOUS CONTINUOUS
Status: DISCONTINUED | OUTPATIENT
Start: 2023-01-01 | End: 2023-01-01

## 2023-01-01 RX ORDER — INSULIN LISPRO 100 [IU]/ML
0.2 INJECTION, SOLUTION INTRAVENOUS; SUBCUTANEOUS
Status: DISCONTINUED | OUTPATIENT
Start: 2023-01-01 | End: 2023-01-01

## 2023-01-01 RX ORDER — CLOPIDOGREL BISULFATE 75 MG/1
TABLET ORAL
Qty: 90 TABLET | Refills: 1 | Status: SHIPPED | OUTPATIENT
Start: 2023-01-01 | End: 2023-11-02

## 2023-01-01 RX ORDER — WARFARIN SODIUM 5 MG/1
2.5-5 TABLET ORAL DAILY
Status: ON HOLD | COMMUNITY
End: 2023-01-01

## 2023-01-01 RX ORDER — GUAIFENESIN 600 MG/1
600 TABLET, EXTENDED RELEASE ORAL EVERY 12 HOURS
Qty: 28 TABLET | Refills: 0 | Status: SHIPPED | OUTPATIENT
Start: 2023-01-01 | End: 2023-01-01

## 2023-01-01 RX ORDER — WARFARIN SODIUM 2.5 MG/1
2.5 TABLET ORAL
Status: COMPLETED | OUTPATIENT
Start: 2023-01-01 | End: 2023-01-01

## 2023-01-01 RX ORDER — OXYBUTYNIN CHLORIDE 5 MG/1
5 TABLET ORAL 3 TIMES DAILY PRN
Status: DISCONTINUED | OUTPATIENT
Start: 2023-01-01 | End: 2023-01-01 | Stop reason: HOSPADM

## 2023-01-01 RX ORDER — DILTIAZEM HYDROCHLORIDE 60 MG/1
TABLET, FILM COATED ORAL
Qty: 270 TABLET | Refills: 3 | Status: SHIPPED | OUTPATIENT
Start: 2023-01-01 | End: 2023-11-02

## 2023-01-01 RX ORDER — OMEPRAZOLE 20 MG/1
20 CAPSULE, DELAYED RELEASE ORAL DAILY
Status: DISCONTINUED | OUTPATIENT
Start: 2023-01-01 | End: 2023-01-01

## 2023-01-01 RX ORDER — INSULIN DETEMIR 100 [IU]/ML
10 INJECTION, SOLUTION SUBCUTANEOUS EVERY EVENING
COMMUNITY

## 2023-01-01 RX ORDER — POLYETHYLENE GLYCOL 3350 17 G/17G
1 POWDER, FOR SOLUTION ORAL
Status: DISCONTINUED | OUTPATIENT
Start: 2023-01-01 | End: 2023-01-01

## 2023-01-01 RX ORDER — UMECLIDINIUM 62.5 UG/1
AEROSOL, POWDER ORAL
COMMUNITY
Start: 2023-01-01

## 2023-01-01 RX ORDER — GUAIFENESIN/DEXTROMETHORPHAN 100-10MG/5
10 SYRUP ORAL EVERY 6 HOURS PRN
Status: DISCONTINUED | OUTPATIENT
Start: 2023-01-01 | End: 2023-01-01 | Stop reason: HOSPADM

## 2023-01-01 RX ADMIN — FUROSEMIDE 60 MG: 10 INJECTION, SOLUTION INTRAVENOUS at 18:01

## 2023-01-01 RX ADMIN — INSULIN LISPRO 3 UNITS: 100 INJECTION, SOLUTION INTRAVENOUS; SUBCUTANEOUS at 00:27

## 2023-01-01 RX ADMIN — CLOPIDOGREL BISULFATE 75 MG: 75 TABLET ORAL at 04:46

## 2023-01-01 RX ADMIN — MOMETASONE FUROATE AND FORMOTEROL FUMARATE DIHYDRATE 2 PUFF: 200; 5 AEROSOL RESPIRATORY (INHALATION) at 07:07

## 2023-01-01 RX ADMIN — DILTIAZEM HYDROCHLORIDE 60 MG: 30 TABLET, FILM COATED ORAL at 11:53

## 2023-01-01 RX ADMIN — TIOTROPIUM BROMIDE INHALATION SPRAY 5 MCG: 3.12 SPRAY, METERED RESPIRATORY (INHALATION) at 06:53

## 2023-01-01 RX ADMIN — DILTIAZEM HYDROCHLORIDE 60 MG: 30 TABLET, FILM COATED ORAL at 21:07

## 2023-01-01 RX ADMIN — DILTIAZEM HYDROCHLORIDE 60 MG: 30 TABLET, FILM COATED ORAL at 20:09

## 2023-01-01 RX ADMIN — INSULIN HUMAN 3 UNITS: 100 INJECTION, SOLUTION PARENTERAL at 21:06

## 2023-01-01 RX ADMIN — PIPERACILLIN AND TAZOBACTAM 3.38 G: 3; .375 INJECTION, POWDER, FOR SOLUTION INTRAVENOUS at 11:32

## 2023-01-01 RX ADMIN — METOPROLOL TARTRATE 50 MG: 50 TABLET, FILM COATED ORAL at 05:19

## 2023-01-01 RX ADMIN — INSULIN HUMAN 2 UNITS: 100 INJECTION, SOLUTION PARENTERAL at 12:51

## 2023-01-01 RX ADMIN — INSULIN LISPRO 8 UNITS: 100 INJECTION, SOLUTION INTRAVENOUS; SUBCUTANEOUS at 16:26

## 2023-01-01 RX ADMIN — DILTIAZEM HYDROCHLORIDE 60 MG: 30 TABLET, FILM COATED ORAL at 05:32

## 2023-01-01 RX ADMIN — LINEZOLID 600 MG: 600 TABLET, FILM COATED ORAL at 17:44

## 2023-01-01 RX ADMIN — INSULIN LISPRO 5 UNITS: 100 INJECTION, SOLUTION INTRAVENOUS; SUBCUTANEOUS at 11:15

## 2023-01-01 RX ADMIN — LINEZOLID 600 MG: 600 TABLET, FILM COATED ORAL at 17:55

## 2023-01-01 RX ADMIN — DILTIAZEM HYDROCHLORIDE 90 MG: 30 TABLET, FILM COATED ORAL at 18:01

## 2023-01-01 RX ADMIN — MOMETASONE FUROATE AND FORMOTEROL FUMARATE DIHYDRATE 2 PUFF: 200; 5 AEROSOL RESPIRATORY (INHALATION) at 20:33

## 2023-01-01 RX ADMIN — MOMETASONE FUROATE AND FORMOTEROL FUMARATE DIHYDRATE 2 PUFF: 200; 5 AEROSOL RESPIRATORY (INHALATION) at 08:02

## 2023-01-01 RX ADMIN — DILTIAZEM HYDROCHLORIDE 60 MG: 30 TABLET, FILM COATED ORAL at 05:21

## 2023-01-01 RX ADMIN — FUROSEMIDE 40 MG: 10 INJECTION, SOLUTION INTRAVENOUS at 17:06

## 2023-01-01 RX ADMIN — ACETAMINOPHEN 650 MG: 325 TABLET, FILM COATED ORAL at 23:20

## 2023-01-01 RX ADMIN — DILTIAZEM HYDROCHLORIDE 60 MG: 30 TABLET, FILM COATED ORAL at 05:04

## 2023-01-01 RX ADMIN — PIPERACILLIN AND TAZOBACTAM 3.38 G: 3; .375 INJECTION, POWDER, FOR SOLUTION INTRAVENOUS at 13:53

## 2023-01-01 RX ADMIN — INSULIN LISPRO 6 UNITS: 100 INJECTION, SOLUTION INTRAVENOUS; SUBCUTANEOUS at 17:58

## 2023-01-01 RX ADMIN — FUROSEMIDE 40 MG: 10 INJECTION INTRAMUSCULAR; INTRAVENOUS at 18:15

## 2023-01-01 RX ADMIN — FINASTERIDE 5 MG: 5 TABLET, FILM COATED ORAL at 05:47

## 2023-01-01 RX ADMIN — FUROSEMIDE 25 MG/HR: 10 INJECTION, SOLUTION INTRAMUSCULAR; INTRAVENOUS at 10:38

## 2023-01-01 RX ADMIN — METHYLPREDNISOLONE SODIUM SUCCINATE 125 MG: 125 INJECTION, POWDER, FOR SOLUTION INTRAMUSCULAR; INTRAVENOUS at 08:28

## 2023-01-01 RX ADMIN — DILTIAZEM HYDROCHLORIDE 60 MG: 30 TABLET, FILM COATED ORAL at 11:26

## 2023-01-01 RX ADMIN — INSULIN HUMAN 2 UNITS: 100 INJECTION, SOLUTION PARENTERAL at 17:00

## 2023-01-01 RX ADMIN — OXYBUTYNIN CHLORIDE 5 MG: 5 TABLET ORAL at 00:51

## 2023-01-01 RX ADMIN — SENNOSIDES AND DOCUSATE SODIUM 2 TABLET: 50; 8.6 TABLET ORAL at 05:31

## 2023-01-01 RX ADMIN — PIPERACILLIN AND TAZOBACTAM 3.38 G: 3; .375 INJECTION, POWDER, FOR SOLUTION INTRAVENOUS at 20:36

## 2023-01-01 RX ADMIN — MOMETASONE FUROATE AND FORMOTEROL FUMARATE DIHYDRATE 2 PUFF: 200; 5 AEROSOL RESPIRATORY (INHALATION) at 20:30

## 2023-01-01 RX ADMIN — MOMETASONE FUROATE AND FORMOTEROL FUMARATE DIHYDRATE 2 PUFF: 200; 5 AEROSOL RESPIRATORY (INHALATION) at 08:45

## 2023-01-01 RX ADMIN — WARFARIN SODIUM 2.5 MG: 2.5 TABLET ORAL at 17:51

## 2023-01-01 RX ADMIN — MORPHINE SULFATE 5 MG: 10 INJECTION INTRAVENOUS at 06:42

## 2023-01-01 RX ADMIN — LINEZOLID 600 MG: 600 TABLET, FILM COATED ORAL at 04:45

## 2023-01-01 RX ADMIN — INSULIN HUMAN 8 UNITS: 100 INJECTION, SOLUTION PARENTERAL at 11:49

## 2023-01-01 RX ADMIN — FUROSEMIDE 40 MG: 10 INJECTION, SOLUTION INTRAVENOUS at 04:44

## 2023-01-01 RX ADMIN — WARFARIN SODIUM 5 MG: 5 TABLET ORAL at 17:37

## 2023-01-01 RX ADMIN — MOMETASONE FUROATE AND FORMOTEROL FUMARATE DIHYDRATE 2 PUFF: 200; 5 AEROSOL RESPIRATORY (INHALATION) at 20:22

## 2023-01-01 RX ADMIN — METOLAZONE 5 MG: 5 TABLET ORAL at 05:47

## 2023-01-01 RX ADMIN — IPRATROPIUM BROMIDE AND ALBUTEROL SULFATE 3 ML: .5; 3 SOLUTION RESPIRATORY (INHALATION) at 14:20

## 2023-01-01 RX ADMIN — CLOPIDOGREL BISULFATE 75 MG: 75 TABLET ORAL at 06:22

## 2023-01-01 RX ADMIN — FUROSEMIDE 40 MG: 10 INJECTION INTRAMUSCULAR; INTRAVENOUS at 16:36

## 2023-01-01 RX ADMIN — FUROSEMIDE 40 MG: 40 TABLET ORAL at 11:49

## 2023-01-01 RX ADMIN — CLOPIDOGREL BISULFATE 75 MG: 75 TABLET ORAL at 06:13

## 2023-01-01 RX ADMIN — DILTIAZEM HYDROCHLORIDE 60 MG: 30 TABLET, FILM COATED ORAL at 12:50

## 2023-01-01 RX ADMIN — INSULIN HUMAN 3 UNITS: 100 INJECTION, SOLUTION PARENTERAL at 17:23

## 2023-01-01 RX ADMIN — SENNOSIDES AND DOCUSATE SODIUM 2 TABLET: 50; 8.6 TABLET ORAL at 17:54

## 2023-01-01 RX ADMIN — MOMETASONE FUROATE AND FORMOTEROL FUMARATE DIHYDRATE 2 PUFF: 200; 5 AEROSOL RESPIRATORY (INHALATION) at 08:00

## 2023-01-01 RX ADMIN — FUROSEMIDE 80 MG: 40 TABLET ORAL at 05:46

## 2023-01-01 RX ADMIN — INSULIN HUMAN 2 UNITS: 100 INJECTION, SOLUTION PARENTERAL at 20:46

## 2023-01-01 RX ADMIN — METOPROLOL TARTRATE 50 MG: 50 TABLET, FILM COATED ORAL at 05:47

## 2023-01-01 RX ADMIN — INSULIN HUMAN 3 UNITS: 100 INJECTION, SOLUTION PARENTERAL at 11:21

## 2023-01-01 RX ADMIN — INSULIN HUMAN 3 UNITS: 100 INJECTION, SOLUTION PARENTERAL at 21:59

## 2023-01-01 RX ADMIN — FUROSEMIDE 40 MG: 10 INJECTION INTRAMUSCULAR; INTRAVENOUS at 16:50

## 2023-01-01 RX ADMIN — TAMSULOSIN HYDROCHLORIDE 0.4 MG: 0.4 CAPSULE ORAL at 17:34

## 2023-01-01 RX ADMIN — INSULIN GLARGINE-YFGN 10 UNITS: 100 INJECTION, SOLUTION SUBCUTANEOUS at 23:12

## 2023-01-01 RX ADMIN — DILTIAZEM HYDROCHLORIDE 60 MG: 30 TABLET, FILM COATED ORAL at 05:58

## 2023-01-01 RX ADMIN — CLOPIDOGREL BISULFATE 75 MG: 75 TABLET ORAL at 05:04

## 2023-01-01 RX ADMIN — WARFARIN SODIUM 5 MG: 5 TABLET ORAL at 18:32

## 2023-01-01 RX ADMIN — INSULIN HUMAN 6 UNITS: 100 INJECTION, SOLUTION PARENTERAL at 20:16

## 2023-01-01 RX ADMIN — ACETAMINOPHEN 650 MG: 325 TABLET, FILM COATED ORAL at 10:17

## 2023-01-01 RX ADMIN — FUROSEMIDE 25 MG/HR: 10 INJECTION, SOLUTION INTRAMUSCULAR; INTRAVENOUS at 20:11

## 2023-01-01 RX ADMIN — CLOPIDOGREL BISULFATE 75 MG: 75 TABLET ORAL at 05:52

## 2023-01-01 RX ADMIN — FINASTERIDE 5 MG: 5 TABLET, FILM COATED ORAL at 05:31

## 2023-01-01 RX ADMIN — INSULIN HUMAN 2 UNITS: 100 INJECTION, SOLUTION PARENTERAL at 17:34

## 2023-01-01 RX ADMIN — IPRATROPIUM BROMIDE AND ALBUTEROL SULFATE 3 ML: .5; 3 SOLUTION RESPIRATORY (INHALATION) at 14:29

## 2023-01-01 RX ADMIN — DEXAMETHASONE 6 MG: 4 TABLET ORAL at 13:07

## 2023-01-01 RX ADMIN — CLOPIDOGREL BISULFATE 75 MG: 75 TABLET ORAL at 05:32

## 2023-01-01 RX ADMIN — FUROSEMIDE 25 MG/HR: 10 INJECTION, SOLUTION INTRAMUSCULAR; INTRAVENOUS at 01:42

## 2023-01-01 RX ADMIN — ACETAMINOPHEN 650 MG: 325 TABLET, FILM COATED ORAL at 19:55

## 2023-01-01 RX ADMIN — FUROSEMIDE 40 MG: 10 INJECTION, SOLUTION INTRAVENOUS at 05:57

## 2023-01-01 RX ADMIN — DEXMEDETOMIDINE HYDROCHLORIDE 0.2 MCG/KG/HR: 4 INJECTION, SOLUTION INTRAVENOUS at 00:15

## 2023-01-01 RX ADMIN — DILTIAZEM HYDROCHLORIDE 60 MG: 30 TABLET, FILM COATED ORAL at 05:15

## 2023-01-01 RX ADMIN — INSULIN HUMAN 3 UNITS: 100 INJECTION, SOLUTION PARENTERAL at 11:50

## 2023-01-01 RX ADMIN — CLOPIDOGREL BISULFATE 75 MG: 75 TABLET ORAL at 05:15

## 2023-01-01 RX ADMIN — MOMETASONE FUROATE AND FORMOTEROL FUMARATE DIHYDRATE 2 PUFF: 200; 5 AEROSOL RESPIRATORY (INHALATION) at 08:38

## 2023-01-01 RX ADMIN — MOMETASONE FUROATE AND FORMOTEROL FUMARATE DIHYDRATE 2 PUFF: 200; 5 AEROSOL RESPIRATORY (INHALATION) at 19:20

## 2023-01-01 RX ADMIN — MOMETASONE FUROATE AND FORMOTEROL FUMARATE DIHYDRATE 2 PUFF: 200; 5 AEROSOL RESPIRATORY (INHALATION) at 07:34

## 2023-01-01 RX ADMIN — WARFARIN SODIUM 5 MG: 5 TABLET ORAL at 17:06

## 2023-01-01 RX ADMIN — DILTIAZEM HYDROCHLORIDE 60 MG: 30 TABLET, FILM COATED ORAL at 06:05

## 2023-01-01 RX ADMIN — FINASTERIDE 5 MG: 5 TABLET, FILM COATED ORAL at 06:30

## 2023-01-01 RX ADMIN — INSULIN HUMAN 5 UNITS: 100 INJECTION, SOLUTION PARENTERAL at 12:12

## 2023-01-01 RX ADMIN — FUROSEMIDE 60 MG: 10 INJECTION, SOLUTION INTRAVENOUS at 17:52

## 2023-01-01 RX ADMIN — INSULIN LISPRO 8 UNITS: 100 INJECTION, SOLUTION INTRAVENOUS; SUBCUTANEOUS at 06:22

## 2023-01-01 RX ADMIN — OXYBUTYNIN CHLORIDE 5 MG: 5 TABLET ORAL at 18:06

## 2023-01-01 RX ADMIN — OXYBUTYNIN CHLORIDE 5 MG: 5 TABLET ORAL at 13:49

## 2023-01-01 RX ADMIN — FUROSEMIDE 25 MG/HR: 10 INJECTION, SOLUTION INTRAMUSCULAR; INTRAVENOUS at 17:13

## 2023-01-01 RX ADMIN — METHYLPREDNISOLONE SODIUM SUCCINATE 62.5 MG: 125 INJECTION, POWDER, FOR SOLUTION INTRAMUSCULAR; INTRAVENOUS at 05:39

## 2023-01-01 RX ADMIN — FINASTERIDE 5 MG: 5 TABLET, FILM COATED ORAL at 17:34

## 2023-01-01 RX ADMIN — INSULIN HUMAN 3 UNITS: 100 INJECTION, SOLUTION PARENTERAL at 11:49

## 2023-01-01 RX ADMIN — INSULIN LISPRO 3 UNITS: 100 INJECTION, SOLUTION INTRAVENOUS; SUBCUTANEOUS at 05:05

## 2023-01-01 RX ADMIN — IPRATROPIUM BROMIDE AND ALBUTEROL SULFATE 3 ML: .5; 3 SOLUTION RESPIRATORY (INHALATION) at 02:36

## 2023-01-01 RX ADMIN — METHYLPREDNISOLONE SODIUM SUCCINATE 62.5 MG: 125 INJECTION, POWDER, FOR SOLUTION INTRAMUSCULAR; INTRAVENOUS at 17:06

## 2023-01-01 RX ADMIN — SCOPOLAMINE 1 PATCH: 1.5 PATCH, EXTENDED RELEASE TRANSDERMAL at 19:33

## 2023-01-01 RX ADMIN — MOMETASONE FUROATE AND FORMOTEROL FUMARATE DIHYDRATE 2 PUFF: 200; 5 AEROSOL RESPIRATORY (INHALATION) at 21:06

## 2023-01-01 RX ADMIN — INSULIN HUMAN 3 UNITS: 100 INJECTION, SOLUTION PARENTERAL at 21:09

## 2023-01-01 RX ADMIN — PIPERACILLIN AND TAZOBACTAM 3.38 G: 3; .375 INJECTION, POWDER, FOR SOLUTION INTRAVENOUS at 05:28

## 2023-01-01 RX ADMIN — INSULIN HUMAN 2 UNITS: 100 INJECTION, SOLUTION PARENTERAL at 18:14

## 2023-01-01 RX ADMIN — POTASSIUM CHLORIDE 40 MEQ: 1500 TABLET, EXTENDED RELEASE ORAL at 17:20

## 2023-01-01 RX ADMIN — METOLAZONE 5 MG: 5 TABLET ORAL at 15:35

## 2023-01-01 RX ADMIN — DILTIAZEM HYDROCHLORIDE 60 MG: 30 TABLET, FILM COATED ORAL at 18:15

## 2023-01-01 RX ADMIN — METOPROLOL TARTRATE 50 MG: 50 TABLET, FILM COATED ORAL at 17:18

## 2023-01-01 RX ADMIN — TAMSULOSIN HYDROCHLORIDE 0.4 MG: 0.4 CAPSULE ORAL at 08:18

## 2023-01-01 RX ADMIN — FINASTERIDE 5 MG: 5 TABLET, FILM COATED ORAL at 04:45

## 2023-01-01 RX ADMIN — ACETAMINOPHEN 650 MG: 325 TABLET, FILM COATED ORAL at 14:53

## 2023-01-01 RX ADMIN — CEFTRIAXONE SODIUM 1000 MG: 1 INJECTION, POWDER, FOR SOLUTION INTRAMUSCULAR; INTRAVENOUS at 17:17

## 2023-01-01 RX ADMIN — IPRATROPIUM BROMIDE AND ALBUTEROL SULFATE 3 ML: .5; 3 SOLUTION RESPIRATORY (INHALATION) at 15:26

## 2023-01-01 RX ADMIN — DILTIAZEM HYDROCHLORIDE 90 MG: 30 TABLET, FILM COATED ORAL at 11:50

## 2023-01-01 RX ADMIN — ACETAMINOPHEN 650 MG: 325 TABLET, FILM COATED ORAL at 11:58

## 2023-01-01 RX ADMIN — INSULIN HUMAN 3 UNITS: 100 INJECTION, SOLUTION PARENTERAL at 12:00

## 2023-01-01 RX ADMIN — CLOPIDOGREL BISULFATE 75 MG: 75 TABLET ORAL at 06:05

## 2023-01-01 RX ADMIN — INSULIN LISPRO 3 UNITS: 100 INJECTION, SOLUTION INTRAVENOUS; SUBCUTANEOUS at 16:28

## 2023-01-01 RX ADMIN — INSULIN HUMAN 2 UNITS: 100 INJECTION, SOLUTION PARENTERAL at 11:50

## 2023-01-01 RX ADMIN — WARFARIN SODIUM 2.5 MG: 2.5 TABLET ORAL at 17:20

## 2023-01-01 RX ADMIN — FUROSEMIDE 80 MG: 40 TABLET ORAL at 17:05

## 2023-01-01 RX ADMIN — LINEZOLID 600 MG: 600 TABLET, FILM COATED ORAL at 05:58

## 2023-01-01 RX ADMIN — FUROSEMIDE 60 MG: 10 INJECTION, SOLUTION INTRAVENOUS at 05:03

## 2023-01-01 RX ADMIN — INSULIN HUMAN 3 UNITS: 100 INJECTION, SOLUTION PARENTERAL at 20:25

## 2023-01-01 RX ADMIN — INSULIN LISPRO 8 UNITS: 100 INJECTION, SOLUTION INTRAVENOUS; SUBCUTANEOUS at 05:04

## 2023-01-01 RX ADMIN — INSULIN HUMAN 2 UNITS: 100 INJECTION, SOLUTION PARENTERAL at 16:42

## 2023-01-01 RX ADMIN — POTASSIUM CHLORIDE 40 MEQ: 1500 TABLET, EXTENDED RELEASE ORAL at 05:31

## 2023-01-01 RX ADMIN — MOMETASONE FUROATE AND FORMOTEROL FUMARATE DIHYDRATE 2 PUFF: 200; 5 AEROSOL RESPIRATORY (INHALATION) at 20:14

## 2023-01-01 RX ADMIN — DILTIAZEM HYDROCHLORIDE 90 MG: 30 TABLET, FILM COATED ORAL at 17:44

## 2023-01-01 RX ADMIN — INSULIN HUMAN 2 UNITS: 100 INJECTION, SOLUTION PARENTERAL at 08:02

## 2023-01-01 RX ADMIN — SENNOSIDES AND DOCUSATE SODIUM 2 TABLET: 50; 8.6 TABLET ORAL at 17:20

## 2023-01-01 RX ADMIN — FUROSEMIDE 40 MG: 10 INJECTION INTRAMUSCULAR; INTRAVENOUS at 06:13

## 2023-01-01 RX ADMIN — DILTIAZEM HYDROCHLORIDE 60 MG: 30 TABLET, FILM COATED ORAL at 11:24

## 2023-01-01 RX ADMIN — TIOTROPIUM BROMIDE INHALATION SPRAY 5 MCG: 3.12 SPRAY, METERED RESPIRATORY (INHALATION) at 15:26

## 2023-01-01 RX ADMIN — DILTIAZEM HYDROCHLORIDE 25 MG: 5 INJECTION INTRAVENOUS at 14:35

## 2023-01-01 RX ADMIN — INSULIN HUMAN 5 UNITS: 100 INJECTION, SOLUTION PARENTERAL at 20:55

## 2023-01-01 RX ADMIN — INSULIN HUMAN 5 UNITS: 100 INJECTION, SOLUTION PARENTERAL at 20:01

## 2023-01-01 RX ADMIN — INSULIN LISPRO 5 UNITS: 100 INJECTION, SOLUTION INTRAVENOUS; SUBCUTANEOUS at 00:13

## 2023-01-01 RX ADMIN — DILTIAZEM HYDROCHLORIDE 60 MG: 30 TABLET, FILM COATED ORAL at 23:19

## 2023-01-01 RX ADMIN — FUROSEMIDE 25 MG/HR: 10 INJECTION, SOLUTION INTRAMUSCULAR; INTRAVENOUS at 00:17

## 2023-01-01 RX ADMIN — FUROSEMIDE 40 MG: 10 INJECTION INTRAMUSCULAR; INTRAVENOUS at 06:05

## 2023-01-01 RX ADMIN — ALBUTEROL SULFATE 2.5 MG: 2.5 SOLUTION RESPIRATORY (INHALATION) at 23:08

## 2023-01-01 RX ADMIN — ACETAMINOPHEN 650 MG: 325 TABLET, FILM COATED ORAL at 11:17

## 2023-01-01 RX ADMIN — INSULIN HUMAN 2 UNITS: 100 INJECTION, SOLUTION PARENTERAL at 22:58

## 2023-01-01 RX ADMIN — DEXMEDETOMIDINE HYDROCHLORIDE 0.2 MCG/KG/HR: 4 INJECTION, SOLUTION INTRAVENOUS at 09:23

## 2023-01-01 RX ADMIN — IPRATROPIUM BROMIDE AND ALBUTEROL SULFATE 3 ML: .5; 3 SOLUTION RESPIRATORY (INHALATION) at 06:48

## 2023-01-01 RX ADMIN — DILTIAZEM HYDROCHLORIDE 60 MG: 30 TABLET, FILM COATED ORAL at 16:36

## 2023-01-01 RX ADMIN — FUROSEMIDE 40 MG: 10 INJECTION INTRAMUSCULAR; INTRAVENOUS at 18:03

## 2023-01-01 RX ADMIN — TAMSULOSIN HYDROCHLORIDE 0.4 MG: 0.4 CAPSULE ORAL at 08:58

## 2023-01-01 RX ADMIN — MOMETASONE FUROATE AND FORMOTEROL FUMARATE DIHYDRATE 2 PUFF: 200; 5 AEROSOL RESPIRATORY (INHALATION) at 09:14

## 2023-01-01 RX ADMIN — INSULIN LISPRO 8 UNITS: 100 INJECTION, SOLUTION INTRAVENOUS; SUBCUTANEOUS at 18:01

## 2023-01-01 RX ADMIN — TAMSULOSIN HYDROCHLORIDE 0.4 MG: 0.4 CAPSULE ORAL at 08:11

## 2023-01-01 RX ADMIN — CLOPIDOGREL BISULFATE 75 MG: 75 TABLET ORAL at 05:58

## 2023-01-01 RX ADMIN — DILTIAZEM HYDROCHLORIDE 60 MG: 30 TABLET, FILM COATED ORAL at 11:57

## 2023-01-01 RX ADMIN — FUROSEMIDE 25 MG/HR: 10 INJECTION, SOLUTION INTRAMUSCULAR; INTRAVENOUS at 09:33

## 2023-01-01 RX ADMIN — MOMETASONE FUROATE AND FORMOTEROL FUMARATE DIHYDRATE 2 PUFF: 200; 5 AEROSOL RESPIRATORY (INHALATION) at 23:10

## 2023-01-01 RX ADMIN — DILTIAZEM HYDROCHLORIDE 90 MG: 30 TABLET, FILM COATED ORAL at 04:45

## 2023-01-01 RX ADMIN — DILTIAZEM HYDROCHLORIDE 60 MG: 30 TABLET, FILM COATED ORAL at 11:55

## 2023-01-01 RX ADMIN — INSULIN LISPRO 3 UNITS: 100 INJECTION, SOLUTION INTRAVENOUS; SUBCUTANEOUS at 06:25

## 2023-01-01 RX ADMIN — INSULIN HUMAN 3 UNITS: 100 INJECTION, SOLUTION PARENTERAL at 12:54

## 2023-01-01 RX ADMIN — DILTIAZEM HYDROCHLORIDE 90 MG: 30 TABLET, FILM COATED ORAL at 11:49

## 2023-01-01 RX ADMIN — FUROSEMIDE 40 MG: 10 INJECTION, SOLUTION INTRAVENOUS at 18:13

## 2023-01-01 RX ADMIN — METHYLPREDNISOLONE SODIUM SUCCINATE 62.5 MG: 125 INJECTION, POWDER, FOR SOLUTION INTRAMUSCULAR; INTRAVENOUS at 11:54

## 2023-01-01 RX ADMIN — METOLAZONE 5 MG: 5 TABLET ORAL at 17:07

## 2023-01-01 RX ADMIN — DEXAMETHASONE 6 MG: 4 TABLET ORAL at 05:57

## 2023-01-01 RX ADMIN — IPRATROPIUM BROMIDE AND ALBUTEROL SULFATE 3 ML: .5; 3 SOLUTION RESPIRATORY (INHALATION) at 23:12

## 2023-01-01 RX ADMIN — GUAIFENESIN 600 MG: 600 TABLET, EXTENDED RELEASE ORAL at 10:53

## 2023-01-01 RX ADMIN — MOMETASONE FUROATE AND FORMOTEROL FUMARATE DIHYDRATE 2 PUFF: 200; 5 AEROSOL RESPIRATORY (INHALATION) at 19:17

## 2023-01-01 RX ADMIN — ACETAMINOPHEN 650 MG: 325 TABLET, FILM COATED ORAL at 20:24

## 2023-01-01 RX ADMIN — MOMETASONE FUROATE AND FORMOTEROL FUMARATE DIHYDRATE 2 PUFF: 200; 5 AEROSOL RESPIRATORY (INHALATION) at 20:04

## 2023-01-01 RX ADMIN — DILTIAZEM HYDROCHLORIDE 60 MG: 30 TABLET, FILM COATED ORAL at 20:22

## 2023-01-01 RX ADMIN — FINASTERIDE 5 MG: 5 TABLET, FILM COATED ORAL at 05:58

## 2023-01-01 RX ADMIN — INSULIN HUMAN 3 UNITS: 100 INJECTION, SOLUTION PARENTERAL at 20:08

## 2023-01-01 RX ADMIN — INSULIN HUMAN 3 UNITS: 100 INJECTION, SOLUTION PARENTERAL at 17:21

## 2023-01-01 RX ADMIN — WARFARIN SODIUM 2.5 MG: 2.5 TABLET ORAL at 16:36

## 2023-01-01 RX ADMIN — IPRATROPIUM BROMIDE AND ALBUTEROL SULFATE 3 ML: .5; 3 SOLUTION RESPIRATORY (INHALATION) at 19:19

## 2023-01-01 RX ADMIN — PIPERACILLIN AND TAZOBACTAM 3.38 G: 3; .375 INJECTION, POWDER, FOR SOLUTION INTRAVENOUS at 08:35

## 2023-01-01 RX ADMIN — PHYTONADIONE 10 MG: 10 INJECTION, EMULSION INTRAMUSCULAR; INTRAVENOUS; SUBCUTANEOUS at 13:02

## 2023-01-01 RX ADMIN — DILTIAZEM HYDROCHLORIDE 60 MG: 30 TABLET, FILM COATED ORAL at 13:05

## 2023-01-01 RX ADMIN — TIOTROPIUM BROMIDE INHALATION SPRAY 5 MCG: 3.12 SPRAY, METERED RESPIRATORY (INHALATION) at 10:03

## 2023-01-01 RX ADMIN — IPRATROPIUM BROMIDE AND ALBUTEROL SULFATE 3 ML: .5; 3 SOLUTION RESPIRATORY (INHALATION) at 06:53

## 2023-01-01 RX ADMIN — ACETAMINOPHEN 650 MG: 325 TABLET, FILM COATED ORAL at 08:45

## 2023-01-01 RX ADMIN — INSULIN HUMAN 3 UNITS: 100 INJECTION, SOLUTION PARENTERAL at 20:35

## 2023-01-01 RX ADMIN — MOMETASONE FUROATE AND FORMOTEROL FUMARATE DIHYDRATE 2 PUFF: 200; 5 AEROSOL RESPIRATORY (INHALATION) at 10:03

## 2023-01-01 RX ADMIN — METHYLPREDNISOLONE SODIUM SUCCINATE 62.5 MG: 125 INJECTION, POWDER, FOR SOLUTION INTRAMUSCULAR; INTRAVENOUS at 10:58

## 2023-01-01 RX ADMIN — LINEZOLID 600 MG: 600 TABLET, FILM COATED ORAL at 18:00

## 2023-01-01 RX ADMIN — CLOPIDOGREL BISULFATE 75 MG: 75 TABLET ORAL at 05:19

## 2023-01-01 RX ADMIN — IPRATROPIUM BROMIDE AND ALBUTEROL SULFATE 3 ML: .5; 3 SOLUTION RESPIRATORY (INHALATION) at 02:37

## 2023-01-01 RX ADMIN — FUROSEMIDE 80 MG: 10 INJECTION INTRAMUSCULAR; INTRAVENOUS at 09:31

## 2023-01-01 RX ADMIN — INSULIN HUMAN 2 UNITS: 100 INJECTION, SOLUTION PARENTERAL at 17:18

## 2023-01-01 RX ADMIN — CEFTRIAXONE SODIUM 1000 MG: 1 INJECTION, POWDER, FOR SOLUTION INTRAMUSCULAR; INTRAVENOUS at 18:06

## 2023-01-01 RX ADMIN — CLOPIDOGREL BISULFATE 75 MG: 75 TABLET ORAL at 06:30

## 2023-01-01 RX ADMIN — INSULIN LISPRO 8 UNITS: 100 INJECTION, SOLUTION INTRAVENOUS; SUBCUTANEOUS at 17:27

## 2023-01-01 RX ADMIN — METHYLPREDNISOLONE SODIUM SUCCINATE 62.5 MG: 125 INJECTION, POWDER, FOR SOLUTION INTRAMUSCULAR; INTRAVENOUS at 05:29

## 2023-01-01 RX ADMIN — GUAIFENESIN 600 MG: 600 TABLET, EXTENDED RELEASE ORAL at 05:47

## 2023-01-01 RX ADMIN — INSULIN HUMAN 2 UNITS: 100 INJECTION, SOLUTION PARENTERAL at 08:45

## 2023-01-01 RX ADMIN — INSULIN HUMAN 2 UNITS: 100 INJECTION, SOLUTION PARENTERAL at 11:58

## 2023-01-01 RX ADMIN — DILTIAZEM HYDROCHLORIDE 60 MG: 30 TABLET, FILM COATED ORAL at 17:05

## 2023-01-01 RX ADMIN — FUROSEMIDE 80 MG: 10 INJECTION INTRAMUSCULAR; INTRAVENOUS at 11:53

## 2023-01-01 RX ADMIN — OMEPRAZOLE 20 MG: 20 CAPSULE, DELAYED RELEASE ORAL at 05:46

## 2023-01-01 RX ADMIN — DILTIAZEM HYDROCHLORIDE 60 MG: 30 TABLET, FILM COATED ORAL at 17:50

## 2023-01-01 RX ADMIN — METHYLPREDNISOLONE SODIUM SUCCINATE 62.5 MG: 125 INJECTION, POWDER, FOR SOLUTION INTRAMUSCULAR; INTRAVENOUS at 00:17

## 2023-01-01 RX ADMIN — MOMETASONE FUROATE AND FORMOTEROL FUMARATE DIHYDRATE 2 PUFF: 200; 5 AEROSOL RESPIRATORY (INHALATION) at 06:25

## 2023-01-01 RX ADMIN — INSULIN LISPRO 2 UNITS: 100 INJECTION, SOLUTION INTRAVENOUS; SUBCUTANEOUS at 18:03

## 2023-01-01 RX ADMIN — INSULIN HUMAN 3 UNITS: 100 INJECTION, SOLUTION PARENTERAL at 18:09

## 2023-01-01 RX ADMIN — MOMETASONE FUROATE AND FORMOTEROL FUMARATE DIHYDRATE 2 PUFF: 200; 5 AEROSOL RESPIRATORY (INHALATION) at 08:34

## 2023-01-01 RX ADMIN — CLOPIDOGREL BISULFATE 75 MG: 75 TABLET ORAL at 05:46

## 2023-01-01 RX ADMIN — ACETAMINOPHEN 650 MG: 325 TABLET, FILM COATED ORAL at 14:48

## 2023-01-01 RX ADMIN — WARFARIN SODIUM 5 MG: 5 TABLET ORAL at 17:05

## 2023-01-01 RX ADMIN — INSULIN HUMAN 3 UNITS: 100 INJECTION, SOLUTION PARENTERAL at 11:52

## 2023-01-01 RX ADMIN — DILTIAZEM HYDROCHLORIDE 60 MG: 30 TABLET, FILM COATED ORAL at 18:02

## 2023-01-01 RX ADMIN — MOMETASONE FUROATE AND FORMOTEROL FUMARATE DIHYDRATE 2 PUFF: 200; 5 AEROSOL RESPIRATORY (INHALATION) at 20:34

## 2023-01-01 RX ADMIN — WARFARIN SODIUM 2.5 MG: 5 TABLET ORAL at 18:07

## 2023-01-01 RX ADMIN — METHYLPREDNISOLONE SODIUM SUCCINATE 62.5 MG: 125 INJECTION, POWDER, FOR SOLUTION INTRAMUSCULAR; INTRAVENOUS at 00:24

## 2023-01-01 RX ADMIN — IPRATROPIUM BROMIDE AND ALBUTEROL SULFATE 3 ML: .5; 3 SOLUTION RESPIRATORY (INHALATION) at 22:42

## 2023-01-01 RX ADMIN — INSULIN LISPRO 8 UNITS: 100 INJECTION, SOLUTION INTRAVENOUS; SUBCUTANEOUS at 20:57

## 2023-01-01 RX ADMIN — IPRATROPIUM BROMIDE AND ALBUTEROL SULFATE 3 ML: .5; 3 SOLUTION RESPIRATORY (INHALATION) at 19:17

## 2023-01-01 RX ADMIN — DILTIAZEM HYDROCHLORIDE 90 MG: 30 TABLET, FILM COATED ORAL at 10:58

## 2023-01-01 RX ADMIN — MOMETASONE FUROATE AND FORMOTEROL FUMARATE DIHYDRATE 2 PUFF: 200; 5 AEROSOL RESPIRATORY (INHALATION) at 19:55

## 2023-01-01 RX ADMIN — INSULIN HUMAN 3 UNITS: 100 INJECTION, SOLUTION PARENTERAL at 06:06

## 2023-01-01 RX ADMIN — FUROSEMIDE 40 MG: 40 TABLET ORAL at 06:30

## 2023-01-01 RX ADMIN — METOPROLOL TARTRATE 50 MG: 50 TABLET, FILM COATED ORAL at 17:34

## 2023-01-01 RX ADMIN — GUAIFENESIN 600 MG: 600 TABLET, EXTENDED RELEASE ORAL at 18:07

## 2023-01-01 RX ADMIN — WARFARIN SODIUM 5 MG: 5 TABLET ORAL at 18:03

## 2023-01-01 RX ADMIN — MORPHINE SULFATE 10 MG: 10 INJECTION INTRAVENOUS at 13:50

## 2023-01-01 RX ADMIN — WARFARIN SODIUM 2.5 MG: 2.5 TABLET ORAL at 20:30

## 2023-01-01 RX ADMIN — FUROSEMIDE 25 MG/HR: 10 INJECTION, SOLUTION INTRAMUSCULAR; INTRAVENOUS at 15:13

## 2023-01-01 RX ADMIN — INSULIN HUMAN 3 UNITS: 100 INJECTION, SOLUTION PARENTERAL at 11:26

## 2023-01-01 RX ADMIN — FUROSEMIDE 40 MG: 10 INJECTION, SOLUTION INTRAVENOUS at 02:32

## 2023-01-01 RX ADMIN — INSULIN LISPRO 3 UNITS: 100 INJECTION, SOLUTION INTRAVENOUS; SUBCUTANEOUS at 06:20

## 2023-01-01 RX ADMIN — LINEZOLID 600 MG: 600 TABLET, FILM COATED ORAL at 17:31

## 2023-01-01 RX ADMIN — MORPHINE SULFATE 5 MG: 10 INJECTION INTRAVENOUS at 00:53

## 2023-01-01 RX ADMIN — FUROSEMIDE 60 MG: 10 INJECTION, SOLUTION INTRAVENOUS at 05:15

## 2023-01-01 RX ADMIN — DILTIAZEM HYDROCHLORIDE 90 MG: 30 TABLET, FILM COATED ORAL at 05:48

## 2023-01-01 RX ADMIN — MOMETASONE FUROATE AND FORMOTEROL FUMARATE DIHYDRATE 2 PUFF: 200; 5 AEROSOL RESPIRATORY (INHALATION) at 20:24

## 2023-01-01 RX ADMIN — LORAZEPAM 1 MG: 2 INJECTION INTRAMUSCULAR; INTRAVENOUS at 14:04

## 2023-01-01 RX ADMIN — DILTIAZEM HYDROCHLORIDE 60 MG: 30 TABLET, FILM COATED ORAL at 06:13

## 2023-01-01 RX ADMIN — ACETAMINOPHEN 650 MG: 325 TABLET, FILM COATED ORAL at 19:19

## 2023-01-01 RX ADMIN — FUROSEMIDE 25 MG/HR: 10 INJECTION, SOLUTION INTRAMUSCULAR; INTRAVENOUS at 06:01

## 2023-01-01 RX ADMIN — INSULIN HUMAN 3 UNITS: 100 INJECTION, SOLUTION PARENTERAL at 16:55

## 2023-01-01 RX ADMIN — INSULIN LISPRO 5 UNITS: 100 INJECTION, SOLUTION INTRAVENOUS; SUBCUTANEOUS at 17:25

## 2023-01-01 RX ADMIN — DILTIAZEM HYDROCHLORIDE 60 MG: 30 TABLET, FILM COATED ORAL at 06:22

## 2023-01-01 RX ADMIN — LINEZOLID 600 MG: 600 TABLET, FILM COATED ORAL at 11:24

## 2023-01-01 RX ADMIN — MORPHINE SULFATE 2 MG: 4 INJECTION INTRAVENOUS at 19:32

## 2023-01-01 RX ADMIN — WARFARIN SODIUM 5 MG: 5 TABLET ORAL at 17:55

## 2023-01-01 RX ADMIN — DILTIAZEM HYDROCHLORIDE 60 MG: 30 TABLET, FILM COATED ORAL at 17:20

## 2023-01-01 RX ADMIN — DILTIAZEM HYDROCHLORIDE 60 MG: 30 TABLET, FILM COATED ORAL at 18:03

## 2023-01-01 RX ADMIN — PIPERACILLIN AND TAZOBACTAM 3.38 G: 3; .375 INJECTION, POWDER, FOR SOLUTION INTRAVENOUS at 05:44

## 2023-01-01 RX ADMIN — MOMETASONE FUROATE AND FORMOTEROL FUMARATE DIHYDRATE 2 PUFF: 200; 5 AEROSOL RESPIRATORY (INHALATION) at 09:10

## 2023-01-01 RX ADMIN — MOMETASONE FUROATE AND FORMOTEROL FUMARATE DIHYDRATE 2 PUFF: 200; 5 AEROSOL RESPIRATORY (INHALATION) at 21:07

## 2023-01-01 RX ADMIN — FUROSEMIDE 80 MG: 40 TABLET ORAL at 17:12

## 2023-01-01 RX ADMIN — POTASSIUM CHLORIDE 40 MEQ: 1500 TABLET, EXTENDED RELEASE ORAL at 05:58

## 2023-01-01 RX ADMIN — INSULIN HUMAN 3 UNITS: 100 INJECTION, SOLUTION PARENTERAL at 17:53

## 2023-01-01 RX ADMIN — INSULIN HUMAN 5 UNITS: 100 INJECTION, SOLUTION PARENTERAL at 06:06

## 2023-01-01 RX ADMIN — TAMSULOSIN HYDROCHLORIDE 0.4 MG: 0.4 CAPSULE ORAL at 08:34

## 2023-01-01 RX ADMIN — FUROSEMIDE 40 MG: 10 INJECTION INTRAMUSCULAR; INTRAVENOUS at 05:21

## 2023-01-01 RX ADMIN — ACETAMINOPHEN 650 MG: 325 TABLET, FILM COATED ORAL at 21:11

## 2023-01-01 RX ADMIN — LINEZOLID 600 MG: 600 TABLET, FILM COATED ORAL at 05:45

## 2023-01-01 RX ADMIN — METOPROLOL TARTRATE 50 MG: 50 TABLET, FILM COATED ORAL at 17:05

## 2023-01-01 RX ADMIN — DILTIAZEM HYDROCHLORIDE 60 MG: 30 TABLET, FILM COATED ORAL at 20:33

## 2023-01-01 RX ADMIN — WARFARIN SODIUM 2.5 MG: 2.5 TABLET ORAL at 17:13

## 2023-01-01 RX ADMIN — GUAIFENESIN 600 MG: 600 TABLET, EXTENDED RELEASE ORAL at 06:30

## 2023-01-01 RX ADMIN — INSULIN HUMAN 2 UNITS: 100 INJECTION, SOLUTION PARENTERAL at 08:00

## 2023-01-01 RX ADMIN — DILTIAZEM HYDROCHLORIDE 90 MG: 30 TABLET, FILM COATED ORAL at 17:55

## 2023-01-01 RX ADMIN — IPRATROPIUM BROMIDE AND ALBUTEROL SULFATE 3 ML: .5; 3 SOLUTION RESPIRATORY (INHALATION) at 10:03

## 2023-01-01 RX ADMIN — FUROSEMIDE 25 MG/HR: 10 INJECTION, SOLUTION INTRAMUSCULAR; INTRAVENOUS at 04:58

## 2023-01-01 RX ADMIN — FUROSEMIDE 80 MG: 40 TABLET ORAL at 05:53

## 2023-01-01 RX ADMIN — FUROSEMIDE 40 MG: 10 INJECTION INTRAMUSCULAR; INTRAVENOUS at 06:22

## 2023-01-01 RX ADMIN — INSULIN HUMAN 6 UNITS: 100 INJECTION, SOLUTION PARENTERAL at 20:27

## 2023-01-01 RX ADMIN — IPRATROPIUM BROMIDE AND ALBUTEROL SULFATE 3 ML: .5; 3 SOLUTION RESPIRATORY (INHALATION) at 10:01

## 2023-01-01 RX ADMIN — MOMETASONE FUROATE AND FORMOTEROL FUMARATE DIHYDRATE 2 PUFF: 200; 5 AEROSOL RESPIRATORY (INHALATION) at 06:53

## 2023-01-01 RX ADMIN — GUAIFENESIN 600 MG: 600 TABLET, EXTENDED RELEASE ORAL at 05:19

## 2023-01-01 RX ADMIN — PIPERACILLIN AND TAZOBACTAM 3.38 G: 3; .375 INJECTION, POWDER, FOR SOLUTION INTRAVENOUS at 14:13

## 2023-01-01 RX ADMIN — FUROSEMIDE 80 MG: 10 INJECTION INTRAMUSCULAR; INTRAVENOUS at 16:32

## 2023-01-01 RX ADMIN — TAMSULOSIN HYDROCHLORIDE 0.4 MG: 0.4 CAPSULE ORAL at 07:55

## 2023-01-01 RX ADMIN — MOMETASONE FUROATE AND FORMOTEROL FUMARATE DIHYDRATE 2 PUFF: 200; 5 AEROSOL RESPIRATORY (INHALATION) at 20:12

## 2023-01-01 RX ADMIN — MOMETASONE FUROATE AND FORMOTEROL FUMARATE DIHYDRATE 2 PUFF: 200; 5 AEROSOL RESPIRATORY (INHALATION) at 19:22

## 2023-01-01 RX ADMIN — WARFARIN SODIUM 2.5 MG: 2.5 TABLET ORAL at 17:18

## 2023-01-01 RX ADMIN — INSULIN HUMAN 2 UNITS: 100 INJECTION, SOLUTION PARENTERAL at 08:16

## 2023-01-01 RX ADMIN — METHYLPREDNISOLONE SODIUM SUCCINATE 62.5 MG: 125 INJECTION, POWDER, FOR SOLUTION INTRAMUSCULAR; INTRAVENOUS at 17:44

## 2023-01-01 RX ADMIN — CLOPIDOGREL BISULFATE 75 MG: 75 TABLET ORAL at 05:47

## 2023-01-01 RX ADMIN — ACETAMINOPHEN 650 MG: 325 TABLET, FILM COATED ORAL at 00:47

## 2023-01-01 RX ADMIN — TRAMADOL HYDROCHLORIDE 50 MG: 50 TABLET, COATED ORAL at 13:06

## 2023-01-01 RX ADMIN — FUROSEMIDE 25 MG/HR: 10 INJECTION, SOLUTION INTRAMUSCULAR; INTRAVENOUS at 21:07

## 2023-01-01 RX ADMIN — CLOPIDOGREL BISULFATE 75 MG: 75 TABLET ORAL at 05:21

## 2023-01-01 RX ADMIN — IPRATROPIUM BROMIDE AND ALBUTEROL SULFATE 3 ML: .5; 3 SOLUTION RESPIRATORY (INHALATION) at 12:36

## 2023-01-01 ASSESSMENT — PAIN DESCRIPTION - PAIN TYPE
TYPE: ACUTE PAIN
TYPE: CHRONIC PAIN
TYPE: ACUTE PAIN
TYPE: CHRONIC PAIN
TYPE: ACUTE PAIN
TYPE: CHRONIC PAIN
TYPE: ACUTE PAIN
TYPE: ACUTE PAIN;CHRONIC PAIN
TYPE: ACUTE PAIN
TYPE: ACUTE PAIN
TYPE: CHRONIC PAIN
TYPE: ACUTE PAIN
TYPE: ACUTE PAIN;CHRONIC PAIN
TYPE: ACUTE PAIN

## 2023-01-01 ASSESSMENT — ENCOUNTER SYMPTOMS
SORE THROAT: 0
DIZZINESS: 0
NERVOUS/ANXIOUS: 1
WEAKNESS: 1
DIZZINESS: 0
BLURRED VISION: 0
HEARTBURN: 0
SPEECH CHANGE: 0
MYALGIAS: 0
SENSORY CHANGE: 0
BLURRED VISION: 0
PALPITATIONS: 0
VOMITING: 0
DIAPHORESIS: 0
NERVOUS/ANXIOUS: 0
DIARRHEA: 0
FOCAL WEAKNESS: 0
COUGH: 0
DIZZINESS: 0
SORE THROAT: 0
PALPITATIONS: 0
BACK PAIN: 0
HEARTBURN: 0
COUGH: 0
BACK PAIN: 0
SHORTNESS OF BREATH: 1
DIARRHEA: 0
FEVER: 0
LOSS OF CONSCIOUSNESS: 0
SENSORY CHANGE: 0
COUGH: 0
SENSORY CHANGE: 0
ABDOMINAL PAIN: 0
BLURRED VISION: 0
VOMITING: 0
SPEECH CHANGE: 0
BACK PAIN: 0
SHORTNESS OF BREATH: 0
DIAPHORESIS: 0
SENSORY CHANGE: 0
SHORTNESS OF BREATH: 0
FEVER: 0
MYALGIAS: 0
NAUSEA: 0
STRIDOR: 0
DIZZINESS: 0
SORE THROAT: 0
DIZZINESS: 0
LOSS OF CONSCIOUSNESS: 0
HEADACHES: 0
WHEEZING: 0
NAUSEA: 0
HEADACHES: 0
FEVER: 0
MYALGIAS: 0
DEPRESSION: 0
VOMITING: 0
FLANK PAIN: 0
WHEEZING: 0
SPUTUM PRODUCTION: 0
HEARTBURN: 0
STRIDOR: 0
WHEEZING: 0
CHILLS: 0
HEARTBURN: 0
SPEECH CHANGE: 0
HEARTBURN: 0
HEADACHES: 0
CHILLS: 0
DIZZINESS: 0
FEVER: 0
VOMITING: 0
DIARRHEA: 0
NERVOUS/ANXIOUS: 0
SHORTNESS OF BREATH: 1
SORE THROAT: 0
SHORTNESS OF BREATH: 1
WEAKNESS: 1
FEVER: 0
ABDOMINAL PAIN: 0
NERVOUS/ANXIOUS: 0
DIAPHORESIS: 0
FEVER: 0
CONSTIPATION: 0
HEADACHES: 0
SHORTNESS OF BREATH: 1
BLURRED VISION: 0
HEADACHES: 0
PALPITATIONS: 0
SHORTNESS OF BREATH: 1
PALPITATIONS: 0
SPEECH CHANGE: 0
SORE THROAT: 0
NAUSEA: 0
MYALGIAS: 0
ABDOMINAL PAIN: 0
WEAKNESS: 1
SHORTNESS OF BREATH: 1
SINUS PAIN: 0
FLANK PAIN: 0
COUGH: 1
COUGH: 0
FEVER: 0
ABDOMINAL PAIN: 0
CHILLS: 0
FEVER: 0
SORE THROAT: 0
WHEEZING: 0
CHILLS: 0
ABDOMINAL PAIN: 0
SORE THROAT: 0
PALPITATIONS: 0
PALPITATIONS: 0
NECK PAIN: 0
CHILLS: 0
PALPITATIONS: 0
NERVOUS/ANXIOUS: 0
ORTHOPNEA: 0
NECK PAIN: 0
CONSTIPATION: 0
CHILLS: 0
COUGH: 0
FEVER: 0
MYALGIAS: 0
SHORTNESS OF BREATH: 1
FOCAL WEAKNESS: 0
HEADACHES: 0
ABDOMINAL PAIN: 0
PALPITATIONS: 0
HEMOPTYSIS: 0
NAUSEA: 1
NERVOUS/ANXIOUS: 1
FEVER: 0
PALPITATIONS: 0
FOCAL WEAKNESS: 0
FEVER: 0
NECK PAIN: 0
SPEECH CHANGE: 0
FLANK PAIN: 0
WEAKNESS: 1
CHILLS: 0
FOCAL WEAKNESS: 0
WHEEZING: 0
COUGH: 0
BLURRED VISION: 0
SPEECH CHANGE: 0
FEVER: 0
TINGLING: 0
BLURRED VISION: 0
MYALGIAS: 0
ABDOMINAL PAIN: 0
SENSORY CHANGE: 0
VOMITING: 0
NERVOUS/ANXIOUS: 1
DIZZINESS: 0
CHILLS: 0
NECK PAIN: 0
WHEEZING: 0
VOMITING: 0
NERVOUS/ANXIOUS: 0
WHEEZING: 0
DIARRHEA: 0
VOMITING: 0
SHORTNESS OF BREATH: 1
DIARRHEA: 0
VOMITING: 0
BACK PAIN: 0
DIARRHEA: 0
NERVOUS/ANXIOUS: 0
DIARRHEA: 0
HEADACHES: 0
NAUSEA: 0
CHILLS: 0
HEARTBURN: 0
FLANK PAIN: 0
BACK PAIN: 0
LOSS OF CONSCIOUSNESS: 0
HEARTBURN: 0
BACK PAIN: 0
WEAKNESS: 1
VOMITING: 0
FLANK PAIN: 0
NECK PAIN: 0
PALPITATIONS: 0
BACK PAIN: 0
NECK PAIN: 0
MYALGIAS: 0
CHILLS: 1
COUGH: 1
BACK PAIN: 0
ABDOMINAL PAIN: 0
DIARRHEA: 0
CHILLS: 0
CHILLS: 0
SHORTNESS OF BREATH: 1
FEVER: 0
NAUSEA: 0
SORE THROAT: 0
SENSORY CHANGE: 0
FLANK PAIN: 0
HEADACHES: 0
ABDOMINAL PAIN: 0
PALPITATIONS: 0
NECK PAIN: 0
CHILLS: 0
COUGH: 0
HEARTBURN: 0
ABDOMINAL PAIN: 0
MYALGIAS: 0
SPUTUM PRODUCTION: 0
WEAKNESS: 1
VOMITING: 0
HEARTBURN: 0
MYALGIAS: 0
DIARRHEA: 0
COUGH: 0
SORE THROAT: 0
WEAKNESS: 1
NERVOUS/ANXIOUS: 0
COUGH: 0
FEVER: 0
SHORTNESS OF BREATH: 1
HEARTBURN: 0
DIAPHORESIS: 0
FLANK PAIN: 0
DIAPHORESIS: 1
FEVER: 0
MYALGIAS: 0
CHILLS: 0
DIZZINESS: 0
FOCAL WEAKNESS: 0
VOMITING: 0
DIZZINESS: 0
BLURRED VISION: 0
WEAKNESS: 1
WEAKNESS: 0
NAUSEA: 0
PALPITATIONS: 0
COUGH: 0
BACK PAIN: 0
DIZZINESS: 0
DIAPHORESIS: 0
PALPITATIONS: 0
SENSORY CHANGE: 0
COUGH: 0
DIAPHORESIS: 0
SPUTUM PRODUCTION: 0
NECK PAIN: 0
WEAKNESS: 1
DIZZINESS: 0
DIZZINESS: 0
ABDOMINAL PAIN: 1
DIZZINESS: 0
FEVER: 0
VOMITING: 0
FALLS: 0
SPEECH CHANGE: 0
FOCAL WEAKNESS: 0
COUGH: 0
ABDOMINAL PAIN: 1
COUGH: 0
NERVOUS/ANXIOUS: 0
HEARTBURN: 0
ABDOMINAL PAIN: 0
WHEEZING: 0
MYALGIAS: 0
CHILLS: 0
LOSS OF CONSCIOUSNESS: 0
ABDOMINAL PAIN: 0
SPEECH CHANGE: 0
FLANK PAIN: 0
DIARRHEA: 0
BLURRED VISION: 0
SHORTNESS OF BREATH: 1
HEADACHES: 0
HEADACHES: 0
CONSTIPATION: 0
WEAKNESS: 1
ORTHOPNEA: 1
SHORTNESS OF BREATH: 0
HEADACHES: 0
MYALGIAS: 0
FOCAL WEAKNESS: 0
DIAPHORESIS: 0
BACK PAIN: 0
DIARRHEA: 0
SHORTNESS OF BREATH: 1
MYALGIAS: 0
BLURRED VISION: 0
COUGH: 0
ABDOMINAL PAIN: 0
DIZZINESS: 0
BLURRED VISION: 0
SENSORY CHANGE: 0
WHEEZING: 0
VOMITING: 0
MYALGIAS: 0
VOMITING: 0
FOCAL WEAKNESS: 0
BLURRED VISION: 0
PALPITATIONS: 0
ABDOMINAL PAIN: 0
DIAPHORESIS: 0
MYALGIAS: 0

## 2023-01-01 ASSESSMENT — CHA2DS2 SCORE
PRIOR STROKE OR TIA OR THROMBOEMBOLISM: NO
HYPERTENSION: YES
VASCULAR DISEASE: YES
CHF OR LEFT VENTRICULAR DYSFUNCTION: YES
CHA2DS2 VASC SCORE: 5
DIABETES: YES
HYPERTENSION: YES
DIABETES: YES
AGE 75 OR GREATER: YES
SEX: MALE
VASCULAR DISEASE: NO
CHF OR LEFT VENTRICULAR DYSFUNCTION: YES
AGE 75 OR GREATER: YES
AGE 65 TO 74: NO
AGE 65 TO 74: NO
PRIOR STROKE OR TIA OR THROMBOEMBOLISM: NO
CHA2DS2 VASC SCORE: 6
SEX: MALE

## 2023-01-01 ASSESSMENT — COGNITIVE AND FUNCTIONAL STATUS - GENERAL
SUGGESTED CMS G CODE MODIFIER DAILY ACTIVITY: CJ
TURNING FROM BACK TO SIDE WHILE IN FLAT BAD: A LITTLE
TOILETING: A LITTLE
CLIMB 3 TO 5 STEPS WITH RAILING: A LOT
MOBILITY SCORE: 9
TURNING FROM BACK TO SIDE WHILE IN FLAT BAD: A LOT
MOVING FROM LYING ON BACK TO SITTING ON SIDE OF FLAT BED: A LOT
DRESSING REGULAR UPPER BODY CLOTHING: A LOT
SUGGESTED CMS G CODE MODIFIER DAILY ACTIVITY: CK
MOBILITY SCORE: 17
CLIMB 3 TO 5 STEPS WITH RAILING: A LOT
DRESSING REGULAR LOWER BODY CLOTHING: A LITTLE
MOVING TO AND FROM BED TO CHAIR: A LOT
MOVING FROM LYING ON BACK TO SITTING ON SIDE OF FLAT BED: A LITTLE
STANDING UP FROM CHAIR USING ARMS: TOTAL
STANDING UP FROM CHAIR USING ARMS: A LITTLE
TURNING FROM BACK TO SIDE WHILE IN FLAT BAD: A LOT
HELP NEEDED FOR BATHING: A LOT
MOVING TO AND FROM BED TO CHAIR: A LITTLE
TOILETING: A LITTLE
STANDING UP FROM CHAIR USING ARMS: A LOT
DRESSING REGULAR LOWER BODY CLOTHING: A LOT
DAILY ACTIVITIY SCORE: 20
TOILETING: A LOT
MOVING TO AND FROM BED TO CHAIR: A LOT
CLIMB 3 TO 5 STEPS WITH RAILING: A LITTLE
SUGGESTED CMS G CODE MODIFIER DAILY ACTIVITY: CK
DAILY ACTIVITIY SCORE: 19
TOILETING: A LITTLE
CLIMB 3 TO 5 STEPS WITH RAILING: TOTAL
DRESSING REGULAR UPPER BODY CLOTHING: A LITTLE
TURNING FROM BACK TO SIDE WHILE IN FLAT BAD: A LITTLE
MOVING FROM LYING ON BACK TO SITTING ON SIDE OF FLAT BED: A LOT
MOVING FROM LYING ON BACK TO SITTING ON SIDE OF FLAT BED: UNABLE
HELP NEEDED FOR BATHING: A LOT
MOVING TO AND FROM BED TO CHAIR: A LOT
WALKING IN HOSPITAL ROOM: TOTAL
DRESSING REGULAR UPPER BODY CLOTHING: A LITTLE
TURNING FROM BACK TO SIDE WHILE IN FLAT BAD: A LITTLE
DRESSING REGULAR UPPER BODY CLOTHING: A LITTLE
HELP NEEDED FOR BATHING: A LITTLE
MOBILITY SCORE: 18
MOVING FROM LYING ON BACK TO SITTING ON SIDE OF FLAT BED: A LITTLE
STANDING UP FROM CHAIR USING ARMS: A LITTLE
DRESSING REGULAR LOWER BODY CLOTHING: A LITTLE
SUGGESTED CMS G CODE MODIFIER MOBILITY: CM
DAILY ACTIVITIY SCORE: 17
WALKING IN HOSPITAL ROOM: A LITTLE
WALKING IN HOSPITAL ROOM: A LOT
DRESSING REGULAR LOWER BODY CLOTHING: A LOT
DAILY ACTIVITIY SCORE: 19
SUGGESTED CMS G CODE MODIFIER MOBILITY: CK
WALKING IN HOSPITAL ROOM: A LITTLE
MOBILITY SCORE: 15
DAILY ACTIVITIY SCORE: 16
SUGGESTED CMS G CODE MODIFIER MOBILITY: CK
HELP NEEDED FOR BATHING: A LOT
MOVING TO AND FROM BED TO CHAIR: A LITTLE
STANDING UP FROM CHAIR USING ARMS: A LITTLE
TOILETING: A LOT
CLIMB 3 TO 5 STEPS WITH RAILING: A LITTLE
SUGGESTED CMS G CODE MODIFIER DAILY ACTIVITY: CK
SUGGESTED CMS G CODE MODIFIER MOBILITY: CL
SUGGESTED CMS G CODE MODIFIER DAILY ACTIVITY: CK
DRESSING REGULAR LOWER BODY CLOTHING: A LOT
SUGGESTED CMS G CODE MODIFIER MOBILITY: CK
HELP NEEDED FOR BATHING: A LOT
MOBILITY SCORE: 12
WALKING IN HOSPITAL ROOM: A LITTLE

## 2023-01-01 ASSESSMENT — PATIENT HEALTH QUESTIONNAIRE - PHQ9
1. LITTLE INTEREST OR PLEASURE IN DOING THINGS: NOT AT ALL
1. LITTLE INTEREST OR PLEASURE IN DOING THINGS: NOT AT ALL
SUM OF ALL RESPONSES TO PHQ9 QUESTIONS 1 AND 2: 0
SUM OF ALL RESPONSES TO PHQ9 QUESTIONS 1 AND 2: 0
1. LITTLE INTEREST OR PLEASURE IN DOING THINGS: NOT AT ALL
2. FEELING DOWN, DEPRESSED, IRRITABLE, OR HOPELESS: NOT AT ALL
1. LITTLE INTEREST OR PLEASURE IN DOING THINGS: NOT AT ALL
2. FEELING DOWN, DEPRESSED, IRRITABLE, OR HOPELESS: NOT AT ALL
2. FEELING DOWN, DEPRESSED, IRRITABLE, OR HOPELESS: NOT AT ALL
SUM OF ALL RESPONSES TO PHQ9 QUESTIONS 1 AND 2: 0
SUM OF ALL RESPONSES TO PHQ9 QUESTIONS 1 AND 2: 0
1. LITTLE INTEREST OR PLEASURE IN DOING THINGS: NOT AT ALL
1. LITTLE INTEREST OR PLEASURE IN DOING THINGS: NOT AT ALL
2. FEELING DOWN, DEPRESSED, IRRITABLE, OR HOPELESS: NOT AT ALL
SUM OF ALL RESPONSES TO PHQ9 QUESTIONS 1 AND 2: 0
2. FEELING DOWN, DEPRESSED, IRRITABLE, OR HOPELESS: NOT AT ALL
2. FEELING DOWN, DEPRESSED, IRRITABLE, OR HOPELESS: NOT AT ALL
SUM OF ALL RESPONSES TO PHQ9 QUESTIONS 1 AND 2: 0
2. FEELING DOWN, DEPRESSED, IRRITABLE, OR HOPELESS: NOT AT ALL
1. LITTLE INTEREST OR PLEASURE IN DOING THINGS: NOT AT ALL
SUM OF ALL RESPONSES TO PHQ9 QUESTIONS 1 AND 2: 0
SUM OF ALL RESPONSES TO PHQ9 QUESTIONS 1 AND 2: 0
2. FEELING DOWN, DEPRESSED, IRRITABLE, OR HOPELESS: NOT AT ALL
1. LITTLE INTEREST OR PLEASURE IN DOING THINGS: NOT AT ALL

## 2023-01-01 ASSESSMENT — COPD QUESTIONNAIRES
DURING THE PAST 4 WEEKS HOW MUCH DID YOU FEEL SHORT OF BREATH: SOME OF THE TIME
HAVE YOU SMOKED AT LEAST 100 CIGARETTES IN YOUR ENTIRE LIFE: YES
DO YOU EVER COUGH UP ANY MUCUS OR PHLEGM?: YES, A FEW DAYS A WEEK OR MONTH
COPD SCREENING SCORE: 7

## 2023-01-01 ASSESSMENT — PULMONARY FUNCTION TESTS
EPAP_CMH2O: 6

## 2023-01-01 ASSESSMENT — FIBROSIS 4 INDEX
FIB4 SCORE: 1.51
FIB4 SCORE: 1.58
FIB4 SCORE: 1.53
FIB4 SCORE: 1.5
FIB4 SCORE: 1.51
FIB4 SCORE: 1.14
FIB4 SCORE: 1.08
FIB4 SCORE: 1.35
FIB4 SCORE: 1.08
FIB4 SCORE: 1.35
FIB4 SCORE: 1.47
FIB4 SCORE: 1.08
FIB4 SCORE: 1.14
FIB4 SCORE: 1.51
FIB4 SCORE: 1.38
FIB4 SCORE: 1.08
FIB4 SCORE: 1.13
FIB4 SCORE: 1.5
FIB4 SCORE: 1.51
FIB4 SCORE: 1.08
FIB4 SCORE: 0.88

## 2023-01-01 ASSESSMENT — GAIT ASSESSMENTS
DEVIATION: SHUFFLED GAIT;BRADYKINETIC
GAIT LEVEL OF ASSIST: MINIMAL ASSIST
ASSISTIVE DEVICE: FRONT WHEEL WALKER
DISTANCE (FEET): 75
GAIT LEVEL OF ASSIST: CONTACT GUARD ASSIST
ASSISTIVE DEVICE: FRONT WHEEL WALKER
DEVIATION: BRADYKINETIC
DISTANCE (FEET): 20

## 2023-01-01 ASSESSMENT — ACTIVITIES OF DAILY LIVING (ADL)
TOILETING: INDEPENDENT
TOILETING: INDEPENDENT

## 2023-01-01 ASSESSMENT — LIFESTYLE VARIABLES
TOTAL SCORE: 0
ON A TYPICAL DAY WHEN YOU DRINK ALCOHOL HOW MANY DRINKS DO YOU HAVE: 0
AVERAGE NUMBER OF DAYS PER WEEK YOU HAVE A DRINK CONTAINING ALCOHOL: 0
HAVE PEOPLE ANNOYED YOU BY CRITICIZING YOUR DRINKING: NO
ALCOHOL_USE: NO
EVER HAD A DRINK FIRST THING IN THE MORNING TO STEADY YOUR NERVES TO GET RID OF A HANGOVER: NO
CONSUMPTION TOTAL: NEGATIVE
HOW MANY TIMES IN THE PAST YEAR HAVE YOU HAD 5 OR MORE DRINKS IN A DAY: 0
TOTAL SCORE: 0
HAVE YOU EVER FELT YOU SHOULD CUT DOWN ON YOUR DRINKING: NO
EVER FELT BAD OR GUILTY ABOUT YOUR DRINKING: NO
TOTAL SCORE: 0

## 2023-01-03 NOTE — PROGRESS NOTES
This evaluation was conducted via Telemed using secure and encrypted videoconferencing technology. The patient was physically located at [facility] in [place], [place]. The patient was presented by a medical professional at the originating site.   The patient's identity was confirmed and verbal consent was obtained for this telemedicine encounter.      OP Anticoagulation Service Note    Date: 1/3/2023       Anticoagulation Summary  As of 1/3/2023      INR goal:  2.0-3.0   TTR:  67.1 % (7.4 y)   INR used for dosin.50 (1/3/2023)   Warfarin maintenance plan:  2.5 mg (5 mg x 0.5) every Mon, Wed, Fri; 5 mg (5 mg x 1) all other days   Weekly warfarin total:  27.5 mg   Plan last modified:  Cedric Martinez, PharmD (2022)   Next INR check:  2023   Target end date:  Indefinite    Indications    Atrial fibrillation with rapid ventricular response (HCC) (Resolved) [I48.91]  Long term current use of anticoagulant therapy (Resolved) [Z79.01]  Atrial fibrillation (HCC) (Resolved) [I48.91]  Hypercoagulable state (HCC) [D68.59]                 Anticoagulation Episode Summary       INR check location:      Preferred lab:      Send INR reminders to:      Comments:            Anticoagulation Care Providers       Provider Role Specialty Phone number    Sanjay Sorensen M.D. Referring Cardiovascular Disease (Cardiology) [phone]          Anticoagulation Patient Findings  Patient Findings       Negatives:  Signs/symptoms of thrombosis, Signs/symptoms of bleeding, Laboratory test error suspected, Change in health, Change in alcohol use, Change in activity, Upcoming invasive procedure, Emergency department visit, Upcoming dental procedure, Missed doses, Extra doses, Change in medications, Change in diet/appetite, Hospital admission, Bruising, Other complaints            THIS VISIT CONDUCTED WITH PRESENTER [employer]IA TELE[employer] UTILIZING SECURE AND ENCRYPTED VIDEOCONFERENCING EQUIPMENT  ROS:    Pulm: Denies SOB, chest  pain.    Card: Denies syncope, edema, palpitations.    Extremities: Denies redness, pain.     PE:    Pulm: No SOB, even and unlabored.    Card: Normal rate and rhythm.    Extremities: No redness, or edema.     INR  is-therapeutic.    Denies signs/symptoms of bleeding and/or thrombosis.    Denies changes to diet or medications.   Follow up appt in 2 weeks     Plan:  continue with current dosing regimen    Medication: Warfarin (Coumadin)     Sunday Monday Tuesday Wednesday Thursday Friday Saturday      5 mg    2.5 mg    5 mg    2.5 mg    5 mg    2.5 mg    5 mg      1 tab(s)    1/2 tab(s)    1 tab(s)    1/2 tab(s)    1 tab(s)    1/2 tab(s)  1 tab(s)          Next Appointment: Tuesday, 1/17/2023 @1:30pm     Pt on antiplatelet therapy Plavix 75mg for coronary arter occlusion with stent, indef per cardiology.    Review all of your home medications and newly ordered medications with your doctor and / or pharmacist. Follow medication instructions as directed by your doctor and / or pharmacist. Please keep your medication list with you and share with your physician. Update the information when medications are discontinued, doses are changed, or new medications (including over-the-counter products) are added; and carry medication information at all times in the event of emergency situations.       Patient is on a high risk medication and therefore requires close monitoring and follow up.     CHEST guidelines recommend frequent INR monitoring at regular intervals (a few days up to a max of 12 weeks) to ensure they are on the proper dose of warfarin and not having any complications from therapy.  INRs can dramatically change over a short time period due to diet, medications, and medical conditions.   The patient instructed to go to the ER for falls with a head injury,  blood in urine or stool or any bleeding that last longer than 20 min.   For questions, please contact Outpatient Anticoagulation Service [phone].      AMALIA Hughes

## 2023-01-17 NOTE — PROGRESS NOTES
This evaluation was conducted via Telemed using secure and encrypted videoconferencing technology. The patient was physically located at [facility] in [place], [place]. The patient was presented by a medical professional at the originating site.   The patient's identity was confirmed and verbal consent was obtained for this telemedicine encounter.      OP Anticoagulation Service Note    Date: 2023       Anticoagulation Summary  As of 2023      INR goal:  2.0-3.0   TTR:  67.3 % (7.4 y)   INR used for dosin.10 (2023)   Warfarin maintenance plan:  2.5 mg (5 mg x 0.5) every Mon, Wed, Fri; 5 mg (5 mg x 1) all other days   Weekly warfarin total:  27.5 mg   Plan last modified:  Cedric Martinez, PharmD (2022)   Next INR check:  2023   Target end date:  Indefinite    Indications    Atrial fibrillation with rapid ventricular response (HCC) (Resolved) [I48.91]  Long term current use of anticoagulant therapy (Resolved) [Z79.01]  Atrial fibrillation (HCC) (Resolved) [I48.91]  Hypercoagulable state (HCC) [D68.59]                 Anticoagulation Episode Summary       INR check location:      Preferred lab:      Send INR reminders to:      Comments:            Anticoagulation Care Providers       Provider Role Specialty Phone number    Sanjay Sorensen M.D. Referring Cardiovascular Disease (Cardiology) [phone]          Anticoagulation Patient Findings  Patient Findings       Positives:  Upcoming invasive procedure (upcoming derm appt to remove lesion)    Negatives:  Signs/symptoms of thrombosis, Signs/symptoms of bleeding, Laboratory test error suspected, Change in health, Change in alcohol use, Change in activity, Emergency department visit, Upcoming dental procedure, Missed doses, Extra doses, Change in medications, Change in diet/appetite, Hospital admission, Bruising, Other complaints            THIS VISIT CONDUCTED WITH PRESENTER VIA TELEMEDICINE UTILIZING SECURE AND ENCRYPTED  VIDEOCONFERENCING EQUIPMENT  ROS:    Pulm: Denies SOB, chest pain.    Card: Denies syncope, edema, palpitations.    Extremities: Denies redness, pain.     PE:    Pulm: No SOB, even and unlabored.    Card: Normal rate and rhythm.    Extremities: No redness, or edema.     INR  is-therapeutic.    Denies signs/symptoms of bleeding and/or thrombosis.    Denies changes to diet or medications.   Follow up appt in 4 weeks per pt request    Plan:  continue with current dosing regimen    Medication: Warfarin (Coumadin)          Next Appointment: Tuesday, 2/14/2023 @ 1:30pm      Pt on antiplatelet therapy Plavix 75mg for coronary artery occlusion with stent indef per cardiology.    Review all of your home medications and newly ordered medications with your doctor and / or pharmacist. Follow medication instructions as directed by your doctor and / or pharmacist. Please keep your medication list with you and share with your physician. Update the information when medications are discontinued, doses are changed, or new medications (including over-the-counter products) are added; and carry medication information at all times in the event of emergency situations.       Patient is on a high risk medication and therefore requires close monitoring and follow up.     CHEST guidelines recommend frequent INR monitoring at regular intervals (a few days up to a max of 12 weeks) to ensure they are on the proper dose of warfarin and not having any complications from therapy.  INRs can dramatically change over a short time period due to diet, medications, and medical conditions.   The patient instructed to go to the ER for falls with a head injury,  blood in urine or stool or any bleeding that last longer than 20 min.   For questions, please contact Outpatient Anticoagulation Service [phone].     POLO Hughes.

## 2023-01-19 ENCOUNTER — APPOINTMENT (RX ONLY)
Dept: URBAN - METROPOLITAN AREA CLINIC 4 | Facility: CLINIC | Age: 78
Setting detail: DERMATOLOGY
End: 2023-01-19

## 2023-01-19 NOTE — PROCEDURE: MIPS QUALITY
Detail Level: Detailed
Quality 130: Documentation Of Current Medications In The Medical Record: Current Medications Documented
Quality 111:Pneumonia Vaccination Status For Older Adults: Pneumococcal vaccine (PPSV23) administered on or after patient’s 60th birthday and before the end of the measurement period
Quality 265: Biopsy Follow-Up: Biopsy results reviewed, communicated, tracked, and documented
Quality 226: Preventive Care And Screening: Tobacco Use: Screening And Cessation Intervention: Patient screened for tobacco use and is an ex/non-smoker

## 2023-02-14 NOTE — PROGRESS NOTES
This evaluation was conducted via Telemed using secure and encrypted videoconferencing technology. The patient was physically located at [facility] in [place], [place]. The patient was presented by a medical professional at the originating site.   The patient's identity was confirmed and verbal consent was obtained for this telemedicine encounter.      OP Anticoagulation Service Note    Date: 2023       Anticoagulation Summary  As of 2023      INR goal:  2.0-3.0   TTR:  67.6 % (7.5 y)   INR used for dosin.00 (2023)   Warfarin maintenance plan:  2.5 mg (5 mg x 0.5) every Mon, Wed, Fri; 5 mg (5 mg x 1) all other days   Weekly warfarin total:  27.5 mg   Plan last modified:  Cedric Martinez, PharmD (2022)   Next INR check:  3/14/2023   Target end date:  Indefinite    Indications    Atrial fibrillation with rapid ventricular response (HCC) (Resolved) [I48.91]  Long term current use of anticoagulant therapy (Resolved) [Z79.01]  Atrial fibrillation (HCC) (Resolved) [I48.91]  Hypercoagulable state (HCC) [D68.59]                 Anticoagulation Episode Summary       INR check location:      Preferred lab:      Send INR reminders to:      Comments:            Anticoagulation Care Providers       Provider Role Specialty Phone number    Sanjay Sorensen M.D. Referring Cardiovascular Disease (Cardiology) [phone]          Anticoagulation Patient Findings  Patient Findings       Negatives:  Signs/symptoms of thrombosis, Signs/symptoms of bleeding, Laboratory test error suspected, Change in health, Change in alcohol use, Change in activity, Upcoming invasive procedure, Emergency department visit, Upcoming dental procedure, Missed doses, Extra doses, Change in medications, Change in diet/appetite, Hospital admission, Bruising, Other complaints            THIS VISIT CONDUCTED WITH PRESENTER VIA TELEMEDICINE UTILIZING SECURE AND ENCRYPTED VIDEOCONFERENCING EQUIPMENT  ROS:    Pulm: Denies SOB, chest  pain.    Card: Denies syncope, edema, palpitations.    Extremities: Denies redness, pain.     PE:    Pulm: No SOB, even and unlabored.    Card: Normal rate and rhythm.    Extremities: No redness, or edema.     INR  is-therapeutic.    Denies signs/symptoms of bleeding and/or thrombosis.    Denies changes to diet or medications.   Follow up appt in 4 weeks     Plan:  continue with current dosing regimen    Medication: Warfarin (Coumadin)         Sunday Monday Tuesday Wednesday Thursday Friday Saturday      5 mg    2.5 mg    5 mg    2.5 mg    5 mg    2.5 mg    5 mg      1 tab(s)    1/2 tab(s)    1 tab(s)    1/2 tab(s)    1 tab(s)    1/2 tab(s)  1 tab(s)     Next Appointment: Tuesday, 3/14/2023 @1pm       Pt on antiplatelet therapy Plavix 75mg for coronary artery occlusion with stent indef per cardiology.    Review all of your home medications and newly ordered medications with your doctor and / or pharmacist. Follow medication instructions as directed by your doctor and / or pharmacist. Please keep your medication list with you and share with your physician. Update the information when medications are discontinued, doses are changed, or new medications (including over-the-counter products) are added; and carry medication information at all times in the event of emergency situations.       Patient is on a high risk medication and therefore requires close monitoring and follow up.    CHEST guidelines recommend frequent INR monitoring at regular intervals (a few days up to a max of 12 weeks) to ensure they are on the proper dose of warfarin and not having any complications from therapy.  INRs can dramatically change over a short time period due to diet, medications, and medical conditions.   The patient is instructed to go to the ER for falls with a head injury,  blood in urine or stool or any bleeding that last longer than 20 min.   For questions, please contact Outpatient Anticoagulation Service [phone]  [phone].     POLO Hughes.

## 2023-02-15 ENCOUNTER — APPOINTMENT (RX ONLY)
Dept: URBAN - METROPOLITAN AREA CLINIC 6 | Facility: CLINIC | Age: 78
Setting detail: DERMATOLOGY
End: 2023-02-15

## 2023-02-15 DIAGNOSIS — L57.0 ACTINIC KERATOSIS: ICD-10-CM

## 2023-02-15 PROBLEM — C44.41 BASAL CELL CARCINOMA OF SKIN OF SCALP AND NECK: Status: ACTIVE | Noted: 2023-02-15

## 2023-02-15 PROBLEM — D04.62 CARCINOMA IN SITU OF SKIN OF LEFT UPPER LIMB, INCLUDING SHOULDER: Status: ACTIVE | Noted: 2023-02-15

## 2023-02-15 PROBLEM — C44.619 BASAL CELL CARCINOMA OF SKIN OF LEFT UPPER LIMB, INCLUDING SHOULDER: Status: ACTIVE | Noted: 2023-02-15

## 2023-02-15 PROCEDURE — ? PRESCRIPTION

## 2023-02-15 PROCEDURE — 99213 OFFICE O/P EST LOW 20 MIN: CPT | Mod: 25

## 2023-02-15 PROCEDURE — ? LIQUID NITROGEN

## 2023-02-15 PROCEDURE — ? ADDITIONAL NOTES

## 2023-02-15 PROCEDURE — 17000 DESTRUCT PREMALG LESION: CPT

## 2023-02-15 PROCEDURE — ? COUNSELING

## 2023-02-15 RX ADMIN — FLUOROURACIL THIN LAYER: 5 CREAM TOPICAL at 00:00

## 2023-02-15 ASSESSMENT — LOCATION SIMPLE DESCRIPTION DERM: LOCATION SIMPLE: RIGHT EAR

## 2023-02-15 ASSESSMENT — LOCATION ZONE DERM: LOCATION ZONE: EAR

## 2023-02-15 ASSESSMENT — LOCATION DETAILED DESCRIPTION DERM: LOCATION DETAILED: RIGHT SUPERIOR HELIX

## 2023-02-15 NOTE — PROCEDURE: ADDITIONAL NOTES
Additional Notes: Pt cancelled appointments for excision because of infection on leg, will reschedule with Dr. Sutton\\nreviewed surgical procedures in depth
Detail Level: Simple
Render Risk Assessment In Note?: yes
Additional Notes: Pt cancelled appointments for excision because of infection on leg, will reschedule with Dr. Sutton
Additional Notes: Many many AKs and photodamage\\nDiscussed 5-FU treatment\\nApply bid for two weeks to forehead, then left cheek, then right cheek. Written instruction provided. \\nRecheck in 2  months

## 2023-02-16 RX ORDER — FLUOROURACIL 5 MG/G
CREAM TOPICAL BID
Qty: 40 | Refills: 0 | Status: ERX | COMMUNITY
Start: 2023-02-15

## 2023-02-21 NOTE — PROGRESS NOTES
Patient Consult Note     CHF Pharmacotherapy visit   Informed written consent was given on: 10/26/21    [facility]  Yuri De León is here for CHF and DM   Pertinent Interval History since last visit:   none  Most recent EF:  65%, 7/23/2021  60%, 3/23/2018  Vitals:    02/21/23 1357   BP: 124/78       Home BP and HR:  Not testing   Change in weight: up about 10lbs   Exercise habits: moderate regular exercise program   Diet: DASH diet      Current Outpatient Medications:     dilTIAZem, TAKE 1 TABLET BY MOUTH THREE TIMES DAILY    furosemide, 80 mg, Oral, DAILY    clopidogrel, Take 1 tablet by mouth once daily    warfarin, 2.5-5 mg, Oral, DAILY    multivitamin, 1 Tablet, Oral, DAILY    lisinopril, 2.5 mg, Oral, DAILY    budesonide-formoterol, 2 Puff, Inhalation, BID    Trulicity, 1 Each, Subcutaneous, Q7 DAYS (Patient taking differently: 1 Each, Subcutaneous, EVERY 7 DAYS, Getting it via PAP)    atorvastatin, 80 mg, Oral, DAILY (Patient taking differently: 40 mg, Oral, DAILY)    Berberine Complex, 1 Capsule, Oral, BID    celecoxib, 200 mg, Oral, QDAY PRN (Patient not taking: No sig reported)    albuterol, 2.5 mg, Nebulization, Q4HRS PRN    Current Adherence to CHF and other therapies:  Complete    DATA REVIEW  INR   Date Value Ref Range Status   02/14/2023 2.00 2 - 3.5 Final     POC INR   Date Value Ref Range Status   02/14/2023 2.0 (A) 0.9 - 1.2 Final              Lab Results   Component Value Date/Time    CHOLSTRLTOT 189 12/20/2022 12:21 PM     (H) 12/20/2022 12:21 PM    HDL 52 12/20/2022 12:21 PM    TRIGLYCERIDE 52 12/20/2022 12:21 PM       Lab Results   Component Value Date/Time    SODIUM 140 12/20/2022 12:21 PM    POTASSIUM 4.6 12/20/2022 12:21 PM    CHLORIDE 100 12/20/2022 12:21 PM    CO2 26 12/20/2022 12:21 PM    GLUCOSE 158 (H) 12/20/2022 12:21 PM    BUN 47 (H) 12/20/2022 12:21 PM    CREATININE 2.59 (H) 12/20/2022 12:21 PM     Lab Results   Component Value Date/Time    ALKPHOSPHAT 121 (H) 12/20/2022 12:21  PM    ASTSGOT 17 2022 12:21 PM    ALTSGPT 17 2022 12:21 PM    TBILIRUBIN 0.8 2022 12:21 PM    INR 2.00 2023 12:00 AM    ALBUMIN 4.3 2022 12:21 PM    ALBUMIN 3.77 2021 12:04 PM      Lab Results   Component Value Date/Time    MALBCRT 484 (H) 10/20/2021 12:14 PM    MICROALBUR 37.1 10/20/2021 12:14 PM        Latest Reference Range & Units 22 10:43 22 08:35 22 10:42 9/15/22 12:20   Creatinine 0.50 - 1.40 mg/dL 1.89 (H) 2.48 (H) 1.87 (H) 2.46 (H)   GFR (CKD-EPI) >60 mL/min/1.73 m 2  26 ! 37 ! 26 !   (H): Data is abnormally high  !: Data is abnormal    Calculated creatinine clearance:     Significant changes to laboratory values since last visit that require repeat labs:  n/a  Other Pertinent Blood Work:   n/a    Immunization History   Administered Date(s) Administered    Influenza Vaccine Quad Inj (Pf) 10/17/2018, 2019    Pneumococcal Conjugate Vaccine (Prevnar/PCV-13) 10/17/2018    Pneumococcal polysaccharide vaccine (PPSV-23) 10/07/2014         SOCIAL HISTORY  Social History     Tobacco Use   Smoking Status Former    Packs/day: 0.50    Years: 10.00    Pack years: 5.00    Types: Cigarettes    Quit date: 12/10/1968    Years since quittin.2   Smokeless Tobacco Never        Recent Imaging Studies:    None since last visit      ASSESSMENT AND PLAN  CHF & HTN:  bp at goal   No edema, no SOB  Up in weight   CrCl is stable but only near 25-30ml/min  CHF medications:  Continue Lasix  80mg as tolerated   Continue lisinopril 2.5mg QD  Continue Diltiazem 60mg BID-TID, per pt     2. DM   A1C near goal   No SGLT2 due to CrCl ,  May consider at subsequent appointments due to microalbuminuria, if CrCl is stable   Diabetes medication:   Continue with Trulicity 4.5mg every 7 days - getting via the PAP          3. Lipids:  ldl not at goal   lipid medication:   Continue with Lipitor 40mg , due to Nausea     4. Lifestyle   Lifestyle Recommendations From Today's Visit:   Continue  to eat DASH/MED style diet.   Continue to exercise as tolerated.      Blood Work Ordered At Today's visit: cmp and A1C  Studies Ordered at Todays Visit: none  Follow-Up: 12 week(s)    Ilya Rachel PharmD  [facility]  Phone: [phone], Fax: [phone]    This note was created using voice recognition software (Dragon). The accuracy of the dictation is limited by the abilities of the software. I have reviewed the note prior to signing, however some errors in grammar and context are still possible. If you have any questions related to this note please do not hesitate to contact our office.     CC:  Lashell Carmona D.O.  No ref. provider found

## 2023-03-09 ENCOUNTER — APPOINTMENT (RX ONLY)
Dept: URBAN - METROPOLITAN AREA CLINIC 4 | Facility: CLINIC | Age: 78
Setting detail: DERMATOLOGY
End: 2023-03-09

## 2023-03-09 PROBLEM — D04.62 CARCINOMA IN SITU OF SKIN OF LEFT UPPER LIMB, INCLUDING SHOULDER: Status: ACTIVE | Noted: 2023-03-09

## 2023-03-09 PROCEDURE — 11604 EXC TR-EXT MAL+MARG 3.1-4 CM: CPT

## 2023-03-09 PROCEDURE — ? EXCISION

## 2023-03-09 PROCEDURE — 13121 CMPLX RPR S/A/L 2.6-7.5 CM: CPT

## 2023-03-09 NOTE — PROCEDURE: EXCISION
Referring Physician (Optional): MD Evon
Surgeon (Optional): Lesley
Biopsy Photograph Reviewed: Yes
Previous Accession (Optional): [id number]
Size Of Lesion In Cm: 2.9
X Size Of Lesion In Cm (Optional): 0
Size Of Margin In Cm: 0.2
Anesthesia Volume In Cc: 12
Was An Eye Clamp Used?: No
Eye Clamp Note Details: An eye clamp was used during the procedure.
Excision Method: Elliptical
Saucerization Depth: dermis and superficial adipose tissue
Repair Type: Complex
Suturegard Retention Suture: 2-0 Nylon
Retention Suture Bite Size: 3 mm
Length To Time In Minutes Device Was In Place: 10
Number Of Hemigard Strips Per Side: 1
Intermediate / Complex Repair - Final Wound Length In Cm: 6.6
Width Of Defect Perpendicular To Closure In Cm (Required): 1.9
Distance Of Undermining In Cm (Required): 3.1
Undermining Type: Entire Wound
Debridement Text: The wound edges were debrided prior to proceeding with the closure to facilitate wound healing.
Helical Rim Text: The closure involved the helical rim.
Vermilion Border Text: The closure involved the vermilion border.
Nostril Rim Text: The closure involved the nostril rim.
Retention Suture Text: Retention sutures were placed to support the closure and prevent dehiscence.
Lab: [id number]
Lab Facility: 
Graft Donor Site Bandage (Optional-Leave Blank If You Don't Want In Note): Steri-strips and a pressure bandage were applied to the donor site.
Epidermal Closure Graft Donor Site (Optional): simple interrupted
Billing Type: Third-Party Bill
Excision Depth: adipose tissue
Scalpel Size: 15 blade
Anesthesia Type: 1% lidocaine with epinephrine
Additional Anesthesia Volume In Cc: 6
Hemostasis: Electrocautery
Estimated Blood Loss (Cc): minimal
Detail Level: Detailed
Repair Anesthesia Method: local infiltration
Deep Sutures: 2-0 Vicryl
Dermal Closure: buried vertical mattress
Epidermal Sutures: 5-0 Caprosyn
Epidermal Closure: running locked
Wound Care: Bacitracin
Dressing: dry sterile dressing
Suturegard Intro: Intraoperative tissue expansion was performed, utilizing the SUTUREGARD device, in order to reduce wound tension.
Suturegard Body: The suture ends were repeatedly re-tightened and re-clamped to achieve the desired tissue expansion.
Hemigard Intro: Due to skin fragility and wound tension, it was decided to use HEMIGARD adhesive retention suture devices to permit a linear closure. The skin was cleaned and dried for a 6cm distance away from the wound. Excessive hair, if present, was removed to allow for adhesion.
Hemigard Postcare Instructions: The HEMIGARD strips are to remain completely dry for at least 5-7 days.
Positioning (Leave Blank If You Do Not Want): The patient was placed in a comfortable position exposing the surgical site.
Pre-Excision Curettage Text (Leave Blank If You Do Not Want): Prior to drawing the surgical margin the visible lesion was removed with electrodesiccation and curettage to clearly define the lesion size.
Complex Repair Preamble Text (Leave Blank If You Do Not Want): Extensive wide undermining was performed.
Intermediate Repair Preamble Text (Leave Blank If You Do Not Want): Undermining was performed with blunt dissection.
Curvilinear Excision Additional Text (Leave Blank If You Do Not Want): The margin was drawn around the clinically apparent lesion.  A curvilinear shape was then drawn on the skin incorporating the lesion and margins.  Incisions were then made along these lines to the appropriate tissue plane and the lesion was extirpated.
Fusiform Excision Additional Text (Leave Blank If You Do Not Want): The margin was drawn around the clinically apparent lesion.  A fusiform shape was then drawn on the skin incorporating the lesion and margins.  Incisions were then made along these lines to the appropriate tissue plane and the lesion was extirpated.
Elliptical Excision Additional Text (Leave Blank If You Do Not Want): The margin was drawn around the clinically apparent lesion.  An elliptical shape was then drawn on the skin incorporating the lesion and margins.  Incisions were then made along these lines to the appropriate tissue plane and the lesion was extirpated.
Saucerization Excision Additional Text (Leave Blank If You Do Not Want): The margin was drawn around the clinically apparent lesion.  Incisions were then made along these lines, in a tangential fashion, to the appropriate tissue plane and the lesion was extirpated.
Slit Excision Additional Text (Leave Blank If You Do Not Want): A linear line was drawn on the skin overlying the lesion. An incision was made slowly until the lesion was visualized.  Once visualized, the lesion was removed with blunt dissection.
Excisional Biopsy Additional Text (Leave Blank If You Do Not Want): The margin was drawn around the clinically apparent lesion. An elliptical shape was then drawn on the skin incorporating the lesion and margins.  Incisions were then made along these lines to the appropriate tissue plane and the lesion was extirpated.
Perilesional Excision Additional Text (Leave Blank If You Do Not Want): The margin was drawn around the clinically apparent lesion. Incisions were then made along these lines to the appropriate tissue plane and the lesion was extirpated.
Repair Performed By Another Provider Text (Leave Blank If You Do Not Want): After the tissue was excised the defect was repaired by another provider.
No Repair - Repaired With Adjacent Surgical Defect Text (Leave Blank If You Do Not Want): After the excision the defect was repaired concurrently with another surgical defect which was in close approximation.
Adjacent Tissue Transfer Text: The defect edges were debeveled with a #15 scalpel blade. Given the location of the defect and the proximity to free margins an adjacent tissue transfer was deemed most appropriate. Using a sterile surgical marker, an appropriate flap was drawn incorporating the defect and placing the expected incisions within the relaxed skin tension lines where possible. The area thus outlined was incised deep to adipose tissue with a #15 scalpel blade. The skin margins were undermined to an appropriate distance in all directions utilizing iris scissors and carried over to close the primary defect.
Advancement Flap (Single) Text: The defect edges were debeveled with a #15 scalpel blade.  Given the location of the defect and the proximity to free margins a single advancement flap was deemed most appropriate.  Using a sterile surgical marker, an appropriate advancement flap was drawn incorporating the defect and placing the expected incisions within the relaxed skin tension lines where possible.    The area thus outlined was incised deep to adipose tissue with a #15 scalpel blade.  The skin margins were undermined to an appropriate distance in all directions utilizing iris scissors.
Advancement Flap (Double) Text: The defect edges were debeveled with a #15 scalpel blade.  Given the location of the defect and the proximity to free margins a double advancement flap was deemed most appropriate.  Using a sterile surgical marker, the appropriate advancement flaps were drawn incorporating the defect and placing the expected incisions within the relaxed skin tension lines where possible.    The area thus outlined was incised deep to adipose tissue with a #15 scalpel blade.  The skin margins were undermined to an appropriate distance in all directions utilizing iris scissors.
Burow's Advancement Flap Text: The defect edges were debeveled with a #15 scalpel blade.  Given the location of the defect and the proximity to free margins a Burow's advancement flap was deemed most appropriate.  Using a sterile surgical marker, the appropriate advancement flap was drawn incorporating the defect and placing the expected incisions within the relaxed skin tension lines where possible.    The area thus outlined was incised deep to adipose tissue with a #15 scalpel blade.  The skin margins were undermined to an appropriate distance in all directions utilizing iris scissors.
Chonodrocutaneous Helical Advancement Flap Text: The defect edges were debeveled with a #15 scalpel blade. Given the location of the defect and the proximity to free margins a chondrocutaneous helical advancement flap was deemed most appropriate. Using a sterile surgical marker, the appropriate advancement flap was drawn incorporating the defect and placing the expected incisions within the relaxed skin tension lines where possible. The area thus outlined was incised deep to adipose tissue with a #15 scalpel blade. The skin margins were undermined to an appropriate distance in all directions utilizing iris scissors. Following this, the designed flap was advanced and carried over into the primary defect and sutured into place.
Crescentic Advancement Flap Text: The defect edges were debeveled with a #15 scalpel blade.  Given the location of the defect and the proximity to free margins a crescentic advancement flap was deemed most appropriate.  Using a sterile surgical marker, the appropriate advancement flap was drawn incorporating the defect and placing the expected incisions within the relaxed skin tension lines where possible.    The area thus outlined was incised deep to adipose tissue with a #15 scalpel blade.  The skin margins were undermined to an appropriate distance in all directions utilizing iris scissors.
A-T Advancement Flap Text: The defect edges were debeveled with a #15 scalpel blade.  Given the location of the defect, shape of the defect and the proximity to free margins an A-T advancement flap was deemed most appropriate.  Using a sterile surgical marker, an appropriate advancement flap was drawn incorporating the defect and placing the expected incisions within the relaxed skin tension lines where possible.    The area thus outlined was incised deep to adipose tissue with a #15 scalpel blade.  The skin margins were undermined to an appropriate distance in all directions utilizing iris scissors.
O-T Advancement Flap Text: The defect edges were debeveled with a #15 scalpel blade.  Given the location of the defect, shape of the defect and the proximity to free margins an O-T advancement flap was deemed most appropriate.  Using a sterile surgical marker, an appropriate advancement flap was drawn incorporating the defect and placing the expected incisions within the relaxed skin tension lines where possible.    The area thus outlined was incised deep to adipose tissue with a #15 scalpel blade.  The skin margins were undermined to an appropriate distance in all directions utilizing iris scissors.
O-L Flap Text: The defect edges were debeveled with a #15 scalpel blade.  Given the location of the defect, shape of the defect and the proximity to free margins an O-L flap was deemed most appropriate.  Using a sterile surgical marker, an appropriate advancement flap was drawn incorporating the defect and placing the expected incisions within the relaxed skin tension lines where possible.    The area thus outlined was incised deep to adipose tissue with a #15 scalpel blade.  The skin margins were undermined to an appropriate distance in all directions utilizing iris scissors.
O-Z Flap Text: The defect edges were debeveled with a #15 scalpel blade. Given the location of the defect, shape of the defect and the proximity to free margins an O-Z flap was deemed most appropriate. Using a sterile surgical marker, an appropriate transposition flap was drawn incorporating the defect and placing the expected incisions within the relaxed skin tension lines where possible. The area thus outlined was incised deep to adipose tissue with a #15 scalpel blade. The skin margins were undermined to an appropriate distance in all directions utilizing iris scissors. Following this, the designed flap was carried over into the primary defect and sutured into place.
Double O-Z Flap Text: The defect edges were debeveled with a #15 scalpel blade. Given the location of the defect, shape of the defect and the proximity to free margins a Double O-Z flap was deemed most appropriate. Using a sterile surgical marker, an appropriate transposition flap was drawn incorporating the defect and placing the expected incisions within the relaxed skin tension lines where possible. The area thus outlined was incised deep to adipose tissue with a #15 scalpel blade. The skin margins were undermined to an appropriate distance in all directions utilizing iris scissors. Following this, the designed flap was carried over into the primary defect and sutured into place.
V-Y Flap Text: The defect edges were debeveled with a #15 scalpel blade.  Given the location of the defect, shape of the defect and the proximity to free margins a V-Y flap was deemed most appropriate.  Using a sterile surgical marker, an appropriate advancement flap was drawn incorporating the defect and placing the expected incisions within the relaxed skin tension lines where possible.    The area thus outlined was incised deep to adipose tissue with a #15 scalpel blade.  The skin margins were undermined to an appropriate distance in all directions utilizing iris scissors.
Advancement-Rotation Flap Text: The defect edges were debeveled with a #15 scalpel blade.  Given the location of the defect, shape of the defect and the proximity to free margins an advancement-rotation flap was deemed most appropriate.  Using a sterile surgical marker, an appropriate flap was drawn incorporating the defect and placing the expected incisions within the relaxed skin tension lines where possible. The area thus outlined was incised deep to adipose tissue with a #15 scalpel blade.  The skin margins were undermined to an appropriate distance in all directions utilizing iris scissors.
Mercedes Flap Text: The defect edges were debeveled with a #15 scalpel blade. Given the location of the defect, shape of the defect and the proximity to free margins a Mercedes flap was deemed most appropriate. Using a sterile surgical marker, an appropriate advancement flap was drawn incorporating the defect and placing the expected incisions within the relaxed skin tension lines where possible. The area thus outlined was incised deep to adipose tissue with a #15 scalpel blade. The skin margins were undermined to an appropriate distance in all directions utilizing iris scissors. Following this, the designed flap was advanced and carried over into the primary defect and sutured into place.
Modified Advancement Flap Text: The defect edges were debeveled with a #15 scalpel blade.  Given the location of the defect, shape of the defect and the proximity to free margins a modified advancement flap was deemed most appropriate.  Using a sterile surgical marker, an appropriate advancement flap was drawn incorporating the defect and placing the expected incisions within the relaxed skin tension lines where possible.    The area thus outlined was incised deep to adipose tissue with a #15 scalpel blade.  The skin margins were undermined to an appropriate distance in all directions utilizing iris scissors.
Mucosal Advancement Flap Text: Given the location of the defect, shape of the defect and the proximity to free margins a mucosal advancement flap was deemed most appropriate. Incisions were made with a 15 blade scalpel in the appropriate fashion along the cutaneous vermilion border and the mucosal lip. The remaining actinically damaged mucosal tissue was excised.  The mucosal advancement flap was then elevated to the gingival sulcus with care taken to preserve the neurovascular structures and advanced into the primary defect. Care was taken to ensure that precise realignment of the vermilion border was achieved.
Peng Advancement Flap Text: The defect edges were debeveled with a #15 scalpel blade. Given the location of the defect, shape of the defect and the proximity to free margins a Peng advancement flap was deemed most appropriate. Using a sterile surgical marker, an appropriate advancement flap was drawn incorporating the defect and placing the expected incisions within the relaxed skin tension lines where possible. The area thus outlined was incised deep to adipose tissue with a #15 scalpel blade. The skin margins were undermined to an appropriate distance in all directions utilizing iris scissors. Following this, the designed flap was advanced and carried over into the primary defect and sutured into place.
Hatchet Flap Text: The defect edges were debeveled with a #15 scalpel blade.  Given the location of the defect, shape of the defect and the proximity to free margins a hatchet flap was deemed most appropriate.  Using a sterile surgical marker, an appropriate hatchet flap was drawn incorporating the defect and placing the expected incisions within the relaxed skin tension lines where possible.    The area thus outlined was incised deep to adipose tissue with a #15 scalpel blade.  The skin margins were undermined to an appropriate distance in all directions utilizing iris scissors.
Rotation Flap Text: The defect edges were debeveled with a #15 scalpel blade.  Given the location of the defect, shape of the defect and the proximity to free margins a rotation flap was deemed most appropriate.  Using a sterile surgical marker, an appropriate rotation flap was drawn incorporating the defect and placing the expected incisions within the relaxed skin tension lines where possible.    The area thus outlined was incised deep to adipose tissue with a #15 scalpel blade.  The skin margins were undermined to an appropriate distance in all directions utilizing iris scissors.
Spiral Flap Text: The defect edges were debeveled with a #15 scalpel blade.  Given the location of the defect, shape of the defect and the proximity to free margins a spiral flap was deemed most appropriate.  Using a sterile surgical marker, an appropriate rotation flap was drawn incorporating the defect and placing the expected incisions within the relaxed skin tension lines where possible. The area thus outlined was incised deep to adipose tissue with a #15 scalpel blade.  The skin margins were undermined to an appropriate distance in all directions utilizing iris scissors.
Staged Advancement Flap Text: The defect edges were debeveled with a #15 scalpel blade. Given the location of the defect, shape of the defect and the proximity to free margins a staged advancement flap was deemed most appropriate. Using a sterile surgical marker, an appropriate advancement flap was drawn incorporating the defect and placing the expected incisions within the relaxed skin tension lines where possible. The area thus outlined was incised deep to adipose tissue with a #15 scalpel blade. The skin margins were undermined to an appropriate distance in all directions utilizing iris scissors. Following this, the designed flap was carried over into the primary defect and sutured into place.
Star Wedge Flap Text: The defect edges were debeveled with a #15 scalpel blade.  Given the location of the defect, shape of the defect and the proximity to free margins a star wedge flap was deemed most appropriate.  Using a sterile surgical marker, an appropriate rotation flap was drawn incorporating the defect and placing the expected incisions within the relaxed skin tension lines where possible. The area thus outlined was incised deep to adipose tissue with a #15 scalpel blade.  The skin margins were undermined to an appropriate distance in all directions utilizing iris scissors.
Transposition Flap Text: The defect edges were debeveled with a #15 scalpel blade.  Given the location of the defect and the proximity to free margins a transposition flap was deemed most appropriate.  Using a sterile surgical marker, an appropriate transposition flap was drawn incorporating the defect.    The area thus outlined was incised deep to adipose tissue with a #15 scalpel blade.  The skin margins were undermined to an appropriate distance in all directions utilizing iris scissors.
Muscle Hinge Flap Text: The defect edges were debeveled with a #15 scalpel blade.  Given the size, depth and location of the defect and the proximity to free margins a muscle hinge flap was deemed most appropriate.  Using a sterile surgical marker, an appropriate hinge flap was drawn incorporating the defect. The area thus outlined was incised with a #15 scalpel blade.  The skin margins were undermined to an appropriate distance in all directions utilizing iris scissors.
Mustarde Flap Text: The defect edges were debeveled with a #15 scalpel blade.  Given the size, depth and location of the defect and the proximity to free margins a Mustarde flap was deemed most appropriate. Using a sterile surgical marker, an appropriate flap was drawn incorporating the defect. The area thus outlined was incised with a #15 scalpel blade. The skin margins were undermined to an appropriate distance in all directions utilizing iris scissors. Following this, the designed flap was carried into the primary defect and sutured into place.
Nasal Turnover Hinge Flap Text: The defect edges were debeveled with a #15 scalpel blade.  Given the size, depth, location of the defect and the defect being full thickness a nasal turnover hinge flap was deemed most appropriate. Using a sterile surgical marker, an appropriate hinge flap was drawn incorporating the defect. The area thus outlined was incised with a #15 scalpel blade. The flap was designed to recreate the nasal mucosal lining and the alar rim. The skin margins were undermined to an appropriate distance in all directions utilizing iris scissors. Following this, the designed flap was carried over into the primary defect and sutured into place
Nasalis-Muscle-Based Myocutaneous Island Pedicle Flap Text: Using a #15 blade, an incision was made around the donor flap to the level of the nasalis muscle. Wide lateral undermining was then performed in both the subcutaneous plane above the nasalis muscle, and in a submuscular plane just above periosteum. This allowed the formation of a free nasalis muscle axial pedicle (based on the angular artery) which was still attached to the actual cutaneous flap, increasing its mobility and vascular viability. Hemostasis was obtained with pinpoint electrocoagulation. The flap was mobilized into position and the pivotal anchor points positioned and stabilized with buried interrupted sutures. Subcutaneous and dermal tissues were closed in a multilayered fashion with sutures. Tissue redundancies were excised, and the epidermal edges were apposed without significant tension and sutured with sutures.
Orbicularis Oris Muscle Flap Text: The defect edges were debeveled with a #15 scalpel blade.  Given that the defect affected the competency of the oral sphincter an orbicularis oris muscle flap was deemed most appropriate to restore this competency and normal muscle function.  Using a sterile surgical marker, an appropriate flap was drawn incorporating the defect. The area thus outlined was incised with a #15 scalpel blade. Following this, the designed flap was carried over into the primary defect and sutured into place.
Melolabial Transposition Flap Text: The defect edges were debeveled with a #15 scalpel blade.  Given the location of the defect and the proximity to free margins a melolabial flap was deemed most appropriate.  Using a sterile surgical marker, an appropriate melolabial transposition flap was drawn incorporating the defect.    The area thus outlined was incised deep to adipose tissue with a #15 scalpel blade.  The skin margins were undermined to an appropriate distance in all directions utilizing iris scissors.
Rhombic Flap Text: The defect edges were debeveled with a #15 scalpel blade.  Given the location of the defect and the proximity to free margins a rhombic flap was deemed most appropriate.  Using a sterile surgical marker, an appropriate rhombic flap was drawn incorporating the defect.    The area thus outlined was incised deep to adipose tissue with a #15 scalpel blade.  The skin margins were undermined to an appropriate distance in all directions utilizing iris scissors.
Rhomboid Transposition Flap Text: The defect edges were debeveled with a #15 scalpel blade. Given the location of the defect and the proximity to free margins a rhomboid transposition flap was deemed most appropriate. Using a sterile surgical marker, an appropriate rhomboid flap was drawn incorporating the defect. The area thus outlined was incised deep to adipose tissue with a #15 scalpel blade. The skin margins were undermined to an appropriate distance in all directions utilizing iris scissors. Following this, the designed flap was carried over into the primary defect and sutured into place.
Bi-Rhombic Flap Text: The defect edges were debeveled with a #15 scalpel blade.  Given the location of the defect and the proximity to free margins a bi-rhombic flap was deemed most appropriate.  Using a sterile surgical marker, an appropriate rhombic flap was drawn incorporating the defect. The area thus outlined was incised deep to adipose tissue with a #15 scalpel blade.  The skin margins were undermined to an appropriate distance in all directions utilizing iris scissors.
Helical Rim Advancement Flap Text: The defect edges were debeveled with a #15 blade scalpel.  Given the location of the defect and the proximity to free margins (helical rim) a double helical rim advancement flap was deemed most appropriate.  Using a sterile surgical marker, the appropriate advancement flaps were drawn incorporating the defect and placing the expected incisions between the helical rim and antihelix where possible.  The area thus outlined was incised through and through with a #15 scalpel blade.  With a skin hook and iris scissors, the flaps were gently and sharply undermined and freed up.
Bilateral Helical Rim Advancement Flap Text: The defect edges were debeveled with a #15 blade scalpel.  Given the location of the defect and the proximity to free margins (helical rim) a bilateral helical rim advancement flap was deemed most appropriate.  Using a sterile surgical marker, the appropriate advancement flaps were drawn incorporating the defect and placing the expected incisions between the helical rim and antihelix where possible.  The area thus outlined was incised through and through with a #15 scalpel blade.  With a skin hook and iris scissors, the flaps were gently and sharply undermined and freed up.
Ear Star Wedge Flap Text: The defect edges were debeveled with a #15 blade scalpel.  Given the location of the defect and the proximity to free margins (helical rim) an ear star wedge flap was deemed most appropriate.  Using a sterile surgical marker, the appropriate flap was drawn incorporating the defect and placing the expected incisions between the helical rim and antihelix where possible.  The area thus outlined was incised through and through with a #15 scalpel blade.
Banner Transposition Flap Text: The defect edges were debeveled with a #15 scalpel blade. Given the location of the defect and the proximity to free margins a Banner transposition flap was deemed most appropriate. Using a sterile surgical marker, an appropriate flap was drawn around the defect. The area thus outlined was incised deep to adipose tissue with a #15 scalpel blade. The skin margins were undermined to an appropriate distance in all directions utilizing iris scissors. Following this, the designed flap was carried into the primary defect and sutured into place.
Bilobed Flap Text: The defect edges were debeveled with a #15 scalpel blade.  Given the location of the defect and the proximity to free margins a bilobe flap was deemed most appropriate.  Using a sterile surgical marker, an appropriate bilobe flap drawn around the defect.    The area thus outlined was incised deep to adipose tissue with a #15 scalpel blade.  The skin margins were undermined to an appropriate distance in all directions utilizing iris scissors.
Bilobed Transposition Flap Text: The defect edges were debeveled with a #15 scalpel blade.  Given the location of the defect and the proximity to free margins a bilobed transposition flap was deemed most appropriate.  Using a sterile surgical marker, an appropriate bilobe flap drawn around the defect.    The area thus outlined was incised deep to adipose tissue with a #15 scalpel blade.  The skin margins were undermined to an appropriate distance in all directions utilizing iris scissors.
Trilobed Flap Text: The defect edges were debeveled with a #15 scalpel blade.  Given the location of the defect and the proximity to free margins a trilobed flap was deemed most appropriate.  Using a sterile surgical marker, an appropriate trilobed flap drawn around the defect.    The area thus outlined was incised deep to adipose tissue with a #15 scalpel blade.  The skin margins were undermined to an appropriate distance in all directions utilizing iris scissors.
Dorsal Nasal Flap Text: The defect edges were debeveled with a #15 scalpel blade.  Given the location of the defect and the proximity to free margins a dorsal nasal flap was deemed most appropriate.  Using a sterile surgical marker, an appropriate dorsal nasal flap was drawn around the defect.    The area thus outlined was incised deep to adipose tissue with a #15 scalpel blade.  The skin margins were undermined to an appropriate distance in all directions utilizing iris scissors.
Island Pedicle Flap Text: The defect edges were debeveled with a #15 scalpel blade.  Given the location of the defect, shape of the defect and the proximity to free margins an island pedicle advancement flap was deemed most appropriate.  Using a sterile surgical marker, an appropriate advancement flap was drawn incorporating the defect, outlining the appropriate donor tissue and placing the expected incisions within the relaxed skin tension lines where possible.    The area thus outlined was incised deep to adipose tissue with a #15 scalpel blade.  The skin margins were undermined to an appropriate distance in all directions around the primary defect and laterally outward around the island pedicle utilizing iris scissors.  There was minimal undermining beneath the pedicle flap.
Island Pedicle Flap With Canthal Suspension Text: The defect edges were debeveled with a #15 scalpel blade.  Given the location of the defect, shape of the defect and the proximity to free margins an island pedicle advancement flap was deemed most appropriate.  Using a sterile surgical marker, an appropriate advancement flap was drawn incorporating the defect, outlining the appropriate donor tissue and placing the expected incisions within the relaxed skin tension lines where possible. The area thus outlined was incised deep to adipose tissue with a #15 scalpel blade.  The skin margins were undermined to an appropriate distance in all directions around the primary defect and laterally outward around the island pedicle utilizing iris scissors.  There was minimal undermining beneath the pedicle flap. A suspension suture was placed in the canthal tendon to prevent tension and prevent ectropion.
Alar Island Pedicle Flap Text: The defect edges were debeveled with a #15 scalpel blade.  Given the location of the defect, shape of the defect and the proximity to the alar rim an island pedicle advancement flap was deemed most appropriate.  Using a sterile surgical marker, an appropriate advancement flap was drawn incorporating the defect, outlining the appropriate donor tissue and placing the expected incisions within the nasal ala running parallel to the alar rim. The area thus outlined was incised with a #15 scalpel blade.  The skin margins were undermined minimally to an appropriate distance in all directions around the primary defect and laterally outward around the island pedicle utilizing iris scissors.  There was minimal undermining beneath the pedicle flap.
Double Island Pedicle Flap Text: The defect edges were debeveled with a #15 scalpel blade.  Given the location of the defect, shape of the defect and the proximity to free margins a double island pedicle advancement flap was deemed most appropriate.  Using a sterile surgical marker, an appropriate advancement flap was drawn incorporating the defect, outlining the appropriate donor tissue and placing the expected incisions within the relaxed skin tension lines where possible.    The area thus outlined was incised deep to adipose tissue with a #15 scalpel blade.  The skin margins were undermined to an appropriate distance in all directions around the primary defect and laterally outward around the island pedicle utilizing iris scissors.  There was minimal undermining beneath the pedicle flap.
Island Pedicle Flap-Requiring Vessel Identification Text: The defect edges were debeveled with a #15 scalpel blade.  Given the location of the defect, shape of the defect and the proximity to free margins an island pedicle advancement flap was deemed most appropriate.  Using a sterile surgical marker, an appropriate advancement flap was drawn, based on the axial vessel mentioned above, incorporating the defect, outlining the appropriate donor tissue and placing the expected incisions within the relaxed skin tension lines where possible.    The area thus outlined was incised deep to adipose tissue with a #15 scalpel blade.  The skin margins were undermined to an appropriate distance in all directions around the primary defect and laterally outward around the island pedicle utilizing iris scissors.  There was minimal undermining beneath the pedicle flap.
Keystone Flap Text: The defect edges were debeveled with a #15 scalpel blade.  Given the location of the defect, shape of the defect a keystone flap was deemed most appropriate.  Using a sterile surgical marker, an appropriate keystone flap was drawn incorporating the defect, outlining the appropriate donor tissue and placing the expected incisions within the relaxed skin tension lines where possible. The area thus outlined was incised deep to adipose tissue with a #15 scalpel blade.  The skin margins were undermined to an appropriate distance in all directions around the primary defect and laterally outward around the flap utilizing iris scissors.
O-T Plasty Text: The defect edges were debeveled with a #15 scalpel blade.  Given the location of the defect, shape of the defect and the proximity to free margins an O-T plasty was deemed most appropriate.  Using a sterile surgical marker, an appropriate O-T plasty was drawn incorporating the defect and placing the expected incisions within the relaxed skin tension lines where possible.    The area thus outlined was incised deep to adipose tissue with a #15 scalpel blade.  The skin margins were undermined to an appropriate distance in all directions utilizing iris scissors.
O-Z Plasty Text: The defect edges were debeveled with a #15 scalpel blade.  Given the location of the defect, shape of the defect and the proximity to free margins an O-Z plasty (double transposition flap) was deemed most appropriate.  Using a sterile surgical marker, the appropriate transposition flaps were drawn incorporating the defect and placing the expected incisions within the relaxed skin tension lines where possible.    The area thus outlined was incised deep to adipose tissue with a #15 scalpel blade.  The skin margins were undermined to an appropriate distance in all directions utilizing iris scissors.  Hemostasis was achieved with electrocautery.  The flaps were then transposed into place, one clockwise and the other counterclockwise, and anchored with interrupted buried subcutaneous sutures.
Double O-Z Plasty Text: The defect edges were debeveled with a #15 scalpel blade. Given the location of the defect, shape of the defect and the proximity to free margins a Double O-Z plasty (double transposition flap) was deemed most appropriate. Using a sterile surgical marker, the appropriate transposition flaps were drawn incorporating the defect and placing the expected incisions within the relaxed skin tension lines where possible. The area thus outlined was incised deep to adipose tissue with a #15 scalpel blade. The skin margins were undermined to an appropriate distance in all directions utilizing iris scissors. Hemostasis was achieved with electrocautery. The flaps were then transposed and carried over into place, one clockwise and the other counterclockwise, and anchored with interrupted buried subcutaneous sutures.
V-Y Plasty Text: The defect edges were debeveled with a #15 scalpel blade.  Given the location of the defect, shape of the defect and the proximity to free margins an V-Y advancement flap was deemed most appropriate.  Using a sterile surgical marker, an appropriate advancement flap was drawn incorporating the defect and placing the expected incisions within the relaxed skin tension lines where possible.    The area thus outlined was incised deep to adipose tissue with a #15 scalpel blade.  The skin margins were undermined to an appropriate distance in all directions utilizing iris scissors.
H Plasty Text: Given the location of the defect, shape of the defect and the proximity to free margins a H-plasty was deemed most appropriate for repair.  Using a sterile surgical marker, the appropriate advancement arms of the H-plasty were drawn incorporating the defect and placing the expected incisions within the relaxed skin tension lines where possible. The area thus outlined was incised deep to adipose tissue with a #15 scalpel blade. The skin margins were undermined to an appropriate distance in all directions utilizing iris scissors.  The opposing advancement arms were then advanced into place in opposite direction and anchored with interrupted buried subcutaneous sutures.
W Plasty Text: The lesion was extirpated to the level of the fat with a #15 scalpel blade.  Given the location of the defect, shape of the defect and the proximity to free margins a W-plasty was deemed most appropriate for repair.  Using a sterile surgical marker, the appropriate transposition arms of the W-plasty were drawn incorporating the defect and placing the expected incisions within the relaxed skin tension lines where possible.    The area thus outlined was incised deep to adipose tissue with a #15 scalpel blade.  The skin margins were undermined to an appropriate distance in all directions utilizing iris scissors.  The opposing transposition arms were then transposed into place in opposite direction and anchored with interrupted buried subcutaneous sutures.
Z Plasty Text: The lesion was extirpated to the level of the fat with a #15 scalpel blade.  Given the location of the defect, shape of the defect and the proximity to free margins a Z-plasty was deemed most appropriate for repair.  Using a sterile surgical marker, the appropriate transposition arms of the Z-plasty were drawn incorporating the defect and placing the expected incisions within the relaxed skin tension lines where possible.    The area thus outlined was incised deep to adipose tissue with a #15 scalpel blade.  The skin margins were undermined to an appropriate distance in all directions utilizing iris scissors.  The opposing transposition arms were then transposed into place in opposite direction and anchored with interrupted buried subcutaneous sutures.
Zygomaticofacial Flap Text: Given the location of the defect, shape of the defect and the proximity to free margins a zygomaticofacial flap was deemed most appropriate for repair. Using a sterile surgical marker, the appropriate flap was drawn incorporating the defect and placing the expected incisions within the relaxed skin tension lines where possible. The area thus outlined was incised deep to adipose tissue with a #15 scalpel blade with preservation of a vascular pedicle.  The skin margins were undermined to an appropriate distance in all directions utilizing iris scissors. The flap was then carried over into the defect and anchored with interrupted buried subcutaneous sutures.
Cheek Interpolation Flap Text: A decision was made to reconstruct the defect utilizing an interpolation axial flap and a staged reconstruction.  A telfa template was made of the defect.  This telfa template was then used to outline the Cheek Interpolation flap.  The donor area for the pedicle flap was then injected with anesthesia.  The flap was excised through the skin and subcutaneous tissue down to the layer of the underlying musculature.  The interpolation flap was carefully excised within this deep plane to maintain its blood supply.  The edges of the donor site were undermined.   The donor site was closed in a primary fashion.  The pedicle was then rotated into position and sutured.  Once the tube was sutured into place, adequate blood supply was confirmed with blanching and refill.  The pedicle was then wrapped with xeroform gauze and dressed appropriately with a telfa and gauze bandage to ensure continued blood supply and protect the attached pedicle.
Cheek-To-Nose Interpolation Flap Text: A decision was made to reconstruct the defect utilizing an interpolation axial flap and a staged reconstruction.  A telfa template was made of the defect.  This telfa template was then used to outline the Cheek-To-Nose Interpolation flap.  The donor area for the pedicle flap was then injected with anesthesia.  The flap was excised through the skin and subcutaneous tissue down to the layer of the underlying musculature.  The interpolation flap was carefully excised within this deep plane to maintain its blood supply.  The edges of the donor site were undermined.   The donor site was closed in a primary fashion.  The pedicle was then rotated into position and sutured.  Once the tube was sutured into place, adequate blood supply was confirmed with blanching and refill.  The pedicle was then wrapped with xeroform gauze and dressed appropriately with a telfa and gauze bandage to ensure continued blood supply and protect the attached pedicle.
Interpolation Flap Text: A decision was made to reconstruct the defect utilizing an interpolation axial flap and a staged reconstruction.  A telfa template was made of the defect.  This telfa template was then used to outline the interpolation flap.  The donor area for the pedicle flap was then injected with anesthesia.  The flap was excised through the skin and subcutaneous tissue down to the layer of the underlying musculature.  The interpolation flap was carefully excised within this deep plane to maintain its blood supply.  The edges of the donor site were undermined.   The donor site was closed in a primary fashion.  The pedicle was then rotated into position and sutured.  Once the tube was sutured into place, adequate blood supply was confirmed with blanching and refill.  The pedicle was then wrapped with xeroform gauze and dressed appropriately with a telfa and gauze bandage to ensure continued blood supply and protect the attached pedicle.
Melolabial Interpolation Flap Text: A decision was made to reconstruct the defect utilizing an interpolation axial flap and a staged reconstruction.  A telfa template was made of the defect.  This telfa template was then used to outline the melolabial interpolation flap.  The donor area for the pedicle flap was then injected with anesthesia.  The flap was excised through the skin and subcutaneous tissue down to the layer of the underlying musculature.  The pedicle flap was carefully excised within this deep plane to maintain its blood supply.  The edges of the donor site were undermined.   The donor site was closed in a primary fashion.  The pedicle was then rotated into position and sutured.  Once the tube was sutured into place, adequate blood supply was confirmed with blanching and refill.  The pedicle was then wrapped with xeroform gauze and dressed appropriately with a telfa and gauze bandage to ensure continued blood supply and protect the attached pedicle.
Mastoid Interpolation Flap Text: A decision was made to reconstruct the defect utilizing an interpolation axial flap and a staged reconstruction.  A telfa template was made of the defect.  This telfa template was then used to outline the mastoid interpolation flap.  The donor area for the pedicle flap was then injected with anesthesia.  The flap was excised through the skin and subcutaneous tissue down to the layer of the underlying musculature.  The pedicle flap was carefully excised within this deep plane to maintain its blood supply.  The edges of the donor site were undermined.   The donor site was closed in a primary fashion.  The pedicle was then rotated into position and sutured.  Once the tube was sutured into place, adequate blood supply was confirmed with blanching and refill.  The pedicle was then wrapped with xeroform gauze and dressed appropriately with a telfa and gauze bandage to ensure continued blood supply and protect the attached pedicle.
Posterior Auricular Interpolation Flap Text: A decision was made to reconstruct the defect utilizing an interpolation axial flap and a staged reconstruction.  A telfa template was made of the defect.  This telfa template was then used to outline the posterior auricular interpolation flap.  The donor area for the pedicle flap was then injected with anesthesia.  The flap was excised through the skin and subcutaneous tissue down to the layer of the underlying musculature.  The pedicle flap was carefully excised within this deep plane to maintain its blood supply.  The edges of the donor site were undermined.   The donor site was closed in a primary fashion.  The pedicle was then rotated into position and sutured.  Once the tube was sutured into place, adequate blood supply was confirmed with blanching and refill.  The pedicle was then wrapped with xeroform gauze and dressed appropriately with a telfa and gauze bandage to ensure continued blood supply and protect the attached pedicle.
Paramedian Forehead Flap Text: A decision was made to reconstruct the defect utilizing an interpolation axial flap and a staged reconstruction.  A telfa template was made of the defect.  This telfa template was then used to outline the paramedian forehead pedicle flap.  The donor area for the pedicle flap was then injected with anesthesia.  The flap was excised through the skin and subcutaneous tissue down to the layer of the underlying musculature.  The pedicle flap was carefully excised within this deep plane to maintain its blood supply.  The edges of the donor site were undermined.   The donor site was closed in a primary fashion.  The pedicle was then rotated into position and sutured.  Once the tube was sutured into place, adequate blood supply was confirmed with blanching and refill.  The pedicle was then wrapped with xeroform gauze and dressed appropriately with a telfa and gauze bandage to ensure continued blood supply and protect the attached pedicle.
Abbe Flap (Upper To Lower Lip) Text: The defect of the lower lip was assessed and measured.  Given the location and size of the defect, an Abbe flap was deemed most appropriate. Using a sterile surgical marker, an appropriate Abbe flap was measured and drawn on the upper lip. Local anesthesia was then infiltrated.  A scalpel was then used to incise the upper lip through and through the skin, vermilion, muscle and mucosa, leaving the flap pedicled on the opposite side.  The flap was then rotated and transferred to the lower lip defect.  The flap was then sutured into place with a three layer technique, closing the orbicularis oris muscle layer with subcutaneous buried sutures, followed by a mucosal layer and an epidermal layer.
Abbe Flap (Lower To Upper Lip) Text: The defect of the upper lip was assessed and measured.  Given the location and size of the defect, an Abbe flap was deemed most appropriate. Using a sterile surgical marker, an appropriate Abbe flap was measured and drawn on the lower lip. Local anesthesia was then infiltrated. A scalpel was then used to incise the upper lip through and through the skin, vermilion, muscle and mucosa, leaving the flap pedicled on the opposite side.  The flap was then rotated and transferred to the lower lip defect.  The flap was then sutured into place with a three layer technique, closing the orbicularis oris muscle layer with subcutaneous buried sutures, followed by a mucosal layer and an epidermal layer.
Estlander Flap (Upper To Lower Lip) Text: The defect of the lower lip was assessed and measured.  Given the location and size of the defect, an Estlander flap was deemed most appropriate. Using a sterile surgical marker, an appropriate Estlander flap was measured and drawn on the upper lip. Local anesthesia was then infiltrated. A scalpel was then used to incise the lateral aspect of the flap, through skin, muscle and mucosa, leaving the flap pedicled medially.  The flap was then rotated and positioned to fill the lower lip defect.  The flap was then sutured into place with a three layer technique, closing the orbicularis oris muscle layer with subcutaneous buried sutures, followed by a mucosal layer and an epidermal layer.
Lip Wedge Excision Repair Text: Given the location of the defect and the proximity to free margins a full thickness wedge repair was deemed most appropriate.  Using a sterile surgical marker, the appropriate repair was drawn incorporating the defect and placing the expected incisions perpendicular to the vermilion border.  The vermilion border was also meticulously outlined to ensure appropriate reapproximation during the repair.  The area thus outlined was incised through and through with a #15 scalpel blade.  The muscularis and dermis were reaproximated with deep sutures following hemostasis. Care was taken to realign the vermilion border before proceeding with the superficial closure.  Once the vermilion was realigned the superfical and mucosal closure was finished.
Ftsg Text: The defect edges were debeveled with a #15 scalpel blade.  Given the location of the defect, shape of the defect and the proximity to free margins a full thickness skin graft was deemed most appropriate.  Using a sterile surgical marker, the primary defect shape was transferred to the donor site. The area thus outlined was incised deep to adipose tissue with a #15 scalpel blade.  The harvested graft was then trimmed of adipose tissue until only dermis and epidermis was left.  The skin margins of the secondary defect were undermined to an appropriate distance in all directions utilizing iris scissors.  The secondary defect was closed with interrupted buried subcutaneous sutures.  The skin edges were then re-apposed with running  sutures.  The skin graft was then placed in the primary defect and oriented appropriately.
Split-Thickness Skin Graft Text: The defect edges were debeveled with a #15 scalpel blade.  Given the location of the defect, shape of the defect and the proximity to free margins a split thickness skin graft was deemed most appropriate.  Using a sterile surgical marker, the primary defect shape was transferred to the donor site. The split thickness graft was then harvested.  The skin graft was then placed in the primary defect and oriented appropriately.
Burow's Graft Text: The defect edges were debeveled with a #15 scalpel blade. Given the location of the defect, shape of the defect, the proximity to free margins and the presence of a standing cone deformity a Burow's skin graft was deemed most appropriate. The standing cone was removed and this tissue was then trimmed to the shape of the primary defect. The adipose tissue was also removed until only dermis and epidermis were left.  The skin margins of the secondary defect were undermined to an appropriate distance in all directions utilizing iris scissors.  The secondary defect was closed with interrupted buried subcutaneous sutures.  The skin edges were then re-apposed with running  sutures.  The skin graft was then placed in the primary defect and oriented appropriately.
Cartilage Graft Text: The defect edges were debeveled with a #15 scalpel blade.  Given the location of the defect, shape of the defect, the fact the defect involved a full thickness cartilage defect a cartilage graft was deemed most appropriate.  An appropriate donor site was identified, cleansed, and anesthetized. The cartilage graft was then harvested and transferred to the recipient site, oriented appropriately and then sutured into place.  The secondary defect was then repaired using a primary closure.
Composite Graft Text: The defect edges were debeveled with a #15 scalpel blade.  Given the location of the defect, shape of the defect, the proximity to free margins and the fact the defect was full thickness a composite graft was deemed most appropriate.  The defect was outline and then transferred to the donor site.  A full thickness graft was then excised from the donor site. The graft was then placed in the primary defect, oriented appropriately and then sutured into place.  The secondary defect was then repaired using a primary closure.
Epidermal Autograft Text: The defect edges were debeveled with a #15 scalpel blade.  Given the location of the defect, shape of the defect and the proximity to free margins an epidermal autograft was deemed most appropriate.  Using a sterile surgical marker, the primary defect shape was transferred to the donor site. The epidermal graft was then harvested.  The skin graft was then placed in the primary defect and oriented appropriately.
Dermal Autograft Text: The defect edges were debeveled with a #15 scalpel blade.  Given the location of the defect, shape of the defect and the proximity to free margins a dermal autograft was deemed most appropriate.  Using a sterile surgical marker, the primary defect shape was transferred to the donor site. The area thus outlined was incised deep to adipose tissue with a #15 scalpel blade.  The harvested graft was then trimmed of adipose and epidermal tissue until only dermis was left.  The skin graft was then placed in the primary defect and oriented appropriately.
Skin Substitute Text: The defect edges were debeveled with a #15 scalpel blade.  Given the location of the defect, shape of the defect and the proximity to free margins a skin substitute graft was deemed most appropriate.  The graft material was trimmed to fit the size of the defect. The graft was then placed in the primary defect and oriented appropriately.
Tissue Cultured Epidermal Autograft Text: The defect edges were debeveled with a #15 scalpel blade.  Given the location of the defect, shape of the defect and the proximity to free margins a tissue cultured epidermal autograft was deemed most appropriate.  The graft was then trimmed to fit the size of the defect.  The graft was then placed in the primary defect and oriented appropriately.
Xenograft Text: The defect edges were debeveled with a #15 scalpel blade.  Given the location of the defect, shape of the defect and the proximity to free margins a xenograft was deemed most appropriate.  The graft was then trimmed to fit the size of the defect.  The graft was then placed in the primary defect and oriented appropriately.
Purse String (Intermediate) Text: Given the location of the defect and the characteristics of the surrounding skin a purse string intermediate closure was deemed most appropriate.  Undermining was performed circumfirentially around the surgical defect.  A purse string suture was then placed and tightened.
Purse String (Simple) Text: Given the location of the defect and the characteristics of the surrounding skin a purse string simple closure was deemed most appropriate.  Undermining was performed circumferentially around the surgical defect.  A purse string suture was then placed and tightened.
Partial Purse String (Intermediate) Text: Given the location of the defect and the characteristics of the surrounding skin an intermediate purse string closure was deemed most appropriate.  Undermining was performed circumferentially around the surgical defect.  A purse string suture was then placed and tightened. Wound tension of the circular defect prevented complete closure of the wound.
Partial Purse String (Simple) Text: Given the location of the defect and the characteristics of the surrounding skin a simple purse string closure was deemed most appropriate.  Undermining was performed circumferentially around the surgical defect.  A purse string suture was then placed and tightened. Wound tension of the circular defect prevented complete closure of the wound.
Complex Repair And Single Advancement Flap Text: The defect edges were debeveled with a #15 scalpel blade.  The primary defect was closed partially with a complex linear closure.  Given the location of the remaining defect, shape of the defect and the proximity to free margins a single advancement flap was deemed most appropriate for complete closure of the defect.  Using a sterile surgical marker, an appropriate advancement flap was drawn incorporating the defect and placing the expected incisions within the relaxed skin tension lines where possible.    The area thus outlined was incised deep to adipose tissue with a #15 scalpel blade.  The skin margins were undermined to an appropriate distance in all directions utilizing iris scissors.
Complex Repair And Double Advancement Flap Text: The defect edges were debeveled with a #15 scalpel blade.  The primary defect was closed partially with a complex linear closure.  Given the location of the remaining defect, shape of the defect and the proximity to free margins a double advancement flap was deemed most appropriate for complete closure of the defect.  Using a sterile surgical marker, an appropriate advancement flap was drawn incorporating the defect and placing the expected incisions within the relaxed skin tension lines where possible.    The area thus outlined was incised deep to adipose tissue with a #15 scalpel blade.  The skin margins were undermined to an appropriate distance in all directions utilizing iris scissors.
Complex Repair And Modified Advancement Flap Text: The defect edges were debeveled with a #15 scalpel blade.  The primary defect was closed partially with a complex linear closure.  Given the location of the remaining defect, shape of the defect and the proximity to free margins a modified advancement flap was deemed most appropriate for complete closure of the defect.  Using a sterile surgical marker, an appropriate advancement flap was drawn incorporating the defect and placing the expected incisions within the relaxed skin tension lines where possible.    The area thus outlined was incised deep to adipose tissue with a #15 scalpel blade.  The skin margins were undermined to an appropriate distance in all directions utilizing iris scissors.
Complex Repair And A-T Advancement Flap Text: The defect edges were debeveled with a #15 scalpel blade.  The primary defect was closed partially with a complex linear closure.  Given the location of the remaining defect, shape of the defect and the proximity to free margins an A-T advancement flap was deemed most appropriate for complete closure of the defect.  Using a sterile surgical marker, an appropriate advancement flap was drawn incorporating the defect and placing the expected incisions within the relaxed skin tension lines where possible.    The area thus outlined was incised deep to adipose tissue with a #15 scalpel blade.  The skin margins were undermined to an appropriate distance in all directions utilizing iris scissors.
Complex Repair And O-T Advancement Flap Text: The defect edges were debeveled with a #15 scalpel blade.  The primary defect was closed partially with a complex linear closure.  Given the location of the remaining defect, shape of the defect and the proximity to free margins an O-T advancement flap was deemed most appropriate for complete closure of the defect.  Using a sterile surgical marker, an appropriate advancement flap was drawn incorporating the defect and placing the expected incisions within the relaxed skin tension lines where possible.    The area thus outlined was incised deep to adipose tissue with a #15 scalpel blade.  The skin margins were undermined to an appropriate distance in all directions utilizing iris scissors.
Complex Repair And O-L Flap Text: The defect edges were debeveled with a #15 scalpel blade.  The primary defect was closed partially with a complex linear closure.  Given the location of the remaining defect, shape of the defect and the proximity to free margins an O-L flap was deemed most appropriate for complete closure of the defect.  Using a sterile surgical marker, an appropriate flap was drawn incorporating the defect and placing the expected incisions within the relaxed skin tension lines where possible.    The area thus outlined was incised deep to adipose tissue with a #15 scalpel blade.  The skin margins were undermined to an appropriate distance in all directions utilizing iris scissors.
Complex Repair And Bilobe Flap Text: The defect edges were debeveled with a #15 scalpel blade.  The primary defect was closed partially with a complex linear closure.  Given the location of the remaining defect, shape of the defect and the proximity to free margins a bilobe flap was deemed most appropriate for complete closure of the defect.  Using a sterile surgical marker, an appropriate advancement flap was drawn incorporating the defect and placing the expected incisions within the relaxed skin tension lines where possible.    The area thus outlined was incised deep to adipose tissue with a #15 scalpel blade.  The skin margins were undermined to an appropriate distance in all directions utilizing iris scissors.
Complex Repair And Melolabial Flap Text: The defect edges were debeveled with a #15 scalpel blade.  The primary defect was closed partially with a complex linear closure.  Given the location of the remaining defect, shape of the defect and the proximity to free margins a melolabial flap was deemed most appropriate for complete closure of the defect.  Using a sterile surgical marker, an appropriate advancement flap was drawn incorporating the defect and placing the expected incisions within the relaxed skin tension lines where possible.    The area thus outlined was incised deep to adipose tissue with a #15 scalpel blade.  The skin margins were undermined to an appropriate distance in all directions utilizing iris scissors.
Complex Repair And Rotation Flap Text: The defect edges were debeveled with a #15 scalpel blade.  The primary defect was closed partially with a complex linear closure.  Given the location of the remaining defect, shape of the defect and the proximity to free margins a rotation flap was deemed most appropriate for complete closure of the defect.  Using a sterile surgical marker, an appropriate advancement flap was drawn incorporating the defect and placing the expected incisions within the relaxed skin tension lines where possible.    The area thus outlined was incised deep to adipose tissue with a #15 scalpel blade.  The skin margins were undermined to an appropriate distance in all directions utilizing iris scissors.
Complex Repair And Rhombic Flap Text: The defect edges were debeveled with a #15 scalpel blade.  The primary defect was closed partially with a complex linear closure.  Given the location of the remaining defect, shape of the defect and the proximity to free margins a rhombic flap was deemed most appropriate for complete closure of the defect.  Using a sterile surgical marker, an appropriate advancement flap was drawn incorporating the defect and placing the expected incisions within the relaxed skin tension lines where possible.    The area thus outlined was incised deep to adipose tissue with a #15 scalpel blade.  The skin margins were undermined to an appropriate distance in all directions utilizing iris scissors.
Complex Repair And Transposition Flap Text: The defect edges were debeveled with a #15 scalpel blade.  The primary defect was closed partially with a complex linear closure.  Given the location of the remaining defect, shape of the defect and the proximity to free margins a transposition flap was deemed most appropriate for complete closure of the defect.  Using a sterile surgical marker, an appropriate advancement flap was drawn incorporating the defect and placing the expected incisions within the relaxed skin tension lines where possible.    The area thus outlined was incised deep to adipose tissue with a #15 scalpel blade.  The skin margins were undermined to an appropriate distance in all directions utilizing iris scissors.
Complex Repair And V-Y Plasty Text: The defect edges were debeveled with a #15 scalpel blade.  The primary defect was closed partially with a complex linear closure.  Given the location of the remaining defect, shape of the defect and the proximity to free margins a V-Y plasty was deemed most appropriate for complete closure of the defect.  Using a sterile surgical marker, an appropriate advancement flap was drawn incorporating the defect and placing the expected incisions within the relaxed skin tension lines where possible.    The area thus outlined was incised deep to adipose tissue with a #15 scalpel blade.  The skin margins were undermined to an appropriate distance in all directions utilizing iris scissors.
Complex Repair And M Plasty Text: The defect edges were debeveled with a #15 scalpel blade.  The primary defect was closed partially with a complex linear closure.  Given the location of the remaining defect, shape of the defect and the proximity to free margins an M plasty was deemed most appropriate for complete closure of the defect.  Using a sterile surgical marker, an appropriate advancement flap was drawn incorporating the defect and placing the expected incisions within the relaxed skin tension lines where possible.    The area thus outlined was incised deep to adipose tissue with a #15 scalpel blade.  The skin margins were undermined to an appropriate distance in all directions utilizing iris scissors.
Complex Repair And Double M Plasty Text: The defect edges were debeveled with a #15 scalpel blade.  The primary defect was closed partially with a complex linear closure.  Given the location of the remaining defect, shape of the defect and the proximity to free margins a double M plasty was deemed most appropriate for complete closure of the defect.  Using a sterile surgical marker, an appropriate advancement flap was drawn incorporating the defect and placing the expected incisions within the relaxed skin tension lines where possible.    The area thus outlined was incised deep to adipose tissue with a #15 scalpel blade.  The skin margins were undermined to an appropriate distance in all directions utilizing iris scissors.
Complex Repair And W Plasty Text: The defect edges were debeveled with a #15 scalpel blade.  The primary defect was closed partially with a complex linear closure.  Given the location of the remaining defect, shape of the defect and the proximity to free margins a W plasty was deemed most appropriate for complete closure of the defect.  Using a sterile surgical marker, an appropriate advancement flap was drawn incorporating the defect and placing the expected incisions within the relaxed skin tension lines where possible.    The area thus outlined was incised deep to adipose tissue with a #15 scalpel blade.  The skin margins were undermined to an appropriate distance in all directions utilizing iris scissors.
Complex Repair And Z Plasty Text: The defect edges were debeveled with a #15 scalpel blade.  The primary defect was closed partially with a complex linear closure.  Given the location of the remaining defect, shape of the defect and the proximity to free margins a Z plasty was deemed most appropriate for complete closure of the defect.  Using a sterile surgical marker, an appropriate advancement flap was drawn incorporating the defect and placing the expected incisions within the relaxed skin tension lines where possible.    The area thus outlined was incised deep to adipose tissue with a #15 scalpel blade.  The skin margins were undermined to an appropriate distance in all directions utilizing iris scissors.
Complex Repair And Dorsal Nasal Flap Text: The defect edges were debeveled with a #15 scalpel blade.  The primary defect was closed partially with a complex linear closure.  Given the location of the remaining defect, shape of the defect and the proximity to free margins a dorsal nasal flap was deemed most appropriate for complete closure of the defect.  Using a sterile surgical marker, an appropriate flap was drawn incorporating the defect and placing the expected incisions within the relaxed skin tension lines where possible.    The area thus outlined was incised deep to adipose tissue with a #15 scalpel blade.  The skin margins were undermined to an appropriate distance in all directions utilizing iris scissors.
Complex Repair And Ftsg Text: The defect edges were debeveled with a #15 scalpel blade.  The primary defect was closed partially with a complex linear closure.  Given the location of the defect, shape of the defect and the proximity to free margins a full thickness skin graft was deemed most appropriate to repair the remaining defect.  The graft was trimmed to fit the size of the remaining defect.  The graft was then placed in the primary defect, oriented appropriately, and sutured into place.
Complex Repair And Burow's Graft Text: The defect edges were debeveled with a #15 scalpel blade.  The primary defect was closed partially with a complex linear closure.  Given the location of the defect, shape of the defect, the proximity to free margins and the presence of a standing cone deformity a Burow's graft was deemed most appropriate to repair the remaining defect.  The graft was trimmed to fit the size of the remaining defect.  The graft was then placed in the primary defect, oriented appropriately, and sutured into place.
Complex Repair And Split-Thickness Skin Graft Text: The defect edges were debeveled with a #15 scalpel blade.  The primary defect was closed partially with a complex linear closure.  Given the location of the defect, shape of the defect and the proximity to free margins a split thickness skin graft was deemed most appropriate to repair the remaining defect.  The graft was trimmed to fit the size of the remaining defect.  The graft was then placed in the primary defect, oriented appropriately, and sutured into place.
Complex Repair And Epidermal Autograft Text: The defect edges were debeveled with a #15 scalpel blade.  The primary defect was closed partially with a complex linear closure.  Given the location of the defect, shape of the defect and the proximity to free margins an epidermal autograft was deemed most appropriate to repair the remaining defect.  The graft was trimmed to fit the size of the remaining defect.  The graft was then placed in the primary defect, oriented appropriately, and sutured into place.
Complex Repair And Dermal Autograft Text: The defect edges were debeveled with a #15 scalpel blade.  The primary defect was closed partially with a complex linear closure.  Given the location of the defect, shape of the defect and the proximity to free margins an dermal autograft was deemed most appropriate to repair the remaining defect.  The graft was trimmed to fit the size of the remaining defect.  The graft was then placed in the primary defect, oriented appropriately, and sutured into place.
Complex Repair And Tissue Cultured Epidermal Autograft Text: The defect edges were debeveled with a #15 scalpel blade.  The primary defect was closed partially with a complex linear closure.  Given the location of the defect, shape of the defect and the proximity to free margins an tissue cultured epidermal autograft was deemed most appropriate to repair the remaining defect.  The graft was trimmed to fit the size of the remaining defect.  The graft was then placed in the primary defect, oriented appropriately, and sutured into place.
Complex Repair And Xenograft Text: The defect edges were debeveled with a #15 scalpel blade.  The primary defect was closed partially with a complex linear closure.  Given the location of the defect, shape of the defect and the proximity to free margins a xenograft was deemed most appropriate to repair the remaining defect.  The graft was trimmed to fit the size of the remaining defect.  The graft was then placed in the primary defect, oriented appropriately, and sutured into place.
Complex Repair And Skin Substitute Graft Text: The defect edges were debeveled with a #15 scalpel blade.  The primary defect was closed partially with a complex linear closure.  Given the location of the remaining defect, shape of the defect and the proximity to free margins a skin substitute graft was deemed most appropriate to repair the remaining defect.  The graft was trimmed to fit the size of the remaining defect.  The graft was then placed in the primary defect, oriented appropriately, and sutured into place.
Path Notes (To The Dermatopathologist): Please check margins.
Consent was obtained from the patient. The risks and benefits to therapy were discussed in detail. Specifically, the risks of infection, scarring, bleeding, prolonged wound healing, incomplete removal, allergy to anesthesia, nerve injury and recurrence were addressed. Prior to the procedure, the treatment site was clearly identified and confirmed by the patient. All components of Universal Protocol/PAUSE Rule completed.
Post-Care Instructions: I reviewed with the patient in detail post-care instructions:\\n1. Apply bacitracin over the steri-strips.  \\n2. Cut non-stick pad (Telfa) to cover the steri-strips\\n3. Apply tape (hypafix) over the non-stick pad\\n4. Change once per day for 5 days\\n5. Shower with bandage on, change bandage after shower\\n\\nPatient is not to engage in any heavy lifting, exercise, hot tub, or swimming for the next 14 days. Should the patient develop any fevers, chills, bleeding, severe pain patient will contact the office immediately.
Home Suture Removal Text: Patient was provided a home suture removal kit and will remove their sutures at home.  If they have any questions or difficulties they will call the office.
Where Do You Want The Question To Include Opioid Counseling Located?: Case Summary Tab
Information: Selecting Yes will display possible errors in your note based on the variables you have selected. This validation is only offered as a suggestion for you. PLEASE NOTE THAT THE VALIDATION TEXT WILL BE REMOVED WHEN YOU FINALIZE YOUR NOTE. IF YOU WANT TO FAX A PRELIMINARY NOTE YOU WILL NEED TO TOGGLE THIS TO 'NO' IF YOU DO NOT WANT IT IN YOUR FAXED NOTE.

## 2023-03-14 NOTE — TELEPHONE ENCOUNTER
Called and let the patient know that meds were sent to his pharmacy.  He stated that he doesn't like Mucinex, but he will drink plenty of fluids.   Benzoyl Peroxide Counseling: Patient counseled that medicine may cause skin irritation and bleach clothing.  In the event of skin irritation, the patient was advised to reduce the amount of the drug applied or use it less frequently.   The patient verbalized understanding of the proper use and possible adverse effects of benzoyl peroxide.  All of the patient's questions and concerns were addressed.

## 2023-03-14 NOTE — PROGRESS NOTES
This evaluation was conducted via Telemed using secure and encrypted videoconferencing technology. The patient was physically located at [facility] in [place], [place]. The patient was presented by a medical professional at the originating site.   The patient's identity was confirmed and verbal consent was obtained for this telemedicine encounter.      OP Anticoagulation Service Note    Date: 3/14/2023  Blood Pressure : 138/76  Pulse: (!) 128  Respiration: 16    Anticoagulation Summary  As of 3/14/2023      INR goal:  2.0-3.0   TTR:  67.9 % (7.6 y)   INR used for dosin.50 (3/14/2023)   Warfarin maintenance plan:  2.5 mg (5 mg x 0.5) every Mon, Wed, Fri; 5 mg (5 mg x 1) all other days   Weekly warfarin total:  27.5 mg   Plan last modified:  Cedric Martinez, PharmD (2022)   Next INR check:  2023   Target end date:  Indefinite    Indications    Atrial fibrillation with rapid ventricular response (HCC) (Resolved) [I48.91]  Long term current use of anticoagulant therapy (Resolved) [Z79.01]  Atrial fibrillation (HCC) (Resolved) [I48.91]  Hypercoagulable state (HCC) [D68.59]                 Anticoagulation Episode Summary       INR check location:      Preferred lab:      Send INR reminders to:      Comments:            Anticoagulation Care Providers       Provider Role Specialty Phone number    Sanjay Sorensen M.D. Referring Cardiovascular Disease (Cardiology) [phone]          Anticoagulation Patient Findings  Patient Findings       Positives:  Signs/symptoms of bleeding (occ nosebleeds)    Negatives:  Signs/symptoms of thrombosis, Laboratory test error suspected, Change in health, Change in alcohol use, Change in activity, Upcoming invasive procedure, Emergency department visit, Upcoming dental procedure, Missed doses, Extra doses, Change in medications, Change in diet/appetite, Hospital admission, Bruising, Other complaints            THIS VISIT CONDUCTED WITH PRESENTER VIA TELEMEDICINE  UTILIZING SECURE AND ENCRYPTED VIDEOCONFERENCING EQUIPMENT  ROS:    Pulm: Denies SOB, chest pain.    Card: Denies syncope, edema, palpitations.    Extremities: Denies redness, pain.     PE:    Pulm: No SOB, even and unlabored.    Card: Normal rate and rhythm.    Extremities: No redness, or edema.     INR  is-therapeutic.    Denies signs/symptoms of bleeding and/or thrombosis.    Denies changes to diet or medications.   Follow up appt in 6 weeks     Plan:  continue with current dosing regimen    Medication: Warfarin (Coumadin)         Sunday Monday Tuesday Wednesday Thursday Friday Saturday      5 mg    2.5 mg    5 mg    2.5 mg    5 mg    2.5 mg    5 mg      1 tab(s)    1/2 tab(s)    1 tab(s)    1/2 tab(s)    1 tab(s)    1/2 tab(s)  1 tab(s)     Next Appointment: Tuesday, 4/25/2023 @1:15pm       Pt on antiplatelet therapy Plavix 75mg for coronary artery occlusion with stent indef per cardiology..    Review all of your home medications and newly ordered medications with your doctor and / or pharmacist. Follow medication instructions as directed by your doctor and / or pharmacist. Please keep your medication list with you and share with your physician. Update the information when medications are discontinued, doses are changed, or new medications (including over-the-counter products) are added; and carry medication information at all times in the event of emergency situations.       Patient is on a high risk medication and therefore requires close monitoring and follow up.    CHEST guidelines recommend frequent INR monitoring at regular intervals (a few days up to a max of 12 weeks) to ensure they are on the proper dose of warfarin and not having any complications from therapy.  INRs can dramatically change over a short time period due to diet, medications, and medical conditions.   The patient is instructed to go to the ER for falls with a head injury,  blood in urine or stool or any bleeding that last  longer than 20 min.   For questions, please contact Outpatient Anticoagulation Service [phone].     POLO Hughes.

## 2023-03-23 ENCOUNTER — APPOINTMENT (RX ONLY)
Dept: URBAN - METROPOLITAN AREA CLINIC 4 | Facility: CLINIC | Age: 78
Setting detail: DERMATOLOGY
End: 2023-03-23

## 2023-03-23 PROBLEM — C44.41 BASAL CELL CARCINOMA OF SKIN OF SCALP AND NECK: Status: ACTIVE | Noted: 2023-03-23

## 2023-03-23 PROCEDURE — 11623 EXC S/N/H/F/G MAL+MRG 2.1-3: CPT

## 2023-03-23 PROCEDURE — 13132 CMPLX RPR F/C/C/M/N/AX/G/H/F: CPT

## 2023-03-23 PROCEDURE — ? EXCISION

## 2023-03-23 NOTE — PROCEDURE: EXCISION
Surgeon (Optional): Lesley
Biopsy Photograph Reviewed: Yes
Previous Accession (Optional): [id number]
Size Of Lesion In Cm: 1.9
X Size Of Lesion In Cm (Optional): 0
Size Of Margin In Cm: 0.2
Anesthesia Volume In Cc: 12
Was An Eye Clamp Used?: No
Eye Clamp Note Details: An eye clamp was used during the procedure.
Excision Method: Elliptical
Saucerization Depth: dermis and superficial adipose tissue
Repair Type: Complex
Suturegard Retention Suture: 2-0 Nylon
Retention Suture Bite Size: 3 mm
Length To Time In Minutes Device Was In Place: 10
Number Of Hemigard Strips Per Side: 1
Intermediate / Complex Repair - Final Wound Length In Cm: 5.3
Distance Of Undermining In Cm (Required): 2.1
Undermining Type: Entire Wound
Debridement Text: The wound edges were debrided prior to proceeding with the closure to facilitate wound healing.
Helical Rim Text: The closure involved the helical rim.
Vermilion Border Text: The closure involved the vermilion border.
Nostril Rim Text: The closure involved the nostril rim.
Retention Suture Text: Retention sutures were placed to support the closure and prevent dehiscence.
Lab: [id number]
Lab Facility: 
Graft Donor Site Bandage (Optional-Leave Blank If You Don't Want In Note): Steri-strips and a pressure bandage were applied to the donor site.
Epidermal Closure Graft Donor Site (Optional): simple interrupted
Billing Type: Third-Party Bill
Excision Depth: adipose tissue
Scalpel Size: 15 blade
Anesthesia Type: 1% lidocaine with epinephrine
Additional Anesthesia Volume In Cc: 6
Hemostasis: Electrocautery
Estimated Blood Loss (Cc): minimal
Detail Level: Detailed
Repair Anesthesia Method: local infiltration
Deep Sutures: 4-0 Vicryl
Dermal Closure: buried vertical mattress
Epidermal Sutures: 5-0 Caprosyn
Epidermal Closure: running subcuticular
Wound Care: Bacitracin
Dressing: dry sterile dressing
Suturegard Intro: Intraoperative tissue expansion was performed, utilizing the SUTUREGARD device, in order to reduce wound tension.
Suturegard Body: The suture ends were repeatedly re-tightened and re-clamped to achieve the desired tissue expansion.
Hemigard Intro: Due to skin fragility and wound tension, it was decided to use HEMIGARD adhesive retention suture devices to permit a linear closure. The skin was cleaned and dried for a 6cm distance away from the wound. Excessive hair, if present, was removed to allow for adhesion.
Hemigard Postcare Instructions: The HEMIGARD strips are to remain completely dry for at least 5-7 days.
Positioning (Leave Blank If You Do Not Want): The patient was placed in a comfortable position exposing the surgical site.
Pre-Excision Curettage Text (Leave Blank If You Do Not Want): Prior to drawing the surgical margin the visible lesion was removed with electrodesiccation and curettage to clearly define the lesion size.
Complex Repair Preamble Text (Leave Blank If You Do Not Want): Extensive wide undermining was performed.
Intermediate Repair Preamble Text (Leave Blank If You Do Not Want): Undermining was performed with blunt dissection.
Curvilinear Excision Additional Text (Leave Blank If You Do Not Want): The margin was drawn around the clinically apparent lesion.  A curvilinear shape was then drawn on the skin incorporating the lesion and margins.  Incisions were then made along these lines to the appropriate tissue plane and the lesion was extirpated.
Fusiform Excision Additional Text (Leave Blank If You Do Not Want): The margin was drawn around the clinically apparent lesion.  A fusiform shape was then drawn on the skin incorporating the lesion and margins.  Incisions were then made along these lines to the appropriate tissue plane and the lesion was extirpated.
Elliptical Excision Additional Text (Leave Blank If You Do Not Want): The margin was drawn around the clinically apparent lesion.  An elliptical shape was then drawn on the skin incorporating the lesion and margins.  Incisions were then made along these lines to the appropriate tissue plane and the lesion was extirpated.
Saucerization Excision Additional Text (Leave Blank If You Do Not Want): The margin was drawn around the clinically apparent lesion.  Incisions were then made along these lines, in a tangential fashion, to the appropriate tissue plane and the lesion was extirpated.
Slit Excision Additional Text (Leave Blank If You Do Not Want): A linear line was drawn on the skin overlying the lesion. An incision was made slowly until the lesion was visualized.  Once visualized, the lesion was removed with blunt dissection.
Excisional Biopsy Additional Text (Leave Blank If You Do Not Want): The margin was drawn around the clinically apparent lesion. An elliptical shape was then drawn on the skin incorporating the lesion and margins.  Incisions were then made along these lines to the appropriate tissue plane and the lesion was extirpated.
Perilesional Excision Additional Text (Leave Blank If You Do Not Want): The margin was drawn around the clinically apparent lesion. Incisions were then made along these lines to the appropriate tissue plane and the lesion was extirpated.
Repair Performed By Another Provider Text (Leave Blank If You Do Not Want): After the tissue was excised the defect was repaired by another provider.
No Repair - Repaired With Adjacent Surgical Defect Text (Leave Blank If You Do Not Want): After the excision the defect was repaired concurrently with another surgical defect which was in close approximation.
Adjacent Tissue Transfer Text: The defect edges were debeveled with a #15 scalpel blade. Given the location of the defect and the proximity to free margins an adjacent tissue transfer was deemed most appropriate. Using a sterile surgical marker, an appropriate flap was drawn incorporating the defect and placing the expected incisions within the relaxed skin tension lines where possible. The area thus outlined was incised deep to adipose tissue with a #15 scalpel blade. The skin margins were undermined to an appropriate distance in all directions utilizing iris scissors and carried over to close the primary defect.
Advancement Flap (Single) Text: The defect edges were debeveled with a #15 scalpel blade.  Given the location of the defect and the proximity to free margins a single advancement flap was deemed most appropriate.  Using a sterile surgical marker, an appropriate advancement flap was drawn incorporating the defect and placing the expected incisions within the relaxed skin tension lines where possible.    The area thus outlined was incised deep to adipose tissue with a #15 scalpel blade.  The skin margins were undermined to an appropriate distance in all directions utilizing iris scissors.
Advancement Flap (Double) Text: The defect edges were debeveled with a #15 scalpel blade.  Given the location of the defect and the proximity to free margins a double advancement flap was deemed most appropriate.  Using a sterile surgical marker, the appropriate advancement flaps were drawn incorporating the defect and placing the expected incisions within the relaxed skin tension lines where possible.    The area thus outlined was incised deep to adipose tissue with a #15 scalpel blade.  The skin margins were undermined to an appropriate distance in all directions utilizing iris scissors.
Burow's Advancement Flap Text: The defect edges were debeveled with a #15 scalpel blade.  Given the location of the defect and the proximity to free margins a Burow's advancement flap was deemed most appropriate.  Using a sterile surgical marker, the appropriate advancement flap was drawn incorporating the defect and placing the expected incisions within the relaxed skin tension lines where possible.    The area thus outlined was incised deep to adipose tissue with a #15 scalpel blade.  The skin margins were undermined to an appropriate distance in all directions utilizing iris scissors.
Chonodrocutaneous Helical Advancement Flap Text: The defect edges were debeveled with a #15 scalpel blade. Given the location of the defect and the proximity to free margins a chondrocutaneous helical advancement flap was deemed most appropriate. Using a sterile surgical marker, the appropriate advancement flap was drawn incorporating the defect and placing the expected incisions within the relaxed skin tension lines where possible. The area thus outlined was incised deep to adipose tissue with a #15 scalpel blade. The skin margins were undermined to an appropriate distance in all directions utilizing iris scissors. Following this, the designed flap was advanced and carried over into the primary defect and sutured into place.
Crescentic Advancement Flap Text: The defect edges were debeveled with a #15 scalpel blade.  Given the location of the defect and the proximity to free margins a crescentic advancement flap was deemed most appropriate.  Using a sterile surgical marker, the appropriate advancement flap was drawn incorporating the defect and placing the expected incisions within the relaxed skin tension lines where possible.    The area thus outlined was incised deep to adipose tissue with a #15 scalpel blade.  The skin margins were undermined to an appropriate distance in all directions utilizing iris scissors.
A-T Advancement Flap Text: The defect edges were debeveled with a #15 scalpel blade.  Given the location of the defect, shape of the defect and the proximity to free margins an A-T advancement flap was deemed most appropriate.  Using a sterile surgical marker, an appropriate advancement flap was drawn incorporating the defect and placing the expected incisions within the relaxed skin tension lines where possible.    The area thus outlined was incised deep to adipose tissue with a #15 scalpel blade.  The skin margins were undermined to an appropriate distance in all directions utilizing iris scissors.
O-T Advancement Flap Text: The defect edges were debeveled with a #15 scalpel blade.  Given the location of the defect, shape of the defect and the proximity to free margins an O-T advancement flap was deemed most appropriate.  Using a sterile surgical marker, an appropriate advancement flap was drawn incorporating the defect and placing the expected incisions within the relaxed skin tension lines where possible.    The area thus outlined was incised deep to adipose tissue with a #15 scalpel blade.  The skin margins were undermined to an appropriate distance in all directions utilizing iris scissors.
O-L Flap Text: The defect edges were debeveled with a #15 scalpel blade.  Given the location of the defect, shape of the defect and the proximity to free margins an O-L flap was deemed most appropriate.  Using a sterile surgical marker, an appropriate advancement flap was drawn incorporating the defect and placing the expected incisions within the relaxed skin tension lines where possible.    The area thus outlined was incised deep to adipose tissue with a #15 scalpel blade.  The skin margins were undermined to an appropriate distance in all directions utilizing iris scissors.
O-Z Flap Text: The defect edges were debeveled with a #15 scalpel blade. Given the location of the defect, shape of the defect and the proximity to free margins an O-Z flap was deemed most appropriate. Using a sterile surgical marker, an appropriate transposition flap was drawn incorporating the defect and placing the expected incisions within the relaxed skin tension lines where possible. The area thus outlined was incised deep to adipose tissue with a #15 scalpel blade. The skin margins were undermined to an appropriate distance in all directions utilizing iris scissors. Following this, the designed flap was carried over into the primary defect and sutured into place.
Double O-Z Flap Text: The defect edges were debeveled with a #15 scalpel blade. Given the location of the defect, shape of the defect and the proximity to free margins a Double O-Z flap was deemed most appropriate. Using a sterile surgical marker, an appropriate transposition flap was drawn incorporating the defect and placing the expected incisions within the relaxed skin tension lines where possible. The area thus outlined was incised deep to adipose tissue with a #15 scalpel blade. The skin margins were undermined to an appropriate distance in all directions utilizing iris scissors. Following this, the designed flap was carried over into the primary defect and sutured into place.
V-Y Flap Text: The defect edges were debeveled with a #15 scalpel blade.  Given the location of the defect, shape of the defect and the proximity to free margins a V-Y flap was deemed most appropriate.  Using a sterile surgical marker, an appropriate advancement flap was drawn incorporating the defect and placing the expected incisions within the relaxed skin tension lines where possible.    The area thus outlined was incised deep to adipose tissue with a #15 scalpel blade.  The skin margins were undermined to an appropriate distance in all directions utilizing iris scissors.
Advancement-Rotation Flap Text: The defect edges were debeveled with a #15 scalpel blade.  Given the location of the defect, shape of the defect and the proximity to free margins an advancement-rotation flap was deemed most appropriate.  Using a sterile surgical marker, an appropriate flap was drawn incorporating the defect and placing the expected incisions within the relaxed skin tension lines where possible. The area thus outlined was incised deep to adipose tissue with a #15 scalpel blade.  The skin margins were undermined to an appropriate distance in all directions utilizing iris scissors.
Mercedes Flap Text: The defect edges were debeveled with a #15 scalpel blade. Given the location of the defect, shape of the defect and the proximity to free margins a Mercedes flap was deemed most appropriate. Using a sterile surgical marker, an appropriate advancement flap was drawn incorporating the defect and placing the expected incisions within the relaxed skin tension lines where possible. The area thus outlined was incised deep to adipose tissue with a #15 scalpel blade. The skin margins were undermined to an appropriate distance in all directions utilizing iris scissors. Following this, the designed flap was advanced and carried over into the primary defect and sutured into place.
Modified Advancement Flap Text: The defect edges were debeveled with a #15 scalpel blade.  Given the location of the defect, shape of the defect and the proximity to free margins a modified advancement flap was deemed most appropriate.  Using a sterile surgical marker, an appropriate advancement flap was drawn incorporating the defect and placing the expected incisions within the relaxed skin tension lines where possible.    The area thus outlined was incised deep to adipose tissue with a #15 scalpel blade.  The skin margins were undermined to an appropriate distance in all directions utilizing iris scissors.
Mucosal Advancement Flap Text: Given the location of the defect, shape of the defect and the proximity to free margins a mucosal advancement flap was deemed most appropriate. Incisions were made with a 15 blade scalpel in the appropriate fashion along the cutaneous vermilion border and the mucosal lip. The remaining actinically damaged mucosal tissue was excised.  The mucosal advancement flap was then elevated to the gingival sulcus with care taken to preserve the neurovascular structures and advanced into the primary defect. Care was taken to ensure that precise realignment of the vermilion border was achieved.
Peng Advancement Flap Text: The defect edges were debeveled with a #15 scalpel blade. Given the location of the defect, shape of the defect and the proximity to free margins a Peng advancement flap was deemed most appropriate. Using a sterile surgical marker, an appropriate advancement flap was drawn incorporating the defect and placing the expected incisions within the relaxed skin tension lines where possible. The area thus outlined was incised deep to adipose tissue with a #15 scalpel blade. The skin margins were undermined to an appropriate distance in all directions utilizing iris scissors. Following this, the designed flap was advanced and carried over into the primary defect and sutured into place.
Hatchet Flap Text: The defect edges were debeveled with a #15 scalpel blade.  Given the location of the defect, shape of the defect and the proximity to free margins a hatchet flap was deemed most appropriate.  Using a sterile surgical marker, an appropriate hatchet flap was drawn incorporating the defect and placing the expected incisions within the relaxed skin tension lines where possible.    The area thus outlined was incised deep to adipose tissue with a #15 scalpel blade.  The skin margins were undermined to an appropriate distance in all directions utilizing iris scissors.
Rotation Flap Text: The defect edges were debeveled with a #15 scalpel blade.  Given the location of the defect, shape of the defect and the proximity to free margins a rotation flap was deemed most appropriate.  Using a sterile surgical marker, an appropriate rotation flap was drawn incorporating the defect and placing the expected incisions within the relaxed skin tension lines where possible.    The area thus outlined was incised deep to adipose tissue with a #15 scalpel blade.  The skin margins were undermined to an appropriate distance in all directions utilizing iris scissors.
Spiral Flap Text: The defect edges were debeveled with a #15 scalpel blade.  Given the location of the defect, shape of the defect and the proximity to free margins a spiral flap was deemed most appropriate.  Using a sterile surgical marker, an appropriate rotation flap was drawn incorporating the defect and placing the expected incisions within the relaxed skin tension lines where possible. The area thus outlined was incised deep to adipose tissue with a #15 scalpel blade.  The skin margins were undermined to an appropriate distance in all directions utilizing iris scissors.
Staged Advancement Flap Text: The defect edges were debeveled with a #15 scalpel blade. Given the location of the defect, shape of the defect and the proximity to free margins a staged advancement flap was deemed most appropriate. Using a sterile surgical marker, an appropriate advancement flap was drawn incorporating the defect and placing the expected incisions within the relaxed skin tension lines where possible. The area thus outlined was incised deep to adipose tissue with a #15 scalpel blade. The skin margins were undermined to an appropriate distance in all directions utilizing iris scissors. Following this, the designed flap was carried over into the primary defect and sutured into place.
Star Wedge Flap Text: The defect edges were debeveled with a #15 scalpel blade.  Given the location of the defect, shape of the defect and the proximity to free margins a star wedge flap was deemed most appropriate.  Using a sterile surgical marker, an appropriate rotation flap was drawn incorporating the defect and placing the expected incisions within the relaxed skin tension lines where possible. The area thus outlined was incised deep to adipose tissue with a #15 scalpel blade.  The skin margins were undermined to an appropriate distance in all directions utilizing iris scissors.
Transposition Flap Text: The defect edges were debeveled with a #15 scalpel blade.  Given the location of the defect and the proximity to free margins a transposition flap was deemed most appropriate.  Using a sterile surgical marker, an appropriate transposition flap was drawn incorporating the defect.    The area thus outlined was incised deep to adipose tissue with a #15 scalpel blade.  The skin margins were undermined to an appropriate distance in all directions utilizing iris scissors.
Muscle Hinge Flap Text: The defect edges were debeveled with a #15 scalpel blade.  Given the size, depth and location of the defect and the proximity to free margins a muscle hinge flap was deemed most appropriate.  Using a sterile surgical marker, an appropriate hinge flap was drawn incorporating the defect. The area thus outlined was incised with a #15 scalpel blade.  The skin margins were undermined to an appropriate distance in all directions utilizing iris scissors.
Mustarde Flap Text: The defect edges were debeveled with a #15 scalpel blade.  Given the size, depth and location of the defect and the proximity to free margins a Mustarde flap was deemed most appropriate. Using a sterile surgical marker, an appropriate flap was drawn incorporating the defect. The area thus outlined was incised with a #15 scalpel blade. The skin margins were undermined to an appropriate distance in all directions utilizing iris scissors. Following this, the designed flap was carried into the primary defect and sutured into place.
Nasal Turnover Hinge Flap Text: The defect edges were debeveled with a #15 scalpel blade.  Given the size, depth, location of the defect and the defect being full thickness a nasal turnover hinge flap was deemed most appropriate. Using a sterile surgical marker, an appropriate hinge flap was drawn incorporating the defect. The area thus outlined was incised with a #15 scalpel blade. The flap was designed to recreate the nasal mucosal lining and the alar rim. The skin margins were undermined to an appropriate distance in all directions utilizing iris scissors. Following this, the designed flap was carried over into the primary defect and sutured into place
Nasalis-Muscle-Based Myocutaneous Island Pedicle Flap Text: Using a #15 blade, an incision was made around the donor flap to the level of the nasalis muscle. Wide lateral undermining was then performed in both the subcutaneous plane above the nasalis muscle, and in a submuscular plane just above periosteum. This allowed the formation of a free nasalis muscle axial pedicle (based on the angular artery) which was still attached to the actual cutaneous flap, increasing its mobility and vascular viability. Hemostasis was obtained with pinpoint electrocoagulation. The flap was mobilized into position and the pivotal anchor points positioned and stabilized with buried interrupted sutures. Subcutaneous and dermal tissues were closed in a multilayered fashion with sutures. Tissue redundancies were excised, and the epidermal edges were apposed without significant tension and sutured with sutures.
Orbicularis Oris Muscle Flap Text: The defect edges were debeveled with a #15 scalpel blade.  Given that the defect affected the competency of the oral sphincter an orbicularis oris muscle flap was deemed most appropriate to restore this competency and normal muscle function.  Using a sterile surgical marker, an appropriate flap was drawn incorporating the defect. The area thus outlined was incised with a #15 scalpel blade. Following this, the designed flap was carried over into the primary defect and sutured into place.
Melolabial Transposition Flap Text: The defect edges were debeveled with a #15 scalpel blade.  Given the location of the defect and the proximity to free margins a melolabial flap was deemed most appropriate.  Using a sterile surgical marker, an appropriate melolabial transposition flap was drawn incorporating the defect.    The area thus outlined was incised deep to adipose tissue with a #15 scalpel blade.  The skin margins were undermined to an appropriate distance in all directions utilizing iris scissors.
Rhombic Flap Text: The defect edges were debeveled with a #15 scalpel blade.  Given the location of the defect and the proximity to free margins a rhombic flap was deemed most appropriate.  Using a sterile surgical marker, an appropriate rhombic flap was drawn incorporating the defect.    The area thus outlined was incised deep to adipose tissue with a #15 scalpel blade.  The skin margins were undermined to an appropriate distance in all directions utilizing iris scissors.
Rhomboid Transposition Flap Text: The defect edges were debeveled with a #15 scalpel blade. Given the location of the defect and the proximity to free margins a rhomboid transposition flap was deemed most appropriate. Using a sterile surgical marker, an appropriate rhomboid flap was drawn incorporating the defect. The area thus outlined was incised deep to adipose tissue with a #15 scalpel blade. The skin margins were undermined to an appropriate distance in all directions utilizing iris scissors. Following this, the designed flap was carried over into the primary defect and sutured into place.
Bi-Rhombic Flap Text: The defect edges were debeveled with a #15 scalpel blade.  Given the location of the defect and the proximity to free margins a bi-rhombic flap was deemed most appropriate.  Using a sterile surgical marker, an appropriate rhombic flap was drawn incorporating the defect. The area thus outlined was incised deep to adipose tissue with a #15 scalpel blade.  The skin margins were undermined to an appropriate distance in all directions utilizing iris scissors.
Helical Rim Advancement Flap Text: The defect edges were debeveled with a #15 blade scalpel.  Given the location of the defect and the proximity to free margins (helical rim) a double helical rim advancement flap was deemed most appropriate.  Using a sterile surgical marker, the appropriate advancement flaps were drawn incorporating the defect and placing the expected incisions between the helical rim and antihelix where possible.  The area thus outlined was incised through and through with a #15 scalpel blade.  With a skin hook and iris scissors, the flaps were gently and sharply undermined and freed up.
Bilateral Helical Rim Advancement Flap Text: The defect edges were debeveled with a #15 blade scalpel.  Given the location of the defect and the proximity to free margins (helical rim) a bilateral helical rim advancement flap was deemed most appropriate.  Using a sterile surgical marker, the appropriate advancement flaps were drawn incorporating the defect and placing the expected incisions between the helical rim and antihelix where possible.  The area thus outlined was incised through and through with a #15 scalpel blade.  With a skin hook and iris scissors, the flaps were gently and sharply undermined and freed up.
Ear Star Wedge Flap Text: The defect edges were debeveled with a #15 blade scalpel.  Given the location of the defect and the proximity to free margins (helical rim) an ear star wedge flap was deemed most appropriate.  Using a sterile surgical marker, the appropriate flap was drawn incorporating the defect and placing the expected incisions between the helical rim and antihelix where possible.  The area thus outlined was incised through and through with a #15 scalpel blade.
Banner Transposition Flap Text: The defect edges were debeveled with a #15 scalpel blade. Given the location of the defect and the proximity to free margins a Banner transposition flap was deemed most appropriate. Using a sterile surgical marker, an appropriate flap was drawn around the defect. The area thus outlined was incised deep to adipose tissue with a #15 scalpel blade. The skin margins were undermined to an appropriate distance in all directions utilizing iris scissors. Following this, the designed flap was carried into the primary defect and sutured into place.
Bilobed Flap Text: The defect edges were debeveled with a #15 scalpel blade.  Given the location of the defect and the proximity to free margins a bilobe flap was deemed most appropriate.  Using a sterile surgical marker, an appropriate bilobe flap drawn around the defect.    The area thus outlined was incised deep to adipose tissue with a #15 scalpel blade.  The skin margins were undermined to an appropriate distance in all directions utilizing iris scissors.
Bilobed Transposition Flap Text: The defect edges were debeveled with a #15 scalpel blade.  Given the location of the defect and the proximity to free margins a bilobed transposition flap was deemed most appropriate.  Using a sterile surgical marker, an appropriate bilobe flap drawn around the defect.    The area thus outlined was incised deep to adipose tissue with a #15 scalpel blade.  The skin margins were undermined to an appropriate distance in all directions utilizing iris scissors.
Trilobed Flap Text: The defect edges were debeveled with a #15 scalpel blade.  Given the location of the defect and the proximity to free margins a trilobed flap was deemed most appropriate.  Using a sterile surgical marker, an appropriate trilobed flap drawn around the defect.    The area thus outlined was incised deep to adipose tissue with a #15 scalpel blade.  The skin margins were undermined to an appropriate distance in all directions utilizing iris scissors.
Dorsal Nasal Flap Text: The defect edges were debeveled with a #15 scalpel blade.  Given the location of the defect and the proximity to free margins a dorsal nasal flap was deemed most appropriate.  Using a sterile surgical marker, an appropriate dorsal nasal flap was drawn around the defect.    The area thus outlined was incised deep to adipose tissue with a #15 scalpel blade.  The skin margins were undermined to an appropriate distance in all directions utilizing iris scissors.
Island Pedicle Flap Text: The defect edges were debeveled with a #15 scalpel blade.  Given the location of the defect, shape of the defect and the proximity to free margins an island pedicle advancement flap was deemed most appropriate.  Using a sterile surgical marker, an appropriate advancement flap was drawn incorporating the defect, outlining the appropriate donor tissue and placing the expected incisions within the relaxed skin tension lines where possible.    The area thus outlined was incised deep to adipose tissue with a #15 scalpel blade.  The skin margins were undermined to an appropriate distance in all directions around the primary defect and laterally outward around the island pedicle utilizing iris scissors.  There was minimal undermining beneath the pedicle flap.
Island Pedicle Flap With Canthal Suspension Text: The defect edges were debeveled with a #15 scalpel blade.  Given the location of the defect, shape of the defect and the proximity to free margins an island pedicle advancement flap was deemed most appropriate.  Using a sterile surgical marker, an appropriate advancement flap was drawn incorporating the defect, outlining the appropriate donor tissue and placing the expected incisions within the relaxed skin tension lines where possible. The area thus outlined was incised deep to adipose tissue with a #15 scalpel blade.  The skin margins were undermined to an appropriate distance in all directions around the primary defect and laterally outward around the island pedicle utilizing iris scissors.  There was minimal undermining beneath the pedicle flap. A suspension suture was placed in the canthal tendon to prevent tension and prevent ectropion.
Alar Island Pedicle Flap Text: The defect edges were debeveled with a #15 scalpel blade.  Given the location of the defect, shape of the defect and the proximity to the alar rim an island pedicle advancement flap was deemed most appropriate.  Using a sterile surgical marker, an appropriate advancement flap was drawn incorporating the defect, outlining the appropriate donor tissue and placing the expected incisions within the nasal ala running parallel to the alar rim. The area thus outlined was incised with a #15 scalpel blade.  The skin margins were undermined minimally to an appropriate distance in all directions around the primary defect and laterally outward around the island pedicle utilizing iris scissors.  There was minimal undermining beneath the pedicle flap.
Double Island Pedicle Flap Text: The defect edges were debeveled with a #15 scalpel blade.  Given the location of the defect, shape of the defect and the proximity to free margins a double island pedicle advancement flap was deemed most appropriate.  Using a sterile surgical marker, an appropriate advancement flap was drawn incorporating the defect, outlining the appropriate donor tissue and placing the expected incisions within the relaxed skin tension lines where possible.    The area thus outlined was incised deep to adipose tissue with a #15 scalpel blade.  The skin margins were undermined to an appropriate distance in all directions around the primary defect and laterally outward around the island pedicle utilizing iris scissors.  There was minimal undermining beneath the pedicle flap.
Island Pedicle Flap-Requiring Vessel Identification Text: The defect edges were debeveled with a #15 scalpel blade.  Given the location of the defect, shape of the defect and the proximity to free margins an island pedicle advancement flap was deemed most appropriate.  Using a sterile surgical marker, an appropriate advancement flap was drawn, based on the axial vessel mentioned above, incorporating the defect, outlining the appropriate donor tissue and placing the expected incisions within the relaxed skin tension lines where possible.    The area thus outlined was incised deep to adipose tissue with a #15 scalpel blade.  The skin margins were undermined to an appropriate distance in all directions around the primary defect and laterally outward around the island pedicle utilizing iris scissors.  There was minimal undermining beneath the pedicle flap.
Keystone Flap Text: The defect edges were debeveled with a #15 scalpel blade.  Given the location of the defect, shape of the defect a keystone flap was deemed most appropriate.  Using a sterile surgical marker, an appropriate keystone flap was drawn incorporating the defect, outlining the appropriate donor tissue and placing the expected incisions within the relaxed skin tension lines where possible. The area thus outlined was incised deep to adipose tissue with a #15 scalpel blade.  The skin margins were undermined to an appropriate distance in all directions around the primary defect and laterally outward around the flap utilizing iris scissors.
O-T Plasty Text: The defect edges were debeveled with a #15 scalpel blade.  Given the location of the defect, shape of the defect and the proximity to free margins an O-T plasty was deemed most appropriate.  Using a sterile surgical marker, an appropriate O-T plasty was drawn incorporating the defect and placing the expected incisions within the relaxed skin tension lines where possible.    The area thus outlined was incised deep to adipose tissue with a #15 scalpel blade.  The skin margins were undermined to an appropriate distance in all directions utilizing iris scissors.
O-Z Plasty Text: The defect edges were debeveled with a #15 scalpel blade.  Given the location of the defect, shape of the defect and the proximity to free margins an O-Z plasty (double transposition flap) was deemed most appropriate.  Using a sterile surgical marker, the appropriate transposition flaps were drawn incorporating the defect and placing the expected incisions within the relaxed skin tension lines where possible.    The area thus outlined was incised deep to adipose tissue with a #15 scalpel blade.  The skin margins were undermined to an appropriate distance in all directions utilizing iris scissors.  Hemostasis was achieved with electrocautery.  The flaps were then transposed into place, one clockwise and the other counterclockwise, and anchored with interrupted buried subcutaneous sutures.
Double O-Z Plasty Text: The defect edges were debeveled with a #15 scalpel blade. Given the location of the defect, shape of the defect and the proximity to free margins a Double O-Z plasty (double transposition flap) was deemed most appropriate. Using a sterile surgical marker, the appropriate transposition flaps were drawn incorporating the defect and placing the expected incisions within the relaxed skin tension lines where possible. The area thus outlined was incised deep to adipose tissue with a #15 scalpel blade. The skin margins were undermined to an appropriate distance in all directions utilizing iris scissors. Hemostasis was achieved with electrocautery. The flaps were then transposed and carried over into place, one clockwise and the other counterclockwise, and anchored with interrupted buried subcutaneous sutures.
V-Y Plasty Text: The defect edges were debeveled with a #15 scalpel blade.  Given the location of the defect, shape of the defect and the proximity to free margins an V-Y advancement flap was deemed most appropriate.  Using a sterile surgical marker, an appropriate advancement flap was drawn incorporating the defect and placing the expected incisions within the relaxed skin tension lines where possible.    The area thus outlined was incised deep to adipose tissue with a #15 scalpel blade.  The skin margins were undermined to an appropriate distance in all directions utilizing iris scissors.
H Plasty Text: Given the location of the defect, shape of the defect and the proximity to free margins a H-plasty was deemed most appropriate for repair.  Using a sterile surgical marker, the appropriate advancement arms of the H-plasty were drawn incorporating the defect and placing the expected incisions within the relaxed skin tension lines where possible. The area thus outlined was incised deep to adipose tissue with a #15 scalpel blade. The skin margins were undermined to an appropriate distance in all directions utilizing iris scissors.  The opposing advancement arms were then advanced into place in opposite direction and anchored with interrupted buried subcutaneous sutures.
W Plasty Text: The lesion was extirpated to the level of the fat with a #15 scalpel blade.  Given the location of the defect, shape of the defect and the proximity to free margins a W-plasty was deemed most appropriate for repair.  Using a sterile surgical marker, the appropriate transposition arms of the W-plasty were drawn incorporating the defect and placing the expected incisions within the relaxed skin tension lines where possible.    The area thus outlined was incised deep to adipose tissue with a #15 scalpel blade.  The skin margins were undermined to an appropriate distance in all directions utilizing iris scissors.  The opposing transposition arms were then transposed into place in opposite direction and anchored with interrupted buried subcutaneous sutures.
Z Plasty Text: The lesion was extirpated to the level of the fat with a #15 scalpel blade.  Given the location of the defect, shape of the defect and the proximity to free margins a Z-plasty was deemed most appropriate for repair.  Using a sterile surgical marker, the appropriate transposition arms of the Z-plasty were drawn incorporating the defect and placing the expected incisions within the relaxed skin tension lines where possible.    The area thus outlined was incised deep to adipose tissue with a #15 scalpel blade.  The skin margins were undermined to an appropriate distance in all directions utilizing iris scissors.  The opposing transposition arms were then transposed into place in opposite direction and anchored with interrupted buried subcutaneous sutures.
Zygomaticofacial Flap Text: Given the location of the defect, shape of the defect and the proximity to free margins a zygomaticofacial flap was deemed most appropriate for repair. Using a sterile surgical marker, the appropriate flap was drawn incorporating the defect and placing the expected incisions within the relaxed skin tension lines where possible. The area thus outlined was incised deep to adipose tissue with a #15 scalpel blade with preservation of a vascular pedicle.  The skin margins were undermined to an appropriate distance in all directions utilizing iris scissors. The flap was then carried over into the defect and anchored with interrupted buried subcutaneous sutures.
Cheek Interpolation Flap Text: A decision was made to reconstruct the defect utilizing an interpolation axial flap and a staged reconstruction.  A telfa template was made of the defect.  This telfa template was then used to outline the Cheek Interpolation flap.  The donor area for the pedicle flap was then injected with anesthesia.  The flap was excised through the skin and subcutaneous tissue down to the layer of the underlying musculature.  The interpolation flap was carefully excised within this deep plane to maintain its blood supply.  The edges of the donor site were undermined.   The donor site was closed in a primary fashion.  The pedicle was then rotated into position and sutured.  Once the tube was sutured into place, adequate blood supply was confirmed with blanching and refill.  The pedicle was then wrapped with xeroform gauze and dressed appropriately with a telfa and gauze bandage to ensure continued blood supply and protect the attached pedicle.
Cheek-To-Nose Interpolation Flap Text: A decision was made to reconstruct the defect utilizing an interpolation axial flap and a staged reconstruction.  A telfa template was made of the defect.  This telfa template was then used to outline the Cheek-To-Nose Interpolation flap.  The donor area for the pedicle flap was then injected with anesthesia.  The flap was excised through the skin and subcutaneous tissue down to the layer of the underlying musculature.  The interpolation flap was carefully excised within this deep plane to maintain its blood supply.  The edges of the donor site were undermined.   The donor site was closed in a primary fashion.  The pedicle was then rotated into position and sutured.  Once the tube was sutured into place, adequate blood supply was confirmed with blanching and refill.  The pedicle was then wrapped with xeroform gauze and dressed appropriately with a telfa and gauze bandage to ensure continued blood supply and protect the attached pedicle.
Interpolation Flap Text: A decision was made to reconstruct the defect utilizing an interpolation axial flap and a staged reconstruction.  A telfa template was made of the defect.  This telfa template was then used to outline the interpolation flap.  The donor area for the pedicle flap was then injected with anesthesia.  The flap was excised through the skin and subcutaneous tissue down to the layer of the underlying musculature.  The interpolation flap was carefully excised within this deep plane to maintain its blood supply.  The edges of the donor site were undermined.   The donor site was closed in a primary fashion.  The pedicle was then rotated into position and sutured.  Once the tube was sutured into place, adequate blood supply was confirmed with blanching and refill.  The pedicle was then wrapped with xeroform gauze and dressed appropriately with a telfa and gauze bandage to ensure continued blood supply and protect the attached pedicle.
Melolabial Interpolation Flap Text: A decision was made to reconstruct the defect utilizing an interpolation axial flap and a staged reconstruction.  A telfa template was made of the defect.  This telfa template was then used to outline the melolabial interpolation flap.  The donor area for the pedicle flap was then injected with anesthesia.  The flap was excised through the skin and subcutaneous tissue down to the layer of the underlying musculature.  The pedicle flap was carefully excised within this deep plane to maintain its blood supply.  The edges of the donor site were undermined.   The donor site was closed in a primary fashion.  The pedicle was then rotated into position and sutured.  Once the tube was sutured into place, adequate blood supply was confirmed with blanching and refill.  The pedicle was then wrapped with xeroform gauze and dressed appropriately with a telfa and gauze bandage to ensure continued blood supply and protect the attached pedicle.
Mastoid Interpolation Flap Text: A decision was made to reconstruct the defect utilizing an interpolation axial flap and a staged reconstruction.  A telfa template was made of the defect.  This telfa template was then used to outline the mastoid interpolation flap.  The donor area for the pedicle flap was then injected with anesthesia.  The flap was excised through the skin and subcutaneous tissue down to the layer of the underlying musculature.  The pedicle flap was carefully excised within this deep plane to maintain its blood supply.  The edges of the donor site were undermined.   The donor site was closed in a primary fashion.  The pedicle was then rotated into position and sutured.  Once the tube was sutured into place, adequate blood supply was confirmed with blanching and refill.  The pedicle was then wrapped with xeroform gauze and dressed appropriately with a telfa and gauze bandage to ensure continued blood supply and protect the attached pedicle.
Posterior Auricular Interpolation Flap Text: A decision was made to reconstruct the defect utilizing an interpolation axial flap and a staged reconstruction.  A telfa template was made of the defect.  This telfa template was then used to outline the posterior auricular interpolation flap.  The donor area for the pedicle flap was then injected with anesthesia.  The flap was excised through the skin and subcutaneous tissue down to the layer of the underlying musculature.  The pedicle flap was carefully excised within this deep plane to maintain its blood supply.  The edges of the donor site were undermined.   The donor site was closed in a primary fashion.  The pedicle was then rotated into position and sutured.  Once the tube was sutured into place, adequate blood supply was confirmed with blanching and refill.  The pedicle was then wrapped with xeroform gauze and dressed appropriately with a telfa and gauze bandage to ensure continued blood supply and protect the attached pedicle.
Paramedian Forehead Flap Text: A decision was made to reconstruct the defect utilizing an interpolation axial flap and a staged reconstruction.  A telfa template was made of the defect.  This telfa template was then used to outline the paramedian forehead pedicle flap.  The donor area for the pedicle flap was then injected with anesthesia.  The flap was excised through the skin and subcutaneous tissue down to the layer of the underlying musculature.  The pedicle flap was carefully excised within this deep plane to maintain its blood supply.  The edges of the donor site were undermined.   The donor site was closed in a primary fashion.  The pedicle was then rotated into position and sutured.  Once the tube was sutured into place, adequate blood supply was confirmed with blanching and refill.  The pedicle was then wrapped with xeroform gauze and dressed appropriately with a telfa and gauze bandage to ensure continued blood supply and protect the attached pedicle.
Abbe Flap (Upper To Lower Lip) Text: The defect of the lower lip was assessed and measured.  Given the location and size of the defect, an Abbe flap was deemed most appropriate. Using a sterile surgical marker, an appropriate Abbe flap was measured and drawn on the upper lip. Local anesthesia was then infiltrated.  A scalpel was then used to incise the upper lip through and through the skin, vermilion, muscle and mucosa, leaving the flap pedicled on the opposite side.  The flap was then rotated and transferred to the lower lip defect.  The flap was then sutured into place with a three layer technique, closing the orbicularis oris muscle layer with subcutaneous buried sutures, followed by a mucosal layer and an epidermal layer.
Abbe Flap (Lower To Upper Lip) Text: The defect of the upper lip was assessed and measured.  Given the location and size of the defect, an Abbe flap was deemed most appropriate. Using a sterile surgical marker, an appropriate Abbe flap was measured and drawn on the lower lip. Local anesthesia was then infiltrated. A scalpel was then used to incise the upper lip through and through the skin, vermilion, muscle and mucosa, leaving the flap pedicled on the opposite side.  The flap was then rotated and transferred to the lower lip defect.  The flap was then sutured into place with a three layer technique, closing the orbicularis oris muscle layer with subcutaneous buried sutures, followed by a mucosal layer and an epidermal layer.
Estlander Flap (Upper To Lower Lip) Text: The defect of the lower lip was assessed and measured.  Given the location and size of the defect, an Estlander flap was deemed most appropriate. Using a sterile surgical marker, an appropriate Estlander flap was measured and drawn on the upper lip. Local anesthesia was then infiltrated. A scalpel was then used to incise the lateral aspect of the flap, through skin, muscle and mucosa, leaving the flap pedicled medially.  The flap was then rotated and positioned to fill the lower lip defect.  The flap was then sutured into place with a three layer technique, closing the orbicularis oris muscle layer with subcutaneous buried sutures, followed by a mucosal layer and an epidermal layer.
Lip Wedge Excision Repair Text: Given the location of the defect and the proximity to free margins a full thickness wedge repair was deemed most appropriate.  Using a sterile surgical marker, the appropriate repair was drawn incorporating the defect and placing the expected incisions perpendicular to the vermilion border.  The vermilion border was also meticulously outlined to ensure appropriate reapproximation during the repair.  The area thus outlined was incised through and through with a #15 scalpel blade.  The muscularis and dermis were reaproximated with deep sutures following hemostasis. Care was taken to realign the vermilion border before proceeding with the superficial closure.  Once the vermilion was realigned the superfical and mucosal closure was finished.
Ftsg Text: The defect edges were debeveled with a #15 scalpel blade.  Given the location of the defect, shape of the defect and the proximity to free margins a full thickness skin graft was deemed most appropriate.  Using a sterile surgical marker, the primary defect shape was transferred to the donor site. The area thus outlined was incised deep to adipose tissue with a #15 scalpel blade.  The harvested graft was then trimmed of adipose tissue until only dermis and epidermis was left.  The skin margins of the secondary defect were undermined to an appropriate distance in all directions utilizing iris scissors.  The secondary defect was closed with interrupted buried subcutaneous sutures.  The skin edges were then re-apposed with running  sutures.  The skin graft was then placed in the primary defect and oriented appropriately.
Split-Thickness Skin Graft Text: The defect edges were debeveled with a #15 scalpel blade.  Given the location of the defect, shape of the defect and the proximity to free margins a split thickness skin graft was deemed most appropriate.  Using a sterile surgical marker, the primary defect shape was transferred to the donor site. The split thickness graft was then harvested.  The skin graft was then placed in the primary defect and oriented appropriately.
Burow's Graft Text: The defect edges were debeveled with a #15 scalpel blade. Given the location of the defect, shape of the defect, the proximity to free margins and the presence of a standing cone deformity a Burow's skin graft was deemed most appropriate. The standing cone was removed and this tissue was then trimmed to the shape of the primary defect. The adipose tissue was also removed until only dermis and epidermis were left.  The skin margins of the secondary defect were undermined to an appropriate distance in all directions utilizing iris scissors.  The secondary defect was closed with interrupted buried subcutaneous sutures.  The skin edges were then re-apposed with running  sutures.  The skin graft was then placed in the primary defect and oriented appropriately.
Cartilage Graft Text: The defect edges were debeveled with a #15 scalpel blade.  Given the location of the defect, shape of the defect, the fact the defect involved a full thickness cartilage defect a cartilage graft was deemed most appropriate.  An appropriate donor site was identified, cleansed, and anesthetized. The cartilage graft was then harvested and transferred to the recipient site, oriented appropriately and then sutured into place.  The secondary defect was then repaired using a primary closure.
Composite Graft Text: The defect edges were debeveled with a #15 scalpel blade.  Given the location of the defect, shape of the defect, the proximity to free margins and the fact the defect was full thickness a composite graft was deemed most appropriate.  The defect was outline and then transferred to the donor site.  A full thickness graft was then excised from the donor site. The graft was then placed in the primary defect, oriented appropriately and then sutured into place.  The secondary defect was then repaired using a primary closure.
Epidermal Autograft Text: The defect edges were debeveled with a #15 scalpel blade.  Given the location of the defect, shape of the defect and the proximity to free margins an epidermal autograft was deemed most appropriate.  Using a sterile surgical marker, the primary defect shape was transferred to the donor site. The epidermal graft was then harvested.  The skin graft was then placed in the primary defect and oriented appropriately.
Dermal Autograft Text: The defect edges were debeveled with a #15 scalpel blade.  Given the location of the defect, shape of the defect and the proximity to free margins a dermal autograft was deemed most appropriate.  Using a sterile surgical marker, the primary defect shape was transferred to the donor site. The area thus outlined was incised deep to adipose tissue with a #15 scalpel blade.  The harvested graft was then trimmed of adipose and epidermal tissue until only dermis was left.  The skin graft was then placed in the primary defect and oriented appropriately.
Skin Substitute Text: The defect edges were debeveled with a #15 scalpel blade.  Given the location of the defect, shape of the defect and the proximity to free margins a skin substitute graft was deemed most appropriate.  The graft material was trimmed to fit the size of the defect. The graft was then placed in the primary defect and oriented appropriately.
Tissue Cultured Epidermal Autograft Text: The defect edges were debeveled with a #15 scalpel blade.  Given the location of the defect, shape of the defect and the proximity to free margins a tissue cultured epidermal autograft was deemed most appropriate.  The graft was then trimmed to fit the size of the defect.  The graft was then placed in the primary defect and oriented appropriately.
Xenograft Text: The defect edges were debeveled with a #15 scalpel blade.  Given the location of the defect, shape of the defect and the proximity to free margins a xenograft was deemed most appropriate.  The graft was then trimmed to fit the size of the defect.  The graft was then placed in the primary defect and oriented appropriately.
Purse String (Intermediate) Text: Given the location of the defect and the characteristics of the surrounding skin a purse string intermediate closure was deemed most appropriate.  Undermining was performed circumfirentially around the surgical defect.  A purse string suture was then placed and tightened.
Purse String (Simple) Text: Given the location of the defect and the characteristics of the surrounding skin a purse string simple closure was deemed most appropriate.  Undermining was performed circumferentially around the surgical defect.  A purse string suture was then placed and tightened.
Partial Purse String (Intermediate) Text: Given the location of the defect and the characteristics of the surrounding skin an intermediate purse string closure was deemed most appropriate.  Undermining was performed circumferentially around the surgical defect.  A purse string suture was then placed and tightened. Wound tension of the circular defect prevented complete closure of the wound.
Partial Purse String (Simple) Text: Given the location of the defect and the characteristics of the surrounding skin a simple purse string closure was deemed most appropriate.  Undermining was performed circumferentially around the surgical defect.  A purse string suture was then placed and tightened. Wound tension of the circular defect prevented complete closure of the wound.
Complex Repair And Single Advancement Flap Text: The defect edges were debeveled with a #15 scalpel blade.  The primary defect was closed partially with a complex linear closure.  Given the location of the remaining defect, shape of the defect and the proximity to free margins a single advancement flap was deemed most appropriate for complete closure of the defect.  Using a sterile surgical marker, an appropriate advancement flap was drawn incorporating the defect and placing the expected incisions within the relaxed skin tension lines where possible.    The area thus outlined was incised deep to adipose tissue with a #15 scalpel blade.  The skin margins were undermined to an appropriate distance in all directions utilizing iris scissors.
Complex Repair And Double Advancement Flap Text: The defect edges were debeveled with a #15 scalpel blade.  The primary defect was closed partially with a complex linear closure.  Given the location of the remaining defect, shape of the defect and the proximity to free margins a double advancement flap was deemed most appropriate for complete closure of the defect.  Using a sterile surgical marker, an appropriate advancement flap was drawn incorporating the defect and placing the expected incisions within the relaxed skin tension lines where possible.    The area thus outlined was incised deep to adipose tissue with a #15 scalpel blade.  The skin margins were undermined to an appropriate distance in all directions utilizing iris scissors.
Complex Repair And Modified Advancement Flap Text: The defect edges were debeveled with a #15 scalpel blade.  The primary defect was closed partially with a complex linear closure.  Given the location of the remaining defect, shape of the defect and the proximity to free margins a modified advancement flap was deemed most appropriate for complete closure of the defect.  Using a sterile surgical marker, an appropriate advancement flap was drawn incorporating the defect and placing the expected incisions within the relaxed skin tension lines where possible.    The area thus outlined was incised deep to adipose tissue with a #15 scalpel blade.  The skin margins were undermined to an appropriate distance in all directions utilizing iris scissors.
Complex Repair And A-T Advancement Flap Text: The defect edges were debeveled with a #15 scalpel blade.  The primary defect was closed partially with a complex linear closure.  Given the location of the remaining defect, shape of the defect and the proximity to free margins an A-T advancement flap was deemed most appropriate for complete closure of the defect.  Using a sterile surgical marker, an appropriate advancement flap was drawn incorporating the defect and placing the expected incisions within the relaxed skin tension lines where possible.    The area thus outlined was incised deep to adipose tissue with a #15 scalpel blade.  The skin margins were undermined to an appropriate distance in all directions utilizing iris scissors.
Complex Repair And O-T Advancement Flap Text: The defect edges were debeveled with a #15 scalpel blade.  The primary defect was closed partially with a complex linear closure.  Given the location of the remaining defect, shape of the defect and the proximity to free margins an O-T advancement flap was deemed most appropriate for complete closure of the defect.  Using a sterile surgical marker, an appropriate advancement flap was drawn incorporating the defect and placing the expected incisions within the relaxed skin tension lines where possible.    The area thus outlined was incised deep to adipose tissue with a #15 scalpel blade.  The skin margins were undermined to an appropriate distance in all directions utilizing iris scissors.
Complex Repair And O-L Flap Text: The defect edges were debeveled with a #15 scalpel blade.  The primary defect was closed partially with a complex linear closure.  Given the location of the remaining defect, shape of the defect and the proximity to free margins an O-L flap was deemed most appropriate for complete closure of the defect.  Using a sterile surgical marker, an appropriate flap was drawn incorporating the defect and placing the expected incisions within the relaxed skin tension lines where possible.    The area thus outlined was incised deep to adipose tissue with a #15 scalpel blade.  The skin margins were undermined to an appropriate distance in all directions utilizing iris scissors.
Complex Repair And Bilobe Flap Text: The defect edges were debeveled with a #15 scalpel blade.  The primary defect was closed partially with a complex linear closure.  Given the location of the remaining defect, shape of the defect and the proximity to free margins a bilobe flap was deemed most appropriate for complete closure of the defect.  Using a sterile surgical marker, an appropriate advancement flap was drawn incorporating the defect and placing the expected incisions within the relaxed skin tension lines where possible.    The area thus outlined was incised deep to adipose tissue with a #15 scalpel blade.  The skin margins were undermined to an appropriate distance in all directions utilizing iris scissors.
Complex Repair And Melolabial Flap Text: The defect edges were debeveled with a #15 scalpel blade.  The primary defect was closed partially with a complex linear closure.  Given the location of the remaining defect, shape of the defect and the proximity to free margins a melolabial flap was deemed most appropriate for complete closure of the defect.  Using a sterile surgical marker, an appropriate advancement flap was drawn incorporating the defect and placing the expected incisions within the relaxed skin tension lines where possible.    The area thus outlined was incised deep to adipose tissue with a #15 scalpel blade.  The skin margins were undermined to an appropriate distance in all directions utilizing iris scissors.
Complex Repair And Rotation Flap Text: The defect edges were debeveled with a #15 scalpel blade.  The primary defect was closed partially with a complex linear closure.  Given the location of the remaining defect, shape of the defect and the proximity to free margins a rotation flap was deemed most appropriate for complete closure of the defect.  Using a sterile surgical marker, an appropriate advancement flap was drawn incorporating the defect and placing the expected incisions within the relaxed skin tension lines where possible.    The area thus outlined was incised deep to adipose tissue with a #15 scalpel blade.  The skin margins were undermined to an appropriate distance in all directions utilizing iris scissors.
Complex Repair And Rhombic Flap Text: The defect edges were debeveled with a #15 scalpel blade.  The primary defect was closed partially with a complex linear closure.  Given the location of the remaining defect, shape of the defect and the proximity to free margins a rhombic flap was deemed most appropriate for complete closure of the defect.  Using a sterile surgical marker, an appropriate advancement flap was drawn incorporating the defect and placing the expected incisions within the relaxed skin tension lines where possible.    The area thus outlined was incised deep to adipose tissue with a #15 scalpel blade.  The skin margins were undermined to an appropriate distance in all directions utilizing iris scissors.
Complex Repair And Transposition Flap Text: The defect edges were debeveled with a #15 scalpel blade.  The primary defect was closed partially with a complex linear closure.  Given the location of the remaining defect, shape of the defect and the proximity to free margins a transposition flap was deemed most appropriate for complete closure of the defect.  Using a sterile surgical marker, an appropriate advancement flap was drawn incorporating the defect and placing the expected incisions within the relaxed skin tension lines where possible.    The area thus outlined was incised deep to adipose tissue with a #15 scalpel blade.  The skin margins were undermined to an appropriate distance in all directions utilizing iris scissors.
Complex Repair And V-Y Plasty Text: The defect edges were debeveled with a #15 scalpel blade.  The primary defect was closed partially with a complex linear closure.  Given the location of the remaining defect, shape of the defect and the proximity to free margins a V-Y plasty was deemed most appropriate for complete closure of the defect.  Using a sterile surgical marker, an appropriate advancement flap was drawn incorporating the defect and placing the expected incisions within the relaxed skin tension lines where possible.    The area thus outlined was incised deep to adipose tissue with a #15 scalpel blade.  The skin margins were undermined to an appropriate distance in all directions utilizing iris scissors.
Complex Repair And M Plasty Text: The defect edges were debeveled with a #15 scalpel blade.  The primary defect was closed partially with a complex linear closure.  Given the location of the remaining defect, shape of the defect and the proximity to free margins an M plasty was deemed most appropriate for complete closure of the defect.  Using a sterile surgical marker, an appropriate advancement flap was drawn incorporating the defect and placing the expected incisions within the relaxed skin tension lines where possible.    The area thus outlined was incised deep to adipose tissue with a #15 scalpel blade.  The skin margins were undermined to an appropriate distance in all directions utilizing iris scissors.
Complex Repair And Double M Plasty Text: The defect edges were debeveled with a #15 scalpel blade.  The primary defect was closed partially with a complex linear closure.  Given the location of the remaining defect, shape of the defect and the proximity to free margins a double M plasty was deemed most appropriate for complete closure of the defect.  Using a sterile surgical marker, an appropriate advancement flap was drawn incorporating the defect and placing the expected incisions within the relaxed skin tension lines where possible.    The area thus outlined was incised deep to adipose tissue with a #15 scalpel blade.  The skin margins were undermined to an appropriate distance in all directions utilizing iris scissors.
Complex Repair And W Plasty Text: The defect edges were debeveled with a #15 scalpel blade.  The primary defect was closed partially with a complex linear closure.  Given the location of the remaining defect, shape of the defect and the proximity to free margins a W plasty was deemed most appropriate for complete closure of the defect.  Using a sterile surgical marker, an appropriate advancement flap was drawn incorporating the defect and placing the expected incisions within the relaxed skin tension lines where possible.    The area thus outlined was incised deep to adipose tissue with a #15 scalpel blade.  The skin margins were undermined to an appropriate distance in all directions utilizing iris scissors.
Complex Repair And Z Plasty Text: The defect edges were debeveled with a #15 scalpel blade.  The primary defect was closed partially with a complex linear closure.  Given the location of the remaining defect, shape of the defect and the proximity to free margins a Z plasty was deemed most appropriate for complete closure of the defect.  Using a sterile surgical marker, an appropriate advancement flap was drawn incorporating the defect and placing the expected incisions within the relaxed skin tension lines where possible.    The area thus outlined was incised deep to adipose tissue with a #15 scalpel blade.  The skin margins were undermined to an appropriate distance in all directions utilizing iris scissors.
Complex Repair And Dorsal Nasal Flap Text: The defect edges were debeveled with a #15 scalpel blade.  The primary defect was closed partially with a complex linear closure.  Given the location of the remaining defect, shape of the defect and the proximity to free margins a dorsal nasal flap was deemed most appropriate for complete closure of the defect.  Using a sterile surgical marker, an appropriate flap was drawn incorporating the defect and placing the expected incisions within the relaxed skin tension lines where possible.    The area thus outlined was incised deep to adipose tissue with a #15 scalpel blade.  The skin margins were undermined to an appropriate distance in all directions utilizing iris scissors.
Complex Repair And Ftsg Text: The defect edges were debeveled with a #15 scalpel blade.  The primary defect was closed partially with a complex linear closure.  Given the location of the defect, shape of the defect and the proximity to free margins a full thickness skin graft was deemed most appropriate to repair the remaining defect.  The graft was trimmed to fit the size of the remaining defect.  The graft was then placed in the primary defect, oriented appropriately, and sutured into place.
Complex Repair And Burow's Graft Text: The defect edges were debeveled with a #15 scalpel blade.  The primary defect was closed partially with a complex linear closure.  Given the location of the defect, shape of the defect, the proximity to free margins and the presence of a standing cone deformity a Burow's graft was deemed most appropriate to repair the remaining defect.  The graft was trimmed to fit the size of the remaining defect.  The graft was then placed in the primary defect, oriented appropriately, and sutured into place.
Complex Repair And Split-Thickness Skin Graft Text: The defect edges were debeveled with a #15 scalpel blade.  The primary defect was closed partially with a complex linear closure.  Given the location of the defect, shape of the defect and the proximity to free margins a split thickness skin graft was deemed most appropriate to repair the remaining defect.  The graft was trimmed to fit the size of the remaining defect.  The graft was then placed in the primary defect, oriented appropriately, and sutured into place.
Complex Repair And Epidermal Autograft Text: The defect edges were debeveled with a #15 scalpel blade.  The primary defect was closed partially with a complex linear closure.  Given the location of the defect, shape of the defect and the proximity to free margins an epidermal autograft was deemed most appropriate to repair the remaining defect.  The graft was trimmed to fit the size of the remaining defect.  The graft was then placed in the primary defect, oriented appropriately, and sutured into place.
Complex Repair And Dermal Autograft Text: The defect edges were debeveled with a #15 scalpel blade.  The primary defect was closed partially with a complex linear closure.  Given the location of the defect, shape of the defect and the proximity to free margins an dermal autograft was deemed most appropriate to repair the remaining defect.  The graft was trimmed to fit the size of the remaining defect.  The graft was then placed in the primary defect, oriented appropriately, and sutured into place.
Complex Repair And Tissue Cultured Epidermal Autograft Text: The defect edges were debeveled with a #15 scalpel blade.  The primary defect was closed partially with a complex linear closure.  Given the location of the defect, shape of the defect and the proximity to free margins an tissue cultured epidermal autograft was deemed most appropriate to repair the remaining defect.  The graft was trimmed to fit the size of the remaining defect.  The graft was then placed in the primary defect, oriented appropriately, and sutured into place.
Complex Repair And Xenograft Text: The defect edges were debeveled with a #15 scalpel blade.  The primary defect was closed partially with a complex linear closure.  Given the location of the defect, shape of the defect and the proximity to free margins a xenograft was deemed most appropriate to repair the remaining defect.  The graft was trimmed to fit the size of the remaining defect.  The graft was then placed in the primary defect, oriented appropriately, and sutured into place.
Complex Repair And Skin Substitute Graft Text: The defect edges were debeveled with a #15 scalpel blade.  The primary defect was closed partially with a complex linear closure.  Given the location of the remaining defect, shape of the defect and the proximity to free margins a skin substitute graft was deemed most appropriate to repair the remaining defect.  The graft was trimmed to fit the size of the remaining defect.  The graft was then placed in the primary defect, oriented appropriately, and sutured into place.
Path Notes (To The Dermatopathologist): Please check margins.
Consent was obtained from the patient. The risks and benefits to therapy were discussed in detail. Specifically, the risks of infection, scarring, bleeding, prolonged wound healing, incomplete removal, allergy to anesthesia, nerve injury and recurrence were addressed. Prior to the procedure, the treatment site was clearly identified and confirmed by the patient. All components of Universal Protocol/PAUSE Rule completed.
Post-Care Instructions: I reviewed with the patient in detail post-care instructions:\\n1. Apply bacitracin over the steri-strips.  \\n2. Cut non-stick pad (Telfa) to cover the steri-strips\\n3. Apply tape (hypafix) over the non-stick pad\\n4. Change once per day for 5 days\\n5. Shower with bandage on, change bandage after shower\\n\\nPatient is not to engage in any heavy lifting, exercise, hot tub, or swimming for the next 14 days. Should the patient develop any fevers, chills, bleeding, severe pain patient will contact the office immediately.
Home Suture Removal Text: Patient was provided a home suture removal kit and will remove their sutures at home.  If they have any questions or difficulties they will call the office.
Where Do You Want The Question To Include Opioid Counseling Located?: Case Summary Tab
Information: Selecting Yes will display possible errors in your note based on the variables you have selected. This validation is only offered as a suggestion for you. PLEASE NOTE THAT THE VALIDATION TEXT WILL BE REMOVED WHEN YOU FINALIZE YOUR NOTE. IF YOU WANT TO FAX A PRELIMINARY NOTE YOU WILL NEED TO TOGGLE THIS TO 'NO' IF YOU DO NOT WANT IT IN YOUR FAXED NOTE.

## 2023-03-27 NOTE — PROGRESS NOTES
PROGRESS NOTE     Subjective:       Patient ID: Steph Lemus is a 54 y.o. female.  MRN: [id number]  : 1968    Chief Complaint:    Stage IA (T1c, N0, M0, Grade 1, ER+/WY+/HER2 pending) Invasive ductal carcinoma of the LEFT breast    History of Present Illness:   Steph Lemus is a 54 y.o. female who is referred for newly diagnosed left breast IDC, found on screening mammogram.   She did not palpate a breast mass. She denies any pain or nipple discharge.   She is a smoker, has stopped at the time of her diagnosis.      Menarche at age 11.   . Age at first live birth 17 years old   Menopause at 52 years old. HRT-divigel now, took for 2 years     FH  Father- throat cancer- dx 73  Paternal Grandfather- Lung cancer- dx at 68  Paternal Uncle- Lung cancer, Dx at 60   Cousin- maternal- breast cancer dx at 36  Maternal cousin-Lung cancer- dx at 40    Dx with Covid 5 in October and had a difficult recovery, surgery was delayed.Had follow up imaging while she was waiting that showed stable breast mass and borderline enlarging left axillary node. It was biopsied to be benign.       Interim history:  On 23, she underwent b/l mastectomy and left sentinel node exam.  Has persistent post op abdominal pain and tightness, follows up with PT. Not significantly improved yet.     Not smoking at all.     Still has epigastric discomfort off an on. On PPI. Had an EGD and abdominal US and has a GI follow up.     Oncology History:  22: Screening mammogram  Impression:  Left  Focal Asymmetry: Left breast focal asymmetry at the anterior 12 o'clock position. Assessment: 0 - Incomplete. Diagnostic Mammogram and/or Ultrasound is recommended.      Right  There is no mammographic evidence of malignancy in the right breast.     BI-RADS Category:   Overall: 0 - Incomplete: Needs Additional Imaging Evaluation    22:  Diagnostic mammogram:  Impression:  Left  Mass: Left  Bedside report received from [facility] . Lines patent Safety precautions in place.   breast 18 mm x 9 mm x 9 mm mass at the 12 o'clock position. Assessment: 4 - Suspicious finding. Biopsy is recommended.      An 8 mm satellite mass is present 0.5 cm remote from the aforementioned mass at 12 o'clock.      BI-RADS Category:   Overall: 4 - Suspicious     8/3/22:   1. Breast, left, 12 o'clock, 6 cm from nipple, ultrasound-guided core needle   biopsies:   - Invasive ductal carcinoma       - Present in 4 of 6 core fragments (3 mm in greatest contiguous dimension)       - Brandon-Jimenez modified SBR score 2 + 2 + 1 = 5 of 9       - Histologic grade 1 of 3       - Mitotic index = 0.3 (average mitoses per high power field) (low)   - Biomarkers, assessed by immunohistochemistry on block 1A       - Estrogen Receptor (ER):  Positive (95%; strong intensity)       - Progesterone Receptor (WI):  Positive (70%; moderate intensity)       - HER2:  Equivocal (2+)       - HER2 FISH pending; results to follow in a supplemental report       - Ki-67 proliferation index:  Approximately 20% in hot-spot (intermediate)   - Lymphovascular invasion not identified   - Ductal carcinoma in situ       - Associated with invasive tumor       - Nuclear grade 2 of 3       - Cribriform pattern       - Central comedonecrosis: Approximately 20%   - Background breast contains fragments of fibroadenoma with myxoid change   2. Breast, left, 12 o'clock, 7 cm from nipple, ultrasound-guided core needle   biopsies:   - Fibroadenoma with myxoid change     8/15/22: MRI   Impression:  Left  Mass: Left breast 15 mm x 12 mm x 20 mm mass at the 12 o'clock position. Assessment: 6 - Known biopsy, proven malignancy.      Right  There is no MR evidence of malignancy in the right breast.    1/5/23  1.  LEFT BREAST, SKIN AND NIPPLE SPARING MASTECTOMY:   - IN SITU AND INVASIVE DUCT CARCINOMA, 0.7 CM, LOW-GRADE (1, 2, 1).     - FIBROADENOMA (ADJACENT TO INVASIVE TUMOR).     - BIOPSY SITE REPAIR REACTIONS.     2.  LEFT BREAST, RE-EXCISION ANTERIOR MARGIN:   -  ADIPOSE TISSUE NEGATIVE FOR TUMOR.     3.  LEFT AXILLARY SENTINEL LYMPH NODE BIOPSY:   - FIVE LYMPH NODES, ALL NEGATIVE FOR METASTATIC CARCINOMA (0/5).     - ONE LYMPH NODE WITH CAPSULAR MERARY NEVUS.     4.  RIGHT BREAST, SKIN AND NIPPLE SPARING MASTECTOMY:   - NO TUMOR SEEN.      pT1b pN0(sn)     History:  Past Medical History:   Diagnosis Date    Acid reflux     Breast cancer 08/2022    Stage IA (T1c, N0, M0, Grade 1, ER+/[place]+/HER2 pending) Invasive ductal carcinoma of the LEFT breast    Epistaxis, recurrent     none since cauterization    Hormone replacement therapy     Hyperlipidemia     Invasive ductal carcinoma of left breast in female 09/2022      Past Surgical History:   Procedure Laterality Date    APPENDECTOMY      BREAST BIOPSY Left 08/03/2022    idc    COLONOSCOPY N/A 04/15/2019    Procedure: COLONOSCOPY;  Surgeon: Tad Suarez Jr., MD;  Location: [facility];  Service: Endoscopy;  Laterality: N/A;    DILATION AND CURETTAGE OF UTERUS      for uterine polyps    ENDOMETRIAL ABLATION      ENDOSCOPIC NASAL CAUTERIZATION N/A 08/11/2020    Procedure: CAUTERIZATION, NOSE, ENDOSCOPIC;  Surgeon: Surinder Acevedo MD;  Location: [facility];  Service: ENT;  Laterality: N/A;    LAPAROSCOPIC APPENDECTOMY N/A 09/22/2020    Procedure: APPENDECTOMY, LAPAROSCOPIC;  Surgeon: Shai Ludwig MD;  Location: [facility];  Service: General;  Laterality: N/A;    RECONSTRUCTION OF BREAST WITH DEEP INFERIOR EPIGASTRIC ARTERY  (RUTH) FREE FLAP Bilateral 1/5/2023    Procedure: RECONSTRUCTION, BREAST, USING RUTH FREE FLAP;  Surgeon: Michel Tirado MD;  Location: [facility] OR;  Service: Plastics;  Laterality: Bilateral;    REPAIR, NERVE USING ALLOGRAFT Bilateral 1/5/2023    Procedure: REPAIR, NERVE USING ALLOGRAFT;  Surgeon: Michel Tirado MD;  Location: [facility] OR;  Service: Plastics;  Laterality: Bilateral;    SENTINEL LYMPH NODE BIOPSY Left 1/5/2023    Procedure: BIOPSY, LYMPH NODE, SENTINEL;  Surgeon: Simona Cueva MD;   Location: [facility] OR;  Service: General;  Laterality: Left;    SIMPLE MASTECTOMY Bilateral 2023    Procedure: MASTECTOMY, SIMPLE;  Surgeon: Simona Cueva MD;  Location: [facility] OR;  Service: General;  Laterality: Bilateral;    TONSILLECTOMY       Family History   Problem Relation Age of Onset    Hyperlipidemia Mother     Hypertension Mother     Diabetes Mother     Hypertension Father     Cancer Father         throat    Pacemaker/defibrilator Father     Diabetes Maternal Grandfather     Cancer Maternal Grandfather     Cancer Paternal Grandfather     Breast cancer Cousin 40    BRCA 1/2 Cousin     Colon cancer Neg Hx     Ovarian cancer Neg Hx       Social History     Tobacco Use    Smoking status: Former     Packs/day: 0.10     Years: 25.00     Pack years: 2.50     Types: Cigarettes     Quit date: 2022     Years since quittin.6    Smokeless tobacco: Never   Substance and Sexual Activity    Alcohol use: Not Currently     Alcohol/week: 4.0 standard drinks     Types: 4 Glasses of wine per week     Comment: 6 drinks weekly    Drug use: No    Sexual activity: Yes     Partners: Male     Birth control/protection: Other-see comments        ROS:   Review of Systems   Constitutional:  Negative for fever, malaise/fatigue and weight loss.   HENT:  Negative for congestion, hearing loss, nosebleeds and sore throat.    Eyes:  Negative for double vision and photophobia.   Respiratory:  Negative for cough, hemoptysis, sputum production, shortness of breath and wheezing.    Cardiovascular:  Negative for chest pain, palpitations, orthopnea and leg swelling.   Gastrointestinal:  Negative for abdominal pain, blood in stool, constipation, diarrhea, heartburn, nausea and vomiting.   Genitourinary:  Negative for dysuria, frequency, hematuria and urgency.   Musculoskeletal:  Negative for back pain, joint pain and myalgias.   Skin:  Negative for itching and rash.   Neurological:  Negative for tingling, seizures, weakness and  "headaches.   Endo/Heme/Allergies:  Negative for polydipsia. Does not bruise/bleed easily.   Psychiatric/Behavioral:  Negative for depression and memory loss. The patient is not nervous/anxious and does not have insomnia.       Objective:     Vitals:    03/27/23 1411   BP: (!) 140/88   Pulse: 70   Resp: 18   Temp: 97.1 °F (36.2 °C)   TempSrc: Temporal   SpO2: (!) 94%   Weight: 82.7 kg (182 lb 5.1 oz)   Height: 5' 6" (1.676 m)   PainSc: 0-No pain         Physical Examination:   Physical Exam  Vitals and nursing note reviewed.   Constitutional:       General: She is not in acute distress.     Appearance: She is not diaphoretic.   HENT:      Head: Normocephalic.      Mouth/Throat:      Pharynx: No oropharyngeal exudate.   Eyes:      General: No scleral icterus.     Conjunctiva/sclera: Conjunctivae normal.   Neck:      Thyroid: No thyromegaly.   Cardiovascular:      Rate and Rhythm: Normal rate and regular rhythm.      Heart sounds: Normal heart sounds. No murmur heard.  Pulmonary:      Effort: Pulmonary effort is normal. No respiratory distress.      Breath sounds: No stridor. No wheezing or rales.   Chest:      Chest wall: No tenderness.   Abdominal:      General: Bowel sounds are normal. There is no distension.      Palpations: Abdomen is soft. There is no mass.      Tenderness: There is no abdominal tenderness. There is no rebound.   Musculoskeletal:         General: No tenderness or deformity. Normal range of motion.      Cervical back: Neck supple.   Lymphadenopathy:      Cervical: No cervical adenopathy.   Skin:     General: Skin is warm and dry.      Findings: No erythema or rash.      Comments: B/l mastectomy with flap reconstruction    Neurological:      Mental Status: She is alert and oriented to person, place, and time.      Cranial Nerves: No cranial nerve deficit.      Coordination: Coordination normal.      Gait: Gait is intact.   Psychiatric:         Mood and Affect: Affect normal.         Cognition and " Memory: Memory normal.         Judgment: Judgment normal.        Diagnostic Tests:  Significant Imaging: I have reviewed and interpreted all pertinent imaging results/findings.  PET Scan No results found for this or any previous visit.      Laboratory Data:  All pertinent labs have been reviewed.    Labs:   Lab Results   Component Value Date    WBC 4.06 02/02/2023    HGB 11.9 (L) 02/02/2023    HCT 37.8 02/02/2023    MCV 97 02/02/2023     02/02/2023       Assessment/Plan:   Invasive ductal carcinoma of left breast in female  Stage IA (T1c, N0, M0, Grade 1, ER+/AK+/HER2 2+ by IHC, FISH negative, Invasive ductal carcinoma of the LEFT breast  pT1b N0 (sn)     She is s/p b/l mastectomy with flap reconstruction.   Oncotype is low risk at 7, with 3% risk of distant recurrence with AI or Tamoxifen.   She is post menopausal based on labs and could be offered with AI or Tamoxifen vs observation as per patient preference.   See prior note for discussion. She has thought about endocrine therapy and has declined at this time. Will continue active surveillance.        ECOG SCORE    0 - Fully active-able to carry on all pre-disease performance without restriction           Discussion:   No follow-ups on file.    Plan was discussed with the patient at length, and she verbalized understanding. Steph was given an opportunity to ask questions that were answered to her satisfaction, and she was advised to call in the interval if any problems or questions arise.    Electronically signed by Angelica Coelho MD      Route Chart for Scheduling    Med Onc Chart Routing      Follow up with physician 4 months.   Follow up with [facility]    Infusion scheduling note    Injection scheduling note    Labs    Imaging    Pharmacy appointment    Other referrals                Answers submitted by the patient for this visit:  Review of Systems Questionnaire (Submitted on 3/25/2023)  appetite change : No  unexpected weight change: No  mouth sores:  No  visual disturbance: No  adenopathy: No

## 2023-04-06 ENCOUNTER — APPOINTMENT (RX ONLY)
Dept: URBAN - METROPOLITAN AREA CLINIC 4 | Facility: CLINIC | Age: 78
Setting detail: DERMATOLOGY
End: 2023-04-06

## 2023-04-06 PROBLEM — C44.619 BASAL CELL CARCINOMA OF SKIN OF LEFT UPPER LIMB, INCLUDING SHOULDER: Status: ACTIVE | Noted: 2023-04-06

## 2023-04-06 PROCEDURE — 13122 CMPLX RPR S/A/L ADDL 5 CM/>: CPT

## 2023-04-06 PROCEDURE — 13121 CMPLX RPR S/A/L 2.6-7.5 CM: CPT

## 2023-04-06 PROCEDURE — 11604 EXC TR-EXT MAL+MARG 3.1-4 CM: CPT

## 2023-04-06 PROCEDURE — ? EXCISION

## 2023-04-06 NOTE — PROCEDURE: EXCISION
Surgeon (Optional): Lesley
Biopsy Photograph Reviewed: Yes
Previous Accession (Optional): [id number]
Size Of Lesion In Cm: 2.9
X Size Of Lesion In Cm (Optional): 0
Size Of Margin In Cm: 0.2
Anesthesia Volume In Cc: 12
Was An Eye Clamp Used?: No
Eye Clamp Note Details: An eye clamp was used during the procedure.
Excision Method: Elliptical
Saucerization Depth: dermis and superficial adipose tissue
Repair Type: Complex
Suturegard Retention Suture: 2-0 Nylon
Retention Suture Bite Size: 3 mm
Length To Time In Minutes Device Was In Place: 10
Number Of Hemigard Strips Per Side: 1
Intermediate / Complex Repair - Final Wound Length In Cm: 8.2
Width Of Defect Perpendicular To Closure In Cm (Required): 2.1
Distance Of Undermining In Cm (Required): 3.1
Undermining Type: Entire Wound
Debridement Text: The wound edges were debrided prior to proceeding with the closure to facilitate wound healing.
Helical Rim Text: The closure involved the helical rim.
Vermilion Border Text: The closure involved the vermilion border.
Nostril Rim Text: The closure involved the nostril rim.
Retention Suture Text: Retention sutures were placed to support the closure and prevent dehiscence.
Lab: [id number]
Lab Facility: 
Graft Donor Site Bandage (Optional-Leave Blank If You Don't Want In Note): Steri-strips and a pressure bandage were applied to the donor site.
Epidermal Closure Graft Donor Site (Optional): simple interrupted
Billing Type: Third-Party Bill
Excision Depth: adipose tissue
Scalpel Size: 15 blade
Anesthesia Type: 1% lidocaine with epinephrine
Additional Anesthesia Volume In Cc: 6
Hemostasis: Electrocautery
Estimated Blood Loss (Cc): minimal
Detail Level: Detailed
Deep Sutures: 2-0 Vicryl
Dermal Closure: buried vertical mattress
Epidermal Sutures: 5-0 Caprosyn
Epidermal Closure: running locked
Wound Care: Bacitracin
Dressing: dry sterile dressing
Suturegard Intro: Intraoperative tissue expansion was performed, utilizing the SUTUREGARD device, in order to reduce wound tension.
Suturegard Body: The suture ends were repeatedly re-tightened and re-clamped to achieve the desired tissue expansion.
Hemigard Intro: Due to skin fragility and wound tension, it was decided to use HEMIGARD adhesive retention suture devices to permit a linear closure. The skin was cleaned and dried for a 6cm distance away from the wound. Excessive hair, if present, was removed to allow for adhesion.
Hemigard Postcare Instructions: The HEMIGARD strips are to remain completely dry for at least 5-7 days.
Positioning (Leave Blank If You Do Not Want): The patient was placed in a comfortable position exposing the surgical site.
Pre-Excision Curettage Text (Leave Blank If You Do Not Want): Prior to drawing the surgical margin the visible lesion was removed with electrodesiccation and curettage to clearly define the lesion size.
Complex Repair Preamble Text (Leave Blank If You Do Not Want): Extensive wide undermining was performed.
Intermediate Repair Preamble Text (Leave Blank If You Do Not Want): Undermining was performed with blunt dissection.
Curvilinear Excision Additional Text (Leave Blank If You Do Not Want): The margin was drawn around the clinically apparent lesion.  A curvilinear shape was then drawn on the skin incorporating the lesion and margins.  Incisions were then made along these lines to the appropriate tissue plane and the lesion was extirpated.
Fusiform Excision Additional Text (Leave Blank If You Do Not Want): The margin was drawn around the clinically apparent lesion.  A fusiform shape was then drawn on the skin incorporating the lesion and margins.  Incisions were then made along these lines to the appropriate tissue plane and the lesion was extirpated.
Elliptical Excision Additional Text (Leave Blank If You Do Not Want): The margin was drawn around the clinically apparent lesion.  An elliptical shape was then drawn on the skin incorporating the lesion and margins.  Incisions were then made along these lines to the appropriate tissue plane and the lesion was extirpated.
Saucerization Excision Additional Text (Leave Blank If You Do Not Want): The margin was drawn around the clinically apparent lesion.  Incisions were then made along these lines, in a tangential fashion, to the appropriate tissue plane and the lesion was extirpated.
Slit Excision Additional Text (Leave Blank If You Do Not Want): A linear line was drawn on the skin overlying the lesion. An incision was made slowly until the lesion was visualized.  Once visualized, the lesion was removed with blunt dissection.
Excisional Biopsy Additional Text (Leave Blank If You Do Not Want): The margin was drawn around the clinically apparent lesion. An elliptical shape was then drawn on the skin incorporating the lesion and margins.  Incisions were then made along these lines to the appropriate tissue plane and the lesion was extirpated.
Perilesional Excision Additional Text (Leave Blank If You Do Not Want): The margin was drawn around the clinically apparent lesion. Incisions were then made along these lines to the appropriate tissue plane and the lesion was extirpated.
Repair Performed By Another Provider Text (Leave Blank If You Do Not Want): After the tissue was excised the defect was repaired by another provider.
No Repair - Repaired With Adjacent Surgical Defect Text (Leave Blank If You Do Not Want): After the excision the defect was repaired concurrently with another surgical defect which was in close approximation.
Adjacent Tissue Transfer Text: The defect edges were debeveled with a #15 scalpel blade. Given the location of the defect and the proximity to free margins an adjacent tissue transfer was deemed most appropriate. Using a sterile surgical marker, an appropriate flap was drawn incorporating the defect and placing the expected incisions within the relaxed skin tension lines where possible. The area thus outlined was incised deep to adipose tissue with a #15 scalpel blade. The skin margins were undermined to an appropriate distance in all directions utilizing iris scissors and carried over to close the primary defect.
Advancement Flap (Single) Text: The defect edges were debeveled with a #15 scalpel blade.  Given the location of the defect and the proximity to free margins a single advancement flap was deemed most appropriate.  Using a sterile surgical marker, an appropriate advancement flap was drawn incorporating the defect and placing the expected incisions within the relaxed skin tension lines where possible.    The area thus outlined was incised deep to adipose tissue with a #15 scalpel blade.  The skin margins were undermined to an appropriate distance in all directions utilizing iris scissors.
Advancement Flap (Double) Text: The defect edges were debeveled with a #15 scalpel blade.  Given the location of the defect and the proximity to free margins a double advancement flap was deemed most appropriate.  Using a sterile surgical marker, the appropriate advancement flaps were drawn incorporating the defect and placing the expected incisions within the relaxed skin tension lines where possible.    The area thus outlined was incised deep to adipose tissue with a #15 scalpel blade.  The skin margins were undermined to an appropriate distance in all directions utilizing iris scissors.
Burow's Advancement Flap Text: The defect edges were debeveled with a #15 scalpel blade.  Given the location of the defect and the proximity to free margins a Burow's advancement flap was deemed most appropriate.  Using a sterile surgical marker, the appropriate advancement flap was drawn incorporating the defect and placing the expected incisions within the relaxed skin tension lines where possible.    The area thus outlined was incised deep to adipose tissue with a #15 scalpel blade.  The skin margins were undermined to an appropriate distance in all directions utilizing iris scissors.
Chonodrocutaneous Helical Advancement Flap Text: The defect edges were debeveled with a #15 scalpel blade. Given the location of the defect and the proximity to free margins a chondrocutaneous helical advancement flap was deemed most appropriate. Using a sterile surgical marker, the appropriate advancement flap was drawn incorporating the defect and placing the expected incisions within the relaxed skin tension lines where possible. The area thus outlined was incised deep to adipose tissue with a #15 scalpel blade. The skin margins were undermined to an appropriate distance in all directions utilizing iris scissors. Following this, the designed flap was advanced and carried over into the primary defect and sutured into place.
Crescentic Advancement Flap Text: The defect edges were debeveled with a #15 scalpel blade.  Given the location of the defect and the proximity to free margins a crescentic advancement flap was deemed most appropriate.  Using a sterile surgical marker, the appropriate advancement flap was drawn incorporating the defect and placing the expected incisions within the relaxed skin tension lines where possible.    The area thus outlined was incised deep to adipose tissue with a #15 scalpel blade.  The skin margins were undermined to an appropriate distance in all directions utilizing iris scissors.
A-T Advancement Flap Text: The defect edges were debeveled with a #15 scalpel blade.  Given the location of the defect, shape of the defect and the proximity to free margins an A-T advancement flap was deemed most appropriate.  Using a sterile surgical marker, an appropriate advancement flap was drawn incorporating the defect and placing the expected incisions within the relaxed skin tension lines where possible.    The area thus outlined was incised deep to adipose tissue with a #15 scalpel blade.  The skin margins were undermined to an appropriate distance in all directions utilizing iris scissors.
O-T Advancement Flap Text: The defect edges were debeveled with a #15 scalpel blade.  Given the location of the defect, shape of the defect and the proximity to free margins an O-T advancement flap was deemed most appropriate.  Using a sterile surgical marker, an appropriate advancement flap was drawn incorporating the defect and placing the expected incisions within the relaxed skin tension lines where possible.    The area thus outlined was incised deep to adipose tissue with a #15 scalpel blade.  The skin margins were undermined to an appropriate distance in all directions utilizing iris scissors.
O-L Flap Text: The defect edges were debeveled with a #15 scalpel blade.  Given the location of the defect, shape of the defect and the proximity to free margins an O-L flap was deemed most appropriate.  Using a sterile surgical marker, an appropriate advancement flap was drawn incorporating the defect and placing the expected incisions within the relaxed skin tension lines where possible.    The area thus outlined was incised deep to adipose tissue with a #15 scalpel blade.  The skin margins were undermined to an appropriate distance in all directions utilizing iris scissors.
O-Z Flap Text: The defect edges were debeveled with a #15 scalpel blade. Given the location of the defect, shape of the defect and the proximity to free margins an O-Z flap was deemed most appropriate. Using a sterile surgical marker, an appropriate transposition flap was drawn incorporating the defect and placing the expected incisions within the relaxed skin tension lines where possible. The area thus outlined was incised deep to adipose tissue with a #15 scalpel blade. The skin margins were undermined to an appropriate distance in all directions utilizing iris scissors. Following this, the designed flap was carried over into the primary defect and sutured into place.
Double O-Z Flap Text: The defect edges were debeveled with a #15 scalpel blade. Given the location of the defect, shape of the defect and the proximity to free margins a Double O-Z flap was deemed most appropriate. Using a sterile surgical marker, an appropriate transposition flap was drawn incorporating the defect and placing the expected incisions within the relaxed skin tension lines where possible. The area thus outlined was incised deep to adipose tissue with a #15 scalpel blade. The skin margins were undermined to an appropriate distance in all directions utilizing iris scissors. Following this, the designed flap was carried over into the primary defect and sutured into place.
V-Y Flap Text: The defect edges were debeveled with a #15 scalpel blade.  Given the location of the defect, shape of the defect and the proximity to free margins a V-Y flap was deemed most appropriate.  Using a sterile surgical marker, an appropriate advancement flap was drawn incorporating the defect and placing the expected incisions within the relaxed skin tension lines where possible.    The area thus outlined was incised deep to adipose tissue with a #15 scalpel blade.  The skin margins were undermined to an appropriate distance in all directions utilizing iris scissors.
Advancement-Rotation Flap Text: The defect edges were debeveled with a #15 scalpel blade.  Given the location of the defect, shape of the defect and the proximity to free margins an advancement-rotation flap was deemed most appropriate.  Using a sterile surgical marker, an appropriate flap was drawn incorporating the defect and placing the expected incisions within the relaxed skin tension lines where possible. The area thus outlined was incised deep to adipose tissue with a #15 scalpel blade.  The skin margins were undermined to an appropriate distance in all directions utilizing iris scissors.
Mercedes Flap Text: The defect edges were debeveled with a #15 scalpel blade. Given the location of the defect, shape of the defect and the proximity to free margins a Mercedes flap was deemed most appropriate. Using a sterile surgical marker, an appropriate advancement flap was drawn incorporating the defect and placing the expected incisions within the relaxed skin tension lines where possible. The area thus outlined was incised deep to adipose tissue with a #15 scalpel blade. The skin margins were undermined to an appropriate distance in all directions utilizing iris scissors. Following this, the designed flap was advanced and carried over into the primary defect and sutured into place.
Modified Advancement Flap Text: The defect edges were debeveled with a #15 scalpel blade.  Given the location of the defect, shape of the defect and the proximity to free margins a modified advancement flap was deemed most appropriate.  Using a sterile surgical marker, an appropriate advancement flap was drawn incorporating the defect and placing the expected incisions within the relaxed skin tension lines where possible.    The area thus outlined was incised deep to adipose tissue with a #15 scalpel blade.  The skin margins were undermined to an appropriate distance in all directions utilizing iris scissors.
Mucosal Advancement Flap Text: Given the location of the defect, shape of the defect and the proximity to free margins a mucosal advancement flap was deemed most appropriate. Incisions were made with a 15 blade scalpel in the appropriate fashion along the cutaneous vermilion border and the mucosal lip. The remaining actinically damaged mucosal tissue was excised.  The mucosal advancement flap was then elevated to the gingival sulcus with care taken to preserve the neurovascular structures and advanced into the primary defect. Care was taken to ensure that precise realignment of the vermilion border was achieved.
Peng Advancement Flap Text: The defect edges were debeveled with a #15 scalpel blade. Given the location of the defect, shape of the defect and the proximity to free margins a Peng advancement flap was deemed most appropriate. Using a sterile surgical marker, an appropriate advancement flap was drawn incorporating the defect and placing the expected incisions within the relaxed skin tension lines where possible. The area thus outlined was incised deep to adipose tissue with a #15 scalpel blade. The skin margins were undermined to an appropriate distance in all directions utilizing iris scissors. Following this, the designed flap was advanced and carried over into the primary defect and sutured into place.
Hatchet Flap Text: The defect edges were debeveled with a #15 scalpel blade.  Given the location of the defect, shape of the defect and the proximity to free margins a hatchet flap was deemed most appropriate.  Using a sterile surgical marker, an appropriate hatchet flap was drawn incorporating the defect and placing the expected incisions within the relaxed skin tension lines where possible.    The area thus outlined was incised deep to adipose tissue with a #15 scalpel blade.  The skin margins were undermined to an appropriate distance in all directions utilizing iris scissors.
Rotation Flap Text: The defect edges were debeveled with a #15 scalpel blade.  Given the location of the defect, shape of the defect and the proximity to free margins a rotation flap was deemed most appropriate.  Using a sterile surgical marker, an appropriate rotation flap was drawn incorporating the defect and placing the expected incisions within the relaxed skin tension lines where possible.    The area thus outlined was incised deep to adipose tissue with a #15 scalpel blade.  The skin margins were undermined to an appropriate distance in all directions utilizing iris scissors.
Spiral Flap Text: The defect edges were debeveled with a #15 scalpel blade.  Given the location of the defect, shape of the defect and the proximity to free margins a spiral flap was deemed most appropriate.  Using a sterile surgical marker, an appropriate rotation flap was drawn incorporating the defect and placing the expected incisions within the relaxed skin tension lines where possible. The area thus outlined was incised deep to adipose tissue with a #15 scalpel blade.  The skin margins were undermined to an appropriate distance in all directions utilizing iris scissors.
Staged Advancement Flap Text: The defect edges were debeveled with a #15 scalpel blade. Given the location of the defect, shape of the defect and the proximity to free margins a staged advancement flap was deemed most appropriate. Using a sterile surgical marker, an appropriate advancement flap was drawn incorporating the defect and placing the expected incisions within the relaxed skin tension lines where possible. The area thus outlined was incised deep to adipose tissue with a #15 scalpel blade. The skin margins were undermined to an appropriate distance in all directions utilizing iris scissors. Following this, the designed flap was carried over into the primary defect and sutured into place.
Star Wedge Flap Text: The defect edges were debeveled with a #15 scalpel blade.  Given the location of the defect, shape of the defect and the proximity to free margins a star wedge flap was deemed most appropriate.  Using a sterile surgical marker, an appropriate rotation flap was drawn incorporating the defect and placing the expected incisions within the relaxed skin tension lines where possible. The area thus outlined was incised deep to adipose tissue with a #15 scalpel blade.  The skin margins were undermined to an appropriate distance in all directions utilizing iris scissors.
Transposition Flap Text: The defect edges were debeveled with a #15 scalpel blade.  Given the location of the defect and the proximity to free margins a transposition flap was deemed most appropriate.  Using a sterile surgical marker, an appropriate transposition flap was drawn incorporating the defect.    The area thus outlined was incised deep to adipose tissue with a #15 scalpel blade.  The skin margins were undermined to an appropriate distance in all directions utilizing iris scissors.
Muscle Hinge Flap Text: The defect edges were debeveled with a #15 scalpel blade.  Given the size, depth and location of the defect and the proximity to free margins a muscle hinge flap was deemed most appropriate.  Using a sterile surgical marker, an appropriate hinge flap was drawn incorporating the defect. The area thus outlined was incised with a #15 scalpel blade.  The skin margins were undermined to an appropriate distance in all directions utilizing iris scissors.
Mustarde Flap Text: The defect edges were debeveled with a #15 scalpel blade.  Given the size, depth and location of the defect and the proximity to free margins a Mustarde flap was deemed most appropriate. Using a sterile surgical marker, an appropriate flap was drawn incorporating the defect. The area thus outlined was incised with a #15 scalpel blade. The skin margins were undermined to an appropriate distance in all directions utilizing iris scissors. Following this, the designed flap was carried into the primary defect and sutured into place.
Nasal Turnover Hinge Flap Text: The defect edges were debeveled with a #15 scalpel blade.  Given the size, depth, location of the defect and the defect being full thickness a nasal turnover hinge flap was deemed most appropriate. Using a sterile surgical marker, an appropriate hinge flap was drawn incorporating the defect. The area thus outlined was incised with a #15 scalpel blade. The flap was designed to recreate the nasal mucosal lining and the alar rim. The skin margins were undermined to an appropriate distance in all directions utilizing iris scissors. Following this, the designed flap was carried over into the primary defect and sutured into place
Nasalis-Muscle-Based Myocutaneous Island Pedicle Flap Text: Using a #15 blade, an incision was made around the donor flap to the level of the nasalis muscle. Wide lateral undermining was then performed in both the subcutaneous plane above the nasalis muscle, and in a submuscular plane just above periosteum. This allowed the formation of a free nasalis muscle axial pedicle (based on the angular artery) which was still attached to the actual cutaneous flap, increasing its mobility and vascular viability. Hemostasis was obtained with pinpoint electrocoagulation. The flap was mobilized into position and the pivotal anchor points positioned and stabilized with buried interrupted sutures. Subcutaneous and dermal tissues were closed in a multilayered fashion with sutures. Tissue redundancies were excised, and the epidermal edges were apposed without significant tension and sutured with sutures.
Orbicularis Oris Muscle Flap Text: The defect edges were debeveled with a #15 scalpel blade.  Given that the defect affected the competency of the oral sphincter an orbicularis oris muscle flap was deemed most appropriate to restore this competency and normal muscle function.  Using a sterile surgical marker, an appropriate flap was drawn incorporating the defect. The area thus outlined was incised with a #15 scalpel blade. Following this, the designed flap was carried over into the primary defect and sutured into place.
Melolabial Transposition Flap Text: The defect edges were debeveled with a #15 scalpel blade.  Given the location of the defect and the proximity to free margins a melolabial flap was deemed most appropriate.  Using a sterile surgical marker, an appropriate melolabial transposition flap was drawn incorporating the defect.    The area thus outlined was incised deep to adipose tissue with a #15 scalpel blade.  The skin margins were undermined to an appropriate distance in all directions utilizing iris scissors.
Rhombic Flap Text: The defect edges were debeveled with a #15 scalpel blade.  Given the location of the defect and the proximity to free margins a rhombic flap was deemed most appropriate.  Using a sterile surgical marker, an appropriate rhombic flap was drawn incorporating the defect.    The area thus outlined was incised deep to adipose tissue with a #15 scalpel blade.  The skin margins were undermined to an appropriate distance in all directions utilizing iris scissors.
Rhomboid Transposition Flap Text: The defect edges were debeveled with a #15 scalpel blade. Given the location of the defect and the proximity to free margins a rhomboid transposition flap was deemed most appropriate. Using a sterile surgical marker, an appropriate rhomboid flap was drawn incorporating the defect. The area thus outlined was incised deep to adipose tissue with a #15 scalpel blade. The skin margins were undermined to an appropriate distance in all directions utilizing iris scissors. Following this, the designed flap was carried over into the primary defect and sutured into place.
Bi-Rhombic Flap Text: The defect edges were debeveled with a #15 scalpel blade.  Given the location of the defect and the proximity to free margins a bi-rhombic flap was deemed most appropriate.  Using a sterile surgical marker, an appropriate rhombic flap was drawn incorporating the defect. The area thus outlined was incised deep to adipose tissue with a #15 scalpel blade.  The skin margins were undermined to an appropriate distance in all directions utilizing iris scissors.
Helical Rim Advancement Flap Text: The defect edges were debeveled with a #15 blade scalpel.  Given the location of the defect and the proximity to free margins (helical rim) a double helical rim advancement flap was deemed most appropriate.  Using a sterile surgical marker, the appropriate advancement flaps were drawn incorporating the defect and placing the expected incisions between the helical rim and antihelix where possible.  The area thus outlined was incised through and through with a #15 scalpel blade.  With a skin hook and iris scissors, the flaps were gently and sharply undermined and freed up.
Bilateral Helical Rim Advancement Flap Text: The defect edges were debeveled with a #15 blade scalpel.  Given the location of the defect and the proximity to free margins (helical rim) a bilateral helical rim advancement flap was deemed most appropriate.  Using a sterile surgical marker, the appropriate advancement flaps were drawn incorporating the defect and placing the expected incisions between the helical rim and antihelix where possible.  The area thus outlined was incised through and through with a #15 scalpel blade.  With a skin hook and iris scissors, the flaps were gently and sharply undermined and freed up.
Ear Star Wedge Flap Text: The defect edges were debeveled with a #15 blade scalpel.  Given the location of the defect and the proximity to free margins (helical rim) an ear star wedge flap was deemed most appropriate.  Using a sterile surgical marker, the appropriate flap was drawn incorporating the defect and placing the expected incisions between the helical rim and antihelix where possible.  The area thus outlined was incised through and through with a #15 scalpel blade.
Banner Transposition Flap Text: The defect edges were debeveled with a #15 scalpel blade. Given the location of the defect and the proximity to free margins a Banner transposition flap was deemed most appropriate. Using a sterile surgical marker, an appropriate flap was drawn around the defect. The area thus outlined was incised deep to adipose tissue with a #15 scalpel blade. The skin margins were undermined to an appropriate distance in all directions utilizing iris scissors. Following this, the designed flap was carried into the primary defect and sutured into place.
Bilobed Flap Text: The defect edges were debeveled with a #15 scalpel blade.  Given the location of the defect and the proximity to free margins a bilobe flap was deemed most appropriate.  Using a sterile surgical marker, an appropriate bilobe flap drawn around the defect.    The area thus outlined was incised deep to adipose tissue with a #15 scalpel blade.  The skin margins were undermined to an appropriate distance in all directions utilizing iris scissors.
Bilobed Transposition Flap Text: The defect edges were debeveled with a #15 scalpel blade.  Given the location of the defect and the proximity to free margins a bilobed transposition flap was deemed most appropriate.  Using a sterile surgical marker, an appropriate bilobe flap drawn around the defect.    The area thus outlined was incised deep to adipose tissue with a #15 scalpel blade.  The skin margins were undermined to an appropriate distance in all directions utilizing iris scissors.
Trilobed Flap Text: The defect edges were debeveled with a #15 scalpel blade.  Given the location of the defect and the proximity to free margins a trilobed flap was deemed most appropriate.  Using a sterile surgical marker, an appropriate trilobed flap drawn around the defect.    The area thus outlined was incised deep to adipose tissue with a #15 scalpel blade.  The skin margins were undermined to an appropriate distance in all directions utilizing iris scissors.
Dorsal Nasal Flap Text: The defect edges were debeveled with a #15 scalpel blade.  Given the location of the defect and the proximity to free margins a dorsal nasal flap was deemed most appropriate.  Using a sterile surgical marker, an appropriate dorsal nasal flap was drawn around the defect.    The area thus outlined was incised deep to adipose tissue with a #15 scalpel blade.  The skin margins were undermined to an appropriate distance in all directions utilizing iris scissors.
Island Pedicle Flap Text: The defect edges were debeveled with a #15 scalpel blade.  Given the location of the defect, shape of the defect and the proximity to free margins an island pedicle advancement flap was deemed most appropriate.  Using a sterile surgical marker, an appropriate advancement flap was drawn incorporating the defect, outlining the appropriate donor tissue and placing the expected incisions within the relaxed skin tension lines where possible.    The area thus outlined was incised deep to adipose tissue with a #15 scalpel blade.  The skin margins were undermined to an appropriate distance in all directions around the primary defect and laterally outward around the island pedicle utilizing iris scissors.  There was minimal undermining beneath the pedicle flap.
Island Pedicle Flap With Canthal Suspension Text: The defect edges were debeveled with a #15 scalpel blade.  Given the location of the defect, shape of the defect and the proximity to free margins an island pedicle advancement flap was deemed most appropriate.  Using a sterile surgical marker, an appropriate advancement flap was drawn incorporating the defect, outlining the appropriate donor tissue and placing the expected incisions within the relaxed skin tension lines where possible. The area thus outlined was incised deep to adipose tissue with a #15 scalpel blade.  The skin margins were undermined to an appropriate distance in all directions around the primary defect and laterally outward around the island pedicle utilizing iris scissors.  There was minimal undermining beneath the pedicle flap. A suspension suture was placed in the canthal tendon to prevent tension and prevent ectropion.
Alar Island Pedicle Flap Text: The defect edges were debeveled with a #15 scalpel blade.  Given the location of the defect, shape of the defect and the proximity to the alar rim an island pedicle advancement flap was deemed most appropriate.  Using a sterile surgical marker, an appropriate advancement flap was drawn incorporating the defect, outlining the appropriate donor tissue and placing the expected incisions within the nasal ala running parallel to the alar rim. The area thus outlined was incised with a #15 scalpel blade.  The skin margins were undermined minimally to an appropriate distance in all directions around the primary defect and laterally outward around the island pedicle utilizing iris scissors.  There was minimal undermining beneath the pedicle flap.
Double Island Pedicle Flap Text: The defect edges were debeveled with a #15 scalpel blade.  Given the location of the defect, shape of the defect and the proximity to free margins a double island pedicle advancement flap was deemed most appropriate.  Using a sterile surgical marker, an appropriate advancement flap was drawn incorporating the defect, outlining the appropriate donor tissue and placing the expected incisions within the relaxed skin tension lines where possible.    The area thus outlined was incised deep to adipose tissue with a #15 scalpel blade.  The skin margins were undermined to an appropriate distance in all directions around the primary defect and laterally outward around the island pedicle utilizing iris scissors.  There was minimal undermining beneath the pedicle flap.
Island Pedicle Flap-Requiring Vessel Identification Text: The defect edges were debeveled with a #15 scalpel blade.  Given the location of the defect, shape of the defect and the proximity to free margins an island pedicle advancement flap was deemed most appropriate.  Using a sterile surgical marker, an appropriate advancement flap was drawn, based on the axial vessel mentioned above, incorporating the defect, outlining the appropriate donor tissue and placing the expected incisions within the relaxed skin tension lines where possible.    The area thus outlined was incised deep to adipose tissue with a #15 scalpel blade.  The skin margins were undermined to an appropriate distance in all directions around the primary defect and laterally outward around the island pedicle utilizing iris scissors.  There was minimal undermining beneath the pedicle flap.
Keystone Flap Text: The defect edges were debeveled with a #15 scalpel blade.  Given the location of the defect, shape of the defect a keystone flap was deemed most appropriate.  Using a sterile surgical marker, an appropriate keystone flap was drawn incorporating the defect, outlining the appropriate donor tissue and placing the expected incisions within the relaxed skin tension lines where possible. The area thus outlined was incised deep to adipose tissue with a #15 scalpel blade.  The skin margins were undermined to an appropriate distance in all directions around the primary defect and laterally outward around the flap utilizing iris scissors.
O-T Plasty Text: The defect edges were debeveled with a #15 scalpel blade.  Given the location of the defect, shape of the defect and the proximity to free margins an O-T plasty was deemed most appropriate.  Using a sterile surgical marker, an appropriate O-T plasty was drawn incorporating the defect and placing the expected incisions within the relaxed skin tension lines where possible.    The area thus outlined was incised deep to adipose tissue with a #15 scalpel blade.  The skin margins were undermined to an appropriate distance in all directions utilizing iris scissors.
O-Z Plasty Text: The defect edges were debeveled with a #15 scalpel blade.  Given the location of the defect, shape of the defect and the proximity to free margins an O-Z plasty (double transposition flap) was deemed most appropriate.  Using a sterile surgical marker, the appropriate transposition flaps were drawn incorporating the defect and placing the expected incisions within the relaxed skin tension lines where possible.    The area thus outlined was incised deep to adipose tissue with a #15 scalpel blade.  The skin margins were undermined to an appropriate distance in all directions utilizing iris scissors.  Hemostasis was achieved with electrocautery.  The flaps were then transposed into place, one clockwise and the other counterclockwise, and anchored with interrupted buried subcutaneous sutures.
Double O-Z Plasty Text: The defect edges were debeveled with a #15 scalpel blade. Given the location of the defect, shape of the defect and the proximity to free margins a Double O-Z plasty (double transposition flap) was deemed most appropriate. Using a sterile surgical marker, the appropriate transposition flaps were drawn incorporating the defect and placing the expected incisions within the relaxed skin tension lines where possible. The area thus outlined was incised deep to adipose tissue with a #15 scalpel blade. The skin margins were undermined to an appropriate distance in all directions utilizing iris scissors. Hemostasis was achieved with electrocautery. The flaps were then transposed and carried over into place, one clockwise and the other counterclockwise, and anchored with interrupted buried subcutaneous sutures.
V-Y Plasty Text: The defect edges were debeveled with a #15 scalpel blade.  Given the location of the defect, shape of the defect and the proximity to free margins an V-Y advancement flap was deemed most appropriate.  Using a sterile surgical marker, an appropriate advancement flap was drawn incorporating the defect and placing the expected incisions within the relaxed skin tension lines where possible.    The area thus outlined was incised deep to adipose tissue with a #15 scalpel blade.  The skin margins were undermined to an appropriate distance in all directions utilizing iris scissors.
H Plasty Text: Given the location of the defect, shape of the defect and the proximity to free margins a H-plasty was deemed most appropriate for repair.  Using a sterile surgical marker, the appropriate advancement arms of the H-plasty were drawn incorporating the defect and placing the expected incisions within the relaxed skin tension lines where possible. The area thus outlined was incised deep to adipose tissue with a #15 scalpel blade. The skin margins were undermined to an appropriate distance in all directions utilizing iris scissors.  The opposing advancement arms were then advanced into place in opposite direction and anchored with interrupted buried subcutaneous sutures.
W Plasty Text: The lesion was extirpated to the level of the fat with a #15 scalpel blade.  Given the location of the defect, shape of the defect and the proximity to free margins a W-plasty was deemed most appropriate for repair.  Using a sterile surgical marker, the appropriate transposition arms of the W-plasty were drawn incorporating the defect and placing the expected incisions within the relaxed skin tension lines where possible.    The area thus outlined was incised deep to adipose tissue with a #15 scalpel blade.  The skin margins were undermined to an appropriate distance in all directions utilizing iris scissors.  The opposing transposition arms were then transposed into place in opposite direction and anchored with interrupted buried subcutaneous sutures.
Z Plasty Text: The lesion was extirpated to the level of the fat with a #15 scalpel blade.  Given the location of the defect, shape of the defect and the proximity to free margins a Z-plasty was deemed most appropriate for repair.  Using a sterile surgical marker, the appropriate transposition arms of the Z-plasty were drawn incorporating the defect and placing the expected incisions within the relaxed skin tension lines where possible.    The area thus outlined was incised deep to adipose tissue with a #15 scalpel blade.  The skin margins were undermined to an appropriate distance in all directions utilizing iris scissors.  The opposing transposition arms were then transposed into place in opposite direction and anchored with interrupted buried subcutaneous sutures.
Zygomaticofacial Flap Text: Given the location of the defect, shape of the defect and the proximity to free margins a zygomaticofacial flap was deemed most appropriate for repair. Using a sterile surgical marker, the appropriate flap was drawn incorporating the defect and placing the expected incisions within the relaxed skin tension lines where possible. The area thus outlined was incised deep to adipose tissue with a #15 scalpel blade with preservation of a vascular pedicle.  The skin margins were undermined to an appropriate distance in all directions utilizing iris scissors. The flap was then carried over into the defect and anchored with interrupted buried subcutaneous sutures.
Cheek Interpolation Flap Text: A decision was made to reconstruct the defect utilizing an interpolation axial flap and a staged reconstruction.  A telfa template was made of the defect.  This telfa template was then used to outline the Cheek Interpolation flap.  The donor area for the pedicle flap was then injected with anesthesia.  The flap was excised through the skin and subcutaneous tissue down to the layer of the underlying musculature.  The interpolation flap was carefully excised within this deep plane to maintain its blood supply.  The edges of the donor site were undermined.   The donor site was closed in a primary fashion.  The pedicle was then rotated into position and sutured.  Once the tube was sutured into place, adequate blood supply was confirmed with blanching and refill.  The pedicle was then wrapped with xeroform gauze and dressed appropriately with a telfa and gauze bandage to ensure continued blood supply and protect the attached pedicle.
Cheek-To-Nose Interpolation Flap Text: A decision was made to reconstruct the defect utilizing an interpolation axial flap and a staged reconstruction.  A telfa template was made of the defect.  This telfa template was then used to outline the Cheek-To-Nose Interpolation flap.  The donor area for the pedicle flap was then injected with anesthesia.  The flap was excised through the skin and subcutaneous tissue down to the layer of the underlying musculature.  The interpolation flap was carefully excised within this deep plane to maintain its blood supply.  The edges of the donor site were undermined.   The donor site was closed in a primary fashion.  The pedicle was then rotated into position and sutured.  Once the tube was sutured into place, adequate blood supply was confirmed with blanching and refill.  The pedicle was then wrapped with xeroform gauze and dressed appropriately with a telfa and gauze bandage to ensure continued blood supply and protect the attached pedicle.
Interpolation Flap Text: A decision was made to reconstruct the defect utilizing an interpolation axial flap and a staged reconstruction.  A telfa template was made of the defect.  This telfa template was then used to outline the interpolation flap.  The donor area for the pedicle flap was then injected with anesthesia.  The flap was excised through the skin and subcutaneous tissue down to the layer of the underlying musculature.  The interpolation flap was carefully excised within this deep plane to maintain its blood supply.  The edges of the donor site were undermined.   The donor site was closed in a primary fashion.  The pedicle was then rotated into position and sutured.  Once the tube was sutured into place, adequate blood supply was confirmed with blanching and refill.  The pedicle was then wrapped with xeroform gauze and dressed appropriately with a telfa and gauze bandage to ensure continued blood supply and protect the attached pedicle.
Melolabial Interpolation Flap Text: A decision was made to reconstruct the defect utilizing an interpolation axial flap and a staged reconstruction.  A telfa template was made of the defect.  This telfa template was then used to outline the melolabial interpolation flap.  The donor area for the pedicle flap was then injected with anesthesia.  The flap was excised through the skin and subcutaneous tissue down to the layer of the underlying musculature.  The pedicle flap was carefully excised within this deep plane to maintain its blood supply.  The edges of the donor site were undermined.   The donor site was closed in a primary fashion.  The pedicle was then rotated into position and sutured.  Once the tube was sutured into place, adequate blood supply was confirmed with blanching and refill.  The pedicle was then wrapped with xeroform gauze and dressed appropriately with a telfa and gauze bandage to ensure continued blood supply and protect the attached pedicle.
Mastoid Interpolation Flap Text: A decision was made to reconstruct the defect utilizing an interpolation axial flap and a staged reconstruction.  A telfa template was made of the defect.  This telfa template was then used to outline the mastoid interpolation flap.  The donor area for the pedicle flap was then injected with anesthesia.  The flap was excised through the skin and subcutaneous tissue down to the layer of the underlying musculature.  The pedicle flap was carefully excised within this deep plane to maintain its blood supply.  The edges of the donor site were undermined.   The donor site was closed in a primary fashion.  The pedicle was then rotated into position and sutured.  Once the tube was sutured into place, adequate blood supply was confirmed with blanching and refill.  The pedicle was then wrapped with xeroform gauze and dressed appropriately with a telfa and gauze bandage to ensure continued blood supply and protect the attached pedicle.
Posterior Auricular Interpolation Flap Text: A decision was made to reconstruct the defect utilizing an interpolation axial flap and a staged reconstruction.  A telfa template was made of the defect.  This telfa template was then used to outline the posterior auricular interpolation flap.  The donor area for the pedicle flap was then injected with anesthesia.  The flap was excised through the skin and subcutaneous tissue down to the layer of the underlying musculature.  The pedicle flap was carefully excised within this deep plane to maintain its blood supply.  The edges of the donor site were undermined.   The donor site was closed in a primary fashion.  The pedicle was then rotated into position and sutured.  Once the tube was sutured into place, adequate blood supply was confirmed with blanching and refill.  The pedicle was then wrapped with xeroform gauze and dressed appropriately with a telfa and gauze bandage to ensure continued blood supply and protect the attached pedicle.
Paramedian Forehead Flap Text: A decision was made to reconstruct the defect utilizing an interpolation axial flap and a staged reconstruction.  A telfa template was made of the defect.  This telfa template was then used to outline the paramedian forehead pedicle flap.  The donor area for the pedicle flap was then injected with anesthesia.  The flap was excised through the skin and subcutaneous tissue down to the layer of the underlying musculature.  The pedicle flap was carefully excised within this deep plane to maintain its blood supply.  The edges of the donor site were undermined.   The donor site was closed in a primary fashion.  The pedicle was then rotated into position and sutured.  Once the tube was sutured into place, adequate blood supply was confirmed with blanching and refill.  The pedicle was then wrapped with xeroform gauze and dressed appropriately with a telfa and gauze bandage to ensure continued blood supply and protect the attached pedicle.
Abbe Flap (Upper To Lower Lip) Text: The defect of the lower lip was assessed and measured.  Given the location and size of the defect, an Abbe flap was deemed most appropriate. Using a sterile surgical marker, an appropriate Abbe flap was measured and drawn on the upper lip. Local anesthesia was then infiltrated.  A scalpel was then used to incise the upper lip through and through the skin, vermilion, muscle and mucosa, leaving the flap pedicled on the opposite side.  The flap was then rotated and transferred to the lower lip defect.  The flap was then sutured into place with a three layer technique, closing the orbicularis oris muscle layer with subcutaneous buried sutures, followed by a mucosal layer and an epidermal layer.
Abbe Flap (Lower To Upper Lip) Text: The defect of the upper lip was assessed and measured.  Given the location and size of the defect, an Abbe flap was deemed most appropriate. Using a sterile surgical marker, an appropriate Abbe flap was measured and drawn on the lower lip. Local anesthesia was then infiltrated. A scalpel was then used to incise the upper lip through and through the skin, vermilion, muscle and mucosa, leaving the flap pedicled on the opposite side.  The flap was then rotated and transferred to the lower lip defect.  The flap was then sutured into place with a three layer technique, closing the orbicularis oris muscle layer with subcutaneous buried sutures, followed by a mucosal layer and an epidermal layer.
Estlander Flap (Upper To Lower Lip) Text: The defect of the lower lip was assessed and measured.  Given the location and size of the defect, an Estlander flap was deemed most appropriate. Using a sterile surgical marker, an appropriate Estlander flap was measured and drawn on the upper lip. Local anesthesia was then infiltrated. A scalpel was then used to incise the lateral aspect of the flap, through skin, muscle and mucosa, leaving the flap pedicled medially.  The flap was then rotated and positioned to fill the lower lip defect.  The flap was then sutured into place with a three layer technique, closing the orbicularis oris muscle layer with subcutaneous buried sutures, followed by a mucosal layer and an epidermal layer.
Lip Wedge Excision Repair Text: Given the location of the defect and the proximity to free margins a full thickness wedge repair was deemed most appropriate.  Using a sterile surgical marker, the appropriate repair was drawn incorporating the defect and placing the expected incisions perpendicular to the vermilion border.  The vermilion border was also meticulously outlined to ensure appropriate reapproximation during the repair.  The area thus outlined was incised through and through with a #15 scalpel blade.  The muscularis and dermis were reaproximated with deep sutures following hemostasis. Care was taken to realign the vermilion border before proceeding with the superficial closure.  Once the vermilion was realigned the superfical and mucosal closure was finished.
Ftsg Text: The defect edges were debeveled with a #15 scalpel blade.  Given the location of the defect, shape of the defect and the proximity to free margins a full thickness skin graft was deemed most appropriate.  Using a sterile surgical marker, the primary defect shape was transferred to the donor site. The area thus outlined was incised deep to adipose tissue with a #15 scalpel blade.  The harvested graft was then trimmed of adipose tissue until only dermis and epidermis was left.  The skin margins of the secondary defect were undermined to an appropriate distance in all directions utilizing iris scissors.  The secondary defect was closed with interrupted buried subcutaneous sutures.  The skin edges were then re-apposed with running  sutures.  The skin graft was then placed in the primary defect and oriented appropriately.
Split-Thickness Skin Graft Text: The defect edges were debeveled with a #15 scalpel blade.  Given the location of the defect, shape of the defect and the proximity to free margins a split thickness skin graft was deemed most appropriate.  Using a sterile surgical marker, the primary defect shape was transferred to the donor site. The split thickness graft was then harvested.  The skin graft was then placed in the primary defect and oriented appropriately.
Burow's Graft Text: The defect edges were debeveled with a #15 scalpel blade. Given the location of the defect, shape of the defect, the proximity to free margins and the presence of a standing cone deformity a Burow's skin graft was deemed most appropriate. The standing cone was removed and this tissue was then trimmed to the shape of the primary defect. The adipose tissue was also removed until only dermis and epidermis were left.  The skin margins of the secondary defect were undermined to an appropriate distance in all directions utilizing iris scissors.  The secondary defect was closed with interrupted buried subcutaneous sutures.  The skin edges were then re-apposed with running  sutures.  The skin graft was then placed in the primary defect and oriented appropriately.
Cartilage Graft Text: The defect edges were debeveled with a #15 scalpel blade.  Given the location of the defect, shape of the defect, the fact the defect involved a full thickness cartilage defect a cartilage graft was deemed most appropriate.  An appropriate donor site was identified, cleansed, and anesthetized. The cartilage graft was then harvested and transferred to the recipient site, oriented appropriately and then sutured into place.  The secondary defect was then repaired using a primary closure.
Composite Graft Text: The defect edges were debeveled with a #15 scalpel blade.  Given the location of the defect, shape of the defect, the proximity to free margins and the fact the defect was full thickness a composite graft was deemed most appropriate.  The defect was outline and then transferred to the donor site.  A full thickness graft was then excised from the donor site. The graft was then placed in the primary defect, oriented appropriately and then sutured into place.  The secondary defect was then repaired using a primary closure.
Epidermal Autograft Text: The defect edges were debeveled with a #15 scalpel blade.  Given the location of the defect, shape of the defect and the proximity to free margins an epidermal autograft was deemed most appropriate.  Using a sterile surgical marker, the primary defect shape was transferred to the donor site. The epidermal graft was then harvested.  The skin graft was then placed in the primary defect and oriented appropriately.
Dermal Autograft Text: The defect edges were debeveled with a #15 scalpel blade.  Given the location of the defect, shape of the defect and the proximity to free margins a dermal autograft was deemed most appropriate.  Using a sterile surgical marker, the primary defect shape was transferred to the donor site. The area thus outlined was incised deep to adipose tissue with a #15 scalpel blade.  The harvested graft was then trimmed of adipose and epidermal tissue until only dermis was left.  The skin graft was then placed in the primary defect and oriented appropriately.
Skin Substitute Text: The defect edges were debeveled with a #15 scalpel blade.  Given the location of the defect, shape of the defect and the proximity to free margins a skin substitute graft was deemed most appropriate.  The graft material was trimmed to fit the size of the defect. The graft was then placed in the primary defect and oriented appropriately.
Tissue Cultured Epidermal Autograft Text: The defect edges were debeveled with a #15 scalpel blade.  Given the location of the defect, shape of the defect and the proximity to free margins a tissue cultured epidermal autograft was deemed most appropriate.  The graft was then trimmed to fit the size of the defect.  The graft was then placed in the primary defect and oriented appropriately.
Xenograft Text: The defect edges were debeveled with a #15 scalpel blade.  Given the location of the defect, shape of the defect and the proximity to free margins a xenograft was deemed most appropriate.  The graft was then trimmed to fit the size of the defect.  The graft was then placed in the primary defect and oriented appropriately.
Purse String (Intermediate) Text: Given the location of the defect and the characteristics of the surrounding skin a purse string intermediate closure was deemed most appropriate.  Undermining was performed circumfirentially around the surgical defect.  A purse string suture was then placed and tightened.
Purse String (Simple) Text: Given the location of the defect and the characteristics of the surrounding skin a purse string simple closure was deemed most appropriate.  Undermining was performed circumferentially around the surgical defect.  A purse string suture was then placed and tightened.
Partial Purse String (Intermediate) Text: Given the location of the defect and the characteristics of the surrounding skin an intermediate purse string closure was deemed most appropriate.  Undermining was performed circumferentially around the surgical defect.  A purse string suture was then placed and tightened. Wound tension of the circular defect prevented complete closure of the wound.
Partial Purse String (Simple) Text: Given the location of the defect and the characteristics of the surrounding skin a simple purse string closure was deemed most appropriate.  Undermining was performed circumferentially around the surgical defect.  A purse string suture was then placed and tightened. Wound tension of the circular defect prevented complete closure of the wound.
Complex Repair And Single Advancement Flap Text: The defect edges were debeveled with a #15 scalpel blade.  The primary defect was closed partially with a complex linear closure.  Given the location of the remaining defect, shape of the defect and the proximity to free margins a single advancement flap was deemed most appropriate for complete closure of the defect.  Using a sterile surgical marker, an appropriate advancement flap was drawn incorporating the defect and placing the expected incisions within the relaxed skin tension lines where possible.    The area thus outlined was incised deep to adipose tissue with a #15 scalpel blade.  The skin margins were undermined to an appropriate distance in all directions utilizing iris scissors.
Complex Repair And Double Advancement Flap Text: The defect edges were debeveled with a #15 scalpel blade.  The primary defect was closed partially with a complex linear closure.  Given the location of the remaining defect, shape of the defect and the proximity to free margins a double advancement flap was deemed most appropriate for complete closure of the defect.  Using a sterile surgical marker, an appropriate advancement flap was drawn incorporating the defect and placing the expected incisions within the relaxed skin tension lines where possible.    The area thus outlined was incised deep to adipose tissue with a #15 scalpel blade.  The skin margins were undermined to an appropriate distance in all directions utilizing iris scissors.
Complex Repair And Modified Advancement Flap Text: The defect edges were debeveled with a #15 scalpel blade.  The primary defect was closed partially with a complex linear closure.  Given the location of the remaining defect, shape of the defect and the proximity to free margins a modified advancement flap was deemed most appropriate for complete closure of the defect.  Using a sterile surgical marker, an appropriate advancement flap was drawn incorporating the defect and placing the expected incisions within the relaxed skin tension lines where possible.    The area thus outlined was incised deep to adipose tissue with a #15 scalpel blade.  The skin margins were undermined to an appropriate distance in all directions utilizing iris scissors.
Complex Repair And A-T Advancement Flap Text: The defect edges were debeveled with a #15 scalpel blade.  The primary defect was closed partially with a complex linear closure.  Given the location of the remaining defect, shape of the defect and the proximity to free margins an A-T advancement flap was deemed most appropriate for complete closure of the defect.  Using a sterile surgical marker, an appropriate advancement flap was drawn incorporating the defect and placing the expected incisions within the relaxed skin tension lines where possible.    The area thus outlined was incised deep to adipose tissue with a #15 scalpel blade.  The skin margins were undermined to an appropriate distance in all directions utilizing iris scissors.
Complex Repair And O-T Advancement Flap Text: The defect edges were debeveled with a #15 scalpel blade.  The primary defect was closed partially with a complex linear closure.  Given the location of the remaining defect, shape of the defect and the proximity to free margins an O-T advancement flap was deemed most appropriate for complete closure of the defect.  Using a sterile surgical marker, an appropriate advancement flap was drawn incorporating the defect and placing the expected incisions within the relaxed skin tension lines where possible.    The area thus outlined was incised deep to adipose tissue with a #15 scalpel blade.  The skin margins were undermined to an appropriate distance in all directions utilizing iris scissors.
Complex Repair And O-L Flap Text: The defect edges were debeveled with a #15 scalpel blade.  The primary defect was closed partially with a complex linear closure.  Given the location of the remaining defect, shape of the defect and the proximity to free margins an O-L flap was deemed most appropriate for complete closure of the defect.  Using a sterile surgical marker, an appropriate flap was drawn incorporating the defect and placing the expected incisions within the relaxed skin tension lines where possible.    The area thus outlined was incised deep to adipose tissue with a #15 scalpel blade.  The skin margins were undermined to an appropriate distance in all directions utilizing iris scissors.
Complex Repair And Bilobe Flap Text: The defect edges were debeveled with a #15 scalpel blade.  The primary defect was closed partially with a complex linear closure.  Given the location of the remaining defect, shape of the defect and the proximity to free margins a bilobe flap was deemed most appropriate for complete closure of the defect.  Using a sterile surgical marker, an appropriate advancement flap was drawn incorporating the defect and placing the expected incisions within the relaxed skin tension lines where possible.    The area thus outlined was incised deep to adipose tissue with a #15 scalpel blade.  The skin margins were undermined to an appropriate distance in all directions utilizing iris scissors.
Complex Repair And Melolabial Flap Text: The defect edges were debeveled with a #15 scalpel blade.  The primary defect was closed partially with a complex linear closure.  Given the location of the remaining defect, shape of the defect and the proximity to free margins a melolabial flap was deemed most appropriate for complete closure of the defect.  Using a sterile surgical marker, an appropriate advancement flap was drawn incorporating the defect and placing the expected incisions within the relaxed skin tension lines where possible.    The area thus outlined was incised deep to adipose tissue with a #15 scalpel blade.  The skin margins were undermined to an appropriate distance in all directions utilizing iris scissors.
Complex Repair And Rotation Flap Text: The defect edges were debeveled with a #15 scalpel blade.  The primary defect was closed partially with a complex linear closure.  Given the location of the remaining defect, shape of the defect and the proximity to free margins a rotation flap was deemed most appropriate for complete closure of the defect.  Using a sterile surgical marker, an appropriate advancement flap was drawn incorporating the defect and placing the expected incisions within the relaxed skin tension lines where possible.    The area thus outlined was incised deep to adipose tissue with a #15 scalpel blade.  The skin margins were undermined to an appropriate distance in all directions utilizing iris scissors.
Complex Repair And Rhombic Flap Text: The defect edges were debeveled with a #15 scalpel blade.  The primary defect was closed partially with a complex linear closure.  Given the location of the remaining defect, shape of the defect and the proximity to free margins a rhombic flap was deemed most appropriate for complete closure of the defect.  Using a sterile surgical marker, an appropriate advancement flap was drawn incorporating the defect and placing the expected incisions within the relaxed skin tension lines where possible.    The area thus outlined was incised deep to adipose tissue with a #15 scalpel blade.  The skin margins were undermined to an appropriate distance in all directions utilizing iris scissors.
Complex Repair And Transposition Flap Text: The defect edges were debeveled with a #15 scalpel blade.  The primary defect was closed partially with a complex linear closure.  Given the location of the remaining defect, shape of the defect and the proximity to free margins a transposition flap was deemed most appropriate for complete closure of the defect.  Using a sterile surgical marker, an appropriate advancement flap was drawn incorporating the defect and placing the expected incisions within the relaxed skin tension lines where possible.    The area thus outlined was incised deep to adipose tissue with a #15 scalpel blade.  The skin margins were undermined to an appropriate distance in all directions utilizing iris scissors.
Complex Repair And V-Y Plasty Text: The defect edges were debeveled with a #15 scalpel blade.  The primary defect was closed partially with a complex linear closure.  Given the location of the remaining defect, shape of the defect and the proximity to free margins a V-Y plasty was deemed most appropriate for complete closure of the defect.  Using a sterile surgical marker, an appropriate advancement flap was drawn incorporating the defect and placing the expected incisions within the relaxed skin tension lines where possible.    The area thus outlined was incised deep to adipose tissue with a #15 scalpel blade.  The skin margins were undermined to an appropriate distance in all directions utilizing iris scissors.
Complex Repair And M Plasty Text: The defect edges were debeveled with a #15 scalpel blade.  The primary defect was closed partially with a complex linear closure.  Given the location of the remaining defect, shape of the defect and the proximity to free margins an M plasty was deemed most appropriate for complete closure of the defect.  Using a sterile surgical marker, an appropriate advancement flap was drawn incorporating the defect and placing the expected incisions within the relaxed skin tension lines where possible.    The area thus outlined was incised deep to adipose tissue with a #15 scalpel blade.  The skin margins were undermined to an appropriate distance in all directions utilizing iris scissors.
Complex Repair And Double M Plasty Text: The defect edges were debeveled with a #15 scalpel blade.  The primary defect was closed partially with a complex linear closure.  Given the location of the remaining defect, shape of the defect and the proximity to free margins a double M plasty was deemed most appropriate for complete closure of the defect.  Using a sterile surgical marker, an appropriate advancement flap was drawn incorporating the defect and placing the expected incisions within the relaxed skin tension lines where possible.    The area thus outlined was incised deep to adipose tissue with a #15 scalpel blade.  The skin margins were undermined to an appropriate distance in all directions utilizing iris scissors.
Complex Repair And W Plasty Text: The defect edges were debeveled with a #15 scalpel blade.  The primary defect was closed partially with a complex linear closure.  Given the location of the remaining defect, shape of the defect and the proximity to free margins a W plasty was deemed most appropriate for complete closure of the defect.  Using a sterile surgical marker, an appropriate advancement flap was drawn incorporating the defect and placing the expected incisions within the relaxed skin tension lines where possible.    The area thus outlined was incised deep to adipose tissue with a #15 scalpel blade.  The skin margins were undermined to an appropriate distance in all directions utilizing iris scissors.
Complex Repair And Z Plasty Text: The defect edges were debeveled with a #15 scalpel blade.  The primary defect was closed partially with a complex linear closure.  Given the location of the remaining defect, shape of the defect and the proximity to free margins a Z plasty was deemed most appropriate for complete closure of the defect.  Using a sterile surgical marker, an appropriate advancement flap was drawn incorporating the defect and placing the expected incisions within the relaxed skin tension lines where possible.    The area thus outlined was incised deep to adipose tissue with a #15 scalpel blade.  The skin margins were undermined to an appropriate distance in all directions utilizing iris scissors.
Complex Repair And Dorsal Nasal Flap Text: The defect edges were debeveled with a #15 scalpel blade.  The primary defect was closed partially with a complex linear closure.  Given the location of the remaining defect, shape of the defect and the proximity to free margins a dorsal nasal flap was deemed most appropriate for complete closure of the defect.  Using a sterile surgical marker, an appropriate flap was drawn incorporating the defect and placing the expected incisions within the relaxed skin tension lines where possible.    The area thus outlined was incised deep to adipose tissue with a #15 scalpel blade.  The skin margins were undermined to an appropriate distance in all directions utilizing iris scissors.
Complex Repair And Ftsg Text: The defect edges were debeveled with a #15 scalpel blade.  The primary defect was closed partially with a complex linear closure.  Given the location of the defect, shape of the defect and the proximity to free margins a full thickness skin graft was deemed most appropriate to repair the remaining defect.  The graft was trimmed to fit the size of the remaining defect.  The graft was then placed in the primary defect, oriented appropriately, and sutured into place.
Complex Repair And Burow's Graft Text: The defect edges were debeveled with a #15 scalpel blade.  The primary defect was closed partially with a complex linear closure.  Given the location of the defect, shape of the defect, the proximity to free margins and the presence of a standing cone deformity a Burow's graft was deemed most appropriate to repair the remaining defect.  The graft was trimmed to fit the size of the remaining defect.  The graft was then placed in the primary defect, oriented appropriately, and sutured into place.
Complex Repair And Split-Thickness Skin Graft Text: The defect edges were debeveled with a #15 scalpel blade.  The primary defect was closed partially with a complex linear closure.  Given the location of the defect, shape of the defect and the proximity to free margins a split thickness skin graft was deemed most appropriate to repair the remaining defect.  The graft was trimmed to fit the size of the remaining defect.  The graft was then placed in the primary defect, oriented appropriately, and sutured into place.
Complex Repair And Epidermal Autograft Text: The defect edges were debeveled with a #15 scalpel blade.  The primary defect was closed partially with a complex linear closure.  Given the location of the defect, shape of the defect and the proximity to free margins an epidermal autograft was deemed most appropriate to repair the remaining defect.  The graft was trimmed to fit the size of the remaining defect.  The graft was then placed in the primary defect, oriented appropriately, and sutured into place.
Complex Repair And Dermal Autograft Text: The defect edges were debeveled with a #15 scalpel blade.  The primary defect was closed partially with a complex linear closure.  Given the location of the defect, shape of the defect and the proximity to free margins an dermal autograft was deemed most appropriate to repair the remaining defect.  The graft was trimmed to fit the size of the remaining defect.  The graft was then placed in the primary defect, oriented appropriately, and sutured into place.
Complex Repair And Tissue Cultured Epidermal Autograft Text: The defect edges were debeveled with a #15 scalpel blade.  The primary defect was closed partially with a complex linear closure.  Given the location of the defect, shape of the defect and the proximity to free margins an tissue cultured epidermal autograft was deemed most appropriate to repair the remaining defect.  The graft was trimmed to fit the size of the remaining defect.  The graft was then placed in the primary defect, oriented appropriately, and sutured into place.
Complex Repair And Xenograft Text: The defect edges were debeveled with a #15 scalpel blade.  The primary defect was closed partially with a complex linear closure.  Given the location of the defect, shape of the defect and the proximity to free margins a xenograft was deemed most appropriate to repair the remaining defect.  The graft was trimmed to fit the size of the remaining defect.  The graft was then placed in the primary defect, oriented appropriately, and sutured into place.
Complex Repair And Skin Substitute Graft Text: The defect edges were debeveled with a #15 scalpel blade.  The primary defect was closed partially with a complex linear closure.  Given the location of the remaining defect, shape of the defect and the proximity to free margins a skin substitute graft was deemed most appropriate to repair the remaining defect.  The graft was trimmed to fit the size of the remaining defect.  The graft was then placed in the primary defect, oriented appropriately, and sutured into place.
Path Notes (To The Dermatopathologist): Please check margins.
Consent was obtained from the patient. The risks and benefits to therapy were discussed in detail. Specifically, the risks of infection, scarring, bleeding, prolonged wound healing, incomplete removal, allergy to anesthesia, nerve injury and recurrence were addressed. Prior to the procedure, the treatment site was clearly identified and confirmed by the patient. All components of Universal Protocol/PAUSE Rule completed.
Post-Care Instructions: I reviewed with the patient in detail post-care instructions:\\n1. Apply bacitracin over the steri-strips.  \\n2. Cut non-stick pad (Telfa) to cover the steri-strips\\n3. Apply tape (hypafix) over the non-stick pad\\n4. Change once per day for 5 days\\n5. Shower with bandage on, change bandage after shower\\n\\nPatient is not to engage in any heavy lifting, exercise, hot tub, or swimming for the next 14 days. Should the patient develop any fevers, chills, bleeding, severe pain patient will contact the office immediately.
Home Suture Removal Text: Patient was provided a home suture removal kit and will remove their sutures at home.  If they have any questions or difficulties they will call the office.
Where Do You Want The Question To Include Opioid Counseling Located?: Case Summary Tab
Information: Selecting Yes will display possible errors in your note based on the variables you have selected. This validation is only offered as a suggestion for you. PLEASE NOTE THAT THE VALIDATION TEXT WILL BE REMOVED WHEN YOU FINALIZE YOUR NOTE. IF YOU WANT TO FAX A PRELIMINARY NOTE YOU WILL NEED TO TOGGLE THIS TO 'NO' IF YOU DO NOT WANT IT IN YOUR FAXED NOTE.

## 2023-04-19 NOTE — PROGRESS NOTES
Subjective:   Yuri De León is a 78 y.o. male who presents for Blister (Put cortisone on leg, got a blister, popped blister with needle, now inf on left leg. X 3 days  )      HPI: This is a 78-year-old male who presents today for evaluation of wound.  Patient reports developing a blister to his left lower extremity last week.  He reports using a needle to pop the blister.  He reports persistent drainage from the area.  He denies fevers, chills, body aches.  He has kept the area dressed with gauze dressing and Coban.  Patient noted to have room air saturation of 85% upon entry to exam room. He is not O2 dependent. Patient reports recent cold with a cough.  Patient does have history of CHF, renal failure, atrial fibrillation, and type 2 diabetes.         Review of Systems   Constitutional:  Negative for chills, fever and malaise/fatigue.   HENT:  Negative for congestion, ear discharge, ear pain, sinus pain and sore throat.    Respiratory:  Positive for cough. Negative for hemoptysis, sputum production, shortness of breath, wheezing and stridor.    Cardiovascular:  Positive for leg swelling.     Medications:    Current Outpatient Medications on File Prior to Visit   Medication Sig Dispense Refill    dilTIAZem (CARDIZEM) 60 MG Tab TAKE 1 TABLET BY MOUTH THREE TIMES DAILY 90 Tablet 2    furosemide (LASIX) 40 MG Tab Take 2 Tablets by mouth every day. Or as needed 180 Tablet 1    clopidogrel (PLAVIX) 75 MG Tab Take 1 tablet by mouth once daily 90 Tablet 3    warfarin (COUMADIN) 5 MG Tab Take 0.5-1 Tablets by mouth every day. As directed by [facility] 90 Tablet 1    multivitamin (THERAGRAN) Tab Take 1 Tablet by mouth every day.      lisinopril (PRINIVIL) 2.5 MG Tab Take 1 Tablet by mouth every day. 90 Tablet 3    budesonide-formoterol (SYMBICORT) 160-4.5 MCG/ACT Aerosol Inhale 2 Puffs 2 times a day. Use spacer. Rinse mouth after each use. 1 Each 11    Dulaglutide (TRULICITY) 4.5 MG/0.5ML Solution  Pen-injector Inject 1 Each under the skin every 7 days. (Patient taking differently: Inject 1 Each under the skin every 7 days. Getting it via PAP) 6 mL 5    atorvastatin (LIPITOR) 80 MG tablet Take 1 Tablet by mouth every day. (Patient taking differently: Take 40 mg by mouth every day.) 90 Tablet 3    Barberry-Oreg Grape-Goldenseal (BERBERINE COMPLEX) 200-200-50 MG Cap Take 1 Capsule by mouth 2 times a day.      albuterol (PROVENTIL) 2.5mg/3ml Nebu Soln solution for nebulization Take 3 mL by nebulization every four hours as needed for Shortness of Breath. 120 Bullet 2    celecoxib (CELEBREX) 200 MG Cap Take 200 mg by mouth 1 time a day as needed for Moderate Pain. (Patient not taking: Reported on 9/20/2022)       No current facility-administered medications on file prior to visit.        Allergies:   Atenolol, Tetanus toxoid, and Atorvastatin    Problem List:   Patient Active Problem List   Diagnosis    Nasal polyp    Sleep apnea    Other nonspecific abnormal finding    Type 2 diabetes mellitus with hyperglycemia ([facility])    Nocturnal polyuria    CKD (chronic kidney disease) stage 4, GFR 15-29 ml/min (CMS-HCC)    Leg edema, left    Urinary obstruction    Leucocytosis    Sepsis ([facility])    BMI 32.0-32.9,adult    Mild persistent asthma without complication    S/P coronary artery stent placement    Essential hypertension, benign    Dyslipidemia    Atrial fibrillation, chronic ([facility])    Coronary artery disease, occlusive    Acute on chronic renal failure ([facility])    Cellulitis of right lower extremity    Elevated liver enzymes    Anticoagulated    Bilateral leg edema    Hypertensive heart and kidney disease with acute on chronic right heart failure and stage 1 chronic kidney disease (HCC)    Chronic heart failure with preserved ejection fraction (HCC)    Chronic right-sided heart failure ([facility])    Pulmonary hypertension ([facility])    Moderate mitral regurgitation    Hypercoagulable state ([facility])        Surgical History:  Past Surgical  History:   Procedure Laterality Date    PB ADJ TISS XFER HEAD,FAC,HAND 10.1-30 N/A 2022    Procedure: LOCAL FLAP RECONSTRUCTION WITH REMOVAL OF DENUDED AND DRIED BONE OF THE FOREHEAD;  Surgeon: Buzz Mcnamara M.D.;  Location: SURGERY [facility];  Service: Ent    KNEE ARTHROPLASTY TOTAL Left 10/2014    HIP ARTHROPLASTY TOTAL  2010    LEFT Performed by OSGOOD, PATRICK J at SURGERY [facility]    LUMBAR DECOMPRESSION      CATARACT EXTRACTION WITH IOL Bilateral     CYSTOSCOPY      cannot remember exact procedure    LAMINOTOMY      SINUSCOPE      SINUSOTOMIES  ,,2007       Past Social Hx:   Social History     Tobacco Use    Smoking status: Former     Packs/day: 0.50     Years: 10.00     Pack years: 5.00     Types: Cigarettes     Quit date: 12/10/1968     Years since quittin.3    Smokeless tobacco: Never   Vaping Use    Vaping Use: Never used   Substance Use Topics    Alcohol use: No    Drug use: No          Problem list, medications, and allergies reviewed by myself today in Epic.     Objective:     BP (!) 152/88   Pulse 75   Temp 36.3 °C (97.3 °F) (Temporal)   Resp 16   Ht 1.829 m (6')   Wt 104 kg (230 lb)   SpO2 (!) 85%   BMI 31.19 kg/m²     Physical Exam  Vitals and nursing note reviewed.   Constitutional:       General: He is not in acute distress.     Appearance: Normal appearance. He is normal weight. He is not ill-appearing, toxic-appearing or diaphoretic.   HENT:      Head: Normocephalic and atraumatic.      Right Ear: Tympanic membrane, ear canal and external ear normal. There is no impacted cerumen.      Left Ear: Tympanic membrane, ear canal and external ear normal. There is no impacted cerumen.      Nose: Nose normal. No congestion or rhinorrhea.      Mouth/Throat:      Mouth: Mucous membranes are moist.      Pharynx: Oropharynx is clear. No oropharyngeal exudate or posterior oropharyngeal erythema.   Cardiovascular:      Rate and Rhythm: Normal rate and regular  rhythm.      Pulses: Normal pulses.      Heart sounds: Normal heart sounds. No murmur heard.    No friction rub. No gallop.   Pulmonary:      Effort: Pulmonary effort is normal. No respiratory distress.      Breath sounds: No stridor. No wheezing, rhonchi or rales.   Chest:      Chest wall: No tenderness.   Musculoskeletal:      Cervical back: Normal range of motion and neck supple. No tenderness.      Right lower le+ Pitting Edema present.      Left lower le+ Pitting Edema present.        Legs:       Comments: 8cm x 6cm wound to medial aspect of lower leg. skin color changes to bilateral lower extremities consistent with venous stasis. See image below   Lymphadenopathy:      Cervical: No cervical adenopathy.   Skin:     General: Skin is warm and dry.      Capillary Refill: Capillary refill takes less than 2 seconds.   Neurological:      General: No focal deficit present.      Mental Status: He is alert and oriented to person, place, and time. Mental status is at baseline.      Cranial Nerves: No cranial nerve deficit.      Motor: No weakness.   Psychiatric:         Mood and Affect: Mood normal.         Behavior: Behavior normal.         Thought Content: Thought content normal.         Judgment: Judgment normal.          Assessment/Plan:     Diagnosis and associated orders:   1. Acute cough  DX-CHEST-2 VIEWS    Referral back to [facility]      2. Extremity edema  Referral back to [facility]      3. Wound of left lower extremity, initial encounter  cephALEXin (KEFLEX) 250 MG Cap    Referral back to [facility]      Dx chest:  IMPRESSION:     1.  Cardiomegaly again noted.     2.  Opacifications most prominent in perihilar region are now present which could indicate congestion or pulmonary edema. Inflammation is also possible.         Presents today for evaluation of left lower extremity wound.  Patient noted to have room air saturation of 85% upon and initial injury into exam room.  He reports recently having  cold symptoms.  With deep breathing, patient did have improvement in oxygenation saturation. Patient declines viral testing for COVID today.  Patient noted to have 2+ lower extremity pitting edema.  DX chest obtained and shows Opacifications most prominent in perihilar region are now present which could indicate congestion or pulmonary edema. Inflammation is also possible.  There is suspicion for possible CHF exacerbation given patient's symptoms.  Given patient's symptoms and risk factors, I am recommending that the patient is evaluated in the emergency room where he can obtain additional stat labs and further imaging.  Patient refuses ER at this time and is requesting antibiotics for left lower extremity wound.  Patient will be prescribed Keflex 250 mg 3 times daily for 7 days.  Wound care discussed with patient, I have advised patient to begin taking antibiotics as they are prescribed.  I have also provided patient with strict ER precautions for worsening in his condition.  Patient will also need to have follow-up visit with his PCP.  Patient verbalizes good understand today.         Discussed DDx, management options (risks,benefits, and alternatives to planned treatment), natural progression and supportive care.  Expressed understanding and the treatment plan was agreed upon. Questions were encouraged and answered   Return to urgent care prn if new or worsening sx or if there is no improvement in condition prn.    Educated in Red flags and indications to immediately call 911 or present to the Emergency Department.   Advised the patient to follow-up with the primary care physician for recheck, reevaluation, and consideration of further management.    I personally reviewed prior external notes and test results pertinent to today's visit.  I have independently reviewed and interpreted all diagnostics ordered during this urgent care acute visit.         Please note that this dictation was created using voice  recognition software. I have made a reasonable attempt to correct obvious errors, but I expect that there are errors of grammar and possibly content that I did not discover before finalizing the note.    This note was electronically signed by FARHANA Baires

## 2023-04-25 NOTE — PROGRESS NOTES
This evaluation was conducted via Telemed using secure and encrypted videoconferencing technology. The patient was physically located at [facility] in [place], [place]. The patient was presented by a medical professional at the originating site.   The patient's identity was confirmed and verbal consent was obtained for this telemedicine encounter.      OP Anticoagulation Service Note    Date: 4/25/2023  Blood Pressure : (!) 136/94  Pulse: (!) 109  Respiration: 14    Anticoagulation Summary  As of 4/25/2023      INR goal:  2.0-3.0   TTR:  68.4 % (7.7 y)   INR used for dosing:  3.00 (4/25/2023)   Warfarin maintenance plan:  2.5 mg (5 mg x 0.5) every Mon, Wed, Fri; 5 mg (5 mg x 1) all other days   Weekly warfarin total:  27.5 mg   Plan last modified:  Cedric Martinez, PharmD (8/19/2022)   Next INR check:  6/6/2023   Target end date:  Indefinite    Indications    Atrial fibrillation with rapid ventricular response (HCC) (Resolved) [I48.91]  Long term current use of anticoagulant therapy (Resolved) [Z79.01]  Atrial fibrillation (HCC) (Resolved) [I48.91]  Hypercoagulable state (HCC) [D68.59]                 Anticoagulation Episode Summary       INR check location:      Preferred lab:      Send INR reminders to:      Comments:            Anticoagulation Care Providers       Provider Role Specialty Phone number    Sanjay Sorensen M.D. Referring Cardiovascular Disease (Cardiology) [phone]          Anticoagulation Patient Findings  Patient Findings       Negatives:  Signs/symptoms of thrombosis, Signs/symptoms of bleeding, Laboratory test error suspected, Change in health, Change in alcohol use, Change in activity, Upcoming invasive procedure, Emergency department visit, Upcoming dental procedure, Missed doses, Extra doses, Change in medications, Change in diet/appetite, Hospital admission, Bruising, Other complaints            THIS VISIT CONDUCTED WITH PRESENTER VIA TELEMEDICINE UTILIZING SECURE AND ENCRYPTED  VIDEOCONFERENCING EQUIPMENT  ROS:    Pulm: Denies SOB, chest pain.    Card: Denies syncope, edema, palpitations.    Extremities: Denies redness, pain.     PE:    Pulm: No SOB, even and unlabored.    Card: Normal rate and rhythm.    Extremities: No redness, or edema.     INR  is-therapeutic.    Denies signs/symptoms of bleeding and/or thrombosis.    Denies changes to diet or medications.   Follow up appt in 6 weeks     Plan:  continue with current dosing regimen    Medication: Warfarin (Coumadin)        Next Appointment: Tuesday, 6/6/2023 @1pm       Pt on antiplatelet therapy Plavix 75mg for coronary artery occlusion with stent indef per cardiology.      Review all of your home medications and newly ordered medications with your doctor and / or pharmacist. Follow medication instructions as directed by your doctor and / or pharmacist. Please keep your medication list with you and share with your physician. Update the information when medications are discontinued, doses are changed, or new medications (including over-the-counter products) are added; and carry medication information at all times in the event of emergency situations.       Patient is on a high risk medication and therefore requires close monitoring and follow up.    CHEST guidelines recommend frequent INR monitoring at regular intervals (a few days up to a max of 12 weeks) to ensure they are on the proper dose of warfarin and not having any complications from therapy.  INRs can dramatically change over a short time period due to diet, medications, and medical conditions.   The patient is instructed to go to the ER for falls with a head injury,  blood in urine or stool or any bleeding that last longer than 20 min.   For questions, please contact Outpatient Anticoagulation Service [phone].     POLO Hughes.

## 2023-05-01 NOTE — TELEPHONE ENCOUNTER
Hello,     The patient walked in requesting a medication refill for Furosemide. He would like it sent to the [facility] pharmacy location.     Thank you!    Zohreh

## 2023-05-15 PROBLEM — I16.0 HYPERTENSIVE URGENCY: Status: ACTIVE | Noted: 2018-10-08

## 2023-05-15 PROBLEM — E87.70 VOLUME OVERLOAD: Status: ACTIVE | Noted: 2023-01-01

## 2023-05-15 NOTE — ED NOTES
This RN called microbiology to confirm if two sets of blood cultures were sent.  Per Micro, they received both sets of blood cultures.

## 2023-05-15 NOTE — ED NOTES
Pt resting in bed with unlabored breathing. No signs of distress at this time.  Call light within reach. Pt's wife at bedside.

## 2023-05-15 NOTE — PROGRESS NOTES
Inpatient Anticoagulation Service Note for 5/15/2023      Reason for Anticoagulation: Atrial Fibrillation   [id number] VASc Score: 6  HAS-BLED Score: 2    Hemoglobin Value: (!) 13.3  Hematocrit Value: 42.2  Lab Platelet Value: 241  Target INR: 2.0 to 3.0    INR from last 7 days       Date/Time INR Value    05/15/23 1237 2.82          Dose from last 7 days       Date/Time Dose (mg)    05/15/23 1545 2.5          Significant Interactions: Antiplatelet Medications  Bridge Therapy: No   Reversal Agent Administered: Not Applicable    Patient is on warfarin for history of afib. Prior to hospital arrival patient was taking warfarin 2.5 mg once daily on Mon, Wed, Fri and warfarin 5 mg once daily on all other days. Managed by [facility] anticoagulation clinic. Current INR is 2.82 Goal targeting 2-3. Disease concerns regarding warfarin sensitivity include diuresis with lasix. Diet ordered. H/H stable no overt s/sx of bleeding.       Plan:  INR therapeutic continue home warfarin dose. INR 5/16 in AM      Education Material Provided?: No    Pharmacist suggested discharge dosing: Continue home warfarin dose. Consider follow up INR in 48 to 72 hours after patient discharges from the hospital.     Chloe Kaminski, LeslieD

## 2023-05-15 NOTE — ED PROVIDER NOTES
"ED Provider Note    CHIEF COMPLAINT  Chief Complaint   Patient presents with    Shortness of Breath     X 2 weeks, + chills & productive cough. Had an xray at  in [place] 1 week ago & was told \"I was filling up with fluid\", told to go to ED but did not want to.    Peripheral Edema     Increasing x 2 weeks, edema starting in low legs up to midthigh. States he has gained 30 lbs in 2 weeks. Increased WOB in triage.       EXTERNAL RECORDS REVIEWED  Inpatient Notes the patient had an echocardiogram on July 23, 2021 that showed moderate tricuspid regurgitation moderate mitral regurgitation and an EF of 65%    HPI/ROS  LIMITATION TO HISTORY   Select: : None  OUTSIDE HISTORIAN(S):  Family the patient's wife and daughter    Yuri De León is a 78 y.o. male who presents with past medical history significant for A-fib, asthma, high cholesterol, hypertension, pulmonary hypertension    PAST MEDICAL HISTORY   has a past medical history of Abnormal electrocardiogram (08/13/2010), Arrhythmia, Arthritis, ASTHMA, Atrial fibrillation (HCC) (10/25/2011), Bronchospasm (08/06/2009), Cancer (HCC), Cataract, Dental disorder, Diabetes (HCC), Heart valve disease, Hemorrhagic disorder (HCC), High cholesterol, HTN (hypertension) (10/25/2011), Hypercholesteremia (10/25/2011), Long term (current) use of anticoagulants (10/25/2011), NASAL POLYPS (08/06/2009), Other nonspecific abnormal finding (08/06/2009), Pain, Pneumonia, Pulmonary hypertension (HCC), Renal disorder, Shortness of breath, Sleep apnea (08/06/2009), and Wears glasses.    SURGICAL HISTORY   has a past surgical history that includes sinusotomies (2003,2005,2/2007); lumbar decompression (1984); hip arthroplasty total (08/31/2010); laminotomy; sinuscope; knee arthroplasty total (Left, 10/2014); cataract extraction with iol (Bilateral); cystoscopy; and adj tiss xfer head,fac,hand 10.1-30 (N/A, 9/7/2022).    FAMILY HISTORY  Family History   Problem Relation Age of Onset    Heart " Disease Mother        SOCIAL HISTORY  Social History     Tobacco Use    Smoking status: Former     Packs/day: 0.50     Years: 10.00     Pack years: 5.00     Types: Cigarettes     Quit date: 12/10/1968     Years since quittin.4    Smokeless tobacco: Never   Vaping Use    Vaping Use: Never used   Substance and Sexual Activity    Alcohol use: No    Drug use: No    Sexual activity: Not on file       CURRENT MEDICATIONS  Home Medications       Reviewed by Barbara Carr R.N. (Registered Nurse) on 05/15/23 at 1226  Med List Status: Not Addressed     Medication Last Dose Status   albuterol (PROVENTIL) 2.5mg/3ml Nebu Soln solution for nebulization  Active   atorvastatin (LIPITOR) 80 MG tablet  Active   Barberry-Oreg Grape-Goldenseal (BERBERINE COMPLEX) 200-200-50 MG Cap  Active   budesonide-formoterol (SYMBICORT) 160-4.5 MCG/ACT Aerosol  Active   celecoxib (CELEBREX) 200 MG Cap  Active   clopidogrel (PLAVIX) 75 MG Tab  Active   dilTIAZem (CARDIZEM) 60 MG Tab  Active   Dulaglutide (TRULICITY) 4.5 MG/0.5ML Solution Pen-injector  Active   furosemide (LASIX) 40 MG Tab  Active   lisinopril (PRINIVIL) 2.5 MG Tab  Active   multivitamin (THERAGRAN) Tab  Active   warfarin (COUMADIN) 5 MG Tab  Active                    ALLERGIES  Allergies   Allergen Reactions    Atenolol Shortness of Breath and Unspecified     Shortness of breath, weakness    Tetanus Toxoid Swelling    Atorvastatin Nausea     In large doses       PHYSICAL EXAM  VITAL SIGNS: BP (!) 139/90   Pulse (!) 126   Temp 36.2 °C (97.1 °F) (Temporal)   Resp (!) 24   Ht 1.829 m (6')   Wt 113 kg (249 lb 1.9 oz)   SpO2 92%   BMI 33.79 kg/m²    Constitutional: Alert.  HENT: No signs of trauma, Bilateral external ears normal, Nose normal.   Eyes: Pupils are equal and reactive, Conjunctiva normal, Non-icteric.   Neck: Normal range of motion, No tenderness, Supple, No stridor.   Lymphatic: No lymphadenopathy noted.   Cardiovascular: Irregular irregular with 3/6  diastolic murmur.  Thorax & Lungs: Normal breath sounds, No respiratory distress, No wheezing, No chest tenderness.   Abdomen: Stented.  Skin: Warm, Dry, No erythema, No rash.   Back: No bony tenderness, No CVA tenderness.   Extremities: Intact distal pulses, bilateral edema with reddening of the skin.  Musculoskeletal: Good range of motion in all major joints. No tenderness to palpation or major deformities noted.   Neurologic: Alert , Normal motor function, Normal sensory function, No focal deficits noted.   Psychiatric: Affect normal, Judgment normal, Mood normal.       DIAGNOSTIC STUDIES / PROCEDURES  EKG  I have independently interpreted this EKG  Atrial fibrillation rate 124  axis normal  prolonged QRS interval  RBBB and LPFB  Compared to ECG 09/07/2022 12:19:41  Left posterior fascicular block now present  My impression of this EKG, atrial fibrillation, does not meet STEMI criteria at this time      LABS  Labs Reviewed   CBC WITH DIFFERENTIAL - Abnormal; Notable for the following components:       Result Value    RBC 4.64 (*)     Hemoglobin 13.3 (*)     MCHC 31.5 (*)     RDW 59.6 (*)     Neutrophils-Polys 74.90 (*)     Lymphocytes 7.50 (*)     Lymphs (Absolute) 0.58 (*)     All other components within normal limits   COMP METABOLIC PANEL - Abnormal; Notable for the following components:    Glucose 105 (*)     Bun 63 (*)     Creatinine 2.64 (*)     Alkaline Phosphatase 118 (*)     Globulin 3.7 (*)     All other components within normal limits   URINALYSIS - Abnormal; Notable for the following components:    Leukocyte Esterase Trace (*)     All other components within normal limits    Narrative:     Indication for culture:->Evaluation for sepsis without a  clear source of infection   PROBRAIN NATRIURETIC PEPTIDE, NT - Abnormal; Notable for the following components:    NT-proBNP 4149 (*)     All other components within normal limits   URINE MICROSCOPIC (W/UA) - Abnormal; Notable for the following components:     "WBC 5-10 (*)     RBC 0-2 (*)     Hyaline Cast 3-5 (*)     All other components within normal limits    Narrative:     Indication for culture:->Evaluation for sepsis without a  clear source of infection   ESTIMATED GFR - Abnormal; Notable for the following components:    GFR (CKD-EPI) 24 (*)     All other components within normal limits   POCT GLUCOSE DEVICE RESULTS - Abnormal; Notable for the following components:    POC Glucose, Blood 109 (*)     All other components within normal limits   LACTIC ACID   CORRECTED CALCIUM   LACTIC ACID   LACTIC ACID   URINE CULTURE(NEW)    Narrative:     Indication for culture:->Evaluation for sepsis without a  clear source of infection   BLOOD CULTURE    Narrative:     Per Hospital Policy: Only change Specimen Src: to \"Line\" if  specified by physician order.   BLOOD CULTURE    Narrative:     Per Hospital Policy: Only change Specimen Src: to \"Line\" if  specified by physician order.         RADIOLOGY  I have independently interpreted the diagnostic imaging associated with this visit and am waiting the final reading from the radiologist.   My preliminary interpretation is as follows: Cardiomegaly  Radiologist interpretation:     DX-CHEST-PORTABLE (1 VIEW)   Final Result      Findings suspicious for early LEFT lung pneumonia or atelectasis could have a similar appearance            COURSE & MEDICAL DECISION MAKING    ED Observation Status? Yes; I am placing the patient in to an observation status due to a diagnostic uncertainty as well as therapeutic intensity. Patient placed in observation status at 12:59 PM, 5/15/2023.     Observation plan is as follows: The patient presents with weight gain, possible liver failure, renal failure, or heart failure.  Labs were ordered, chest x-ray was ordered, EKG was ordered.    Upon Reevaluation, the patient's condition has: not improved; and will be escalated to hospitalization.    Patient discharged from ED Observation status at 2:10 PM (Time) May " 15, 2023 (Date).     INITIAL ASSESSMENT, COURSE AND PLAN  Care Narrative: The patient presents clinically with fluid overload and A-fib with RVR.  I have ordered IV diltiazem for rate control.  Labs and chest x-ray are pending        ADDITIONAL PROBLEM LIST  Atrial fibrillation, hypertension, high cholesterol, diabetes, sleep apnea  DISPOSITION AND DISCUSSIONS    2:11 PM the patient's BNP is elevated.  He has chronic renal failure.          I have discussed management of the patient with the following physicians and PEDRO PABLO's: I spoke with the [facility] hospitalist will assess the patient for hospitalization.    Discussion of management with other [facility] or appropriate source(s): None         Barriers to care at this time, including but not limited to:  None .     Decision tools and prescription drugs considered including, but not limited to: IV diltiazem for rate control.      The total critical care time on this patient is 40 minutes, resuscitating patient, speaking with admitting physician, and deciphering test results. This 40 minutes is exclusive of separately billable procedures.      FINAL DIAGNOSIS  1. Anasarca    2. Atrial fibrillation with rapid ventricular response (HCC)      Critical care time 40 minutes as outlined above    Electronically signed by: Hunter Pike M.D., 5/15/2023 12:59 PM

## 2023-05-15 NOTE — ED TRIAGE NOTES
"Yuri De León  78 y.o.  male  Chief Complaint   Patient presents with    Shortness of Breath     X 2 weeks, + chills & productive cough. Had an xray at  in [place] 1 week ago & was told \"I was filling up with fluid\", told to go to ED but did not want to.    Peripheral Edema     Increasing x 2 weeks, edema starting in low legs up to midthigh. States he has gained 30 lbs in 2 weeks. Increased WOB in triage.       Pt denies hx of CHF but states he does take lasix. Hx DM2:  in triage. Hx afib, on warfarin.    EKG in triage shows afib, rate 130s.  "

## 2023-05-15 NOTE — H&P
"Hospital Medicine History & Physical Note    Date of Service  5/15/2023    Primary Care Physician  Alberto Carmona D.O.    Consultants  None    Specialist Names: N/A    Code Status  Full Code    Chief Complaint  Chief Complaint   Patient presents with    Shortness of Breath     X 2 weeks, + chills & productive cough. Had an xray at  in [place] 1 week ago & was told \"I was filling up with fluid\", told to go to ED but did not want to.    Peripheral Edema     Increasing x 2 weeks, edema starting in low legs up to midthigh. States he has gained 30 lbs in 2 weeks. Increased WOB in triage.       History of Presenting Illness  Yuri De León is a 78 y.o. male who presented 5/15/2023 with shortness of breath.  Patient states he has had increasing shortness of breath especially on exertion, increasing lower extremity edema.  He denies having any fever or chills, cough or congestion, chest pain, palpitations.  He has had some nausea, but denies any abdominal pain or diarrhea.  Patient with known history of CKD stage IV, persistent atrial fibrillation on anticoagulation with Coumadin, pulmonary hypertension.  Patient states he has been taking his Lasix 80 mg daily but it has not helped.  He also ran out of his Cardizem a few days ago.  In the ED, he appears to have A-fib RVR with rate in the 120s, appears volume overloaded with a proBNP of 4000.    I discussed the plan of care with patient, family, bedside RN, and ED .    Review of Systems  Review of Systems   Constitutional:  Positive for malaise/fatigue. Negative for chills, diaphoresis and fever.   HENT:  Positive for hearing loss. Negative for congestion and sore throat.    Eyes:  Negative for blurred vision.   Respiratory:  Positive for shortness of breath. Negative for cough and wheezing.    Cardiovascular:  Positive for leg swelling. Negative for chest pain and palpitations.   Gastrointestinal:  Positive for nausea. Negative for abdominal pain, diarrhea, heartburn " and vomiting.   Genitourinary:  Negative for dysuria, flank pain and hematuria.   Musculoskeletal:  Negative for back pain, joint pain, myalgias and neck pain.   Skin:  Negative for rash.   Neurological:  Positive for weakness. Negative for dizziness, sensory change, speech change, focal weakness and headaches.   Psychiatric/Behavioral:  The patient is not nervous/anxious.        Past Medical History   has a past medical history of Abnormal electrocardiogram (08/13/2010), Arrhythmia, Arthritis, ASTHMA, Atrial fibrillation (HCC) (10/25/2011), Bronchospasm (08/06/2009), Cancer (HCC), Cataract, Dental disorder, Diabetes (HCC), Heart valve disease, Hemorrhagic disorder ([facility]), High cholesterol, HTN (hypertension) (10/25/2011), Hypercholesteremia (10/25/2011), Long term (current) use of anticoagulants (10/25/2011), NASAL POLYPS (08/06/2009), Other nonspecific abnormal finding (08/06/2009), Pain, Pneumonia, Pulmonary hypertension ([facility]), Renal disorder, Shortness of breath, Sleep apnea (08/06/2009), and Wears glasses.    Surgical History   has a past surgical history that includes sinusotomies (2003,2005,2/2007); lumbar decompression (1984); hip arthroplasty total (08/31/2010); laminotomy; sinuscope; knee arthroplasty total (Left, 10/2014); cataract extraction with iol (Bilateral); cystoscopy; and pr adj tiss xfer head,fac,hand 10.1-30 (N/A, 9/7/2022).     Family History  family history includes Heart Disease in his mother.   Family history reviewed with patient. There is no family history that is pertinent to the chief complaint.     Social History   reports that he quit smoking about 54 years ago. His smoking use included cigarettes. He has a 5.00 pack-year smoking history. He has never used smokeless tobacco. He reports that he does not drink alcohol and does not use drugs.    Allergies  Allergies   Allergen Reactions    Atenolol Shortness of Breath and Unspecified     Shortness of breath, weakness    Tetanus Toxoid  Swelling    Atorvastatin Nausea     In large doses       Medications  Prior to Admission Medications   Prescriptions Last Dose Informant Patient Reported? Taking?   Barberry-Oreg Grape-Goldenseal (BERBERINE COMPLEX) 200-200-50 MG Cap  Patient Yes No   Sig: Take 1 Capsule by mouth 2 times a day.   Dulaglutide (TRULICITY) 4.5 MG/0.5ML Solution Pen-injector  Patient No No   Sig: Inject 1 Each under the skin every 7 days.   Patient taking differently: Inject 1 Each under the skin every 7 days. Getting it via PAP   albuterol (PROVENTIL) 2.5mg/3ml Nebu Soln solution for nebulization  Patient No No   Sig: Take 3 mL by nebulization every four hours as needed for Shortness of Breath.   atorvastatin (LIPITOR) 80 MG tablet  Patient No No   Sig: Take 1 Tablet by mouth every day.   Patient taking differently: Take 40 mg by mouth every day.   budesonide-formoterol (SYMBICORT) 160-4.5 MCG/ACT Aerosol   No No   Sig: Inhale 2 Puffs 2 times a day. Use spacer. Rinse mouth after each use.   celecoxib (CELEBREX) 200 MG Cap   Yes No   Sig: Take 200 mg by mouth 1 time a day as needed for Moderate Pain.   Patient not taking: Reported on 9/20/2022   clopidogrel (PLAVIX) 75 MG Tab   No No   Sig: Take 1 tablet by mouth once daily   dilTIAZem (CARDIZEM) 60 MG Tab   No No   Sig: TAKE 1 TABLET BY MOUTH THREE TIMES DAILY   furosemide (LASIX) 40 MG Tab   No No   Sig: Take 2 Tablets by mouth every day. Or as needed   lisinopril (PRINIVIL) 2.5 MG Tab   No No   Sig: Take 1 Tablet by mouth every day.   multivitamin (THERAGRAN) Tab   Yes No   Sig: Take 1 Tablet by mouth every day.   warfarin (COUMADIN) 5 MG Tab   No No   Sig: Take 0.5-1 Tablets by mouth every day. As directed by [facility]      Facility-Administered Medications: None       Physical Exam  Temp:  [36.2 °C (97.1 °F)] 36.2 °C (97.1 °F)  Pulse:  [112-138] 112  Resp:  [20-24] 24  BP: (139-162)/() 160/110  SpO2:  [92 %-94 %] 94 %  Blood Pressure : (!) 160/110    Temperature: 36.2 °C (97.1 °F)   Pulse: (!) 112   Respiration: (!) 24   Pulse Oximetry: 94 %       Physical Exam  Vitals and nursing note reviewed.   Constitutional:       Appearance: He is obese.   HENT:      Head: Normocephalic and atraumatic.      Nose: No congestion.      Mouth/Throat:      Mouth: Mucous membranes are moist.   Eyes:      Extraocular Movements: Extraocular movements intact.      Conjunctiva/sclera: Conjunctivae normal.   Cardiovascular:      Rate and Rhythm: Tachycardia present. Rhythm irregular.      Heart sounds: Murmur heard.   Pulmonary:      Effort: Accessory muscle usage present.      Breath sounds: Rales present.   Abdominal:      General: There is distension.      Tenderness: There is no abdominal tenderness. There is no guarding or rebound.   Musculoskeletal:      Cervical back: No tenderness.      Right lower leg: Edema present.      Left lower leg: Edema present.   Skin:     Comments: Venous stasis dermatitis changes both legs   Neurological:      General: No focal deficit present.      Mental Status: He is alert and oriented to person, place, and time.      Cranial Nerves: No cranial nerve deficit.         Laboratory:  Recent Labs     05/15/23  1237   WBC 7.8   RBC 4.64*   HEMOGLOBIN 13.3*   HEMATOCRIT 42.2   MCV 90.9   MCH 28.7   MCHC 31.5*   RDW 59.6*   PLATELETCT 241   MPV 10.0     Recent Labs     05/15/23  1237   SODIUM 145   POTASSIUM 4.7   CHLORIDE 109   CO2 23   GLUCOSE 105*   BUN 63*   CREATININE 2.64*   CALCIUM 9.0     Recent Labs     05/15/23  1237   ALTSGPT 16   ASTSGOT 17   ALKPHOSPHAT 118*   TBILIRUBIN 0.7   GLUCOSE 105*     Recent Labs     05/15/23  1237   INR 2.82*     Recent Labs     05/15/23  1237   NTPROBNP 4149*         No results for input(s): TROPONINT in the last 72 hours.    Imaging:  DX-CHEST-PORTABLE (1 VIEW)   Final Result      Findings suspicious for early LEFT lung pneumonia or atelectasis could have a similar appearance          X-Ray:  I have personally  reviewed the images and compared with prior images.  EKG:  I have personally reviewed the images and compared with prior images.    Assessment/Plan:  Justification for Admission Status  I anticipate this patient will require at least two midnights for appropriate medical management, necessitating inpatient admission because volume overload, atrial fibrillation with rapid ventricular rate, hypertensive urgency, known history of pulmonary hypertension, known history of CKD stage IV, will need careful diuresis with IV Lasix, careful monitoring of renal function, control of rate with A-fib RVR, control of hypertension    Patient will need a Telemetry bed on CARDIOLOGY service .  The need is secondary to A-fib with RVR.    * Volume overload- (present on admission)  Assessment & Plan  Careful diuresis with IV Lasix   Diet 2 gm Na, 1 L fluid restriction  Check Echocardiogram    Atrial fibrillation with RVR (HCC)- (present on admission)  Assessment & Plan  Cardizem  Continue Coumadin    Moderate mitral regurgitation- (present on admission)  Assessment & Plan  Monitor volume status    Pulmonary hypertension (HCC)- (present on admission)  Assessment & Plan  IV lasix to diurese  Monitor volume status  Check echocardiogram    Hypertensive urgency- (present on admission)  Assessment & Plan  Continue Cardizem, Lisinopril  IV Hydralazine as needed with parameters    S/P coronary artery stent placement- (present on admission)  Assessment & Plan  Continue Plavix, Lipitor    Mild persistent asthma without complication- (present on admission)  Assessment & Plan  Symbicort, RT protocol    CKD (chronic kidney disease) stage 4, GFR 15-29 ml/min (CMS-[facility])- (present on admission)  Assessment & Plan  Follow bmp closely  May need  Nephrology consult    Type 2 diabetes mellitus with kidney complication, without long-term current use of insulin ([facility])- (present on admission)  Assessment & Plan  SSI for now        VTE prophylaxis: therapeutic  anticoagulation with Coumadin

## 2023-05-16 NOTE — PROGRESS NOTES
4 Eyes Skin Assessment Completed by CASSI Marinelli and AVA Carrero    Head WDL  Ears WDL  Nose WDL  Mouth WDL  Neck Scab  Breast/Chest Redness  Shoulder Blades WDL  Spine WDL  (R) Arm/Elbow/Hand   WDL  (L) Arm/Elbow/Hand Abrasion  Abdomen WDL  Groin WDL  Scrotum/Coccyx/Buttocks WDL  (R) Leg Redness and Swelling  (L) Leg Redness and Swelling  (R) Heel/Foot/Toe Redness and Swelling  (L) Heel/Foot/Toe Redness, Swelling, and Edema      Left Forearm small skin tear  Left lower leg healing blister  Left shoulder healing surgical site - scabbed    Devices In Places Tele Box, Blood Pressure Cuff, and Pulse Ox      Interventions In Place Pillows and Heels Loaded W/Pillows    Possible Skin Injury Yes    Pictures Uploaded Into Epic Yes  Wound Consult Placed Yes  RN Wound Prevention Protocol Ordered Yes

## 2023-05-16 NOTE — DISCHARGE PLANNING
"In the case of an emergency, pt's legal NOK is his wife Avelino [phone].     CASSI GODDARD met with Ken at bedside and obtained the information used in this assessment. He verified accuracy of facesheet; patient lives in a home with his wife Avelino of 58 years. They have 2 kids- Kimberlee in [place] and Ken in [place]. They had a daughter Natacha who passed at the age of 10 from a brain tumor.  Prior to current hospitalization, pt was mostly independent with ADLS/IADLS. Pt drives and is able to attend necessary MD appointments. Ken's PCP is Dr. Carmona, Nephrologist is Dr. Vicente, and Cardiologist is Dr. Gong. Patient has no financial concerns and has good support from his family. Pt denied any hx of substance use and denies any dx of mh. Ken does not have any ACP documents on file and states he and his family have spoken about his wishes.    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 He helps with managing some of the finances and however else he can assist his daughter with the business. He has a walker, cane and walking stick he uses. He does not have O2 at home currently. RN CM discussed his code status and he feels that when the \" calls me, I'll be ready\" but not feels a decision about resuscitation should include his family, which this RN agreed with. He spoke a lot about the loss of his daughter at the age of 10 and how he turned to [facility] for comfort and coping with the loss. RN recognized how not all miracles have to be evident and learning to cope with a loss so great is a miracle in itself.       Care Transition Team Assessment    Information Source  Orientation Level: Oriented X4  Information Given By: Patient  Informant's Name: Ken  Who is responsible for making decisions for patient? : Patient    Readmission " Evaluation  Is this a readmission?: No    Elopement Risk  Legal Hold: No  Ambulatory or Self Mobile in Wheelchair: Yes  Disoriented: No  Psychiatric Symptoms: None  History of Wandering: No  Elopement this Admit: No  Vocalizing Wanting to Leave: No  Displays Behaviors, Body Language Wanting to Leave: No-Not at Risk for Elopement  Elopement Risk: Not at Risk for Elopement    Interdisciplinary Discharge Planning  Does Admitting Nurse Feel This Could be a Complex Discharge?: No  Primary Care Physician: Dr Carmona  Lives with - Patient's Self Care Capacity: Spouse  Patient or legal guardian wants to designate a caregiver: No  Support Systems: Spouse / Significant Other, Children  Do You Take your Prescribed Medications Regularly: Yes  Able to Return to Previous ADL's: Yes  Mobility Issues: No  Prior Services: None  Patient Prefers to be Discharged to:: Home  Assistance Needed: Unknown at this Time  Durable Medical Equipment: Walker    Discharge Preparedness  What is your plan after discharge?: Home health care  What are your discharge supports?: Spouse, Child  Prior Functional Level: Ambulatory, Drives Self  Difficulity with ADLs: None  Difficulity with IADLs: None    Functional Assesment  Prior Functional Level: Ambulatory, Drives Self    Finances  Financial Barriers to Discharge: No  Prescription Coverage: Yes    Vision / Hearing Impairment  Vision Impairment : Yes  Hearing Impairment : Yes  Hearing Impairment: Both Ears  Does Pt Need Special Equipment for the Hearing Impaired?: No    Values / Beliefs / Concerns  Values / Beliefs Concerns : No    Advance Directive  Advance Directive?: None  Advance Directive offered?: AD Booklet refused    Domestic Abuse  Have you ever been the victim of abuse or violence?: No  Physical Abuse or Sexual Abuse: No  Verbal Abuse or Emotional Abuse: No    Psychological Assessment  History of Substance Abuse: None  History of Psychiatric Problems: No    Discharge Risks or Barriers  Discharge  risks or barriers?: No    Anticipated Discharge Information  Discharge Disposition: D/T to home under HHA care in anticipation of covered skilled care (06)

## 2023-05-16 NOTE — DISCHARGE PLANNING
Case Management Discharge Planning    Admission Date: 5/15/2023  GMLOS: 2.6  ALOS: 1    6-Clicks ADL Score:    6-Clicks Mobility Score:        Anticipated Discharge Dispo: Discharge Disposition: D/T to home under HHA care in anticipation of covered skilled care (06)    DME Needed: No    Action(s) Taken: Updated Provider/Nurse on Discharge Plan and DC Assessment Complete (See below)    Escalations Completed: None    Medically Clear: No    Next Steps: Met with Ken at . He endorses having a walker, cane and walking stick (which he uses at work only so his cane doesn't get dirty). He does not have O2 at home currently. Will continue to follow for CM needs.    Barriers to Discharge: Medical clearance    Is the patient up for discharge tomorrow: No

## 2023-05-16 NOTE — CARE PLAN
The patient is Stable - Low risk of patient condition declining or worsening    Shift Goals  Clinical Goals: hemodynamic stability  Patient Goals: rest  Family Goals: n/a    Progress made toward(s) clinical / shift goals:    Problem: Psychosocial  Goal: Patient's level of anxiety will decrease  Outcome: Progressing     Problem: Hemodynamics  Goal: Patient's hemodynamics, fluid balance and neurologic status will be stable or improve  Outcome: Progressing     Problem: Respiratory  Goal: Patient will achieve/maintain optimum respiratory ventilation and gas exchange  Outcome: Progressing     Problem: Fluid Volume  Goal: Fluid volume balance will be maintained  Outcome: Progressing

## 2023-05-16 NOTE — RESPIRATORY CARE
COPD EDUCATION by COPD CLINICAL EDUCATOR  5/16/2023 at 9:08 AM by Elaine Valenzuela, RRT     Patient reviewed by COPD education team. Patient has a history of asthma and quit smoking in 1968, therefore does not qualify for COPD education.

## 2023-05-16 NOTE — PROGRESS NOTES
Inpatient Anticoagulation Service Note for 5/16/2023  Reason for Anticoagulation: Atrial Fibrillation   [id number] VASc Score: 6  HAS-BLED Score: 2  Hemoglobin Value: (!) 12.5  Hematocrit Value: (!) 40.6  Lab Platelet Value: 225  Target INR: 2.0 to 3.0    INR from last 7 days       Date/Time INR Value    05/16/23 0026 2.59    05/15/23 1237 2.82          Dose from last 7 days       Date/Time Dose (mg)    05/16/23 1300 5    05/15/23 1545 2.5          Average Dose (mg): TBD (Home Dose: 2.5 mg MWF + 5 mg ROW per [facility])  Significant Interactions: Antiplatelet Medications  Bridge Therapy: No  Reversal Agent Administered: Not Applicable  Comments: INR at goal range. Noted bleed concern. H/H low. PLT down. Warfarin interactions noted. Will give warfarin 2.5 mg today. INR with AM labs.    Plan:  warfarin 5 mg 5/16/23  Education Material Provided?: No (chronic warfarin patient)    Pharmacist suggested discharge dosing: warfarin 2.5 mg Mo/We/Fr and 5 mg all other days     Jean-Pierre Skinner, PharmD

## 2023-05-16 NOTE — PROGRESS NOTES
Hospital Medicine Daily Progress Note    Date of Service  5/16/2023    Chief Complaint  Yuri De León is a 78 y.o. male admitted 5/15/2023 with shortness of breath     Hospital Course  This is a 79 y/o Male with PMHX of CKD, A.fibb, CHF who was admitted on 5/15/23 after presenting to ER complaining of sob and increased lower extremity edema.  Patient states he has been taking his Lasix 80 mg daily but it has not helped.  He also ran out of his Cardizem a few days ago.  IN ER he was found to be fluid overload and also on A. fibb with RVR  Patient admitted for further work up and treatment      Interval Problem Update  Patient seen and examined, afebrile sitting up in the chair , feels SOB, still with lower extremity swelling.   HR in the 110-120 increasing Cardizem to QID instead from TID  Cont on IV lasix for his fluid overload   Echo pending     I have discussed this patient's plan of care and discharge plan at IDT rounds today with Case Management, Nursing, Nursing leadership, and other members of the IDT team.    Consultants/Specialty  None     Code Status  Full Code    Disposition  The patient is not medically cleared for discharge to home or a post-acute facility.      I have placed the appropriate orders for post-discharge needs.    Review of Systems  Review of Systems   Constitutional:  Positive for malaise/fatigue. Negative for chills, diaphoresis and fever.   HENT:  Positive for hearing loss. Negative for congestion and sore throat.    Eyes:  Negative for blurred vision.   Respiratory:  Positive for shortness of breath. Negative for cough and wheezing.    Cardiovascular:  Positive for leg swelling. Negative for chest pain and palpitations.   Gastrointestinal:  Negative for abdominal pain, diarrhea, heartburn and vomiting.   Genitourinary:  Negative for dysuria, flank pain and hematuria.   Musculoskeletal:  Negative for back pain, joint pain, myalgias and neck pain.   Skin:  Negative for rash.    Neurological:  Positive for weakness. Negative for dizziness, sensory change, speech change, focal weakness and headaches.   Psychiatric/Behavioral:  The patient is not nervous/anxious.         Physical Exam  Temp:  [36.4 °C (97.6 °F)-37 °C (98.6 °F)] 36.4 °C (97.6 °F)  Pulse:  [] 114  Resp:  [] 18  BP: (133-160)/() 141/94  SpO2:  [88 %-97 %] 96 %    Physical Exam  Vitals and nursing note reviewed.   Constitutional:       Appearance: He is obese.   HENT:      Head: Normocephalic and atraumatic.      Nose: No congestion.      Mouth/Throat:      Mouth: Mucous membranes are moist.   Eyes:      General: No scleral icterus.        Right eye: No discharge.         Left eye: No discharge.      Extraocular Movements: Extraocular movements intact.      Conjunctiva/sclera: Conjunctivae normal.   Cardiovascular:      Rate and Rhythm: Tachycardia present. Rhythm irregular.      Heart sounds: Murmur heard.   Pulmonary:      Effort: Accessory muscle usage present.      Breath sounds: Rales present.   Abdominal:      General: There is distension.      Tenderness: There is no abdominal tenderness. There is no guarding or rebound.   Musculoskeletal:      Cervical back: No tenderness.      Right lower leg: Edema present.      Left lower leg: Edema present.   Skin:     Comments: Venous stasis dermatitis changes both legs   Neurological:      General: No focal deficit present.      Mental Status: He is alert and oriented to person, place, and time.      Cranial Nerves: No cranial nerve deficit.         Fluids    Intake/Output Summary (Last 24 hours) at 5/16/2023 1418  Last data filed at 5/16/2023 1300  Gross per 24 hour   Intake 600 ml   Output 1050 ml   Net -450 ml       Laboratory  Recent Labs     05/15/23  1237 05/16/23  0026   WBC 7.8 9.1   RBC 4.64* 4.49*   HEMOGLOBIN 13.3* 12.5*   HEMATOCRIT 42.2 40.6*   MCV 90.9 90.4   MCH 28.7 27.8   MCHC 31.5* 30.8*   RDW 59.6* 58.6*   PLATELETCT 241 225   MPV 10.0 10.1      Recent Labs     05/15/23  1237 05/16/23  0026   SODIUM 145 143   POTASSIUM 4.7 4.8   CHLORIDE 109 109   CO2 23 20   GLUCOSE 105* 161*   BUN 63* 67*   CREATININE 2.64* 2.63*   CALCIUM 9.0 8.8     Recent Labs     05/15/23  1237 05/16/23  0026   INR 2.82* 2.59*               Imaging  DX-CHEST-PORTABLE (1 VIEW)   Final Result      Findings suspicious for early LEFT lung pneumonia or atelectasis could have a similar appearance           Assessment/Plan  * Volume overload- (present on admission)  Assessment & Plan  Cont on IV lasix   Diet 2 gm Na, 1 L fluid restriction  Echo is pending     Moderate mitral regurgitation- (present on admission)  Assessment & Plan  Monitor volume status    Pulmonary hypertension (HCC)- (present on admission)  Assessment & Plan  IV lasix to diurese  Monitor volume status  Check echocardiogram    Hypertensive urgency- (present on admission)  Assessment & Plan  Cont on lisinopril, increased Cardizem   IV Hydralazine as needed with parameters  Monitor and adjust as needed     S/P coronary artery stent placement- (present on admission)  Assessment & Plan  Continue Plavix, Lipitor    Mild persistent asthma without complication- (present on admission)  Assessment & Plan  Symbicort, RT protocol    CKD (chronic kidney disease) stage 4, GFR 15-29 ml/min (CMS-[facility])- (present on admission)  Assessment & Plan  Repeat BMP in AM   May need  Nephrology consult    Type 2 diabetes mellitus with kidney complication, without long-term current use of insulin ([facility])- (present on admission)  Assessment & Plan  Cont on SSI and accuchecks     Atrial fibrillation with RVR ([facility])- (present on admission)  Assessment & Plan  HR in 110-120   So will increase Cardizem to 60 mg QID instead of TID   Continue Coumadin         VTE prophylaxis: therapeutic anticoagulation with warfarin     I have performed a physical exam and reviewed and updated ROS and Plan today (5/16/2023). In review of yesterday's note (5/15/2023), there  are no changes except as documented above.

## 2023-05-17 NOTE — PROGRESS NOTES
[facility] Medicine Daily Progress Note    Date of Service  5/17/2023    Chief Complaint  Yuri De León is a 78 y.o. male admitted 5/15/2023 with shortness of breath     Hospital Course  This is a 77 y/o Male with PMHX of CKD, A.fibb, CHF who was admitted on 5/15/23 after presenting to ER complaining of sob and increased lower extremity edema.  Patient states he has been taking his Lasix 80 mg daily but it has not helped.  He also ran out of his Cardizem a few days ago.  IN ER he was found to be fluid overload and also on A. fibb with RVR  Patient admitted for further work up and treatment      Interval Problem Update  5/16: Patient seen and examined, afebrile sitting up in the chair , feels SOB, still with lower extremity swelling.   HR in the 110-120 increasing Cardizem to QID instead from TID  Cont on IV lasix for his fluid overload   Echo pending   5/17: Patient resting in the chair still feeling SOB, cont on IV lasix 40 mg BID, and wean off O2 as tolerated,echo pending  Regarding his A.fibb HR better controlled with the increase dose of diltiazem HR 80-90     I have discussed this patient's plan of care and discharge plan at IDT rounds today with Case Management, Nursing, Nursing leadership, and other members of the IDT team.    Consultants/Specialty  None     Code Status  Full Code    Disposition  The patient is not medically cleared for discharge to home or a post-acute facility.      I have placed the appropriate orders for post-discharge needs.    Review of Systems  Review of Systems   Constitutional:  Positive for malaise/fatigue. Negative for chills, diaphoresis and fever.   HENT:  Positive for hearing loss. Negative for congestion and sore throat.    Eyes:  Negative for blurred vision.   Respiratory:  Positive for shortness of breath. Negative for cough and wheezing.    Cardiovascular:  Positive for leg swelling. Negative for chest pain and palpitations.   Gastrointestinal:  Negative for abdominal  pain, diarrhea, heartburn and vomiting.   Genitourinary:  Negative for dysuria, flank pain and hematuria.   Musculoskeletal:  Negative for back pain, joint pain, myalgias and neck pain.   Skin:  Negative for rash.   Neurological:  Positive for weakness. Negative for dizziness, sensory change, speech change, focal weakness and headaches.   Psychiatric/Behavioral:  The patient is not nervous/anxious.         Physical Exam  Temp:  [31.2 °C (88.1 °F)-37.2 °C (98.9 °F)] 36.8 °C (98.2 °F)  Pulse:  [] 84  Resp:  [18] 18  BP: (125-144)/(82-94) 144/83  SpO2:  [90 %-96 %] 93 %    Physical Exam  Vitals and nursing note reviewed.   Constitutional:       Appearance: He is obese.   HENT:      Head: Normocephalic and atraumatic.      Nose: No congestion.      Mouth/Throat:      Mouth: Mucous membranes are moist.   Eyes:      General: No scleral icterus.        Right eye: No discharge.         Left eye: No discharge.      Extraocular Movements: Extraocular movements intact.      Conjunctiva/sclera: Conjunctivae normal.   Cardiovascular:      Rate and Rhythm: Tachycardia present. Rhythm irregular.      Heart sounds: Murmur heard.   Pulmonary:      Effort: Accessory muscle usage present.      Breath sounds: Rales present.   Abdominal:      General: There is distension.      Tenderness: There is no abdominal tenderness. There is no guarding or rebound.   Musculoskeletal:      Cervical back: No tenderness.      Right lower leg: Edema present.      Left lower leg: Edema present.   Skin:     Comments: Venous stasis dermatitis changes both legs   Neurological:      General: No focal deficit present.      Mental Status: He is alert and oriented to person, place, and time.      Cranial Nerves: No cranial nerve deficit.         Fluids    Intake/Output Summary (Last 24 hours) at 5/17/2023 1124  Last data filed at 5/17/2023 0730  Gross per 24 hour   Intake 120 ml   Output 1825 ml   Net -1705 ml       Laboratory  Recent Labs      05/15/23  1237 05/16/23  0026 05/17/23  0119   WBC 7.8 9.1 7.7   RBC 4.64* 4.49* 4.51*   HEMOGLOBIN 13.3* 12.5* 12.5*   HEMATOCRIT 42.2 40.6* 40.5*   MCV 90.9 90.4 89.8   MCH 28.7 27.8 27.7   MCHC 31.5* 30.8* 30.9*   RDW 59.6* 58.6* 57.3*   PLATELETCT 241 225 218   MPV 10.0 10.1 10.1     Recent Labs     05/15/23  1237 05/16/23  0026 05/17/23  0119   SODIUM 145 143 143   POTASSIUM 4.7 4.8 4.7   CHLORIDE 109 109 108   CO2 23 20 22   GLUCOSE 105* 161* 136*   BUN 63* 67* 69*   CREATININE 2.64* 2.63* 2.73*   CALCIUM 9.0 8.8 8.8     Recent Labs     05/15/23  1237 05/16/23  0026 05/17/23  0119   INR 2.82* 2.59* 2.34*               Imaging  DX-CHEST-PORTABLE (1 VIEW)   Final Result      Findings suspicious for early LEFT lung pneumonia or atelectasis could have a similar appearance      EC-ECHOCARDIOGRAM COMPLETE W/O CONT    (Results Pending)        Assessment/Plan  * Volume overload- (present on admission)  Assessment & Plan  Cont on IV lasix   Diet 2 gm Na, 1 L fluid restriction  Echo is pending     Moderate mitral regurgitation- (present on admission)  Assessment & Plan  Monitor volume status    Pulmonary hypertension (HCC)- (present on admission)  Assessment & Plan  IV lasix to diurese  Monitor volume status  Check echocardiogram    Hypertensive urgency- (present on admission)  Assessment & Plan  Cont on lisinopril, increased Cardizem   IV Hydralazine as needed with parameters  Monitor and adjust as needed     S/P coronary artery stent placement- (present on admission)  Assessment & Plan  Continue Plavix, Lipitor    Mild persistent asthma without complication- (present on admission)  Assessment & Plan  Symbicort, RT protocol    CKD (chronic kidney disease) stage 4, GFR 15-29 ml/min (CMS-[facility])- (present on admission)  Assessment & Plan  Repeat BMP in AM   May need  Nephrology consult    Type 2 diabetes mellitus with kidney complication, without long-term current use of insulin ([facility])- (present on admission)  Assessment &  Plan  Cont on SSI and accuchecks     Atrial fibrillation with RVR (HCC)- (present on admission)  Assessment & Plan  HR in 110-120   So will increase Cardizem to 60 mg QID instead of TID   Continue Coumadin         VTE prophylaxis: therapeutic anticoagulation with warfarin     I have performed a physical exam and reviewed and updated ROS and Plan today (5/17/2023). In review of yesterday's note (5/16/2023), there are no changes except as documented above.

## 2023-05-17 NOTE — CARE PLAN
The patient is Stable - Low risk of patient condition declining or worsening    Shift Goals  Clinical Goals: hemodynamic stability/safety  Patient Goals: rest  Family Goals: n/a    Progress made toward(s) clinical / shift goals:    Problem: Knowledge Deficit - Standard  Goal: Patient and family/care givers will demonstrate understanding of plan of care, disease process/condition, diagnostic tests and medications  Outcome: Progressing     Problem: Fall Risk  Goal: Patient will remain free from falls  Outcome: Progressing     Problem: Psychosocial  Goal: Patient's level of anxiety will decrease  Outcome: Progressing     Problem: Hemodynamics  Goal: Patient's hemodynamics, fluid balance and neurologic status will be stable or improve  Outcome: Progressing     Problem: Respiratory  Goal: Patient will achieve/maintain optimum respiratory ventilation and gas exchange  Outcome: Progressing     Problem: Fluid Volume  Goal: Fluid volume balance will be maintained  Outcome: Progressing     Problem: Cardiac - Atrial Fibrillation  Goal: Patient will achieve & maintain adequate cardiac output and rate control  Outcome: Progressing  Goal: Complications related to anticoagulation for unconverted atrial fibrillation will be avoided or minimized  Outcome: Progressing     Problem: Mobility  Goal: Patient's ability to tolerate increased activity will improve  Outcome: Progressing     Problem: Self Care  Goal: Patient will have the ability to perform ADLs independently or with assistance (bathe, groom, dress, toilet and feed)  Outcome: Progressing

## 2023-05-17 NOTE — PROGRESS NOTES
Inpatient Anticoagulation Service Note for 5/17/2023  Reason for Anticoagulation: Atrial Fibrillation   [id number] VASc Score: 6  HAS-BLED Score: 2  Hemoglobin Value: (!) 12.5  Hematocrit Value: (!) 40.5  Lab Platelet Value: 218  Target INR: 2.0 to 3.0    INR from last 7 days       Date/Time INR Value    05/17/23 0119 2.34    05/16/23 0026 2.59    05/15/23 1237 2.82          Dose from last 7 days       Date/Time Dose (mg)    05/17/23 0914 2.5    05/16/23 1300 5    05/15/23 1545 2.5          Average Dose (mg): TBD (Home Dose: 2.5 mg MWF + 5 mg ROW per [facility])  Significant Interactions: Antiplatelet Medications  Bridge Therapy: No   Reversal Agent Administered: Not Applicable  Comments: INR at goal range. Noted bleed concern. H/H low. PLT down. Warfarin interactions noted. Will give warfarin 2.5 mg today. INR with AM labs.    Plan:  warfarin 2.5 mg 5/17/23  Education Material Provided?: No (chronic warfarin patient)    Pharmacist suggested discharge dosing: warfarin 2.5 mg Mo/We/Fr and 5 mg all other days     Jean-Pierre Skinner, PharmD

## 2023-05-17 NOTE — TELEPHONE ENCOUNTER
Received request via: Pharmacy    Was the patient seen in the last year in this department? Yes 08/12/2022: 2.  CAD/PCI: Clinically stable, continue clopidogrel, atorvastatin, diltiazem.    Does the patient have an active prescription (recently filled or refills available) for medication(s) requested? No    Does the patient have [facility] and need 100 day supply (blood pressure, diabetes and cholesterol meds only)? Patient does not have SCP    Provided 90 day supply. Patient currently admitted . Scheduled for follow up on 5/30/2023

## 2023-05-18 NOTE — PROGRESS NOTES
Inpatient Anticoagulation Service Note for 5/18/2023      Reason for Anticoagulation: Atrial Fibrillation   Target INR: 2.0 to 3.0    [id number] VASc Score: 6  HAS-BLED Score: 2    Hemoglobin Value: (!) 12.6  Hematocrit Value: (!) 40.3  Lab Platelet Value: 217      INR from last 7 days       Date/Time INR Value    05/18/23 0119 2.4    05/17/23 0119 2.34    05/16/23 0026 2.59    05/15/23 1237 2.82          Dose from last 7 days       Date/Time Dose (mg)    05/18/23  5    05/17/23  2.5    05/16/23  5    05/15/23  2.5          Average Dose (mg): Per [facility] patient is prescribed Warfarin 2.5 mg po every Mon/Wed/Fri and Warfarin 5 mg po on all other days.   Time in therapeutic range reported outpatient: ~ 70%  Significant Interactions: Antiplatelet Medications (Plavix - established interaction continued from home).   Inpatient Diet: Consistent CHO, Fluid & Na restriction.   Bridge Therapy: N/A  Reversal Agent Administered: N/A    Plan: Continue usual home dosing regimen. INR tomorrow.     Education Material Provided?: No (Established warfarin patient)    Pharmacist suggested discharge dosing: Home dosing as noted above.      Maggie Ventura, PharmD

## 2023-05-18 NOTE — CONSULTS
"[facility] -  CONSULTATION NOTE               Author: Ziyad Carr M.D. Date & Time: 5/18/2023  8:46 AM       REASON FOR CONSULTATION:   YANETH    CHIEF COMPLAINT:   \"Volume overload\"    HISTORY OF PRESENT ILLNESS:    Patient is a 78 year old male with a PMHx of CKD, afib, and CHF admitted on 5/15 for SOB and increased lower extremity edema.  Had been taking lasix 80mg PO daily but has not helped.  In the ER was found to be in afib with RVR and volume overloaded.  Was started on IV lasix but Cr uptrending.  Nephrology consulted.  Currently feels edema slightly improved.  SOB improved as well.  Denies significant salt intake at home.  Understands may need dialysis if renal function worsens    REVIEW OF SYSTEMS:    10 point ROS was performed and is as per HPI or otherwise negative    PAST MEDICAL HISTORY:   Past Medical History:   Diagnosis Date    Abnormal electrocardiogram 08/13/2010    Arrhythmia     Atrial Fibrillation; Cardiologist, Dr. Sorensen    Arthritis     knees, left hip    ASTHMA     inhalers    Atrial fibrillation (HCC) 10/25/2011    Bronchospasm 08/06/2009    Cancer (HCC)     skin cancer R post ear, removed    Cataract     IOL OU    Dental disorder     a few missing molars    Diabetes (HCC)     insulin    Heart valve disease     mitral regurg    Hemorrhagic disorder (HCC)     on blood thinners    High cholesterol     HTN (hypertension) 10/25/2011    Hypercholesteremia 10/25/2011    Long term (current) use of anticoagulants 10/25/2011    NASAL POLYPS 08/06/2009    Other nonspecific abnormal finding 08/06/2009    Pain     left hip    Pneumonia     Pulmonary hypertension (HCC)     Renal disorder     CKD stage 4    Shortness of breath     Sleep apnea 08/06/2009    No O2 or device, no retest    Wears glasses        PAST SURGICAL HISTORY:   Past Surgical History:   Procedure Laterality Date    PB ADJ TISS XFER HEAD,FAC,HAND 10.1-30 N/A 9/7/2022    Procedure: LOCAL FLAP RECONSTRUCTION " WITH REMOVAL OF DENUDED AND DRIED BONE OF THE FOREHEAD;  Surgeon: Buzz Mcnamara M.D.;  Location: SURGERY [facility];  Service: Ent    KNEE ARTHROPLASTY TOTAL Left 10/2014    HIP ARTHROPLASTY TOTAL  2010    LEFT Performed by OSGOOD, PATRICK J at SURGERY [facility] ORS    LUMBAR DECOMPRESSION  1984    CATARACT EXTRACTION WITH IOL Bilateral     CYSTOSCOPY      cannot remember exact procedure    LAMINOTOMY      SINUSCOPE      SINUSOTOMIES  ,,2007       FAMILY HISTORY:   Family History   Problem Relation Age of Onset    Heart Disease Mother        SOCIAL HISTORY:   Social History     Tobacco Use   Smoking Status Former    Packs/day: 0.50    Years: 10.00    Pack years: 5.00    Types: Cigarettes    Quit date: 12/10/1968    Years since quittin.4   Smokeless Tobacco Never   Vaping Use    Vaping Use: Never used     Social History     Substance and Sexual Activity   Alcohol Use No     Social History     Substance and Sexual Activity   Drug Use No       HOME MEDICATIONS:   Reviewed and documented in chart    LABORATORY STUDIES:   Recent Labs     23  0026 23  0119 23  0119   SODIUM 143 143 138   POTASSIUM 4.8 4.7 4.5   CHLORIDE 109 108 104   CO2 20 22 21   GLUCOSE 161* 136* 115*   BUN 67* 69* 72*   CREATININE 2.63* 2.73* 3.17*   CALCIUM 8.8 8.8 8.6       ALLERGIES:  Atenolol, Tetanus toxoid, and Atorvastatin    VS:  /84   Pulse (!) 107   Temp 36.8 °C (98.2 °F) (Temporal)   Resp 18   Ht 1.829 m (6')   Wt 113 kg (249 lb 5.4 oz)   SpO2 94%   BMI 33.82 kg/m²     Physical Exam  Constitutional:       Appearance: He is ill-appearing.   HENT:      Head: Normocephalic.      Right Ear: External ear normal.      Left Ear: External ear normal.      Nose: Nose normal.      Mouth/Throat:      Mouth: Mucous membranes are moist.   Eyes:      General: No scleral icterus.  Cardiovascular:      Rate and Rhythm: Normal rate.   Pulmonary:      Comments: Increased work of  breathing  Abdominal:      General: There is distension.   Musculoskeletal:         General: Swelling present.      Cervical back: Normal range of motion.      Right lower leg: Edema present.      Left lower leg: Edema present.   Skin:     General: Skin is warm.   Neurological:      General: No focal deficit present.      Mental Status: He is alert.   Psychiatric:         Mood and Affect: Mood normal.         FLUID BALANCE:  In: 480 [P.O.:480]  Out: 1900     IMAGING:  All imaging reviewed from admission to present day    IMPRESSION:  # YANETH on CKD    - Seems likely recent baseline renal function fairly labile from 1.8 to 2.5    - Now creat up to 3.1, suspect due to hemodynamic changes from diuresis    - Diuresing well    - UA not suggestive of GN type process  # CKD    - Sees Dr Vicente in the clinic, ckd though to be due to DM  # Volume overload    - Continue diuresis  # p HTN  # Moderate mitral regurgiation  # CAD  # Asthma  # Diabetes  # Afib    PLAN:  - No compelling indication for RRT  - Discussed about dialysis, and understands may be needed if renal function worsens  - Continue with diuresis  - Accurate I/Os  - Daily labs  - Dose all meds per eGFR < 15    Thank you for the consultation!

## 2023-05-18 NOTE — CARE PLAN
The patient is Stable - Low risk of patient condition declining or worsening    Shift Goals  Clinical Goals: hemodynamic stability, wean O2  Patient Goals: rest  Family Goals: n/a    Progress made toward(s) clinical / shift goals:    Problem: Knowledge Deficit - Standard  Goal: Patient and family/care givers will demonstrate understanding of plan of care, disease process/condition, diagnostic tests and medications  Outcome: Progressing     Problem: Fall Risk  Goal: Patient will remain free from falls  Outcome: Progressing     Problem: Psychosocial  Goal: Patient's level of anxiety will decrease  Outcome: Progressing     Problem: Hemodynamics  Goal: Patient's hemodynamics, fluid balance and neurologic status will be stable or improve  Outcome: Progressing     Problem: Respiratory  Goal: Patient will achieve/maintain optimum respiratory ventilation and gas exchange  Outcome: Progressing     Problem: Fluid Volume  Goal: Fluid volume balance will be maintained  Outcome: Progressing     Problem: Cardiac - Atrial Fibrillation  Goal: Patient will achieve & maintain adequate cardiac output and rate control  Outcome: Progressing  Goal: Complications related to anticoagulation for unconverted atrial fibrillation will be avoided or minimized  Outcome: Progressing     Problem: Mobility  Goal: Patient's ability to tolerate increased activity will improve  Outcome: Progressing     Problem: Self Care  Goal: Patient will have the ability to perform ADLs independently or with assistance (bathe, groom, dress, toilet and feed)  Outcome: Progressing

## 2023-05-18 NOTE — PROGRESS NOTES
Hospital Medicine Daily Progress Note    Date of Service  5/18/2023    Chief Complaint  Yuri De León is a 78 y.o. male admitted 5/15/2023 with shortness of breath     Hospital Course  This is a 77 y/o Male with PMHX of CKD, A.fibb, CHF who was admitted on 5/15/23 after presenting to ER complaining of sob and increased lower extremity edema.  Patient states he has been taking his Lasix 80 mg daily but it has not helped.  He also ran out of his Cardizem a few days ago.  IN ER he was found to be fluid overload and also on A. fibb with RVR  Patient admitted for further work up and treatment      Interval Problem Update  5/16: Patient seen and examined, afebrile sitting up in the chair , feels SOB, still with lower extremity swelling.   HR in the 110-120 increasing Cardizem to QID instead from TID  Cont on IV lasix for his fluid overload   Echo pending   5/17: Patient resting in the chair still feeling SOB, cont on IV lasix 40 mg BID, and wean off O2 as tolerated,echo pending  Regarding his A.fibb HR better controlled with the increase dose of diltiazem HR 80-90   5/18: No acute events overnight, cont on iV lasix for aggressive echo pending,   Renal function worsening bmp reviewed showing creatinine 3.17 and GFR of 19. Ihave consulted nephrology and discussed case with Dr. Carr appreciate rec.     I have discussed this patient's plan of care and discharge plan at IDT rounds today with Case Management, Nursing, Nursing leadership, and other members of the IDT team.    Consultants/Specialty  Nephrology     Code Status  Full Code    Disposition  The patient is not medically cleared for discharge to home or a post-acute facility.      I have placed the appropriate orders for post-discharge needs.    Review of Systems  Review of Systems   Constitutional:  Positive for malaise/fatigue. Negative for chills, diaphoresis and fever.   HENT:  Positive for hearing loss. Negative for congestion and sore throat.    Eyes:   Negative for blurred vision.   Respiratory:  Positive for shortness of breath. Negative for cough and wheezing.    Cardiovascular:  Positive for leg swelling. Negative for chest pain and palpitations.   Gastrointestinal:  Negative for abdominal pain, diarrhea, heartburn and vomiting.   Genitourinary:  Negative for dysuria, flank pain and hematuria.   Musculoskeletal:  Negative for back pain, joint pain, myalgias and neck pain.   Skin:  Negative for rash.   Neurological:  Positive for weakness. Negative for dizziness, sensory change, speech change, focal weakness and headaches.   Psychiatric/Behavioral:  The patient is not nervous/anxious.         Physical Exam  Temp:  [36.4 °C (97.6 °F)-36.9 °C (98.4 °F)] 36.5 °C (97.7 °F)  Pulse:  [] 77  Resp:  [18-20] 20  BP: (115-134)/(67-84) 134/67  SpO2:  [93 %-96 %] 96 %    Physical Exam  Vitals and nursing note reviewed.   Constitutional:       Appearance: He is obese.   HENT:      Head: Normocephalic and atraumatic.      Nose: No congestion.      Mouth/Throat:      Mouth: Mucous membranes are moist.   Eyes:      General: No scleral icterus.        Right eye: No discharge.         Left eye: No discharge.      Extraocular Movements: Extraocular movements intact.      Conjunctiva/sclera: Conjunctivae normal.   Cardiovascular:      Rate and Rhythm: Tachycardia present. Rhythm irregular.      Heart sounds: Murmur heard.   Pulmonary:      Effort: Accessory muscle usage present.      Breath sounds: Rales present.   Abdominal:      General: There is distension.      Tenderness: There is no abdominal tenderness. There is no guarding or rebound.   Musculoskeletal:      Cervical back: No tenderness.      Right lower leg: Edema present.      Left lower leg: Edema present.   Skin:     Comments: Venous stasis dermatitis changes both legs   Neurological:      General: No focal deficit present.      Mental Status: He is alert and oriented to person, place, and time.      Cranial  Nerves: No cranial nerve deficit.         Fluids    Intake/Output Summary (Last 24 hours) at 5/18/2023 1208  Last data filed at 5/18/2023 0900  Gross per 24 hour   Intake 660 ml   Output 1180 ml   Net -520 ml       Laboratory  Recent Labs     05/16/23  0026 05/17/23  0119 05/18/23  0119   WBC 9.1 7.7 7.0   RBC 4.49* 4.51* 4.49*   HEMOGLOBIN 12.5* 12.5* 12.6*   HEMATOCRIT 40.6* 40.5* 40.3*   MCV 90.4 89.8 89.8   MCH 27.8 27.7 28.1   MCHC 30.8* 30.9* 31.3*   RDW 58.6* 57.3* 57.0*   PLATELETCT 225 218 217   MPV 10.1 10.1 10.1     Recent Labs     05/16/23 0026 05/17/23  0119 05/18/23  0119   SODIUM 143 143 138   POTASSIUM 4.8 4.7 4.5   CHLORIDE 109 108 104   CO2 20 22 21   GLUCOSE 161* 136* 115*   BUN 67* 69* 72*   CREATININE 2.63* 2.73* 3.17*   CALCIUM 8.8 8.8 8.6     Recent Labs     05/16/23 0026 05/17/23  0119 05/18/23  0119   INR 2.59* 2.34* 2.40*               Imaging  DX-CHEST-PORTABLE (1 VIEW)   Final Result      Findings suspicious for early LEFT lung pneumonia or atelectasis could have a similar appearance      EC-ECHOCARDIOGRAM COMPLETE W/O CONT    (Results Pending)        Assessment/Plan  * Volume overload- (present on admission)  Assessment & Plan  Cont on IV lasix   Diet 2 gm Na, 1 L fluid restriction  Echo is pending     Moderate mitral regurgitation- (present on admission)  Assessment & Plan  Monitor volume status    Pulmonary hypertension (HCC)- (present on admission)  Assessment & Plan  IV lasix to diurese  Monitor volume status  Check echocardiogram    Hypertensive urgency- (present on admission)  Assessment & Plan  Cont on lisinopril, increased Cardizem   IV Hydralazine as needed with parameters  Monitor and adjust as needed     S/P coronary artery stent placement- (present on admission)  Assessment & Plan  Continue Plavix, Lipitor    Mild persistent asthma without complication- (present on admission)  Assessment & Plan  Symbicort, RT protocol    CKD (chronic kidney disease) stage 4, GFR 15-29  ml/min (CMS-HCC)- (present on admission)  Assessment & Plan  Repeat BMP in AM   May need  Nephrology consult    Type 2 diabetes mellitus with kidney complication, without long-term current use of insulin ([facility])- (present on admission)  Assessment & Plan  Cont on SSI and accuchecks     Atrial fibrillation with RVR ([facility])- (present on admission)  Assessment & Plan  HR in 110-120   So will increase Cardizem to 60 mg QID instead of TID   Continue Coumadin         VTE prophylaxis: therapeutic anticoagulation with warfarin     I have performed a physical exam and reviewed and updated ROS and Plan today (5/18/2023). In review of yesterday's note (5/17/2023), there are no changes except as documented above.

## 2023-05-19 NOTE — CARE PLAN
The patient is Watcher - Medium risk of patient condition declining or worsening    Shift Goals  Clinical Goals: wean off O2, VSS, HR control  Patient Goals: rest  Family Goals: n/a    Progress made toward(s) clinical / shift goals:    Problem: Knowledge Deficit - Standard  Goal: Patient and family/care givers will demonstrate understanding of plan of care, disease process/condition, diagnostic tests and medications  Outcome: Progressing       Patient is not progressing towards the following goals:

## 2023-05-19 NOTE — FACE TO FACE
Face to Face Supporting Documentation - Home Health    The encounter with this patient was in whole or in part the primary reason for home health admission.    Date of encounter:   Patient:                    MRN:                       YOB: 2023  Yuri De León  [id number]  1945     Home health to see patient for:  Skilled Nursing care for assessment, interventions & education, Physical Therapy evaluation and treatment, and Occupational therapy evaluation and treatment    Skilled need for:  Comment: medication management     Skilled nursing interventions to include:  Comment: medication management     Homebound status evidenced by:  Need the aid of supportive devices such as crutches, canes, wheelchairs or walkers. Leaving home requires a considerable and taxing effort. There is a normal inability to leave the home.    Community Physician to provide follow up care: Alberto Carmona D.O.     Optional Interventions? No      I certify the face to face encounter for this home health care referral meets the CMS requirements and the encounter/clinical assessment with the patient was, in whole, or in part, for the medical condition(s) listed above, which is the primary reason for home health care. Based on my clinical findings: the service(s) are medically necessary, support the need for home health care, and the homebound criteria are met.  I certify that this patient has had a face to face encounter by myself.  Rafa Carranza M.D. - NPI: [id number]

## 2023-05-19 NOTE — PROGRESS NOTES
Inpatient Anticoagulation Service Note for 5/19/2023    Reason for Anticoagulation: Atrial Fibrillation   Target INR: 2.0 to 3.0    [id number] VASc Score: 6  HAS-BLED Score: 2    Hemoglobin Value: (!) 12.5  Hematocrit Value: (!) 39.8  Lab Platelet Value: 226    INR from last 7 days       Date/Time INR Value    05/19/23 0032 2.5    05/18/23 0119 2.4    05/17/23 0119 2.34    05/16/23 0026 2.59    05/15/23 1237 2.82     Dose from last 7 days       Date/Time Dose (mg)    05/19/23  2.5    05/18/23  5    05/17/23  2.5    05/16/23  5    05/15/23  2.5     Average Dose (mg): Per [facility] patient is prescribed Warfarin 2.5 mg po every Mon/Wed/Fri and Warfarin 5 mg po on all other days.   Time in therapeutic range reported outpatient: ~ 70%  Significant Interactions: Antiplatelet Medications (Plavix - established interaction continued from home).   Inpatient Diet: Consistent CHO, Fluid & Na restriction.   Bridge Therapy: N/A  Reversal Agent Administered: N/A     Plan: Continue usual home dosing regimen. INR monitoring changed to every 48 hours.      Education Material Provided?: No (Established warfarin patient)     Pharmacist suggested discharge dosing: Home dosing as noted above.      Maggie Ventura, PharmD

## 2023-05-19 NOTE — PROGRESS NOTES
Bedside report received and patient care assumed. Pt is resting in bed, A&O4, with no complaints of pain, and is on 2L nasal cannula. Tele box on. All fall precautions are in place, belongings at bedside table.  Pt was updated on POC, no questions or concerns. Pt educated on use of call light for assistance.

## 2023-05-19 NOTE — PROGRESS NOTES
[facility] -  PROGRESS NOTE               Author: Ziyad Carr M.D. Date & Time: 5/19/2023  11:42 AM     HPI:  Patient is a 78 year old male with a PMHx of CKD, afib, and CHF admitted on 5/15 for SOB and increased lower extremity edema.  Had been taking lasix 80mg PO daily but has not helped.  In the ER was found to be in afib with RVR and volume overloaded.  Was started on IV lasix but Cr uptrending.  Nephrology consulted.  Currently feels edema slightly improved.  SOB improved as well.  Denies significant salt intake at home.  Understands may need dialysis if renal function worsens    DAILY NEPHROLOGY SUMMARY:  5/19 - Cr stable.  States edema improved.  SOB improved.  Sleeps sitting up, unclear if I/Os accurate    REVIEW OF SYSTEMS:    10 point ROS reviewed and is as per HPI/daily summary or otherwise negative    PMH/PSH/SH/FH:   Reviewed and unchanged since admission note    CURRENT MEDICATIONS:   Reviewed from admission to present day    VS:  /76   Pulse 72   Temp 36.6 °C (97.9 °F) (Temporal)   Resp 14   Ht 1.829 m (6')   Wt 113 kg (248 lb 7.3 oz)   SpO2 94%   BMI 33.70 kg/m²     Physical Exam  Constitutional:       Appearance: He is ill-appearing.   HENT:      Head: Normocephalic.      Right Ear: External ear normal.      Left Ear: External ear normal.      Nose: Nose normal.      Mouth/Throat:      Mouth: Mucous membranes are moist.   Eyes:      General: No scleral icterus.  Cardiovascular:      Rate and Rhythm: Normal rate.   Pulmonary:      Comments: Increased work of breathing  Abdominal:      General: There is distension.   Musculoskeletal:         General: Swelling present.      Cervical back: Normal range of motion.      Right lower leg: Edema present.      Left lower leg: Edema present.   Skin:     General: Skin is warm.   Neurological:      General: No focal deficit present.      Mental Status: He is alert.   Psychiatric:         Mood and Affect: Mood normal.    Fluids:  In: 980 [P.O.:980]  Out: 380     LABS:  Recent Labs     05/17/23  0119 05/18/23  0119 05/19/23  0032   SODIUM 143 138 139   POTASSIUM 4.7 4.5 4.5   CHLORIDE 108 104 103   CO2 22 21 24   GLUCOSE 136* 115* 199*   BUN 69* 72* 74*   CREATININE 2.73* 3.17* 3.06*   CALCIUM 8.8 8.6 8.5       IMAGING:   All imaging reviewed from admission to present day    IMPRESSION:  # YANETH on CKD    - Seems likely recent baseline renal function fairly labile from 1.8 to 2.5    - Now creat up to 3.1, suspect due to hemodynamic changes from diuresis    - Diuresing well    - UA not suggestive of GN type process  # CKD    - Sees Dr Vicente in the clinic, ckd though to be due to DM  # Volume overload    - Continue diuresis  # p HTN  # Moderate mitral regurgiation  # CAD  # Asthma  # Diabetes  # Afib     PLAN:  - Continue with IV diuresis  - No compelling indication for RRT  - Discussed about dialysis, and understands may be needed if renal function worsens  - Accurate I/Os  - Daily labs  - Dose all meds per eGFR < 15     Thank you for the consultation!

## 2023-05-20 NOTE — CARE PLAN
The patient is Stable - Low risk of patient condition declining or worsening    Shift Goals  Clinical Goals: diurese, fluid restriction, monitor labs  Patient Goals: rest  Family Goals: comfort    Progress made toward(s) clinical / shift goals:        Problem: Knowledge Deficit - Standard  Goal: Patient and family/care givers will demonstrate understanding of plan of care, disease process/condition, diagnostic tests and medications  Description: Target End Date:  1-3 days or as soon as patient condition allows    Document in Patient Education    1.  Patient and family/caregiver oriented to unit, equipment, visitation policy and means for communicating concern  2.  Complete/review Learning Assessment  3.  Assess knowledge level of disease process/condition, treatment plan, diagnostic tests and medications  4.  Explain disease process/condition, treatment plan, diagnostic tests and medications  Outcome: Progressing     Problem: Fall Risk  Goal: Patient will remain free from falls  Description: Target End Date:  Prior to discharge or change in level of care    Document interventions on the [facility] Fall Risk Assessment    1.  Assess for fall risk factors  2.  Implement fall precautions  Outcome: Progressing     Problem: Psychosocial  Goal: Patient's level of anxiety will decrease  Description: Target End Date:  1-3 days or as soon as patient condition allows    1.  Collaborate with patient and family/caregiver to identify triggers and develop strategies to cope with anxiety  2.  Implement stimuli reduction, calming techniques  3.  Pharmacologic management per provider order  4.  Encourage patient/family/care giver participation  5.  Collaborate with interdisciplinary team including Psychologist or Behavioral Health Team as needed  Outcome: Progressing       Patient is not progressing towards the following goals:

## 2023-05-20 NOTE — PROGRESS NOTES
Hospital Medicine Daily Progress Note    Date of Service  5/20/2023    Chief Complaint  Yuri De León is a 78 y.o. male admitted 5/15/2023 with shortness of breath     Hospital Course  This is a 77 y/o Male with PMHX of CKD, A.fibb, CHF who was admitted on 5/15/23 after presenting to ER complaining of sob and increased lower extremity edema.  Patient states he has been taking his Lasix 80 mg daily but it has not helped.  He also ran out of his Cardizem a few days ago.  IN ER he was found to be fluid overload and also on A. fibb with RVR  Patient admitted for further work up and treatment      Interval Problem Update  5/16: Patient seen and examined, afebrile sitting up in the chair , feels SOB, still with lower extremity swelling.   HR in the 110-120 increasing Cardizem to QID instead from TID  Cont on IV lasix for his fluid overload   Echo pending   5/17: Patient resting in the chair still feeling SOB, cont on IV lasix 40 mg BID, and wean off O2 as tolerated,echo pending  Regarding his A.fibb HR better controlled with the increase dose of diltiazem HR 80-90   5/18: No acute events overnight, cont on iV lasix for aggressive echo pending,   Renal function worsening bmp reviewed showing creatinine 3.17 and GFR of 19. Ihave consulted nephrology and discussed case with Dr. Carr appreciate rec.   5/19: Patient resting in bed, still edematous and still requiring Oxygen , echo pending. Cont on IV lasix ill increase dose to 60 mg IV BID. Nephrology following case discussed with Dr Carr appreciate rec.   I have discussed this patient's plan of care and discharge plan at IDT rounds today with Case Management, Nursing, Nursing leadership, and other members of the IDT team.  5/20: Patient seen and examined, still fluid overload , lasix has been increased to 60 mg IV BID< , no acute events overnight, Echo reviewed :showing mild pulmonary HTN.,  YANETH slowly improving appreciate nephrology rec.      Consultants/Specialty  Nephrology     Code Status  Full Code    Disposition  The patient is not medically cleared for discharge to home or a post-acute facility.      I have placed the appropriate orders for post-discharge needs.    Review of Systems  Review of Systems   Constitutional:  Positive for malaise/fatigue. Negative for chills, diaphoresis and fever.   HENT:  Positive for hearing loss. Negative for congestion and sore throat.    Eyes:  Negative for blurred vision.   Respiratory:  Positive for shortness of breath. Negative for cough and wheezing.    Cardiovascular:  Positive for leg swelling. Negative for chest pain and palpitations.   Gastrointestinal:  Negative for abdominal pain, diarrhea, heartburn and vomiting.   Genitourinary:  Negative for dysuria, flank pain and hematuria.   Musculoskeletal:  Negative for back pain, joint pain, myalgias and neck pain.   Skin:  Negative for rash.   Neurological:  Positive for weakness. Negative for dizziness, sensory change, speech change, focal weakness and headaches.   Psychiatric/Behavioral:  The patient is not nervous/anxious.         Physical Exam  Temp:  [36.6 °C (97.9 °F)] 36.6 °C (97.9 °F)  Pulse:  [76-90] 90  Resp:  [14-20] 18  BP: (121-135)/(62-74) 135/68  SpO2:  [94 %-97 %] 94 %    Physical Exam  Vitals and nursing note reviewed.   Constitutional:       Appearance: He is obese.   HENT:      Head: Normocephalic and atraumatic.      Nose: No congestion.      Mouth/Throat:      Mouth: Mucous membranes are moist.   Eyes:      General: No scleral icterus.        Right eye: No discharge.         Left eye: No discharge.      Extraocular Movements: Extraocular movements intact.      Conjunctiva/sclera: Conjunctivae normal.   Cardiovascular:      Rate and Rhythm: Tachycardia present. Rhythm irregular.      Heart sounds: Murmur heard.   Pulmonary:      Effort: Accessory muscle usage present.      Breath sounds: Rales present.   Abdominal:      General: There is  distension.      Tenderness: There is no abdominal tenderness. There is no guarding or rebound.   Musculoskeletal:      Cervical back: No tenderness.      Right lower leg: Edema present.      Left lower leg: Edema present.   Skin:     Comments: Venous stasis dermatitis changes both legs   Neurological:      General: No focal deficit present.      Mental Status: He is alert and oriented to person, place, and time.      Cranial Nerves: No cranial nerve deficit.         Fluids    Intake/Output Summary (Last 24 hours) at 5/20/2023 1130  Last data filed at 5/20/2023 0934  Gross per 24 hour   Intake 100 ml   Output --   Net 100 ml       Laboratory  Recent Labs     05/18/23  0119 05/19/23  0032 05/20/23  0131   WBC 7.0 7.5 7.9   RBC 4.49* 4.40* 4.49*   HEMOGLOBIN 12.6* 12.5* 12.5*   HEMATOCRIT 40.3* 39.8* 40.2*   MCV 89.8 90.5 89.5   MCH 28.1 28.4 27.8   MCHC 31.3* 31.4* 31.1*   RDW 57.0* 57.7* 56.4*   PLATELETCT 217 226 220   MPV 10.1 10.2 9.8     Recent Labs     05/18/23  0119 05/19/23  0032 05/20/23  0131   SODIUM 138 139 140   POTASSIUM 4.5 4.5 4.6   CHLORIDE 104 103 103   CO2 21 24 24   GLUCOSE 115* 199* 148*   BUN 72* 74* 76*   CREATININE 3.17* 3.06* 2.89*   CALCIUM 8.6 8.5 8.6     Recent Labs     05/18/23  0119 05/19/23  0032   INR 2.40* 2.50*               Imaging  EC-ECHOCARDIOGRAM COMPLETE W/O CONT   Final Result      DX-CHEST-PORTABLE (1 VIEW)   Final Result      Findings suspicious for early LEFT lung pneumonia or atelectasis could have a similar appearance           Assessment/Plan  * Volume overload- (present on admission)  Assessment & Plan  Cont on IV lasix   Diet 2 gm Na, 1 L fluid restriction  Echo is pending     Moderate mitral regurgitation- (present on admission)  Assessment & Plan  Monitor volume status    Pulmonary hypertension (HCC)- (present on admission)  Assessment & Plan  IV lasix to diurese  Monitor volume status  Check echocardiogram    Hypertensive urgency- (present on admission)  Assessment  & Plan  Cont on lisinopril, increased Cardizem   IV Hydralazine as needed with parameters  Monitor and adjust as needed     S/P coronary artery stent placement- (present on admission)  Assessment & Plan  Continue Plavix, Lipitor    Mild persistent asthma without complication- (present on admission)  Assessment & Plan  Symbicort, RT protocol    CKD (chronic kidney disease) stage 4, GFR 15-29 ml/min (CMS-HCC)- (present on admission)  Assessment & Plan  Repeat BMP in AM   May need  Nephrology consult    Type 2 diabetes mellitus with kidney complication, without long-term current use of insulin ([facility])- (present on admission)  Assessment & Plan  Cont on SSI and accuchecks     Atrial fibrillation with RVR ([facility])- (present on admission)  Assessment & Plan  HR in 110-120   So will increase Cardizem to 60 mg QID instead of TID   Continue Coumadin         VTE prophylaxis: therapeutic anticoagulation with warfarin     I have performed a physical exam and reviewed and updated ROS and Plan today (5/20/2023). In review of yesterday's note (5/19/2023), there are no changes except as documented above.

## 2023-05-20 NOTE — PROGRESS NOTES
[facility] -  PROGRESS NOTE               Author: Ziyad Carr M.D. Date & Time: 5/20/2023  11:35 AM     HPI:  Patient is a 78 year old male with a PMHx of CKD, afib, and CHF admitted on 5/15 for SOB and increased lower extremity edema.  Had been taking lasix 80mg PO daily but has not helped.  In the ER was found to be in afib with RVR and volume overloaded.  Was started on IV lasix but Cr uptrending.  Nephrology consulted.  Currently feels edema slightly improved.  SOB improved as well.  Denies significant salt intake at home.  Understands may need dialysis if renal function worsens    DAILY NEPHROLOGY SUMMARY:  5/19 - Cr stable.  States edema improved.  SOB improved.  Sleeps sitting up, unclear if I/Os accurate  5/20 - Output not recorded.  Cr trending down.  Edema slightly better but still significant in the legs    REVIEW OF SYSTEMS:    10 point ROS reviewed and is as per HPI/daily summary or otherwise negative    PMH/PSH/SH/FH:   Reviewed and unchanged since admission note    CURRENT MEDICATIONS:   Reviewed from admission to present day    VS:  /68   Pulse 90   Temp 36.6 °C (97.9 °F) (Temporal)   Resp 18   Ht 1.829 m (6')   Wt 113 kg (248 lb 7.3 oz)   SpO2 94%   BMI 33.70 kg/m²     Physical Exam  Constitutional:       Appearance: He is ill-appearing.   HENT:      Head: Normocephalic.      Right Ear: External ear normal.      Left Ear: External ear normal.      Nose: Nose normal.      Mouth/Throat:      Mouth: Mucous membranes are moist.   Eyes:      General: No scleral icterus.  Cardiovascular:      Rate and Rhythm: Normal rate.   Pulmonary:      Comments: Increased work of breathing  Abdominal:      General: There is distension.   Musculoskeletal:         General: Swelling present.      Cervical back: Normal range of motion.      Right lower leg: Edema present.      Left lower leg: Edema present.   Skin:     General: Skin is warm.   Neurological:      General: No focal  deficit present.      Mental Status: He is alert.   Psychiatric:         Mood and Affect: Mood normal.   Fluids:  In: 360 [P.O.:360]  Out: 0     LABS:  Recent Labs     05/18/23  0119 05/19/23  0032 05/20/23  0131   SODIUM 138 139 140   POTASSIUM 4.5 4.5 4.6   CHLORIDE 104 103 103   CO2 21 24 24   GLUCOSE 115* 199* 148*   BUN 72* 74* 76*   CREATININE 3.17* 3.06* 2.89*   CALCIUM 8.6 8.5 8.6         IMAGING:   All imaging reviewed from admission to present day    IMPRESSION:  # YANETH on CKD    - Seems likely recent baseline renal function fairly labile from 1.8 to 2.5    - Now creat up to 3.1, suspect due to hemodynamic changes from diuresis    - Diuresing well    - UA not suggestive of GN type process  # CKD    - Sees Dr Vicente in the clinic, ckd though to be due to DM  # Volume overload    - Continue diuresis  # p HTN  # Moderate mitral regurgiation  # CAD  # Asthma  # Diabetes  # Afib     PLAN:  - Continue with IV diuresis  - No compelling indication for RRT  - Discussed about dialysis, and understands may be needed if renal function worsens  - Accurate I/Os  - Daily labs  - Dose all meds per eGFR < 15     Thank you for the consultation!

## 2023-05-20 NOTE — PROGRESS NOTES
Inpatient Anticoagulation Service Note for 5/20/2023      Reason for Anticoagulation: Atrial Fibrillation   Target INR: 2.0 to 3.0    [id number] VASc Score: 6  HAS-BLED Score: 2    Hemoglobin Value: (!) 12.5  Hematocrit Value: (!) 40.2  Lab Platelet Value: 220      INR from last 7 days       Date/Time INR Value    05/19/23 0032 2.5    05/18/23 0119 2.4    05/17/23 0119 2.34    05/16/23 0026 2.59    05/15/23 1237 2.82     Dose from last 7 days       Date/Time Dose (mg)    05/20/23 5    05/19/23  2.5    05/18/23 5    05/17/23  2.5    05/16/23  5    05/15/23  2.5     No INR today - ordered q48 hours.     Average Dose (mg): Per [facility] patient is prescribed Warfarin 2.5 mg po every Mon/Wed/Fri and Warfarin 5 mg po on all other days.   Time in therapeutic range reported outpatient: ~ 70%  Significant Interactions: Antiplatelet Medications (Plavix - established interaction continued from home).   Inpatient Diet: Consistent CHO, Fluid & Na restriction.   Bridge Therapy: N/A  Reversal Agent Administered: N/A     Plan: Continue usual home dosing regimen. INR tomorrow.       Education Material Provided?: No (Established warfarin patient)     Pharmacist suggested discharge dosing: Home dosing as noted above.      Maggie Ventura, PharmD

## 2023-05-20 NOTE — PROGRESS NOTES
Received report from Huy RN, pt care assumed. Pt resting comfortably in bedside chair on 2L NC w/ family at bedside. Pt AAOx4, denies pain, SOB, nausea, dizziness. POC updated w/ pt/family, no questions verbalized at this time. Chair locked, alarm on and call light in reach. No additional needs at this time, hourly rounding initiated.

## 2023-05-21 NOTE — PROGRESS NOTES
Received report from Huy RN, pt care assumed. Pt resting comfortably in bedside chair on 2L NC w/ family at bedside. Pt AAOx4, denies pain, SOB, nausea, dizziness. POC updated w/ pt/family, no questions verbalized. Chair locked, call light in reach. No additional needs at this time, hourly rounding initiated.

## 2023-05-21 NOTE — CARE PLAN
The patient is Stable - Low risk of patient condition declining or worsening    Shift Goals  Clinical Goals: diurese, manage bg  Patient Goals: rest  Family Goals: comfort    Progress made toward(s) clinical / shift goals:        Problem: Knowledge Deficit - Standard  Goal: Patient and family/care givers will demonstrate understanding of plan of care, disease process/condition, diagnostic tests and medications  Description: Target End Date:  1-3 days or as soon as patient condition allows    Document in Patient Education    1.  Patient and family/caregiver oriented to unit, equipment, visitation policy and means for communicating concern  2.  Complete/review Learning Assessment  3.  Assess knowledge level of disease process/condition, treatment plan, diagnostic tests and medications  4.  Explain disease process/condition, treatment plan, diagnostic tests and medications  Outcome: Progressing     Problem: Fall Risk  Goal: Patient will remain free from falls  Description: Target End Date:  Prior to discharge or change in level of care    Document interventions on the [facility] Fall Risk Assessment    1.  Assess for fall risk factors  2.  Implement fall precautions  Outcome: Progressing     Problem: Psychosocial  Goal: Patient's level of anxiety will decrease  Description: Target End Date:  1-3 days or as soon as patient condition allows    1.  Collaborate with patient and family/caregiver to identify triggers and develop strategies to cope with anxiety  2.  Implement stimuli reduction, calming techniques  3.  Pharmacologic management per provider order  4.  Encourage patient/family/care giver participation  5.  Collaborate with interdisciplinary team including Psychologist or Behavioral Health Team as needed  Outcome: Progressing       Patient is not progressing towards the following goals:

## 2023-05-21 NOTE — PROGRESS NOTES
Inpatient Anticoagulation Service Note for 5/21/2023    Reason for Anticoagulation: Atrial Fibrillation   Target INR: 2.0 to 3.0    [id number] VASc Score: 6  HAS-BLED Score: 2    Hemoglobin Value: (!) 12.5  Hematocrit Value: (!) 40.2  Lab Platelet Value: 220    INR from last 7 days       Date/Time INR Value    05/21/23 0213 2.65    05/19/23 0032 2.5    05/18/23 0119 2.4    05/17/23 0119 2.34    05/16/23 0026 2.59    05/15/23 1237 2.82     Dose from last 7 days       Date/Time Dose (mg)    05/21/23  5    05/20/23  5    05/19/23  2.5    05/18/23  5    05/17/23  2.5    05/16/23  5    05/15/23  2.5     Average Dose (mg): Per [facility] patient is prescribed Warfarin 2.5 mg po every Mon/Wed/Fri and Warfarin 5 mg po on all other days.   Time in therapeutic range reported outpatient: ~ 70%  Significant Interactions: Antiplatelet Medications (Plavix - established interaction continued from home).   Inpatient Diet: Consistent CHO, Fluid & Na restriction.   Bridge Therapy: N/A  Reversal Agent Administered: N/A     Plan: Continue usual home dosing regimen. Next INR 5/23 (ordered q48 hours).      Education Material Provided?: No (Established warfarin patient)     Pharmacist suggested discharge dosing: Home dosing as noted above.      Maggie Ventura, PharmD

## 2023-05-21 NOTE — PROGRESS NOTES
[facility] -  PROGRESS NOTE               Author: Ziyad Carr M.D. Date & Time: 5/21/2023  11:17 AM     HPI:  Patient is a 78 year old male with a PMHx of CKD, afib, and CHF admitted on 5/15 for SOB and increased lower extremity edema.  Had been taking lasix 80mg PO daily but has not helped.  In the ER was found to be in afib with RVR and volume overloaded.  Was started on IV lasix but Cr uptrending.  Nephrology consulted.  Currently feels edema slightly improved.  SOB improved as well.  Denies significant salt intake at home.  Understands may need dialysis if renal function worsens    DAILY NEPHROLOGY SUMMARY:  5/19 - Cr stable.  States edema improved.  SOB improved.  Sleeps sitting up, unclear if I/Os accurate  5/20 - Output not recorded.  Cr trending down.  Edema slightly better but still significant in the legs  5/21 - Denies pain or SOB.  Sitting in chair.  Appears edema decreased.  Cr slgihtly up.    REVIEW OF SYSTEMS:    10 point ROS reviewed and is as per HPI/daily summary or otherwise negative    PMH/PSH/SH/FH:   Reviewed and unchanged since admission note    CURRENT MEDICATIONS:   Reviewed from admission to present day    VS:  /80   Pulse 87   Temp 36.2 °C (97.2 °F) (Temporal)   Resp 18   Ht 1.829 m (6')   Wt 113 kg (248 lb 7.3 oz)   SpO2 93%   BMI 33.70 kg/m²     Physical Exam  Constitutional:       Appearance: He is ill-appearing.   HENT:      Head: Normocephalic.      Right Ear: External ear normal.      Left Ear: External ear normal.      Nose: Nose normal.      Mouth/Throat:      Mouth: Mucous membranes are moist.   Eyes:      General: No scleral icterus.  Cardiovascular:      Rate and Rhythm: Normal rate.   Pulmonary:      Comments: Increased work of breathing  Abdominal:      General: There is distension.   Musculoskeletal:         General: Swelling present.      Cervical back: Normal range of motion.      Right lower leg: Edema present.      Left lower leg:  Edema present.   Skin:     General: Skin is warm.   Neurological:      General: No focal deficit present.      Mental Status: He is alert.   Psychiatric:         Mood and Affect: Mood normal.   Fluids:  In: 460 [P.O.:460]  Out: -     LABS:  Recent Labs     05/19/23  0032 05/20/23  0131 05/21/23  0213   SODIUM 139 140 141   POTASSIUM 4.5 4.6 4.6   CHLORIDE 103 103 104   CO2 24 24 24   GLUCOSE 199* 148* 149*   BUN 74* 76* 81*   CREATININE 3.06* 2.89* 3.24*   CALCIUM 8.5 8.6 9.0         IMAGING:   All imaging reviewed from admission to present day    IMPRESSION:  # YANETH on CKD    - Seems likely recent baseline renal function fairly labile from 1.8 to 2.5    - Now creat up to 3.1, suspect due to hemodynamic changes from diuresis    - Diuresing well    - UA not suggestive of GN type process  # CKD    - Sees Dr Vicente in the clinic, ckd though to be due to DM  # Volume overload    - Continue diuresis  # p HTN  # Moderate mitral regurgiation  # CAD  # Asthma  # Diabetes  # Afib     PLAN:  - Transition to lasix 80mg PO BID  - No compelling indication for RRT  - Discussed about dialysis, and understands may be needed if renal function worsens  - Accurate I/Os  - Daily labs  - Dose all meds per eGFR < 15     Thank you for the consultation!

## 2023-05-21 NOTE — PROGRESS NOTES
Hospital Medicine Daily Progress Note    Date of Service  5/21/2023    Chief Complaint  Yuri De León is a 78 y.o. male admitted 5/15/2023 with shortness of breath     Hospital Course  This is a 79 y/o Male with PMHX of CKD, A.fibb, CHF who was admitted on 5/15/23 after presenting to ER complaining of sob and increased lower extremity edema.  Patient states he has been taking his Lasix 80 mg daily but it has not helped.  He also ran out of his Cardizem a few days ago.  IN ER he was found to be fluid overload and also on A. fibb with RVR  Patient admitted for further work up and treatment      Interval Problem Update  5/16: Patient seen and examined, afebrile sitting up in the chair , feels SOB, still with lower extremity swelling.   HR in the 110-120 increasing Cardizem to QID instead from TID  Cont on IV lasix for his fluid overload   Echo pending   5/17: Patient resting in the chair still feeling SOB, cont on IV lasix 40 mg BID, and wean off O2 as tolerated,echo pending  Regarding his A.fibb HR better controlled with the increase dose of diltiazem HR 80-90   5/18: No acute events overnight, cont on iV lasix for aggressive echo pending,   Renal function worsening bmp reviewed showing creatinine 3.17 and GFR of 19. Ihave consulted nephrology and discussed case with Dr. Carr appreciate rec.   5/19: Patient resting in bed, still edematous and still requiring Oxygen , echo pending. Cont on IV lasix ill increase dose to 60 mg IV BID. Nephrology following case discussed with Dr Carr appreciate rec.   5/20: Patient seen and examined, still fluid overload , lasix has been increased to 60 mg IV BID< , no acute events overnight, Echo reviewed :showing mild pulmonary HTN.,  YANETH slowly improving appreciate nephrology rec.   5/21: Patient seen and examined, no acute events overnight still with lower extremity edema which is slowly improving cont with IV lasix for diuresis  YANETH: creatinine 3.24 from 2.98 today nephrology  following appreciate rec.   Regarding his A.fibb will switch his diltiazem to metoprolol    I have discussed this patient's plan of care and discharge plan at IDT rounds today with Case Management, Nursing, Nursing leadership, and other members of the IDT team.  Consultants/Specialty  Nephrology     Code Status  Full Code    Disposition  The patient is not medically cleared for discharge to home or a post-acute facility.      I have placed the appropriate orders for post-discharge needs.    Review of Systems  Review of Systems   Constitutional:  Positive for malaise/fatigue. Negative for chills, diaphoresis and fever.   HENT:  Positive for hearing loss. Negative for congestion and sore throat.    Eyes:  Negative for blurred vision.   Respiratory:  Positive for shortness of breath. Negative for cough and wheezing.    Cardiovascular:  Positive for leg swelling. Negative for chest pain and palpitations.   Gastrointestinal:  Negative for abdominal pain, diarrhea, heartburn and vomiting.   Genitourinary:  Negative for dysuria, flank pain and hematuria.   Musculoskeletal:  Negative for back pain, joint pain, myalgias and neck pain.   Skin:  Negative for rash.   Neurological:  Positive for weakness. Negative for dizziness, sensory change, speech change, focal weakness and headaches.   Psychiatric/Behavioral:  The patient is not nervous/anxious.         Physical Exam  Temp:  [36.2 °C (97.2 °F)-36.8 °C (98.2 °F)] 36.2 °C (97.2 °F)  Pulse:  [82-96] 87  Resp:  [17-20] 18  BP: (126-137)/(62-80) 132/80  SpO2:  [93 %-96 %] 93 %    Physical Exam  Vitals and nursing note reviewed.   Constitutional:       Appearance: He is obese.   HENT:      Head: Normocephalic and atraumatic.      Nose: No congestion.      Mouth/Throat:      Mouth: Mucous membranes are moist.   Eyes:      General: No scleral icterus.        Right eye: No discharge.         Left eye: No discharge.      Extraocular Movements: Extraocular movements intact.       Conjunctiva/sclera: Conjunctivae normal.   Cardiovascular:      Rate and Rhythm: Tachycardia present. Rhythm irregular.      Heart sounds: Murmur heard.   Pulmonary:      Effort: Accessory muscle usage present.      Breath sounds: Rales present.   Abdominal:      General: There is distension.      Tenderness: There is no abdominal tenderness. There is no guarding or rebound.   Musculoskeletal:      Cervical back: No tenderness.      Right lower leg: Edema present.      Left lower leg: Edema present.   Skin:     Comments: Venous stasis dermatitis changes both legs   Neurological:      General: No focal deficit present.      Mental Status: He is alert and oriented to person, place, and time.      Cranial Nerves: No cranial nerve deficit.         Fluids    Intake/Output Summary (Last 24 hours) at 5/21/2023 1141  Last data filed at 5/21/2023 0903  Gross per 24 hour   Intake 660 ml   Output 150 ml   Net 510 ml         Laboratory  Recent Labs     05/19/23  0032 05/20/23  0131   WBC 7.5 7.9   RBC 4.40* 4.49*   HEMOGLOBIN 12.5* 12.5*   HEMATOCRIT 39.8* 40.2*   MCV 90.5 89.5   MCH 28.4 27.8   MCHC 31.4* 31.1*   RDW 57.7* 56.4*   PLATELETCT 226 220   MPV 10.2 9.8       Recent Labs     05/19/23  0032 05/20/23  0131 05/21/23  0213   SODIUM 139 140 141   POTASSIUM 4.5 4.6 4.6   CHLORIDE 103 103 104   CO2 24 24 24   GLUCOSE 199* 148* 149*   BUN 74* 76* 81*   CREATININE 3.06* 2.89* 3.24*   CALCIUM 8.5 8.6 9.0       Recent Labs     05/19/23  0032 05/21/23  0213   INR 2.50* 2.65*                 Imaging  EC-ECHOCARDIOGRAM COMPLETE W/O CONT   Final Result      DX-CHEST-PORTABLE (1 VIEW)   Final Result      Findings suspicious for early LEFT lung pneumonia or atelectasis could have a similar appearance             Assessment/Plan  * Volume overload- (present on admission)  Assessment & Plan  Cont on IV lasix   Diet 2 gm Na, 1 L fluid restriction  Echo is pending     Moderate mitral regurgitation- (present on admission)  Assessment &  Plan  Monitor volume status    Pulmonary hypertension (HCC)- (present on admission)  Assessment & Plan  IV lasix to diurese  Monitor volume status  Check echocardiogram    Hypertensive urgency- (present on admission)  Assessment & Plan  Cont on lisinopril, increased Cardizem   IV Hydralazine as needed with parameters  Monitor and adjust as needed     S/P coronary artery stent placement- (present on admission)  Assessment & Plan  Continue Plavix, Lipitor    Mild persistent asthma without complication- (present on admission)  Assessment & Plan  Symbicort, RT protocol    CKD (chronic kidney disease) stage 4, GFR 15-29 ml/min (CMS-[facility])- (present on admission)  Assessment & Plan  Repeat BMP in AM   May need  Nephrology consult    Type 2 diabetes mellitus with kidney complication, without long-term current use of insulin ([facility])- (present on admission)  Assessment & Plan  Cont on SSI and accuchecks     Atrial fibrillation with RVR (HCC)- (present on admission)  Assessment & Plan  HR in 110-120   So will increase Cardizem to 60 mg QID instead of TID   Continue Coumadin         VTE prophylaxis: therapeutic anticoagulation with warfarin     I have performed a physical exam and reviewed and updated ROS and Plan today (5/21/2023). In review of yesterday's note (5/20/2023), there are no changes except as documented above.

## 2023-05-22 NOTE — CARE PLAN
Monitor summary  SR 70s-80s      The patient is Stable - Low risk of patient condition declining or worsening    Shift Goals  Clinical Goals: Maintain patient safety  Patient Goals: Rest  Family Goals: comfort    Progress made toward(s) clinical / shift goals:        Problem: Fall Risk  Goal: Patient will remain free from falls  Outcome: Progressing     Problem: Hemodynamics  Goal: Patient's hemodynamics, fluid balance and neurologic status will be stable or improve  Outcome: Progressing     Problem: Respiratory  Goal: Patient will achieve/maintain optimum respiratory ventilation and gas exchange  Outcome: Progressing     Problem: Fluid Volume  Goal: Fluid volume balance will be maintained  Outcome: Progressing       Patient is not progressing towards the following goals:

## 2023-05-22 NOTE — CARE PLAN
Problem: Knowledge Deficit - Standard  Goal: Patient and family/care givers will demonstrate understanding of plan of care, disease process/condition, diagnostic tests and medications  Outcome: Progressing     Problem: Fall Risk  Goal: Patient will remain free from falls  Outcome: Progressing     Problem: Psychosocial  Goal: Patient's level of anxiety will decrease  Outcome: Progressing  Goal: Patient's ability to verbalize feelings about condition will improve  Outcome: Progressing  Goal: Patient's ability to re-evaluate and adapt role responsibilities will improve  Outcome: Progressing  Goal: Patient and family will demonstrate ability to cope with life altering diagnosis and/or procedure  Outcome: Progressing  Goal: Spiritual and cultural needs incorporated into hospitalization  Outcome: Progressing     Problem: Hemodynamics  Goal: Patient's hemodynamics, fluid balance and neurologic status will be stable or improve  Outcome: Progressing     Problem: Respiratory  Goal: Patient will achieve/maintain optimum respiratory ventilation and gas exchange  Outcome: Progressing     Problem: Fluid Volume  Goal: Fluid volume balance will be maintained  Outcome: Progressing     Problem: Cardiac - Atrial Fibrillation  Goal: Patient will achieve & maintain adequate cardiac output and rate control  Outcome: Progressing  Goal: Complications related to anticoagulation for unconverted atrial fibrillation will be avoided or minimized  Outcome: Progressing     Problem: Mobility  Goal: Patient's ability to tolerate increased activity will improve  Outcome: Progressing     Problem: Self Care  Goal: Patient will have the ability to perform ADLs independently or with assistance (bathe, groom, dress, toilet and feed)  Outcome: Progressing     Problem: Knowledge Deficit - Atrial Fibrillation  Goal: Patient and family/care givers will demonstrate understanding of atrial fibrillation condition and follow-up  Outcome: Progressing      Problem: Pain - Standard  Goal: Alleviation of pain or a reduction in pain to the patient’s comfort goal  Outcome: Progressing   The patient is Stable - Low risk of patient condition declining or worsening    Shift Goals  Clinical Goals: safety, wean off oxygen, diurese, monitor I/Os  Patient Goals: rest, go home  Family Goals: jun    Progress made toward(s) clinical / shift goals:  mobility, wean oxygen, home o2 eval, diurese, measure I/Os    Patient is not progressing towards the following goals: compliance, knowledge deficit

## 2023-05-22 NOTE — PROGRESS NOTES
Hospital Medicine Daily Progress Note    Date of Service  5/22/2023    Chief Complaint  Yuri De León is a 78 y.o. male admitted 5/15/2023 with shortness of breath     Hospital Course  This is a 77 y/o Male with PMHX of CKD, A.fibb, CHF who was admitted on 5/15/23 after presenting to ER complaining of sob and increased lower extremity edema.  Patient states he has been taking his Lasix 80 mg daily but it has not helped.  He also ran out of his Cardizem a few days ago.  IN ER he was found to be fluid overload and also on A. fibb with RVR  Patient admitted for further work up and treatment      Interval Problem Update  5/16: Patient seen and examined, afebrile sitting up in the chair , feels SOB, still with lower extremity swelling.   HR in the 110-120 increasing Cardizem to QID instead from TID  Cont on IV lasix for his fluid overload   Echo pending   5/17: Patient resting in the chair still feeling SOB, cont on IV lasix 40 mg BID, and wean off O2 as tolerated,echo pending  Regarding his A.fibb HR better controlled with the increase dose of diltiazem HR 80-90   5/18: No acute events overnight, cont on iV lasix for aggressive echo pending,   Renal function worsening bmp reviewed showing creatinine 3.17 and GFR of 19. Ihave consulted nephrology and discussed case with Dr. Carr appreciate rec.   5/19: Patient resting in bed, still edematous and still requiring Oxygen , echo pending. Cont on IV lasix ill increase dose to 60 mg IV BID. Nephrology following case discussed with Dr Carr appreciate rec.   5/20: Patient seen and examined, still fluid overload , lasix has been increased to 60 mg IV BID< , no acute events overnight, Echo reviewed :showing mild pulmonary HTN.,  YANETH slowly improving appreciate nephrology rec.   5/21: Patient seen and examined, no acute events overnight still with lower extremity edema which is slowly improving cont with IV lasix for diuresis  YANETH: creatinine 3.24 from 2.98 today nephrology  following appreciate rec.   Regarding his A.fibb will switch his diltiazem to metoprolol    5.22: Patient resting in bed, still requiring 1-2 L of oxygen cont on lkasxi for diuresis and wean off as tolerated. Now switched to  80 mg BID  of lasix   Regarding acute on chronic renal failure nephrology following appreciate rec.  Addendum: Patient renal US showing b/l hydronephrosis no stone seen, discussed with nephrology will place a franco   I have discussed this patient's plan of care and discharge plan at IDT rounds today with Case Management, Nursing, Nursing leadership, and other members of the IDT team.  Consultants/Specialty  Nephrology     Code Status  Full Code    Disposition  The patient is not medically cleared for discharge to home or a post-acute facility.      I have placed the appropriate orders for post-discharge needs.    Review of Systems  Review of Systems   Constitutional:  Positive for malaise/fatigue. Negative for chills, diaphoresis and fever.   HENT:  Positive for hearing loss. Negative for congestion and sore throat.    Eyes:  Negative for blurred vision.   Respiratory:  Positive for shortness of breath. Negative for cough and wheezing.    Cardiovascular:  Positive for leg swelling. Negative for chest pain and palpitations.   Gastrointestinal:  Negative for abdominal pain, diarrhea, heartburn and vomiting.   Genitourinary:  Negative for dysuria, flank pain and hematuria.   Musculoskeletal:  Negative for back pain, joint pain, myalgias and neck pain.   Skin:  Negative for rash.   Neurological:  Positive for weakness. Negative for dizziness, sensory change, speech change, focal weakness and headaches.   Psychiatric/Behavioral:  The patient is not nervous/anxious.         Physical Exam  Temp:  [36.4 °C (97.5 °F)-36.7 °C (98.1 °F)] 36.7 °C (98.1 °F)  Pulse:  [76-91] 91  Resp:  [18] 18  BP: (132-138)/(78-86) 137/86  SpO2:  [93 %-96 %] 94 %    Physical Exam  Vitals and nursing note reviewed.    Constitutional:       Appearance: He is obese.   HENT:      Head: Normocephalic and atraumatic.      Nose: No congestion.      Mouth/Throat:      Mouth: Mucous membranes are moist.   Eyes:      General: No scleral icterus.        Right eye: No discharge.         Left eye: No discharge.      Extraocular Movements: Extraocular movements intact.      Conjunctiva/sclera: Conjunctivae normal.   Cardiovascular:      Rate and Rhythm: Tachycardia present. Rhythm irregular.      Heart sounds: Murmur heard.   Pulmonary:      Effort: Accessory muscle usage present.      Breath sounds: Rales present.   Abdominal:      General: There is distension.      Tenderness: There is no abdominal tenderness. There is no guarding or rebound.   Musculoskeletal:      Cervical back: No tenderness.      Right lower leg: Edema present.      Left lower leg: Edema present.   Skin:     Comments: Venous stasis dermatitis changes both legs   Neurological:      General: No focal deficit present.      Mental Status: He is alert and oriented to person, place, and time.      Cranial Nerves: No cranial nerve deficit.         Fluids    Intake/Output Summary (Last 24 hours) at 5/22/2023 1125  Last data filed at 5/22/2023 1000  Gross per 24 hour   Intake 240 ml   Output 700 ml   Net -460 ml       Laboratory  Recent Labs     05/20/23  0131 05/22/23  0305   WBC 7.9 6.9   RBC 4.49* 4.44*   HEMOGLOBIN 12.5* 12.8*   HEMATOCRIT 40.2* 39.9*   MCV 89.5 89.9   MCH 27.8 28.8   MCHC 31.1* 32.1*   RDW 56.4* 56.4*   PLATELETCT 220 216   MPV 9.8 10.3     Recent Labs     05/20/23  0131 05/21/23  0213 05/22/23  0305   SODIUM 140 141 141   POTASSIUM 4.6 4.6 4.2   CHLORIDE 103 104 104   CO2 24 24 24   GLUCOSE 148* 149* 113*   BUN 76* 81* 83*   CREATININE 2.89* 3.24* 2.88*   CALCIUM 8.6 9.0 8.8     Recent Labs     05/21/23  0213   INR 2.65*               Imaging  EC-ECHOCARDIOGRAM COMPLETE W/O CONT   Final Result      DX-CHEST-PORTABLE (1 VIEW)   Final Result      Findings  suspicious for early LEFT lung pneumonia or atelectasis could have a similar appearance      US-RENAL    (Results Pending)          Assessment/Plan  * Volume overload- (present on admission)  Assessment & Plan  Cont on IV lasix   Diet 2 gm Na, 1 L fluid restriction  Echo ishowing pulmonary HTN   Cont on lasix for diuresis  Slowly improving     Moderate mitral regurgitation- (present on admission)  Assessment & Plan  Monitor volume status    Pulmonary hypertension (HCC)- (present on admission)  Assessment & Plan    Monitor volume status  Showing mild pulmonary HTN in the echo  On lasix     Hypertensive urgency- (present on admission)  Assessment & Plan  Cont on lisinopril, I, lasix, metoprolol   IV Hydralazine as needed with parameters  Better controlled     S/P coronary artery stent placement- (present on admission)  Assessment & Plan  Continue Plavix, Lipitor    Mild persistent asthma without complication- (present on admission)  Assessment & Plan  Symbicort, RT protocol    CKD (chronic kidney disease) stage 4, GFR 15-29 ml/min (CMS-[facility])- (present on admission)  Assessment & Plan  Repeat BMP in AM   Acute on chronic renal failure   Nephrology following cont on laisx for diuresis     Type 2 diabetes mellitus with kidney complication, without long-term current use of insulin (HCC)- (present on admission)  Assessment & Plan  Cont on SSI and accuchecks     Atrial fibrillation with RVR ([facility])- (present on admission)  Assessment & Plan    Now on metoprolol   Continue Coumadin  HR controlled          VTE prophylaxis: therapeutic anticoagulation with warfarin     I have performed a physical exam and reviewed and updated ROS and Plan today (5/22/2023). In review of yesterday's note (5/21/2023), there are no changes except as documented above.

## 2023-05-22 NOTE — PROGRESS NOTES
[facility] -  PROGRESS NOTE               Author: Kassy Zurita M.D. Date & Time: 5/22/2023  7:43 AM     HPI:  Patient is a 78 year old male with a PMHx of CKD, afib, and CHF admitted on 5/15 for SOB and increased lower extremity edema.  Had been taking lasix 80mg PO daily but has not helped.  In the ER was found to be in afib with RVR and volume overloaded.  Was started on IV lasix but Cr uptrending.  Nephrology consulted.  Currently feels edema slightly improved.  SOB improved as well.  Denies significant salt intake at home.  Understands may need dialysis if renal function worsens    DAILY NEPHROLOGY SUMMARY:  5/19 - Cr stable.  States edema improved.  SOB improved.  Sleeps sitting up, unclear if I/Os accurate  5/20 - Output not recorded.  Cr trending down.  Edema slightly better but still significant in the legs  5/21 - Denies pain or SOB.  Sitting in chair.  Appears edema decreased.  Cr slgihtly up.  5/22-No events, VSS, Denies any complaints, edema interval improvement , no accurate I/O , bladdre incontinent     REVIEW OF SYSTEMS:    10 point ROS reviewed and is as per HPI/daily summary or otherwise negative    PMH/PSH/SH/FH:   Reviewed and unchanged since admission note    CURRENT MEDICATIONS:   Reviewed from admission to present day    VS:  /80   Pulse 76   Temp 36.4 °C (97.5 °F) (Temporal)   Resp 18   Ht 1.829 m (6')   Wt 112 kg (246 lb 14.6 oz)   SpO2 95%   BMI 33.49 kg/m²     Physical Exam  Constitutional:       Appearance: NAD  HENT:      Head: Normocephalic.      Right Ear: External ear normal.      Left Ear: External ear normal.      Nose: Nose normal.      Mouth/Throat:      Mouth: Mucous membranes are moist.   Eyes:      General: No scleral icterus.  Cardiovascular:      Rate and Rhythm: Normal rate.   Pulmonary:      BL lungs CTA  Abdominal:      General: soft , NT  Musculoskeletal:         General: Swelling present.      Cervical back: Normal range of  motion.      Right lower leg: Edema present.      Left lower leg: Edema present.   Skin:     General: Skin is warm.   Neurological:      General: No focal deficit present.      Mental Status: He is alert.   Psychiatric:         Mood and Affect: Mood normal.   Fluids:  In: 540 [P.O.:540]  Out: 450     LABS:  Recent Labs     05/20/23  0131 05/21/23  0213 05/22/23  0305   SODIUM 140 141 141   POTASSIUM 4.6 4.6 4.2   CHLORIDE 103 104 104   CO2 24 24 24   GLUCOSE 148* 149* 113*   BUN 76* 81* 83*   CREATININE 2.89* 3.24* 2.88*   CALCIUM 8.6 9.0 8.8       IMAGING:   All imaging reviewed from admission to present day    IMPRESSION:  # YANETH on CKD    - Seems likely recent baseline renal function fairly labile between 1.8 to 2.5    - Peak Cr 3.2  suspect due to hemodynamic changes     - Diuresing well    - UA sterile pyuria 5/15    - Cr stable 2.88 5/22   # CKD    - Sees Dr Vicente in the clinic, ckd though to be due to DM  # Volume overload    - Continue diuresis  # HFpEF  # p HTN with moderate TR   # Moderate mitral regurgiation  # CAD  # Asthma  # Diabetes  # Afib     PLAN:  - Continue lasix 80mg PO BID  - No compelling indication for RRT  - Discussed about dialysis, and understands may be needed if renal function worsens  - Accurate I/Os  - Limit fluids 1.2 L/d  - Daily labs  - Dose all meds per current GFR  - Check renal US   - UA with sterile pyuria 5/15 , resend UA with microscopy     Addendum 1.10 pm renal US with severe BL hydro  Need franco placement   Discussed with Dr. Carranza

## 2023-05-22 NOTE — PROGRESS NOTES
Inpatient Anticoagulation Service Note for 2023    Reason for Anticoagulation: Atrial Fibrillation  Target INR: 2.0 to 3.0    [id number] VASc Score: 6  HAS-BLED Score: 2    Hemoglobin Value: (!) 12.8  Hematocrit Value: (!) 39.9  Lab Platelet Value: 216    INR from last 7 days       Date/Time INR Value    23 0213 2.65    23 0032 2.5    23 0119 2.4    23 0119 2.34    23 0026 2.59          Dose from last 7 days       Date/Time Dose (mg)    23 1315 2.5    23 1246 5    23 1100 5    23 1418 2.5    23 1636 5    23 0914 2.5    23 1300 5    05/15/23 1545 2.5          Assessment:  - warfarin for hx of AF  - INR has been therapeutic since admission (goal 2-3)  - outpatient INR checks are therapeutic ~70% of the time, follows w/ [facility] warfarin clinic w/ the following dosin.5 mg MWF, 5 mg all other days  - DDI w/ plavix noted, CBC stable w/o overt bleeding  - YANETH on CKD improving w/ diuresis, no concerns w/ hepatic fx  - remains on diabetic diet    Plan:  - continue home warfarin dosing, 2.5 mg tonight  - repeat INR tomorrow (ordered Q48H)     Education Material Provided?: No (chronic warfarin patient)     Pharmacist suggested discharge dosing: Home warfarin dosing (2.5 mg MWF, 5 mg all other days). Recommend INR check within 3 days of discharge.     Pharmacy will continue following.        Андрей Barfield, LeslieD

## 2023-05-22 NOTE — FACE TO FACE
"Face to Face Note  -  Durable Medical Equipment    Rafa Carranza M.D. - NPI: [id number]  I certify that this patient is under my care and that they had a durable medical equipment(DME)face to face encounter by myself that meets the physician DME face-to-face encounter requirements with this patient on:    Date of encounter:   Patient:                    MRN:                       YOB: 2023  Yuri De León  [id number]  1945     The encounter with the patient was in whole, or in part, for the following medical condition, which is the primary reason for durable medical equipment:  Other - pulmonary HTN    I certify that, based on my findings, the following durable medical equipment is medically necessary:    Oxygen   HOME O2 Saturation Measurements:(Values must be present for Home Oxygen orders)  Room air sat at rest: 88  Room air sat with amb: 86  With liters of O2: 1, O2 sat at rest with O2: 95  With Liters of O2: 2, O2 sat with amb with O2 : 92  Is the patient mobile?: Yes  If patient feels more short of breath, they can go up to 6 liters per minute and contact healthcare provider.    Supporting Symptoms: The patient requires supplemental oxygen, as the following interventions have been tried with limited or no improvement: \"Ambulation with oximetry and \"Incentive spirometry.    My Clinical findings support the need for the above equipment due to:  Hypoxia  "

## 2023-05-23 NOTE — PROGRESS NOTES
[facility] -  PROGRESS NOTE               Author: Kassy Zurita M.D. Date & Time: 5/23/2023  8:35 AM     HPI:  Patient is a 78 year old male with a PMHx of CKD, afib, and CHF admitted on 5/15 for SOB and increased lower extremity edema.  Had been taking lasix 80mg PO daily but has not helped.  In the ER was found to be in afib with RVR and volume overloaded.  Was started on IV lasix but Cr uptrending.  Nephrology consulted.  Currently feels edema slightly improved.  SOB improved as well.  Denies significant salt intake at home.  Understands may need dialysis if renal function worsens    DAILY NEPHROLOGY SUMMARY:  5/19 - Cr stable.  States edema improved.  SOB improved.  Sleeps sitting up, unclear if I/Os accurate  5/20 - Output not recorded.  Cr trending down.  Edema slightly better but still significant in the legs  5/21 - Denies pain or SOB.  Sitting in chair.  Appears edema decreased.  Cr slgihtly up.  5/22-No events, VSS, Denies any complaints, edema interval improvement , no accurate I/O , bladdre incontinent   5/23- Renal US showed severe BL hydronephrosis s/p franco, VSS, UOP 8450 cc/ 24 hrs, no complaints, appear to be auto- diuresing     REVIEW OF SYSTEMS:    10 point ROS reviewed and is as per HPI/daily summary or otherwise negative    PMH/PSH/SH/FH:   Reviewed and unchanged since admission note    CURRENT MEDICATIONS:   Reviewed from admission to present day    VS:  BP (!) 143/81   Pulse 98   Temp 36.8 °C (98.2 °F) (Temporal)   Resp 16   Ht 1.829 m (6')   Wt 112 kg (246 lb 14.6 oz)   SpO2 95%   BMI 33.49 kg/m²     Physical Exam  Constitutional:       Appearance: NAD  HENT:      Head: Normocephalic.      Right Ear: External ear normal.      Left Ear: External ear normal.      Nose: Nose normal.      Mouth/Throat:      Mouth: Mucous membranes are moist.   Eyes:      General: No scleral icterus.  Cardiovascular:      Rate and Rhythm: Normal rate.   Pulmonary:      BL  lungs CTA  Abdominal:      General: soft , NT  Musculoskeletal:         General: Swelling present.      Cervical back: Normal range of motion.      Right lower leg: Edema present.      Left lower leg: Edema present.   Skin:     General: Skin is warm.   Neurological:      General: No focal deficit present.      Mental Status: He is alert.   Psychiatric:         Mood and Affect: Mood normal.   Fluids:  In: 600 [P.O.:600]  Out: 8450     LABS:  Recent Labs     05/21/23  0213 05/22/23  0305   SODIUM 141 141   POTASSIUM 4.6 4.2   CHLORIDE 104 104   CO2 24 24   GLUCOSE 149* 113*   BUN 81* 83*   CREATININE 3.24* 2.88*   CALCIUM 9.0 8.8       IMAGING:   All imaging reviewed from admission to present day    IMPRESSION:  # YANETH on CKD likely sec to urine retention     - Recent baseline renal function fairly labile between 1.8 to 2.5    - Peak Cr 3.2      - Diuresing well    - UA sterile pyuria, microscopic hematuria in the setting of severe BL hydronephrosis likely NAVARRO s/p franco cath placement    - Cr stable 2.88 5/22   # CKD    - Sees Dr Vicente in the clinic, ckd though to be due to DM  # Volume overload  # HFpEF  # p HTN with moderate TR   # Moderate mitral regurgiation  # CAD  # Asthma  # Diabetes  # Afib     PLAN:  - Hold  80mg PO BID  - Monitor for post obstructive diuresis and replace lytes as necessary   - No compelling indication for RRT  - Discussed about dialysis, and understands may be needed if renal function worsens  - Accurate I/Os  - Limit fluids 1.2 L/d  - Daily labs  - Dose all meds per current GFR  - Ct Franco and follow up with urology as outpatient   - Labs ordered.

## 2023-05-23 NOTE — CARE PLAN
The patient is Watcher - Medium risk of patient condition declining or worsening    Shift Goals  Clinical Goals: diurese, monitor urine output  Patient Goals: sleep, home, pain relief  Family Goals: rest    Progress made toward(s) clinical / shift goals:        Problem: Knowledge Deficit - Standard  Goal: Patient and family/care givers will demonstrate understanding of plan of care, disease process/condition, diagnostic tests and medications  Description: Target End Date:  1-3 days or as soon as patient condition allows    Document in Patient Education    1.  Patient and family/caregiver oriented to unit, equipment, visitation policy and means for communicating concern  2.  Complete/review Learning Assessment  3.  Assess knowledge level of disease process/condition, treatment plan, diagnostic tests and medications  4.  Explain disease process/condition, treatment plan, diagnostic tests and medications  Outcome: Progressing     Problem: Fall Risk  Goal: Patient will remain free from falls  Description: Target End Date:  Prior to discharge or change in level of care    Document interventions on the [facility] Fall Risk Assessment    1.  Assess for fall risk factors  2.  Implement fall precautions  Outcome: Progressing     Problem: Psychosocial  Goal: Patient's level of anxiety will decrease  Description: Target End Date:  1-3 days or as soon as patient condition allows    1.  Collaborate with patient and family/caregiver to identify triggers and develop strategies to cope with anxiety  2.  Implement stimuli reduction, calming techniques  3.  Pharmacologic management per provider order  4.  Encourage patient/family/care giver participation  5.  Collaborate with interdisciplinary team including Psychologist or Behavioral Health Team as needed  Outcome: Progressing       Patient is not progressing towards the following goals:      Problem: Respiratory  Goal: Patient will achieve/maintain optimum respiratory ventilation  and gas exchange  Description: Target End Date:  Prior to discharge or change in level of care    Document on Assessment flowsheet    1.  Assess and monitor rate, rhythm, depth and effort of respiration  2.  Breath sounds assessed qshift and/or as needed  3.  Assess O2 saturation, administer/titrate oxygen as ordered  4.  Position patient for maximum ventilatory efficiency  5.  Turn, cough, and deep breath with splinting to improve effectiveness  6.  Collaborate with RT to administer medication/treatments per order  7.  Encourage use of incentive spirometer and encourage patient to cough after use and utilize splinting techniques if applicable  8.  Airway suctioning  9.  Monitor sputum production for changes in color, consistency and frequency  10. Perform frequent oral hygiene  11. Alternate physical activity with rest periods  Outcome: Not Progressing

## 2023-05-23 NOTE — PROGRESS NOTES
Received bedside report from Jayna RN, pt care assumed. Pt resting comfortably in bedside chair on 2L NC w/ wife and daughter at bedside. Pt AAOx4, endorses 7/10 pain at Gonzalez catheter and SOB. Denies nausea, dizziness. POC updated w/ pt, pt agreeable, no questions verbalized at this time. Bed locked and in lowest position, call light in reach, bed alarm on. No additional needs at this time, hourly rounding initiated.

## 2023-05-23 NOTE — PROGRESS NOTES
Notified MD of hematuria an hour after franco insertion. MD to bedside, states it looks okay and most likely due to insertion and blood thinner. Will continue to monitor

## 2023-05-23 NOTE — HOSPITAL COURSE
This is a 78 year-old male with a past medical history significant for hypertension, dyslipidemia ,type 2 Dm with glyco of 8.2, pulmonary hypertension, moderate MR, CAD status post stent, CKD status for ,atrial fibrillation, secondary hypercoagulable state presented to the ER on 5/15/2023 with a complaint of shortness of breath along with bilateral lower extremity swelling.    Patient has been taking Lasix 80 mg p.o. daily but it has not helped thus needed to come to ER.  Echocardiogram was obtained, also noted to have pulmonary hypertension, EF 60%, patient could not recently see during the stay in the hospital which has been held as of 5/23.    Bladder scan and renal ultrasound was obtained, noted to have severe bilateral hydronephrosis, Gonzalez was placed.  Nephrology consulted, continue to follow.    Patient was noted to have hematuria on 5/23, Coumadin has been held.  Plavix has been held from 5/24, urology contacted, patient was started on continuous bladder irrigation.  Patient urine is now clear.  Coumadin and Plavix has been started.  Patient will follow-up with urology as an outpatient for possible cystoscopy    Nephrology continue to follow the patient, patient renal function is noted to be improving today creatinine at 2.4, nephrology started the patient on 40 mg of Lasix.  Nephrology will follow the patient as an outpatient.    Patient should avoid NSAID at any cost, this has been discussed with him.  He will also follow-up at anticoagulation clinic, he understands and accepted to 3.    Also patient is currently requiring oxygen, home O2 evaluation has been obtained, patient will be discharged home on oxygen

## 2023-05-23 NOTE — PROGRESS NOTES
Inpatient Anticoagulation Service Note for 5/23/2023      Reason for Anticoagulation: Atrial Fibrillation   [id number] VASc Score: 6  HAS-BLED Score: 2    Hemoglobin Value: (!) 13.4  Hematocrit Value: (!) 41.7  Lab Platelet Value: 223  Target INR: 2.0 to 3.0    INR from last 7 days       Date/Time INR Value    05/23/23 0043 3.11    05/21/23 0213 2.65    05/19/23 0032 2.5    05/18/23 0119 2.4    05/17/23 0119 2.34          Dose from last 7 days       Date/Time Dose (mg)    05/23/23 1627 2.5    05/22/23 1315 2.5    05/21/23 1246 5    05/20/23 1100 5    05/19/23 1418 2.5    05/18/23 1636 5    05/17/23 0914 2.5          Average Dose (mg):  (Home Dose: 2.5 mg MWF + 5 mg ROW per [facility])  Significant Interactions: Antiplatelet Medications  Bridge Therapy: No   Reversal Agent Administered: Not Applicable    Assessment: Here with pulmonary edema 2/2 CHF which can increase INR.  INR today is slightly supra-therapeutic on home warfarin regimen.  Unknown TTR as outpatient.  No changes to diet or DDI.  H/H low but stable, platelets WNL.  No overt s/sx of bleeding.      Plan:  Will change to warfarin 5 mg every M/W/F and 2.5 mg T/Th/Sa/Su.  He will receive 2.5 mg tonight.  Follow up INR in the AM.  All other INRs ordered q48h.  Education Material Provided?: No (chronic warfarin patient)    Pharmacist suggested discharge dosing: Can likely resume home warfarin regimen of 2.5 mg every M/W/F and 5 mg all other days once patient has returned to his baseline clinical status.  Would recommend follow up INR within 3 days of discharge.     Cecelia Irizarry, LeslieD, BCPS

## 2023-05-23 NOTE — PROGRESS NOTES
"Hospital Medicine Daily Progress Note    Date of Service  5/23/2023    Chief Complaint  Yuri De León is a 78 y.o. male admitted 5/15/2023 with   Chief Complaint   Patient presents with    Shortness of Breath     X 2 weeks, + chills & productive cough. Had an xray at  in [place] 1 week ago & was told \"I was filling up with fluid\", told to go to ED but did not want to.    Peripheral Edema     Increasing x 2 weeks, edema starting in low legs up to midthigh. States he has gained 30 lbs in 2 weeks. Increased WOB in triage.         Hospital Course  This 72-year-old male with a past medical history significant for pulmonary hypertension, atrial fibrillation, secondary hypercoagulable state presented to the ER on 5/15/2023 with a complaint of shortness of breath along with bilateral lower extremity swelling.    Patient has been taking Lasix 80 mg p.o. daily but it has not helped thus needed to come to ER.  Echocardiogram was obtained, also noted to have pulmonary hypertension, EF 60%, patient could not recently see during the stay in the hospital which has been held as of 5/23.    Bladder scan and renal ultrasound was obtained, noted to have severe bilateral hydronephrosis, Gonzalez was placed.  Will monitor I/os    Interval events :  -- No acute events overnight, continued understanding patient has been stable, patient is requiring 1.5 L of oxygen  --INR 3.1, hemoglobin at 13.4, monitor  --Rate is well controlled at this time.  Nephrology following, will follow their recommendation, will monitor for postobstructive diuresis, monitor strict I/os  -- will start flomax and finasteride    I have discussed this patient's plan of care and discharge plan at IDT rounds today with Case Management, Nursing, Nursing leadership, and other members of the IDT team.    Consultants/Specialty  nephrology    Code Status  Full Code    Disposition  The patient is not medically cleared for discharge to home or a post-acute " facility.  Anticipate discharge to: home with organized home healthcare and close outpatient follow-up    I have placed the appropriate orders for post-discharge needs.    Review of Systems  Review of Systems   Constitutional:  Positive for malaise/fatigue. Negative for fever.   Eyes:  Negative for blurred vision.   Cardiovascular:  Negative for chest pain.   Gastrointestinal:  Negative for abdominal pain, heartburn, nausea and vomiting.   Genitourinary:  Positive for hematuria. Negative for dysuria.   Musculoskeletal:  Negative for myalgias.   Psychiatric/Behavioral:  The patient is nervous/anxious.    All other systems reviewed and are negative.       Physical Exam  Temp:  [36.8 °C (98.2 °F)] 36.8 °C (98.2 °F)  Pulse:  [] 97  Resp:  [16-19] 16  BP: (138-143)/(81-86) 142/86  SpO2:  [92 %-97 %] 97 %    Physical Exam  Vitals and nursing note reviewed.   Constitutional:       Appearance: Normal appearance.   HENT:      Head: Normocephalic and atraumatic.   Eyes:      Extraocular Movements: Extraocular movements intact.   Cardiovascular:      Rate and Rhythm: Normal rate.   Pulmonary:      Effort: Pulmonary effort is normal.   Abdominal:      General: There is no distension.      Palpations: Abdomen is soft.      Comments:  Nephrostomy tube in place   Musculoskeletal:      Cervical back: Neck supple.      Right lower leg: No edema.      Left lower leg: No edema.   Neurological:      Mental Status: He is alert and oriented to person, place, and time.      Cranial Nerves: No cranial nerve deficit.         Fluids    Intake/Output Summary (Last 24 hours) at 5/23/2023 1445  Last data filed at 5/23/2023 1200  Gross per 24 hour   Intake 1020 ml   Output 8900 ml   Net -7880 ml       Laboratory  Recent Labs     05/22/23  0305 05/23/23  1047   WBC 6.9 8.1   RBC 4.44* 4.77   HEMOGLOBIN 12.8* 13.4*   HEMATOCRIT 39.9* 41.7*   MCV 89.9 87.4   MCH 28.8 28.1   MCHC 32.1* 32.1*   RDW 56.4* 53.6*   PLATELETCT 216 223   MPV 10.3  10.3     Recent Labs     05/21/23  0213 05/22/23  0305 05/23/23  1047   SODIUM 141 141 136   POTASSIUM 4.6 4.2 3.9   CHLORIDE 104 104 97   CO2 24 24 25   GLUCOSE 149* 113* 251*   BUN 81* 83* 78*   CREATININE 3.24* 2.88* 2.84*   CALCIUM 9.0 8.8 8.9     Recent Labs     05/21/23  0213 05/23/23  0043   INR 2.65* 3.11*               Imaging  US-RENAL   Final Result      Severe BILATERAL hydronephrosis      EC-ECHOCARDIOGRAM COMPLETE W/O CONT   Final Result      DX-CHEST-PORTABLE (1 VIEW)   Final Result      Findings suspicious for early LEFT lung pneumonia or atelectasis could have a similar appearance           Assessment/Plan  * Volume overload- (present on admission)  Assessment & Plan  Question pulmonary hypertension    Moderate mitral regurgitation- (present on admission)  Assessment & Plan  Monitor volume status    Pulmonary hypertension (HCC)- (present on admission)  Assessment & Plan    Monitor volume status  Showing mild pulmonary HTN in the echo  On lasix     Hypertensive urgency- (present on admission)  Assessment & Plan  Cont on lisinopril, I, lasix, metoprolol   IV Hydralazine as needed with parameters  Better controlled     S/P coronary artery stent placement- (present on admission)  Assessment & Plan  Continue Plavix, Lipitor    Mild persistent asthma without complication- (present on admission)  Assessment & Plan  Symbicort, RT protocol    CKD (chronic kidney disease) stage 4, GFR 15-29 ml/min (CMS-[facility])- (present on admission)  Assessment & Plan  Nephrology following , will follow their recs    Type 2 diabetes mellitus with kidney complication, without long-term current use of insulin ([facility])- (present on admission)  Assessment & Plan  Cont on SSI and accuchecks     Atrial fibrillation with RVR ([facility])- (present on admission)  Assessment & Plan    Now on metoprolol   Continue Coumadin  HR controlled          VTE prophylaxis: SCDs/TEDs

## 2023-05-23 NOTE — PROGRESS NOTES
Bedside report received from night RN; assumed pt care. Pt assessment complete. Pt assisted to bathroom x1 assist. Reviewed plan of care with pt. Chart and labs reviewed. Monitor franco output. Bed in lowest position, and 2 side rails up. Pt educated on call light; call light with in reach.

## 2023-05-24 NOTE — PROGRESS NOTES
"Hospital Medicine Daily Progress Note    Date of Service  5/24/2023    Chief Complaint  Yuri De León is a 78 y.o. male admitted 5/15/2023 with   Chief Complaint   Patient presents with    Shortness of Breath     X 2 weeks, + chills & productive cough. Had an xray at  in [place] 1 week ago & was told \"I was filling up with fluid\", told to go to ED but did not want to.    Peripheral Edema     Increasing x 2 weeks, edema starting in low legs up to midthigh. States he has gained 30 lbs in 2 weeks. Increased WOB in triage.         Hospital Course  This is a 78year-old male with a past medical history significant for hypertension, dyslipidemia ,type 2 Dm with glyco of 8.2, pulmonary hypertension, moderate MR, CAD status post stent, CKD status for ,atrial fibrillation, secondary hypercoagulable state presented to the ER on 5/15/2023 with a complaint of shortness of breath along with bilateral lower extremity swelling.    Patient has been taking Lasix 80 mg p.o. daily but it has not helped thus needed to come to ER.  Echocardiogram was obtained, also noted to have pulmonary hypertension, EF 60%, patient could not recently see during the stay in the hospital which has been held as of 5/23.    Bladder scan and renal ultrasound was obtained, noted to have severe bilateral hydronephrosis, Gonzalez was placed.  Nephrology consulted, continue to follow.    Patient was noted to have hematuria on 5/23, Coumadin has been held.  Plavix has been held from 5/24, urology contacted, pending evaluation.    Interval events :  -- No acute events overnight, patient has been stable, patient is alert, awake, answering questions appropriately, currently requiring 1 L of oxygen.  --INR at 3.05,patient Coumadin has been held since yesterday we will monitor I/os, creatinine at 2.5  --Considering patient having hematuria, urology was contacted, pending evaluation  -- Ct abdomen without contrast has been ordered , pending at this time     I " have discussed this patient's plan of care and discharge plan at IDT rounds today with Case Management, Nursing, Nursing leadership, and other members of the IDT team.    Consultants/Specialty  nephrology    Code Status  Full Code    Disposition  The patient is not medically cleared for discharge to home or a post-acute facility.  Anticipate discharge to: home with organized home healthcare and close outpatient follow-up    I have placed the appropriate orders for post-discharge needs.    Review of Systems  Review of Systems   Constitutional:  Positive for malaise/fatigue. Negative for fever.   Eyes:  Negative for blurred vision.   Cardiovascular:  Negative for chest pain.   Gastrointestinal:  Negative for abdominal pain, heartburn, nausea and vomiting.   Genitourinary:  Positive for hematuria. Negative for dysuria.   Musculoskeletal:  Negative for myalgias.   Psychiatric/Behavioral:  The patient is nervous/anxious.    All other systems reviewed and are negative.       Physical Exam  Temp:  [36.5 °C (97.7 °F)-37 °C (98.6 °F)] 37 °C (98.6 °F)  Pulse:  [] 81  Resp:  [17-20] 17  BP: (112-136)/(73-87) 116/73  SpO2:  [95 %-99 %] 98 %    Physical Exam  Vitals and nursing note reviewed.   Constitutional:       Appearance: Normal appearance.   HENT:      Head: Normocephalic and atraumatic.   Eyes:      Extraocular Movements: Extraocular movements intact.   Cardiovascular:      Rate and Rhythm: Normal rate.   Pulmonary:      Effort: Pulmonary effort is normal.   Abdominal:      General: There is no distension.      Palpations: Abdomen is soft.      Comments:  Nephrostomy tube in place   Musculoskeletal:      Cervical back: Neck supple.      Right lower leg: No edema.      Left lower leg: No edema.   Neurological:      Mental Status: He is alert and oriented to person, place, and time.      Cranial Nerves: No cranial nerve deficit.         Fluids    Intake/Output Summary (Last 24 hours) at 5/24/2023 1543  Last data  filed at 5/24/2023 1025  Gross per 24 hour   Intake 6360 ml   Output 29367 ml   Net -3890 ml         Laboratory  Recent Labs     05/22/23  0305 05/23/23  1047 05/24/23  0134   WBC 6.9 8.1 7.5   RBC 4.44* 4.77 4.71   HEMOGLOBIN 12.8* 13.4* 13.4*   HEMATOCRIT 39.9* 41.7* 40.9*   MCV 89.9 87.4 86.8   MCH 28.8 28.1 28.5   MCHC 32.1* 32.1* 32.8   RDW 56.4* 53.6* 52.1*   PLATELETCT 216 223 221   MPV 10.3 10.3 10.1       Recent Labs     05/22/23  0305 05/23/23  1047 05/24/23  0134   SODIUM 141 136 143   POTASSIUM 4.2 3.9 3.8   CHLORIDE 104 97 101   CO2 24 25 26   GLUCOSE 113* 251* 113*   BUN 83* 78* 75*   CREATININE 2.88* 2.84* 2.59*   CALCIUM 8.8 8.9 9.3       Recent Labs     05/23/23  0043 05/24/23  0134   INR 3.11* 3.05*                 Imaging  US-RENAL   Final Result      Severe BILATERAL hydronephrosis      EC-ECHOCARDIOGRAM COMPLETE W/O CONT   Final Result      DX-CHEST-PORTABLE (1 VIEW)   Final Result      Findings suspicious for early LEFT lung pneumonia or atelectasis could have a similar appearance      CT-ABDOMEN-PELVIS W/O    (Results Pending)          Assessment/Plan  * Volume overload- (present on admission)  Assessment & Plan  Improving     Moderate mitral regurgitation- (present on admission)  Assessment & Plan  Monitor volume status    Pulmonary hypertension (HCC)- (present on admission)  Assessment & Plan    Monitor volume status  Showing mild pulmonary HTN in the echo  Now euvolumic     Hypertensive urgency- (present on admission)  Assessment & Plan  Continue metoprolol 50 twice daily  BP well controlled    S/P coronary artery stent placement- (present on admission)  Assessment & Plan  Continue Plavix, Lipitor  Holding plavix on 5/24    Mild persistent asthma without complication- (present on admission)  Assessment & Plan  Symbicort, RT protocol    CKD (chronic kidney disease) stage 4, GFR 15-29 ml/min (CMS-HCC)- (present on admission)  Assessment & Plan  Nephrology following , will follow their  recs    Type 2 diabetes mellitus with kidney complication, without long-term current use of insulin (HCC)- (present on admission)  Assessment & Plan  Cont on SSI and accuchecks \glyco of 8.2    Atrial fibrillation with RVR (HCC)- (present on admission)  Assessment & Plan    Now on metoprolol   Hold coumadin         VTE prophylaxis: SCDs/TEDs  I have performed a physical exam and reviewed and updated ROS and Plan today (5/24/2023). In review of yesterday's note (5/23/2023), there are no changes except as documented above.

## 2023-05-24 NOTE — PROGRESS NOTES
Bedside report received from night RN; assumed pt care. Pt assessment complete. Pt states he has no pain. CBI is a light orange color. Reviewed plan of care with pt.Chart and labs reviewed. Bed in lowest position, and 2 side rails up. Pt educated on call light; call light with in reach.

## 2023-05-24 NOTE — CONSULTS
UROLOGY Consult Note:    Osbaldo Kingston P.A.-C.  Date & Time note created:    5/24/2023   3:51 PM     Referring MD:  Dr. Mosqueda    Patient ID:   Name:             Yuri De León   YOB: 1945  Age:                 78 y.o.  male   MRN:               [id number]                                                             Reason for Consult:      Retention, hydronephrosis, gross hematuria    History of Present Illness:    77 yo male. Presented to hospital with SOB and edema. Found to have YANETH. Imaging demonstrated bilateral hydronephrosis so franco placed with subsequent onset of gross hematuria. He is on Plavix for A Fib which continues. He reports history of TURP with Dr. Vidal perhaps 10 years ago but had been voiding fine until this hospital visit.     Review of Systems:      Constitutional: Denies fevers   Eyes: Denies changes in vision   Ears/Nose/Throat/Mouth: Denies nasal congestion   Cardiovascular: no chest pain   Respiratory: shortness of breath   Gastrointestinal/Hepatic: Denies abdominal pain   Genitourinary: Denies dysuria or frequency  Musculoskeletal/Rheum: Denies joint pain   Skin: Denies rash  Neurological: Denies headache   Psychiatric: denies mood disorder   Endocrine: Nyla thyroid problems  Heme/Oncology/Lymph Nodes: Denies enlarged lymph nodes   All other systems were reviewed and are negative (AMA/CMS criteria)                Past Medical History:   Past Medical History:   Diagnosis Date    Abnormal electrocardiogram 08/13/2010    Arrhythmia     Atrial Fibrillation; Cardiologist, Dr. Sorensen    Arthritis     knees, left hip    ASTHMA     inhalers    Atrial fibrillation (HCC) 10/25/2011    Bronchospasm 08/06/2009    Cancer (HCC)     skin cancer R post ear, removed    Cataract     IOL OU    Dental disorder     a few missing molars    Diabetes (HCC)     insulin    Heart valve disease     mitral regurg    Hemorrhagic disorder (HCC)     on blood thinners    High cholesterol      HTN (hypertension) 10/25/2011    Hypercholesteremia 10/25/2011    Long term (current) use of anticoagulants 10/25/2011    NASAL POLYPS 08/06/2009    Other nonspecific abnormal finding 08/06/2009    Pain     left hip    Pneumonia     Pulmonary hypertension (HCC)     Renal disorder     CKD stage 4    Shortness of breath     Sleep apnea 08/06/2009    No O2 or device, no retest    Wears glasses      Active Hospital Problems    Diagnosis     Volume overload [E87.70]     Pulmonary hypertension (HCC) [I27.20]     Moderate mitral regurgitation [I34.0]     Hypertensive urgency [I16.0]     S/P coronary artery stent placement [Z95.5]     Mild persistent asthma without complication [J45.30]     CKD (chronic kidney disease) stage 4, GFR 15-29 ml/min (CMS-[facility]) [N18.4]     Type 2 diabetes mellitus with kidney complication, without long-term current use of insulin (HCC) [E11.29]     Atrial fibrillation with RVR ([facility]) [I48.91]        Past Surgical History:  Past Surgical History:   Procedure Laterality Date    PB ADJ TISS XFER HEAD,FAC,HAND 10.1-30 N/A 9/7/2022    Procedure: LOCAL FLAP RECONSTRUCTION WITH REMOVAL OF DENUDED AND DRIED BONE OF THE FOREHEAD;  Surgeon: Buzz Mcnamara M.D.;  Location: SURGERY [facility];  Service: Ent    KNEE ARTHROPLASTY TOTAL Left 10/2014    HIP ARTHROPLASTY TOTAL  08/31/2010    LEFT Performed by OSGOOD, PATRICK J at SURGERY [facility]    LUMBAR DECOMPRESSION  1984    CATARACT EXTRACTION WITH IOL Bilateral     CYSTOSCOPY      cannot remember exact procedure    LAMINOTOMY      SINUSCOPE      SINUSOTOMIES  2003,2005,2/2007       Hospital Medications:    Current Facility-Administered Medications:     tamsulosin (FLOMAX) capsule 0.4 mg, 0.4 mg, Oral, AFTER BREAKFAST, Meghan Mosqueda M.D.    finasteride (PROSCAR) tablet 5 mg, 5 mg, Oral, DAILY, Meghan Mosqueda M.D.    guaiFENesin ER (MUCINEX) tablet 600 mg, 600 mg, Oral, Q12HRS, Meghan Mosqueda M.D., 600 mg at 05/24/23 0519    metoprolol  tartrate (LOPRESSOR) tablet 50 mg, 50 mg, Oral, TWICE DAILY, Rafa Carranza M.D., 50 mg at 05/24/23 0519    [Held by provider] furosemide (LASIX) tablet 80 mg, 80 mg, Oral, BID DIURETIC, Ziyad Carr M.D., 80 mg at 05/23/23 0553    albuterol (PROVENTIL) 2.5mg/0.5ml nebulizer solution 2.5 mg, 2.5 mg, Nebulization, Q4H PRN (RT), Miles Ochoa M.D., 2.5 mg at 05/15/23 2308    [Held by provider] clopidogrel (PLAVIX) tablet 75 mg, 75 mg, Oral, DAILY, Miles Ochoa M.D., 75 mg at 05/24/23 0519    senna-docusate (PERICOLACE or SENOKOT S) 8.6-50 MG per tablet 2 Tablet, 2 Tablet, Oral, BID **AND** polyethylene glycol/lytes (MIRALAX) PACKET 1 Packet, 1 Packet, Oral, QDAY PRN **AND** magnesium hydroxide (MILK OF MAGNESIA) suspension 30 mL, 30 mL, Oral, QDAY PRN **AND** bisacodyl (DULCOLAX) suppository 10 mg, 10 mg, Rectal, QDAY PRN, Miles Ochoa M.D.    Respiratory Therapy Consult, , Nebulization, Continuous RT, Miles Ochoa M.D.    acetaminophen (Tylenol) tablet 650 mg, 650 mg, Oral, Q6HRS PRN, Miles Ochoa M.D., 650 mg at 05/24/23 1117    hydrALAZINE (APRESOLINE) injection 10 mg, 10 mg, Intravenous, Q4HRS PRN, Miles Ochoa M.D.    insulin regular (HumuLIN R,NovoLIN R) injection, 2-9 Units, Subcutaneous, 4X/DAY ACHS, 3 Units at 05/24/23 1152 **AND** POC blood glucose manual result, , , Q AC AND BEDTIME(S) **AND** NOTIFY MD and PharmD, , , Once **AND** Administer 20 grams of glucose (approximately 8 ounces of fruit juice) every 15 minutes PRN FSBG less than 70 mg/dL, , , PRN **AND** dextrose 10 % BOLUS 25 g, 25 g, Intravenous, Q15 MIN PRN, Miles Ochoa M.D.    promethazine (PHENERGAN) tablet 25 mg, 25 mg, Oral, Q6HRS PRN, Miles Ochoa M.D.    mometasone-formoterol (Dulera) 200-5 mcg/Act inhaler 2 Puff, 2 Puff, Inhalation, BID (RT), Miles Ochoa M.D., 2 Puff at 05/24/23 0910    Current Outpatient Medications:  Medications Prior to Admission   Medication Sig Dispense Refill Last Dose     insulin detemir (LEVEMIR FLEXTOUCH) 100 UNIT/ML injection PEN Inject 10 Units under the skin every evening.   5/14/2023 at PM    insulin aspart (NOVOLOG) 100 UNIT/ML Solution Inject 5-7 Units under the skin 2 times a day with meals. Sliding Scale   201 - 250 = 5 units  251 - 300 = 7 units   5/15/2023 at AM    budesonide-formoterol (SYMBICORT) 160-4.5 MCG/ACT Aerosol Inhale 2 Puffs 2 times a day. Use spacer. Rinse mouth after each use. 1 Each 5 5/15/2023 at AM    amoxicillin-clavulanate (AUGMENTIN) 875-125 MG Tab Take 1 Tablet by mouth 2 times a day. FOR 10 DAYS   > 1 WEEK at COMPLETE    furosemide (LASIX) 40 MG Tab Take 2 Tablets by mouth every day. Or as needed (Patient taking differently: Take 80 mg by mouth every day.) 180 Tablet 3 5/13/2023    clopidogrel (PLAVIX) 75 MG Tab Take 1 tablet by mouth once daily 90 Tablet 3 5/15/2023 at AM    multivitamin (THERAGRAN) Tab Take 1 Tablet by mouth every day.   5/15/2023 at AM    celecoxib (CELEBREX) 200 MG Cap Take 200 mg by mouth 1 time a day as needed for Moderate Pain.   5/14/2023 at AM    albuterol (PROVENTIL) 2.5mg/3ml Nebu Soln solution for nebulization Take 3 mL by nebulization every four hours as needed for Shortness of Breath. 120 Bullet 2 5/15/2023 at AM       Medication Allergy:  Allergies   Allergen Reactions    Atenolol Shortness of Breath and Unspecified     Shortness of breath, weakness    Tetanus Toxoid Swelling    Atorvastatin Nausea     In large doses       Family History:  Family History   Problem Relation Age of Onset    Heart Disease Mother        Social History:  Social History     Socioeconomic History    Marital status:      Spouse name: Not on file    Number of children: Not on file    Years of education: Not on file    Highest education level: Not on file   Occupational History    Not on file   Tobacco Use    Smoking status: Former     Packs/day: 0.50     Years: 10.00     Pack years: 5.00     Types: Cigarettes     Quit date: 12/10/1968      Years since quittin.4    Smokeless tobacco: Never   Vaping Use    Vaping Use: Never used   Substance and Sexual Activity    Alcohol use: No    Drug use: No    Sexual activity: Not on file   Other Topics Concern    Not on file   Social History Narrative    Not on file     Social Determinants of Health     Financial Resource Strain: Not on file   Food Insecurity: Not on file   Transportation Needs: Not on file   Physical Activity: Not on file   Stress: Not on file   Social Connections: Not on file   Intimate Partner Violence: Not on file   Housing Stability: Not on file         Physical Exam:  Vitals/ General Appearance:   Weight/BMI: Body mass index is 33.49 kg/m².  /73   Pulse 81   Temp 37 °C (98.6 °F) (Temporal)   Resp 17   Ht 1.829 m (6')   Wt 112 kg (246 lb 14.6 oz)   SpO2 98%   Vitals:    23 0519 23 0705 23 1126 23 1518   BP:  112/81 115/77 116/73   Pulse: 89 86 82 81   Resp:  17 18 17   Temp:  36.9 °C (98.4 °F) 36.6 °C (97.9 °F) 37 °C (98.6 °F)   TempSrc:  Temporal Temporal Temporal   SpO2:  96% 96% 98%   Weight:       Height:         Oxygen Therapy:  Pulse Oximetry: 98 %, O2 (LPM): 1, O2 Delivery Device: Silicone Nasal Cannula    Constitutional:   Well developed, Well nourished, No acute distress  HENMT:  Normocephalic, Atraumatic, Oropharynx moist mucous membranes, No oral exudates, Nose normal.  No thyromegaly.  Eyes:  EOMI, Conjunctiva normal, No discharge.  Neck:  Normal range of motion, No cervical tenderness.  Cardiovascular:  Normal heart rate, Normal rhythm, No murmurs, No rubs, No gallops.   Extremitites with intact distal pulses, no cyanosis. BLE edema  Lungs:  Normal breath sounds, breath sounds clear to auscultation bilaterally,  no crackles, no wheezing.   Abdomen: Bowel sounds normal, Soft, No tenderness, No guarding, No rebound, No masses, No hepatosplenomegaly.  : -CVAT. Gonzalez draining clear urine  Skin: Warm, Dry, No erythema, No rash, no  induration.  Neurologic: Alert & oriented x 3, No focal deficits noted.  Psychiatric: Affect normal, Judgment normal, Mood normal.      MDM (Data Review):     Records reviewed and summarized in current documentation    Lab Data Review:  Recent Results (from the past 24 hour(s))   POCT glucose device results    Collection Time: 05/23/23  4:57 PM   Result Value Ref Range    POC Glucose, Blood 155 (H) 65 - 99 mg/dL   POCT glucose device results    Collection Time: 05/23/23  7:57 PM   Result Value Ref Range    POC Glucose, Blood 291 (H) 65 - 99 mg/dL   CBC WITHOUT DIFFERENTIAL    Collection Time: 05/24/23  1:34 AM   Result Value Ref Range    WBC 7.5 4.8 - 10.8 K/uL    RBC 4.71 4.70 - 6.10 M/uL    Hemoglobin 13.4 (L) 14.0 - 18.0 g/dL    Hematocrit 40.9 (L) 42.0 - 52.0 %    MCV 86.8 81.4 - 97.8 fL    MCH 28.5 27.0 - 33.0 pg    MCHC 32.8 32.3 - 36.5 g/dL    RDW 52.1 (H) 35.9 - 50.0 fL    Platelet Count 221 164 - 446 K/uL    MPV 10.1 9.0 - 12.9 fL   Renal Function Panel    Collection Time: 05/24/23  1:34 AM   Result Value Ref Range    Sodium 143 135 - 145 mmol/L    Potassium 3.8 3.6 - 5.5 mmol/L    Chloride 101 96 - 112 mmol/L    Co2 26 20 - 33 mmol/L    Glucose 113 (H) 65 - 99 mg/dL    Creatinine 2.59 (H) 0.50 - 1.40 mg/dL    Bun 75 (H) 8 - 22 mg/dL    Calcium 9.3 8.5 - 10.5 mg/dL    Phosphorus 3.5 2.5 - 4.5 mg/dL    Albumin 3.2 3.2 - 4.9 g/dL   Prothrombin Time    Collection Time: 05/24/23  1:34 AM   Result Value Ref Range    PT 30.5 (H) 12.0 - 14.6 sec    INR 3.05 (H) 0.87 - 1.13   CORRECTED CALCIUM    Collection Time: 05/24/23  1:34 AM   Result Value Ref Range    Correct Calcium 9.9 8.5 - 10.5 mg/dL   ESTIMATED GFR    Collection Time: 05/24/23  1:34 AM   Result Value Ref Range    GFR (CKD-EPI) 25 (A) >60 mL/min/1.73 m 2   POCT glucose device results    Collection Time: 05/24/23  8:05 AM   Result Value Ref Range    POC Glucose, Blood 139 (H) 65 - 99 mg/dL   POCT glucose device results    Collection Time: 05/24/23 11:24  AM   Result Value Ref Range    POC Glucose, Blood 229 (H) 65 - 99 mg/dL       Imaging/Procedures Review:    Reviewed    MDM (Assessment and Plan):       A 79 yo male with history of BPH s/p TURP in the remote past who presents with edema/SOB and discovery of YANETH with bilateral hydronephrosis and urinary retention. A franco was placed with onset of gross hematuria but this has cleared. H/H is stable. Renal functions are improving. We are obtaining a non contrast CT scan to evaluate the urinary tract. Franco will remain. He is on Flomax and Finasteride. We will continue to trend renal functions and ultimately discharge with franco and outpatient follow up for cystoscopy. Case discussed with Dr. Browne who has directed this plan of care.

## 2023-05-24 NOTE — PROGRESS NOTES
[facility] -  PROGRESS NOTE               Author: Kassy Zurita M.D. Date & Time: 5/24/2023  10:58 AM     HPI:  Patient is a 78 year old male with a PMHx of CKD, afib, and CHF admitted on 5/15 for SOB and increased lower extremity edema.  Had been taking lasix 80mg PO daily but has not helped.  In the ER was found to be in afib with RVR and volume overloaded.  Was started on IV lasix but Cr uptrending.  Nephrology consulted.  Currently feels edema slightly improved.  SOB improved as well.  Denies significant salt intake at home.  Understands may need dialysis if renal function worsens    DAILY NEPHROLOGY SUMMARY:  5/19 - Cr stable.  States edema improved.  SOB improved.  Sleeps sitting up, unclear if I/Os accurate  5/20 - Output not recorded.  Cr trending down.  Edema slightly better but still significant in the legs  5/21 - Denies pain or SOB.  Sitting in chair.  Appears edema decreased.  Cr slgihtly up.  5/22-No events, VSS, Denies any complaints, edema interval improvement , no accurate I/O , bladdre incontinent   5/23- Renal US showed severe BL hydronephrosis s/p franco, VSS, UOP 8450 cc/ 24 hrs, no complaints, appear to be auto- diuresing   5/24-CBI for blood clots, reports lower abdominal discomfort, VSS, Cr improving     REVIEW OF SYSTEMS:    10 point ROS reviewed and is as per HPI/daily summary or otherwise negative    PMH/PSH/SH/FH:   Reviewed and unchanged since admission note    CURRENT MEDICATIONS:   Reviewed from admission to present day    VS:  /81   Pulse 86   Temp 36.9 °C (98.4 °F) (Temporal)   Resp 17   Ht 1.829 m (6')   Wt 112 kg (246 lb 14.6 oz)   SpO2 96%   BMI 33.49 kg/m²     Physical Exam  Constitutional:       Appearance: NAD  HENT:      Head: Normocephalic.      Right Ear: External ear normal.      Left Ear: External ear normal.      Nose: Nose normal.      Mouth/Throat:      Mouth: Mucous membranes are moist.   Eyes:      General: No scleral  icterus.  Cardiovascular:      Rate and Rhythm: Normal rate.   Pulmonary:      BL lungs CTA  Abdominal:      General: soft , mildly tender to palpation lower abdomen  Musculoskeletal:         General: Swelling present.      Cervical back: Normal range of motion.      Right lower leg: Edema present.      Left lower leg: Edema present.   Skin:     General: Skin is warm.   Neurological:      General: No focal deficit present.      Mental Status: He is alert.   Psychiatric:         Mood and Affect: Mood normal.   Fluids:  In: 6540 [P.O.:6540]  Out: 35562     LABS:  Recent Labs     05/22/23  0305 05/23/23  1047 05/24/23  0134   SODIUM 141 136 143   POTASSIUM 4.2 3.9 3.8   CHLORIDE 104 97 101   CO2 24 25 26   GLUCOSE 113* 251* 113*   BUN 83* 78* 75*   CREATININE 2.88* 2.84* 2.59*   CALCIUM 8.8 8.9 9.3         IMAGING:   All imaging reviewed from admission to present day    IMPRESSION:  # YANETH on CKD likely sec to urine retention     - Recent baseline renal function fairly labile between 1.8 to 2.5    - Peak Cr 3.2      - Diuresing well    - UA sterile pyuria, microscopic hematuria in the setting of severe BL hydronephrosis likely NAVARRO s/p franco cath placement    - Cr stable 2.88 5/22   # CKD    - Sees Dr Vicente in the clinic, ckd though to be due to DM  # Volume overload  # HFpEF  # p HTN with moderate TR   # Moderate mitral regurgiation  # CAD  # Asthma  # Diabetes  # Afib     PLAN:  - Hold  80mg PO BID  - Monitor for post obstructive diuresis and replace lytes as necessary   - No compelling indication for RRT  - Accurate I/Os  - Limit fluids 1.2 L/d  - Daily labs  - Liberalize diet to cardiac diet   - Dose all meds per current GFR  - Ct Franco and CBI  - Agree with abdominal imaging to assess  further for abdominal pain

## 2023-05-24 NOTE — CARE PLAN
The patient is Stable - Low risk of patient condition declining or worsening    Shift Goals  Clinical Goals: I&  Patient Goals: sleep, pain relief  Family Goals: get better    Progress made toward(s) clinical / shift goals:    Problem: Knowledge Deficit - Standard  Goal: Patient and family/care givers will demonstrate understanding of plan of care, disease process/condition, diagnostic tests and medications  Description: Target End Date:  1-3 days or as soon as patient condition allows    Document in Patient Education    1.  Patient and family/caregiver oriented to unit, equipment, visitation policy and means for communicating concern  2.  Complete/review Learning Assessment  3.  Assess knowledge level of disease process/condition, treatment plan, diagnostic tests and medications  4.  Explain disease process/condition, treatment plan, diagnostic tests and medications  Outcome: Progressing       Patient is not progressing towards the following goals: n/a

## 2023-05-24 NOTE — THERAPY
Occupational Therapy   Initial Evaluation     Patient Name: Yuri De León  Age:  78 y.o., Sex:  male  Medical Record #: [id number]  Today's Date: 5/24/2023          Assessment  Patient is 78 y.o. male with a diagnosis of SOB, volume overload.  Pt currently limited by decreased functional mobility, activity tolerance, balance, and pain which are affecting pt's ability to complete ADLs/IADLs at baseline. Pt would benefit from OT services in the acute care setting to maximize functional recovery.      Plan    Occupational Therapy Initial Treatment Plan   Treatment Interventions: (P) Self Care / Activities of Daily Living, Therapeutic Activity  Treatment Frequency: (P) 3 Times per Week  Duration: (P) Until Therapy Goals Met       Discharge Recommendations: (P) Recommend home health for continued occupational therapy services        05/24/23 0819   Prior Living Situation   Prior Services None   Housing / Facility 1 Story House   Equipment Owned Front-Wheel Walker;Single Point Cane   Lives with - Patient's Self Care Capacity Spouse   Prior Level of ADL Function   Self Feeding Independent   Grooming / Hygiene Independent   Bathing Independent   Dressing Independent   Toileting Independent   ADL Assessment   Grooming Supervision   Upper Body Dressing Supervision   Lower Body Dressing Moderate Assist   Functional Mobility   Sit to Stand Minimal Assist   Bed, Chair, Wheelchair Transfer Minimal Assist   Short Term Goals   Short Term Goal # 1 supervised with LB dressing   Short Term Goal # 2 supervised with ADLL txfs   Occupational Therapy Initial Treatment Plan    Treatment Interventions Self Care / Activities of Daily Living;Therapeutic Activity   Treatment Frequency 3 Times per Week   Duration Until Therapy Goals Met   Anticipated Discharge Equipment and Recommendations   Discharge Recommendations Recommend home health for continued occupational therapy services

## 2023-05-24 NOTE — THERAPY
Physical Therapy   Initial Evaluation     Patient Name: Yuri De León  Age:  78 y.o., Sex:  male  Medical Record #: [id number]  Today's Date: 5/24/2023     Precautions  Precautions: Fall Risk  Comments: CBI    Assessment  Patient is 78 y.o. male admitted 5/15/23 with SOB, volume overload, & A fib RVR.  PMH includes CKD stage 4, DM2, asthma, HTN, pulmonary HTN, and moderate mitral regurgitation.  Patient was seen for PT evaluation, limited by SOB, activity tolerance, and fatigue.  Patient mobilized as detailed below with min A and FWW.  He ambulated with shuffled, slow gait, flexed posture, and tends to keep feet outside TYESHA of walker.  Recommend patient continue to mobilize with nursing staff including sitting up in chair for all meals and ambulating to bathroom/hallway.  Will continue to follow.        Plan    Physical Therapy Initial Treatment Plan   Treatment Plan : Bed Mobility, Equipment, Gait Training, Neuro Re-Education / Balance, Self Care / Home Evaluation, Therapeutic Activities, Therapeutic Exercise  Treatment Frequency: 4 Times per Week  Duration: Until Therapy Goals Met    DC Equipment Recommendations: None  Discharge Recommendations: Recommend home health for continued physical therapy services     Objective     05/24/23 0853   Precautions   Precautions Fall Risk   Comments CBI   Pain 0 - 10 Group   Therapist Pain Assessment Post Activity Pain Same as Prior to Activity;Nurse Notified (Not quantified)   Prior Living Situation   Prior Services None   Housing / Facility 1 Story House   Steps Into Home 0   Equipment Owned Front-Wheel Walker;Single Point Cane;Quad Cane   Lives with - Patient's Self Care Capacity Spouse   Prior Level of Functional Mobility   Bed Mobility Independent   Transfer Status Independent   Ambulation Independent   Ambulation Distance community ambulator   Assistive Devices Used Single Point Cane   Cognition    Cognition / Consciousness X   Level of Consciousness Alert   Comments  Pleasant & cooperative. [place], delayed responses   Active ROM Lower Body    Active ROM Lower Body  WDL   Strength Lower Body   Lower Body Strength  WDL   Sensation Lower Body   Lower Extremity Sensation   WDL   Other Treatments   Other Treatments Provided Therapeutic activity provided after formal evaluation completed including: improving activity tolerance, cues for safety with FWW, cues for posture, and progression of ambulation distance.   Balance Assessment   Sitting Balance (Static) Fair +   Sitting Balance (Dynamic) Fair   Standing Balance (Static) Fair   Standing Balance (Dynamic) Fair -   Weight Shift Sitting Fair   Weight Shift Standing Fair   Bed Mobility    Comments NT, up in chair pre & post session   Gait Analysis   Gait Level Of Assist Minimal Assist   Assistive Device Front Wheel Walker   Distance (Feet) 75   # of Times Distance was Traveled 2   Deviation Shuffled Gait;Bradykinetic (frequent stops to catch breath; flexed posture)   Weight Bearing Status No restrictions   Functional Mobility   Sit to Stand Minimal Assist   Bed, Chair, Wheelchair Transfer Minimal Assist   Transfer Method Stand Step   Mobility ambulation, up to chair   Activity Tolerance   Sitting in Chair Pre & post session   Standing 10 min+   Edema / Skin Assessment   Edema / Skin  X   Comments BLE pitting edema, medial L lower leg wound   Short Term Goals    Short Term Goal # 1 Pt will perform supine <> sit without bed features with SPV in 6 visits to progress bed mobility   Short Term Goal # 2 Pt will perform STS/functional transfers with FWW and SPV in 6 visits to progress OOB mobility   Short Term Goal # 3 Pt will ambulate 200 ft with FWW and SPV in 6 visits to progress functional ambulation

## 2023-05-24 NOTE — CARE PLAN
The patient is Stable - Low risk of patient condition declining or worsening    Shift Goals  Clinical Goals: I&Os, CBI  Patient Goals: sleep, pain relief  Family Goals: get better    Progress made toward(s) clinical / shift goals:        Problem: Knowledge Deficit - Standard  Goal: Patient and family/care givers will demonstrate understanding of plan of care, disease process/condition, diagnostic tests and medications  Description: Target End Date:  1-3 days or as soon as patient condition allows    Document in Patient Education    1.  Patient and family/caregiver oriented to unit, equipment, visitation policy and means for communicating concern  2.  Complete/review Learning Assessment  3.  Assess knowledge level of disease process/condition, treatment plan, diagnostic tests and medications  4.  Explain disease process/condition, treatment plan, diagnostic tests and medications  Outcome: Progressing     Problem: Fall Risk  Goal: Patient will remain free from falls  Description: Target End Date:  Prior to discharge or change in level of care    Document interventions on the [facility] Fall Risk Assessment    1.  Assess for fall risk factors  2.  Implement fall precautions  Outcome: Progressing     Problem: Psychosocial  Goal: Patient's level of anxiety will decrease  Description: Target End Date:  1-3 days or as soon as patient condition allows    1.  Collaborate with patient and family/caregiver to identify triggers and develop strategies to cope with anxiety  2.  Implement stimuli reduction, calming techniques  3.  Pharmacologic management per provider order  4.  Encourage patient/family/care giver participation  5.  Collaborate with interdisciplinary team including Psychologist or Behavioral Health Team as needed  Outcome: Progressing       Patient is not progressing towards the following goals:

## 2023-05-24 NOTE — FACE TO FACE
Face to Face Supporting Documentation - Home Health    The encounter with this patient was in whole or in part the primary reason for home health admission.    Date of encounter:   Patient:                    MRN:                       YOB: 2023  Yuri De León  [id number]  1945     Home health to see patient for:  Skilled Nursing care for assessment, interventions & education, Physical Therapy evaluation and treatment, and Occupational therapy evaluation and treatment    Skilled need for:  Medication Management med management     Skilled nursing interventions to include:  Comment: med management     Homebound status evidenced by:  Needs the assistance of another person in order to leave the home. Leaving home requires a considerable and taxing effort. There is a normal inability to leave the home.    Community Physician to provide follow up care: Alberto Carmona D.O.     Optional Interventions? No      I certify the face to face encounter for this home health care referral meets the CMS requirements and the encounter/clinical assessment with the patient was, in whole, or in part, for the medical condition(s) listed above, which is the primary reason for home health care. Based on my clinical findings: the service(s) are medically necessary, support the need for home health care, and the homebound criteria are met.  I certify that this patient has had a face to face encounter by myself.  Meghan Mosqueda M.D. - NPI: [id number]

## 2023-05-25 PROBLEM — R31.9 HEMATURIA: Status: ACTIVE | Noted: 2023-01-01

## 2023-05-25 NOTE — PROGRESS NOTES
Note to reader: this note follows the APSO format rather than the historical SOAP format. Assessment and plan located at the top of the note for ease of use.    Chief Complaint  78 y.o. year old male here with YANETH, retention, hydro and hematuria.     Assessment/Plan  Interval History   Urinary retention  Bilateral hydroureteronephrosis  YANETH  Hematuria  Bladder spasms      Gonzalez repositioned and irrigated releasing one small stringy clot. Many spasms during procedure.     Start Oxybutynin  B&O supp not available. Can consider Valium is spasms persist  BMP in am. May need to consider NPTs vs ureteral stents      Case discussed with patient, family, RN, Hospitalist and Urology-Dr. Hollie GLORIA  Patient seen and examined    5/25. Painful bladder spasms, unable to sit. Hematuria has cleared. Catheter has been draining. CT with thickened bladder, ureteral obstruction and dilation. Creat worse today           Review of Systems  Physical Exam   Review of Systems   Constitutional:  Negative for chills and fever.   Gastrointestinal:  Positive for abdominal pain.   Genitourinary:  Positive for hematuria and urgency.     Vitals:    05/24/23 2324 05/25/23 0401 05/25/23 0735 05/25/23 1200   BP: 125/74 125/74 122/58 119/60   Pulse: 79 86 78 84   Resp: 18 18 16 16   Temp: 36.9 °C (98.4 °F) (!) 35.7 °C (96.3 °F) 36.7 °C (98.1 °F) 36.6 °C (97.9 °F)   TempSrc: Temporal Temporal Temporal Temporal   SpO2: 97% 95% 96% 93%   Weight:       Height:         Physical Exam  Vitals and nursing note reviewed.   Constitutional:       General: He is in acute distress.   HENT:      Head: Normocephalic and atraumatic.      Nose: Nose normal.      Mouth/Throat:      Mouth: Mucous membranes are moist.   Eyes:      Pupils: Pupils are equal, round, and reactive to light.   Pulmonary:      Effort: Pulmonary effort is normal.   Abdominal:      General: Abdomen is flat. There is no distension.      Palpations: Abdomen is soft.      Tenderness: There is  abdominal tenderness.   Genitourinary:     Comments: 3 way franco in place, urine clear  Neurological:      General: No focal deficit present.      Mental Status: He is alert and oriented to person, place, and time.          Hematology Chemistry   Lab Results   Component Value Date/Time    WBC 6.9 05/25/2023 02:49 AM    HEMOGLOBIN 13.0 (L) 05/25/2023 02:49 AM    HEMATOCRIT 40.1 (L) 05/25/2023 02:49 AM    PLATELETCT 220 05/25/2023 02:49 AM     Lab Results   Component Value Date/Time    SODIUM 141 05/25/2023 02:49 AM    POTASSIUM 3.8 05/25/2023 02:49 AM    CHLORIDE 101 05/25/2023 02:49 AM    CO2 28 05/25/2023 02:49 AM    GLUCOSE 172 (H) 05/25/2023 02:49 AM    BUN 70 (H) 05/25/2023 02:49 AM    CREATININE 2.72 (H) 05/25/2023 02:49 AM         Labs not explicitly included in this progress note were reviewed by the author.   Radiology/imaging not explicitly included in this progress note was reviewed by the author.     Labs reviewed, Radiology images reviewed and Medications reviewed

## 2023-05-25 NOTE — DISCHARGE PLANNING
Case Management Discharge Planning    Admission Date: 5/15/2023  GMLOS: 2.6  ALOS: 10    6-Clicks ADL Score: 19  6-Clicks Mobility Score: 15    PT and/or OT Eval ordered: Yes  Post-acute Referrals Ordered: Yes  Post-acute Choice Obtained: Yes  Has referral(s) been sent to post-acute provider:  No      Anticipated Discharge Dispo: Discharge Disposition: Discharged to home/self care (01)  RNCM to speak to pt today to inquire if he'd like HH now.    DME Needed: Yes    DME Ordered: No- need home oxygen eval when close to medical clearance.    Action(s) Taken: OTHER    Escalations Completed: None    Medically Clear: No    Next Steps: f/u with pt and medical team to discuss dc needs and barriers.    Barriers to Discharge: Medical clearance

## 2023-05-25 NOTE — PROGRESS NOTES
Inpatient Anticoagulation Service Note for 5/25/2023      Reason for Anticoagulation: Atrial Fibrillation   [id number] VASc Score: 6  HAS-BLED Score: 2    Hemoglobin Value: (!) 13  Hematocrit Value: (!) 40.1  Lab Platelet Value: 220  Target INR: 2.0 to 3.0    INR from last 7 days       Date/Time INR Value    05/25/23 0249 2.49    05/24/23 0134 3.05    05/23/23 0043 3.11    05/21/23 0213 2.65    05/19/23 0032 2.5          Dose from last 7 days       Date/Time Dose (mg)    05/25/23 1336 2.5    05/23/23 1627 2.5    05/22/23 1315 2.5    05/21/23 1246 5    05/20/23 1100 5    05/19/23 1418 2.5    05/18/23 1636 5          Average Dose (mg):  (Home Dose: 2.5 mg MWF + 5 mg AOD per [facility])  Significant Interactions: Antiplatelet Medications  Bridge Therapy: No    Reversal Agent Administered: Not Applicable    Comments: Patient with heart failure exacerbation with recent supratherapetuic INR of 3.11 (5/23) and hematuria (5/24). Warfarin and clopidogrel were held 5/24. 5/25 hematuria has resolved and INR is therapeutic at 2.49, per MD may resume today. Will continue with reduced home regimen of 5 mg M/W/F and 2.5 mg T/Th/Sat/Sun. May consider bridging if INR drops < 2 and no s/sx of bleeding.    Plan:  Warfarin 5 mg M/W/F and 2.5 mg T/Th/Sat/Sun.   Education Material Provided?: No    Pharmacist suggested discharge dosing: Can likely resume home warfarin regimen of 2.5 mg every M/W/F and 5 mg all other days once patient has returned to his baseline clinical status.  Would recommend follow up INR within 3 days of discharge     Leslie LouisD

## 2023-05-25 NOTE — PROGRESS NOTES
Received report from Simone RN, pt care assumed. Pt resting comfortably in bedside chair on 2L nasal cannula w/ wife at bedside. Pt AAOx4, endorses 3/10 pain and mild SOB. Denies nausea, dizziness. POC updated w/ pt, pt agreeable, no questions verbalized. Chair locked, call light in reach. No additional needs at this time, hourly rounding initiated.

## 2023-05-25 NOTE — PROGRESS NOTES
[facility] -  PROGRESS NOTE               Author: Kassy Zurita M.D. Date & Time: 5/25/2023  9:15 AM     HPI:  Patient is a 78 year old male with a PMHx of CKD, afib, and CHF admitted on 5/15 for SOB and increased lower extremity edema.  Had been taking lasix 80mg PO daily but has not helped.  In the ER was found to be in afib with RVR and volume overloaded.  Was started on IV lasix but Cr uptrending.  Nephrology consulted.  Currently feels edema slightly improved.  SOB improved as well.  Denies significant salt intake at home.  Understands may need dialysis if renal function worsens    DAILY NEPHROLOGY SUMMARY:  5/19 - Cr stable.  States edema improved.  SOB improved.  Sleeps sitting up, unclear if I/Os accurate  5/20 - Output not recorded.  Cr trending down.  Edema slightly better but still significant in the legs  5/21 - Denies pain or SOB.  Sitting in chair.  Appears edema decreased.  Cr slgihtly up.  5/22-No events, VSS, Denies any complaints, edema interval improvement , no accurate I/O , bladdre incontinent   5/23- Renal US showed severe BL hydronephrosis s/p franco, VSS, UOP 8450 cc/ 24 hrs, no complaints, appear to be auto- diuresing   5/24-CBI for blood clots, reports lower abdominal discomfort, VSS, Cr improving   5/25- ob CBI, hematuria cleared. No complaints, report he lost 30 lbs since hospital stay    REVIEW OF SYSTEMS:    10 point ROS reviewed and is as per HPI/daily summary or otherwise negative    PMH/PSH/SH/FH:   Reviewed and unchanged since admission note    CURRENT MEDICATIONS:   Reviewed from admission to present day    VS:  /58   Pulse 78   Temp 36.7 °C (98.1 °F) (Temporal)   Resp 16   Ht 1.829 m (6')   Wt 100 kg (221 lb 1.9 oz)   SpO2 96%   BMI 29.99 kg/m²     Physical Exam  Constitutional:       Appearance: NAD  HENT:      Head: Normocephalic.      Right Ear: External ear normal.      Left Ear: External ear normal.      Nose: Nose normal.       Mouth/Throat:      Mouth: Mucous membranes are moist.   Eyes:      General: No scleral icterus.  Cardiovascular:      Rate and Rhythm: Normal rate.   Pulmonary:      BL lungs CTA  Abdominal:      General: soft ,distended   Musculoskeletal:         General: Swelling present.      Cervical back: Normal range of motion.      Right lower leg: Edema present.      Left lower leg: Edema present.   Skin:     General: Skin is warm.   Neurological:      General: No focal deficit present.      Mental Status: He is alert.   Psychiatric:         Mood and Affect: Mood normal.   Fluids:  In: 600 [P.O.:600]  Out: -700     LABS:  Recent Labs     05/23/23  1047 05/24/23  0134 05/25/23  0249   SODIUM 136 143 141   POTASSIUM 3.9 3.8 3.8   CHLORIDE 97 101 101   CO2 25 26 28   GLUCOSE 251* 113* 172*   BUN 78* 75* 70*   CREATININE 2.84* 2.59* 2.72*   CALCIUM 8.9 9.3 9.1         IMAGING:   All imaging reviewed from admission to present day    IMPRESSION:  # YANETH on CKD likely sec to urine retention     - Recent baseline renal function fairly labile between 1.8 to 2.5    - Peak Cr 3.2      - Diuresing well    - UA sterile pyuria, microscopic hematuria in the setting of severe BL hydronephrosis sec to markedly thick bladder wall causing chronic obstruction s/p franco cath placement    - Cr improved stable ~2.6-2.7 currently   # CKD    - Sees Dr Vicente in the clinic, ckd though to be due to DM  # Urine retention   - Franco placed  - Urology on board   # Volume overload  # HFpEF  # p HTN with moderate TR   # Moderate mitral regurgiation  # CAD  # Asthma  # Diabetes  # Afib     PLAN:  - Resume lasix 40 mg daily   - Monitor for post obstructive diuresis and replace lytes as necessary   - No compelling indication for RRT  - Accurate I/Os  - Limit fluids 1.2 L/d  - Daily labs  - Liberalize diet to cardiac diet   - Dose all meds per current GFR  - Ct Franco , urology following

## 2023-05-25 NOTE — CARE PLAN
The patient is Watcher - Medium risk of patient condition declining or worsening    Shift Goals  Clinical Goals: I&O, CBI, cystoscopy  Patient Goals: sleep, pain relief  Family Goals: get better, go home    Progress made toward(s) clinical / shift goals:        Problem: Knowledge Deficit - Standard  Goal: Patient and family/care givers will demonstrate understanding of plan of care, disease process/condition, diagnostic tests and medications  Description: Target End Date:  1-3 days or as soon as patient condition allows    Document in Patient Education    1.  Patient and family/caregiver oriented to unit, equipment, visitation policy and means for communicating concern  2.  Complete/review Learning Assessment  3.  Assess knowledge level of disease process/condition, treatment plan, diagnostic tests and medications  4.  Explain disease process/condition, treatment plan, diagnostic tests and medications  Outcome: Progressing     Problem: Fall Risk  Goal: Patient will remain free from falls  Description: Target End Date:  Prior to discharge or change in level of care    Document interventions on the [facility] Fall Risk Assessment    1.  Assess for fall risk factors  2.  Implement fall precautions  Outcome: Progressing     Problem: Psychosocial  Goal: Patient's level of anxiety will decrease  Description: Target End Date:  1-3 days or as soon as patient condition allows    1.  Collaborate with patient and family/caregiver to identify triggers and develop strategies to cope with anxiety  2.  Implement stimuli reduction, calming techniques  3.  Pharmacologic management per provider order  4.  Encourage patient/family/care giver participation  5.  Collaborate with interdisciplinary team including Psychologist or Behavioral Health Team as needed  Outcome: Progressing       Patient is not progressing towards the following goals:      Problem: Respiratory  Goal: Patient will achieve/maintain optimum respiratory ventilation  and gas exchange  Description: Target End Date:  Prior to discharge or change in level of care    Document on Assessment flowsheet    1.  Assess and monitor rate, rhythm, depth and effort of respiration  2.  Breath sounds assessed qshift and/or as needed  3.  Assess O2 saturation, administer/titrate oxygen as ordered  4.  Position patient for maximum ventilatory efficiency  5.  Turn, cough, and deep breath with splinting to improve effectiveness  6.  Collaborate with RT to administer medication/treatments per order  7.  Encourage use of incentive spirometer and encourage patient to cough after use and utilize splinting techniques if applicable  8.  Airway suctioning  9.  Monitor sputum production for changes in color, consistency and frequency  10. Perform frequent oral hygiene  11. Alternate physical activity with rest periods  Outcome: Not Progressing     Problem: Fluid Volume  Goal: Fluid volume balance will be maintained  Description: Target End Date:  Prior to discharge or change in level of care    Document on I/O flowsheet    1.  Monitor intake and output as ordered  2.  Promote oral intake as appropriate  3.  Report inadequate intake or output to physician  4.  Administer IV therapy as ordered  5.  Weights per provider order  6.  Assess for signs and symptoms of bleeding  7.  Monitor for signs of fluid overload (respiratory changes, edema, weight gain, increased abdominal girth)  8.  Monitor of signs for inadequate fluid volume (poor skin turgor, dry mucous membranes)  9.  Instruct patient on adherence to fluid restrictions  Outcome: Not Progressing

## 2023-05-25 NOTE — PROGRESS NOTES
"Hospital Medicine Daily Progress Note    Date of Service  5/25/2023    Chief Complaint  Yuri De León is a 78 y.o. male admitted 5/15/2023 with   Chief Complaint   Patient presents with    Shortness of Breath     X 2 weeks, + chills & productive cough. Had an xray at  in [place] 1 week ago & was told \"I was filling up with fluid\", told to go to ED but did not want to.    Peripheral Edema     Increasing x 2 weeks, edema starting in low legs up to midthigh. States he has gained 30 lbs in 2 weeks. Increased WOB in triage.         Hospital Course  This is a 78year-old male with a past medical history significant for hypertension, dyslipidemia ,type 2 Dm with glyco of 8.2, pulmonary hypertension, moderate MR, CAD status post stent, CKD status for ,atrial fibrillation, secondary hypercoagulable state presented to the ER on 5/15/2023 with a complaint of shortness of breath along with bilateral lower extremity swelling.    Patient has been taking Lasix 80 mg p.o. daily but it has not helped thus needed to come to ER.  Echocardiogram was obtained, also noted to have pulmonary hypertension, EF 60%, patient could not recently see during the stay in the hospital which has been held as of 5/23.    Bladder scan and renal ultrasound was obtained, noted to have severe bilateral hydronephrosis, Gonzalez was placed.  Nephrology consulted, continue to follow.    Patient was noted to have hematuria on 5/23, Coumadin has been held.  Plavix has been held from 5/24, urology contacted, pending evaluation.    Interval events :  -- No acute events overnight, patient has been stable, patient is alert, awake, answering questions appropriately, currently requiring 1 L of oxygen.  --INR at 3.05,patient Coumadin has been held since yesterday we will monitor I/os, creatinine at 2.5  --Considering patient having hematuria, urology was contacted, pending evaluation  -- Ct abdomen without contrast has been ordered , pending at this time     5/25 " no acute events overnight, vital signs stable, patient is alert, awake, answering questions.,  Patient did hav Continuous bladder irrigation, patient is noted to be clear; I updated urology in regards to this.  Urology will be following  -- Patient will need cystoscopy as per urology as an outpatient.  -- Also discussed with urology having pain with urination, he recommended stopping oxybutynin and starting Pyridium which has been ordered    Nephrology following and  they started patient on lasix  Patient is cleared, will continue Coumadin,  Physiatry referral has been  placed    I have discussed this patient's plan of care and discharge plan at IDT rounds today with Case Management, Nursing, Nursing leadership, and other members of the IDT team.    Consultants/Specialty  nephrology    Code Status  Full Code    Disposition  The patient is not medically cleared for discharge to home or a post-acute facility.  Anticipate discharge to: home with organized home healthcare and close outpatient follow-up    I have placed the appropriate orders for post-discharge needs.    Review of Systems  Review of Systems   Constitutional:  Positive for malaise/fatigue. Negative for fever.   Eyes:  Negative for blurred vision.   Cardiovascular:  Negative for chest pain.   Gastrointestinal:  Negative for abdominal pain, heartburn, nausea and vomiting.   Genitourinary:  Positive for hematuria. Negative for dysuria.   Musculoskeletal:  Negative for myalgias.   Psychiatric/Behavioral:  The patient is nervous/anxious.    All other systems reviewed and are negative.       Physical Exam  Temp:  [35.7 °C (96.3 °F)-36.9 °C (98.4 °F)] 36.6 °C (97.9 °F)  Pulse:  [78-86] 84  Resp:  [16-18] 16  BP: (119-125)/(58-74) 119/60  SpO2:  [93 %-97 %] 93 %    Physical Exam  Vitals and nursing note reviewed.   Constitutional:       Appearance: Normal appearance.   HENT:      Head: Normocephalic and atraumatic.   Eyes:      Extraocular Movements: Extraocular  movements intact.   Cardiovascular:      Rate and Rhythm: Normal rate.   Pulmonary:      Effort: Pulmonary effort is normal.   Abdominal:      General: There is no distension.      Palpations: Abdomen is soft.      Comments:  Nephrostomy tube in place   Musculoskeletal:      Cervical back: Neck supple.      Right lower leg: No edema.      Left lower leg: No edema.   Neurological:      Mental Status: He is alert and oriented to person, place, and time.      Cranial Nerves: No cranial nerve deficit.         Fluids    Intake/Output Summary (Last 24 hours) at 5/25/2023 1533  Last data filed at 5/24/2023 2321  Gross per 24 hour   Intake 240 ml   Output -1800 ml   Net 2040 ml         Laboratory  Recent Labs     05/23/23  1047 05/24/23  0134 05/25/23  0249   WBC 8.1 7.5 6.9   RBC 4.77 4.71 4.62*   HEMOGLOBIN 13.4* 13.4* 13.0*   HEMATOCRIT 41.7* 40.9* 40.1*   MCV 87.4 86.8 86.8   MCH 28.1 28.5 28.1   MCHC 32.1* 32.8 32.4   RDW 53.6* 52.1* 52.4*   PLATELETCT 223 221 220   MPV 10.3 10.1 10.2       Recent Labs     05/23/23  1047 05/24/23  0134 05/25/23  0249   SODIUM 136 143 141   POTASSIUM 3.9 3.8 3.8   CHLORIDE 97 101 101   CO2 25 26 28   GLUCOSE 251* 113* 172*   BUN 78* 75* 70*   CREATININE 2.84* 2.59* 2.72*   CALCIUM 8.9 9.3 9.1       Recent Labs     05/23/23  0043 05/24/23  0134 05/25/23  0249   INR 3.11* 3.05* 2.49*                 Imaging  CT-ABDOMEN-PELVIS W/O   Final Result      1.  Markedly thick walled bladder likely causing chronic obstruction of the ureters and marked dilatation of the ureters and renal collecting systems..      2.  Mild to moderate atherosclerotic changes in abdominal aorta and iliac arteries.      3.  Small bilateral pleural effusions right greater than left.      US-RENAL   Final Result      Severe BILATERAL hydronephrosis      EC-ECHOCARDIOGRAM COMPLETE W/O CONT   Final Result      DX-CHEST-PORTABLE (1 VIEW)   Final Result      Findings suspicious for early LEFT lung pneumonia or atelectasis  could have a similar appearance             Assessment/Plan  * Volume overload- (present on admission)  Assessment & Plan  Improving   lasix    Hematuria  Assessment & Plan  Status post continuous bladder irrigation  Urology following   5/25: resolved  Restarted  coumadin    Moderate mitral regurgitation- (present on admission)  Assessment & Plan  Monitor volume status    Pulmonary hypertension (HCC)- (present on admission)  Assessment & Plan    Monitor volume status  Showing mild pulmonary HTN in the echo  Now euvolumic     Hypertensive urgency- (present on admission)  Assessment & Plan  Continue metoprolol 50 twice daily  BP well controlled    S/P coronary artery stent placement- (present on admission)  Assessment & Plan  Continue Plavix, Lipitor  Holding plavix on 5/24  Restarted on 5/25    Mild persistent asthma without complication- (present on admission)  Assessment & Plan  Symbicort, RT protocol    CKD (chronic kidney disease) stage 4, GFR 15-29 ml/min (CMS-[facility])- (present on admission)  Assessment & Plan  Nephrology following , will follow their recs    Type 2 diabetes mellitus with kidney complication, without long-term current use of insulin (HCC)- (present on admission)  Assessment & Plan  Cont on SSI and accuchecks \glyco of 8.2    Atrial fibrillation with RVR (HCC)- (present on admission)  Assessment & Plan    Now on metoprolol   Hold coumadin         VTE prophylaxis: coumadin   I have performed a physical exam and reviewed and updated ROS and Plan today (5/25/2023). In review of yesterday's note (5/24/2023), there are no changes except as documented above.

## 2023-05-25 NOTE — ASSESSMENT & PLAN NOTE
Status post continuous bladder irrigation  Urology following   5/25: resolved  Restarted  coumadin

## 2023-05-26 NOTE — DISCHARGE PLANNING
Received choice form @: [id number]  Agency/Facility name: [facility]  Sent referral per choice form @: [phone]

## 2023-05-26 NOTE — PROGRESS NOTES
Note to reader: this note follows the APSO format rather than the historical SOAP format. Assessment and plan located at the top of the note for ease of use.    Chief Complaint  78 y.o. year old male here with YANETH, retention, hydro and hematuria.     Assessment/Plan  Interval History   Urinary retention  Bilateral hydroureteronephrosis  YANETH  Hematuria  Bladder spasms      Gonzalez to remain  OK to DC from  standpoint  Please discharge with Oxybutynin 5 mg IR TID and on Flomax/Finasteride  We will arrange follow up in 1-2 weeks for voiding trial and outpatient cystoscopy      Case discussed with patient, family, RN, Hospitalist and Urology-Dr. Hollie GLORIA  Patient seen and examined    5/26. Anticipates DC today. Creat did come down slightly. Tolerating catheter better with adjustment yesterday and addition of Oxybutynin.     5/25. Painful bladder spasms, unable to sit. Hematuria has cleared. Catheter has been draining. CT with thickened bladder, ureteral obstruction and dilation. Creat worse today           Review of Systems  Physical Exam   Review of Systems   Constitutional:  Negative for chills and fever.   Gastrointestinal:  Positive for abdominal pain.   Genitourinary:  Positive for hematuria and urgency.     Vitals:    05/25/23 2025 05/25/23 2319 05/26/23 0714 05/26/23 1538   BP:  118/78 138/83 135/84   Pulse:  (!) 110 79 77   Resp:  18 18 16   Temp:  37 °C (98.6 °F) 36.8 °C (98.2 °F) 36.6 °C (97.9 °F)   TempSrc:  Temporal Temporal Temporal   SpO2:  90% 96% 100%   Weight: 101 kg (222 lb 3.6 oz)      Height:         Physical Exam  Vitals and nursing note reviewed.   Constitutional:       General: He is in acute distress.   HENT:      Head: Normocephalic and atraumatic.      Nose: Nose normal.      Mouth/Throat:      Mouth: Mucous membranes are moist.   Eyes:      Pupils: Pupils are equal, round, and reactive to light.   Pulmonary:      Effort: Pulmonary effort is normal.   Abdominal:      General: Abdomen is flat.  There is no distension.      Palpations: Abdomen is soft.      Tenderness: There is no abdominal tenderness.   Genitourinary:     Comments: 3 way franco in place, urine clear  Neurological:      General: No focal deficit present.      Mental Status: He is alert and oriented to person, place, and time.          Hematology Chemistry   Lab Results   Component Value Date/Time    WBC 6.9 05/25/2023 02:49 AM    HEMOGLOBIN 13.0 (L) 05/25/2023 02:49 AM    HEMATOCRIT 40.1 (L) 05/25/2023 02:49 AM    PLATELETCT 220 05/25/2023 02:49 AM     Lab Results   Component Value Date/Time    SODIUM 136 05/26/2023 03:17 AM    POTASSIUM 3.8 05/26/2023 03:17 AM    CHLORIDE 98 05/26/2023 03:17 AM    CO2 24 05/26/2023 03:17 AM    GLUCOSE 178 (H) 05/26/2023 03:17 AM    BUN 70 (H) 05/26/2023 03:17 AM    CREATININE 2.41 (H) 05/26/2023 03:17 AM         Labs not explicitly included in this progress note were reviewed by the author.   Radiology/imaging not explicitly included in this progress note was reviewed by the author.     Labs reviewed and Medications reviewed

## 2023-05-26 NOTE — DISCHARGE SUMMARY
"Discharge Summary    CHIEF COMPLAINT ON ADMISSION  Chief Complaint   Patient presents with    Shortness of Breath     X 2 weeks, + chills & productive cough. Had an xray at  in [place] 1 week ago & was told \"I was filling up with fluid\", told to go to ED but did not want to.    Peripheral Edema     Increasing x 2 weeks, edema starting in low legs up to midthigh. States he has gained 30 lbs in 2 weeks. Increased WOB in triage.       Reason for Admission  Abd Swelling/Leg Swelling     Admission Date  5/15/2023    CODE STATUS  Full Code    HPI & HOSPITAL COURSE\  This is a 78 year-old male with a past medical history significant for hypertension, dyslipidemia ,type 2 Dm with glyco of 8.2, pulmonary hypertension, moderate MR, CAD status post stent, CKD status for ,atrial fibrillation, secondary hypercoagulable state presented to the ER on 5/15/2023 with a complaint of shortness of breath along with bilateral lower extremity swelling.    Patient has been taking Lasix 80 mg p.o. daily but it has not helped thus needed to come to ER.  Echocardiogram was obtained, also noted to have pulmonary hypertension, EF 60%, patient could not recently see during the stay in the hospital which has been held as of 5/23.    Bladder scan and renal ultrasound was obtained, noted to have severe bilateral hydronephrosis, Gonzalez was placed.  Nephrology consulted, continue to follow.    Patient was noted to have hematuria on 5/23, Coumadin has been held.  Plavix has been held from 5/24, urology contacted, patient was started on continuous bladder irrigation.  Patient urine is now clear.  Coumadin and Plavix has been started.  Patient will follow-up with urology as an outpatient for possible cystoscopy    Nephrology continue to follow the patient, patient renal function is noted to be improving today creatinine at 2.4, nephrology started the patient on 40 mg of Lasix.  Nephrology will follow the patient as an outpatient.    Patient should " avoid NSAID at any cost, this has been discussed with him.  He will also follow-up at anticoagulation clinic, he understands and accepted to 3.    Also patient is currently requiring oxygen, home O2 evaluation has been obtained, patient will be discharged home on oxygen    Therefore, he is discharged in good and stable condition to home with close outpatient follow-up.    The patient met 2-midnight criteria for an inpatient stay at the time of discharge.    Discharge Date  5/26/2023      FOLLOW UP ITEMS POST DISCHARGE  Alberto Carmona D.O.  [facility]  [facility] [place]     DISCHARGE DIAGNOSES  Principal Problem:    Volume overload (POA: Yes)  Active Problems:    Atrial fibrillation with RVR (HCC) (POA: Yes)    Type 2 diabetes mellitus with kidney complication, without long-term current use of insulin ([facility]) (POA: Yes)    CKD (chronic kidney disease) stage 4, GFR 15-29 ml/min (CMS-HCC) (POA: Yes)    Mild persistent asthma without complication (POA: Yes)    S/P coronary artery stent placement (POA: Yes)      Overview: Mid right coronary artery with       overlapping stents:4.5x18 mm Ultra non-drug eluting stent and 4.0x38 mm       Xience drug-eluting stent     Hypertensive urgency (POA: Yes)    Pulmonary hypertension (HCC) (POA: Yes)    Moderate mitral regurgitation (POA: Yes)    Hematuria (POA: Unknown)  Resolved Problems:    * No resolved hospital problems. *      FOLLOW UP  Future Appointments   Date Time Provider Department Center   5/30/2023  3:20 PM Sanjay Sorensen M.D. [facility] None   6/6/2023  1:00 PM TELEMED [facility] None     No follow-up provider specified.    MEDICATIONS ON DISCHARGE     Medication List        START taking these medications        Instructions   finasteride 5 MG Tabs  Start taking on: May 27, 2023  Commonly known as: PROSCAR   Take 1 Tablet by mouth every day.  Dose: 5 mg     guaiFENesin  MG Tb12  Commonly known as: MUCINEX   Take 1 Tablet  by mouth every 12 hours.  Dose: 600 mg     metoprolol tartrate 50 MG Tabs  Commonly known as: LOPRESSOR   Take 1 Tablet by mouth 2 times a day.  Dose: 50 mg     oxybutynin 5 MG Tabs  Commonly known as: DITROPAN   Take 1 Tablet by mouth 3 times a day as needed (spasm).  Dose: 5 mg     polyethylene glycol/lytes 17 g Pack  Commonly known as: MIRALAX   Take 1 Packet by mouth 1 time a day as needed (if sennosides and docusate ineffective after 24 hours) for up to 15 days.  Dose: 17 g     senna-docusate 8.6-50 MG Tabs  Commonly known as: PERICOLACE or SENOKOT S   Take 2 Tablets by mouth 2 times a day.  Dose: 2 Tablet     tamsulosin 0.4 MG capsule  Start taking on: May 27, 2023  Commonly known as: FLOMAX   Take 1 Capsule by mouth 1/2 hour after breakfast.  Dose: 0.4 mg     traMADol 50 MG Tabs  Commonly known as: Ultram   Take 1 Tablet by mouth every 12 hours as needed for Moderate Pain for up to 5 days.  Dose: 50 mg            CHANGE how you take these medications        Instructions   furosemide 40 MG Tabs  Start taking on: May 27, 2023  What changed:   how much to take  additional instructions  Commonly known as: LASIX   Take 1 Tablet by mouth every day.  Dose: 40 mg     warfarin 5 MG Tabs  What changed: See the new instructions.  Commonly known as: COUMADIN   TAKE 1/2 TABLET (2.5MG) BY MOUTH ON THURS AND 1 TABLET (5MG) ALL OTHER DAYS OR AS DIRECTED BY CLINIC            CONTINUE taking these medications        Instructions   albuterol 2.5mg/3ml Nebu solution for nebulization  Commonly known as: PROVENTIL   Take 3 mL by nebulization every four hours as needed for Shortness of Breath.  Dose: 2.5 mg     budesonide-formoterol 160-4.5 MCG/ACT Aero  Commonly known as: Symbicort   Inhale 2 Puffs 2 times a day. Use spacer. Rinse mouth after each use.  Dose: 2 Puff     clopidogrel 75 MG Tabs  Commonly known as: PLAVIX   Take 1 tablet by mouth once daily     multivitamin Tabs   Take 1 Tablet by mouth every day.  Dose: 1 Tablet             STOP taking these medications      amoxicillin-clavulanate 875-125 MG Tabs  Commonly known as: AUGMENTIN     celecoxib 200 MG Caps  Commonly known as: CELEBREX            ASK your doctor about these medications        Instructions   dilTIAZem 60 MG Tabs  Commonly known as: Cardizem  Ask about: Which instructions should I use?   TAKE 1 TABLET BY MOUTH THREE TIMES DAILY     insulin aspart 100 UNIT/ML Soln  Commonly known as: NovoLOG   Inject 5-7 Units under the skin 2 times a day with meals. Sliding Scale   201 - 250 = 5 units  251 - 300 = 7 units  Dose: 5-7 Units     Levemir FlexTouch 100 UNIT/ML injection PEN  Generic drug: insulin detemir   Inject 10 Units under the skin every evening.  Dose: 10 Units              Allergies  Allergies   Allergen Reactions    Atenolol Shortness of Breath and Unspecified     Shortness of breath, weakness    Tetanus Toxoid Swelling    Atorvastatin Nausea     In large doses       DIET  Orders Placed This Encounter   Procedures    Diet Order Diet: Consistent CHO (Diabetic); Second Modifier: (optional): 2 Gram Sodium     Standing Status:   Standing     Number of Occurrences:   1     Order Specific Question:   Diet:     Answer:   Consistent CHO (Diabetic) [4]     Order Specific Question:   Second Modifier: (optional)     Answer:   2 Gram Sodium [7]       ACTIVITY  As tolerated.  Weight bearing as tolerated    CONSULTATIONS  Urology  Nephrology    PROCEDURES  None     LABORATORY  Lab Results   Component Value Date    SODIUM 136 05/26/2023    POTASSIUM 3.8 05/26/2023    CHLORIDE 98 05/26/2023    CO2 24 05/26/2023    GLUCOSE 178 (H) 05/26/2023    BUN 70 (H) 05/26/2023    CREATININE 2.41 (H) 05/26/2023        Lab Results   Component Value Date    WBC 6.9 05/25/2023    HEMOGLOBIN 13.0 (L) 05/25/2023    HEMATOCRIT 40.1 (L) 05/25/2023    PLATELETCT 220 05/25/2023        Total time of the discharge process exceeds 39 minutes.

## 2023-05-26 NOTE — DISCHARGE PLANNING
RNCM spoke to patient and family (wife and dtr) bedside, after permission was obtained. Patient is kindly refusing any HH or rehab services at discharge. Per patient his wife and daughter can assist as needed. Both wife and dtr concur. DCP: Home with family without further needs.

## 2023-05-26 NOTE — DISCHARGE PLANNING
[facility] Transitional [facility] Coordination    Referral from: Dr. Mosqueda    Insurance Provider on Facesheet: [facility]    Potential Rehab Diagnosis: Debility    Chart review indicates patient may have on going medical management and may have therapy needs to possibly meet inpatient rehab facility criteria with the goal of returning to community.    D/C support will need to be verified: Spouse    Physiatry consultation forwarded per protocol.      Thank you for the referral.     1044-Spoke with Bill and Avelino, spouse.  Choice home.  TCC will no longer follow.  Please reach out to myself with any interval changes/questions.

## 2023-05-26 NOTE — DOCUMENTATION QUERY
[facility]                                                                       Query Response Note      PATIENT:               LUCILA MONTANEZ  ACCT #:                  [id number]  MRN:                     [id number]  :                      1945  ADMIT DATE:       5/15/2023 12:30 PM  DISCH DATE:          RESPONDING  PROVIDER #:        [id number]           QUERY TEXT:    Volume overload and history of CHF is documented in the Hospital Medicine Notes. HFpEF is documented in the Nephrology Notes beginning      Can this documentation be further specified?    The patient's Clinical Indicators include:  5/15  ED: States he has gained 30 lbs in 2 weeks. Increased WOB in triage.  H&P: Volume overload, AFib w/ RVR, HTN urgency, pulmonary hypertension, CKD IV, DM 2  CXR: Findings suspicious for early LEFT lung pneumonia or atelectasis  NT -proBNP: 4149       NT -proBNP: 4205  HM PN: PMHX of CKD, AFib, CHF presenting to ER complaining of sob & increased LE edema. Volume overload.     ECHO: EF 60%. Unable to accurately assess LV diastolic function given arrhythmia, but appears to be abnormal.     Nephrology PN: HFpEF    Treatment: IV Lasix --> PO Lasix; Lopressor; Cardizem;  2 gm Na diet, 1 L fluid restriction; ECHO; O2; lab/imaging  Risk Factors: Volume overload; CKD IV; persistent AFib; pulmonary hypertension; HTN; DM 2    Thank You,  Lashell Ward RN  Clinical    Connect via [employer]  Options provided:   -- Acute diastolic heart failure   -- Chronic diastolic heart failure   -- Acute on chronic diastolic heart failure   -- Other explanation, (please specify other explanation)   -- Unable to determine      Query created by: Lashell Ward on 2023 4:39 PM    RESPONSE TEXT:    Acute on chronic diastolic heart failure          Electronically signed by:  ROSS CERRATO MD 2023 11:12 AM

## 2023-05-26 NOTE — CARE PLAN
The patient is Stable - Low risk of patient condition declining or worsening    Shift Goals  Clinical Goals: Hemodynamically stable  Patient Goals: Comfort/rest  Family Goals: jun    Progress made toward(s) clinical / shift goals:        Problem: Knowledge Deficit - Standard  Goal: Patient and family/care givers will demonstrate understanding of plan of care, disease process/condition, diagnostic tests and medications  Outcome: Progressing     Problem: Fall Risk  Goal: Patient will remain free from falls  Outcome: Progressing     Problem: Pain - Standard  Goal: Alleviation of pain or a reduction in pain to the patient’s comfort goal  Outcome: Progressing       Patient is not progressing towards the following goals: N/A

## 2023-05-26 NOTE — PROGRESS NOTES
[facility] -  PROGRESS NOTE               Author: Kassy Zurita M.D. Date & Time: 5/26/2023  5:57 AM     HPI:  Patient is a 78 year old male with a PMHx of CKD, afib, and CHF admitted on 5/15 for SOB and increased lower extremity edema.  Had been taking lasix 80mg PO daily but has not helped.  In the ER was found to be in afib with RVR and volume overloaded.  Was started on IV lasix but Cr uptrending.  Nephrology consulted.  Currently feels edema slightly improved.  SOB improved as well.  Denies significant salt intake at home.  Understands may need dialysis if renal function worsens    DAILY NEPHROLOGY SUMMARY:  5/19 - Cr stable.  States edema improved.  SOB improved.  Sleeps sitting up, unclear if I/Os accurate  5/20 - Output not recorded.  Cr trending down.  Edema slightly better but still significant in the legs  5/21 - Denies pain or SOB.  Sitting in chair.  Appears edema decreased.  Cr slgihtly up.  5/22-No events, VSS, Denies any complaints, edema interval improvement , no accurate I/O , bladdre incontinent   5/23- Renal US showed severe BL hydronephrosis s/p franco, VSS, UOP 8450 cc/ 24 hrs, no complaints, appear to be auto- diuresing   5/24-CBI for blood clots, reports lower abdominal discomfort, VSS, Cr improving   5/25- ob CBI, hematuria cleared. No complaints, report he lost 30 lbs since hospital stay  5/26-off CBI, Cr down to 2.4 today , pt reports feeling better and hoping that he will be able to go home today      REVIEW OF SYSTEMS:    10 point ROS reviewed and is as per HPI/daily summary or otherwise negative    PMH/PSH/SH/FH:   Reviewed and unchanged since admission note    CURRENT MEDICATIONS:   Reviewed from admission to present day    VS:  /78   Pulse (!) 110   Temp 37 °C (98.6 °F) (Temporal)   Resp 18   Ht 1.829 m (6')   Wt 101 kg (222 lb 3.6 oz)   SpO2 90%   BMI 30.14 kg/m²     Physical Exam  Constitutional:       Appearance: NAD  HENT:      Head:  Normocephalic.      Right Ear: External ear normal.      Left Ear: External ear normal.      Nose: Nose normal.      Mouth/Throat:      Mouth: Mucous membranes are moist.   Eyes:      General: No scleral icterus.  Cardiovascular:      Rate and Rhythm: irregular   Pulmonary:      BL lungs CTA  Abdominal:      General: soft ,distended   Musculoskeletal:         General: Swelling present.      Cervical back: Normal range of motion.      Right lower leg: Edema present. Interval improvement      Left lower leg: Edema present.   Skin:     General: Skin is warm.   Neurological:      General: No focal deficit present.      Mental Status: He is alert.   Psychiatric:         Mood and Affect: Mood normal.   Fluids:  In: -   Out: -1800     LABS:  Recent Labs     05/24/23  0134 05/25/23  0249 05/26/23  0317   SODIUM 143 141 136   POTASSIUM 3.8 3.8 3.8   CHLORIDE 101 101 98   CO2 26 28 24   GLUCOSE 113* 172* 178*   BUN 75* 70* 70*   CREATININE 2.59* 2.72* 2.41*   CALCIUM 9.3 9.1 8.9         IMAGING:   All imaging reviewed from admission to present day    IMPRESSION:  # YANETH on CKD likely sec to urine retention     - Recent baseline renal function fairly labile between 1.8 to 2.5    - Peak Cr 3.2      - Diuresing well    - UA sterile pyuria, microscopic hematuria in the setting of severe BL hydronephrosis sec to markedly thick bladder wall causing chronic obstruction s/p franco cath placement    - Cr improved stable ~2.6-2.7 currently   # CKD    - Sees Dr Vicente in the clinic, ckd though to be due to DM  # Urine retention   - Franco placed  - Urology on board   # Volume overload  # HFpEF  # p HTN with moderate TR   # Moderate mitral regurgiation  # CAD  # Asthma  # Diabetes  # Afib     PLAN:  - Ct lasix 40 mg daily   - No compelling indication for RRT  - Accurate I/Os  - Limit fluids 1.2 L/d  - Daily labs  - Liberalized diet to cardiac diet   - Dose all meds per current GFR  - Ct Franco, urology following   - Avoid NSAIDs, no  celecoxib( was taking this PRN PTA)     Ok to discharge from renal standpoint when medically cleared.

## 2023-05-26 NOTE — CARE PLAN
The patient is Stable - Low risk of patient condition declining or worsening    Shift Goals  Clinical Goals: Pain Management, Trend Cr  Patient Goals: DC Home with Family  Family Goals: jun    Progress made toward(s) clinical / shift goals:        Problem: Hemodynamics  Goal: Patient's hemodynamics, fluid balance and neurologic status will be stable or improve  Outcome: Progressing  Note: Patient's creatinine and GFR are improving mildly. Patient's bladder is draining with catheter. Output charted.      Problem: Psychosocial  Goal: Patient's level of anxiety will decrease  Outcome: Progressing  Note: Patient is understanding the severity of his kidneys and what the POC is which is decreasing anxiety.            Patient is not progressing towards the following goals: NA

## 2023-05-26 NOTE — FACE TO FACE
"Face to Face Note  -  Durable Medical Equipment    Meghan Mosqueda M.D. - NPI: [id number]  I certify that this patient is under my care and that they had a durable medical equipment(DME)face to face encounter by myself that meets the physician DME face-to-face encounter requirements with this patient on:    Date of encounter:   Patient:                    MRN:                       YOB: 2023  Yuri De León  [id number]  1945     The encounter with the patient was in whole, or in part, for the following medical condition, which is the primary reason for durable medical equipment:  CHF    I certify that, based on my findings, the following durable medical equipment is medically necessary:    Oxygen   HOME O2 Saturation Measurements:(Values must be present for Home Oxygen orders)  Room air sat at rest: 86  Room air sat with amb: 80  With liters of O2: 2, O2 sat at rest with O2: 96  With Liters of O2: 3, O2 sat with amb with O2 : 94  Is the patient mobile?: Yes  If patient feels more short of breath, they can go up to 6 liters per minute and contact healthcare provider.    Supporting Symptoms: The patient requires supplemental oxygen, as the following interventions have been tried with limited or no improvement: \"Positive expiratory pressure therapies.    My Clinical findings support the need for the above equipment due to:  Hypoxia  "

## 2023-05-26 NOTE — PROGRESS NOTES
Received report from nightshift RN  Assumed patients care.  Patient sitting on the chair.   Assessment performed and done  VSS  Patient is alert and oriented x 4  In no apparent distress  Denies chest pain. SOB and N/V  Reports 0/10 pain   Repositioned  Telemetry box on. Rate and rhythm verified.   Plan of care discussed with the patient, labs and chart reviewed.   All questions and needs are answered and provided.   Bed locked and in lowest position  Bed alarm on.  Call light within reach.  No further needs at this time, will continue to monitor.

## 2023-05-26 NOTE — CONSULTS
Physical Medicine and Rehabilitation Consultation              Date of initial consultation: 5/26/2023  Requesting provider: ordered by Meghan Mosqueda M.D. at 05/26/23 0857   Consulting provider: Sherlyn Hein D.O.  Reason for consultation: assess for acute inpatient rehab appropriateness  LOS: 11 Day(s)    Chief complaint: Shortness of breath, peripheral edema    HPI: The patient is a 78 y.o. male with a past medical history of CKD stage IV, atrial fibrillation on Coumadin, diabetes, hypertension, hyperlipidemia, and history of pulmonary hypertension;  who presented on 5/15/2023 12:30 PM with shortness of breath and pulmonary edema.  Per documentation, patient had noticed increased shortness of breath and increased lower extremity edema.  Patient presented to the emergency department when he was unable to catch his breath.  On evaluation in the emergency department patient was noted to have A-fib with RVR with heart rate in the 120s.  BNP 4000.  Reportedly patient been taking Lasix 80 mg daily but had not helped with his fluid retention.  Echocardiogram obtained showed EF of 60%.  Patient has been diuresed with Lasix which has been tapered down.  Additionally CT abdomen pelvis was obtained which showed markedly thickened wall bladder causing obstruction of the ureters and marked dilation of the ureters causing hydronephrosis.  Urology has been consulted, recommendations for continuous bladder irrigation, patient had notable hematuria.  Patient's Coumadin was held for the hematuria but has recently been restarted.  Nephrology was also consulted for patient's acute on chronic YANETH.  Patient is followed in the outpatient setting by nephrology for CKD stage IV.  Patient's creatinine has improved from 2.7-2 2.41.  Nephrology recommending okay for patient to stay on Lasix 40 mg daily and for patient to remain on fluid restriction.  Patient has been able to be weaned down to 1 L of supplemental O2.  During patient's  hospitalization his respiratory status has improved, his edema has improved and patient has been able to participate with therapies he is functioning at a min assist level with an FW W    Patient seen and examined at bedside, wife and daughter in room. Witnessed patient walk to bathroom, then entered room again and patient attempted to walk around room without staff. Assisted patient from bathroom back to chair with CGA level, has some poor safety awareness around oxygen tubing. Patient eager to go home, asking when he can leave. Reports pain at the franco catheter when its moved around. Otherwise Does not report HA, lightheadedness, SOB, CP, abdominal pain, or changes in numbness/tingling/weakness.     Social Hx:  Patient lives with spouse in a one-story house with no stairs to enter, spouse can help as needed  0 JORGE  At prior level of function mod I with a single-point cane, independent for ADLs    Tobacco: denies   Alcohol: denies   Drugs: denies     THERAPY:  Restrictions: Fall risk  PT: Functional mobility   5/24 min assist with an FW W times 75 feet x 2, min assist sit to stand, min assist transfers    OT: ADLs  5/24 min assist sit to stand, min assist transfers, mod assist lower body dressing    SLP:   None    IMAGING:  CT-ABDOMEN-PELVIS W/O   Final Result       1.  Markedly thick walled bladder likely causing chronic obstruction of the ureters and marked dilatation of the ureters and renal collecting systems..       2.  Mild to moderate atherosclerotic changes in abdominal aorta and iliac arteries.       3.  Small bilateral pleural effusions right greater than left.       US-RENAL   Final Result       Severe BILATERAL hydronephrosis       EC-ECHOCARDIOGRAM COMPLETE W/O CONT   Final Result       DX-CHEST-PORTABLE (1 VIEW)   Final Result       Findings suspicious for early LEFT lung pneumonia or atelectasis could have a similar appearance       PROCEDURES:  None     PMH:  Past Medical History:   Diagnosis Date     Abnormal electrocardiogram 08/13/2010    Arrhythmia     Atrial Fibrillation; Cardiologist, Dr. Sorensen    Arthritis     knees, left hip    ASTHMA     inhalers    Atrial fibrillation (HCC) 10/25/2011    Bronchospasm 08/06/2009    Cancer (HCC)     skin cancer R post ear, removed    Cataract     IOL OU    Dental disorder     a few missing molars    Diabetes (HCC)     insulin    Heart valve disease     mitral regurg    Hemorrhagic disorder (HCC)     on blood thinners    High cholesterol     HTN (hypertension) 10/25/2011    Hypercholesteremia 10/25/2011    Long term (current) use of anticoagulants 10/25/2011    NASAL POLYPS 08/06/2009    Other nonspecific abnormal finding 08/06/2009    Pain     left hip    Pneumonia     Pulmonary hypertension (HCC)     Renal disorder     CKD stage 4    Shortness of breath     Sleep apnea 08/06/2009    No O2 or device, no retest    Wears glasses        PSH:  Past Surgical History:   Procedure Laterality Date    PB ADJ TISS XFER HEAD,FAC,HAND 10.1-30 N/A 9/7/2022    Procedure: LOCAL FLAP RECONSTRUCTION WITH REMOVAL OF DENUDED AND DRIED BONE OF THE FOREHEAD;  Surgeon: Buzz Mcnamara M.D.;  Location: SURGERY [facility];  Service: Ent    KNEE ARTHROPLASTY TOTAL Left 10/2014    HIP ARTHROPLASTY TOTAL  08/31/2010    LEFT Performed by OSGOOD, PATRICK J at [facility] OR[facility]    LUMBAR DECOMPRESSION  1984    CATARACT EXTRACTION WITH IOL Bilateral     CYSTOSCOPY      cannot remember exact procedure    LAMINOTOMY      SINUSCOPE      SINUSOTOMIES  2003,2005,2/2007       FHX:  Family History   Problem Relation Age of Onset    Heart Disease Mother        Medications:  Current Facility-Administered Medications   Medication Dose    furosemide (LASIX) tablet 40 mg  40 mg    oxybutynin (DITROPAN) tablet 5 mg  5 mg    warfarin (COUMADIN) tablet 5 mg  5 mg    And    warfarin (COUMADIN) tablet 2.5 mg  2.5 mg    MD Alert...Warfarin per Pharmacy      HYDROmorphone (Dilaudid) injection 0.5 mg   0.5 mg    traMADol (Ultram) 50 MG tablet 50 mg  50 mg    tamsulosin (FLOMAX) capsule 0.4 mg  0.4 mg    finasteride (PROSCAR) tablet 5 mg  5 mg    guaiFENesin ER (MUCINEX) tablet 600 mg  600 mg    metoprolol tartrate (LOPRESSOR) tablet 50 mg  50 mg    albuterol (PROVENTIL) 2.5mg/0.5ml nebulizer solution 2.5 mg  2.5 mg    clopidogrel (PLAVIX) tablet 75 mg  75 mg    senna-docusate (PERICOLACE or SENOKOT S) 8.6-50 MG per tablet 2 Tablet  2 Tablet    And    polyethylene glycol/lytes (MIRALAX) PACKET 1 Packet  1 Packet    And    magnesium hydroxide (MILK OF MAGNESIA) suspension 30 mL  30 mL    And    bisacodyl (DULCOLAX) suppository 10 mg  10 mg    Respiratory Therapy Consult      acetaminophen (Tylenol) tablet 650 mg  650 mg    hydrALAZINE (APRESOLINE) injection 10 mg  10 mg    insulin regular (HumuLIN R,NovoLIN R) injection  2-9 Units    And    dextrose 10 % BOLUS 25 g  25 g    promethazine (PHENERGAN) tablet 25 mg  25 mg    mometasone-formoterol (Dulera) 200-5 mcg/Act inhaler 2 Puff  2 Puff       Allergies:  Allergies   Allergen Reactions    Atenolol Shortness of Breath and Unspecified     Shortness of breath, weakness    Tetanus Toxoid Swelling    Atorvastatin Nausea     In large doses         Physical Exam:  Vitals: /83   Pulse 79   Temp 36.8 °C (98.2 °F) (Temporal)   Resp 18   Ht 1.829 m (6')   Wt 101 kg (222 lb 3.6 oz)   SpO2 96%   Gen: NAD, witnessed ambulating with FWW, then seated in recliner   Head:  NC/AT  Eyes/ Nose/ Mouth: PERRLA, moist mucous membranes  Cardio: RRR, good distal perfusion, warm extremities  Pulm: normal respiratory effort, no cyanosis, on 1 L o2   Abd: Soft NTND, negative borborygmi   ; franco in place, no hematuria   Ext: No peripheral edema. No calf tenderness. No clubbing.    Mental status:  A&Ox4 (person, place, date, situation) answers questions appropriately follows commands  Speech: fluent, no aphasia or dysarthria    CRANIAL NERVES:  2,3: visual acuity grossly intact,  PERRL  3,4,6: EOMI bilaterally, no nystagmus or diplopia  5: sensation intact to light touch bilaterally and symmetric  7: no facial asymmetry  8: hearing grossly intact      Motor:      Upper Extremity  Myotome R L   Shoulder flexion C5 5/5 5/5   Elbow flexion C5 5/5 5/5   Wrist extension C6 5/5 5/5   Elbow extension C7 5/5 5/5   Finger flexion C8 5/5 5/5   Finger abduction T1 5/5 5/5     Lower Extremity Myotome R L   Hip flexion L2 4/5 4/5   Knee extension L3 5/5 5/5   Ankle dorsiflexion L4 5/5 5/5   Toe extension L5 5/5 5/5   Ankle plantarflexion S1 5/5 5/5       Negative Pronator drift bilaterally    Sensory:   intact to light touch through out    DTRs: 2+ in bilateral  biceps  No clonus at bilateral ankles  Negative Tan b/l     Tone: no spasticity noted, no cogwheeling noted    Coordination:   intact finger to nose bilaterally  intact fine motor with fingers bilaterally      Labs: Reviewed and significant for   Recent Labs     05/23/23  1047 05/24/23  0134 05/25/23 0249 05/26/23 0317   RBC 4.77 4.71 4.62*  --    HEMOGLOBIN 13.4* 13.4* 13.0*  --    HEMATOCRIT 41.7* 40.9* 40.1*  --    PLATELETCT 223 221 220  --    PROTHROMBTM  --  30.5* 26.1* 22.5*   INR  --  3.05* 2.49* 2.04*     Recent Labs     05/24/23  0134 05/25/23 0249 05/26/23 0317   SODIUM 143 141 136   POTASSIUM 3.8 3.8 3.8   CHLORIDE 101 101 98   CO2 26 28 24   GLUCOSE 113* 172* 178*   BUN 75* 70* 70*   CREATININE 2.59* 2.72* 2.41*   CALCIUM 9.3 9.1 8.9     Recent Results (from the past 24 hour(s))   POCT glucose device results    Collection Time: 05/25/23 11:46 AM   Result Value Ref Range    POC Glucose, Blood 172 (H) 65 - 99 mg/dL   POCT glucose device results    Collection Time: 05/25/23  5:34 PM   Result Value Ref Range    POC Glucose, Blood 224 (H) 65 - 99 mg/dL   POCT glucose device results    Collection Time: 05/25/23  8:14 PM   Result Value Ref Range    POC Glucose, Blood 252 (H) 65 - 99 mg/dL   MAGNESIUM    Collection Time: 05/26/23   3:17 AM   Result Value Ref Range    Magnesium 1.9 1.5 - 2.5 mg/dL   Renal Function Panel    Collection Time: 05/26/23  3:17 AM   Result Value Ref Range    Sodium 136 135 - 145 mmol/L    Potassium 3.8 3.6 - 5.5 mmol/L    Chloride 98 96 - 112 mmol/L    Co2 24 20 - 33 mmol/L    Glucose 178 (H) 65 - 99 mg/dL    Creatinine 2.41 (H) 0.50 - 1.40 mg/dL    Bun 70 (H) 8 - 22 mg/dL    Calcium 8.9 8.5 - 10.5 mg/dL    Phosphorus 3.4 2.5 - 4.5 mg/dL    Albumin 3.2 3.2 - 4.9 g/dL   PROTHROMBIN TIME    Collection Time: 05/26/23  3:17 AM   Result Value Ref Range    PT 22.5 (H) 12.0 - 14.6 sec    INR 2.04 (H) 0.87 - 1.13   CORRECTED CALCIUM    Collection Time: 05/26/23  3:17 AM   Result Value Ref Range    Correct Calcium 9.5 8.5 - 10.5 mg/dL   ESTIMATED GFR    Collection Time: 05/26/23  3:17 AM   Result Value Ref Range    GFR (CKD-EPI) 27 (A) >60 mL/min/1.73 m 2   POCT glucose device results    Collection Time: 05/26/23  8:29 AM   Result Value Ref Range    POC Glucose, Blood 184 (H) 65 - 99 mg/dL         ASSESSMENT:  Patient is a 78 y.o. male admitted with shortness of breath due to volume overload    [facility] Code / Diagnosis to Support: 0016 - Debility (Non-Cardiac, Non-Pulmonary)    Rehabilitation: Impaired ADLs and mobility  Patient is a poor candidate for inpatient rehab based on desire go to home, willing to do home health at home.     Barriers to transfer include: Insurance authorization, TCCs to verify disposition, medical clearance and bed availability     Additional Recommendations:  Volume overload  Pulmonary hypertension  - Originally presented with hypoxia and lower extremity edema  - Patient previously been on Lasix 80 mg daily without improvement of edema  - Patient has been diuresed and his Lasix has been weaned down to 40 mg daily  - O2 weaned down to 1 L  - Continue with PT/OT, recommend energy conservation techniques    CKD stage IV  - Followed in the outpatient setting by nephrology  - Nephrology consulted  -  Creatinine improving  - Nephrology okay for 40 mg Lasix daily, continue with fluid restriction    Atrial fibrillation with RVR  - Originally presented with shortness of breath, found to be in A-fib with RVR  - Has converted back to normal sinus rhythm currently on Coumadin for anticoagulation  - Continues to be rate controlled with beta-blocker    Bilateral hydroureteronephrosis  Hematuria  - At time of admission CT abdomen pelvis chest showed evidence of hydronephrosis of bilateral ureters  - Urology consulted  - Recommended continuous bladder irrigation to assist with ureteral obstruction  - Complicated by hematuria, Coumadin was held, Coumadin has been restarted since improvement of hematuria  - Has been started on oxybutynin for bladder spasms  - Needs follow-up with urology in the outpatient setting    Dispo:  - patient is currently functioning below their level of baseline, recommend  ongoing therapies  -Patient has made functional gains during hospitalization  - Is mobilizing at a min assist level with FWW, has help at home  - Reviewed levels of rehab with patient, patient prefers home with home health, is not interested in postacute rehab at this time  - PM&R will sign off        Medical Complexity:  Volume overload  Pulmonary hypertension  CKD  Atrial fibrillation with RVR  Bilateral hydroureteronephrosis  Hematuria  Impaired mobility and ADLs        DVT PPX: Coumadin      Thank you for allowing us to participate in the care of this patient.     Patient was seen for 82 minutes on unit/floor of which > 50% of time was spent on counseling and coordination of care regarding the above, including prognosis, risk reduction, benefits of treatment, and options for next stage of care.    Sherlyn Hein D.O.   Physical Medicine and Rehabilitation     Please note that this dictation was created using voice recognition software. I have made every reasonable attempt to correct obvious errors, but there may be errors of  grammar and possibly content that I did not discover before finalizing the note.

## 2023-05-26 NOTE — PROGRESS NOTES
Monitor Summary:     Rhythm: Afib  Rate:   Ectopy: (R) PVC, (R) Coup  Measurements: -/0.14/-           12 Hour Chart Check

## 2023-05-27 NOTE — DISCHARGE INSTRUCTIONS
HF Patient Discharge Instructions  Monitor your weight daily, and maintain a weight chart, to track your weight changes.   Activity as tolerated, unless your Doctor has ordered otherwise.   Follow a low fat, low cholesterol, low salt diet unless instructed otherwise by your Doctor. Read the labels on the back of food products and track your intake of fat, cholesterol and salt.   Fluid Restriction Yes. If a Fluid Restriction has been ordered by your Doctor, measure fluids with a measuring cup to ensure that you are not exceeding the restriction.   No smoking.  Oxygen Yes. If your Doctor has ordered that you wear Oxygen at home, it is important to wear it as ordered.  Did you receive an explanation from staff on the importance of taking each of your medications and why it is necessary to keep taking them unless your doctor says to stop? Yes  Were all of your questions answered about how to manage your heart failure and what to do if you have increased signs and symptoms after you go home? Yes  Do you feel like your heart failure care team involved you in the care treatment plan and allowed you to make decisions regarding your care while in the hospital and addressed any discharge needs you might have? Yes    See the educational handout provided at discharge for more information on monitoring your daily weight, activity and diet. This also explains more about Heart Failure, symptoms of a flare-up and some of the tests that you have undergone.     Warning Signs of a Flare-Up include:  Swelling in the ankles or lower legs.  Shortness of breath, while at rest, or while doing normal activities.   Shortness of breath at night when in bed, or coughing in bed.   Requiring more pillows to sleep at night, or needing to sit up at night to sleep.  Feeling weak, dizzy or fatigued.     When to call your Doctor:  Call [facility] seven days a week from 8:00 a.m. to 8:00 p.m. for medical questions [phone].  Call  your Primary Care Physician or Cardiologist if:   You experience any pain radiating to your jaw or neck.  You have any difficulty breathing.  You experience weight gain of 3 lbs in a day or 5 lbs in a week.   You feel any palpitations or irregular heartbeats.  You become dizzy or lose consciousness.   If you have had an angiogram or had a pacemaker or AICD placed, and experience:  Bleeding, drainage or swelling at the surgical / puncture site.  Fever greater than 100.0 F  Shock from internal defibrillator.  Cool and / or numb extremities.     Please access the [facility] My HF Guide/Heart Failure Interactive Workbook:   [email or web address]

## 2023-05-27 NOTE — PROGRESS NOTES
Pt discharged to home with wife and daughter. Oxygen tank delivered. Medication prom [facility] given to the patient. Discharge instructions reviewed, and signed. IV and tele monitor removed. Patient brought down via wheelchair by ACT. The patient and family denies any concerns or questions.

## 2023-05-27 NOTE — PROGRESS NOTES
Inpatient Anticoagulation Service Note for 5/26/2023      Reason for Anticoagulation: Atrial Fibrillation   [id number] VASc Score: 6  HAS-BLED Score: 2    Hemoglobin Value: (!) 13  Hematocrit Value: (!) 40.1  Lab Platelet Value: 220  Target INR: 2.0 to 3.0    INR from last 7 days       Date/Time INR Value    05/26/23 0317 2.04    05/25/23 0249 2.49    05/24/23 0134 3.05    05/23/23 0043 3.11    05/21/23 0213 2.65          Dose from last 7 days       Date/Time Dose (mg)    05/26/23 1732 5    05/25/23 1336 2.5    05/23/23 1627 2.5    05/22/23 1315 2.5    05/21/23 1246 5    05/20/23 1100 5          Average Dose (mg):  (Home Dose: 2.5 mg MWF + 5 mg AOD per [facility])  Significant Interactions: Antiplatelet Medications  Bridge Therapy: No     Reversal Agent Administered: Not Applicable  Comments:   - Patient with heart failure exacerbation (increases INR) with recent supratherapetuic INR of 3.11 (5/23) and hematuria (5/24). Warfarin and clopidogrel were held 5/24.   - 5/25: hematuria has resolved and INR is therapeutic at 2.49, per MD may resume today.   - 5/26: No signs of bleeding since restarting warfarin, INR therapeutic but on the low end as expected from the recent held dose. Will continue with reduced home regimen of 5 mg M/W/F and 2.5 mg T/Th/Sat/Sun. Given high [id number] score may consider bridging if INR continues to decrease and becomes subtherapeutic.    Plan:  Warfarin 5 mg M/W/F and 2.5 mg T/Th/Sat/Sun.     Education Material Provided?: No    Pharmacist suggested discharge dosing: Can likely resume home warfarin regimen of 2.5 mg every M/W/F and 5 mg all other days once patient has returned to his baseline clinical status.  Would recommend follow up INR within 2 days of discharge      Cammy Harris, LeslieD

## 2023-05-30 NOTE — PROGRESS NOTES
Chief Complaint   Patient presents with    CHF (Chronic)     F/V Dx: Chronic heart failure with preserved ejection fraction (HCC)    Coronary Artery Disease     F/V Dx: Coronary artery disease involving native coronary artery of native heart without angina pectoris         Subjective     Ken De León is a 77 y.o. male who presents today for follow-up evaluation of CAD, NSTEMI, RCA stent, chronic fibrillation, chronic anticoagulation, hypertension and hyperlipidemia.    The patient has a past medical history of CAD, PCI mid RCA (4.5 x 18 mm BMS, 4.0 x 38 mm SONYA) 8/15/2018, chronic atrial fibrillation, anticoagulation with warfarin, HFpEF, moderate mitral regurgitation, pulmonary hypertension, dyslipidemia, hypertension, diabetes mellitus, COPD, CKD    Since 8/12/2022 appointment the patient was recently hospitalized at [facility] 5/15/2023-5/26/2023 for acute on chronic CKD with resultant volume overload with evidence of bladder outlet obstruction improved with catheterization and diuretic therapy.  Was switched from diltiazem to metoprolol but has continued taking diltiazem.  Atorvastatin previously stopped due to GI symptoms on 80 mg daily.  Echocardiogram showed LVEF 65% with normal RV function.  Seen by urology Osbaldo Rojas PA-C urology and Ziyad Carr MD nephrology.  Has indwelling catheter.  Has follow-up labs and urologic evaluation with imaging next week.  Currently feeling very weak.  Having a lot of discomfort related to indwelling catheter.  No shortness of breath.    Past Medical History:   Diagnosis Date    Abnormal electrocardiogram 08/13/2010    Arrhythmia     Atrial Fibrillation; Cardiologist, Dr. Sorensen    Arthritis     knees, left hip    ASTHMA     inhalers    Atrial fibrillation (HCC) 10/25/2011    Bronchospasm 08/06/2009    Cancer (HCC)     skin cancer R post ear, removed    Cataract     IOL OU    Dental disorder     a few missing molars    Diabetes (HCC)     insulin    Heart valve  disease     mitral regurg    Hemorrhagic disorder (HCC)     on blood thinners    High cholesterol     HTN (hypertension) 10/25/2011    Hypercholesteremia 10/25/2011    Long term (current) use of anticoagulants 10/25/2011    NASAL POLYPS 2009    Other nonspecific abnormal finding 2009    Pain     left hip    Pneumonia     Pulmonary hypertension (HCC)     Renal disorder     CKD stage 4    Shortness of breath     Sleep apnea 2009    No O2 or device, no retest    Wears glasses      Past Surgical History:   Procedure Laterality Date    PB ADJ TISS XFER HEAD,FAC,HAND 10.1-30 N/A 2022    Procedure: LOCAL FLAP RECONSTRUCTION WITH REMOVAL OF DENUDED AND DRIED BONE OF THE FOREHEAD;  Surgeon: Buzz Mcnamara M.D.;  Location: SURGERY [facility];  Service: Ent    KNEE ARTHROPLASTY TOTAL Left 10/2014    HIP ARTHROPLASTY TOTAL  2010    LEFT Performed by OSGOOD, PATRICK J at SURGERY [facility] ORS    LUMBAR DECOMPRESSION  1984    CATARACT EXTRACTION WITH IOL Bilateral     CYSTOSCOPY      cannot remember exact procedure    LAMINOTOMY      SINUSCOPE      SINUSOTOMIES  ,,2007     Family History   Problem Relation Age of Onset    Heart Disease Mother      Social History     Socioeconomic History    Marital status:      Spouse name: Not on file    Number of children: Not on file    Years of education: Not on file    Highest education level: Not on file   Occupational History    Not on file   Tobacco Use    Smoking status: Former     Packs/day: 0.50     Years: 10.00     Pack years: 5.00     Types: Cigarettes     Quit date: 12/10/1968     Years since quittin.5    Smokeless tobacco: Never   Vaping Use    Vaping Use: Never used   Substance and Sexual Activity    Alcohol use: No    Drug use: No    Sexual activity: Not on file   Other Topics Concern    Not on file   Social History Narrative    Not on file     Social Determinants of Health     Financial Resource Strain: Not on file    Food Insecurity: Not on file   Transportation Needs: Not on file   Physical Activity: Not on file   Stress: Not on file   Social Connections: Not on file   Intimate Partner Violence: Not on file   Housing Stability: Not on file     Allergies   Allergen Reactions    Atenolol Shortness of Breath and Unspecified     Shortness of breath, weakness    Tetanus Toxoid Swelling    Atorvastatin Nausea     In large doses     Outpatient Encounter Medications as of 5/30/2023   Medication Sig Dispense Refill    furosemide (LASIX) 40 MG Tab Take 1 Tablet by mouth every day. 30 Tablet 0    finasteride (PROSCAR) 5 MG Tab Take 1 Tablet by mouth every day. 30 Tablet 0    oxybutynin (DITROPAN) 5 MG Tab Take 1 Tablet by mouth 3 times a day as needed (spasm). 90 Tablet 0    tamsulosin (FLOMAX) 0.4 MG capsule Take 1 Capsule by mouth 1/2 hour after breakfast. 30 Capsule 0    warfarin (COUMADIN) 5 MG Tab 5 mg (1 tablet) on Mon/Wed/Friday and 2.5 mg (1/2 tablet) on Tues/Thurs/Sat/Sun (Patient taking differently: 2.5 mg (1/2 tablet) on Mon/Wed/Friday and 5 mg (1 tablet) on Tues/Thurs/Sat/Sun) 30 Tablet 3    dilTIAZem (CARDIZEM) 60 MG Tab TAKE 1 TABLET BY MOUTH THREE TIMES DAILY 90 Tablet 0    insulin detemir (LEVEMIR FLEXTOUCH) 100 UNIT/ML injection PEN Inject 10 Units under the skin every evening.      insulin aspart (NOVOLOG) 100 UNIT/ML Solution Inject 5-7 Units under the skin 2 times a day with meals. Sliding Scale   201 - 250 = 5 units  251 - 300 = 7 units      budesonide-formoterol (SYMBICORT) 160-4.5 MCG/ACT Aerosol Inhale 2 Puffs 2 times a day. Use spacer. Rinse mouth after each use. 1 Each 5    clopidogrel (PLAVIX) 75 MG Tab Take 1 tablet by mouth once daily 90 Tablet 3    multivitamin (THERAGRAN) Tab Take 1 Tablet by mouth every day.      albuterol (PROVENTIL) 2.5mg/3ml Nebu Soln solution for nebulization Take 3 mL by nebulization every four hours as needed for Shortness of Breath. 120 Bullet 2    guaiFENesin ER (MUCINEX) 600 MG  TABLET SR 12 HR Take 1 Tablet by mouth every 12 hours. (Patient not taking: Reported on 5/30/2023) 28 Tablet 0    metoprolol tartrate (LOPRESSOR) 50 MG Tab Take 1 Tablet by mouth 2 times a day. (Patient not taking: Reported on 5/30/2023) 60 Tablet 0    senna-docusate (PERICOLACE OR SENOKOT S) 8.6-50 MG Tab Take 2 Tablets by mouth 2 times a day. (Patient not taking: Reported on 5/30/2023) 30 Tablet 0    polyethylene glycol/lytes (MIRALAX) 17 g Pack Take 1 Packet by mouth 1 time a day as needed (if sennosides and docusate ineffective after 24 hours) for up to 15 days. (Patient not taking: Reported on 5/30/2023) 15 Each 3    traMADol (ULTRAM) 50 MG Tab Take 1 Tablet by mouth every 12 hours as needed for Moderate Pain for up to 5 days. (Patient not taking: Reported on 5/30/2023) 10 Tablet 0     No facility-administered encounter medications on file as of 5/30/2023.     Review of Systems   Respiratory:  Negative for cough and shortness of breath.    Cardiovascular:  Negative for chest pain and palpitations.   Musculoskeletal:  Positive for joint pain. Negative for myalgias.   Neurological:  Negative for dizziness and loss of consciousness.              Objective     /64 (BP Location: Left arm, Patient Position: Sitting, BP Cuff Size: Adult)   Pulse 84   Resp 16   Ht 1.829 m (6')   Wt 103 kg (226 lb)   SpO2 95%   BMI 30.65 kg/m²   BP Readings from Last 5 Encounters:   05/30/23 118/64   05/26/23 135/84   04/25/23 (!) 136/94   04/19/23 (!) 152/88   03/14/23 138/76      Pulse Readings from Last 5 Encounters:   05/30/23 84   05/26/23 77   04/25/23 (!) 109   04/19/23 99   03/14/23 (!) 128      Wt Readings from Last 5 Encounters:   05/30/23 103 kg (226 lb)   05/25/23 101 kg (222 lb 3.6 oz)   04/19/23 104 kg (230 lb)   02/21/23 106 kg (233 lb)   11/15/22 99.1 kg (218 lb 6.4 oz)       Physical Exam  Constitutional:       Appearance: He is well-developed.      Comments: Frail.   Eyes:      Pupils: Pupils are equal,  round, and reactive to light.   Neck:      Vascular: No JVD.      Comments: JVP appears normal.  Cardiovascular:      Rate and Rhythm: Normal rate. Rhythm irregular.      Heart sounds: Murmur heard.   Pulmonary:      Effort: Pulmonary effort is normal. No respiratory distress.      Breath sounds: Normal breath sounds. No wheezing or rales.   Abdominal:      Comments: Obese.   Musculoskeletal:      Right lower leg: No edema.      Left lower leg: No edema.      Comments: Hardened chronic dermatologic changes   Skin:     General: Skin is warm and dry.   Neurological:      Mental Status: He is alert and oriented to person, place, and time.   Psychiatric:         Behavior: Behavior normal.            CARDIAC CATHETERIZATION 08/15/2018  A.  Left heart catheterization.  B.  Left ventriculography.  C.  Selective coronary angiography.  D.  Coronary stent implantation of the mid right coronary artery with   overlapping stents: 4.5x18 mm Ultra non-drug eluting stent and 4.0x38 mm Xience   Gianna drug-eluting stent   E.  Right radial artery approach.   PREOPERATIVE DIAGNOSES:  1.  Unstable angina pectoris.  2.  Abnormal myocardial perfusion scan with inferior perfusion abnormality.  3.  Chronic atrial fibrillation.  4.  Chronic anticoagulation with warfarin.  5.  Diabetes mellitus.   POSTOPERATIVE DIAGNOSES:  1.  Coronary artery disease, 3-vessel, involving the dominant mid right coronary artery, distal circumflex artery and diagonal branch ostium.  2.  Left ventricular ejection fraction 60%.     ECHOCARDIOGRAM 3/23/2018  No prior study is available for comparison.   Mild concentric left ventricular hypertrophy.  Normal left ventricular systolic function.  Left ventricular ejection fraction is visually estimated to be 60%.  Diastolic function is difficult to assess with atrial fibrillation.  Severely dilated left atrium.  Estimated right ventricular systolic pressure is 37 mmHg + JVP.    ECHOCARDIOGRAM 7/23/2021  Left  ventricular ejection fraction is visually estimated to be 65%.  Flattening of the interventricular septum.  Moderately dilated right ventricle.  Moderate mitral regurgitation.  Moderate tricuspid regurgitation.  Estimated right ventricular systolic pressure  is 55 mmHg.    ECHOCARDIOGRAM 5/90/2023  Compared to the prior study on 07/23/21, there is mild increase in   estimated RVSP with ongoing evidence of pulmonary hypertension.  Normal LV size, thickness with preserved LV systolic function, normal   estimated LVEF 60%.  Dilated RV with preserved systolic function  Biatrial enlargement  Aortic valve sclerosis without stenosis  Moderate tricuspid valve regurgitation  Mild mitral valve regurgitation  Mild aortic valve insufficiency  Moderate pulmonary hypertension with estimated RVSP 60-65 mmHg    Assessment & Plan     1. Atrial fibrillation, chronic (HCC)        2. Coronary artery disease, occlusive        3. Dyslipidemia        4. Hypercoagulable state ([facility])        5. Anticoagulated        6. Pulmonary hypertension ([facility])            Medical Decision Making: Today's Assessment/Status/Plan:   Assessment  HFpEF; predominant right heart failure.  Pulmonary hypertension  Mitral regurgitation, mild to moderate  CAD/PCI-RCA stent 8/15/2018  Atrial fibrillation, chronic  Anticoagulation, on warfarin.  Hypertension.    Dyslipidemia.    Diabetes mellitus  Lower extremity edema with right calf ulcer.  Cellulitis.  Right knee.  6/2019.  CKD   Nonhealing forehead wound, posttraumatic.    Recommendation Discussion  HFpEF, clinically euvolemic, continue furosemide, metoprolol; lisinopril, empagliflozin and spironolactone precluded due to GFR 27, reviewed the most recent echocardiogram images.  CAD/PCI, clinically stable, no ischemic symptoms, continue clopidogrel, diltiazem; high-dose atorvastatin DC'd due to GI symptoms, will consider restarting lower dose or change in statin.  Atrial fibrillation: Rate controlled, continue  warfarin, diltiazem.  Mitral regurgitation: Continue clinical monitoring, follow-up echocardiograms.  Pulmonary hypertension, probably multifactorial, no obvious evidence of right heart failure at this time.  Hypertension, BP normal, at goal, continue diltiazem  Dyslipidemia, see above under CAD  Cleared from a cardiac standpoint for future urologic procedure at a moderate perioperative risk for cardiac events, can stop anticoagulation for needed period of time will coordinate with anticoagulation clinic.  RTC 3 months.

## 2023-06-13 PROBLEM — J96.11 CHRONIC RESPIRATORY FAILURE WITH HYPOXIA (HCC): Status: ACTIVE | Noted: 2023-01-01

## 2023-06-13 NOTE — PROGRESS NOTES
"Chief Complaint   Patient presents with    COPD       HPI:  Yuri De León is a 78 y.o. year old male here today for follow-up on Asthma.  Last seen 11/10/2022 by me. Past medical history significant for hypertension, dyslipidemia ,type 2 Dm with glyco of 8.2, pulmonary hypertension, moderate MR, CAD status post stent, CKD, atrial fibrillation, secondary hypercoagulable state .   Patient presenting for hospital follow-up.  He presented on 5/15/2023 for shortness of breath x2 weeks with chills and productive cough.  Patient also had peripheral edema with 30 pound weight gain in 2 weeks.  He had increased work of breathing in triage.   Per hospitalist note, \"Echocardiogram was obtained, also noted to have pulmonary hypertension, EF 60%, patient could not recently see during the stay in the hospital which has been held as of 5/23.     Bladder scan and renal ultrasound was obtained, noted to have severe bilateral hydronephrosis, Gonzalez was placed.  Nephrology consulted, continue to follow.     Patient was noted to have hematuria on 5/23, Coumadin has been held.  Plavix has been held from 5/24, urology contacted, patient was started on continuous bladder irrigation.  Patient urine is now clear.  Coumadin and Plavix has been started.  Patient will follow-up with urology as an outpatient for possible cystoscopy     Nephrology continue to follow the patient, patient renal function is noted to be improving today creatinine at 2.4, nephrology started the patient on 40 mg of Lasix.  Nephrology will follow the patient as an outpatient.\"    During hospital course, patient was requiring supplemental oxygen at 2 L/min continuously.  He was ultimately discharged with oxygen.  He does have a home oxygen concentrator and portable oxygen tanks, but they are too cumbersome.  He has difficulty maneuvering them along with his cane that he uses for ambulation.    In regards to asthma, patient continues to use Symbicort twice daily " along with albuterol as needed.  He states since hospital discharge, he has not needed to use the albuterol.  He was discharged with a catheter in place and has consulted urology.  Catheter movable was trialed, but patient had urinary retention and catheter was replaced.  He presents today with leg bag.  Urine is clear and yellow and not foul-smelling according to patient.  He continues to take Lasix at 80 mg.  He also takes finasteride.  His only current symptom is shortness of breath and dyspnea with exertion.  He uses oxygen at 2 L/min.  States that he is lost 30 pounds since hospital discharge.          ROS: As per HPI and otherwise negative if not stated.    Past Medical History:   Diagnosis Date    Abnormal electrocardiogram 08/13/2010    Arrhythmia     Atrial Fibrillation; Cardiologist, Dr. Sorensen    Arthritis     knees, left hip    ASTHMA     inhalers    Atrial fibrillation (HCC) 10/25/2011    Bronchospasm 08/06/2009    Cancer (HCC)     skin cancer R post ear, removed    Cataract     IOL OU    Dental disorder     a few missing molars    Diabetes (HCC)     insulin    Heart valve disease     mitral regurg    Hemorrhagic disorder (HCC)     on blood thinners    High cholesterol     HTN (hypertension) 10/25/2011    Hypercholesteremia 10/25/2011    Long term (current) use of anticoagulants 10/25/2011    NASAL POLYPS 08/06/2009    Other nonspecific abnormal finding 08/06/2009    Pain     left hip    Pneumonia     Pulmonary hypertension (HCC)     Renal disorder     CKD stage 4    Shortness of breath     Sleep apnea 08/06/2009    No O2 or device, no retest    Wears glasses        Past Surgical History:   Procedure Laterality Date    PB ADJ TISS XFER HEAD,FAC,HAND 10.1-30 N/A 9/7/2022    Procedure: LOCAL FLAP RECONSTRUCTION WITH REMOVAL OF DENUDED AND DRIED BONE OF THE FOREHEAD;  Surgeon: Buzz Mcnamara M.D.;  Location: SURGERY [facility];  Service: Ent    KNEE ARTHROPLASTY TOTAL Left 10/2014    HIP  "ARTHROPLASTY TOTAL  08/31/2010    LEFT Performed by OSGOOD, PATRICK J at SURGERY [facility] ORS    LUMBAR DECOMPRESSION  1984    CATARACT EXTRACTION WITH IOL Bilateral     CYSTOSCOPY      cannot remember exact procedure    LAMINOTOMY      SINUSCOPE      SINUSOTOMIES  2003,2005,2/2007       Family History   Problem Relation Age of Onset    Heart Disease Mother        Allergies as of 06/13/2023 - Reviewed 06/13/2023   Allergen Reaction Noted    Atenolol Shortness of Breath and Unspecified 08/06/2009    Tetanus toxoid Swelling 08/23/2010    Atorvastatin Nausea 09/02/2022        Vitals:  /58 (BP Location: Left arm, Patient Position: Sitting, BP Cuff Size: Adult)   Pulse (!) 114   Resp 16   Ht 1.803 m (5' 11\")   Wt 96.6 kg (213 lb)   SpO2 88%     Current medications as of today   Current Outpatient Medications   Medication Sig Dispense Refill    furosemide (LASIX) 40 MG Tab Take 1 Tablet by mouth every day. 30 Tablet 0    finasteride (PROSCAR) 5 MG Tab Take 1 Tablet by mouth every day. 30 Tablet 0    oxybutynin (DITROPAN) 5 MG Tab Take 1 Tablet by mouth 3 times a day as needed (spasm). 90 Tablet 0    tamsulosin (FLOMAX) 0.4 MG capsule Take 1 Capsule by mouth 1/2 hour after breakfast. 30 Capsule 0    warfarin (COUMADIN) 5 MG Tab 5 mg (1 tablet) on Mon/Wed/Friday and 2.5 mg (1/2 tablet) on Tues/Thurs/Sat/Sun (Patient taking differently: 2.5 mg (1/2 tablet) on Mon/Wed/Friday and 5 mg (1 tablet) on Tues/Thurs/Sat/Sun) 30 Tablet 3    dilTIAZem (CARDIZEM) 60 MG Tab TAKE 1 TABLET BY MOUTH THREE TIMES DAILY 90 Tablet 0    insulin detemir (LEVEMIR FLEXTOUCH) 100 UNIT/ML injection PEN Inject 10 Units under the skin every evening.      insulin aspart (NOVOLOG) 100 UNIT/ML Solution Inject 5-7 Units under the skin 2 times a day with meals. Sliding Scale   201 - 250 = 5 units  251 - 300 = 7 units      budesonide-formoterol (SYMBICORT) 160-4.5 MCG/ACT Aerosol Inhale 2 Puffs 2 times a day. Use spacer. Rinse mouth after " each use. 1 Each 5    clopidogrel (PLAVIX) 75 MG Tab Take 1 tablet by mouth once daily 90 Tablet 3    multivitamin (THERAGRAN) Tab Take 1 Tablet by mouth every day.      albuterol (PROVENTIL) 2.5mg/3ml Nebu Soln solution for nebulization Take 3 mL by nebulization every four hours as needed for Shortness of Breath. 120 Bullet 2    guaiFENesin ER (MUCINEX) 600 MG TABLET SR 12 HR Take 1 Tablet by mouth every 12 hours. (Patient not taking: Reported on 5/30/2023) 28 Tablet 0    metoprolol tartrate (LOPRESSOR) 50 MG Tab Take 1 Tablet by mouth 2 times a day. (Patient not taking: Reported on 5/30/2023) 60 Tablet 0    senna-docusate (PERICOLACE OR SENOKOT S) 8.6-50 MG Tab Take 2 Tablets by mouth 2 times a day. (Patient not taking: Reported on 5/30/2023) 30 Tablet 0     No current facility-administered medications for this visit.         Physical Exam:   Gen:           Alert and oriented, No apparent distress. Mood and affect appropriate, normal interaction with examiner.  Eyes:          PERRL, EOM intact, sclere white, conjunctive moist.  Ears:          Not examined.   Hearing:     Grossly intact.  Nose:          Normal, no lesions or deformities.  Dentition:    Good dentition.  Oropharynx:   Tongue normal, posterior pharynx without erythema or exudate.  Neck:        Supple, trachea midline, no masses.  Respiratory Effort: No intercostal retractions or use of accessory muscles.   Lung Auscultation:      Clear to auscultation bilaterally; no rales, rhonchi or wheezing.  CV:            Regular rate and rhythm. No murmurs, rubs or gallops.  Abd:           Not examined.   Lymphadenopathy: Not examined.  Gait and Station: Normal.  Digits and Nails: No clubbing, cyanosis, petechiae, or nodes.   Cranial Nerves: II-XII grossly intact.  Skin:        No rashes, lesions or ulcers noted.               Ext:           No cyanosis but trace edema noted.      Assessment:  1. Mild persistent asthma without complication  Multiple Oximetry     Multiple Oximetry      2. Pulmonary hypertension (HCC)  Multiple Oximetry    Multiple Oximetry      3. Chronic respiratory failure with hypoxia (HCC)  DME O2 New Set Up          Plan:  Continue Symbicort and albuterol as needed.  Patient denies any symptoms at this time other than shortness of breath.  Shortness of breath is likely associated with pulmonary hypertension or fluid volume overload.  Patient is to continue following up with cardiology and nephrology.  Patient will follow-up in pulmonary clinic in 3 months.  We will reassess oxygen needs at that time.  Today ambulatory multiple oximetry done in clinic showed that patient required 2 L/min of supplemental oxygen continuously.  Patient does request order for portable oxygen concentrator that is battery-operated due to difficulty maneuvering along with his cane.  At 3-month follow-up, will consider repeat echocardiogram.    Please note that this dictation was created using voice recognition software. I have made every reasonable attempt to correct obvious errors, but it is possible there are errors of grammar and possibly content that I did not discover before finalizing the note.

## 2023-06-13 NOTE — PROCEDURES
Multi-Ox Readings  Multi Ox #1 Room air   O2 sat % at rest 88   O2 sat % on exertion     O2 sat average on exertion     Multi Ox #2 2 LPM   O2 sat % at rest 96   O2 sat % on exertion 92   O2 sat average on exertion       Oxygen Use 1.5   Oxygen Frequency With exertion and nocturnal   Duration of need     Is the patient mobile within the home?     CPAP Use?     BIPAP Use?     Servo Titration

## 2023-06-19 NOTE — TELEPHONE ENCOUNTER
Is the patient due for a refill? Yes    Was the patient seen the past year? Yes    Date of last office visit: 5/30/2023    Does the patient have an upcoming appointment?  Yes   If yes, When? 9/6/2023    Provider to refill:PIPO    Does the patients insurance require a 100 day supply?  No

## 2023-07-14 NOTE — PROGRESS NOTES
Referral placed for ongoing anticoagulation monitoring.  Amanda CUELLAR  [facility]

## 2023-07-18 NOTE — PROGRESS NOTES
This evaluation was conducted via Telemed using secure and encrypted videoconferencing technology. The patient was physically located at [facility] in [place], [place]. The patient was presented by a medical professional at the originating site.   The patient's identity was confirmed and verbal consent was obtained for this telemedicine encounter.      OP Anticoagulation Service Note    Date: 2023  Blood Pressure : 114/60  Pulse: 96  Respiration: 12    Anticoagulation Summary  As of 2023      INR goal:  2.0-3.0   TTR:  68.8 % (7.8 y)   INR used for dosin.10 (2023)   Warfarin maintenance plan:  2.5 mg (5 mg x 0.5) every Mon, Wed, Fri; 5 mg (5 mg x 1) all other days   Weekly warfarin total:  27.5 mg   Plan last modified:  MARIAA Machado (2023)   Next INR check:  2023   Target end date:  Indefinite    Indications    Atrial fibrillation with RVR (HCC) [I48.91]  Long term current use of anticoagulant therapy (Resolved) [Z79.01]  Atrial fibrillation (HCC) (Resolved) [I48.91]  Hypercoagulable state (HCC) [D68.59]                 Anticoagulation Episode Summary       INR check location:      Preferred lab:      Send INR reminders to:      Comments:            Anticoagulation Care Providers       Provider Role Specialty Phone number    Sanjay Sorensen M.D. Referring Cardiovascular Disease (Cardiology) [phone]          Anticoagulation Patient Findings  Patient Findings       Positives:  Signs/symptoms of bleeding (easy bleeding/nose bleeds), Missed doses, Extra doses, Change in medications (started bactrim by urology, has 2 more days (10 day course)), Hospital admission (2023 kidney failure, saw urology recently)    Negatives:  Signs/symptoms of thrombosis, Laboratory test error suspected, Change in health, Change in alcohol use, Change in activity, Upcoming invasive procedure, Emergency department visit, Upcoming dental procedure, Change in diet/appetite, Bruising,  Other complaints            THIS VISIT CONDUCTED WITH PRESENTER VIA TELEMEDICINE UTILIZING SECURE AND ENCRYPTED VIDEOCONFERENCING EQUIPMENT  ROS:    Pulm: Denies SOB, chest pain.    Card: Denies syncope, edema, palpitations.    Extremities: Denies redness, pain.     PE:    Pulm: No SOB, even and unlabored.    Card: Normal rate and rhythm.    Extremities: No redness, or edema.     INR  sub-therapeutic.    Denies signs/symptoms of bleeding and/or thrombosis.    Denies changes to diet or medications.   Follow up appt in 1   weeks     Plan:  continue with current dosing regimen.    Pt is sub therapeutic today, however remains on 2 more days of Bactrim.   Will resume normal dosing and check in 1 week     Medication: Warfarin (Coumadin)        Next Appointment: Tuesday, 7/25/2023 @2:45       Pt on antiplatelet therapy Plavix 75mg for coronary artery occlusion with stent indef per cardiology..    Review all of your home medications and newly ordered medications with your doctor and / or pharmacist. Follow medication instructions as directed by your doctor and / or pharmacist. Please keep your medication list with you and share with your physician. Update the information when medications are discontinued, doses are changed, or new medications (including over-the-counter products) are added; and carry medication information at all times in the event of emergency situations.      The patient is on a high risk medication and is sub- therapeutic. This could lead to clot formation or risk of stroke. Therefore this patient requires close monitoring and follow up.     CHEST guidelines recommend frequent INR monitoring at regular intervals (a few days up to a max of 12 weeks) to ensure they are on the proper dose of warfarin and not having any complications from therapy.  INRs can dramatically change over a short time period due to diet, medications, and medical conditions.   The patient is instructed to go to the ER for falls with  a head injury,  blood in urine or stool or any bleeding that last longer than 20 min.   For questions, please contact Outpatient Anticoagulation Service [phone].     POLO Hughes.

## 2023-07-25 NOTE — PROGRESS NOTES
This evaluation was conducted via Telemed using secure and encrypted videoconferencing technology. The patient was physically located at [facility] in [place], [place]. The patient was presented by a medical professional at the originating site.   The patient's identity was confirmed and verbal consent was obtained for this telemedicine encounter.      OP Anticoagulation Service Note    Date: 2023  Blood Pressure : 114/70  Pulse: 84  Respiration: 14    Anticoagulation Summary  As of 2023      INR goal:  2.0-3.0   TTR:  68.6 % (7.8 y)   INR used for dosin.40 (2023)   Warfarin maintenance plan:  2.5 mg (5 mg x 0.5) every Mon, Wed, Fri; 5 mg (5 mg x 1) all other days   Weekly warfarin total:  27.5 mg   Plan last modified:  AMALIA Hughes (2023)   Next INR check:  2023   Target end date:  Indefinite    Indications    Atrial fibrillation with RVR (HCC) [I48.91]  Long term current use of anticoagulant therapy (Resolved) [Z79.01]  Atrial fibrillation (HCC) (Resolved) [I48.91]  Hypercoagulable state (HCC) [D68.59]                 Anticoagulation Episode Summary       INR check location:      Preferred lab:      Send INR reminders to:      Comments:            Anticoagulation Care Providers       Provider Role Specialty Phone number    Sanjay Sorensen M.D. Referring Cardiovascular Disease (Cardiology) [phone]          Anticoagulation Patient Findings  Patient Findings       Positives:  Change in medications (has completed bactrim)    Negatives:  Signs/symptoms of thrombosis, Signs/symptoms of bleeding, Laboratory test error suspected, Change in health, Change in alcohol use, Change in activity, Upcoming invasive procedure, Emergency department visit, Upcoming dental procedure, Missed doses, Extra doses, Change in diet/appetite, Hospital admission, Bruising, Other complaints            THIS VISIT CONDUCTED WITH PRESENTER VIA TELEMEDICINE UTILIZING SECURE AND ENCRYPTED  VIDEOCONFERENCING EQUIPMENT  ROS:    Pulm: Denies SOB, chest pain.    Card: Denies syncope, edema, palpitations.    Extremities: Denies redness, pain.     PE:    Pulm: No SOB, even and unlabored.    Card: Normal rate and rhythm.    Extremities: No redness, or edema.     INR  sub-therapeutic.    Denies signs/symptoms of bleeding and/or thrombosis.    Denies changes to diet or medications.   Follow up appt in 1 weeks     Plan:  take 2 full tablets tonight, then follow normal dosing regimen    Medication: Warfarin (Coumadin)        Next Appointment: Tuesday, 8/1/2023 @2:15pm       Pt on antiplatelet therapy Plavix 75mg for coronary artery occlusion with stent indef per cardiology..    Review all of your home medications and newly ordered medications with your doctor and / or pharmacist. Follow medication instructions as directed by your doctor and / or pharmacist. Please keep your medication list with you and share with your physician. Update the information when medications are discontinued, doses are changed, or new medications (including over-the-counter products) are added; and carry medication information at all times in the event of emergency situations.      The patient is on a high risk medication and is sub- therapeutic. This could lead to clot formation or risk of stroke. Therefore this patient requires close monitoring and follow up.     CHEST guidelines recommend frequent INR monitoring at regular intervals (a few days up to a max of 12 weeks) to ensure they are on the proper dose of warfarin and not having any complications from therapy.  INRs can dramatically change over a short time period due to diet, medications, and medical conditions.   The patient is instructed to go to the ER for falls with a head injury,  blood in urine or stool or any bleeding that last longer than 20 min.   For questions, please contact Outpatient Anticoagulation Service [phone].     POLO Hughes.

## 2023-07-31 NOTE — TELEPHONE ENCOUNTER
Have we ever prescribed this med? Yes.  If yes, what date? 5/10/2023    Last OV: 6/13/2023 KASSIDY Nogueira APRN     Next OV: 9/14/2023 KASSIDY Nogueira APRN     DX: Chronic obstructive pulmonary disease, unspecified COPD type (HCC) [J44.9]    Medications: SYMBICORT 160-4.5 MCG/ACT Aerosol

## 2023-07-31 NOTE — PROGRESS NOTES
Order placed for ongoing anticoagulation monitoring.  Amanda CUELLAR  [facility]

## 2023-08-01 NOTE — TELEPHONE ENCOUNTER
Last OV: 5/30/2023  Proposed Surgery: Transurethral Resection of Prostate  Surgery Date: 8/14/2023  Requesting Office Name: [facility] [address]   Fax Number: [phone]  Preference of Location (default is surgery center unless specified by Cardiologist or PEDRO PABLO)  Prior Clearance Addressed: Yes        Anticoags/Antiplatelets: Warfarin and Clopidogrel   Outstanding Cardiac Imaging : No  Stent, Cardiac Devices, or Catheterization: No  Ablation, TAVR/Valve (including open heart), Cardioversion: No  Recent Cardiac Hospitalization: No            When: N/A  History (cardiac history):   Past Medical History:   Diagnosis Date    Abnormal electrocardiogram 08/13/2010    Arrhythmia     Atrial Fibrillation; Cardiologist, Dr. Sorensen    Arthritis     knees, left hip    ASTHMA     inhalers    Atrial fibrillation (HCC) 10/25/2011    Bronchospasm 08/06/2009    Cancer (HCC)     skin cancer R post ear, removed    Cataract     IOL OU    Dental disorder     a few missing molars    Diabetes (HCC)     insulin    Heart valve disease     mitral regurg    Hemorrhagic disorder (HCC)     on blood thinners    High cholesterol     HTN (hypertension) 10/25/2011    Hypercholesteremia 10/25/2011    Long term (current) use of anticoagulants 10/25/2011    NASAL POLYPS 08/06/2009    Other nonspecific abnormal finding 08/06/2009    Pain     left hip    Pneumonia     Pulmonary hypertension (HCC)     Renal disorder     CKD stage 4    Shortness of breath     Sleep apnea 08/06/2009    No O2 or device, no retest    Wears glasses              Surgical Clearance Letter Sent: YES   **Scan clearance request letter into [facility].**

## 2023-08-01 NOTE — LETTER
PROCEDURE/SURGERY CLEARANCE FORM    Date: 8/1/2023   Patient Name: Yuri De León  Dear Surgeon or Proceduralist,      Thank you for your request for cardiac stratification of our mutual patient Yuri De León 1945. We have reviewed their [facility] records; and to the best of our understanding this patient has not had stenting, ablation, cardiothoracic surgery or hospitalization for cardiovascular reasons in the past 6 months.  Yuri De León has been seen within the past 18 months and is considered to have non-modifiable cardiac risk for this low-risk procedure/surgery. They may proceed from a cardiovascular standpoint and may hold their antiplatelet/anticoagulation as briefly as possible. Please have patient resume this medication when hemodynamically stable to do so.     Aspirin or Prasugrel   - hold 7 days prior to procedure/surgery, resume when hemodynamically stable      Clopidrogrel or Ticagrelor  - hold 7 days for all neurological procedures, hold 5 days prior to all other procedure/surgery,  resume when hemodynamically stable     Warfarin - hold 7 days for all neurological procedures, hold 5 days prior to all other procedure/surgery and coordinate with [facility] Anticoagulation Clinic ([phone]) INR testing and dose management.      Pradaxa/Xarelto/Eliquis/Savesya - hold 1 day prior to procedure for low bleeding risk procedure, 2 days for high bleeding risk procedure, or consider holding 3 days or longer for patients with reduced kidney function (CrCl <30mL/min) or spinal/cranial surgeries/procedures.      If they have a mechanical heart valve, please coordinate with [facility] Anticoagulation Service ([phone]) the proper management of their anticoagulant in the periprocedural or perioperative period.      Some patients have higher risk for cardiovascular complications or holding medication. If our patient has had prior complications of holding antiplatelet or anticoagulants in the past  "and we have seen them after these events, we have addressed these concerns with the patient. They are at an unknown degree of increased risk for recurrent complication.  You may hold anticoagulation/antiplatelets for the procedure or surgery if the benefits of the procedure or surgery outweigh this nonmodifiable risk.   If Yuri De León 1945 has new symptoms of heart failure decompensation, unstable arrythmia, or angina please reach out and we will assess the patient.   If you have other patient-specific concerns, please feel free to reach out to the patient's cardiologist directly at [phone].    \"Cleared from a cardiac standpoint for future urologic procedure at a moderate perioperative risk for cardiac events, can stop anticoagulation for needed period of time will coordinate with anticoagulation clinic.\"-Sanjay Sorensen M.D.    Thank you,       [facility]    "

## 2023-08-01 NOTE — LETTER
PROCEDURE/SURGERY CLEARANCE FORM    Date: 8/3/2023   Patient Name: Yuri De León    Dear Surgeon or Proceduralist,      Thank you for your request for cardiac stratification of our mutual patient Yuri De León 1945. We have reviewed their [facility] records; and to the best of our understanding this patient has not had stenting, ablation, cardiothoracic surgery or hospitalization for cardiovascular reasons in the past 6 months.  Yuri De León has been seen within the past 18 months and is considered to have non-modifiable cardiac risk for this low-risk procedure/surgery. They may proceed from a cardiovascular standpoint and may hold their antiplatelet/anticoagulation as briefly as possible. Please have patient resume this medication when hemodynamically stable to do so.     Aspirin or Prasugrel   - hold 7 days prior to procedure/surgery, resume when hemodynamically stable      Clopidrogrel or Ticagrelor  - hold 7 days for all neurological procedures, hold 5 days prior to all other procedure/surgery,  resume when hemodynamically stable     Warfarin - hold 7 days for all neurological procedures, hold 5 days prior to all other procedure/surgery and coordinate with [facility] Anticoagulation Clinic ([phone]) INR testing and dose management.      Pradaxa/Xarelto/Eliquis/Savesya - hold 1 day prior to procedure for low bleeding risk procedure, 2 days for high bleeding risk procedure, or consider holding 3 days or longer for patients with reduced kidney function (CrCl <30mL/min) or spinal/cranial surgeries/procedures.      If they have a mechanical heart valve, please coordinate with [facility] Anticoagulation Service ([phone]) the proper management of their anticoagulant in the periprocedural or perioperative period.      Some patients have higher risk for cardiovascular complications or holding medication. If our patient has had prior complications of holding antiplatelet or anticoagulants in the past  "and we have seen them after these events, we have addressed these concerns with the patient. They are at an unknown degree of increased risk for recurrent complication.  You may hold anticoagulation/antiplatelets for the procedure or surgery if the benefits of the procedure or surgery outweigh this nonmodifiable risk.   If Yuri De León 1945 has new symptoms of heart failure decompensation, unstable arrythmia, or angina please reach out and we will assess the patient.   If you have other patient-specific concerns, please feel free to reach out to the patient's cardiologist directly at [phone].    \"Cleared from a cardiac standpoint for future urologic procedure at a moderate perioperative risk for cardiac events, can stop anticoagulation for needed period of time will coordinate with anticoagulation clinic.\"-Sanjay Sorensen M.D.  Thank you,       [facility]    "

## 2023-08-02 NOTE — TELEPHONE ENCOUNTER
PIPO    Caller: [facility]    Topic/issue: They state they have no received the clearance yet and would like it faxed to the same number of: [phone]    Callback Number: [phone]    Thank you,  -Brook KRISHNA

## 2023-08-02 NOTE — TELEPHONE ENCOUNTER
Called pt regarding missed anticoag appt - r/s for 8/8 in telemed clinic.    Also placed INR SO for pt to obtain tomorrow.    Vaibhav King, PharmD, BCACP

## 2023-08-05 NOTE — PROGRESS NOTES
OP   Telephone Anticoagulation Service Note      Anticoagulation Summary  As of 2023      INR goal:  2.0-3.0   TTR:  68.4 % (7.8 y)   INR used for dosin.83 (2023)   Warfarin maintenance plan:  2.5 mg (5 mg x 0.5) every Mon, Wed, Fri; 5 mg (5 mg x 1) all other days   Weekly warfarin total:  27.5 mg   Plan last modified:  AMALIA Hughes (2023)   Next INR check:  2023   Target end date:  Indefinite    Indications    Atrial fibrillation with RVR (HCC) [I48.91]  Long term current use of anticoagulant therapy (Resolved) [Z79.01]  Atrial fibrillation (HCC) (Resolved) [I48.91]  Hypercoagulable state (HCC) [D68.59]                 Anticoagulation Episode Summary       INR check location:      Preferred lab:      Send INR reminders to:      Comments:            Anticoagulation Care Providers       Provider Role Specialty Phone number    Sanjay Sorensen M.D. Referring Cardiovascular Disease (Cardiology) [phone]          Anticoagulation Patient Findings  Patient Findings       Negatives:  Signs/symptoms of thrombosis, Signs/symptoms of bleeding, Laboratory test error suspected, Change in health, Change in alcohol use, Change in activity, Upcoming invasive procedure, Emergency department visit, Upcoming dental procedure, Missed doses, Extra doses, Change in medications, Change in diet/appetite, Hospital admission, Bruising, Other complaints          Spoke with the patient on the phone today, reporting a SUB-therapeutic INR  of 1.83.   Confirmed the current warfarin dosing regimen and patient compliance.  Patient denies any missed doses.  Patient denies any interval changes to diet and/or medications. Patient denies any signs/symptoms of bleeding or clotting.  Patient's INR trending up and also reports he will be having upcoming procedure so no bolus will be given today.  Patient instructed to continue with his current dosing regimen and to follow up as scheduled on Tues in Telemed  clinic for further dosing instructions.     Kennedi NelsonD

## 2023-08-09 NOTE — PROGRESS NOTES
He has a prostate procedure on 8/14 and was told to hold warfarin for 5 days.     He got a INR today but it has not posted yet. He will continue his normal dose.

## 2023-08-09 NOTE — PROGRESS NOTES
Anticoagulation Summary  As of 2023      INR goal:  2.0-3.0   TTR:  68.3 % (7.8 y)   INR used for dosin.94 (2023)   Warfarin maintenance plan:  2.5 mg (5 mg x 0.5) every Mon, Wed, Fri; 5 mg (5 mg x 1) all other days   Weekly warfarin total:  27.5 mg   Plan last modified:  AMALIA Hughes (2023)   Next INR check:  2023   Target end date:  Indefinite    Indications    Atrial fibrillation with RVR (HCC) [I48.91]  Long term current use of anticoagulant therapy (Resolved) [Z79.01]  Atrial fibrillation (HCC) (Resolved) [I48.91]  Hypercoagulable state (HCC) [D68.59]                 Anticoagulation Episode Summary       INR check location:      Preferred lab:      Send INR reminders to:      Comments:            Anticoagulation Care Providers       Provider Role Specialty Phone number    Sanjay Sorensen M.D. Referring Cardiovascular Disease (Cardiology) [phone]            Refer to Anticoagulation Patient Findings for HPI  Patient Findings       Positives:  Upcoming invasive procedure (TURP on  - instructed to hold warfarin x 5 days prior.)    Negatives:  Signs/symptoms of thrombosis, Signs/symptoms of bleeding, Laboratory test error suspected, Change in health, Change in alcohol use, Change in activity, Emergency department visit, Upcoming dental procedure, Missed doses, Extra doses, Change in medications, Change in diet/appetite, Hospital admission, Bruising, Other complaints            Spoke with pt.  INR is SUB therapeutic.     Pt verifies warfarin weekly dosing.     Will have pt HOLD x 5 days prior to his procedure. His CHADSVASc is < 7 & no hx of stroke - will forgo bridge therapy.    Instructed pt to resume warfarin 24 hrs post-op w/ two bolus doses (7.5 mg on 8/15 and 5 mg on ).    Repeat INR in 1 week(s).     Vaibhav King, PharmD, BCACP

## 2023-08-22 NOTE — PROGRESS NOTES
This evaluation was conducted via Telemed using secure and encrypted videoconferencing technology. The patient was physically located at [facility] in [place], [place]. The patient was presented by a medical professional at the originating site.   The patient's identity was confirmed and verbal consent was obtained for this telemedicine encounter.      OP Anticoagulation Service Note    Date: 2023  Blood Pressure : 122/70  Pulse: 87  Respiration: 14    Anticoagulation Summary  As of 2023      INR goal:  2.0-3.0   TTR:  67.9 % (7.9 y)   INR used for dosin.20 (2023)   Warfarin maintenance plan:  2.5 mg (5 mg x 0.5) every Mon, Wed, Fri; 5 mg (5 mg x 1) all other days   Weekly warfarin total:  27.5 mg   Plan last modified:  AMALIA Hughes (2023)   Next INR check:  2023   Target end date:  Indefinite    Indications    Atrial fibrillation with RVR (HCC) [I48.91]  Long term current use of anticoagulant therapy (Resolved) [Z79.01]  Atrial fibrillation (HCC) (Resolved) [I48.91]  Hypercoagulable state (HCC) [D68.59]                 Anticoagulation Episode Summary       INR check location:      Preferred lab:      Send INR reminders to:      Comments:            Anticoagulation Care Providers       Provider Role Specialty Phone number    Sanjay Sorensen M.D. Referring Cardiovascular Disease (Cardiology) [phone]          Anticoagulation Patient Findings  Patient Findings       Positives:  Signs/symptoms of bleeding (blood in urine, s/p cystoscopy), Change in diet/appetite (decreased appetite)    Negatives:  Signs/symptoms of thrombosis, Laboratory test error suspected, Change in health, Change in alcohol use, Change in activity, Upcoming invasive procedure, Emergency department visit, Upcoming dental procedure, Missed doses, Extra doses, Change in medications, Hospital admission, Bruising, Other complaints            THIS VISIT CONDUCTED WITH PRESENTER VIA TELEMEDICINE  UTILIZING SECURE AND ENCRYPTED VIDEOCONFERENCING EQUIPMENT  ROS:    Pulm: Denies SOB, chest pain.    Card: Denies syncope, edema, palpitations.    Extremities: Denies redness, pain.     PE:    Pulm: No SOB, even and unlabored.    Card: Normal rate and rhythm.    Extremities: No redness, or edema.     INR  sub-therapeutic.    Denies signs/symptoms of bleeding and/or thrombosis.    Is still having some blood in urine after bladder procedure last week.    Denies changes to diet or medications.   Follow up appt in 1 weeks     Plan:  increase dose tonight to 3 full tablets,   Take 1.5 tablets Wednesday and Thursday night,   Have INR checked on FRIDAY in [place] (as pt will be in [place] for other appts)        Medication: Warfarin (Coumadin)        Next Appointment: 8/25/2023 @1:30 at [facility] in [place]   Tuesday, 8/29/2023 @2:15pm in [facility]      Pt on antiplatelet therapy Plavix 75mg for coronary artery occlusion with stent indef per cardiology..    Review all of your home medications and newly ordered medications with your doctor and / or pharmacist. Follow medication instructions as directed by your doctor and / or pharmacist. Please keep your medication list with you and share with your physician. Update the information when medications are discontinued, doses are changed, or new medications (including over-the-counter products) are added; and carry medication information at all times in the event of emergency situations.      The patient is on a high risk medication and is sub- therapeutic. This could lead to clot formation or risk of stroke. Therefore this patient requires close monitoring and follow up.     CHEST guidelines recommend frequent INR monitoring at regular intervals (a few days up to a max of 12 weeks) to ensure they are on the proper dose of warfarin and not having any complications from therapy.  INRs can dramatically change over a short time period due to diet, medications, and medical conditions.   The  patient is instructed to go to the ER for falls with a head injury,  blood in urine or stool or any bleeding that last longer than 20 min.   For questions, please contact Outpatient Anticoagulation Service [phone].     POLO Hughes.

## 2023-08-22 NOTE — PROGRESS NOTES
Ken De León is a 78 y.o. male here for a non-provider visit for inr check    If abnormal was an in office provider notified today (if so, indicate provider)? Yes    Routed to PCP? Yes

## 2023-08-25 NOTE — PROGRESS NOTES
Anticoagulation Summary  As of 2023      INR goal:  2.0-3.0   TTR:  67.9 % (7.9 y)   INR used for dosin.60 (2023)   Warfarin maintenance plan:  2.5 mg (5 mg x 0.5) every Mon, Wed, Fri; 5 mg (5 mg x 1) all other days   Weekly warfarin total:  27.5 mg   Plan last modified:  AMALIA Hughes (2023)   Next INR check:  2023   Target end date:  Indefinite    Indications    Atrial fibrillation with RVR (HCC) [I48.91]  Long term current use of anticoagulant therapy (Resolved) [Z79.01]  Atrial fibrillation (HCC) (Resolved) [I48.91]  Hypercoagulable state (HCC) [D68.59]                 Anticoagulation Episode Summary       INR check location:      Preferred lab:      Send INR reminders to:      Comments:            Anticoagulation Care Providers       Provider Role Specialty Phone number    Sanjay Sorensen M.D. Referring Cardiovascular Disease (Cardiology) [phone]          Refer to Patient Findings for HPI:  Patient Findings       Negatives:  Signs/symptoms of thrombosis, Signs/symptoms of bleeding, Laboratory test error suspected, Change in health, Change in alcohol use, Change in activity, Upcoming invasive procedure, Emergency department visit, Upcoming dental procedure, Missed doses, Extra doses, Change in medications, Change in diet/appetite, Hospital admission, Bruising, Other complaints            There were no vitals filed for this visit.  pt declined vitals    Verified current warfarin dosing schedule.    Medications reconciled: Yes    Pt on antiplatelet therapy Plavix 75mg for coronary artery occlusion with stent indef per cardiology.      A/P   INR is subtherapeutic    Warfarin dosing recommendation: Unfortunately, patient did not follow previous bolus instructions, he accidentally took a 5 mg yesterday.    Patient is to bolus with 7.5 mg X 2 then pt is to continue with current warfarin dosing regimen.    Pt educated to contact our clinic with any changes in medications  or s/s of bleeding or thrombosis. Pt is aware to seek immediate medical attention for falls, head injury or deep cuts.    Follow up appointment in 4 days via tele-medicine    Eligio Hinds PharmD

## 2023-08-29 NOTE — PROGRESS NOTES
This evaluation was conducted via Telemed using secure and encrypted videoconferencing technology. The patient was physically located at [facility] in [place], [place]. The patient was presented by a medical professional at the originating site.   The patient's identity was confirmed and verbal consent was obtained for this telemedicine encounter.      OP Anticoagulation Service Note    Date: 2023  Blood Pressure : 130/80  Pulse: (!) 111    Anticoagulation Summary  As of 2023      INR goal:  2.0-3.0   TTR:  67.8 % (7.9 y)   INR used for dosin.30 (2023)   Warfarin maintenance plan:  2.5 mg (5 mg x 0.5) every Mon, Wed, Fri; 5 mg (5 mg x 1) all other days   Weekly warfarin total:  27.5 mg   Plan last modified:  AMALIA Hughes (2023)   Next INR check:  2023   Target end date:  Indefinite    Indications    Atrial fibrillation with RVR (HCC) [I48.91]  Long term current use of anticoagulant therapy (Resolved) [Z79.01]  Atrial fibrillation (HCC) (Resolved) [I48.91]  Hypercoagulable state (HCC) [D68.59]                 Anticoagulation Episode Summary       INR check location:      Preferred lab:      Send INR reminders to:      Comments:            Anticoagulation Care Providers       Provider Role Specialty Phone number    Sanjay Sorensen M.D. Referring Cardiovascular Disease (Cardiology) [phone]          Anticoagulation Patient Findings  Patient Findings       Negatives:  Signs/symptoms of thrombosis, Signs/symptoms of bleeding, Laboratory test error suspected, Change in health, Change in alcohol use, Change in activity, Upcoming invasive procedure, Emergency department visit, Upcoming dental procedure, Missed doses, Extra doses, Change in medications, Change in diet/appetite, Hospital admission, Bruising, Other complaints            THIS VISIT CONDUCTED WITH PRESENTER VIA TELEMEDICINE UTILIZING SECURE AND ENCRYPTED VIDEOCONFERENCING EQUIPMENT  ROS:    Pulm: Denies  SOB, chest pain.    Card: Denies syncope, edema, palpitations.    Extremities: Denies redness, pain.     PE:    Pulm: No SOB, even and unlabored.    Card: Normal rate and rhythm.    Extremities: No redness, or edema.     INR  is -therapeutic.    Denies signs/symptoms of bleeding and/or thrombosis.    Denies changes to diet or medications.   Follow up appt in 1 weeks     Plan:  continue with current dosing regimen    Medication: Warfarin (Coumadin)        Next Appointment: Tuesday, 9/5/2023 @3:30       Pt on antiplatelet therapy Plavix 75mg for coronary artery occlusion with stent indef per cardiology..    Review all of your home medications and newly ordered medications with your doctor and / or pharmacist. Follow medication instructions as directed by your doctor and / or pharmacist. Please keep your medication list with you and share with your physician. Update the information when medications are discontinued, doses are changed, or new medications (including over-the-counter products) are added; and carry medication information at all times in the event of emergency situations.       Patient is on a high risk medication and therefore requires close monitoring and follow up.    CHEST guidelines recommend frequent INR monitoring at regular intervals (a few days up to a max of 12 weeks) to ensure they are on the proper dose of warfarin and not having any complications from therapy.  INRs can dramatically change over a short time period due to diet, medications, and medical conditions.   The patient is instructed to go to the ER for falls with a head injury,  blood in urine or stool or any bleeding that last longer than 20 min.   For questions, please contact Outpatient Anticoagulation Service [phone].     POLO Hughes.

## 2023-08-29 NOTE — PROGRESS NOTES
Ken De León is a 78 y.o. male here for a non-provider visit for INR CHECK    If abnormal was an in office provider notified today (if so, indicate provider)? Yes    Routed to PCP? No

## 2023-09-06 NOTE — PROGRESS NOTES
Chief Complaint   Patient presents with    Atrial Fibrillation    Congestive Heart Failure     F/v dx: Chronic heart failure with preserved ejection fraction (HCC)    Coronary Artery Disease     F/v dx: Coronary artery disease involving native coronary artery of native heart without angina pectoris         Subjective     Ken De León is a 78 y.o. male who presents today for follow-up cardiac care    The patient has CAD, PCI mid RCA (4.5 x 18 mm BMS, 4.0 x 38 mm SONYA) 8/15/2018, chronic atrial fibrillation, anticoagulation with warfarin, HFpEF, moderate mitral regurgitation, pulmonary hypertension, dyslipidemia, hypertension, diabetes mellitus, COPD, CKD    Since 5/30/2023 appointment the patient had been doing well until his recent redo TURP in [place] on 8/14/2023 and since that time he has had increasing abdominal girth and increasing shortness of breath with orthopnea.  He is also continue to have balance problems and fell last week having to call EMS to assist him in getting up.    Since 8/12/2022 appointment the patient was recently hospitalized at [facility] 5/15/2023-5/26/2023 for acute on chronic CKD with resultant volume overload with evidence of bladder outlet obstruction improved with catheterization and diuretic therapy.  Was switched from diltiazem to metoprolol but has continued taking diltiazem.  Atorvastatin previously stopped due to GI symptoms on 80 mg daily.  Echocardiogram showed LVEF 65% with normal RV function.  Seen by urology Osbaldo Rojas PA-C urology and Ziyad Carr MD nephrology.  Has indwelling catheter.  Has follow-up labs and urologic evaluation with imaging next week.  Currently feeling very weak.  Having a lot of discomfort related to indwelling catheter.  No shortness of breath.    Past Medical History:   Diagnosis Date    Abnormal electrocardiogram 08/13/2010    Arrhythmia     Atrial Fibrillation; Cardiologist, Dr. Sorensen    Arthritis     knees, left hip    ASTHMA      inhalers    Atrial fibrillation (HCC) 10/25/2011    Bronchospasm 2009    Cancer (HCC)     skin cancer R post ear, removed    Cataract     IOL OU    Dental disorder     a few missing molars    Diabetes (HCC)     insulin    Heart valve disease     mitral regurg    Hemorrhagic disorder (HCC)     on blood thinners    High cholesterol     HTN (hypertension) 10/25/2011    Hypercholesteremia 10/25/2011    Long term (current) use of anticoagulants 10/25/2011    NASAL POLYPS 2009    Other nonspecific abnormal finding 2009    Pain     left hip    Pneumonia     Pulmonary hypertension (HCC)     Renal disorder     CKD stage 4    Shortness of breath     Sleep apnea 2009    No O2 or device, no retest    Wears glasses      Past Surgical History:   Procedure Laterality Date    PB ADJ TISS XFER HEAD,FAC,HAND 10.1-30 N/A 2022    Procedure: LOCAL FLAP RECONSTRUCTION WITH REMOVAL OF DENUDED AND DRIED BONE OF THE FOREHEAD;  Surgeon: Buzz Mcnamara M.D.;  Location: [facility];  Service: Ent    KNEE ARTHROPLASTY TOTAL Left 10/2014    HIP ARTHROPLASTY TOTAL  2010    LEFT Performed by OSGOOD, PATRICK J at SURGERY [facility] ORS    LUMBAR DECOMPRESSION  1984    CATARACT EXTRACTION WITH IOL Bilateral     CYSTOSCOPY      cannot remember exact procedure    LAMINOTOMY      SINUSCOPE      SINUSOTOMIES  ,,2007     Family History   Problem Relation Age of Onset    Heart Disease Mother      Social History     Socioeconomic History    Marital status:      Spouse name: Not on file    Number of children: Not on file    Years of education: Not on file    Highest education level: Not on file   Occupational History    Not on file   Tobacco Use    Smoking status: Former     Current packs/day: 0.00     Average packs/day: 0.5 packs/day for 10.0 years (5.0 ttl pk-yrs)     Types: Cigarettes     Start date: 12/10/1958     Quit date: 12/10/1968     Years since quittin.7    Smokeless tobacco:  Never   Vaping Use    Vaping Use: Never used   Substance and Sexual Activity    Alcohol use: No    Drug use: No    Sexual activity: Not on file   Other Topics Concern    Not on file   Social History Narrative    Not on file     Social Determinants of Health     Financial Resource Strain: Not on file   Food Insecurity: Not on file   Transportation Needs: Not on file   Physical Activity: Not on file   Stress: Not on file   Social Connections: Not on file   Intimate Partner Violence: Not on file   Housing Stability: Not on file     Allergies   Allergen Reactions    Atenolol Shortness of Breath and Unspecified     Shortness of breath, weakness    Tetanus Toxoid Swelling    Atorvastatin Nausea     In large doses     Outpatient Encounter Medications as of 9/6/2023   Medication Sig Dispense Refill    furosemide (LASIX) 40 MG Tab TAKE 2 TABLETS BY MOUTH ONCE DAILY AS NEEDED 180 Tablet 0    SYMBICORT 160-4.5 MCG/ACT Aerosol INHALE 2 PUFFS BY MOUTH TWICE DAILY RINSE MOUTH AFTER USE 11 g 0    dilTIAZem (CARDIZEM) 60 MG Tab TAKE 1 TABLET BY MOUTH THREE TIMES DAILY 270 Tablet 3    finasteride (PROSCAR) 5 MG Tab Take 1 Tablet by mouth every day. 30 Tablet 0    oxybutynin (DITROPAN) 5 MG Tab Take 1 Tablet by mouth 3 times a day as needed (spasm). 90 Tablet 0    tamsulosin (FLOMAX) 0.4 MG capsule Take 1 Capsule by mouth 1/2 hour after breakfast. 30 Capsule 0    warfarin (COUMADIN) 5 MG Tab 5 mg (1 tablet) on Mon/Wed/Friday and 2.5 mg (1/2 tablet) on Tues/Thurs/Sat/Sun (Patient taking differently: 2.5 mg (1/2 tablet) on Mon/Wed/Friday and 5 mg (1 tablet) on Tues/Thurs/Sat/Sun) 30 Tablet 3    insulin detemir (LEVEMIR FLEXTOUCH) 100 UNIT/ML injection PEN Inject 10 Units under the skin every evening.      insulin aspart (NOVOLOG) 100 UNIT/ML Solution Inject 5-7 Units under the skin 2 times a day with meals. Sliding Scale   201 - 250 = 5 units  251 - 300 = 7 units      clopidogrel (PLAVIX) 75 MG Tab Take 1 tablet by mouth once daily 90  "Tablet 3    multivitamin (THERAGRAN) Tab Take 1 Tablet by mouth every day.      albuterol (PROVENTIL) 2.5mg/3ml Nebu Soln solution for nebulization Take 3 mL by nebulization every four hours as needed for Shortness of Breath. 120 Bullet 2    [DISCONTINUED] guaiFENesin ER (MUCINEX) 600 MG TABLET SR 12 HR Take 1 Tablet by mouth every 12 hours. (Patient not taking: Reported on 5/30/2023) 28 Tablet 0    [DISCONTINUED] metoprolol tartrate (LOPRESSOR) 50 MG Tab Take 1 Tablet by mouth 2 times a day. (Patient not taking: Reported on 5/30/2023) 60 Tablet 0    [DISCONTINUED] senna-docusate (PERICOLACE OR SENOKOT S) 8.6-50 MG Tab Take 2 Tablets by mouth 2 times a day. (Patient not taking: Reported on 5/30/2023) 30 Tablet 0     No facility-administered encounter medications on file as of 9/6/2023.     Review of Systems   Respiratory:  Positive for shortness of breath. Negative for cough.    Cardiovascular:  Positive for orthopnea. Negative for chest pain and palpitations.   Musculoskeletal:  Positive for joint pain. Negative for myalgias.   Neurological:  Negative for dizziness and loss of consciousness.              Objective     /72 (BP Location: Left arm, Patient Position: Sitting, BP Cuff Size: Adult)   Pulse 99   Resp 16   Ht 1.803 m (5' 11\")   Wt 112 kg (246 lb)   SpO2 90%   BMI 34.31 kg/m²   BP Readings from Last 5 Encounters:   09/06/23 130/72   08/29/23 130/80   08/22/23 122/70   07/25/23 114/70   07/18/23 114/60      Pulse Readings from Last 5 Encounters:   09/06/23 99   08/29/23 (!) 111   08/22/23 87   07/25/23 84   07/18/23 96      Wt Readings from Last 5 Encounters:   09/06/23 112 kg (246 lb)   06/13/23 96.6 kg (213 lb)   05/30/23 103 kg (226 lb)   05/25/23 101 kg (222 lb 3.6 oz)   04/19/23 104 kg (230 lb)       Physical Exam  Constitutional:       Appearance: He is well-developed.      Comments: Frail.     Eyes:      Pupils: Pupils are equal, round, and reactive to light.   Neck:      Vascular: No JVD. "      Comments: Elevated jugular venous pressure  Cardiovascular:      Rate and Rhythm: Normal rate. Rhythm irregular.      Heart sounds: Murmur heard.   Pulmonary:      Effort: Pulmonary effort is normal. No respiratory distress.      Breath sounds: Normal breath sounds. No wheezing or rales.   Abdominal:      Comments: Markedly obese   Musculoskeletal:      Right lower leg: No edema.      Left lower leg: No edema.      Comments: Hardened chronic dermatologic changes   Skin:     General: Skin is warm and dry.   Neurological:      Mental Status: He is alert and oriented to person, place, and time.   Psychiatric:         Behavior: Behavior normal.            CARDIAC CATHETERIZATION 08/15/2018  A.  Left heart catheterization.  B.  Left ventriculography.  C.  Selective coronary angiography.  D.  Coronary stent implantation of the mid right coronary artery with   overlapping stents: 4.5x18 mm Ultra non-drug eluting stent and 4.0x38 mm Xience   Gianna drug-eluting stent   E.  Right radial artery approach.   PREOPERATIVE DIAGNOSES:  1.  Unstable angina pectoris.  2.  Abnormal myocardial perfusion scan with inferior perfusion abnormality.  3.  Chronic atrial fibrillation.  4.  Chronic anticoagulation with warfarin.  5.  Diabetes mellitus.   POSTOPERATIVE DIAGNOSES:  1.  Coronary artery disease, 3-vessel, involving the dominant mid right coronary artery, distal circumflex artery and diagonal branch ostium.  2.  Left ventricular ejection fraction 60%.     ECHOCARDIOGRAM 3/23/2018  No prior study is available for comparison.   Mild concentric left ventricular hypertrophy.  Normal left ventricular systolic function.  Left ventricular ejection fraction is visually estimated to be 60%.  Diastolic function is difficult to assess with atrial fibrillation.  Severely dilated left atrium.  Estimated right ventricular systolic pressure is 37 mmHg + JVP.    ECHOCARDIOGRAM 7/23/2021  Left ventricular ejection fraction is visually  estimated to be 65%.  Flattening of the interventricular septum.  Moderately dilated right ventricle.  Moderate mitral regurgitation.  Moderate tricuspid regurgitation.  Estimated right ventricular systolic pressure  is 55 mmHg.    ECHOCARDIOGRAM 5/90/2023  Compared to the prior study on 07/23/21, there is mild increase in   estimated RVSP with ongoing evidence of pulmonary hypertension.  Normal LV size, thickness with preserved LV systolic function, normal   estimated LVEF 60%.  Dilated RV with preserved systolic function  Biatrial enlargement  Aortic valve sclerosis without stenosis  Moderate tricuspid valve regurgitation  Mild mitral valve regurgitation  Mild aortic valve insufficiency  Moderate pulmonary hypertension with estimated RVSP 60-65 mmHg    Assessment & Plan     1. Acute on chronic heart failure with normal ejection fraction (HCC)  Basic Metabolic Panel      2. Coronary artery disease, occlusive        3. S/P coronary artery stent placement        4. Atrial fibrillation, chronic ([facility])        5. Hypercoagulable state ([facility])        6. Anticoagulated        7. Dyslipidemia            Medical Decision Making: Today's Assessment/Status/Plan:   Assessment  HFpEF, acute on chronic  Pulmonary hypertension  Mitral regurgitation, mild to moderate  CAD/PCI-RCA stent 8/15/2018  Atrial fibrillation, chronic  Anticoagulation, on warfarin.  Hypertension.    Dyslipidemia.    Diabetes mellitus  Lower extremity edema with right calf ulcer.  Cellulitis.  Right knee.  6/2019.  CKD   Nonhealing forehead wound, posttraumatic.    Recommendation Discussion  HFpEF, currently has decompensated acute on chronic HFpEF with 33 pound weight gain, instructed patient to increase furosemide to 80 mg twice daily, monitor and record daily weights, BMP 5 days, did not tolerate metoprolol  CAD/PCI, clinically stable, no angina pectoris, continue clopidogrel, diltiazem, did not tolerate high-dose atorvastatin or metoprolol  Atrial  fibrillation: Rate controlled, continue warfarin, diltiazem.  Mitral regurgitation: Continue clinical monitoring, follow-up echocardiograms.  Pulmonary hypertension, probably multifactorial.  Hypertension, BP normal, at goal, continue diltiazem  Dyslipidemia, see above under CAD  RTC next week, instructed patient to contact EMS if symptoms of shortness of breath worsen.

## 2023-09-12 PROBLEM — N18.9 ACUTE ON CHRONIC RENAL FAILURE (HCC): Status: RESOLVED | Noted: 2019-06-17 | Resolved: 2023-01-01

## 2023-09-12 PROBLEM — R74.8 ELEVATED LIVER ENZYMES: Status: RESOLVED | Noted: 2019-06-17 | Resolved: 2023-01-01

## 2023-09-12 PROBLEM — N17.9 ACUTE ON CHRONIC RENAL FAILURE (HCC): Status: RESOLVED | Noted: 2019-06-17 | Resolved: 2023-01-01

## 2023-09-12 PROBLEM — D72.829 LEUCOCYTOSIS: Status: RESOLVED | Noted: 2018-03-22 | Resolved: 2023-01-01

## 2023-09-12 PROBLEM — A41.9 SEPSIS (HCC): Status: RESOLVED | Noted: 2018-03-22 | Resolved: 2023-01-01

## 2023-09-12 PROBLEM — I16.0 HYPERTENSIVE URGENCY: Status: RESOLVED | Noted: 2018-10-08 | Resolved: 2023-01-01

## 2023-09-12 NOTE — PROGRESS NOTES
Chief Complaint   Patient presents with    Congestive Heart Failure     F/v dx: Acute on chronic heart failure with normal ejection fraction (HCC)    Atrial Fibrillation     F/v dx: Atrial fibrillation, chronic (HCC)    Coronary Artery Disease     F/v dx: Coronary artery disease involving native coronary artery of native heart without angina pectoris         Subjective     Ken De León is a 78 y.o. male who presents today for follow-up cardiac care    The patient has CAD, PCI mid RCA (4.5 x 18 mm BMS, 4.0 x 38 mm SONYA) 8/15/2018, chronic atrial fibrillation, anticoagulation with warfarin, HFpEF, moderate mitral regurgitation, pulmonary hypertension, dyslipidemia, hypertension, diabetes mellitus, COPD, CKD    Since 9/6/2023 appointment when the patient was found to be an acute decompensated HFpEF and Lasix was increased to 80 mg bid he has lost 20 pounds and feels much better.  Follow-up BMP shows stable CKD    Since 5/30/2023 appointment the patient had been doing well until his recent redo TURP in [place] on 8/14/2023 and since that time he has had increasing abdominal girth and increasing shortness of breath with orthopnea.  He is also continue to have balance problems and fell last week having to call EMS to assist him in getting up.    Since 8/12/2022 appointment the patient was recently hospitalized at [facility] 5/15/2023-5/26/2023 for acute on chronic CKD with resultant volume overload with evidence of bladder outlet obstruction improved with catheterization and diuretic therapy.  Was switched from diltiazem to metoprolol but has continued taking diltiazem.  Atorvastatin previously stopped due to GI symptoms on 80 mg daily.  Echocardiogram showed LVEF 65% with normal RV function.  Seen by urology Osbaldo Rojas PA-C urology and Ziyad Carr MD nephrology.  Has indwelling catheter.  Has follow-up labs and urologic evaluation with imaging next week.  Currently feeling very weak.  Having a lot of discomfort  related to indwelling catheter.  No shortness of breath.    Past Medical History:   Diagnosis Date    Abnormal electrocardiogram 08/13/2010    Arrhythmia     Atrial Fibrillation; Cardiologist, Dr. Sorensen    Arthritis     knees, left hip    ASTHMA     inhalers    Atrial fibrillation (HCC) 10/25/2011    Bronchospasm 08/06/2009    Cancer (HCC)     skin cancer R post ear, removed    Cataract     IOL OU    Dental disorder     a few missing molars    Diabetes (HCC)     insulin    Heart valve disease     mitral regurg    Hemorrhagic disorder (HCC)     on blood thinners    High cholesterol     HTN (hypertension) 10/25/2011    Hypercholesteremia 10/25/2011    Long term (current) use of anticoagulants 10/25/2011    NASAL POLYPS 08/06/2009    Other nonspecific abnormal finding 08/06/2009    Pain     left hip    Pneumonia     Pulmonary hypertension (HCC)     Renal disorder     CKD stage 4    Shortness of breath     Sleep apnea 08/06/2009    No O2 or device, no retest    Wears glasses      Past Surgical History:   Procedure Laterality Date    PB ADJ TISS XFER HEAD,FAC,HAND 10.1-30 N/A 9/7/2022    Procedure: LOCAL FLAP RECONSTRUCTION WITH REMOVAL OF DENUDED AND DRIED BONE OF THE FOREHEAD;  Surgeon: Buzz Mcnamara M.D.;  Location: [facility];  Service: Ent    KNEE ARTHROPLASTY TOTAL Left 10/2014    HIP ARTHROPLASTY TOTAL  08/31/2010    LEFT Performed by OSGOOD, PATRICK J at [facility] ORS    LUMBAR DECOMPRESSION  1984    CATARACT EXTRACTION WITH IOL Bilateral     CYSTOSCOPY      cannot remember exact procedure    LAMINOTOMY      SINUSCOPE      SINUSOTOMIES  2003,2005,2/2007     Family History   Problem Relation Age of Onset    Heart Disease Mother      Social History     Socioeconomic History    Marital status:      Spouse name: Not on file    Number of children: Not on file    Years of education: Not on file    Highest education level: Not on file   Occupational History    Not on file   Tobacco  Use    Smoking status: Former     Current packs/day: 0.00     Average packs/day: 0.5 packs/day for 10.0 years (5.0 ttl pk-yrs)     Types: Cigarettes     Start date: 12/10/1958     Quit date: 12/10/1968     Years since quittin.7    Smokeless tobacco: Never   Vaping Use    Vaping Use: Never used   Substance and Sexual Activity    Alcohol use: No    Drug use: No    Sexual activity: Not on file   Other Topics Concern    Not on file   Social History Narrative    Not on file     Social Determinants of Health     Financial Resource Strain: Not on file   Food Insecurity: Not on file   Transportation Needs: Not on file   Physical Activity: Not on file   Stress: Not on file   Social Connections: Not on file   Intimate Partner Violence: Not on file   Housing Stability: Not on file     Allergies   Allergen Reactions    Atenolol Shortness of Breath and Unspecified     Shortness of breath, weakness    Tetanus Toxoid Swelling    Atorvastatin Nausea     In large doses     Outpatient Encounter Medications as of 2023   Medication Sig Dispense Refill    Empagliflozin (JARDIANCE PO) Take  by mouth.      furosemide (LASIX) 40 MG Tab TAKE 2 TABLETS BY MOUTH ONCE DAILY AS NEEDED 180 Tablet 0    SYMBICORT 160-4.5 MCG/ACT Aerosol INHALE 2 PUFFS BY MOUTH TWICE DAILY RINSE MOUTH AFTER USE 11 g 0    dilTIAZem (CARDIZEM) 60 MG Tab TAKE 1 TABLET BY MOUTH THREE TIMES DAILY 270 Tablet 3    finasteride (PROSCAR) 5 MG Tab Take 1 Tablet by mouth every day. 30 Tablet 0    oxybutynin (DITROPAN) 5 MG Tab Take 1 Tablet by mouth 3 times a day as needed (spasm). 90 Tablet 0    tamsulosin (FLOMAX) 0.4 MG capsule Take 1 Capsule by mouth 1/2 hour after breakfast. 30 Capsule 0    warfarin (COUMADIN) 5 MG Tab 5 mg (1 tablet) on Mon/Wed/Friday and 2.5 mg (1/2 tablet) on Tues/Thurs/Sat/Sun (Patient taking differently: 2.5 mg (1/2 tablet) on Mon/Wed/Friday and 5 mg (1 tablet) on Tues/Thurs/Sat/Sun) 30 Tablet 3    insulin detemir (LEVEMIR FLEXTOUCH) 100  "UNIT/ML injection PEN Inject 10 Units under the skin every evening.      insulin aspart (NOVOLOG) 100 UNIT/ML Solution Inject 5-7 Units under the skin 2 times a day with meals. Sliding Scale   201 - 250 = 5 units  251 - 300 = 7 units      clopidogrel (PLAVIX) 75 MG Tab Take 1 tablet by mouth once daily 90 Tablet 3    multivitamin (THERAGRAN) Tab Take 1 Tablet by mouth every day.      albuterol (PROVENTIL) 2.5mg/3ml Nebu Soln solution for nebulization Take 3 mL by nebulization every four hours as needed for Shortness of Breath. 120 Bullet 2     No facility-administered encounter medications on file as of 9/12/2023.     Review of Systems   Respiratory:  Negative for cough and shortness of breath.    Cardiovascular:  Negative for chest pain, palpitations and orthopnea.   Musculoskeletal:  Positive for joint pain. Negative for myalgias.   Neurological:  Negative for dizziness and loss of consciousness.              Objective     /64 (BP Location: Left arm, Patient Position: Sitting, BP Cuff Size: Adult)   Pulse 71   Resp 16   Ht 1.803 m (5' 11\")   Wt 103 kg (228 lb)   SpO2 94%   BMI 31.80 kg/m²   BP Readings from Last 5 Encounters:   09/12/23 110/64   09/06/23 130/72   08/29/23 130/80   08/22/23 122/70   07/25/23 114/70      Pulse Readings from Last 5 Encounters:   09/12/23 71   09/06/23 99   08/29/23 (!) 111   08/22/23 87   07/25/23 84      Wt Readings from Last 5 Encounters:   09/12/23 103 kg (228 lb)   09/06/23 112 kg (246 lb)   06/13/23 96.6 kg (213 lb)   05/30/23 103 kg (226 lb)   05/25/23 101 kg (222 lb 3.6 oz)       Physical Exam  Constitutional:       Appearance: He is well-developed.      Comments: Frail.     Neck:      Vascular: No JVD.      Comments: JVP normal at 90 degrees  Cardiovascular:      Rate and Rhythm: Normal rate. Rhythm irregular.      Heart sounds: Murmur heard.   Pulmonary:      Effort: Pulmonary effort is normal. No respiratory distress.      Breath sounds: Normal breath sounds. No " wheezing or rales.   Abdominal:      Comments: Markedly obese   Musculoskeletal:      Right lower leg: No edema.      Left lower leg: No edema.      Comments: Hardened chronic dermatologic changes   Skin:     General: Skin is warm and dry.   Neurological:      Mental Status: He is alert and oriented to person, place, and time.   Psychiatric:         Behavior: Behavior normal.            CARDIAC CATHETERIZATION 08/15/2018  A.  Left heart catheterization.  B.  Left ventriculography.  C.  Selective coronary angiography.  D.  Coronary stent implantation of the mid right coronary artery with   overlapping stents: 4.5x18 mm Ultra non-drug eluting stent and 4.0x38 mm Xience   Gianna drug-eluting stent   E.  Right radial artery approach.   PREOPERATIVE DIAGNOSES:  1.  Unstable angina pectoris.  2.  Abnormal myocardial perfusion scan with inferior perfusion abnormality.  3.  Chronic atrial fibrillation.  4.  Chronic anticoagulation with warfarin.  5.  Diabetes mellitus.   POSTOPERATIVE DIAGNOSES:  1.  Coronary artery disease, 3-vessel, involving the dominant mid right coronary artery, distal circumflex artery and diagonal branch ostium.  2.  Left ventricular ejection fraction 60%.     ECHOCARDIOGRAM 3/23/2018  No prior study is available for comparison.   Mild concentric left ventricular hypertrophy.  Normal left ventricular systolic function.  Left ventricular ejection fraction is visually estimated to be 60%.  Diastolic function is difficult to assess with atrial fibrillation.  Severely dilated left atrium.  Estimated right ventricular systolic pressure is 37 mmHg + JVP.    ECHOCARDIOGRAM 7/23/2021  Left ventricular ejection fraction is visually estimated to be 65%.  Flattening of the interventricular septum.  Moderately dilated right ventricle.  Moderate mitral regurgitation.  Moderate tricuspid regurgitation.  Estimated right ventricular systolic pressure  is 55 mmHg.    ECHOCARDIOGRAM 5/90/2023  Compared to the prior  study on 07/23/21, there is mild increase in   estimated RVSP with ongoing evidence of pulmonary hypertension.  Normal LV size, thickness with preserved LV systolic function, normal   estimated LVEF 60%.  Dilated RV with preserved systolic function  Biatrial enlargement  Aortic valve sclerosis without stenosis  Moderate tricuspid valve regurgitation  Mild mitral valve regurgitation  Mild aortic valve insufficiency  Moderate pulmonary hypertension with estimated RVSP 60-65 mmHg    Assessment & Plan     1. Atrial fibrillation with RVR ([facility])        2. Chronic right-sided heart failure ([facility])  Basic Metabolic Panel      3. Chronic heart failure with preserved ejection fraction ([facility])  Basic Metabolic Panel      4. Coronary artery disease, occlusive        5. S/P coronary artery stent placement        6. Atrial fibrillation, chronic ([facility])        7. Hypercoagulable state ([facility])        8. Anticoagulated        9. Moderate mitral regurgitation        10. Pulmonary hypertension ([facility])            Medical Decision Making: Today's Assessment/Status/Plan:   Assessment  HFpEF, biventricular.  Pulmonary hypertension  Right heart failure  Mitral regurgitation, mild to moderate  CAD/PCI-RCA stent 8/15/2018  Atrial fibrillation, chronic  Anticoagulation, on warfarin.  Hypertension.    Dyslipidemia.    Diabetes mellitus  LAUREL  CKD   TURP x2  Anemia  Cellulitis, right knee, 6/2019    Recommendation Discussion  HFpEF, markedly improved, continue furosemide 80 mg twice daily.  BMP prior to next appointment in 4 weeks, continue empagliflozin, did not tolerate metoprolol, no spironolactone due to CKD GFR 29  CAD/PCI, clinically stable, no angina pectoris, continue clopidogrel, diltiazem, did not tolerate metoprolol or high-dose statin, will try to initiate low lower dose statin if not effective will try PCSK9 therapy  Atrial fibrillation: Rate controlled, continue warfarin, diltiazem.  OAC on warfarin, INR 2.3.  Mitral regurgitation: Continue  clinical monitoring, follow-up echocardiograms as needed.  Pulmonary hypertension, probably multifactorial.  Hypertension, BP normal, at goal, continue diltiazem  Dyslipidemia, see above under CAD  RTC 4 weeks with BMP

## 2023-09-14 NOTE — PROCEDURES
Multi-Ox Readings  Multi Ox #1 Room air   O2 sat % at rest 97   O2 sat % on exertion 89-90   O2 sat average on exertion     Multi Ox #2     O2 sat % at rest     O2 sat % on exertion     O2 sat average on exertion       Oxygen Use 2   Oxygen Frequency With exertion and nocturnal   Duration of need     Is the patient mobile within the home?     CPAP Use?     BIPAP Use?     Servo Titration

## 2023-09-18 NOTE — PROGRESS NOTES
Chief Complaint   Patient presents with    Follow-Up     COPD  last seen on 6/13/2023  - Davey DELCID        HPI:  Yuri De León is a 78 y.o. year old male here today for follow-up on mild chronic respiratory failure with hypoxia.  Last seen by me on 6/13/2023.  Past medical history significant for hypertension, dyslipidemia ,type 2 Dm with glyco of 8.2, pulmonary hypertension, moderate MR, CAD status post stent, CKD, atrial fibrillation, secondary hypercoagulable state .    Patient presented last visit for hospital discharge follow-up.  Patient recently underwent TURP and was previously having urinary retention.  Since procedure, symptoms have improved significantly.  Patient continues to use Lasix 40 mg daily.  Has followed up with nephrology since discharge.  Patient was discharged on supplemental oxygen at 2 L/min continuously.  Patient also has a history of asthma using Symbicort twice daily, Incruse, and albuterol as needed.  Patient feels that he has not been requiring albuterol lately.  Denies any cough, wheezing, chest tightness, new or worsening dyspnea or shortness of breath or exacerbations since last visit.    Dilatory multiple oximetry done in clinic today showed room air SPO2 between 90 to 92% on room air with ambulation.  Patient does not qualify for supplemental oxygen with ambulation at this time.  He does continue to use at nighttime at 2 L/min.      ROS: As per HPI and otherwise negative if not stated.    Past Medical History:   Diagnosis Date    Abnormal electrocardiogram 08/13/2010    Arrhythmia     Atrial Fibrillation; Cardiologist, Dr. Sorensen    Arthritis     knees, left hip    ASTHMA     inhalers    Atrial fibrillation (HCC) 10/25/2011    Bronchospasm 08/06/2009    Cancer (HCC)     skin cancer R post ear, removed    Cataract     IOL OU    Dental disorder     a few missing molars    Diabetes (HCC)     insulin    Heart valve disease     mitral regurg    Hemorrhagic disorder  "(HCC)     on blood thinners    High cholesterol     HTN (hypertension) 10/25/2011    Hypercholesteremia 10/25/2011    Long term (current) use of anticoagulants 10/25/2011    NASAL POLYPS 08/06/2009    Other nonspecific abnormal finding 08/06/2009    Pain     left hip    Pneumonia     Pulmonary hypertension (HCC)     Renal disorder     CKD stage 4    Shortness of breath     Sleep apnea 08/06/2009    No O2 or device, no retest    Wears glasses        Past Surgical History:   Procedure Laterality Date    PB ADJ TISS XFER HEAD,FAC,HAND 10.1-30 N/A 9/7/2022    Procedure: LOCAL FLAP RECONSTRUCTION WITH REMOVAL OF DENUDED AND DRIED BONE OF THE FOREHEAD;  Surgeon: Buzz Mcnamara M.D.;  Location: SURGERY [facility];  Service: Ent    KNEE ARTHROPLASTY TOTAL Left 10/2014    HIP ARTHROPLASTY TOTAL  08/31/2010    LEFT Performed by OSGOOD, PATRICK J at SURGERY [facility] ORS    LUMBAR DECOMPRESSION  1984    CATARACT EXTRACTION WITH IOL Bilateral     CYSTOSCOPY      cannot remember exact procedure    LAMINOTOMY      SINUSCOPE      SINUSOTOMIES  2003,2005,2/2007       Family History   Problem Relation Age of Onset    Heart Disease Mother        Allergies as of 09/14/2023 - Reviewed 09/14/2023   Allergen Reaction Noted    Atenolol Shortness of Breath and Unspecified 08/06/2009    Tetanus toxoid Swelling 08/23/2010    Atorvastatin Nausea 09/02/2022        Vitals:  /72 (BP Location: Left arm, Patient Position: Sitting, BP Cuff Size: Adult)   Pulse 81   Resp 16   Ht 1.803 m (5' 11\")   Wt 99.8 kg (220 lb)   SpO2 94%     Current medications as of today   Current Outpatient Medications   Medication Sig Dispense Refill    INCRUSE ELLIPTA 62.5 MCG/ACT AEROSOL POWDER, BREATH ACTIVATED INHALE 1 PUFF BY MOUTH EVERY 24 HOURS      Empagliflozin (JARDIANCE PO) Take  by mouth.      furosemide (LASIX) 40 MG Tab TAKE 2 TABLETS BY MOUTH ONCE DAILY AS NEEDED 180 Tablet 0    SYMBICORT 160-4.5 MCG/ACT Aerosol INHALE 2 PUFFS BY MOUTH " TWICE DAILY RINSE MOUTH AFTER USE 11 g 0    dilTIAZem (CARDIZEM) 60 MG Tab TAKE 1 TABLET BY MOUTH THREE TIMES DAILY 270 Tablet 3    finasteride (PROSCAR) 5 MG Tab Take 1 Tablet by mouth every day. 30 Tablet 0    oxybutynin (DITROPAN) 5 MG Tab Take 1 Tablet by mouth 3 times a day as needed (spasm). 90 Tablet 0    tamsulosin (FLOMAX) 0.4 MG capsule Take 1 Capsule by mouth 1/2 hour after breakfast. 30 Capsule 0    warfarin (COUMADIN) 5 MG Tab 5 mg (1 tablet) on Mon/Wed/Friday and 2.5 mg (1/2 tablet) on Tues/Thurs/Sat/Sun (Patient taking differently: 2.5 mg (1/2 tablet) on Mon/Wed/Friday and 5 mg (1 tablet) on Tues/Thurs/Sat/Sun) 30 Tablet 3    insulin detemir (LEVEMIR FLEXTOUCH) 100 UNIT/ML injection PEN Inject 10 Units under the skin every evening.      insulin aspart (NOVOLOG) 100 UNIT/ML Solution Inject 5-7 Units under the skin 2 times a day with meals. Sliding Scale   201 - 250 = 5 units  251 - 300 = 7 units      clopidogrel (PLAVIX) 75 MG Tab Take 1 tablet by mouth once daily 90 Tablet 3    multivitamin (THERAGRAN) Tab Take 1 Tablet by mouth every day.      albuterol (PROVENTIL) 2.5mg/3ml Nebu Soln solution for nebulization Take 3 mL by nebulization every four hours as needed for Shortness of Breath. 120 Bullet 2     No current facility-administered medications for this visit.         Physical Exam:   Gen:           Alert and oriented, No apparent distress. Mood and affect appropriate, normal interaction with examiner.  Eyes:          PERRL, EOM intact, sclere white, conjunctive moist.  Ears:          Not examined.   Hearing:     Grossly intact.  Nose:          Normal, no lesions or deformities.  Dentition:    Good dentition.  Oropharynx:   Tongue normal, posterior pharynx without erythema or exudate.  Neck:        Supple, trachea midline, no masses.  Respiratory Effort: No intercostal retractions or use of accessory muscles.   Lung Auscultation:      Clear to auscultation bilaterally; no rales, rhonchi or  wheezing.  CV:            Regular rate and rhythm. No murmurs, rubs or gallops.  Abd:           Not examined.   Lymphadenopathy: Not examined.  Gait and Station: Normal.  Digits and Nails: No clubbing, cyanosis, petechiae, or nodes.   Cranial Nerves: II-XII grossly intact.  Skin:        No rashes, lesions or ulcers noted.               Ext:           No cyanosis or edema.      Assessment:  1. Chronic respiratory failure with hypoxia (HCC)  Multiple Oximetry    Multiple Oximetry      2. Mild persistent asthma without complication        3. Pulmonary hypertension (HCC)            Plan:  Continue monitoring SPO2 and titrate supplemental oxygen when SPO2 falls below 89%.  I do think supplemental oxygen requirement was due to fluid volume overload secondary to urinary retention.  Patient will follow-up in 3 months.  Continue current regimen of Symbicort, Incruse, and albuterol as needed.  Patient is currently symptom-free and denies any new or worsening dyspnea or shortness of breath or exacerbations.  Patient follows cardiology.  Will likely recommend follow-up echocardiogram within 6 months.    Please note that this dictation was created using voice recognition software. I have made every reasonable attempt to correct obvious errors, but it is possible there are errors of grammar and possibly content that I did not discover before finalizing the note.

## 2023-09-20 NOTE — PROGRESS NOTES
This evaluation was conducted via Telemed using secure and encrypted videoconferencing technology. The patient was physically located at [facility] in [place], [place]. The patient was presented by a medical professional at the originating site.   The patient's identity was confirmed and verbal consent was obtained for this telemedicine encounter.      OP Anticoagulation Service Note    Date: 2023       Anticoagulation Summary  As of 2023      INR goal:  2.0-3.0   TTR:  67.9 % (7.9 y)   INR used for dosin.90 (2023)   Warfarin maintenance plan:  2.5 mg (5 mg x 0.5) every Mon, Wed, Fri; 5 mg (5 mg x 1) all other days   Weekly warfarin total:  27.5 mg   Plan last modified:  AMALIA Hughes (2023)   Next INR check:  10/11/2023   Target end date:  Indefinite    Indications    Atrial fibrillation with RVR (HCC) (Resolved) [I48.91]  Long term current use of anticoagulant therapy (Resolved) [Z79.01]  Atrial fibrillation (HCC) (Resolved) [I48.91]  Hypercoagulable state (HCC) [D68.59]                 Anticoagulation Episode Summary       INR check location:      Preferred lab:      Send INR reminders to:      Comments:            Anticoagulation Care Providers       Provider Role Specialty Phone number    Sanjay Sorensen M.D. Referring Cardiovascular Disease (Cardiology) [phone]          Anticoagulation Patient Findings      ROS:    Pulm: Denies SOB, chest pain.    Card: Denies syncope, edema, palpitations.    Extremities: Denies redness, pain.   Urinary: Reports hematuria that comes and goes since his TURP procedure last month. Pt states he was told to expect some blood with his urine.    PE:    Pulm: No SOB, even and unlabored.    Card: Normal rate and rhythm.    Extremities: No redness, or edema.     INR  slightly sub-therapeutic.    Instructed to call his urologist today to discuss hematuria.  Denies signs/symptoms of thrombosis.    Denies changes to diet or medications.    Follow up appt in 3 weeks     Plan:  Will increase his dose tonight then resume his previous regimen.    Medication: Warfarin (Coumadin)    Take 5 mg tonight then resume your previous regimen:       Sunday Monday Tuesday Wednesday Thursday Friday Saturday      5 mg    2.5 mg    5 mg    2.5 mg    5 mg    2.5 mg    5 mg      1 tab(s)    1/2 tab(s)    1 tab(s)    1/2 tab(s)    1 tab(s)    1/2 tab(s)  1 tab(s)     Next Appointment: Wednesday, October 11, 2013 at 1:45 pm.       Pt is on antiplatelet therapy Plavix 75mg for CAD/stent and must be reviewed again next visit with cardiology.    Review all of your home medications and newly ordered medications with your doctor and / or pharmacist. Follow medication instructions as directed by your doctor and / or pharmacist. Please keep your medication list with you and share with your physician. Update the information when medications are discontinued, doses are changed, or new medications (including over-the-counter products) are added; and carry medication information at all times in the event of emergency situations.       Patient is on a high risk medication and therefore requires close monitoring and follow up.    CHEST guidelines recommend frequent INR monitoring at regular intervals (a few days up to a max of 12 weeks) to ensure they are on the proper dose of warfarin and not having any complications from therapy.  INRs can dramatically change over a short time period due to diet, medications, and medical conditions.   The patient is instructed to go to the ER for falls with a head injury,  blood in urine or stool or any bleeding that last longer than 20 min.   For questions, please contact Outpatient Anticoagulation Service [phone].     APRIL Feng.

## 2023-10-11 NOTE — PROGRESS NOTES
[facility] Tele[facility]icine Anticoagulation Service Note    10/11/23    This evaluation was conducted via telemedicine visit using secure and encrypted videoconferencing technology. The patient was physically located at [facility] in [place], [place]. The patient was presented by a medical professional at the originating site. The patient's identity was confirmed and verbal consent for the telemedicine encounter was obtained for this telemedicine encounter.    Anticoagulation Summary  As of 10/11/2023      INR goal:  2.0-3.0   TTR:  67.9 % (8 y)   INR used for dosin.20 (10/11/2023)   Warfarin maintenance plan:  2.5 mg (5 mg x 0.5) every Mon, Wed, Fri; 5 mg (5 mg x 1) all other days   Weekly warfarin total:  27.5 mg   Plan last modified:  AMALIA Hughes (2023)   Next INR check:  11/15/2023   Target end date:  Indefinite    Indications    Atrial fibrillation with RVR (HCC) (Resolved) [I48.91]  Long term current use of anticoagulant therapy (Resolved) [Z79.01]  Atrial fibrillation (HCC) (Resolved) [I48.91]  Hypercoagulable state (HCC) [D68.59]                 Anticoagulation Episode Summary       INR check location:      Preferred lab:      Send INR reminders to:      Comments:            Anticoagulation Care Providers       Provider Role Specialty Phone number    Sanjay Sorensen M.D. Referring Cardiovascular Disease (Cardiology) [phone]                Refer to Patient Findings for HPI:  Patient Findings       Negatives:  Signs/symptoms of thrombosis, Signs/symptoms of bleeding, Laboratory test error suspected, Change in health, Change in alcohol use, Change in activity, Upcoming invasive procedure, Emergency department visit, Upcoming dental procedure, Missed doses, Extra doses, Change in medications, Change in diet/appetite, Hospital admission, Bruising, Other complaints    Comments:   Last visit he reported some hematuria. He believes it was caused from jardiance which he has stopped taking. Did not discuss hematuria with his urologist. He will continue to monitor and if occurs again, asked that he discuss with his urologist.            Vitals:    10/11/23 1357   BP: 130/80   Pulse: 88   Resp: (!) 22   Temp: 36.3 °C (97.3 °F)   SpO2: 94%       Verified current warfarin dosing schedule.    Pt is on antiplatelet therapy with clopidogrel for CAD/stent.    Review of Systems   HENT: Negative for nosebleeds.   Respiratory: Negative for shortness of breath.  Gastrointestinal: Negative for blood in stool.   Genitourinary: Negative for hematuria.   Psychiatric/Behavioral: The patient is not nervous/anxious.     Physical Exam   Constitutional: Oriented to person, place, and time. Appears well-developed and well-nourished.   Pulmonary/Chest: Effort normal. No respiratory distress.   Skin: Not diaphoretic.  Psychiatric: Normal mood and affect. Behavior is normal.    A/P   INR is therapeutic.      Warfarin dosing recommendation: Continue current regimen.       Sunday Monday Tuesday Wednesday Thursday Friday Saturday      5 mg    2.5 mg    5 mg    2.5 mg    5 mg    2.5 mg    5 mg      1 tab(s)    1/2 tab(s)    1 tab(s)    1/2 tab(s)    1 tab(s)    1/2 tab(s)  1 tab(s)     Follow up appointment in 4 week(s).    Next Appointment: Wednesday, November 15th at 1:45 pm.    Pt educated to contact our clinic with any changes in medications or s/s of bleeding or thrombosis. Pt is aware to seek immediate medical attention for falls, head injury or deep cuts. Review all of your home medications and newly ordered medications with your doctor and / or pharmacist. Follow medication instructions as directed by your doctor and / or pharmacist. Please keep your medication list with you and share with your physician. Update the information when medications are discontinued, doses are changed, or new medications  (including over-the-counter products) are added; and carry medication information at all times in the event of emergency situations.      CHEST guidelines recommend frequent INR monitoring at regular intervals (a few days up to a max of 12 weeks) to ensure they are on the proper dose of warfarin and not having any complications from therapy.  INRs can dramatically change over a short time period due to diet, medications, and medical conditions.   The patient is instructed to go to the ER for falls with a head injury, blood in urine or stool or any bleeding that last longer than 20 min.   For questions, please contact Outpatient Anticoagulation Service [phone].     APRIL Feng.  [facility]  [phone]

## 2023-10-11 NOTE — PROGRESS NOTES
Ken De León is a 78 y.o. male here for a non-provider visit for inr check    If abnormal was an in office provider notified today (if so, indicate provider)? No    Routed to PCP? No

## 2023-10-18 NOTE — TELEPHONE ENCOUNTER
PIPO, please advise when back in office.   I tried to call pt, no answer. Last OV notes pt to take furosemide 80mg BID, however last Rx sent in as 80mg once daily. I wanted to confirm dosage with pt and be sure he gets the bmp done as advised by PIPO. He also pushed out f/u to January.   Please confirm dosage, approve Rx's,  and I can then send MyChart msg to pt.

## 2023-10-18 NOTE — TELEPHONE ENCOUNTER
Caller: Yuri De León    Topic/issue: MEDICATION MANAGEMENT     Ken states that he is only taking FUROSEMIDE 40MG 2 TABS DAILY. Ken was also instructed to complete his BMP as instructed by PIPO, Ken states that he will try to get his lab order completed tomorrow. Please be advised.    Thank you,  Siddhartha SENA    Callback Number: [phone] (home) [phone] (work)

## 2023-10-18 NOTE — TELEPHONE ENCOUNTER
Pt called back to confirm he is taking furosemide 80mg once daily and he will have labs done. Rx sent as confirmed.   SW---nothing further needed.

## 2023-10-27 PROBLEM — R94.31 PROLONGED QT INTERVAL: Status: ACTIVE | Noted: 2023-01-01

## 2023-10-27 PROBLEM — J18.9 PNEUMONIA OF RIGHT LOWER LOBE DUE TO INFECTIOUS ORGANISM: Status: ACTIVE | Noted: 2023-01-01

## 2023-10-27 PROBLEM — Z71.89 ADVANCED CARE PLANNING/COUNSELING DISCUSSION: Status: ACTIVE | Noted: 2023-01-01

## 2023-10-27 PROBLEM — U07.1 ACUTE HYPOXEMIC RESPIRATORY FAILURE DUE TO COVID-19 (HCC): Status: ACTIVE | Noted: 2018-03-22

## 2023-10-27 PROBLEM — L03.90 CELLULITIS: Status: ACTIVE | Noted: 2023-01-01

## 2023-10-27 PROBLEM — N40.0 BPH (BENIGN PROSTATIC HYPERPLASIA): Status: ACTIVE | Noted: 2023-01-01

## 2023-10-27 NOTE — ASSESSMENT & PLAN NOTE
Secondary to bilateral venous stasis and edema.  Linezolid and Zosyn were empirically started on admission.  No positive cultures.  On comfort care

## 2023-10-27 NOTE — CARE PLAN
The patient is Stable - Low risk of patient condition declining or worsening    Shift Goals  Clinical Goals: monitor I/Os & O2s, diureses, Q2 turns, PT/OT  Patient Goals: change code status    Progress made toward(s) clinical / shift goals:      Problem: Knowledge Deficit - Standard  Goal: Patient and family/care givers will demonstrate understanding of plan of care, disease process/condition, diagnostic tests and medications  Description: Target End Date:  1-3 days or as soon as patient condition allows    Document in Patient Education    1.  Patient and family/caregiver oriented to unit, equipment, visitation policy and means for communicating concern  2.  Complete/review Learning Assessment  3.  Assess knowledge level of disease process/condition, treatment plan, diagnostic tests and medications  4.  Explain disease process/condition, treatment plan, diagnostic tests and medications  Outcome: Progressing  Note: Pt updated on POC to include MRsA and COVID swab, diuresis, PT/ OT consult, CXR, and ECHO.      Problem: Care Map:  Admission Optimal Outcome for the Heart Failure Patient  Goal: Admission:  Optimal Care of the heart failure patient  Description: Target End Date:  end of day 1  Outcome: Progressing     Problem: Care Map:  Day 1 Optimal Outcome for the Heart Failure Patient  Goal: Day 1:  Optimal Care of the heart failure patient  Description: Target End Date:  end of day 1  Outcome: Progressing     Problem: Skin Integrity  Goal: Skin integrity is maintained or improved  Description: Target End Date:  Prior to discharge or change in level of care    Document interventions on Skin Risk/Darrick flowsheet groups and corresponding LDA    1.  Assess and monitor skin integrity, appearance and/or temperature  2.  Assess risk factors for impaired skin integrity and/or pressures ulcers  3.  Implement precautions to protect skin integrity in collaboration with interdisciplinary team  4.  Implement pressure ulcer  prevention protocol if at risk for skin breakdown  5.  Confirm wound care consult if at risk for skin breakdown  6.  Ensure patient use of pressure relieving devices  (Low air loss bed, waffle overlay, heel protectors, ROHO cushion, etc)  Outcome: Progressing  Note: Wound consult placed, pictures uploaded into epic. Pt refused TAPS system and Q2 turns with NOC RN, day shift will educate and try again to use proper methods of skin breakdown prevention.      Problem: Fall Risk  Goal: Patient will remain free from falls  Description: Target End Date:  Prior to discharge or change in level of care    Document interventions on the [facility] Fall Risk Assessment    1.  Assess for fall risk factors  2.  Implement fall precautions  Outcome: Progressing  Note: Pt is max assist, and increased WOB. All appropriate fall precautions in place.        Patient is not progressing towards the following goals:

## 2023-10-27 NOTE — ASSESSMENT & PLAN NOTE
COVID was positive.  Procalcitonin was 0.44.  Chest x-ray showed possible right lower lobe pneumonia.  Empirically started on IV Zosyn linezolid.  No clinical improvement.  On comfort care

## 2023-10-27 NOTE — PROGRESS NOTES
Telemetry Shift Summary    Rhythm afib  HR Range 107-112  Ectopy rPVC, rTrig  Measurements -/0.12/-        Normal Values  Rhythm SR  HR Range    Measurements 0.12-0.20 / 0.06-0.10  / 0.30-0.52

## 2023-10-27 NOTE — H&P
Hospital Medicine History & Physical Note    Date of Service  10/27/2023    Primary Care Physician  Alberto Carmona D.O.    Consultants  None    Specialist Names: None    Code Status  Full Code    Chief Complaint  No chief complaint on file.      History of Presenting Illness  Yuri De León is a 78 y.o. male who presented 10/27/2023 with right leg pain and erythema.  Patient states he had a Gonzalez removed earlier this week, began noticing redness of his leg afterwards.  Patient was having urinary retention hence the Gonzalez was placed, he did have a transurethral resection of the prostate a couple of weeks ago.  Patient has been urinating since then.  Patient also states he has had a worsening lower extremity edema.  He denies any chest pain  but does complain of some shortness of breath but states this is chronic.  Patient initially presented to an outside facility, transferred here for higher level of care.    I discussed the plan of care with patient.    Review of Systems  Review of Systems   Constitutional:  Negative for chills, fever and malaise/fatigue.   HENT:  Negative for congestion.    Respiratory:  Positive for shortness of breath. Negative for cough, sputum production and stridor.    Cardiovascular:  Positive for leg swelling. Negative for chest pain and palpitations.   Gastrointestinal:  Negative for abdominal pain, constipation, diarrhea, nausea and vomiting.   Genitourinary:  Negative for dysuria and urgency.   Musculoskeletal:  Negative for falls and myalgias.   Skin:  Positive for rash.   Neurological:  Negative for dizziness, tingling, loss of consciousness, weakness and headaches.   Psychiatric/Behavioral:  Negative for depression and suicidal ideas.    All other systems reviewed and are negative.      Past Medical History   has a past medical history of Abnormal electrocardiogram (08/13/2010), Arrhythmia, Arthritis, ASTHMA, Atrial fibrillation (HCC) (10/25/2011), Bronchospasm (08/06/2009),  Cancer (HCC), Cataract, Dental disorder, Diabetes (HCC), Heart valve disease, Hemorrhagic disorder (HCC), High cholesterol, HTN (hypertension) (10/25/2011), Hypercholesteremia (10/25/2011), Long term (current) use of anticoagulants (10/25/2011), NASAL POLYPS (08/06/2009), Other nonspecific abnormal finding (08/06/2009), Pain, Pneumonia, Pulmonary hypertension (HCC), Renal disorder, Shortness of breath, Sleep apnea (08/06/2009), and Wears glasses.    Surgical History   has a past surgical history that includes sinusotomies (2003,2005,2/2007); lumbar decompression (1984); hip arthroplasty total (08/31/2010); laminotomy; sinuscope; knee arthroplasty total (Left, 10/2014); cataract extraction with iol (Bilateral); cystoscopy; and pr adj tiss xfer head,fac,hand 10.1-30 (N/A, 9/7/2022).     Family History  family history includes Heart Disease in his mother.   Family history reviewed with patient. There is family history that is pertinent to the chief complaint.     Social History   reports that he quit smoking about 54 years ago. His smoking use included cigarettes. He started smoking about 64 years ago. He has a 5.0 pack-year smoking history. He has never used smokeless tobacco. He reports that he does not drink alcohol and does not use drugs.    Allergies  Allergies   Allergen Reactions    Atenolol Shortness of Breath and Unspecified     Shortness of breath, weakness    Tetanus Toxoid Swelling    Atorvastatin Nausea     In large doses       Medications  Prior to Admission Medications   Prescriptions Last Dose Informant Patient Reported? Taking?   Empagliflozin (JARDIANCE PO)   Yes No   Sig: Take  by mouth.   INCRUSE ELLIPTA 62.5 MCG/ACT AEROSOL POWDER, BREATH ACTIVATED  at PRN  Yes No   Sig: INHALE 1 PUFF BY MOUTH EVERY 24 HOURS   SYMBICORT 160-4.5 MCG/ACT Aerosol 10/26/2023 at 2130  No No   Sig: INHALE 2 PUFFS BY MOUTH TWICE DAILY RINSE MOUTH AFTER USE   albuterol (PROVENTIL) 2.5mg/3ml Nebu Soln solution for  nebulization  at PRN Patient No No   Sig: Take 3 mL by nebulization every four hours as needed for Shortness of Breath.   clopidogrel (PLAVIX) 75 MG Tab 10/26/2023 at 1000  No No   Sig: Take 1 tablet by mouth once daily   dilTIAZem (CARDIZEM) 60 MG Tab 10/26/2023 at 100  No No   Sig: TAKE 1 TABLET BY MOUTH THREE TIMES DAILY   finasteride (PROSCAR) 5 MG Tab 10/26/2023 at 1000  No No   Sig: Take 1 Tablet by mouth every day.   furosemide (LASIX) 40 MG Tab 10/26/2023 at 0700  No No   Sig: TAKE 2 TABLETS BY MOUTH ONCE DAILY AS NEEDED   insulin aspart (NOVOLOG) 100 UNIT/ML Solution  at PRN Patient Yes No   Sig: Inject 5-7 Units under the skin 2 times a day with meals. Sliding Scale   201 - 250 = 5 units  251 - 300 = 7 units   insulin detemir (LEVEMIR FLEXTOUCH) 100 UNIT/ML injection PEN 10/26/2023 at 0100 Patient Yes No   Sig: Inject 10 Units under the skin every evening.   multivitamin (THERAGRAN) Tab 10/26/2023 at 2000 Patient Yes No   Sig: Take 1 Tablet by mouth every day.   oxybutynin (DITROPAN) 5 MG Tab 10/26/2023 at 1000  No No   Sig: Take 1 Tablet by mouth 3 times a day as needed (spasm).   tamsulosin (FLOMAX) 0.4 MG capsule 10/26/2023 at 1000  No No   Sig: Take 1 Capsule by mouth 1/2 hour after breakfast.   warfarin (COUMADIN) 5 MG Tab 10/26/2023 at 2200  No No   Si mg (1 tablet) on Mon/Wed/Friday and 2.5 mg (1/2 tablet) on Tues/Thurs/Sat/Sun   Patient taking differently: 2.5 mg (1/2 tablet) on Mon/Wed/Friday and 5 mg (1 tablet) on Tues/Thurs/Sat/Sun      Facility-Administered Medications: None       Physical Exam                             Physical Exam  Vitals and nursing note reviewed.   Constitutional:       General: He is not in acute distress.     Appearance: He is well-developed. He is not toxic-appearing or diaphoretic.   HENT:      Head: Normocephalic and atraumatic.      Right Ear: External ear normal.      Left Ear: External ear normal.      Nose: Nose normal. No congestion or rhinorrhea.       "Mouth/Throat:      Mouth: Mucous membranes are moist.      Pharynx: No oropharyngeal exudate.   Eyes:      General:         Right eye: No discharge.         Left eye: No discharge.   Neck:      Trachea: No tracheal deviation.   Cardiovascular:      Rate and Rhythm: Normal rate.      Heart sounds: No murmur heard.     No friction rub. No gallop.   Pulmonary:      Effort: Pulmonary effort is normal. No respiratory distress.      Breath sounds: No stridor. Rales present. No wheezing.   Chest:      Chest wall: No tenderness.   Abdominal:      General: Bowel sounds are normal. There is no distension.      Palpations: Abdomen is soft.      Tenderness: There is no abdominal tenderness.   Musculoskeletal:         General: No tenderness. Normal range of motion.      Cervical back: Normal range of motion and neck supple. No edema or erythema.      Right lower leg: Edema present.      Left lower leg: Edema present.   Lymphadenopathy:      Cervical: No cervical adenopathy.   Skin:     General: Skin is warm and dry.      Findings: No erythema or rash.   Neurological:      Mental Status: He is alert and oriented to person, place, and time.      Cranial Nerves: No cranial nerve deficit.   Psychiatric:         Mood and Affect: Mood normal.         Behavior: Behavior normal.         Thought Content: Thought content normal.         Judgment: Judgment normal.         Laboratory:          No results for input(s): \"ALTSGPT\", \"ASTSGOT\", \"ALKPHOSPHAT\", \"TBILIRUBIN\", \"DBILIRUBIN\", \"GAMMAGT\", \"AMYLASE\", \"LIPASE\", \"ALB\", \"PREALBUMIN\", \"GLUCOSE\" in the last 72 hours.      No results for input(s): \"NTPROBNP\" in the last 72 hours.      No results for input(s): \"TROPONINT\" in the last 72 hours.    Imaging:  DX-CHEST-LIMITED (1 VIEW)    (Results Pending)       no X-Ray or EKG requiring interpretation    Assessment/Plan:  Justification for Admission Status  I anticipate this patient will require at least two midnights for appropriate medical " management, necessitating inpatient admission because cellulitis, worsening lower extremity edema    Patient will need a Telemetry bed on MEDICAL service .  The need is secondary to cellulitis and edema.    * Cellulitis- (present on admission)  Assessment & Plan  Patient does have significant bilateral leg, right greater than left cellulitis  Erythema is bilateral lower extremities as well as medial aspect of the right thigh  Start vancomycin and Rocephin  ERP at outside facility did mention an abnormal urinalysis, Rocephin will cover for potential UTI as well but will obtain a urinalysis  No sepsis noted at the outside facility, repeat labs pending    BPH (benign prostatic hyperplasia)- (present on admission)  Assessment & Plan  Patient did have a recent TURP  Continue home Proscar and Flomax  Monitor urinary output    Chronic respiratory failure with hypoxia (HCC)- (present on admission)  Assessment & Plan  Patient generally is on 2 L of oxygen, good saturations on this oxygen level    Chronic heart failure with preserved ejection fraction (HCC)- (present on admission)  Assessment & Plan  With acute exacerbation resulting in worsening bilateral lower extremity edema  Change from his usual p.o. Lasix which is 40 mg twice daily to IV Lasix 40 mg twice daily  Monitor patient on telemetry  No troponin obtained at the outside facility, obtain a troponin as well as an EKG    Atrial fibrillation, chronic ([facility])- (present on admission)  Assessment & Plan  Rate controlled with diltiazem, continue  Continue home Coumadin  Monitor on telemetry    CKD (chronic kidney disease) stage 4, GFR 15-29 ml/min (CMS-[facility])- (present on admission)  Assessment & Plan  Await labs    Type 2 diabetes mellitus with kidney complication, without long-term current use of insulin ([facility])- (present on admission)  Assessment & Plan  Start insulin sliding scale  Adjust as needed        VTE prophylaxis: therapeutic anticoagulation with Coumadin

## 2023-10-27 NOTE — ASSESSMENT & PLAN NOTE
Patient has had labored breathing, tachypneic, increasing oxygen requirements.  He has been on IV Solu-Medrol, IV Lasix, and increasing amount of oxygen without any improvement.  On comfort care.

## 2023-10-27 NOTE — ASSESSMENT & PLAN NOTE
Echocardiogram from 10/27/2023 showed LVEF of 55 to 60%, severely dilated left ventricle with reduced systolic function, RVSP of about 80 mmHg, severe tricuspid regurgitation.  Previous echocardiogram showed RVSP of 60 to 65 mmHg

## 2023-10-27 NOTE — RESPIRATORY CARE
"   COPD EDUCATION by COPD CLINICAL EDUCATOR  10/27/2023 at 12:31 PM by Ramona Lovelace, RRT     Patient reviewed by COPD education team. Patient does not have a history or diagnosis of COPD and is a former smoker.  Therefore, patient does not qualify for the COPD program.     COPD Screen  COPD Risk Screening  Do you have a history of COPD?: No (hx ASTHMA)  Do you have a Pulmonologist?: Yes    COPD Assessment  COPD Clinical Specialists ONLY  COPD Education Initiated: No--Quick Screen (Pt has hx of Asthma not COPD, is followed by Pulmonary has an appointment 3/14/24 at 1400 w/Derek Nogueira, smoked for 5 yrs quit in 1968)    PFT Results    No results found for: \"PFT\"    Meds to Beds  Would the patient like to opt in for Bedside Medication Delivery at Discharge?: No     MY COPD ACTION PLAN     It is recommended that patients and physicians /healthcare providers complete this action plan together. This plan should be discussed at each physician visit and updated as needed.    The green, yellow and red zones show groups of symptoms of COPD. This list of symptoms is not comprehensive, and you may experience other symptoms. In the \"Actions\" column, your healthcare provider has recommended actions for you to take based on your symptoms.    Patient Name: Yuri De León   YOB: 1945   Last Updated on:     Green Zone:  I am doing well today Actions     Usual activitiy and exercise level   Take daily medications     Usual amounts of cough and phlegm/mucus   Use oxygen as prescribed     Sleep well at night   Continue regular exercise/diet plan     Appetite is good   At all times avoid cigarette smoke, inhaled irritants     Daily Medications (these medications are taken every day):                Yellow Zone:  I am having a bad day or a COPD flare Actions     More breathless than usual   Continue daily medications     I have less energy for my daily activities   Use quick relief inhaler as ordered     Increased or " "thicker phlegm/mucus   Use oxygen as prescribed     Using quick relief inhaler/nebulizer more often   Get plenty of rest     Swelling of ankles more than usual   Use pursed lip breathing     More coughing than usual   At all times avoid cigarette smoke, inhaled irritants     I feel like I have a \"chest cold\"     Poor sleep and my symptoms woke me up     My appetite is not good     My medicine is not helping      Call provider immediately if symptoms don’t improve     Continue daily medications, add rescue medications:               Medications to be used during a flare up, (as Discussed with Provider):              Red Zone:  I need urgent medical care Actions     Severe shortness of breath even at rest   Call 911 or seek medical care immediately     Not able to do any activity because of breathing      Fever or shaking chills      Feeling confused or very drowsy       Chest pains      Coughing up blood                  "

## 2023-10-27 NOTE — CARE PLAN
The patient is Stable - Low risk of patient condition declining or worsening    Shift Goals  Clinical Goals: monitor I/Os, IV antibiotics  Patient Goals: drink water, utilize urinal    Progress made toward(s) clinical / shift goals:    Problem: Knowledge Deficit - Standard  Goal: Patient and family/care givers will demonstrate understanding of plan of care, disease process/condition, diagnostic tests and medications  Outcome: Progressing     Problem: Skin Integrity  Goal: Skin integrity is maintained or improved  Outcome: Progressing  Note: Wound care consult placed, patient educated on wound prevention.     Problem: Fall Risk  Goal: Patient will remain free from falls  Outcome: Progressing

## 2023-10-27 NOTE — THERAPY
Physical Therapy Contact Note    Patient Name: Yuri De León  Age:  78 y.o., Sex:  male  Medical Record #: [id number]  Today's Date: 10/27/2023       10/27/23 0849   Interdisciplinary Plan of Care Collaboration   IDT Collaboration with  Nursing;Physician   Collaboration Comments PT order received. Attempted therapy evaluation, however, physician recommending to hold for now due to significant SOB and difficulty breathing. Will re-attempt once more appropriate.

## 2023-10-27 NOTE — PROGRESS NOTES
Pharmacy Vancomycin Kinetics Note for 10/27/2023     78 y.o. male on Vancomycin day # 1     Vancomycin Indication (AUC Dosing): Skin/skin structure infection    Provider specified end date: 11/01/23    Active Antibiotics (From admission, onward)      Ordered     Ordering Provider       Fri Oct 27, 2023  3:36 AM    10/27/23 0336  vancomycin 1000 mg in 250 mL NS IVPB Premix  (vancomycin (VANCOCIN) IV (LD + Maintenance))  EVERY 24 HOURS         Sanjay Guillory D.O.       Fri Oct 27, 2023  1:21 AM    10/27/23 0121  cefTRIAXone (Rocephin) 1,000 mg in  mL IVPB  EVERY 24 HOURS         Sanjay Guillory D.O.    10/27/23 0121  MD Alert...Vancomycin per Pharmacy  PRN        Question:  Indication(s) for vancomycin?  Answer:  Skin and soft tissue infection    Sanjay Guillory D.O.            Dosing Weight: 121 kg (266 lb 12.1 oz)      Admission History: Admitted on 10/27/2023 for Cellulitis [L03.90]  Pertinent history: Pt is 77 yo male w/cellulitis on ceftriaxone and vancomycin. Last dose of vancomycin 1000 mg @2209 on 10/26.    Allergies:     Atenolol, Tetanus toxoid, and Atorvastatin     Pertinent cultures to date:     Results       Procedure Component Value Units Date/Time    URINALYSIS [[id number]]  (Abnormal) Collected: 10/27/23 0236    Order Status: Completed Specimen: Urine, Clean Catch Updated: 10/27/23 0254     Color Yellow     Character Cloudy     Specific Gravity 1.010     Ph 5.5     Glucose Negative mg/dL      Ketones Negative mg/dL      Protein Negative mg/dL      Bilirubin Negative     Nitrite Positive     Leukocyte Esterase Moderate     Occult Blood Small     Micro Urine Req Microscopic    Narrative:      Collected By: [id number] CALEB ALLEN E  Release to patient->Immediate            Labs:     Estimated Creatinine Clearance: 38.2 mL/min (A) (by C-G formula based on SCr of 2.11 mg/dL (H)).  Recent Labs     10/27/23  0205   WBC 12.1*   NEUTSPOLYS 88.40*     Recent Labs     10/27/23  0205   BUN 52*   CREATININE  "2.11*   ALBUMIN 3.6       Intake/Output Summary (Last 24 hours) at 10/27/2023 0337  Last data filed at 10/27/2023 0325  Gross per 24 hour   Intake 800 ml   Output 350 ml   Net 450 ml      /80   Pulse (!) 125   Temp 36.8 °C (98.2 °F) (Oral)   Resp (!) 24   Ht 1.803 m (5' 11\")   Wt 121 kg (267 lb 3.2 oz)   SpO2 97%  Temp (24hrs), Av.8 °C (98.2 °F), Min:36.8 °C (98.2 °F), Max:36.8 °C (98.2 °F)      List concerns for Vancomycin clearance:     Obesity;BUN/Scr ratio greater than 20:1;Age    Pharmacokinetics:     AUC kinetics:   Ke (hr ^-1): 0.0361 hr^-1  Half life: 19.2 hr  Clearance: 1.882  Estimated TDD: 941  Estimated Dose: 831  Estimated interval: 21.2    A/P:     -  Vancomycin dose: 1000 mg IV q24h (@2200)     -  Next vancomycin level(s): None ordered    -  Predicted vancomycin AUC from initial AUC test calculator: 531 mg·hr/L    -  Comments: Concerns for renal accumulation of vancomycin listed above. CKD stage 4. Pharmacy will continue to follow.     Joycelyn Paiz, PharmD    "

## 2023-10-27 NOTE — PROGRESS NOTES
4 Eyes Skin Assessment Completed by CASSI Dexter and CASSI Cox.    Head Scab and Redness  Ears Redness and Blanching  Nose WDL  Mouth WDL  Neck WDL  Breast/Chest WDL  Shoulder Blades WDL  Spine Redness and Blanching  (R) Arm/Elbow/Hand Bruising and Scab, tear on forearm  (L) Arm/Elbow/Hand Bruising and Scab, healing tear on elbow  Abdomen Scab  Groin Redness and Excoriation  Scrotum/Coccyx/Buttocks Redness, Non-Blanching, and Discoloration  (R) Leg Redness, Rash, Scar, Scab, Swelling, and Edema  (L) Leg Redness, Rash, Scar, Scab, Swelling, and Edema  (R) Heel/Foot/Toe Redness, Blanching, Ulcer(s), Scab, and Edema  (L) Heel/Foot/Toe Redness, Blanching, Scab, and Edema      Scabs on face.     Devices In Places Tele Box, Pulse Ox, and Nasal Cannula      Interventions In Place Gray Ear Foams, Pillows, and Q2 Turns    Possible Skin Injury Yes    Pictures Uploaded Into Epic Yes  Wound Consult Placed Yes  RN Wound Prevention Protocol Ordered Yes

## 2023-10-27 NOTE — PROGRESS NOTES
0054 patient arrived to the unit via EMS. Patient was transferred to the hospital bed utilizing the sliding board. Patient's vital signs taken and telemetry monitor placed. Patient's admission profile and assessment completed. Patient was placed on a continuous pulse oximeters. Pressure ulcer prevention discussed with patient. Patient refused q2h turns. Importance of turning emphasized and education provided. Charge RN notified and wound consult placed. Medication, diet, mobility, and the plan of care for the evening reviewed. All patient's needs and questions addressed at this time.

## 2023-10-27 NOTE — PROGRESS NOTES
[facility] Medicine Daily Progress Note    Date of Service  10/27/2023    Discussion participants:  patient and spouse, son and daughter     The patient wishes to discuss Advanced Care Planning today and the following is a brief summary of our discussion.     Patient has capacity to make their own medical decisions.  Health Care Agent/Surrogate Decision Maker documented in chart.     Documents of Advance Directives:  None     Communication of relevant diagnoses:   Principal Problem:    Cellulitis (POA: Yes)  Active Problems:    Acute hypoxemic respiratory failure due to COVID-19 (HCC) (POA: Yes)    Atrial fibrillation, chronic (HCC) (POA: Yes)    Chronic heart failure with preserved ejection fraction (HCC) (POA: Yes)    Pneumonia of right lower lobe due to infectious organism (POA: Yes)    Type 2 diabetes mellitus with kidney complication, without long-term current use of insulin (HCC) (POA: Yes)    CKD (chronic kidney disease) stage 4, GFR 15-29 ml/min (CMS-HCC) (POA: Yes)    S/P coronary artery stent placement (POA: Yes)      Overview: Mid right coronary artery with       overlapping stents:4.5x18 mm Ultra non-drug eluting stent and 4.0x38 mm       Xience drug-eluting stent     Pulmonary hypertension (HCC) (POA: Yes)    Chronic respiratory failure with hypoxia (HCC) (POA: Yes)    BPH (benign prostatic hyperplasia) (POA: Yes)    Advanced care planning/counseling discussion (POA: Yes)    Prolonged QT interval (POA: Yes)  Resolved Problems:    * No resolved hospital problems. *      Communication of prognosis:  guarded     Communication of treatment goals/options: continue current plan and change of code status     Treatment decisions:  We discussed patient's current diagnosis, prognosis and goals of care. I discussed code status, medical interventions, nutritional interventions, risks and benefits of any therapies that could be offered. In addition, we discussed about palliation and hospice, as appropriate.    Code  status:    Patient wished to be DNR/DNI   Family agreed on patient's wishes  I discussed the [place] POLST form with the patient.   POLST form not signed but patient and family given an original copy to read over.    Time statement:  I spent a total of 17 minutes with the patient directly at bedside, exclusive of evaluation and management or other separately billable procedures.

## 2023-10-27 NOTE — THERAPY
Occupational Therapy Contact Note    Patient Name: Yuri De León  Age:  78 y.o., Sex:  male  Medical Record #: [id number]  Today's Date: 10/27/2023    Discussed missed therapy with RN and PT       10/27/23 4529   Initial Contact Note    Initial Contact Note Order Received and Verified, Occupational Therapy Evaluation in Progress with Full Report to Follow.   Interdisciplinary Plan of Care Collaboration   IDT Collaboration with  Nursing;Physical Therapist   Collaboration Comments OT order received.  Consult w/ PT and RN - recommending hold secondary medically unstable.  Recommended follow up tomorrow for OT Eval.

## 2023-10-27 NOTE — PROGRESS NOTES
Hospital Medicine Daily Progress Note    Date of Service  10/27/2023    Chief Complaint  Yuri De León is a 78 y.o. male admitted 10/27/2023 with   RLE swelling, Transferred from [facility] ([place], [place])    Hospital Course  No notes on file    Interval Problem Update    I reviewed last echocardiogram 5/19/2023, LVEF 60%, underlying IVCD, RV dilated, enlarged RA, severely dilated LA, mild mitral regurgitation no stenosis, mild aortic insufficiency no stenosis, moderate tricuspid regurgitation, RVSP 60 to 65 mmHg (severe pulmonary hypertension), aortic root 3.8 cm, no pericardial effusion    - I examined chest x-ray, shows cardiomegaly, possible right lower lobe pneumonia with effusion versus pulmonary edema  -Patient on 4 L nasal cannula in A-fib with RVR  -I ordered INR, magnesium, phosphorus, procalcitonin levels.  - Creatinine 2.11, baseline between 2-2.6.  -I ordered echocardiogram  -- Ceftriaxone for RLE cellulitis and UTI, had recent TURP and Gonzalez catheter  -- I ordered covid swab, returned positive.    I have discussed this patient's plan of care and discharge plan at IDT rounds today with Case Management, Nursing, Nursing leadership, and other members of the IDT team.    Consultants/Specialty  none    Code Status  DNAR/DNI    Disposition  Medically Cleared  I have placed the appropriate orders for post-discharge needs.    Review of Systems  ROS     Physical Exam  Temp:  [36.8 °C (98.2 °F)-37.3 °C (99.2 °F)] 36.8 °C (98.2 °F)  Pulse:  [106-125] 113  Resp:  [20-24] 20  BP: (116-133)/(70-80) 125/72  SpO2:  [96 %-98 %] 96 %    Physical Exam  Constitutional:       General: He is in acute distress.      Appearance: He is ill-appearing.   Pulmonary:      Effort: Respiratory distress present.      Breath sounds: Wheezing, rhonchi and rales present.      Comments: On 4LNC  Abdominal:      General: There is distension.      Comments: Abdominal wall edema +   Musculoskeletal:      Right  lower leg: Edema present.      Left lower leg: Edema present.   Skin:     Capillary Refill: Capillary refill takes 2 to 3 seconds.   Neurological:      Mental Status: He is alert.      Motor: Weakness present.   Psychiatric:         Mood and Affect: Mood normal.         Behavior: Behavior normal.         Fluids    Intake/Output Summary (Last 24 hours) at 10/27/2023 1223  Last data filed at 10/27/2023 0900  Gross per 24 hour   Intake 1160 ml   Output 350 ml   Net 810 ml       Laboratory  Recent Labs     10/27/23  0205   WBC 12.1*   RBC 4.32*   HEMOGLOBIN 12.4*   HEMATOCRIT 39.9*   MCV 92.4   MCH 28.7   MCHC 31.1*   RDW 52.6*   PLATELETCT 197   MPV 10.1     Recent Labs     10/27/23  0205   SODIUM 138   POTASSIUM 4.4   CHLORIDE 101   CO2 22   GLUCOSE 227*   BUN 52*   CREATININE 2.11*   CALCIUM 9.0     Recent Labs     10/27/23  0205   INR 1.80*               Imaging  DX-CHEST-PORTABLE (1 VIEW)   Final Result         1.  Pulmonary edema and/or infiltrates.   2.  Trace right pleural effusion   3.  Cardiomegaly   4.  Atherosclerosis      EC-ECHOCARDIOGRAM COMPLETE W/O CONT    (Results Pending)        Assessment/Plan  * Cellulitis- (present on admission)  Assessment & Plan  Patient does have significant bilateral leg, right greater than left cellulitis  Erythema is bilateral lower extremities as well as medial aspect of the right thigh  Start vancomycin and Rocephin  ERP at outside facility did mention an abnormal urinalysis, Rocephin will cover for potential UTI as well but will obtain a urinalysis  No sepsis noted at the outside facility, repeat labs pending    Pneumonia of right lower lobe due to infectious organism- (present on admission)  Assessment & Plan  CXR with RLL findings  procal 0.44  On Ceftriaxone  Changed vanco to linezolid due to renal function  Unable to use azithromycin QTc 508ms  Unable to use doxycycline due to warfarin interaction  -- I ordered covid swab, returned positive.    Chronic heart failure  with preserved ejection fraction (HCC)- (present on admission)  Assessment & Plan  With acute exacerbation resulting in worsening bilateral lower extremity edema  Due to covid  Change from his usual p.o. Lasix which is 40 mg twice daily to IV Lasix 40 mg twice daily  Monitor patient on telemetry  No troponin obtained at the outside facility, obtain a troponin as well as an EKG    I reviewed last echocardiogram 5/19/2023, LVEF 60%, underlying IVCD, RV dilated, enlarged RA, severely dilated LA, mild mitral regurgitation no stenosis, mild aortic insufficiency no stenosis, moderate tricuspid regurgitation, RVSP 60 to 65 mmHg (severe pulmonary hypertension), aortic root 3.8 cm, no pericardial effusion  -I ordered new echocardiogram    Atrial fibrillation, chronic (HCC)- (present on admission)  Assessment & Plan  Acute on chronic  Rate 125 presentation to ED.    On Cardizem at home    Continue home Coumadin  Monitor on telemetry  Metoprolol PRN    Acute hypoxemic respiratory failure due to COVID-19 (HCC)- (present on admission)  Assessment & Plan  Acute on chronic  In labored breathing, tachypneic, one word sentences  CXR findings consistent with pnuemonia, right pleural effusion  - on higher oxygen 4LNC than home 2LNC    -- I ordered covid swab, returned positive.    Prolonged QT interval- (present on admission)  Assessment & Plan  qtc 508ms, chronic    Advanced care planning/counseling discussion- (present on admission)  Assessment & Plan  DNR/DNI requested by patient    BPH (benign prostatic hyperplasia)- (present on admission)  Assessment & Plan  Patient did have a recent TURP  Continue home Proscar and Flomax  Monitor urinary output  I ordered bladder scan    Chronic respiratory failure with hypoxia (HCC)- (present on admission)  Assessment & Plan  Patient generally is on 2 L of oxygen, good saturations on this oxygen level    Pulmonary hypertension (HCC)- (present on admission)  Assessment & Plan  RVSP 60 to  65 mmHg (severe pulmonary hypertension)    S/P coronary artery stent placement- (present on admission)  Assessment & Plan  Hx of    CKD (chronic kidney disease) stage 4, GFR 15-29 ml/min (CMS-[facility])- (present on admission)  Assessment & Plan  Await labs    Type 2 diabetes mellitus with kidney complication, without long-term current use of insulin ([facility])- (present on admission)  Assessment & Plan  Start insulin sliding scale  Adjust as needed  - I ordered A1c, prior on record show uncontrolled T2DM         VTE prophylaxis: Warfarin    I have performed a physical exam and reviewed and updated ROS and Plan today (10/27/2023). In review of yesterday's note (10/26/2023), there are no changes except as documented above.

## 2023-10-27 NOTE — ASSESSMENT & PLAN NOTE
"Patient wished to be DNR/DNI.  Due to multiple comorbidities and deterioration, and extensive goals of care discussion , patient decided to transition to comfort care.  Per previous previous MD: \"discussed the [place] POLST form with the patient. POA: name Avelino De León wife  I discussed the [place] POLST form with the patient. POLST form was signed by the POA and uploaded to Epic EMR\".  "

## 2023-10-27 NOTE — PROGRESS NOTES
Inpatient Anticoagulation Service Note for 10/27/2023    Reason for Anticoagulation: Atrial Fibrillation   [id number] VASc Score: 5  HAS-BLED Score: 2    Hemoglobin Value: (!) 12.4  Hematocrit Value: (!) 39.9  Lab Platelet Value: 197  Target INR: 2.0 to 3.0    INR from last 7 days       None          Dose from last 7 days       Date/Time Dose (mg)    10/27/23 0200 2.5          Average Dose (mg): 27.5  Significant Interactions: Antibiotics, Antiplatelet Medications  Bridge Therapy: No     Reversal Agent Administered: Not Applicable  Comments: Pt is 77 yo male on warfarin 2.5 mg (1/2 tablet) on Mon/Wed/Friday and 5 mg (1 tablet) on Tues/Thurs/Sat/Sun for afib. Last dose 10/26 2200. INR therapeutic at 2.1 on 10/26 at other facility.    Plan: Warfarin 2.5 mg tonight  Education Material Provided?: No    Pharmacist suggested discharge dosing: TBD (Likely home dosing continued)     Joycelyn Paiz, PharmD

## 2023-10-27 NOTE — ASSESSMENT & PLAN NOTE
Acute exacerbation secondary to pneumonia. Echocardiogram from 10/27/2023 showed LVEF of 55 to 60%, severely dilated left ventricle with reduced systolic function, RVSP of about 80 mmHg, severe tricuspid regurgitation.  Comfort care

## 2023-10-28 NOTE — PROGRESS NOTES
Inpatient Anticoagulation Service Note    Date: 10/28/2023    Reason for Anticoagulation: Atrial Fibrillation   Target INR: 2.0 to 3.0  [id number] VASc Score: 5  HAS-BLED Score: 2   Hemoglobin Value: (!) 12.3  Hematocrit Value: (!) 39.2  Lab Platelet Value: 198    INR from last 7 days       Date/Time INR Value    10/27/23 0205 1.8          Dose from last 7 days       Date/Time Dose (mg)    10/28/23 0800 5    10/27/23 0200 2.5          Average Dose (mg): 27.5  Significant Interactions: Antibiotics, Antiplatelet Medications  Bridge Therapy: No (If less than 5 days and overlap therapy discontinued -- document reason (i.e. Bleed Risk))    (If still on overlap therapy, if No -- document reason (i.e. Bleed Risk))    Reversal Agent Administered: Not Applicable  Comments: Pt is 79 yo male on warfarin 2.5 mg (1/2 tablet) on Mon/Wed/Friday and 5 mg (1 tablet) on Tues/Thurs/Sat/Sun for afib. Last dose 10/26 2200. INR therapeutic at 2.1 on 10/26.    Plan:  Will give warfarin 5 mg po tonight for INR of 1.8  Education Material Provided?: No  Pharmacist suggested discharge dosing: Resume home regimen when appropriate     Clinton Lares, [facility]

## 2023-10-28 NOTE — WOUND TEAM
[facility]  Inpatient Services  Wound and Skin Care Brief Evaluation    Admission Date: 10/27/2023     Last order of IP CONSULT TO WOUND CARE was found on 10/27/2023 from Hospital Encounter on 10/26/2023     HPI, PMH, SH: Reviewed    No chief complaint on file.    Diagnosis: Cellulitis [L03.90]    Unit where seen by Wound Team: 3307/00     Wound consult placed regarding sacrum, RFA skin tear, and R foot. Chart and images reviewed. This discussed with bedside RN. This clinician in to assess patient. Patient pleasant and agreeable. Sacrum with mild redness, however all areas blanching. Pt is also ambulatory. BLE with erythema and edema, likely cellulitis. Otherwise no open wounds. RFA skin tear resolving, can leave open to air.    No pressure injuries or advanced wound care needs identified. Wound consult completed. No further follow up unless indicated and consulted.          PREVENTATIVE INTERVENTIONS:    Q shift Darrick - performed per nursing policy  Q shift pressure point assessments - performed per nursing policy    Surface/Positioning  Reposition q 2 hours - Currently in Place  Waffle cushion - Currently in Place  Waffle overlay  - Currently in Place    Offloading/Redistribution  Sacral offloading dressing (Silicone dressing) - Ordered  Heel offloading dressing (Silicone dressing) - Ordered      Mobilization      Up to chair and Ambulate

## 2023-10-28 NOTE — CARE PLAN
The patient is Stable - Low risk of patient condition declining or worsening    Shift Goals  Clinical Goals: oxygen saturation above 90%  Patient Goals: change diet    Progress made toward(s) clinical / shift goals:    Problem: Knowledge Deficit - Standard  Goal: Patient and family/care givers will demonstrate understanding of plan of care, disease process/condition, diagnostic tests and medications  Outcome: Progressing     Problem: Skin Integrity  Goal: Skin integrity is maintained or improved  Outcome: Progressing     Problem: Fall Risk  Goal: Patient will remain free from falls  Outcome: Progressing

## 2023-10-28 NOTE — THERAPY
Physical Therapy   Initial Evaluation     Patient Name: Yuri De León  Age:  78 y.o., Sex:  male  Medical Record #: [id number]  Today's Date: 10/28/2023     Precautions  Precautions: (P) Fall Risk    Assessment  Patient is 78 y.o. male with a diagnosis of cellulitis,covid.Pt lives at home with wife and is not very active.2 lit of 02 at baseline.Acute PT needed to improve bed mob,transfers ,ambulation and stairs      Plan    Physical Therapy Initial Treatment Plan   Treatment Plan : (P) Bed Mobility, Gait Training, Neuro Re-Education / Balance, Stair Training, Therapeutic Activities, Therapeutic Exercise  Treatment Frequency: (P) 4 Times per Week    DC Equipment Recommendations: (P) None  Discharge Recommendations: (P)  (SNF V HH depending on progress)       Objective       10/28/23 1000   Charge Group   PT Evaluation PT Evaluation Mod   PT Self Care / Home Evaluation (Units) 1   Total Time Spent   PT Evaluation Time Spent (Mins) 30   PT Self Care/Home Evaluation Time Spent (Mins) 10   Initial Contact Note    Initial Contact Note Order Received and Verified, Physical Therapy Evaluation in Progress with Full Report to Follow.   Precautions   Precautions Fall Risk   Prior Living Situation   Prior Services None   Housing / Facility 1 Story House   Steps Into Home 2   Steps In Home 0   Equipment Owned Front-Wheel Walker   Lives with - Patient's Self Care Capacity Spouse   Prior Level of Functional Mobility   Bed Mobility Independent   Transfer Status Independent   Ambulation Independent   Ambulation Distance household   Assistive Devices Used Front-Wheel Walker   Stairs Independent   Cognition    Cognition / Consciousness WDL   Passive ROM Lower Body   Passive ROM Lower Body X   Active ROM Lower Body    Active ROM Lower Body  X   Strength Lower Body   Lower Body Strength  X   Coordination Lower Body    Coordination Lower Body  WDL   Balance Assessment   Sitting Balance (Static) Fair +   Sitting Balance (Dynamic) Fair  +   Standing Balance (Static) Fair   Standing Balance (Dynamic) Fair   Weight Shift Sitting Fair   Weight Shift Standing Fair   Bed Mobility    Supine to Sit Minimal Assist   Sit to Supine Minimal Assist   Gait Analysis   Gait Level Of Assist Contact Guard Assist   Assistive Device Front Wheel Walker   Distance (Feet) 20   # of Times Distance was Traveled 2   Deviation Bradykinetic   # of Stairs Climbed 0   Weight Bearing Status full   Functional Mobility   Sit to Stand Minimal Assist   Bed, Chair, Wheelchair Transfer Minimal Assist   Toilet Transfers Minimal Assist   Transfer Method Stand Step   How much difficulty does the patient currently have...   Turning over in bed (including adjusting bedclothes, sheets and blankets)? 3   Sitting down on and standing up from a chair with arms (e.g., wheelchair, bedside commode, etc.) 3   Moving from lying on back to sitting on the side of the bed? 3   How much help from another person does the patient currently need...   Moving to and from a bed to a chair (including a wheelchair)? 3   Need to walk in a hospital room? 3   Climbing 3-5 steps with a railing? 2   6 clicks Mobility Score 17   Activity Tolerance   Sitting in Chair > 1hr   Sitting Edge of Bed 10   Standing 7   Patient / Family Goals    Patient / Family Goal #1 not stated   Short Term Goals    Short Term Goal # 1 S with bed mob in 4 V   Short Term Goal # 2 S with transfers in 4 V   Short Term Goal # 3 S with amb x 100 feet in 4 V   Short Term Goal # 4 CGA x 2 stairs in 4 v   Physical Therapy Initial Treatment Plan    Treatment Plan  Bed Mobility;Gait Training;Neuro Re-Education / Balance;Stair Training;Therapeutic Activities;Therapeutic Exercise   Treatment Frequency 4 Times per Week   Problem List    Problems Impaired Bed Mobility;Impaired Transfers;Impaired Ambulation;Functional ROM Deficit;Functional Strength Deficit;Impaired Balance;Decreased Activity Tolerance   Anticipated Discharge Equipment and  Recommendations   DC Equipment Recommendations None   Discharge Recommendations   (SNF V HH depending on progress)   Interdisciplinary Plan of Care Collaboration   IDT Collaboration with  Nursing   Session Information   Date / Session Number  10/28-1 1/4 11/ 3

## 2023-10-28 NOTE — THERAPY
Occupational Therapy   Initial Evaluation     Patient Name: Yuri De León  Age:  78 y.o., Sex:  male  Medical Record #: [id number]  Today's Date: 10/28/2023     Precautions: Covid.  Fall Risk    Assessment  Patient is 78 y.o. male admitted with RLE pain, erythema. Diagnosis of cellulitis, Covid. Hx of BPH with recent TURP, AFib, Type 2 DM, chronic heart failure, CKD stage 4, home O2 at 2L, GLFs. Pt is A&Ox4, pleasant and cooperative. Gonzalez, O2 @2L NC in place. Education on role of OT, energy conservation, fall prec's, DME use and home safety during ADL's. Currently limited by decreased strength/endurance, impaired balance, LE pain to perform ADL's as at prior indep level; see below for CLOF. Lives with wife in [place]. OT will follow while in house, recommend post-acute upon dc at this time.          Plan  Occupational Therapy Initial Treatment Plan   Treatment Interventions: (P) Self Care / Activities of Daily Living, Neuro Re-Education / Balance, Therapeutic Exercises, Therapeutic Activity  Treatment Frequency: (P) 4 Times per Week  Duration: (P) Until Therapy Goals Met    DC Equipment Recommendations: (P) Grab Bar(s) in Tub / Shower, Tub / Shower Seat, Unable to determine at this time  Discharge Recommendations: (P) Recommend post-acute placement for additional occupational therapy services prior to discharge home     Subjective  Agreeable     Objective     10/28/23 1039   Prior Living Situation   Prior Services Intermittent Physical Support for ADL Per Family;None   Housing / Facility 1 Story House   Steps Into Home 2   Steps In Home 0   Bathroom Set up Walk In Shower   Equipment Owned Single Point Cane;Front-Wheel Walker;Oxygen   Lives with - Patient's Self Care Capacity Spouse   Comments Lives in [place]; reports being quite sedentary but indep and using SPC prior. Spouse is able to assist if needed. Grandson also available.   Prior Level of ADL Function   Self Feeding Independent   Grooming / Hygiene  Independent   Bathing Independent   Dressing Independent   Toileting Independent   Prior Level of IADL Function   Medication Management Unable To Determine At This Time   Laundry Unable To Determine At This Time   Kitchen Mobility Independent   Finances Unable To Determine At This Time   Home Management Unable To Determine At This Time   Shopping Unable To Determine At This Time   Prior Level Of Mobility Independent With Device in Home   Driving / Transportation Driving Independent  (short distances)   Occupation (Pre-Hospital Vocational) Retired Due To Age   Leisure Interests Family   History of Falls   History of Falls Yes   Precautions   Precautions Fall Risk   Vitals   O2 (LPM) 2   O2 Delivery Device Silicone Nasal Cannula   Pain 0 - 10 Group   Location Knee   Location Orientation Right   Therapist Pain Assessment Post Activity Pain Same as Prior to Activity;Nurse Notified   Cognition    Cognition / Consciousness WDL   Level of Consciousness Alert   Comments [place]   Passive ROM Upper Body   Passive ROM Upper Body WDL   Active ROM Upper Body   Active ROM Upper Body  WDL   Strength Upper Body   Upper Body Strength  X   Comments generalized weakness   Sensation Upper Body   Upper Extremity Sensation  Not Tested   Upper Body Muscle Tone   Upper Body Muscle Tone  WDL   Coordination Upper Body   Coordination WDL   Balance Assessment   Sitting Balance (Static) Good   Sitting Balance (Dynamic) Fair +   Standing Balance (Static) Fair   Standing Balance (Dynamic) Fair   Weight Shift Sitting Fair   Weight Shift Standing Fair   Bed Mobility    Supine to Sit Minimal Assist   ADL Assessment   Eating Supervision   Grooming Contact Guard Assist;Seated   Lower Body Dressing Maximal Assist   Toileting Maximal Assist   How much help from another person does the patient currently need...   Putting on and taking off regular lower body clothing? 2   Bathing (including washing, rinsing, and drying)? 2   Toileting, which includes using a  "toilet, bedpan, or urinal? 2   Putting on and taking off regular upper body clothing? 3   Taking care of personal grooming such as brushing teeth? 4   Eating meals? 4   6 Clicks Daily Activity Score 17   Functional Mobility   Sit to Stand Minimal Assist   Bed, Chair, Wheelchair Transfer Minimal Assist   Toilet Transfers Minimal Assist   Transfer Method Stand Step   Comments fww   Visual Perception   Visual Perception  Not Tested   Activity Tolerance   Sitting in Chair yes   Sitting Edge of Bed 20   Standing 2x3   Patient / Family Goals   Patient / Family Goal #1 Home   Short Term Goals   Short Term Goal # 1 ADL transfers with Spv within 5 days   Short Term Goal # 2 Toileting with Spv within 5 days   Short Term Goal # 3 FB dressing with Spv, AE as needed, within 5 days   Short Term Goal # 4 Grooming at sink, tolerate 6-8\", with SBA within 5 days   Education Group   Education Provided Home Safety;Activities of Daily Living;Adaptive Equipment   Role of Occupational Therapist Patient Response Patient;Family;Acceptance;Explanation;Verbal Demonstration   Home Safety Patient Response Patient;Family;Acceptance;Explanation;Verbal Demonstration   ADL Patient Response Patient;Family;Acceptance;Explanation;Action Demonstration   Adaptive Equipment Patient Response Patient;Family;Acceptance;Explanation;Verbal Demonstration   Occupational Therapy Initial Treatment Plan    Treatment Interventions Self Care / Activities of Daily Living;Neuro Re-Education / Balance;Therapeutic Exercises;Therapeutic Activity   Treatment Frequency 4 Times per Week   Duration Until Therapy Goals Met   Problem List   Problem List Decreased Active Daily Living Skills;Decreased Homemaking Skills;Decreased Upper Extremity Strength Right;Decreased Upper Extremity Strength Left;Decreased Functional Mobility;Decreased Activity Tolerance;Impaired Postural Control / Balance   Anticipated Discharge Equipment and Recommendations   DC Equipment Recommendations " Grab Bar(s) in Tub / Shower;Tub / Shower Seat;Unable to determine at this time   Discharge Recommendations Recommend post-acute placement for additional occupational therapy services prior to discharge home   Interdisciplinary Plan of Care Collaboration   IDT Collaboration with  Nursing;Certified Nursing Assistant;Family / Caregiver   Patient Position at End of Therapy Seated;Family / Friend in Room;Phone within Reach;Tray Table within Reach;Call Light within Reach   Collaboration Comments Aware of visit. DC planning.

## 2023-10-28 NOTE — CARE PLAN
The patient is Watcher - Medium risk of patient condition declining or worsening    Shift Goals  Clinical Goals: work with PT/OT, wound, monitor orientation  Patient Goals: work with PT    Progress made toward(s) clinical / shift goals:  Patient remained A&Ox4, denies hallucinations during the day.      Problem: Knowledge Deficit - Standard  Goal: Patient and family/care givers will demonstrate understanding of plan of care, disease process/condition, diagnostic tests and medications  Outcome: Progressing     Problem: Skin Integrity  Goal: Skin integrity is maintained or improved  Outcome: Progressing  Note: Wound care RN assessed patient today     Problem: Fall Risk  Goal: Patient will remain free from falls  Outcome: Progressing  Note: PT/OT worked with patient, recommendations charted. Patient now able to ambulate with 1-2 assist with FWW to commode.        Patient is not progressing towards the following goals:

## 2023-10-28 NOTE — PROGRESS NOTES
Telemetry Shift Summary     Rhythm A Fib  HR Range 104-130  Ectopy r PVC  Measurements    Per strip printed 0400     Normal Values  Rhythm SR  HR Range    Measurements 0.12-0.20 / 0.06-0.10  / 0.30-0.52

## 2023-10-28 NOTE — PROGRESS NOTES
Telemetry Shift Summary    Rhythm afib  HR Range   Ectopy rPVC, rTrig  Measurements -/0.14/-        Normal Values  Rhythm SR  HR Range    Measurements 0.12-0.20 / 0.06-0.10  / 0.30-0.52

## 2023-10-28 NOTE — PROGRESS NOTES
1909 received report and assumed patient care. Family at bedside.     2020 patient assessment completed. Patient discussed feeling better than yesterday and requested home medication for his breathing. Patient informed that the night MD would be notified. Medications, q2h turns, mobility, diet, and the plan of care for the evening reviewed. All patient's needs and questions addressed at this time.     Dr. Guillory notified of patient's request for Symbicort. New orders.placed.

## 2023-10-28 NOTE — PROGRESS NOTES
Hospital Medicine Daily Progress Note    Date of Service  10/28/2023    Chief Complaint  Yuri De León is a 78 y.o. male admitted 10/27/2023 with   RLE swelling, Transferred from [facility] ([place], [place])    Hospital Course  No notes on file    Interval Problem Update  10/27:  - I examined chest x-ray, shows cardiomegaly, possible right lower lobe pneumonia with effusion versus pulmonary edema  -Patient on 4 L nasal cannula in A-fib with RVR  -I ordered INR, magnesium, phosphorus, procalcitonin levels.  - Creatinine 2.11, baseline between 2-2.6.  -I ordered echocardiogram  -- Ceftriaxone for RLE cellulitis and UTI, had recent TURP and Gonzalez catheter  -- I ordered covid swab, returned positive.    10/28:  Patient felt he had hallucinations, likely decadron. Holding steroid.  Patient still in A-fib with RVR, increasing diltiazem  - Continue ceftriaxone and linezolid  - Continue dexamethasone, oxygen support for COVID-19  - Continue IV Lasix for heart failure and pulmonary hypertension  - labs WBC 11.2, K 5.3 stopping KCL, Cr 2.25, Mag 2.3, BNP 4929  -- Glucose 250+, I increased glargine to 15units and added premeal insulin, and increased ISS.  - I reviewed new echocardiogram which showed LVEF 55 to 65%, abnormal septal wall motion, severely dilated RA, severely dilated LA, mild mitral regurgitation, RVSP 80 mmHg, trace pulmonic insufficiency, no pericardial effusion    I have discussed this patient's plan of care and discharge plan at IDT rounds today with Case Management, Nursing, Nursing leadership, and other members of the IDT team.    Consultants/Specialty  none    Code Status  DNAR/DNI    Disposition  The patient is not medically cleared for discharge to home or a post-acute facility.      I have placed the appropriate orders for post-discharge needs.    Review of Systems  Review of Systems   Constitutional:  Positive for malaise/fatigue. Negative for chills and fever.   Respiratory:   Positive for shortness of breath. Negative for cough.    Cardiovascular:  Negative for chest pain and palpitations.   Gastrointestinal:  Negative for abdominal pain, constipation, diarrhea, nausea and vomiting.   Musculoskeletal:  Negative for back pain and joint pain.   Neurological:  Positive for weakness. Negative for dizziness and headaches.   All other systems reviewed and are negative.       Physical Exam  Temp:  [36.3 °C (97.4 °F)-36.6 °C (97.9 °F)] 36.3 °C (97.4 °F)  Pulse:  [] 94  Resp:  [20-24] 20  BP: (116-139)/(59-79) 116/68  SpO2:  [92 %-95 %] 92 %    Physical Exam  Vitals and nursing note reviewed.   Constitutional:       General: He is in acute distress.      Appearance: He is ill-appearing. He is not diaphoretic.   HENT:      Head: Normocephalic and atraumatic.      Right Ear: External ear normal.      Left Ear: External ear normal.      Mouth/Throat:      Mouth: Mucous membranes are dry.      Pharynx: No oropharyngeal exudate.   Eyes:      General: No scleral icterus.     Extraocular Movements: Extraocular movements intact.   Cardiovascular:      Rate and Rhythm: Normal rate and regular rhythm.      Pulses: Normal pulses.      Heart sounds: Normal heart sounds. No murmur heard.     Comments: Difficult to auscultate due to body habitus, distant heart sounds  Pulmonary:      Effort: Respiratory distress present.      Breath sounds: Wheezing and rhonchi present.      Comments: On 2LNC  Abdominal:      General: Bowel sounds are normal. There is no distension.      Tenderness: There is no abdominal tenderness.   Genitourinary:     Comments: Gonzalez in place  Musculoskeletal:         General: No swelling or tenderness. Normal range of motion.      Right lower leg: Edema present.      Left lower leg: Edema present.   Skin:     General: Skin is warm.      Capillary Refill: Capillary refill takes less than 2 seconds.      Coloration: Skin is not jaundiced or pale.   Neurological:      Mental Status: He  is alert and oriented to person, place, and time. Mental status is at baseline.      Motor: Weakness present.   Psychiatric:         Mood and Affect: Mood normal.         Behavior: Behavior normal.         Fluids    Intake/Output Summary (Last 24 hours) at 10/28/2023 1536  Last data filed at 10/28/2023 0700  Gross per 24 hour   Intake --   Output 1380 ml   Net -1380 ml       Laboratory  Recent Labs     10/27/23  0205 10/28/23  0244   WBC 12.1* 11.2*   RBC 4.32* 4.19*   HEMOGLOBIN 12.4* 12.3*   HEMATOCRIT 39.9* 39.2*   MCV 92.4 93.6   MCH 28.7 29.4   MCHC 31.1* 31.4*   RDW 52.6* 53.3*   PLATELETCT 197 198   MPV 10.1 10.6     Recent Labs     10/27/23  0205 10/28/23  0244   SODIUM 138 138   POTASSIUM 4.4 5.3   CHLORIDE 101 102   CO2 22 22   GLUCOSE 227* 312*   BUN 52* 60*   CREATININE 2.11* 2.25*   CALCIUM 9.0 8.9     Recent Labs     10/27/23  0205   INR 1.80*               Imaging  EC-ECHOCARDIOGRAM COMPLETE W/O CONT   Final Result      DX-CHEST-PORTABLE (1 VIEW)   Final Result         1.  Pulmonary edema and/or infiltrates.   2.  Trace right pleural effusion   3.  Cardiomegaly   4.  Atherosclerosis           Assessment/Plan  * Cellulitis- (present on admission)  Assessment & Plan  Patient does have significant bilateral leg, right greater than left cellulitis  Erythema is bilateral lower extremities as well as medial aspect of the right thigh    Continue ceftriaxone and linezolid    Pneumonia of right lower lobe due to infectious organism- (present on admission)  Assessment & Plan  CXR with RLL findings  procal 0.44    Unable to use azithromycin QTc 508ms  Unable to use doxycycline due to warfarin interaction  -- I ordered covid swab, returned positive.    -Continue ceftriaxone and linezolid    Chronic heart failure with preserved ejection fraction (HCC)- (present on admission)  Assessment & Plan  With acute exacerbation resulting in worsening bilateral lower extremity edema  Due to covid    -- last echocardiogram  5/19/2023, LVEF 60%, underlying IVCD, RV dilated, enlarged RA, severely dilated LA, mild mitral regurgitation no stenosis, mild aortic insufficiency no stenosis, moderate tricuspid regurgitation, RVSP 60 to 65 mmHg (severe pulmonary hypertension), aortic root 3.8 cm, no pericardial effusion    I reviewed new echocardiogram which showed LVEF 55 to 65%, abnormal septal wall motion, severely dilated RA, severely dilated LA, mild mitral regurgitation, RVSP 80 mmHg, trace pulmonic insufficiency, no pericardial effusion  Continue IV lasix BID  Continue telemetry    Atrial fibrillation, chronic (HCC)- (present on admission)  Assessment & Plan  Acute on chronic  Rate 125 presentation to ED.    On Cardizem at home  Due to covid and HFpEF exacerbation    Continue warfarin  INR goal 2-3, monitor for any signs of hemorrhage  Monitor on telemetry  Continue diltiazem  Holding beta-blockers due to acute CHF    Acute hypoxemic respiratory failure due to COVID-19 (HCC)- (present on admission)  Assessment & Plan  Acute on chronic  In labored breathing, tachypneic, one word sentences  CXR findings consistent with pnuemonia, right pleural effusion  - on higher oxygen 4LNC than home 2LNC  - positive PCR 10/27  - started on dexamethasone, pt c/o of hallucinations, will hold.   - continue oxygen support, IV lasix  - continue isolation    Prolonged QT interval- (present on admission)  Assessment & Plan  qtc 508ms, chronic    Advanced care planning/counseling discussion- (present on admission)  Assessment & Plan  I spent extra time discussing with patient about advanced care planning.    I spent a total of 17 minutes with the patient directly at bedside, and discussed patient's current diagnosis, prognosis and goals of care. I discussed code status, medical interventions, nutritional interventions.    Patient wished to be DNR/DNI  I discussed the [place] POLST form with the patient.  POA: name Avelino De León wife  I discussed the [place]  POLST form with the patient. POLST form was signed by the POA and uploaded to Epic EMR.       BPH (benign prostatic hyperplasia)- (present on admission)  Assessment & Plan  Patient did have a recent TURP  Continue home Proscar and Flomax  Monitor urinary output    Patient has acute urinary retention, Gonzalez catheter ordered and placed    Chronic respiratory failure with hypoxia (HCC)- (present on admission)  Assessment & Plan  Patient generally is on 2 L of oxygen, good saturations on this oxygen level    Pulmonary hypertension (HCC)- (present on admission)  Assessment & Plan  RVSP 60 to 65 mmHg (severe pulmonary hypertension)    New echo shows worsening pulmonary hypertension, RVSP 80 mmHg  Continue aggressive diuretics    S/P coronary artery stent placement- (present on admission)  Assessment & Plan  Hx of    CKD (chronic kidney disease) stage 4, GFR 15-29 ml/min (CMS-[facility])- (present on admission)  Assessment & Plan  Await labs    Type 2 diabetes mellitus with kidney complication, without long-term current use of insulin ([facility])- (present on admission)  Assessment & Plan  T2DM, on home insulin, HbA1c 9.7  -Monitor Accuchecks TIDAC and Bedtime  -Insulin sliding scale, as per protocol  -Diabetic diet  -- Glucose 250+, I increased glargine to 15units and added premeal insulin, and increased ISS.         VTE prophylaxis:  therapeutic anticoagulation with coumadin dosing per pharmacy, adjust PRN    I have performed a physical exam and reviewed and updated ROS and Plan today (10/28/2023). In review of yesterday's note (10/27/2023), there are no changes except as documented above.      Total time spent 51 minutes. I spent greater than 50% of the time for patient care, counseling, and coordination on this date, including unit/floor time, and face-to-face time with the patient as per interval events, my own review of patient's imaging and lab analysis and developing my assessment and plan above.

## 2023-10-29 PROBLEM — J96.02 ACUTE HYPERCAPNIC RESPIRATORY FAILURE (HCC): Status: ACTIVE | Noted: 2023-01-01

## 2023-10-29 NOTE — CONSULTS
"[facility] -  CONSULTATION NOTE               Author: Sanjay Figueroa M.D. Date & Time: 10/29/2023  3:58 PM       REASON FOR CONSULTATION:   YANETH    CHIEF COMPLAINT:   \"edema\"    HISTORY OF PRESENT ILLNESS:    78 y.o. male who presented 10/27/2023 with a history of CKD stage 4 presumed second to DM, afib on coumadin, COPD (last FEV1 1.51L in 2013) on incruse ellipta and symbicort, HLD, PH (Group 2 and possible Group 3), HTN and sleep apnea admitted for edema. Unfortunately, he also has COVID and has decompensated from a resp status during the hospitalization. Despite appropriate and aggressive care, the patients U/O has decreased substantially. He has not responded to furosemide challenges      REVIEW OF SYSTEMS:    10 point ROS limited second to clincal condition    PAST MEDICAL HISTORY:   Past Medical History:   Diagnosis Date    Abnormal electrocardiogram 08/13/2010    Arrhythmia     Atrial Fibrillation; Cardiologist, Dr. Sorensen    Arthritis     knees, left hip    ASTHMA     inhalers    Atrial fibrillation (HCC) 10/25/2011    Bronchospasm 08/06/2009    Cancer (HCC)     skin cancer R post ear, removed    Cataract     IOL OU    Dental disorder     a few missing molars    Diabetes (HCC)     insulin    Heart valve disease     mitral regurg    Hemorrhagic disorder (HCC)     on blood thinners    High cholesterol     HTN (hypertension) 10/25/2011    Hypercholesteremia 10/25/2011    Long term (current) use of anticoagulants 10/25/2011    NASAL POLYPS 08/06/2009    Other nonspecific abnormal finding 08/06/2009    Pain     left hip    Pneumonia     Pulmonary hypertension (HCC)     Renal disorder     CKD stage 4    Shortness of breath     Sleep apnea 08/06/2009    No O2 or device, no retest    Wears glasses        PAST SURGICAL HISTORY:   Past Surgical History:   Procedure Laterality Date    PB ADJ TISS XFER HEAD,FAC,HAND 10.1-30 N/A 9/7/2022    Procedure: LOCAL FLAP RECONSTRUCTION WITH " "REMOVAL OF DENUDED AND DRIED BONE OF THE FOREHEAD;  Surgeon: Buzz Mcnamara M.D.;  Location: SURGERY [facility];  Service: Ent    KNEE ARTHROPLASTY TOTAL Left 10/2014    HIP ARTHROPLASTY TOTAL  2010    LEFT Performed by OSGOOD, PATRICK J at SURGERY [facility] ORS    LUMBAR DECOMPRESSION  1984    CATARACT EXTRACTION WITH IOL Bilateral     CYSTOSCOPY      cannot remember exact procedure    LAMINOTOMY      SINUSCOPE      SINUSOTOMIES  ,,2007       FAMILY HISTORY:   Family History   Problem Relation Age of Onset    Heart Disease Mother        SOCIAL HISTORY:   Social History     Tobacco Use   Smoking Status Former    Current packs/day: 0.00    Average packs/day: 0.5 packs/day for 10.0 years (5.0 ttl pk-yrs)    Types: Cigarettes    Start date: 12/10/1958    Quit date: 12/10/1968    Years since quittin.9   Smokeless Tobacco Never     Social History     Substance and Sexual Activity   Alcohol Use No     Social History     Substance and Sexual Activity   Drug Use No       HOME MEDICATIONS:   Reviewed and documented in chart    LABORATORY STUDIES:   Recent Labs     10/27/23  0205 10/28/23  0244 10/29/23  0118   SODIUM 138 138 134*   POTASSIUM 4.4 5.3 5.2   CHLORIDE 101 102 98   CO2 22 22 21   GLUCOSE 227* 312* 278*   BUN 52* 60* 69*   CREATININE 2.11* 2.25* 2.54*   CALCIUM 9.0 8.9 9.1       ALLERGIES:  Atenolol, Tetanus toxoid, and Atorvastatin    VS:  BP 94/78   Pulse 83   Temp (!) 35.7 °C (96.2 °F) (Axillary) Comment: Bear warmer on  Resp (!) 26   Ht 1.803 m (5' 11\")   Wt 117 kg (257 lb 15 oz)   SpO2 96%   BMI 35.98 kg/m²     Physical Exam  General: appears ill  HEENT: EOMI, no scleral icterus  Heart: irregular with murmur  Pulm: tachypnic and diminished  Abdomen: soft  Extremites: +3 edema with erythema  Derm: no rashes  Neuro: grossly non focal    FLUID BALANCE:  In: 800 [P.O.:800]  Out: 1280     IMAGING:  All imaging reviewed from admission to present day    IMPRESSION:  77 y/o man " has multiple issues including YANETH and hypervolemia.  #YANETH on advanced dM kdiney disease? Cardiorenal vs COVID-olgiruic  -U/S, urine studies  -trial of furosemide iv  #Hyponatremia second to volume  #Acidemia second to renal failure  #DM  #Right sided Heart Failure  #COVID  #Cellulits  #Pleural Effucions    PLAN:  - trial of furosmde drip/ metoazone  - imaging (hx of hydro) but unlikley  - urine stuides  - poor HD candidate  -renal dose abx    Thank you for the consultation!

## 2023-10-29 NOTE — ASSESSMENT & PLAN NOTE
On 2 L/min nasal cannula oxygen at home. ABG showed PaCO2 60. Pulmonology were consulted.  COVID positive.  Procalcitonin was elevated. Patient was transferred to the ICU due to increased oxygen requirement. He was transitioned to comfort care and transferred back to the floor

## 2023-10-29 NOTE — PROGRESS NOTES
12-hour chart check complete.    Monitor Summary  Rhythm: a fib w/BBB  Rate: 70-91  Ectopy: r PVC/coup  Measurements: --/0.16/--

## 2023-10-29 NOTE — CONSULTS
Critical Care Consultation    Date of consult: 10/29/2023    Referring Physician  Maggie Vivar M.D.    Reason for Consultation  Hypercapnic respiratory failure    History of Presenting Illness  78 y.o. male who presented 10/27/2023 with a history of afib on coumadin, COPD (last FEV1 1.51L in 2013) on incruse ellipta and symbicort, HLD, PH (Group 2 and possible Group 3), HTN and sleep apnea.  He is usually on 2L of supplemental oxygen by nasal cannula at home. Covid positive on 10/27, treated with steroids thus far.  He was on the floor today with increased work of breathing overnight.  ABG showed worsening hypercapnic respiratory failure.  Patient with diaphoresis.  CT scan with large right sided pleural effusion and small left with possible pneumonia on the right.  Initial plan was for bedside thoracentesis but the patient started looking better after HFNC and he was given warfarin last night and is therapeutic.  He is also on plavix.  We discussed the thoracentesis with his family and decided to defer for the time being although it will likely need to be done at some point.  Giving vitamin K and holding warfarin.  Patient complains of shortness of breath.  He is seeing people who are not present in the room but is A&Ox3.        Code Status  DNAR/DNI    Review of Systems  Review of Systems   Constitutional:  Positive for chills, diaphoresis and malaise/fatigue. Negative for fever.   HENT:  Negative for congestion.    Respiratory:  Positive for cough and shortness of breath. Negative for sputum production.    Cardiovascular:  Positive for leg swelling. Negative for chest pain and palpitations.   Gastrointestinal:  Negative for abdominal pain.   Neurological:  Positive for weakness. Negative for dizziness and headaches.       Past Medical History   has a past medical history of Abnormal electrocardiogram (08/13/2010), Arrhythmia, Arthritis, ASTHMA, Atrial fibrillation (HCC) (10/25/2011), Bronchospasm  (08/06/2009), Cancer ([facility]), Cataract, Dental disorder, Diabetes (HCC), Heart valve disease, Hemorrhagic disorder (HCC), High cholesterol, HTN (hypertension) (10/25/2011), Hypercholesteremia (10/25/2011), Long term (current) use of anticoagulants (10/25/2011), NASAL POLYPS (08/06/2009), Other nonspecific abnormal finding (08/06/2009), Pain, Pneumonia, Pulmonary hypertension (HCC), Renal disorder, Shortness of breath, Sleep apnea (08/06/2009), and Wears glasses.    He has no past medical history of Blood clotting disorder ([facility]).    Surgical History   has a past surgical history that includes sinusotomies (2003,2005,2/2007); lumbar decompression (1984); hip arthroplasty total (08/31/2010); laminotomy; sinuscope; knee arthroplasty total (Left, 10/2014); cataract extraction with iol (Bilateral); cystoscopy; and pr adj tiss xfer head,fac,hand 10.1-30 (N/A, 9/7/2022).    Family History  family history includes Heart Disease in his mother.    Social History   reports that he quit smoking about 54 years ago. His smoking use included cigarettes. He started smoking about 64 years ago. He has a 5.0 pack-year smoking history. He has never used smokeless tobacco. He reports that he does not drink alcohol and does not use drugs.    Medications  Home Medications       Reviewed by Guerita Pham R.N. (Registered Nurse) on 10/27/23 at 0054  Med List Status: Complete     Medication Last Dose Status   albuterol (PROVENTIL) 2.5mg/3ml Nebu Soln solution for nebulization  Active   clopidogrel (PLAVIX) 75 MG Tab 10/26/2023 Active   dilTIAZem (CARDIZEM) 60 MG Tab 10/26/2023 Active   Empagliflozin (JARDIANCE PO)  Active   finasteride (PROSCAR) 5 MG Tab 10/26/2023 Active   furosemide (LASIX) 40 MG Tab 10/26/2023 Active   INCRUSE ELLIPTA 62.5 MCG/ACT AEROSOL POWDER, BREATH ACTIVATED  Active   insulin aspart (NOVOLOG) 100 UNIT/ML Solution  Active   insulin detemir (LEVEMIR FLEXTOUCH) 100 UNIT/ML injection PEN 10/26/2023 Active   multivitamin  (THERAGRAN) Tab 10/26/2023 Active   oxybutynin (DITROPAN) 5 MG Tab 10/26/2023 Active   SYMBICORT 160-4.5 MCG/ACT Aerosol 10/26/2023 Active   tamsulosin (FLOMAX) 0.4 MG capsule 10/26/2023 Active   warfarin (COUMADIN) 5 MG Tab 10/26/2023 Active                  Current Facility-Administered Medications   Medication Dose Route Frequency Provider Last Rate Last Admin    Pharmacy Consult Request - C. difficile consult  1 Each Other PHARMACY TO DOSE Sanjay Guillory D.O.        piperacillin-tazobactam (Zosyn) 3.375 g in  mL IVPB  3.375 g Intravenous Q8HRS Kip Jimenez M.D. 25 mL/hr at 10/29/23 1132 3.375 g at 10/29/23 1132    albuterol inhaler 2 Puff  2 Puff Inhalation Q4HRS PRN Kip Jimenez M.D.        methylPREDNISolone sod succ (SOLU-MEDROL) 125 MG injection 62.5 mg  62.5 mg Intravenous Q6HRS Kip Jimenez M.D.        dexmedetomidine (PRECEDEX) 400 mcg/100mL NS premix infusion  0.1-1.5 mcg/kg/hr (Ideal) Intravenous Continuous Maggie Vivar M.D. 1.9 mL/hr at 10/29/23 1131 0.1 mcg/kg/hr at 10/29/23 1131    insulin lispro (HumaLOG,AdmeLOG) injection  0.2 Units/kg/day Subcutaneous TID AC Maggie Vivar M.D.        And    insulin lispro (HumaLOG,AdmeLOG) injection  2-9 Units Subcutaneous Q6HRS Maggie Vivar M.D.        insulin GLARGINE (Lantus,Semglee) injection  15 Units Subcutaneous Q EVENING Kip Jimenez M.D.   15 Units at 10/28/23 1758    dilTIAZem (Cardizem) tablet 90 mg  90 mg Oral TID Kip Jimenez M.D.   90 mg at 10/29/23 0445    clopidogrel (Plavix) tablet 75 mg  75 mg Oral DAILY Sanjay Guillory D.O.   75 mg at 10/29/23 0446    finasteride (Proscar) tablet 5 mg  5 mg Oral DAILY Sanjay Guillory D.O.   5 mg at 10/29/23 0445    tamsulosin (Flomax) capsule 0.4 mg  0.4 mg Oral AFTER BREAKFAST Sanjay Guillory D.O.   0.4 mg at 10/28/23 0858    acetaminophen (Tylenol) tablet 650 mg  650 mg Oral Q6HRS PRN Sanjay Guillory D.O.        hydrALAZINE (Apresoline) injection 10  mg  10 mg Intravenous Q4HRS PRN Sanjay Guillory D.O.        guaiFENesin dextromethorphan (Robitussin DM) 100-10 MG/5ML syrup 10 mL  10 mL Oral Q6HRS PRN Sanjay Guillory D.O.        dextrose 50% (D50W) injection 25 g  25 g Intravenous Q15 MIN PRN Sanjay Guillory D.O.        furosemide (Lasix) injection 40 mg  40 mg Intravenous BID DIURETIC Sanjay Guillory D.O.   40 mg at 10/29/23 0444    linezolid (Zyvox) tablet 600 mg  600 mg Oral Q12HRS Kip Jimenez M.D.   600 mg at 10/29/23 0445    mometasone-formoterol (Dulera) 200-5 MCG/ACT inhaler 2 Puff  2 Puff Inhalation BID (RT) Sanjay Guillory D.O.   2 Puff at 10/29/23 0734       Allergies  Allergies   Allergen Reactions    Atenolol Shortness of Breath and Unspecified     Shortness of breath, weakness    Tetanus Toxoid Swelling    Atorvastatin Nausea     In large doses       Vital Signs last 24 hours  Temp:  [34.8 °C (94.6 °F)-36.4 °C (97.5 °F)] 34.8 °C (94.6 °F)  Pulse:  [78-99] 89  Resp:  [18-28] 26  BP: (104-126)/(58-83) 104/58  SpO2:  [92 %-99 %] 94 %    Physical Exam  Physical Exam  Constitutional:       Appearance: He is ill-appearing.   HENT:      Head: Atraumatic.      Mouth/Throat:      Mouth: Mucous membranes are dry.   Eyes:      Extraocular Movements: Extraocular movements intact.   Cardiovascular:      Rate and Rhythm: Regular rhythm. Tachycardia present.      Heart sounds: Normal heart sounds.   Pulmonary:      Effort: Pulmonary effort is normal.      Breath sounds: Normal breath sounds. No wheezing or rales.      Comments: Diminished on right  Abdominal:      General: There is distension.      Palpations: Abdomen is soft.   Musculoskeletal:         General: Swelling and tenderness present. No deformity.   Skin:     Findings: Erythema present.   Neurological:      General: No focal deficit present.      Mental Status: He is alert and oriented to person, place, and time.         Fluids    Intake/Output Summary (Last 24 hours) at 10/29/2023  1142  Last data filed at 10/29/2023 1141  Gross per 24 hour   Intake 893.02 ml   Output 825 ml   Net 68.02 ml       Laboratory  Recent Results (from the past 48 hour(s))   TROPONIN    Collection Time: 10/27/23  2:19 PM   Result Value Ref Range    Troponin T 75 (H) 6 - 19 ng/L   Blood Culture    Collection Time: 10/27/23  3:32 PM    Specimen: Peripheral; Blood   Result Value Ref Range    Significant Indicator NEG     Source BLD     Site PERIPHERAL     Culture Result       No Growth  Note: Blood cultures are incubated for 5 days and  are monitored continuously.Positive blood cultures  are called to the RN and reported as soon as  they are identified.  Blood culture testing and Gram stain, if indicated, are  performed at [facility], [address] [place] [address].  Positive blood cultures are  sent to [facility], [address] [place] [address], for organism identification and  susceptibility testing.     Blood Culture    Collection Time: 10/27/23  3:32 PM    Specimen: Peripheral; Blood   Result Value Ref Range    Significant Indicator NEG     Source BLD     Site PERIPHERAL     Culture Result       No Growth  Note: Blood cultures are incubated for 5 days and  are monitored continuously.Positive blood cultures  are called to the RN and reported as soon as  they are identified.  Blood culture testing and Gram stain, if indicated, are  performed at [facility], [address] [place] [address].  Positive blood cultures are  sent to [facility], [address] [place] [address], for organism identification and  susceptibility testing.     EC-ECHOCARDIOGRAM COMPLETE W/O CONT    Collection Time: 10/27/23  4:58 PM   Result Value Ref Range    Eject.Frac. MOD BP 29.74     Eject.Frac. MOD 4C 53.15     Eject.Frac. MOD 2C 24.82     Left Ventrical Ejection Fraction 55    POCT glucose device results    Collection Time: 10/27/23  5:19 PM    Result Value Ref Range    POC Glucose, Blood 203 (H) 65 - 99 mg/dL   POCT glucose device results    Collection Time: 10/27/23  8:16 PM   Result Value Ref Range    POC Glucose, Blood 320 (H) 65 - 99 mg/dL   POCT glucose device results    Collection Time: 10/27/23 11:12 PM   Result Value Ref Range    POC Glucose, Blood 329 (H) 65 - 99 mg/dL   proBrain Natriuretic Peptide, NT    Collection Time: 10/28/23  2:44 AM   Result Value Ref Range    NT-proBNP 4929 (H) 0 - 125 pg/mL   Renal Function Panel    Collection Time: 10/28/23  2:44 AM   Result Value Ref Range    Sodium 138 135 - 145 mmol/L    Potassium 5.3 3.6 - 5.5 mmol/L    Chloride 102 96 - 112 mmol/L    Co2 22 20 - 33 mmol/L    Glucose 312 (H) 65 - 99 mg/dL    Creatinine 2.25 (H) 0.50 - 1.40 mg/dL    Bun 60 (H) 8 - 22 mg/dL    Calcium 8.9 8.4 - 10.2 mg/dL    Correct Calcium 9.4 8.5 - 10.5 mg/dL    Phosphorus 4.0 2.5 - 4.5 mg/dL    Albumin 3.4 3.2 - 4.9 g/dL   MAGNESIUM    Collection Time: 10/28/23  2:44 AM   Result Value Ref Range    Magnesium 2.3 1.5 - 2.5 mg/dL   CBC WITHOUT DIFFERENTIAL    Collection Time: 10/28/23  2:44 AM   Result Value Ref Range    WBC 11.2 (H) 4.8 - 10.8 K/uL    RBC 4.19 (L) 4.70 - 6.10 M/uL    Hemoglobin 12.3 (L) 14.0 - 18.0 g/dL    Hematocrit 39.2 (L) 42.0 - 52.0 %    MCV 93.6 81.4 - 97.8 fL    MCH 29.4 27.0 - 33.0 pg    MCHC 31.4 (L) 32.3 - 36.5 g/dL    RDW 53.3 (H) 35.9 - 50.0 fL    Platelet Count 198 164 - 446 K/uL    MPV 10.6 9.0 - 12.9 fL   ESTIMATED GFR    Collection Time: 10/28/23  2:44 AM   Result Value Ref Range    GFR (CKD-EPI) 29 (A) >60 mL/min/1.73 m 2   POCT glucose device results    Collection Time: 10/28/23  6:06 AM   Result Value Ref Range    POC Glucose, Blood 255 (H) 65 - 99 mg/dL   POCT glucose device results    Collection Time: 10/28/23 11:46 AM   Result Value Ref Range    POC Glucose, Blood 373 (H) 65 - 99 mg/dL   POCT glucose device results    Collection Time: 10/28/23  5:57 PM   Result Value Ref Range    POC Glucose,  Blood 325 (H) 65 - 99 mg/dL   POCT glucose device results    Collection Time: 10/28/23  8:56 PM   Result Value Ref Range    POC Glucose, Blood 391 (H) 65 - 99 mg/dL   Comp Metabolic Panel    Collection Time: 10/29/23  1:18 AM   Result Value Ref Range    Sodium 134 (L) 135 - 145 mmol/L    Potassium 5.2 3.6 - 5.5 mmol/L    Chloride 98 96 - 112 mmol/L    Co2 21 20 - 33 mmol/L    Anion Gap 15.0 7.0 - 16.0    Glucose 278 (H) 65 - 99 mg/dL    Bun 69 (H) 8 - 22 mg/dL    Creatinine 2.54 (H) 0.50 - 1.40 mg/dL    Calcium 9.1 8.4 - 10.2 mg/dL    Correct Calcium 9.6 8.5 - 10.5 mg/dL    AST(SGOT) 14 12 - 45 U/L    ALT(SGPT) 15 2 - 50 U/L    Alkaline Phosphatase 114 (H) 30 - 99 U/L    Total Bilirubin 0.5 0.1 - 1.5 mg/dL    Albumin 3.4 3.2 - 4.9 g/dL    Total Protein 7.4 6.0 - 8.2 g/dL    Globulin 4.0 (H) 1.9 - 3.5 g/dL    A-G Ratio 0.9 g/dL   CBC WITHOUT DIFFERENTIAL    Collection Time: 10/29/23  1:18 AM   Result Value Ref Range    WBC 13.3 (H) 4.8 - 10.8 K/uL    RBC 4.19 (L) 4.70 - 6.10 M/uL    Hemoglobin 12.2 (L) 14.0 - 18.0 g/dL    Hematocrit 39.0 (L) 42.0 - 52.0 %    MCV 93.1 81.4 - 97.8 fL    MCH 29.1 27.0 - 33.0 pg    MCHC 31.3 (L) 32.3 - 36.5 g/dL    RDW 53.2 (H) 35.9 - 50.0 fL    Platelet Count 209 164 - 446 K/uL    MPV 10.9 9.0 - 12.9 fL   MAGNESIUM    Collection Time: 10/29/23  1:18 AM   Result Value Ref Range    Magnesium 2.5 1.5 - 2.5 mg/dL   PROCALCITONIN    Collection Time: 10/29/23  1:18 AM   Result Value Ref Range    Procalcitonin 1.24 (H) <0.25 ng/mL   PHOSPHORUS    Collection Time: 10/29/23  1:18 AM   Result Value Ref Range    Phosphorus 4.4 2.5 - 4.5 mg/dL   ESTIMATED GFR    Collection Time: 10/29/23  1:18 AM   Result Value Ref Range    GFR (CKD-EPI) 25 (A) >60 mL/min/1.73 m 2   POCT glucose device results    Collection Time: 10/29/23  4:51 AM   Result Value Ref Range    POC Glucose, Blood 221 (H) 65 - 99 mg/dL   Prothrombin Time    Collection Time: 10/29/23  7:36 AM   Result Value Ref Range    PT 27.5 (H) 12.0  - 14.6 sec    INR 2.45 (H) 0.87 - 1.13   POCT arterial blood gas device results    Collection Time: 10/29/23  7:46 AM   Result Value Ref Range    Ph 7.231 (LL) 7.400 - 7.500    Pco2 59.2 (HH) 26.0 - 37.0 mmHg    Po2 60 (L) 64 - 87 mmHg    Tco2 27 20 - 33 mmol/L    S02 85 (L) 93 - 99 %    Hco3 24.9 17.0 - 25.0 mmol/L    BE -4 -4 - 3 mmol/L    Body Temp 99.0 F degrees    Ph Temp Ricco 7.228 (LL) 7.400 - 7.500    Po2 Temp Cor 61 (L) 64 - 87 mmHg    Specimen Arterial     DelSys Other     LPM 3 lpm   POCT lactate device results    Collection Time: 10/29/23  7:46 AM   Result Value Ref Range    iStat Lactate 0.8 0.5 - 2.0 mmol/L   AMMONIA    Collection Time: 10/29/23  7:56 AM   Result Value Ref Range    Ammonia 20 11 - 45 umol/L       Imaging  CT-CHEST (THORAX) W/O   Final Result         1. Right lower lobe airspace disease and atelectasis.   2. Large right pleural effusion.   3. Small left pleural effusion.   4. Mild, 4 cm ascending thoracic aortic aneurysm.   5. Cardiomegaly.      Fleischner Society pulmonary nodule recommendations:   Not Applicable         CT-HEAD W/O   Final Result      No acute process.         EC-ECHOCARDIOGRAM COMPLETE W/O CONT   Final Result      DX-CHEST-PORTABLE (1 VIEW)   Final Result         1.  Pulmonary edema and/or infiltrates.   2.  Trace right pleural effusion   3.  Cardiomegaly   4.  Atherosclerosis          Assessment/Plan  #Acute on chronic hypercapnic and hypoxic respiratory failure  #History of persistent asthma/COPD - minimal pack history  #Covid 19  #Right pleural effusion  Plan:  - ICU for HFNC  - BIPAP contraindicated due to mental status  - Start renally dosed baricitinib at 1mg daily  - Continue steroids  - Start spiriva  - Continue dulera  - Duonebs Q4H  - Continue abx coverage for now  - Aggressive diuresis  - Thoracentesis if needed - deferred for now due to recent warfarin dosing and plavix  - Vitamin K  - HOB elevation  - Aspiration precautions  - Titrate oxygen to an SpO2  of 88-94%  - Airway clearance as needed    #Cellulitis to bilateral LE  Plan:  - Continue zosyn and Zyvox  - Monitor borders.     #History of afib  Plan:  - Stop coumadin  - Vitamin K  - Monitor INR  - Start heparin when needed    #Pulmonary hypertension  #Diastolic heart failure - biventricular  #CAD s/p PCI RCA stent 8/15/2018  #Mitral regurgitation   Plan:  - Previously was on lasix 80mg BID  - Continue diltiazem  - Continue plavix  - Diuresis as tolerated    #Chronic prolonged Qtc  Plan:  - EKG    #YANETH on CKD - Seen [facility] nephrology in the past  Plan:  - Trial Lasix 80mg IV  - Monitor UOP  - Avoid nephrotoxins as able  - Monitor renal function on daily labs    #T2DM  Plan:  - Accuchecks Q6H  - SSI insulin per scale  - Hold PO meds while in ICU  - Hypoglycemia protocol ordered    #BPH  Plan:  - Continue tamsulosin      Discussed patient condition and risk of morbidity and/or mortality with Family, RN, RT, Pharmacy, and Patient.    The patient remains critically ill.  Critical care time = 90 minutes in directly providing and coordinating critical care and extensive data review.  No time overlap and excludes procedures.    Maggie Vivar MD RD  Pulmonary and Critical Care    Available on Voalte

## 2023-10-29 NOTE — CARE PLAN
The patient is Stable - Low risk of patient condition declining or worsening    Shift Goals  Clinical Goals: IV antibiotics, monitor I/Os  Patient Goals: go to bed    Progress made toward(s) clinical / shift goals:    Problem: Knowledge Deficit - Standard  Goal: Patient and family/care givers will demonstrate understanding of plan of care, disease process/condition, diagnostic tests and medications  Outcome: Progressing     Problem: Skin Integrity  Goal: Skin integrity is maintained or improved  Outcome: Progressing  Note: Waffle cushion, barrier paste, q2h turns, and increased mobility utilized.      Problem: Fall Risk  Goal: Patient will remain free from falls  Outcome: Progressing

## 2023-10-29 NOTE — PROGRESS NOTES
1910 received report and assumed patient care. Patient's family at bedside and patient sitting in the cardiac chair.     2034 patient assessment completed. Patient's family discussed the patient experiencing hallucinations when previously taking medications such as oxybutynin and potassium supplements. Family requested that the doctor be notified. The family was informed that the doctor and day shift RN would be notified. Medications, mobility, q2h turns, skin care, and the plan of care for the evening reviewed. All of the patient's and patient's family's questions addressed at this time.

## 2023-10-29 NOTE — PROGRESS NOTES
0900- Pt to ICU room 319. Respiratory distress, on NRB, tachypneic. Plan to start BIPAP, however pt nauseous and dry heaving. Decision to initiate HHFNC. Pt alert and oriented x4 with orientation questions. RT and Intensivist to bedside.  4 Eyes     Skin Assessment Completed by Yves RN and CASSI Grover.    Head Scab, Dry  Ears WDL, Dry  Nose WDL  Mouth WDL  Neck Discoloration  Breast/Chest Discoloration and Scab  Shoulder Blades WDL  Spine WDL  (R) Arm/Elbow/Hand Skin tear.  (L) Arm/Elbow/Hand WDL  Abdomen- healing scabs.   Groin Redness and Swelling  Scrotum/Coccyx/Buttocks WDL  (R) Leg Redness, Swelling, and Edema  (L) Leg Redness, Swelling, and Edema  (R) Heel/Foot/Toe Top of foot opening/lesion.  (L) Heel/Foot/Toe WDL   Scattered dry, flaky skin.         Devices In Places ECG, Blood Pressure Cuff, Pulse Ox, Gonzalez, and HFNC      Interventions In Place TAP System, Pillows, Q2 Turns, Low Air Loss Mattress, Barrier Cream, and Heels Loaded W/Pillows    Possible Skin Injury No    Pictures Uploaded Into Epic No, needs to be completed  Wound Consult Placed N/A  RN Wound Prevention Protocol Ordered No

## 2023-10-29 NOTE — PROGRESS NOTES
Report received from night RN. Patient appears to have labored breathing and expiratory wheezes, RT at bedside along with MD. Patient currently 6L NC. CT of head and chest ordered. ABGs preformed by RT. Patient to transfer to ICU after CT is completed.     This RN and Charge RN transported patient to CT. Patient unable to tolerated laying flat for CT. Patient saturating 80% on 6L NC. Pt placed on 15L nonrebreather, saturating 94%, now able to tolerate and complete CT. Continuous pulse ox placed while in CT.

## 2023-10-29 NOTE — PROGRESS NOTES
Telemetry Shift Summary    Rhythm afib BBB  HR Range 85-95  Ectopy rPVC  Measurements -/0.15/-      Normal Values  Rhythm SR  HR Range:   Measurements: 0.12-0.20/0.06-0.10/0.30-0.52

## 2023-10-29 NOTE — PROGRESS NOTES
Telemetry Shift Summary    Rhythm afib, BBB  HR Range 65-95  Ectopy rPVC, rTrig  Measurements -/0.16/-        Normal Values  Rhythm SR  HR Range    Measurements 0.12-0.20 / 0.06-0.10  / 0.30-0.52

## 2023-10-29 NOTE — PROGRESS NOTES
Inpatient Anticoagulation Service Note    Date: 10/29/2023    Reason for Anticoagulation: Atrial Fibrillation   Target INR: 2.0 to 3.0  [id number] VASc Score: 5  HAS-BLED Score: 2   Hemoglobin Value: (!) 12.2  Hematocrit Value: (!) 39  Lab Platelet Value: 209    INR from last 7 days       Date/Time INR Value    10/29/23 0736 2.45    10/27/23 0205 1.8          Dose from last 7 days       Date/Time Dose (mg)    10/29/23 0900 3    10/28/23 0800 5    10/27/23 0200 2.5          Average Dose (mg): 27.5  Significant Interactions: Antibiotics, Antiplatelet Medications, Corticosteroids  Bridge Therapy: No (If less than 5 days and overlap therapy discontinued -- document reason (i.e. Bleed Risk))    (If still on overlap therapy, if No -- document reason (i.e. Bleed Risk))    Reversal Agent Administered: Not Applicable  Comments: Pt is 77 yo male on warfarin 2.5 mg (1/2 tablet) on Mon/Wed/Friday and 5 mg (1 tablet) on Tues/Thurs/Sat/Sun for afib. Last dose 10/26 2200. INR therapeutic at 2.1 on 10/26.    Plan:  Will give warfarin 3 mg po tonight for INR of 2.45  Education Material Provided?: No  Pharmacist suggested discharge dosing: Resume home regimen when appropriate.     Clinton Lares, [facility]

## 2023-10-30 NOTE — PROGRESS NOTES
12-hour chart check complete.    Monitor Summary  Rhythm: Afib w/ BBB  Rate: 70-91  Ectopy: rPVC/Coup  Measurements: -/0.16/-

## 2023-10-30 NOTE — CARE PLAN
The patient is Watcher - Medium risk of patient condition declining or worsening    Shift Goals  Clinical Goals: SPO2 88-94%, IV lasix, monitor UO  Patient Goals: rest  Family Goals: TAYLOR    Progress made toward(s) clinical / shift goals:    Problem: Knowledge Deficit - Standard  Goal: Patient and family/care givers will demonstrate understanding of plan of care, disease process/condition, diagnostic tests and medications  Outcome: Progressing     Problem: Skin Integrity  Goal: Skin integrity is maintained or improved  Outcome: Progressing     Problem: Fall Risk  Goal: Patient will remain free from falls  Outcome: Progressing     Problem: Hemodynamics  Goal: Patient's hemodynamics, fluid balance and neurologic status will be stable or improve  Outcome: Progressing     Problem: Urinary - Renal Perfusion  Goal: Ability to achieve and maintain adequate renal perfusion and functioning will improve  Outcome: Progressing       Patient is not progressing towards the following goals:

## 2023-10-30 NOTE — DISCHARGE PLANNING
Case Management Discharge Planning    Admission Date: 10/27/2023  GMLOS: 3.9  ALOS: 3    6-Clicks ADL Score: 17  6-Clicks Mobility Score: 17  PT and/or OT Eval ordered: Yes  Post-acute Referrals Ordered: Yes  Post-acute Choice Obtained: No  Has referral(s) been sent to post-acute provider:  No      Anticipated Discharge Dispo: Discharge Disposition: D/T to SNF with Medicare cert in anticipation of skilled care (03)    DME Needed: No    Action(s) Taken: Completed PASSR/LOC    Escalations Completed: None    Medically Clear: No    Barriers to Discharge: Medical clearance and Pending Placement    **[id number]  PT/OT recommendations reflect possible needs for placement. PASRR created: [id number]  LSW requesting DPA send out SNF referrals.

## 2023-10-30 NOTE — PROGRESS NOTES
Critical Care Progress Note    Date of admission  10/27/2023    Chief Complaint  78 y.o. male admitted 10/27/2023 with covid pneumonia    Hospital Course  78 y.o. male who presented 10/27/2023 with a history of afib on coumadin, COPD (last FEV1 1.51L in 2013) on incruse ellipta and symbicort, HLD, PH (Group 2 and possible Group 3), HTN and sleep apnea.  He is usually on 2L of supplemental oxygen by nasal cannula at home. Covid positive on 10/27, treated with steroids thus far.  He was on the floor today with increased work of breathing overnight.  ABG showed worsening hypercapnic respiratory failure.  Patient with diaphoresis.  CT scan with large right sided pleural effusion and small left with possible pneumonia on the right.  Initial plan was for bedside thoracentesis but the patient started looking better after HFNC and he was given warfarin last night and is therapeutic.  He is also on plavix.  We discussed the thoracentesis with his family and decided to defer for the time being although it will likely need to be done at some point.  Giving vitamin K and holding warfarin.  Patient complains of shortness of breath.  He is seeing people who are not present in the room but is A&Ox3.     Interval Problem Update  Reviewed last 24 hour events:  ICU CHECKLIST:  Chart review from the past 24 hours includes imaging, laboratory studies, vital signs and notes available.  Pertinent system review for today's visit includes:  Significant overnight events:     Neuro: Somnolent this morning.  Precedex off, CO2 consistent with prior  Cardiac: Stable  Pulmonary: Improved on HFNC  Heme: Improved   DVT Prophylaxis: Held warfarin yesterday, will start Heparin gtt after thoracentesis or if thoracentesis is deferred.   /Renal: Worsened creatinine. Improved UOP with lasix gtt   I/O: 665/1715 = -1049 = -2159   Gonzalez: Immobility  ID:  On zosyn and zyvox  Skin: Cellulitis to bilateral lower extremities  GI/Nutrition: Renal  diet   Prophylaxis: Protonix due to high dose steroids  Endo: Stable on SSI    Review of Systems  Review of Systems   Unable to perform ROS: Medical condition        Vital Signs for last 24 hours   Temp:  [34.8 °C (94.6 °F)-36.3 °C (97.4 °F)] 36.2 °C (97.1 °F)  Pulse:  [] 82  Resp:  [15-75] 31  BP: ()/(55-91) 106/71  SpO2:  [91 %-99 %] 97 %    Hemodynamic parameters for last 24 hours       Respiratory Information for the last 24 hours       Physical Exam   Physical Exam  Constitutional:       Appearance: He is ill-appearing.   HENT:      Head: Atraumatic.      Mouth/Throat:      Mouth: Mucous membranes are dry.   Cardiovascular:      Rate and Rhythm: Normal rate.      Heart sounds: Normal heart sounds.   Pulmonary:      Breath sounds: No wheezing, rhonchi or rales.      Comments: Diminished on right  Abdominal:      General: There is distension.      Palpations: Abdomen is soft.   Musculoskeletal:         General: Swelling, tenderness and deformity present.   Skin:     General: Skin is warm and dry.      Findings: Erythema present.   Neurological:      General: No focal deficit present.         Medications  Current Facility-Administered Medications   Medication Dose Route Frequency Provider Last Rate Last Admin    Pharmacy Consult Request - C. difficile consult  1 Each Other PHARMACY TO DOSE Sanjay Guillory D.O.        piperacillin-tazobactam (Zosyn) 3.375 g in  mL IVPB  3.375 g Intravenous Q8HRS Kip Jimenez M.D. 25 mL/hr at 10/30/23 0528 3.375 g at 10/30/23 0528    methylPREDNISolone sod succ (SOLU-MEDROL) 125 MG injection 62.5 mg  62.5 mg Intravenous Q6HRS Kip Jimenez M.D.   62.5 mg at 10/30/23 0529    dexmedetomidine (PRECEDEX) 400 mcg/100mL NS premix infusion  0.1-1.5 mcg/kg/hr (Ideal) Intravenous Continuous Maggie Vivar M.D.   Stopped at 10/30/23 0600    insulin lispro (HumaLOG,AdmeLOG) injection  0.2 Units/kg/day Subcutaneous TID AC Maggie Vivar M.D.        And     insulin lispro (HumaLOG,AdmeLOG) injection  2-9 Units Subcutaneous Q6HRS Maggie Vivar M.D.   3 Units at 10/30/23 0625    tiotropium (Spiriva Respimat) 2.5 mcg/Act inhalation spray 5 mcg  5 mcg Inhalation QDAILY (RT) Maggie Vivar M.D.   5 mcg at 10/30/23 0653    ipratropium-albuterol (DUONEB) nebulizer solution  3 mL Nebulization Q4HRS (RT) Maggie Vivar M.D.   3 mL at 10/30/23 0653    metOLazone (Zaroxolyn) tablet 5 mg  5 mg Oral Q DAY Sanjay Figueroa M.D.   5 mg at 10/29/23 1707    furosemide (Lasix) 100 mg in  mL infusion  25 mg/hr Intravenous Continuous Sanjay Figueroa M.D. 25 mL/hr at 10/30/23 0601 25 mg/hr at 10/30/23 0601    insulin GLARGINE (Lantus,Semglee) injection  15 Units Subcutaneous Q EVENING Kip Jimenez M.D.   15 Units at 10/29/23 1835    dilTIAZem (Cardizem) tablet 90 mg  90 mg Oral TID Kip Jimenez M.D.   90 mg at 10/29/23 1801    clopidogrel (Plavix) tablet 75 mg  75 mg Oral DAILY Sanjay Guillory D.O.   75 mg at 10/29/23 0446    finasteride (Proscar) tablet 5 mg  5 mg Oral DAILY Sanjay Guillory D.O.   5 mg at 10/29/23 0445    tamsulosin (Flomax) capsule 0.4 mg  0.4 mg Oral AFTER BREAKFAST Sanjay Guillory D.O.   0.4 mg at 10/28/23 0858    acetaminophen (Tylenol) tablet 650 mg  650 mg Oral Q6HRS PRN Sanjay Guillory D.O.        hydrALAZINE (Apresoline) injection 10 mg  10 mg Intravenous Q4HRS PRN Sanjay Guillory D.O.        guaiFENesin dextromethorphan (Robitussin DM) 100-10 MG/5ML syrup 10 mL  10 mL Oral Q6HRS PRN Sanjay Guillory D.O.        dextrose 50% (D50W) injection 25 g  25 g Intravenous Q15 MIN PRN Sanjay Guillory D.O.        linezolid (Zyvox) tablet 600 mg  600 mg Oral Q12HRS Kip Jimenez M.D.   600 mg at 10/29/23 1800    mometasone-formoterol (Dulera) 200-5 MCG/ACT inhaler 2 Puff  2 Puff Inhalation BID (RT) Sanjay Guillory D.O.   2 Puff at 10/30/23 0653       Fluids    Intake/Output Summary (Last 24 hours) at 10/30/2023 0817  Last data  filed at 10/30/2023 0800  Gross per 24 hour   Intake 665.77 ml   Output 1815 ml   Net -1149.23 ml       Laboratory  Recent Labs     10/29/23  0746 10/29/23  1224 10/30/23  0747   ISTATAPH 7.231* 7.257* 7.222*   ISTATAPCO2 59.2* 56.0* 58.6*   ISTATAPO2 60* 131* 77   ISTATATCO2 27 27 26   EQVPLYP9CJQ 85* 98 92*   ISTATARTHCO3 24.9 24.9 24.1   ISTATARTBE -4 -3 -4   ISTATTEMP 99.0 F 98.0 F 98.0 F   ISTATFIO2  --  70 40   ISTATSPEC Arterial Arterial Arterial   ISTATAPHTC 7.228* 7.261* 7.226*   [id number] 61* 129* 75         Recent Labs     10/27/23  1001 10/28/23  0244 10/29/23  0118   SODIUM  --  138 134*   POTASSIUM  --  5.3 5.2   CHLORIDE  --  102 98   CO2  --  22 21   BUN  --  60* 69*   CREATININE  --  2.25* 2.54*   MAGNESIUM 2.3 2.3 2.5   PHOSPHORUS 3.5 4.0 4.4   CALCIUM  --  8.9 9.1     Recent Labs     10/28/23  0244 10/29/23  0118   ALTSGPT  --  15   ASTSGOT  --  14   ALKPHOSPHAT  --  114*   TBILIRUBIN  --  0.5   GLUCOSE 312* 278*     Recent Labs     10/28/23  0244 10/29/23  0118   WBC 11.2* 13.3*   ASTSGOT  --  14   ALTSGPT  --  15   ALKPHOSPHAT  --  114*   TBILIRUBIN  --  0.5     Recent Labs     10/28/23  0244 10/29/23  0118 10/29/23  0736 10/30/23  0435   RBC 4.19* 4.19*  --   --    HEMOGLOBIN 12.3* 12.2*  --   --    HEMATOCRIT 39.2* 39.0*  --   --    PLATELETCT 198 209  --   --    PROTHROMBTM  --   --  27.5* 18.7*   INR  --   --  2.45* 1.50*       Imaging  X-Ray:  I have personally reviewed the images and compared with prior images.  EKG:  I have personally reviewed the images and compared with prior images.  CT:    Reviewed    Assessment/Plan  #Acute on chronic hypercapnic and hypoxic respiratory failure  #History of persistent asthma/COPD - minimal pack history  #Covid 19  #Right pleural effusion  Plan:  - Continue ICU care  - BIPAP trial today due to work of breathing  - Continue steroids  - Start spiriva  - Continue dulera  - Duonebs Q4H  - Continue abx coverage for now  - Aggressive diuresis  -  Thoracentesis if needed - CXR stable.   - Vitamin K  - HOB elevation  - Aspiration precautions  - Titrate oxygen to an SpO2 of 88-94%  - Airway clearance as needed     #Cellulitis to bilateral LE  Plan:  - Continue zosyn and Zyvox  - Monitor borders.      #History of afib  Plan:  - Stop coumadin  - s/p Vitamin K  - Monitor INR  - Start heparin when needed     #Pulmonary hypertension  #Diastolic heart failure - biventricular  #CAD s/p PCI RCA stent 8/15/2018  #Mitral regurgitation   Plan:  - Previously was on lasix 80mg BID  - Continue diltiazem  - Continue plavix  - Diuresis as tolerated     #Chronic prolonged Qtc  Plan:  - EKG  -      #YANETH on CKD - Seen [facility] nephrology in the past  Plan:  - Lasix gtt  - Metolazone   - Monitor UOP  - Avoid nephrotoxins as able  - Monitor renal function on daily labs  - nephrology consulted yesterday, started lasix gtt and metolazone, patient has responded well.      #T2DM  Plan:  - Accuchecks Q6H  - SSI insulin per scale  - Hold PO meds while in ICU  - Hypoglycemia protocol ordered     #BPH  Plan:  - continue tamsulosin        VTE:   held due to need for procedures  Ulcer: PPI  Lines: Gonzalez Catheter  Ongoing indication addressed    I have performed a physical exam and reviewed and updated ROS and Plan today (10/30/2023). In review of yesterday's note (10/29/2023), there are no changes except as documented above.     Discussed patient condition and risk of morbidity and/or mortality with Family, RN, RT, Pharmacy, and Patient  The patient remains critically ill.  Critical care time = 90 minutes in directly providing and coordinating critical care and extensive data review.  No time overlap and excludes procedures.    Maggie Vivar MD RD  Pulmonary and Critical Care    Available on Voalte

## 2023-10-30 NOTE — PROGRESS NOTES
"[facility] -  PROGRESS NOTE               Author: Lm Daniels M.D. Date & Time: 10/30/2023  8:49 AM     HPI:  78 y.o. male who presented 10/27/2023 with a history of CKD stage 4 presumed second to DM, afib on coumadin, COPD (last FEV1 1.51L in 2013) on ellipta and symbicort, HLD, PH (Group 2 and possible Group 3), HTN and sleep apnea admitted for edema. Unfortunately, he also has COVID and has decompensated from a resp status during the hospitalization. Despite appropriate and aggressive care, the patients U/O has decreased substantially. He has not responded to furosemide challenges.    ECHO:   Normal left ventricular systolic function.  The left ventricular ejection fraction is visually estimated to be 55-  60%.  Abnormal septal wall motion due to right ventricular pressure and   volume overload.  Diastolic function is abnormal, but grade cannot be assessed due to   underlying rhythm.  The right ventricle is severely dilated with reduced systolic function.  Right ventricular systolic pressure is estimated to be severely   elevated at 80 mmHg.  Severe biatrial dilation.     DAILY NEPHROLOGY SUMMARY:  10/30/23 - Scr continues to rise. Non-oliguric. UA possible UTI. BESSIE reveals 1.  moderate bilateral hydronephrosis, grossly similar to prior. 2.  Decompressed urinary bladder by Gonzalez catheter.    REVIEW OF SYSTEMS:    Unable to assess given patient's clinical status     PMH/PSH/SH/FH:   Reviewed and unchanged since admission note    CURRENT MEDICATIONS:   Reviewed from admission to present day    VS:  /71   Pulse 82   Temp 36.2 °C (97.1 °F) (Temporal)   Resp (!) 31   Ht 1.803 m (5' 11\")   Wt 117 kg (257 lb 15 oz)   SpO2 97%   BMI 35.98 kg/m²     Physical Exam  Vitals and nursing note reviewed.   Constitutional:       General: He is not in acute distress.     Appearance: He is ill-appearing.   HENT:      Head: Normocephalic and atraumatic.      Nose: Nose normal.      " Mouth/Throat:      Mouth: Mucous membranes are moist.   Eyes:      General: No scleral icterus.     Conjunctiva/sclera: Conjunctivae normal.   Cardiovascular:      Rate and Rhythm: Normal rate. Rhythm irregular.   Pulmonary:      Breath sounds: Rhonchi present.   Abdominal:      General: Abdomen is flat. Bowel sounds are normal.      Palpations: Abdomen is soft.   Musculoskeletal:         General: Normal range of motion.      Cervical back: Normal range of motion and neck supple.   Skin:     General: Skin is warm.   Neurological:      Comments: Unable to assess given patient's clinical status    Psychiatric:      Comments: Unable to assess given patient's clinical status          Fluids:  In: 665.8 [P.O.:45; I.V.:68.2]  Out: 1715     LABS:  Recent Labs     10/28/23  0244 10/29/23  0118   SODIUM 138 134*   POTASSIUM 5.3 5.2   CHLORIDE 102 98   CO2 22 21   GLUCOSE 312* 278*   BUN 60* 69*   CREATININE 2.25* 2.54*   CALCIUM 8.9 9.1       IMAGING:   All imaging reviewed from admission to present day    IMPRESSION:  77 y/o man has multiple issues including YANETH and hypervolemia.  #YANETH on advanced DM kdiney disease? Cardiorenal vs COVID- currently nonoliguric. Augustine hydro on BESSIE - this is likely chronic. Would get Urology input  #Hyponatremia second to volume  #Acidemia second to renal failure  #DM  #Proteinuria  #Right sided Heart Failure  #COVID  #Cellulitis  #Pleural Effusions     PLAN:  - Continue furosemide drip/ and metolazone  - Would not recommend RRT given his co-morbidities. He is a poor HD candidate  - Need labs this am  - renal dose abx  - Daily labs        CCM >35 mins

## 2023-10-30 NOTE — THERAPY
10/30/23 1502   Treatment Variance   Reason For Missed Therapy Medical - Patient Is Not Medically Stable   Interdisciplinary Plan of Care Collaboration   IDT Collaboration with  Nursing   Patient Position at End of Therapy In Bed;Phone within Reach;Tray Table within Reach;Call Light within Reach   Collaboration Comments RN reports pt with more 02 demands. RT and fmaily with pt.   Session Information   Date / Session Number  10/30 -1 ( held) 1/4,11/3

## 2023-10-30 NOTE — THERAPY
Speech Language Pathology   Clinical Swallow Evaluation     Patient Name: Yuri De León  AGE:  78 y.o., SEX:  male  Medical Record #: [id number]  Date of Service: 10/30/2023      History of Present Illness  Pt admitted 10/27 with right leg pain and erythema. Found to have cellulitis. Found to be COVID+ and tx to ICU 10/29 d/t increasing SOB.   PMHx of CKD stage 4, resp failure w/ hypoxia and heart failure w/ preserved ejection fraction.   No previous SLP notes found in EMR.     Head CT: negative.     Chest CT:   1. Right lower lobe airspace disease and atelectasis.  2. Large right pleural effusion.  3. Small left pleural effusion.  4. Mild, 4 cm ascending thoracic aortic aneurysm.  5. Cardiomegaly.    General Information:  Vitals  O2 (LPM): 30  O2 Delivery Device: Heated High Flow Nasal Cannula  Level of Consciousness: Alert, Awake  Patient Behaviors: Fatigue  Orientation: Oriented x 4  Follows Directives: Yes - simple commands only    Prior Living Situation & Level of Function:  Prior Services: Intermittent Physical Support for ADL Per Family  Lives with - Patient's Self Care Capacity: Spouse  Communication: WFL  Swallowing: WFL    Oral Mechanism Evaluation:  Dentition: Good, Natural dentition   Facial Symmetry: Equal  Facial Sensation: Equal     Labial Observations: WFL   Lingual Observations: Midline  Motor Speech: WFL    Laryngeal Function:  Secretion Management: Adequate  Voice Quality: Breathy transient, Hoarse  Max Phonation Time (seconds): 3  Cough: Perceptually WNL    Subjective  RN cleared patient for CSE. Patient was on BIPAP, but RT switched patient to 30L O2 at 40% FiO2 via HHFNC for evaluation. No desaturation noted during CSE. Patient AAOx4 and pleasant and cooperative. Vocal quality hoarse and breathy, with adequate phonation only for 1-2 words. Family reports hoarse voice at baseline, but that vocal quality and breathiness are currently worse than normal. Patient denies and dysphagia or s/sx of  aspiration, but reports hx of pneumonia (~5 years ago).    Assessment  Current Method of Nutrition: Oral diet (Renal, but tray being held d/t aspiration concerns)  Positioning: Semi-Khoury's (30-45 degrees)  Bolus Administration: SLP, Patient  O2 (LPM): 30 O2 Delivery Device: Heated High Flow Nasal Cannula  Factor(s) Affecting Performance: Impaired endurance  Tracheostomy : No    Swallowing Trials:  Swallowing Trials  Ice: WFL  Thin Liquid (TN0): Impaired (W/ straw sips only; WNL for tsp and cup sip)  Liquidised (LQ3): WFL  Pureed (PU4): WFL  Soft & Bite Sized (SB6): WFL  Easy to Chew (EC7): WFL  Regular (RG7): WFL    Comments: Per RN, patient noted to have s/sx of aspiration during med pass (given whole w/ thin liquid wash) this AM. Patient noted to have hoarse, transient breathy vocal quality with adequate phonation for only 1-2 words at a time. Family reports this is worse than baseline (they report voice is normally hoarse). Patient's oral motor evaluation was unremarkable other than poor vocal quality and poor breath support for sustained phonation. Patient consumed PO trials of ice, thins via tsp, cup sip and straw, liquidized purees, pudding, soft solids, mixed consistencies, and dry solids. Patient required clinician assistance d/t weakness. Patient presented with coughing after thins via straw, which is concerning for penetration/aspiration. No other overt s/sx of aspiration were noted on any other consistencies trialed. Bolus acceptance and containment was adequate. Mastication was timely and effective. No drop in SpO2 noted with PO trials.     Clinical Impressions  Patient presents with s/sx of oropharyngeal dysphagia on thin liquids via straw, concerning for aspiration. Suspect dysphagia may be acute on chronic, related to poor vocal quality, fatigue, COVID, pneumonia, and generalized weakness. Patient would benefit from FEES to further evaluate oropharyngeal swallow function and r/o aspiration.  "Extensive education to patient and family (wife and daughter) and patient politely refused FEES, reporting he \"was tired of having invasive procedures\". Wife and daughter attempted to encourage patient to participate, but he declined. SLP to follow for diet tolerance and will complete diagnostic swallow study as able/appropriate.       Recommendations  Diet Consistency: Reg/thins (no straws)  Instrumentation: FEES (Recommended, but pt refused)  Medication: Whole with puree  Supervision: Assist with meal tray set up, Encourage self-feeding  Positioning: Fully upright and midline during oral intake  Risk Management : Small bites/sips, Slow rate of intake  Oral Care: Q4h    SLP Treatment Plan  Treatment Plan: Dysphagia Treatment  SLP Frequency: 4x Per Week  Estimated Duration: Until Therapy Goals Met    Anticipated Discharge Needs  Discharge Recommendations: Other (Pending progress and possible FEES if agreeable)   Therapy Recommendations Upon DC: Dysphagia Training, Community Re-Integration, Patient / Family / Caregiver Education      Patient / Family Goals  Patient / Family Goal #1: \"I'm just tired of so many invasive things\"  Short Term Goals  Short Term Goal # 1: Patient will consume regular/thin liquid diet w/ assistance with no overt s/sx of aspiration or worsening respiratory status.  Short Term Goal # 2: Patient will complete FEES to further evaluate oropharyngeal swallow function and r/o aspiration.    Brook Mcmahon, SLP   "

## 2023-10-31 NOTE — THERAPY
Speech Language Pathology   Daily Treatment     Patient Name: Yuri De León  AGE:  78 y.o., SEX:  male  Medical Record #: [id number]  Date of Service: 10/31/2023    Precautions:  Precautions: Fall Risk, Swallow Precautions     Subjective  Pt seen A&Ox3 saturating on 40L HF NC 50% FiO2. Pt continues w/ dysphonia (hoarse, breathy quality) and reduced breath support for phonation (max phonation time ~ 3 seconds). His RR was 24-28 during PO intake.     Assessment  Pt self-fed w/ distant supervision and set-up assist. Noted w/ appropriate rate & volume control- pt taking rest breaks as need to improve swallow-breath coordination. Dry coughing was noted before PO intake- no upper airway wetness or overt coughing was noted during PO trials.     Clinical Impressions  Continued signs of pharyngeal insufficiency (I.e., dystussia, dysphonia) and higher risk for compromised swallow-breath coordination (I.e., high flow nasal cannula requirement, RR> 25). Reduced overt s/s of aspiration noted at bedside today as compared to 10/30. Pt would benefit from instrumentation (I.e., FEES)- however he continues to politely decline to participate. SLP to continue to follow for diet tolerance and will complete diagnostic swallow study as able/appropriate.      Recommendations  Dysphagia Treatment  Diet Consistency: Regular solids, Thin/all Liquids  Instrumentation: FEES  Medication: Whole with puree  Supervision: Assist with meal tray set up, Encourage self-feeding  Positioning: Fully upright and midline during oral intake  Risk Management : Small bites/sips, Slow rate of intake  Oral Care: Q4h    SLP Treatment Plan  Treatment Plan: Dysphagia Treatment  SLP Frequency: 4x Per Week  Estimated Duration: Until Therapy Goals Met    Anticipated Discharge Needs  Discharge Recommendations: Other (Recs to follow pending progress and possible FEES if agreeable)  Therapy Recommendations Upon DC: Dysphagia Training, Patient / Family / Caregiver  "Education    Patient / Family Goals  Patient / Family Goal #1: \"I'm just tired of so many invasive things\"  Short Term Goals  Short Term Goal # 1: Patient will consume regular/thin liquid diet w/ assistance with no overt s/sx of aspiration or worsening respiratory status.  Goal Outcome # 1: Progressing as expected  Short Term Goal # 2: Patient will complete FEES to further evaluate oropharyngeal swallow function and r/o aspiration.  Goal Outcome # 2 : Progressing slower than expected    FERCHO Lara  "

## 2023-10-31 NOTE — PROGRESS NOTES
12-hour chart check complete.    Monitor Summary  Rhythm: Afib with BBB  Rate: 77-94  Ectopy: o-r PVC, r Couplet  Measurements: -/0.16/-

## 2023-10-31 NOTE — CARE PLAN
The patient is Watcher - Medium risk of patient condition declining or worsening    Shift Goals  Clinical Goals: spo2 88-94%, monitor UO, BiPap if pt becomes confused  Patient Goals: rest  Family Goals: rest, get better    Progress made toward(s) clinical / shift goals:    Problem: Knowledge Deficit - Standard  Goal: Patient and family/care givers will demonstrate understanding of plan of care, disease process/condition, diagnostic tests and medications  Outcome: Progressing     Problem: Skin Integrity  Goal: Skin integrity is maintained or improved  Outcome: Progressing     Problem: Fall Risk  Goal: Patient will remain free from falls  Outcome: Progressing     Problem: Hemodynamics  Goal: Patient's hemodynamics, fluid balance and neurologic status will be stable or improve  Outcome: Progressing     Problem: Urinary - Renal Perfusion  Goal: Ability to achieve and maintain adequate renal perfusion and functioning will improve  Outcome: Progressing     Problem: Respiratory  Goal: Patient will achieve/maintain optimum respiratory ventilation and gas exchange  Outcome: Progressing       Patient is not progressing towards the following goals:

## 2023-10-31 NOTE — DISCHARGE PLANNING
Case Management Discharge Planning    Admission Date: 10/27/2023  GMLOS: 5  ALOS: 4    6-Clicks ADL Score: 17  6-Clicks Mobility Score: 17  PT and/or OT Eval ordered: Yes  Post-acute Referrals Ordered: Yes  Post-acute Choice Obtained: No  Has referral(s) been sent to post-acute provider:  Yes      Anticipated Discharge Dispo: Discharge Disposition: D/T to home under A care in anticipation of covered skilled care (06)    DME Needed: No    Action(s) Taken: DC Assessment Complete (See below)    Escalations Completed: None    Medically Clear: No    Barriers to Discharge: Medical clearance    **0802  Per last admission, patient refused home health services. Patient's spouse and daughter are able to assist at home. Patient reports owning a walker, cane, and a walking stick. Does not use oxygen at home. PT/OT evaluations warrant possible placement. Patient has been accepted at [facility], [place], [place], and [facility]. If patient and family are not agreeable to placement, home health may be more optimal. LSW following for discharge needs. Patient remains in ICU.    **1105  Per chart review, patient's family is considering comfort care measures. LSW will continue to follow to assist with discharge needs.

## 2023-10-31 NOTE — PROGRESS NOTES
12-hour chart check complete.    Monitor Summary  Rhythm: Afib w/ BBB  Rate: 77-94  Ectopy: o-rPVC, rCoup  Measurements: -/0.16/-

## 2023-10-31 NOTE — THERAPY
Physical Therapy Contact Note    Patient Name: Yuri De León  Age:  78 y.o., Sex:  male  Medical Record #: [id number]  Today's Date: 10/31/2023         10/31/23 1101   Treatment Variance   Reason For Missed Therapy Medical - Patient on Hold from Therapy   Interdisciplinary Plan of Care Collaboration   IDT Collaboration with  Nursing   Collaboration Comments per RN pt is too fatigued to participate, also with high O2 needs. RN will notify PT if pt is approp for PT this pm

## 2023-10-31 NOTE — CARE PLAN
The patient is Watcher - Medium risk of patient condition declining or worsening    Shift Goals  Clinical Goals: SPO2 88-94%, IV lasix, monitor UO  Patient Goals: rest  Family Goals: TAYLOR    Progress made toward(s) clinical / shift goals:    Problem: Knowledge Deficit - Standard  Goal: Patient and family/care givers will demonstrate understanding of plan of care, disease process/condition, diagnostic tests and medications  Description: Target End Date:  1-3 days or as soon as patient condition allows    Document in Patient Education    1.  Patient and family/caregiver oriented to unit, equipment, visitation policy and means for communicating concern  2.  Complete/review Learning Assessment  3.  Assess knowledge level of disease process/condition, treatment plan, diagnostic tests and medications  4.  Explain disease process/condition, treatment plan, diagnostic tests and medications  Outcome: Progressing     Problem: Skin Integrity  Goal: Skin integrity is maintained or improved  Description: Target End Date:  Prior to discharge or change in level of care    Document interventions on Skin Risk/Darrick flowsheet groups and corresponding LDA    1.  Assess and monitor skin integrity, appearance and/or temperature  2.  Assess risk factors for impaired skin integrity and/or pressures ulcers  3.  Implement precautions to protect skin integrity in collaboration with interdisciplinary team  4.  Implement pressure ulcer prevention protocol if at risk for skin breakdown  5.  Confirm wound care consult if at risk for skin breakdown  6.  Ensure patient use of pressure relieving devices  (Low air loss bed, waffle overlay, heel protectors, ROHO cushion, etc)  Outcome: Progressing     Problem: Fall Risk  Goal: Patient will remain free from falls  Description: Target End Date:  Prior to discharge or change in level of care    Document interventions on the Kim Ruiz Fall Risk Assessment    1.  Assess for fall risk factors  2.   Implement fall precautions  Outcome: Progressing     Problem: Hemodynamics  Goal: Patient's hemodynamics, fluid balance and neurologic status will be stable or improve  Description: Target End Date:  Prior to discharge or change in level of care    Document on Assessment and I/O flowsheet templates    1.  Monitor vital signs, pulse oximetry and cardiac monitor per provider order and/or policy  2.  Maintain blood pressure per provider order  3.  Hemodynamic monitoring per provider order  4.  Manage IV fluids and IV infusions  5.  Monitor intake and output  6.  Daily weights per unit policy or provider order  7.  Assess peripheral pulses and capillary refill  8.  Assess color and body temperature  9.  Position patient for maximum circulation/cardiac output  10. Monitor for signs/symptoms of excessive bleeding  11. Assess mental status, restlessness and changes in level of consciousness  12. Monitor temperature and report fever or hypothermia to provider immediately. Consideration of targeted temperature management.  Outcome: Progressing     Problem: Fluid Volume  Goal: Fluid volume balance will be maintained  Description: Target End Date:  Prior to discharge or change in level of care    Document on I/O flowsheet    1.  Monitor intake and output as ordered  2.  Promote oral intake as appropriate  3.  Report inadequate intake or output to physician  4.  Administer IV therapy as ordered  5.  Weights per provider order  6.  Assess for signs and symptoms of bleeding  7.  Monitor for signs of fluid overload (respiratory changes, edema, weight gain, increased abdominal girth)  8.  Monitor of signs for inadequate fluid volume (poor skin turgor, dry mucous membranes)  9.  Instruct patient on adherence to fluid restrictions  Outcome: Progressing     Problem: Urinary - Renal Perfusion  Goal: Ability to achieve and maintain adequate renal perfusion and functioning will improve  Description: Target End Date:  Prior to discharge  or change in level of care    Document on I/O and Assessment flowsheet    1.  Urine output will remain greater than 0.5ml/Kg/HR  2.  Monitor amount and/or characteristics of urine per order/policy. Specific gravity per order/policy  3.  Assess signs and symptoms of renal dysfunction  Outcome: Progressing

## 2023-10-31 NOTE — PROGRESS NOTES
"[facility] -  PROGRESS NOTE               Author: Sanjay Figueroa M.D. Date & Time: 10/31/2023  9:59 AM     HPI:  78 y.o. male who presented 10/27/2023 with a history of CKD stage 4 presumed second to DM, afib on coumadin, COPD (last FEV1 1.51L in 2013) on ellipta and symbicort, HLD, PH (Group 2 and possible Group 3), HTN and sleep apnea admitted for edema. Unfortunately, he also has COVID and has decompensated from a resp status during the hospitalization. Despite appropriate and aggressive care, the patients U/O has decreased substantially. He has not responded to furosemide challenges.    ECHO:   Normal left ventricular systolic function.  The left ventricular ejection fraction is visually estimated to be 55-  60%.  Abnormal septal wall motion due to right ventricular pressure and   volume overload.  Diastolic function is abnormal, but grade cannot be assessed due to   underlying rhythm.  The right ventricle is severely dilated with reduced systolic function.  Right ventricular systolic pressure is estimated to be severely   elevated at 80 mmHg.  Severe biatrial dilation.     DAILY NEPHROLOGY SUMMARY:  10/30/23 - Scr continues to rise. Non-oliguric. UA possible UTI. BESSIE reveals 1.  moderate bilateral hydronephrosis, grossly similar to prior. 2.  Decompressed urinary bladder by Gonzalez catheter.  10/31: Ongoing  U/O, but minmal overall improvement    REVIEW OF SYSTEMS:    Unable to assess given patient's clinical status     PMH/PSH/SH/FH:   Reviewed and unchanged since admission note    CURRENT MEDICATIONS:   Reviewed from admission to present day    VS:  /68   Pulse 99   Temp 36.7 °C (98.1 °F) (Bladder)   Resp (!) 40   Ht 1.803 m (5' 11\")   Wt 117 kg (257 lb 15 oz)   SpO2 94%   BMI 35.98 kg/m²     Physical Exam  Vitals and nursing note reviewed.   Constitutional:       General: He is not in acute distress.     Appearance: He is ill-appearing.   HENT:      Head: Normocephalic " and atraumatic.      Nose: Nose normal.      Mouth/Throat:      Mouth: Mucous membranes are moist.   Eyes:      General: No scleral icterus.     Conjunctiva/sclera: Conjunctivae normal.   Cardiovascular:      Rate and Rhythm: Normal rate. Rhythm irregular.   Pulmonary:      Breath sounds: Rhonchi present.   Abdominal:      General: Abdomen is flat. Bowel sounds are normal.      Palpations: Abdomen is soft.   Musculoskeletal:         General: Normal range of motion.      Cervical back: Normal range of motion and neck supple.   Skin:     General: Skin is warm.   Neurological:      Comments: Unable to assess given patient's clinical status    Psychiatric:      Comments: Unable to assess given patient's clinical status          Fluids:  In: 982.5 [P.O.:300; Other:70]  Out: 2515     LABS:  Recent Labs     10/29/23  0118 10/30/23  0824 10/31/23  0456   SODIUM 134* 137 138   POTASSIUM 5.2 5.2 4.5   CHLORIDE 98 101 100   CO2 21 23 25   GLUCOSE 278* 252* 229*   BUN 69* 88* 97*   CREATININE 2.54* 3.08* 3.28*   CALCIUM 9.1 8.8 8.8         IMAGING:   All imaging reviewed from admission to present day    IMPRESSION:  77 y/o man has multiple issues including YANETH and hypervolemia.  #YANETH on advanced DM kdiney disease? Cardiorenal vs COVID- currently nonoliguric. Augustine hydro on BESSIE - this is likely chronic. Would get Urology input  #DM  #Proteinuria  #Right sided Heart Failure  #COVID  #Cellulitis  #Pleural Effusions  #Failure to progress     PLAN:  - Continue furosemide drip/ and metolazone  - discussed with family poor rognosis, probalbe comfort, but ongoing discussions

## 2023-10-31 NOTE — CARE PLAN
Problem: Bronchoconstriction  Goal: Improve in air movement and diminished wheezing  Description: Target End Date:  2 to 3 days    1.  Implement inhaled treatments  2.  Evaluate and manage medication effects  Outcome: Progressing     Problem: Humidified High Flow Nasal Cannula  Goal: Maintain adequate oxygenation dependent on patient condition  Description: Target End Date:  resolve prior to discharge or when underlying condition is resolved/stabilized    1.  Implement humidified high flow oxygen therapy  2.  Titrate high flow oxygen to maintain appropriate SpO2  Outcome: Not Progressing

## 2023-10-31 NOTE — PROGRESS NOTES
Critical Care Progress Note    Date of admission  10/27/2023    Chief Complaint  78 y.o. male admitted 10/27/2023 with covid pneumonia    Hospital Course  78 y.o. male who presented 10/27/2023 with a history of afib on coumadin, COPD (last FEV1 1.51L in 2013) on incruse ellipta and symbicort, HLD, PH (Group 2 and possible Group 3), HTN and sleep apnea.  He is usually on 2L of supplemental oxygen by nasal cannula at home. Covid positive on 10/27, treated with steroids thus far.  He was on the floor today with increased work of breathing overnight.  ABG showed worsening hypercapnic respiratory failure.  Patient with diaphoresis.  CT scan with large right sided pleural effusion and small left with possible pneumonia on the right.  Initial plan was for bedside thoracentesis but the patient started looking better after HFNC and he was given warfarin last night and is therapeutic.  He is also on plavix.  We discussed the thoracentesis with his family and decided to defer for the time being although it will likely need to be done at some point.  Giving vitamin K and holding warfarin.  Patient complains of shortness of breath.  He is seeing people who are not present in the room but is A&Ox3.     Interval Problem Update  Reviewed last 24 hour events:  ICU CHECKLIST:  Chart review from the past 24 hours includes imaging, laboratory studies, vital signs and notes available.  Pertinent system review for today's visit includes:  Significant overnight events: Increased O2 requirements    Neuro:   Cardiac: Stable  Pulmonary: Improved on HFNC  Heme: Improved   DVT Prophylaxis: Held warfarin yesterday, will start Heparin gtt after thoracentesis or if thoracentesis is deferred.   /Renal: Worsened creatinine. Acceptable UOP with lasix gtt and metolazone   I/O: 982/2515 = -1532 = -3691   Gonzalez: Immobility  ID:  On zosyn and zyvox  Skin: Cellulitis to bilateral lower extremities  GI/Nutrition: Renal diet   Prophylaxis: Protonix due  to high dose steroids  Endo: Stable on SSI    Review of Systems  Review of Systems   Unable to perform ROS: Medical condition        Vital Signs for last 24 hours   Temp:  [36.7 °C (98.1 °F)] 36.7 °C (98.1 °F)  Pulse:  [] 99  Resp:  [19-54] 40  BP: (106-135)/(59-84) 132/68  SpO2:  [84 %-98 %] 94 %    Hemodynamic parameters for last 24 hours       Respiratory Information for the last 24 hours       Physical Exam   Physical Exam  Constitutional:       Appearance: He is ill-appearing.   HENT:      Head: Atraumatic.      Mouth/Throat:      Mouth: Mucous membranes are dry.   Cardiovascular:      Rate and Rhythm: Normal rate.      Heart sounds: Normal heart sounds.   Pulmonary:      Breath sounds: No wheezing, rhonchi or rales.      Comments: Diminished on right  Abdominal:      General: There is distension.      Palpations: Abdomen is soft.   Musculoskeletal:         General: Swelling, tenderness and deformity present.   Skin:     General: Skin is warm and dry.      Findings: Erythema present.   Neurological:      General: No focal deficit present.         Medications  Current Facility-Administered Medications   Medication Dose Route Frequency Provider Last Rate Last Admin    omeprazole (PriLOSEC) capsule 20 mg  20 mg Oral DAILY Maggie Vivar M.D.   20 mg at 10/31/23 0546    linezolid (Zyvox) tablet 600 mg  600 mg Oral Q12HRS Maggie Vivar M.D.   600 mg at 10/31/23 0545    Pharmacy Consult Request - C. difficile consult  1 Each Other PHARMACY TO DOSE Sanjay Guillory D.O.        piperacillin-tazobactam (Zosyn) 3.375 g in  mL IVPB  3.375 g Intravenous Q8HRS Kip Jimenez M.D. 25 mL/hr at 10/31/23 0544 3.375 g at 10/31/23 0544    methylPREDNISolone sod succ (SOLU-MEDROL) 125 MG injection 62.5 mg  62.5 mg Intravenous Q6HRS Kip Jimenez M.D.   62.5 mg at 10/31/23 0539    insulin lispro (HumaLOG,AdmeLOG) injection  0.2 Units/kg/day Subcutaneous TID AC Maggie Vivar M.D.   8 Units at  10/31/23 0622    And    insulin lispro (HumaLOG,AdmeLOG) injection  2-9 Units Subcutaneous Q6HRS Maggie Vivar M.D.   3 Units at 10/31/23 0620    tiotropium (Spiriva Respimat) 2.5 mcg/Act inhalation spray 5 mcg  5 mcg Inhalation QDAILY (RT) Maggie Vivar M.D.   5 mcg at 10/30/23 0653    ipratropium-albuterol (DUONEB) nebulizer solution  3 mL Nebulization Q4HRS (RT) Maggie Vivar M.D.   3 mL at 10/31/23 0648    metOLazone (Zaroxolyn) tablet 5 mg  5 mg Oral Q DAY Sanjay Figueroa M.D.   5 mg at 10/31/23 0547    furosemide (Lasix) 100 mg in  mL infusion  25 mg/hr Intravenous Continuous Lm KAJAL Daniels M.D. 25 mL/hr at 10/31/23 0458 25 mg/hr at 10/31/23 0458    insulin GLARGINE (Lantus,Semglee) injection  15 Units Subcutaneous Q EVENING Kip Jimenez M.D.   15 Units at 10/30/23 1741    dilTIAZem (Cardizem) tablet 90 mg  90 mg Oral TID Kip Jimenez M.D.   90 mg at 10/31/23 0548    clopidogrel (Plavix) tablet 75 mg  75 mg Oral DAILY ANTONINO MeadOTanya   75 mg at 10/31/23 0547    finasteride (Proscar) tablet 5 mg  5 mg Oral DAILY Sanjay Guillory D.O.   5 mg at 10/31/23 0547    tamsulosin (Flomax) capsule 0.4 mg  0.4 mg Oral AFTER BREAKFAST ANTONINO MeadOTanya   0.4 mg at 10/31/23 0755    acetaminophen (Tylenol) tablet 650 mg  650 mg Oral Q6HRS PRN ANTONINO MeadOTanya        hydrALAZINE (Apresoline) injection 10 mg  10 mg Intravenous Q4HRS PRN Sanjay Guillory D.O.        guaiFENesin dextromethorphan (Robitussin DM) 100-10 MG/5ML syrup 10 mL  10 mL Oral Q6HRS PRN Sanjay Guillory D.O.        dextrose 50% (D50W) injection 25 g  25 g Intravenous Q15 MIN PRN Sanjay Guillory D.O.        mometasone-formoterol (Dulera) 200-5 MCG/ACT inhaler 2 Puff  2 Puff Inhalation BID (RT) Sanjay Guillory D.O.   2 Puff at 10/30/23 1917       Fluids    Intake/Output Summary (Last 24 hours) at 10/31/2023 0841  Last data filed at 10/31/2023 0800  Gross per 24 hour   Intake 1052.5 ml   Output 2565 ml   Net  -1512.5 ml         Laboratory  Recent Labs     10/29/23  1224 10/30/23  0747 10/30/23  1435   ISTATAPH 7.257* 7.222* 7.262*   ISTATAPCO2 56.0* 58.6* 51.5*   ISTATAPO2 131* 77 61*   ISTATATCO2 27 26 25   UBBDETM1GWI 98 92* 87*   ISTATARTHCO3 24.9 24.1 23.2   ISTATARTBE -3 -4 -4   ISTATTEMP 98.0 F 98.0 F 98.0 F   ISTATFIO2 70 40 40   ISTATSPEC Arterial Arterial Arterial   ISTATAPHTC 7.261* 7.226* 7.267*   NHQUVQPR3AU 129* 75 60*           Recent Labs     10/29/23  0118 10/30/23  0824 10/31/23  0456   SODIUM 134* 137 138   POTASSIUM 5.2 5.2 4.5   CHLORIDE 98 101 100   CO2 21 23 25   BUN 69* 88* 97*   CREATININE 2.54* 3.08* 3.28*   MAGNESIUM 2.5 2.6* 2.7*   PHOSPHORUS 4.4 6.4* 6.0*   CALCIUM 9.1 8.8 8.8       Recent Labs     10/29/23  0118 10/30/23  0824 10/31/23  0456   ALTSGPT 15 14 13   ASTSGOT 14 11* 9*   ALKPHOSPHAT 114* 83 75   TBILIRUBIN 0.5 0.5 0.5   GLUCOSE 278* 252* 229*       Recent Labs     10/29/23  0118 10/30/23  0824 10/31/23  0456   WBC 13.3* 8.4 7.5   NEUTSPOLYS  --  94.20*  --    LYMPHOCYTES  --  2.90*  --    MONOCYTES  --  2.40  --    EOSINOPHILS  --  0.00  --    BASOPHILS  --  0.10  --    ASTSGOT 14 11* 9*   ALTSGPT 15 14 13   ALKPHOSPHAT 114* 83 75   TBILIRUBIN 0.5 0.5 0.5       Recent Labs     10/29/23  0118 10/29/23  0736 10/30/23  0435 10/30/23  0824 10/31/23  0456   RBC 4.19*  --   --  4.10* 4.22*   HEMOGLOBIN 12.2*  --   --  12.0* 12.2*   HEMATOCRIT 39.0*  --   --  38.5* 38.8*   PLATELETCT 209  --   --  186 220   PROTHROMBTM  --  27.5* 18.7*  --  17.3*   INR  --  2.45* 1.50*  --  1.34*         Imaging  X-Ray:  I have personally reviewed the images and compared with prior images.  EKG:  I have personally reviewed the images and compared with prior images.  CT:    Reviewed    Assessment/Plan  #Acute on chronic hypercapnic and hypoxic respiratory failure  #History of persistent asthma/COPD - minimal pack history  #Covid 19  #Right pleural effusion  Plan:  - Continue ICU care  - Continue  steroids  - Continue spiriva  - Continue dulera  - Duonebs Q4H  - Continue abx coverage for now  - Aggressive diuresis  - Vitamin K  - HOB elevation  - Aspiration precautions  - Titrate oxygen to an SpO2 of 88-94%  - Airway clearance as needed     #Cellulitis to bilateral LE  Plan:  - Continue zosyn   - Monitor borders.      #History of afib  Plan:  - Stop coumadin  - s/p Vitamin K  - Monitor INR  - Start heparin when needed     #Pulmonary hypertension - Last RVSP 10/27 was 80 indicating severe pulmonary hypertension  #Diastolic heart failure - biventricular  #CAD s/p PCI RCA stent 8/15/2018  #Mitral regurgitation   Plan:  - Previously was on lasix 80mg BID  - Continue diltiazem  - Continue plavix  - Diuresis as tolerated     #Chronic prolonged Qtc  Plan:  - EKG  -      #YANETH on CKD - Seen [facility] nephrology in the past  Plan:  - Lasix gtt D/C'd  - Lasix 80 BID  - Metolazone BID followed by Lasix  - Monitor UOP  - Avoid nephrotoxins as able  - Monitor renal function on daily labs  - nephrology following      #T2DM  Plan:  - Accuchecks Q6H  - SSI insulin per scale  - Hold PO meds while in ICU  - Hypoglycemia protocol ordered     #BPH  Plan:  - continue tamsulosin     GOC: Discussed case with Nephrology who believes the diuretic is working in the short term but not helping the underlying YANETH on CKD.  They feel he is a poor candidate for dialysis and I agree.  I explained to the family how HD would change his life at home and how difficult ESRD on HD can be for people with significant pulmonary hypertension.  I am not confident that tapping his pleural effusion will provide any long term benefit without dialysis or renal recovery as without the lasix gtt and metolazone he was anuric.  I explained, as is the case often,  there are many things we can do for Bill but likely once we start down that path it will lead to more and more interventions which would make Bill uncomfortable. For example, the use of pressors to  help support Ken through dialysis with his heart failure requiring potentially the placement of a second CVC. Furthermore, Ken has stated he is ready to be comfortable and just spend time with his family today. Considering all of these facts the family has decided to consider comfort care.  We will trial Ken off the diuretics today and if his respiratory status worsens significantly we will proceed to comfort measures.           VTE:   held due to need for procedures  Ulcer: PPI  Lines: Gonzalez Catheter  Ongoing indication addressed    I have performed a physical exam and reviewed and updated ROS and Plan today (10/31/2023). In review of yesterday's note (10/30/2023), there are no changes except as documented above.     Discussed patient condition and risk of morbidity and/or mortality with Family, RN, RT, Pharmacy, and Patient  The patient remains critically ill.  Critical care time = 60 minutes in directly providing and coordinating critical care and extensive data review.  No time overlap and excludes procedures.    Maggie Vivar MD RD  Pulmonary and Critical Care    Available on Voalte

## 2023-11-01 PROBLEM — J96.11 CHRONIC RESPIRATORY FAILURE WITH HYPOXIA (HCC): Status: RESOLVED | Noted: 2023-01-01 | Resolved: 2023-01-01

## 2023-11-01 PROBLEM — R94.31 PROLONGED QT INTERVAL: Status: RESOLVED | Noted: 2023-01-01 | Resolved: 2023-01-01

## 2023-11-01 NOTE — CARE PLAN
The patient is Unstable - High likelihood or risk of patient condition declining or worsening    Shift Goals  Clinical Goals: comfort care  Patient Goals: See family and rest  Family Goals: Pt to feel better    Progress made toward(s) clinical / shift goals:    Problem: Skin Integrity  Goal: Skin integrity is maintained or improved  Outcome: Not Progressing     Problem: Fall Risk  Goal: Patient will remain free from falls  Outcome: Progressing       Patient is not progressing towards the following goals:      Problem: Skin Integrity  Goal: Skin integrity is maintained or improved  Outcome: Not Progressing     Problem: Respiratory  Goal: Patient will achieve/maintain optimum respiratory ventilation and gas exchange  Outcome: Not Progressing

## 2023-11-01 NOTE — THERAPY
Physical Therapy   Discharge     Patient Name: Yuri De León  Age:  78 y.o., Sex:  male  Medical Record #: [id number]  Today's Date: 11/1/2023      Plan    Reason for Discharge From Therapy: Pt is now on comfort care, will DC PT

## 2023-11-01 NOTE — PROGRESS NOTES
Charge RN note:    This RN was called by primary RN to bedside. Patient observed with no spontaneous breathing, no audible heart sounds, no pupillary response. Patient is on comfort care measures. Family at bedside. MD updated.

## 2023-11-01 NOTE — WOUND TEAM
[facility]  Inpatient Services  Wound and Skin Care Brief Evaluation    Admission Date: 10/27/2023     Last order of IP CONSULT TO WOUND CARE was found on 10/30/2023 from Hospital Encounter on 10/26/2023     HPI, PMH, SH: Reviewed    No chief complaint on file.    Diagnosis: Cellulitis [L03.90]    Unit where seen by Wound Team: 3319/00     Wound consult placed regarding R plantar 4th toe. Chart and images reviewed. This discussed with bedside RN Jean-Pierre. This clinician in to assess patient. Patient pleasant and agreeable. R. 4th toe. Non-selectively debrided with Normal Saline and Gauze.     Painted with betadine and left open to air.    No pressure injuries or advanced wound care needs identified. Wound consult completed. No further follow up unless indicated and consulted.             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 Right (Active)   Date First Assessed/Time First Assessed: 10/30/23 0800   Present on Original Admission: Yes  Hand Hygiene Completed: Yes  Primary Wound Type: Diabetic Ulcer  Location: (c) Toe, 4th  Wound Orientation: Plantar  Laterality: Right      Assessments 10/31/2023  5:00 PM   Site Assessment Black;Eschar   Periwound Assessment Pink;Red   Margins Defined edges;Attached edges   Closure Open to air   Drainage Amount None   Treatments Site care;Nonselective debridement;Cleansed   Wound Cleansing Normal Saline Irrigation   Dressing Status Open to Air   NEXT Weekly Photo (Inpatient Only) 11/07/23   Wound Team Following Not following       PREVENTATIVE INTERVENTIONS:    Q shift Darrick - performed per nursing policy  Q shift pressure point assessments - performed per nursing policy    Surface/Positioning  ICU Low Airloss - Currently in Place

## 2023-11-01 NOTE — THERAPY
Speech Language Therapy Contact Note    Patient Name: Yuri De León  Age:  78 y.o., Sex:  male  Medical Record #: [id number]  Today's Date: 11/1/2023    Discussed missed therapy with RN       11/01/23 0853   Treatment Variance   Reason For Missed Therapy Medical - Patient Is Not Medically Stable  (Pt now comfort care)   Interdisciplinary Plan of Care Collaboration   IDT Collaboration with  Nursing   Collaboration Comments Per EMR, patient has transitioned to comfort care. SLP will d/c services d/t same. Please reconsult if POC changes. Thank you.

## 2023-11-01 NOTE — WOUND TEAM
Non-selective debridement performed with dry gauze and NS on R 4th toe plantar 1 x 0.9 cm. No c/o pain or bleeding. Painted wound with iodine.  Iodine used for antimicrobial and to keep eschar present for body to autolytic debride eschar on own timeframe.

## 2023-11-01 NOTE — PROGRESS NOTES
Telemetry Shift Summary     Rhythm AFib  HR Range   Ectopy rPVC, rCoup  Measurements  -/0.08/-     Normal Values  Rhythm SR  HR Range    Measurements 0.12-0.20 / 0.06-0.10  / 0.30-0.52

## 2023-11-01 NOTE — PROGRESS NOTES
Received report from ICU RN. Assumed pt care upon arrival to unit.   Pt is resting comfortably in bed does not open eyes to voice.    Plan of care reviewed with family.     Patients needs attended well. Fall precautions in place. Bed at lowest position. Call light and personal belongings within reach.   Hourly rounding in progress.   Family at bedside. Pt continues to rest no distress noted.   Medications administered for comfort.

## 2023-11-01 NOTE — DISCHARGE PLANNING
Physician asked me to get choice. Family waiting for Dr to come see them. They do not want hospice.I offered for Hospice to come and due education for the family and family declined. Family feels patient should stay here until he passes. Explained all option in case he doesn't pass in the next 24 hrs. Family waiting for the physician to come and talk to them before making any decisions.

## 2023-11-01 NOTE — PROGRESS NOTES
12-hour chart check complete.    Monitor Summary  Rhythm: Afib w BBB   Rate:   Ectopy: r-o PVC  Measurements: -/0.14/-

## 2023-11-01 NOTE — DISCHARGE PLANNING
Care Transition Team Discharge Planning    Anticipated Discharge Information  Discharge Disposition: D/T to home under [facility] care in anticipation of covered skilled care (06)    Discharge Plan:  patient was in ICU for resp. Distress. On HF o2 40L 50 %. A/o x 3 with hallucinations. Found large pleural effusions with probable Pnuemonia. Uses home o2 at 2l/nc.    Family decided on comfort care today. Moved from ICU to 2224-01.

## 2023-11-01 NOTE — DISCHARGE PLANNING
Care Transition Team Discharge Planning    Anticipated Discharge Information  Discharge Disposition: D/T to hospice home (50)     Discharge Plan:  patient downgraded to comfort care and moved to medical room. Family at bedside, wife, daughter ,daughter in law, grandsons. Son is enroute eta 1 hour.     Attempted to discuss next steps with family. They are not wanting hospice they just want to be here with family at bedside. Daughter said that is all he wanted.   Messaged Physician regarding palliative care or speaking to the family for next steps.

## 2023-11-01 NOTE — PROGRESS NOTES
[facility] Medicine Daily Progress Note    Date of Service  11/1/2023    Chief Complaint  Yuri De León is a 78 y.o. male admitted 10/27/2023 with COVID-pneumonia. History of atrial fibrillation (on Coumadin), COPD (2 L/min nasal cannula at home), hypertension, dyslipidemia, hypertension, CKD-4, sleep apnea.    Hospital Course  On admission, ABG showed worsening hypercapnic respiratory failure.  CT showed large right-sided pleural effusion, small left pleural effusion, and possible pneumonia.  Patient's family decided to defer thoracentesis.    Patient was noted to have bilateral lower extremity cellulitis. He was empirically started on Zosyn, Zyvox, and steroids.  Spiriva, and Dulera were continued.    Nephrology were consulted for acute on chronic renal failure.  Renal ultrasound showed direct bilateral hydronephrosis.  Gonzalez catheter was placed.    Patient was started on Lasix drip and metolazone.  Serum creatinine continued to increase. Urine output substantially decreased, and did not respond to Lasix challenge. Patient was deemed a poor candidate for hemodialysis due to his comorbidities.    Patient's oxygen requirements increased. Echocardiogram from 10/27/2023 showed LVEF of 55 to 60%, severely dilated left ventricle with reduced systolic function, RVSP of about 80 mmHg, severe tricuspid regurgitation.    After an extensive goals of care discussion, patient decided to transition to comfort care. His family requested to keep oxygen until his brother arrived from out of town.     Interval Problem Update  Patient is unresponsive. Oxygen requirement have continued to increase, currently saturating 89% on 40 L/min high flow oxygen.  Urine output continues to be low.  No positive cultures.    Oxygen was continued until patient's brother was able to arrive and see the patient. Patient's brother and all family members are all present and are ready to have oxygen removed and pursue full comfort care measures.         I have discussed this patient's plan of care and discharge plan at IDT rounds today with Case Management, Nursing, Nursing leadership, and other members of the IDT team.    Consultants/Specialty  critical care, nephrology, and pulmonary    Code Status  Comfort Care/DNR    Disposition  Medically Cleared  I have placed the appropriate orders for post-discharge needs.    Review of Systems  Review of Systems   Unable to perform ROS: Acuity of condition        Physical Exam  Temp:  [36.2 °C (97.1 °F)-36.4 °C (97.5 °F)] 36.2 °C (97.1 °F)  Pulse:  [] 104  Resp:  [13-29] 18  BP: (109-143)/(63-78) 136/73  SpO2:  [87 %-100 %] 100 %    Physical Exam  Constitutional:       Appearance: He is ill-appearing.      Comments: Unresponsive   HENT:      Head: Normocephalic.      Mouth/Throat:      Mouth: Mucous membranes are dry.   Eyes:      Comments: Eyes closed   Pulmonary:      Comments: On oxygen support  Neurological:      Comments: Unresponsive         Fluids    Intake/Output Summary (Last 24 hours) at 11/1/2023 1208  Last data filed at 11/1/2023 0800  Gross per 24 hour   Intake 200 ml   Output 800 ml   Net -600 ml       Laboratory  Recent Labs     10/30/23  0824 10/31/23  0456   WBC 8.4 7.5   RBC 4.10* 4.22*   HEMOGLOBIN 12.0* 12.2*   HEMATOCRIT 38.5* 38.8*   MCV 93.9 91.9   MCH 29.3 28.9   MCHC 31.2* 31.4*   RDW 53.5* 51.2*   PLATELETCT 186 220   MPV 10.3 10.5     Recent Labs     10/30/23  0824 10/31/23  0456   SODIUM 137 138   POTASSIUM 5.2 4.5   CHLORIDE 101 100   CO2 23 25   GLUCOSE 252* 229*   BUN 88* 97*   CREATININE 3.08* 3.28*   CALCIUM 8.8 8.8     Recent Labs     10/30/23  0435 10/31/23  0456   INR 1.50* 1.34*               Imaging  DX-CHEST-PORTABLE (1 VIEW)   Final Result      1.  Slight increase in right basilar opacity consistent with a combination of atelectasis and pleural effusion      2.  Enlarged cardiac silhouette      US-RENAL   Final Result         Poorly visualized bilateral renal fossae due to  bowel gas and body habitus.      1.  Likely moderate bilateral hydronephrosis, grossly similar to prior.   2.  Decompressed urinary bladder by Gonzlaez catheter.      CT-CHEST (THORAX) W/O   Final Result         1. Right lower lobe airspace disease and atelectasis.   2. Large right pleural effusion.   3. Small left pleural effusion.   4. Mild, 4 cm ascending thoracic aortic aneurysm.   5. Cardiomegaly.      Fleischner Society pulmonary nodule recommendations:   Not Applicable         CT-HEAD W/O   Final Result      No acute process.         EC-ECHOCARDIOGRAM COMPLETE W/O CONT   Final Result      DX-CHEST-PORTABLE (1 VIEW)   Final Result         1.  Pulmonary edema and/or infiltrates.   2.  Trace right pleural effusion   3.  Cardiomegaly   4.  Atherosclerosis           Assessment/Plan  * Acute hypercapnic respiratory failure (HCC)  Assessment & Plan  On 2 L/min nasal cannula oxygen at home. ABG showed PaCO2 60. Pulmonology were consulted.  COVID positive.  Procalcitonin was elevated. Patient was transferred to the ICU due to increased oxygen requirement. He was transitioned to comfort care and transferred back to the floor    Pulmonary hypertension (HCC)- (present on admission)  Assessment & Plan  Echocardiogram from 10/27/2023 showed LVEF of 55 to 60%, severely dilated left ventricle with reduced systolic function, RVSP of about 80 mmHg, severe tricuspid regurgitation.  Previous echocardiogram showed RVSP of 60 to 65 mmHg    Chronic heart failure with preserved ejection fraction (HCC)- (present on admission)  Assessment & Plan  Acute exacerbation secondary to pneumonia. Echocardiogram from 10/27/2023 showed LVEF of 55 to 60%, severely dilated left ventricle with reduced systolic function, RVSP of about 80 mmHg, severe tricuspid regurgitation.  Comfort care    Cellulitis- (present on admission)  Assessment & Plan  Secondary to bilateral venous stasis and edema.  Linezolid and Zosyn were empirically started on admission.  " No positive cultures.  On comfort care    Pneumonia of right lower lobe due to infectious organism- (present on admission)  Assessment & Plan  COVID was positive.  Procalcitonin was 0.44.  Chest x-ray showed possible right lower lobe pneumonia.  Empirically started on IV Zosyn linezolid.  No clinical improvement.  On comfort care    BPH (benign prostatic hyperplasia)- (present on admission)  Assessment & Plan  Gonzalez catheter was placed.  On comfort care    S/P coronary artery stent placement- (present on admission)  Assessment & Plan  Has been on Plavix prior to admission.  On comfort care.    Acute hypoxemic respiratory failure due to COVID-19 ([facility])- (present on admission)  Assessment & Plan  Patient has had labored breathing, tachypneic, increasing oxygen requirements.  He has been on IV Solu-Medrol, IV Lasix, and increasing amount of oxygen without any improvement.  On comfort care.    Type 2 diabetes mellitus with kidney complication, without long-term current use of insulin ([facility])- (present on admission)  Assessment & Plan  On comfort care measures only    Advanced care planning/counseling discussion- (present on admission)  Assessment & Plan  Patient wished to be DNR/DNI.  Due to multiple comorbidities and deterioration, and extensive goals of care discussion , patient decided to transition to comfort care.  Per previous previous MD: \"discussed the [place] POLST form with the patient. POA: name Avelino De León wife  I discussed the [place] POLST form with the patient. POLST form was signed by the POA and uploaded to Epic EMR\".    Atrial fibrillation, chronic (HCC)- (present on admission)  Assessment & Plan  On comfort care    CKD (chronic kidney disease) stage 4, GFR 15-29 ml/min (CMS-[facility])- (present on admission)  Assessment & Plan  Comfort care measures         VTE prophylaxis: Comfort care      "

## 2023-11-01 NOTE — DISCHARGE SUMMARY
Death Summary    Cause of Death  Acute hypercapnic respiratory failure, Covid pneumonia, acute on chronic renal failure. Underlying severe pulmonary hypertension    Comorbid Conditions at the Time of Death  Principal Problem:    Acute hypercapnic respiratory failure (HCC) (POA: Clinically Undetermined)  Active Problems:    Chronic heart failure with preserved ejection fraction (HCC) (POA: Yes)    Pulmonary hypertension (HCC) (POA: Yes)    Cellulitis (POA: Yes)    Type 2 diabetes mellitus with kidney complication, without long-term current use of insulin (HCC) (POA: Yes)    Acute hypoxemic respiratory failure due to COVID-19 (HCC) (POA: Yes)    S/P coronary artery stent placement (POA: Yes)      Overview: Mid right coronary artery with       overlapping stents:4.5x18 mm Ultra non-drug eluting stent and 4.0x38 mm       Xience drug-eluting stent     BPH (benign prostatic hyperplasia) (POA: Yes)    Pneumonia of right lower lobe due to infectious organism (POA: Yes)    CKD (chronic kidney disease) stage 4, GFR 15-29 ml/min (CMS-[facility]) (POA: Yes)    Atrial fibrillation, chronic ([facility]) (POA: Yes)    Advanced care planning/counseling discussion (POA: Yes)  Resolved Problems:    Chronic respiratory failure with hypoxia (HCC) (POA: Yes)    Prolonged QT interval (POA: Yes)      History of Presenting Illness and Hospital Course  Yuri De León is a 78 y.o. male admitted 10/27/2023 with COVID-pneumonia. History of atrial fibrillation (on Coumadin), COPD (2 L/min nasal cannula at home), hypertension, dyslipidemia, hypertension, CKD-4, sleep apnea.     On admission, ABG showed worsening hypercapnic respiratory failure.  CT showed large right-sided pleural effusion, small left pleural effusion, and possible pneumonia.  Patient's family decided to defer thoracentesis.     Patient was noted to have bilateral lower extremity cellulitis. He was empirically started on Zosyn, Zyvox, and steroids.  Spiriva, and Dulera were continued.      Nephrology were consulted for acute on chronic renal failure.  Renal ultrasound showed direct bilateral hydronephrosis.  Gonzalez catheter was placed.     Patient was started on Lasix drip and metolazone.  Serum creatinine continued to increase. Urine output substantially decreased, and did not respond to Lasix challenge. Patient was deemed a poor candidate for hemodialysis due to his comorbidities.     Patient's oxygen requirements increased. Echocardiogram from 10/27/2023 showed LVEF of 55 to 60%, severely dilated left ventricle with reduced systolic function, RVSP of about 80 mmHg, severe tricuspid regurgitation.     After an extensive goals of care discussion, patient decided to transition to comfort care. His family requested to keep oxygen until his brother arrived from out of town.      Patient remained unresponsive with increasing oxygen requirements. Urine output continued to diminish.      After patient's brother arrived, patient's wife and family were ready to have oxygen removed and pursue full comfort care measures.      Death Date: 11/01/23   Death Time: 1444      Pronounced By (RN1): Diane  Pronounced By (RN2): Hector

## 2024-01-12 NOTE — FLOWSHEET NOTE
03/23/18 0430   Events/Summary/Plan   Events/Summary/Plan On BiPAP   General Vent Information   Pulse Oximetry 98 %   Heart Rate (Monitored) (!) 101   BiPAP for Respiratory Failure   #BiPap Initial Day  Yes   IPAP (cmH2O) 10   EPAP (cm H2O) 6   FiO2 35   Alarms Set / Checked Yes   Non-Invasive Temp (C) 31   Respiratory Rate Patient 32   Spontaneous Vt (ml) 589   Spontaneous Ve (LPM) 17.8      good balance

## (undated) DEVICE — WATER IRRIGATION STERILE 1000ML (12EA/CA)

## (undated) DEVICE — SENSOR OXIMETER ADULT SPO2 RD SET (20EA/BX)

## (undated) DEVICE — TOWEL STOP TIMEOUT SAFETY FLAG (40EA/CA)

## (undated) DEVICE — HUMID-VENT HEAT AND MOISTURE EXCHANGE- (50/BX)

## (undated) DEVICE — SUTURE 5-0 PLAIN GUT PC-1 - (12/BX)

## (undated) DEVICE — GOWN WARMING STANDARD FLEX - (30/CA)

## (undated) DEVICE — LACTATED RINGERS INJ 1000 ML - (14EA/CA 60CA/PF)

## (undated) DEVICE — CANISTER SUCTION RIGID RED 1500CC (40EA/CA)

## (undated) DEVICE — SUCTION INSTRUMENT YANKAUER BULBOUS TIP W/O VENT (50EA/CA)

## (undated) DEVICE — PACK MINOR BASIN - (2EA/CA)

## (undated) DEVICE — SODIUM CHL IRRIGATION 0.9% 1000ML (12EA/CA)

## (undated) DEVICE — SUTURE 4-0 VICRYL PLUS FS-2 - 27 INCH (36/BX)

## (undated) DEVICE — SUTURE GENERAL

## (undated) DEVICE — GLOVE, LITE (PAIR)

## (undated) DEVICE — TUBE CONNECTING SUCTION - CLEAR PLASTIC STERILE 72 IN (50EA/CA)

## (undated) DEVICE — ELECTRODE DUAL RETURN W/ CORD - (50/PK)

## (undated) DEVICE — BAG SPONGE COUNT 10.25 X 32 - BLUE (250/CA)

## (undated) DEVICE — DRAPE SURGICAL U 77X120 - (10/CA)